# Patient Record
Sex: FEMALE | Race: WHITE | NOT HISPANIC OR LATINO | Employment: UNEMPLOYED | URBAN - NONMETROPOLITAN AREA
[De-identification: names, ages, dates, MRNs, and addresses within clinical notes are randomized per-mention and may not be internally consistent; named-entity substitution may affect disease eponyms.]

---

## 2022-09-11 ENCOUNTER — APPOINTMENT (OUTPATIENT)
Dept: ULTRASOUND IMAGING | Facility: HOSPITAL | Age: 72
End: 2022-09-11

## 2022-09-11 ENCOUNTER — APPOINTMENT (OUTPATIENT)
Dept: CT IMAGING | Facility: HOSPITAL | Age: 72
End: 2022-09-11

## 2022-09-11 ENCOUNTER — APPOINTMENT (OUTPATIENT)
Dept: GENERAL RADIOLOGY | Facility: HOSPITAL | Age: 72
End: 2022-09-11

## 2022-09-11 ENCOUNTER — HOSPITAL ENCOUNTER (INPATIENT)
Facility: HOSPITAL | Age: 72
LOS: 10 days | Discharge: HOME HEALTH | End: 2022-09-22
Attending: STUDENT IN AN ORGANIZED HEALTH CARE EDUCATION/TRAINING PROGRAM | Admitting: INTERNAL MEDICINE

## 2022-09-11 DIAGNOSIS — N30.01 ACUTE CYSTITIS WITH HEMATURIA: ICD-10-CM

## 2022-09-11 DIAGNOSIS — R53.81 PHYSICAL DEBILITY: ICD-10-CM

## 2022-09-11 DIAGNOSIS — N17.9 AKI (ACUTE KIDNEY INJURY): Primary | ICD-10-CM

## 2022-09-11 DIAGNOSIS — I50.33 ACUTE ON CHRONIC HEART FAILURE WITH PRESERVED EJECTION FRACTION: ICD-10-CM

## 2022-09-11 PROBLEM — N39.0 UTI (URINARY TRACT INFECTION): Status: ACTIVE | Noted: 2022-09-11

## 2022-09-11 LAB
ALBUMIN SERPL-MCNC: 2.99 G/DL (ref 3.5–5.2)
ALBUMIN/GLOB SERPL: 0.9 G/DL
ALP SERPL-CCNC: 132 U/L (ref 39–117)
ALT SERPL W P-5'-P-CCNC: 8 U/L (ref 1–33)
ANION GAP SERPL CALCULATED.3IONS-SCNC: 12.3 MMOL/L (ref 5–15)
APTT PPP: 33.5 SECONDS (ref 26.5–34.5)
AST SERPL-CCNC: 12 U/L (ref 1–32)
BACTERIA UR QL AUTO: ABNORMAL /HPF
BASOPHILS # BLD AUTO: 0.04 10*3/MM3 (ref 0–0.2)
BASOPHILS NFR BLD AUTO: 0.3 % (ref 0–1.5)
BILIRUB SERPL-MCNC: 0.4 MG/DL (ref 0–1.2)
BILIRUB UR QL STRIP: NEGATIVE
BUN SERPL-MCNC: 36 MG/DL (ref 8–23)
BUN/CREAT SERPL: 19.7 (ref 7–25)
CALCIUM SPEC-SCNC: 8.7 MG/DL (ref 8.6–10.5)
CHLORIDE SERPL-SCNC: 97 MMOL/L (ref 98–107)
CLARITY UR: ABNORMAL
CO2 SERPL-SCNC: 17.7 MMOL/L (ref 22–29)
COLOR UR: ABNORMAL
CREAT SERPL-MCNC: 1.83 MG/DL (ref 0.57–1)
CRP SERPL-MCNC: 9.5 MG/DL (ref 0–0.5)
D-LACTATE SERPL-SCNC: 1 MMOL/L (ref 0.5–2)
DEPRECATED RDW RBC AUTO: 45.7 FL (ref 37–54)
EGFRCR SERPLBLD CKD-EPI 2021: 29 ML/MIN/1.73
EOSINOPHIL # BLD AUTO: 0.05 10*3/MM3 (ref 0–0.4)
EOSINOPHIL NFR BLD AUTO: 0.4 % (ref 0.3–6.2)
ERYTHROCYTE [DISTWIDTH] IN BLOOD BY AUTOMATED COUNT: 14.6 % (ref 12.3–15.4)
FLUAV SUBTYP SPEC NAA+PROBE: NOT DETECTED
FLUBV RNA ISLT QL NAA+PROBE: NOT DETECTED
GLOBULIN UR ELPH-MCNC: 3.4 GM/DL
GLUCOSE SERPL-MCNC: 143 MG/DL (ref 65–99)
GLUCOSE UR STRIP-MCNC: NEGATIVE MG/DL
HCT VFR BLD AUTO: 34.4 % (ref 34–46.6)
HGB BLD-MCNC: 11.3 G/DL (ref 12–15.9)
HGB UR QL STRIP.AUTO: ABNORMAL
HYALINE CASTS UR QL AUTO: ABNORMAL /LPF
IMM GRANULOCYTES # BLD AUTO: 0.09 10*3/MM3 (ref 0–0.05)
IMM GRANULOCYTES NFR BLD AUTO: 0.6 % (ref 0–0.5)
INR PPP: 1.07 (ref 0.9–1.1)
KETONES UR QL STRIP: ABNORMAL
LEUKOCYTE ESTERASE UR QL STRIP.AUTO: ABNORMAL
LIPASE SERPL-CCNC: 34 U/L (ref 13–60)
LYMPHOCYTES # BLD AUTO: 0.99 10*3/MM3 (ref 0.7–3.1)
LYMPHOCYTES NFR BLD AUTO: 7 % (ref 19.6–45.3)
MAGNESIUM SERPL-MCNC: 2.1 MG/DL (ref 1.6–2.4)
MCH RBC QN AUTO: 28.2 PG (ref 26.6–33)
MCHC RBC AUTO-ENTMCNC: 32.8 G/DL (ref 31.5–35.7)
MCV RBC AUTO: 85.8 FL (ref 79–97)
MONOCYTES # BLD AUTO: 1.02 10*3/MM3 (ref 0.1–0.9)
MONOCYTES NFR BLD AUTO: 7.2 % (ref 5–12)
NEUTROPHILS NFR BLD AUTO: 11.94 10*3/MM3 (ref 1.7–7)
NEUTROPHILS NFR BLD AUTO: 84.5 % (ref 42.7–76)
NITRITE UR QL STRIP: POSITIVE
NRBC BLD AUTO-RTO: 0 /100 WBC (ref 0–0.2)
PH UR STRIP.AUTO: 7 [PH] (ref 5–8)
PLATELET # BLD AUTO: 298 10*3/MM3 (ref 140–450)
PMV BLD AUTO: 8.5 FL (ref 6–12)
POTASSIUM SERPL-SCNC: 4.9 MMOL/L (ref 3.5–5.2)
PROT SERPL-MCNC: 6.4 G/DL (ref 6–8.5)
PROT UR QL STRIP: ABNORMAL
PROTHROMBIN TIME: 14.1 SECONDS (ref 12.1–14.7)
RBC # BLD AUTO: 4.01 10*6/MM3 (ref 3.77–5.28)
RBC # UR STRIP: ABNORMAL /HPF
REF LAB TEST METHOD: ABNORMAL
SARS-COV-2 RNA PNL SPEC NAA+PROBE: NOT DETECTED
SODIUM SERPL-SCNC: 127 MMOL/L (ref 136–145)
SP GR UR STRIP: 1.01 (ref 1–1.03)
SQUAMOUS #/AREA URNS HPF: ABNORMAL /HPF
UROBILINOGEN UR QL STRIP: ABNORMAL
WBC # UR STRIP: ABNORMAL /HPF
WBC NRBC COR # BLD: 14.13 10*3/MM3 (ref 3.4–10.8)

## 2022-09-11 PROCEDURE — G0378 HOSPITAL OBSERVATION PER HR: HCPCS

## 2022-09-11 PROCEDURE — 87077 CULTURE AEROBIC IDENTIFY: CPT | Performed by: STUDENT IN AN ORGANIZED HEALTH CARE EDUCATION/TRAINING PROGRAM

## 2022-09-11 PROCEDURE — 74176 CT ABD & PELVIS W/O CONTRAST: CPT

## 2022-09-11 PROCEDURE — 83690 ASSAY OF LIPASE: CPT | Performed by: STUDENT IN AN ORGANIZED HEALTH CARE EDUCATION/TRAINING PROGRAM

## 2022-09-11 PROCEDURE — 87186 SC STD MICRODIL/AGAR DIL: CPT | Performed by: STUDENT IN AN ORGANIZED HEALTH CARE EDUCATION/TRAINING PROGRAM

## 2022-09-11 PROCEDURE — P9612 CATHETERIZE FOR URINE SPEC: HCPCS

## 2022-09-11 PROCEDURE — 87086 URINE CULTURE/COLONY COUNT: CPT | Performed by: STUDENT IN AN ORGANIZED HEALTH CARE EDUCATION/TRAINING PROGRAM

## 2022-09-11 PROCEDURE — 76705 ECHO EXAM OF ABDOMEN: CPT

## 2022-09-11 PROCEDURE — 71045 X-RAY EXAM CHEST 1 VIEW: CPT

## 2022-09-11 PROCEDURE — 83605 ASSAY OF LACTIC ACID: CPT | Performed by: STUDENT IN AN ORGANIZED HEALTH CARE EDUCATION/TRAINING PROGRAM

## 2022-09-11 PROCEDURE — 80053 COMPREHEN METABOLIC PANEL: CPT | Performed by: STUDENT IN AN ORGANIZED HEALTH CARE EDUCATION/TRAINING PROGRAM

## 2022-09-11 PROCEDURE — 85025 COMPLETE CBC W/AUTO DIFF WBC: CPT | Performed by: STUDENT IN AN ORGANIZED HEALTH CARE EDUCATION/TRAINING PROGRAM

## 2022-09-11 PROCEDURE — 87636 SARSCOV2 & INF A&B AMP PRB: CPT | Performed by: STUDENT IN AN ORGANIZED HEALTH CARE EDUCATION/TRAINING PROGRAM

## 2022-09-11 PROCEDURE — 99285 EMERGENCY DEPT VISIT HI MDM: CPT

## 2022-09-11 PROCEDURE — 87040 BLOOD CULTURE FOR BACTERIA: CPT | Performed by: STUDENT IN AN ORGANIZED HEALTH CARE EDUCATION/TRAINING PROGRAM

## 2022-09-11 PROCEDURE — 93005 ELECTROCARDIOGRAM TRACING: CPT | Performed by: STUDENT IN AN ORGANIZED HEALTH CARE EDUCATION/TRAINING PROGRAM

## 2022-09-11 PROCEDURE — 81001 URINALYSIS AUTO W/SCOPE: CPT | Performed by: STUDENT IN AN ORGANIZED HEALTH CARE EDUCATION/TRAINING PROGRAM

## 2022-09-11 PROCEDURE — 25010000002 CEFTRIAXONE PER 250 MG

## 2022-09-11 PROCEDURE — 99223 1ST HOSP IP/OBS HIGH 75: CPT | Performed by: PHYSICIAN ASSISTANT

## 2022-09-11 PROCEDURE — 86140 C-REACTIVE PROTEIN: CPT | Performed by: STUDENT IN AN ORGANIZED HEALTH CARE EDUCATION/TRAINING PROGRAM

## 2022-09-11 PROCEDURE — 93010 ELECTROCARDIOGRAM REPORT: CPT | Performed by: INTERNAL MEDICINE

## 2022-09-11 PROCEDURE — 83735 ASSAY OF MAGNESIUM: CPT | Performed by: STUDENT IN AN ORGANIZED HEALTH CARE EDUCATION/TRAINING PROGRAM

## 2022-09-11 PROCEDURE — 85610 PROTHROMBIN TIME: CPT | Performed by: STUDENT IN AN ORGANIZED HEALTH CARE EDUCATION/TRAINING PROGRAM

## 2022-09-11 PROCEDURE — 85730 THROMBOPLASTIN TIME PARTIAL: CPT | Performed by: STUDENT IN AN ORGANIZED HEALTH CARE EDUCATION/TRAINING PROGRAM

## 2022-09-11 RX ORDER — CHOLECALCIFEROL (VITAMIN D3) 125 MCG
10 CAPSULE ORAL NIGHTLY PRN
Status: DISCONTINUED | OUTPATIENT
Start: 2022-09-11 | End: 2022-09-22 | Stop reason: HOSPADM

## 2022-09-11 RX ORDER — INSULIN ASPART 100 [IU]/ML
0-7 INJECTION, SOLUTION INTRAVENOUS; SUBCUTANEOUS
Status: DISCONTINUED | OUTPATIENT
Start: 2022-09-12 | End: 2022-09-22 | Stop reason: HOSPADM

## 2022-09-11 RX ORDER — DEXTROSE MONOHYDRATE 25 G/50ML
25 INJECTION, SOLUTION INTRAVENOUS
Status: DISCONTINUED | OUTPATIENT
Start: 2022-09-11 | End: 2022-09-22 | Stop reason: HOSPADM

## 2022-09-11 RX ORDER — SODIUM CHLORIDE 0.9 % (FLUSH) 0.9 %
3-10 SYRINGE (ML) INJECTION AS NEEDED
Status: DISCONTINUED | OUTPATIENT
Start: 2022-09-11 | End: 2022-09-22 | Stop reason: HOSPADM

## 2022-09-11 RX ORDER — PROCHLORPERAZINE EDISYLATE 5 MG/ML
5 INJECTION INTRAMUSCULAR; INTRAVENOUS EVERY 6 HOURS PRN
Status: DISCONTINUED | OUTPATIENT
Start: 2022-09-11 | End: 2022-09-22 | Stop reason: HOSPADM

## 2022-09-11 RX ORDER — NICOTINE POLACRILEX 4 MG
15 LOZENGE BUCCAL
Status: DISCONTINUED | OUTPATIENT
Start: 2022-09-11 | End: 2022-09-22 | Stop reason: HOSPADM

## 2022-09-11 RX ORDER — METRONIDAZOLE 500 MG/100ML
500 INJECTION, SOLUTION INTRAVENOUS EVERY 8 HOURS
Status: COMPLETED | OUTPATIENT
Start: 2022-09-12 | End: 2022-09-18

## 2022-09-11 RX ORDER — HEPARIN SODIUM 5000 [USP'U]/ML
5000 INJECTION, SOLUTION INTRAVENOUS; SUBCUTANEOUS EVERY 12 HOURS SCHEDULED
Status: DISCONTINUED | OUTPATIENT
Start: 2022-09-12 | End: 2022-09-12

## 2022-09-11 RX ORDER — CALCIUM CARBONATE 200(500)MG
2 TABLET,CHEWABLE ORAL 3 TIMES DAILY PRN
Status: DISCONTINUED | OUTPATIENT
Start: 2022-09-11 | End: 2022-09-22 | Stop reason: HOSPADM

## 2022-09-11 RX ORDER — HYDROXYZINE HYDROCHLORIDE 25 MG/1
25 TABLET, FILM COATED ORAL 3 TIMES DAILY PRN
Status: DISCONTINUED | OUTPATIENT
Start: 2022-09-11 | End: 2022-09-22 | Stop reason: HOSPADM

## 2022-09-11 RX ORDER — NITROGLYCERIN 0.4 MG/1
0.4 TABLET SUBLINGUAL
Status: DISCONTINUED | OUTPATIENT
Start: 2022-09-11 | End: 2022-09-22 | Stop reason: HOSPADM

## 2022-09-11 RX ORDER — SODIUM CHLORIDE 0.9 % (FLUSH) 0.9 %
3 SYRINGE (ML) INJECTION EVERY 12 HOURS SCHEDULED
Status: DISCONTINUED | OUTPATIENT
Start: 2022-09-12 | End: 2022-09-22 | Stop reason: HOSPADM

## 2022-09-11 RX ORDER — PANTOPRAZOLE SODIUM 40 MG/1
40 TABLET, DELAYED RELEASE ORAL
Status: DISCONTINUED | OUTPATIENT
Start: 2022-09-12 | End: 2022-09-22 | Stop reason: HOSPADM

## 2022-09-11 RX ADMIN — SODIUM CHLORIDE, POTASSIUM CHLORIDE, SODIUM LACTATE AND CALCIUM CHLORIDE 1000 ML: 600; 310; 30; 20 INJECTION, SOLUTION INTRAVENOUS at 18:50

## 2022-09-11 RX ADMIN — HEPARIN SODIUM 5000 UNITS: 5000 INJECTION INTRAVENOUS; SUBCUTANEOUS at 23:45

## 2022-09-11 RX ADMIN — Medication 3 ML: at 23:56

## 2022-09-12 PROBLEM — A41.9 SEPSIS: Status: ACTIVE | Noted: 2022-09-12

## 2022-09-12 LAB
A-A DO2: 88.3 MMHG (ref 0–300)
ALBUMIN SERPL-MCNC: 2.82 G/DL (ref 3.5–5.2)
ALBUMIN/GLOB SERPL: 0.8 G/DL
ALP SERPL-CCNC: 127 U/L (ref 39–117)
ALT SERPL W P-5'-P-CCNC: 7 U/L (ref 1–33)
ANION GAP SERPL CALCULATED.3IONS-SCNC: 10.8 MMOL/L (ref 5–15)
ARTERIAL PATENCY WRIST A: POSITIVE
AST SERPL-CCNC: 10 U/L (ref 1–32)
ATMOSPHERIC PRESS: 725 MMHG
BASE EXCESS BLDA CALC-SCNC: -2.4 MMOL/L (ref 0–2)
BASOPHILS # BLD AUTO: 0.04 10*3/MM3 (ref 0–0.2)
BASOPHILS NFR BLD AUTO: 0.3 % (ref 0–1.5)
BDY SITE: ABNORMAL
BILIRUB SERPL-MCNC: 0.3 MG/DL (ref 0–1.2)
BODY TEMPERATURE: 0 C
BUN SERPL-MCNC: 33 MG/DL (ref 8–23)
BUN/CREAT SERPL: 19.1 (ref 7–25)
CALCIUM SPEC-SCNC: 8.5 MG/DL (ref 8.6–10.5)
CHLORIDE SERPL-SCNC: 98 MMOL/L (ref 98–107)
CHOLEST SERPL-MCNC: 119 MG/DL (ref 0–200)
CO2 BLDA-SCNC: 22.9 MMOL/L (ref 22–33)
CO2 SERPL-SCNC: 18.2 MMOL/L (ref 22–29)
COHGB MFR BLD: 1.2 % (ref 0–5)
CREAT SERPL-MCNC: 1.73 MG/DL (ref 0.57–1)
CRP SERPL-MCNC: 8.29 MG/DL (ref 0–0.5)
DEPRECATED RDW RBC AUTO: 48.9 FL (ref 37–54)
EGFRCR SERPLBLD CKD-EPI 2021: 31.1 ML/MIN/1.73
EOSINOPHIL # BLD AUTO: 0.02 10*3/MM3 (ref 0–0.4)
EOSINOPHIL NFR BLD AUTO: 0.2 % (ref 0.3–6.2)
ERYTHROCYTE [DISTWIDTH] IN BLOOD BY AUTOMATED COUNT: 14.6 % (ref 12.3–15.4)
FERRITIN SERPL-MCNC: 163.1 NG/ML (ref 13–150)
FOLATE SERPL-MCNC: 6.06 NG/ML (ref 4.78–24.2)
GLOBULIN UR ELPH-MCNC: 3.5 GM/DL
GLUCOSE BLDC GLUCOMTR-MCNC: 106 MG/DL (ref 70–130)
GLUCOSE BLDC GLUCOMTR-MCNC: 111 MG/DL (ref 70–130)
GLUCOSE BLDC GLUCOMTR-MCNC: 113 MG/DL (ref 70–130)
GLUCOSE BLDC GLUCOMTR-MCNC: 165 MG/DL (ref 70–130)
GLUCOSE SERPL-MCNC: 130 MG/DL (ref 65–99)
HBA1C MFR BLD: 7.6 % (ref 4.8–5.6)
HCO3 BLDA-SCNC: 21.9 MMOL/L (ref 20–26)
HCT VFR BLD AUTO: 36.8 % (ref 34–46.6)
HCT VFR BLD CALC: 34.3 % (ref 38–51)
HDLC SERPL-MCNC: 38 MG/DL (ref 40–60)
HGB BLD-MCNC: 11.3 G/DL (ref 12–15.9)
HGB BLDA-MCNC: 11.2 G/DL (ref 13.5–17.5)
IMM GRANULOCYTES # BLD AUTO: 0.05 10*3/MM3 (ref 0–0.05)
IMM GRANULOCYTES NFR BLD AUTO: 0.4 % (ref 0–0.5)
INHALED O2 CONCENTRATION: 28 %
IRON 24H UR-MRATE: 27 MCG/DL (ref 37–145)
IRON SATN MFR SERPL: 10 % (ref 20–50)
LDLC SERPL CALC-MCNC: 64 MG/DL (ref 0–100)
LDLC/HDLC SERPL: 1.68 {RATIO}
LYMPHOCYTES # BLD AUTO: 1.15 10*3/MM3 (ref 0.7–3.1)
LYMPHOCYTES NFR BLD AUTO: 9.6 % (ref 19.6–45.3)
Lab: ABNORMAL
MAGNESIUM SERPL-MCNC: 2.1 MG/DL (ref 1.6–2.4)
MCH RBC QN AUTO: 27.8 PG (ref 26.6–33)
MCHC RBC AUTO-ENTMCNC: 30.7 G/DL (ref 31.5–35.7)
MCV RBC AUTO: 90.4 FL (ref 79–97)
METHGB BLD QL: 0.1 % (ref 0–3)
MODALITY: ABNORMAL
MONOCYTES # BLD AUTO: 0.81 10*3/MM3 (ref 0.1–0.9)
MONOCYTES NFR BLD AUTO: 6.8 % (ref 5–12)
NEUTROPHILS NFR BLD AUTO: 82.7 % (ref 42.7–76)
NEUTROPHILS NFR BLD AUTO: 9.93 10*3/MM3 (ref 1.7–7)
NOTE: ABNORMAL
NRBC BLD AUTO-RTO: 0 /100 WBC (ref 0–0.2)
NT-PROBNP SERPL-MCNC: ABNORMAL PG/ML (ref 0–900)
OSMOLALITY SERPL: 279 MOSM/KG (ref 280–301)
OSMOLALITY UR: 279 MOSM/KG
OXYHGB MFR BLDV: 91.7 % (ref 94–99)
PCO2 BLDA: 35 MM HG (ref 35–45)
PCO2 TEMP ADJ BLD: ABNORMAL MM[HG]
PH BLDA: 7.4 PH UNITS (ref 7.35–7.45)
PH, TEMP CORRECTED: ABNORMAL
PLATELET # BLD AUTO: 278 10*3/MM3 (ref 140–450)
PMV BLD AUTO: 8.9 FL (ref 6–12)
PO2 BLDA: 62.3 MM HG (ref 83–108)
PO2 TEMP ADJ BLD: ABNORMAL MM[HG]
POTASSIUM SERPL-SCNC: 5.2 MMOL/L (ref 3.5–5.2)
PROCALCITONIN SERPL-MCNC: 3.24 NG/ML (ref 0–0.25)
PROT ?TM UR-MCNC: 138.1 MG/DL
PROT SERPL-MCNC: 6.3 G/DL (ref 6–8.5)
QT INTERVAL: 408 MS
QTC INTERVAL: 446 MS
RBC # BLD AUTO: 4.07 10*6/MM3 (ref 3.77–5.28)
SAO2 % BLDCOA: 92.9 % (ref 94–99)
SODIUM SERPL-SCNC: 127 MMOL/L (ref 136–145)
SODIUM UR-SCNC: 25 MMOL/L
TIBC SERPL-MCNC: 258 MCG/DL (ref 298–536)
TRANSFERRIN SERPL-MCNC: 173 MG/DL (ref 200–360)
TRIGL SERPL-MCNC: 85 MG/DL (ref 0–150)
TROPONIN T SERPL-MCNC: <0.01 NG/ML (ref 0–0.03)
TSH SERPL DL<=0.05 MIU/L-ACNC: 1.4 UIU/ML (ref 0.27–4.2)
VENTILATOR MODE: ABNORMAL
VIT B12 BLD-MCNC: 300 PG/ML (ref 211–946)
VLDLC SERPL-MCNC: 17 MG/DL (ref 5–40)
WBC NRBC COR # BLD: 12 10*3/MM3 (ref 3.4–10.8)

## 2022-09-12 PROCEDURE — 25010000002 HEPARIN (PORCINE) PER 1000 UNITS: Performed by: HOSPITALIST

## 2022-09-12 PROCEDURE — 82962 GLUCOSE BLOOD TEST: CPT

## 2022-09-12 PROCEDURE — 99222 1ST HOSP IP/OBS MODERATE 55: CPT | Performed by: SPECIALIST

## 2022-09-12 PROCEDURE — 83880 ASSAY OF NATRIURETIC PEPTIDE: CPT | Performed by: PHYSICIAN ASSISTANT

## 2022-09-12 PROCEDURE — 83935 ASSAY OF URINE OSMOLALITY: CPT | Performed by: PHYSICIAN ASSISTANT

## 2022-09-12 PROCEDURE — 80061 LIPID PANEL: CPT | Performed by: PHYSICIAN ASSISTANT

## 2022-09-12 PROCEDURE — 80053 COMPREHEN METABOLIC PANEL: CPT | Performed by: PHYSICIAN ASSISTANT

## 2022-09-12 PROCEDURE — 36600 WITHDRAWAL OF ARTERIAL BLOOD: CPT

## 2022-09-12 PROCEDURE — 82746 ASSAY OF FOLIC ACID SERUM: CPT | Performed by: PHYSICIAN ASSISTANT

## 2022-09-12 PROCEDURE — 84443 ASSAY THYROID STIM HORMONE: CPT | Performed by: HOSPITALIST

## 2022-09-12 PROCEDURE — 82607 VITAMIN B-12: CPT | Performed by: PHYSICIAN ASSISTANT

## 2022-09-12 PROCEDURE — 83050 HGB METHEMOGLOBIN QUAN: CPT

## 2022-09-12 PROCEDURE — 83735 ASSAY OF MAGNESIUM: CPT | Performed by: PHYSICIAN ASSISTANT

## 2022-09-12 PROCEDURE — 83930 ASSAY OF BLOOD OSMOLALITY: CPT | Performed by: PHYSICIAN ASSISTANT

## 2022-09-12 PROCEDURE — 85025 COMPLETE CBC W/AUTO DIFF WBC: CPT | Performed by: PHYSICIAN ASSISTANT

## 2022-09-12 PROCEDURE — 25010000002 CEFTRIAXONE PER 250 MG: Performed by: PHYSICIAN ASSISTANT

## 2022-09-12 PROCEDURE — 84145 PROCALCITONIN (PCT): CPT | Performed by: HOSPITALIST

## 2022-09-12 PROCEDURE — 84300 ASSAY OF URINE SODIUM: CPT | Performed by: PHYSICIAN ASSISTANT

## 2022-09-12 PROCEDURE — 86140 C-REACTIVE PROTEIN: CPT | Performed by: PHYSICIAN ASSISTANT

## 2022-09-12 PROCEDURE — 97166 OT EVAL MOD COMPLEX 45 MIN: CPT

## 2022-09-12 PROCEDURE — 84484 ASSAY OF TROPONIN QUANT: CPT | Performed by: HOSPITALIST

## 2022-09-12 PROCEDURE — 82375 ASSAY CARBOXYHB QUANT: CPT

## 2022-09-12 PROCEDURE — 83540 ASSAY OF IRON: CPT | Performed by: PHYSICIAN ASSISTANT

## 2022-09-12 PROCEDURE — 99222 1ST HOSP IP/OBS MODERATE 55: CPT | Performed by: SURGERY

## 2022-09-12 PROCEDURE — 83036 HEMOGLOBIN GLYCOSYLATED A1C: CPT | Performed by: PHYSICIAN ASSISTANT

## 2022-09-12 PROCEDURE — 82728 ASSAY OF FERRITIN: CPT | Performed by: PHYSICIAN ASSISTANT

## 2022-09-12 PROCEDURE — 25010000002 FUROSEMIDE PER 20 MG: Performed by: HOSPITALIST

## 2022-09-12 PROCEDURE — 99232 SBSQ HOSP IP/OBS MODERATE 35: CPT | Performed by: HOSPITALIST

## 2022-09-12 PROCEDURE — 82805 BLOOD GASES W/O2 SATURATION: CPT

## 2022-09-12 PROCEDURE — 25010000002 HEPARIN (PORCINE) PER 1000 UNITS: Performed by: INTERNAL MEDICINE

## 2022-09-12 PROCEDURE — 82570 ASSAY OF URINE CREATININE: CPT | Performed by: PHYSICIAN ASSISTANT

## 2022-09-12 PROCEDURE — 84156 ASSAY OF PROTEIN URINE: CPT | Performed by: PHYSICIAN ASSISTANT

## 2022-09-12 PROCEDURE — 97162 PT EVAL MOD COMPLEX 30 MIN: CPT

## 2022-09-12 PROCEDURE — 84466 ASSAY OF TRANSFERRIN: CPT | Performed by: PHYSICIAN ASSISTANT

## 2022-09-12 RX ORDER — FUROSEMIDE 10 MG/ML
40 INJECTION INTRAMUSCULAR; INTRAVENOUS ONCE
Status: COMPLETED | OUTPATIENT
Start: 2022-09-12 | End: 2022-09-12

## 2022-09-12 RX ORDER — AMLODIPINE BESYLATE 5 MG/1
5 TABLET ORAL
Status: DISCONTINUED | OUTPATIENT
Start: 2022-09-13 | End: 2022-09-22 | Stop reason: HOSPADM

## 2022-09-12 RX ORDER — ISOSORBIDE MONONITRATE 30 MG/1
30 TABLET, EXTENDED RELEASE ORAL DAILY
Status: DISCONTINUED | OUTPATIENT
Start: 2022-09-12 | End: 2022-09-22 | Stop reason: HOSPADM

## 2022-09-12 RX ORDER — LANOLIN ALCOHOL/MO/W.PET/CERES
1000 CREAM (GRAM) TOPICAL DAILY
Status: DISCONTINUED | OUTPATIENT
Start: 2022-09-13 | End: 2022-09-22 | Stop reason: HOSPADM

## 2022-09-12 RX ORDER — FOLIC ACID 1 MG/1
1 TABLET ORAL DAILY
Status: DISCONTINUED | OUTPATIENT
Start: 2022-09-13 | End: 2022-09-22 | Stop reason: HOSPADM

## 2022-09-12 RX ORDER — ATORVASTATIN CALCIUM 20 MG/1
20 TABLET, FILM COATED ORAL DAILY
COMMUNITY

## 2022-09-12 RX ORDER — HEPARIN SODIUM 5000 [USP'U]/ML
5000 INJECTION, SOLUTION INTRAVENOUS; SUBCUTANEOUS EVERY 8 HOURS SCHEDULED
Status: DISCONTINUED | OUTPATIENT
Start: 2022-09-12 | End: 2022-09-22 | Stop reason: HOSPADM

## 2022-09-12 RX ORDER — ATORVASTATIN CALCIUM 20 MG/1
20 TABLET, FILM COATED ORAL DAILY
Status: DISCONTINUED | OUTPATIENT
Start: 2022-09-12 | End: 2022-09-22 | Stop reason: HOSPADM

## 2022-09-12 RX ORDER — LOSARTAN POTASSIUM 50 MG/1
100 TABLET ORAL DAILY
Status: CANCELLED | OUTPATIENT
Start: 2022-09-12

## 2022-09-12 RX ORDER — AMLODIPINE BESYLATE 10 MG/1
10 TABLET ORAL DAILY
Status: CANCELLED | OUTPATIENT
Start: 2022-09-12

## 2022-09-12 RX ORDER — AMLODIPINE BESYLATE 10 MG/1
10 TABLET ORAL DAILY
COMMUNITY
End: 2023-01-04

## 2022-09-12 RX ORDER — LOSARTAN POTASSIUM 100 MG/1
100 TABLET ORAL DAILY
COMMUNITY
End: 2022-09-22 | Stop reason: HOSPADM

## 2022-09-12 RX ORDER — SODIUM BICARBONATE 650 MG/1
650 TABLET ORAL 3 TIMES DAILY
Status: DISCONTINUED | OUTPATIENT
Start: 2022-09-12 | End: 2022-09-22 | Stop reason: HOSPADM

## 2022-09-12 RX ORDER — ISOSORBIDE MONONITRATE 60 MG/1
60 TABLET, EXTENDED RELEASE ORAL EVERY MORNING
COMMUNITY
End: 2022-09-22 | Stop reason: HOSPADM

## 2022-09-12 RX ORDER — BISOPROLOL FUMARATE 10 MG/1
10 TABLET, FILM COATED ORAL DAILY
COMMUNITY
End: 2022-09-22 | Stop reason: HOSPADM

## 2022-09-12 RX ORDER — NYSTATIN 100000 [USP'U]/G
POWDER TOPICAL EVERY 12 HOURS SCHEDULED
Status: DISCONTINUED | OUTPATIENT
Start: 2022-09-12 | End: 2022-09-22 | Stop reason: HOSPADM

## 2022-09-12 RX ORDER — BISOPROLOL FUMARATE 5 MG/1
10 TABLET, FILM COATED ORAL DAILY
Status: CANCELLED | OUTPATIENT
Start: 2022-09-12

## 2022-09-12 RX ORDER — IRON POLYSACCHARIDE COMPLEX 150 MG
150 CAPSULE ORAL DAILY
Status: DISCONTINUED | OUTPATIENT
Start: 2022-09-13 | End: 2022-09-14

## 2022-09-12 RX ADMIN — CEFTRIAXONE 1 G: 1 INJECTION, POWDER, FOR SOLUTION INTRAMUSCULAR; INTRAVENOUS at 20:50

## 2022-09-12 RX ADMIN — ISOSORBIDE MONONITRATE 30 MG: 30 TABLET, EXTENDED RELEASE ORAL at 12:58

## 2022-09-12 RX ADMIN — METRONIDAZOLE 500 MG: 500 INJECTION, SOLUTION INTRAVENOUS at 08:29

## 2022-09-12 RX ADMIN — Medication 3 ML: at 20:49

## 2022-09-12 RX ADMIN — HEPARIN SODIUM 5000 UNITS: 5000 INJECTION INTRAVENOUS; SUBCUTANEOUS at 12:59

## 2022-09-12 RX ADMIN — SODIUM BICARBONATE TAB 650 MG 650 MG: 650 TAB at 12:58

## 2022-09-12 RX ADMIN — FUROSEMIDE 40 MG: 10 INJECTION, SOLUTION INTRAVENOUS at 17:37

## 2022-09-12 RX ADMIN — ATORVASTATIN CALCIUM 20 MG: 20 TABLET, FILM COATED ORAL at 12:58

## 2022-09-12 RX ADMIN — SODIUM ZIRCONIUM CYCLOSILICATE 10 G: 10 POWDER, FOR SUSPENSION ORAL at 12:59

## 2022-09-12 RX ADMIN — SODIUM BICARBONATE TAB 650 MG 650 MG: 650 TAB at 15:19

## 2022-09-12 RX ADMIN — HEPARIN SODIUM 5000 UNITS: 5000 INJECTION INTRAVENOUS; SUBCUTANEOUS at 08:29

## 2022-09-12 RX ADMIN — METRONIDAZOLE 500 MG: 500 INJECTION, SOLUTION INTRAVENOUS at 01:30

## 2022-09-12 RX ADMIN — Medication 10 MG: at 20:48

## 2022-09-12 RX ADMIN — Medication 3 ML: at 08:29

## 2022-09-12 RX ADMIN — SODIUM BICARBONATE TAB 650 MG 650 MG: 650 TAB at 20:48

## 2022-09-12 RX ADMIN — NYSTATIN: 100000 POWDER TOPICAL at 20:49

## 2022-09-12 RX ADMIN — HEPARIN SODIUM 5000 UNITS: 5000 INJECTION INTRAVENOUS; SUBCUTANEOUS at 20:48

## 2022-09-12 RX ADMIN — METRONIDAZOLE 500 MG: 500 INJECTION, SOLUTION INTRAVENOUS at 15:19

## 2022-09-12 RX ADMIN — NYSTATIN: 100000 POWDER TOPICAL at 10:16

## 2022-09-13 ENCOUNTER — APPOINTMENT (OUTPATIENT)
Dept: NUCLEAR MEDICINE | Facility: HOSPITAL | Age: 72
End: 2022-09-13

## 2022-09-13 ENCOUNTER — APPOINTMENT (OUTPATIENT)
Dept: CT IMAGING | Facility: HOSPITAL | Age: 72
End: 2022-09-13

## 2022-09-13 ENCOUNTER — APPOINTMENT (OUTPATIENT)
Dept: ULTRASOUND IMAGING | Facility: HOSPITAL | Age: 72
End: 2022-09-13

## 2022-09-13 ENCOUNTER — APPOINTMENT (OUTPATIENT)
Dept: CARDIOLOGY | Facility: HOSPITAL | Age: 72
End: 2022-09-13

## 2022-09-13 LAB
ANION GAP SERPL CALCULATED.3IONS-SCNC: 11.9 MMOL/L (ref 5–15)
BACTERIA SPEC AEROBE CULT: ABNORMAL
BACTERIA UR QL AUTO: ABNORMAL /HPF
BASOPHILS # BLD AUTO: 0.03 10*3/MM3 (ref 0–0.2)
BASOPHILS NFR BLD AUTO: 0.3 % (ref 0–1.5)
BH CV ECHO MEAS - ACS: 2 CM
BH CV ECHO MEAS - AO MAX PG: 9.1 MMHG
BH CV ECHO MEAS - AO MEAN PG: 4 MMHG
BH CV ECHO MEAS - AO ROOT DIAM: 3.3 CM
BH CV ECHO MEAS - AO V2 MAX: 151 CM/SEC
BH CV ECHO MEAS - AO V2 VTI: 34.3 CM
BH CV ECHO MEAS - EDV(CUBED): 145.9 ML
BH CV ECHO MEAS - EDV(MOD-SP4): 87.2 ML
BH CV ECHO MEAS - EF(MOD-SP4): 63.3 %
BH CV ECHO MEAS - ESV(CUBED): 39.4 ML
BH CV ECHO MEAS - ESV(MOD-SP4): 32 ML
BH CV ECHO MEAS - FS: 35.4 %
BH CV ECHO MEAS - IVS/LVPW: 1.19 CM
BH CV ECHO MEAS - IVSD: 1.59 CM
BH CV ECHO MEAS - LA DIMENSION: 4.3 CM
BH CV ECHO MEAS - LAT PEAK E' VEL: 9.9 CM/SEC
BH CV ECHO MEAS - LV DIASTOLIC VOL/BSA (35-75): 35 CM2
BH CV ECHO MEAS - LV MASS(C)D: 334.6 GRAMS
BH CV ECHO MEAS - LV SYSTOLIC VOL/BSA (12-30): 12.8 CM2
BH CV ECHO MEAS - LVIDD: 5.3 CM
BH CV ECHO MEAS - LVIDS: 3.4 CM
BH CV ECHO MEAS - LVOT AREA: 3.1 CM2
BH CV ECHO MEAS - LVOT DIAM: 2 CM
BH CV ECHO MEAS - LVPWD: 1.33 CM
BH CV ECHO MEAS - MED PEAK E' VEL: 8.3 CM/SEC
BH CV ECHO MEAS - MV A MAX VEL: 65.1 CM/SEC
BH CV ECHO MEAS - MV E MAX VEL: 97.7 CM/SEC
BH CV ECHO MEAS - MV E/A: 1.5
BH CV ECHO MEAS - PA ACC TIME: 0.05 SEC
BH CV ECHO MEAS - PA PR(ACCEL): 58.3 MMHG
BH CV ECHO MEAS - RAP SYSTOLE: 10 MMHG
BH CV ECHO MEAS - RVSP: 112.4 MMHG
BH CV ECHO MEAS - SI(MOD-SP4): 22.1 ML/M2
BH CV ECHO MEAS - SV(MOD-SP4): 55.2 ML
BH CV ECHO MEAS - TAPSE (>1.6): 1.89 CM
BH CV ECHO MEAS - TR MAX PG: 102.4 MMHG
BH CV ECHO MEAS - TR MAX VEL: 506 CM/SEC
BH CV ECHO MEASUREMENTS AVERAGE E/E' RATIO: 10.74
BH CV NUCLEAR PRIOR STUDY: 3
BH CV REST NUCLEAR ISOTOPE DOSE: 10.2 MCI
BH CV STRESS BP STAGE 1: NORMAL
BH CV STRESS COMMENTS STAGE 1: NORMAL
BH CV STRESS DOSE REGADENOSON STAGE 1: 0.4
BH CV STRESS DURATION MIN STAGE 1: 0
BH CV STRESS DURATION SEC STAGE 1: 10
BH CV STRESS HR STAGE 1: 82
BH CV STRESS NUCLEAR ISOTOPE DOSE: 30.2 MCI
BH CV STRESS PROTOCOL 1: NORMAL
BH CV STRESS RECOVERY BP: NORMAL MMHG
BH CV STRESS RECOVERY HR: 80 BPM
BH CV STRESS STAGE 1: 1
BILIRUB UR QL STRIP: NEGATIVE
BUN SERPL-MCNC: 32 MG/DL (ref 8–23)
BUN/CREAT SERPL: 20.4 (ref 7–25)
CALCIUM SPEC-SCNC: 8.8 MG/DL (ref 8.6–10.5)
CHLORIDE SERPL-SCNC: 98 MMOL/L (ref 98–107)
CLARITY UR: CLEAR
CO2 SERPL-SCNC: 20.1 MMOL/L (ref 22–29)
COLOR UR: ABNORMAL
CREAT SERPL-MCNC: 1.57 MG/DL (ref 0.57–1)
CREAT UR-MCNC: 64.3 MG/DL
DEPRECATED RDW RBC AUTO: 46.8 FL (ref 37–54)
EGFRCR SERPLBLD CKD-EPI 2021: 34.9 ML/MIN/1.73
EOSINOPHIL # BLD AUTO: 0.07 10*3/MM3 (ref 0–0.4)
EOSINOPHIL NFR BLD AUTO: 0.8 % (ref 0.3–6.2)
ERYTHROCYTE [DISTWIDTH] IN BLOOD BY AUTOMATED COUNT: 14.6 % (ref 12.3–15.4)
GLUCOSE BLDC GLUCOMTR-MCNC: 127 MG/DL (ref 70–130)
GLUCOSE BLDC GLUCOMTR-MCNC: 138 MG/DL (ref 70–130)
GLUCOSE BLDC GLUCOMTR-MCNC: 165 MG/DL (ref 70–130)
GLUCOSE BLDC GLUCOMTR-MCNC: 231 MG/DL (ref 70–130)
GLUCOSE SERPL-MCNC: 148 MG/DL (ref 65–99)
GLUCOSE UR STRIP-MCNC: NEGATIVE MG/DL
HCT VFR BLD AUTO: 34.8 % (ref 34–46.6)
HGB BLD-MCNC: 11 G/DL (ref 12–15.9)
HGB UR QL STRIP.AUTO: ABNORMAL
HYALINE CASTS UR QL AUTO: ABNORMAL /LPF
IMM GRANULOCYTES # BLD AUTO: 0.07 10*3/MM3 (ref 0–0.05)
IMM GRANULOCYTES NFR BLD AUTO: 0.8 % (ref 0–0.5)
KETONES UR QL STRIP: NEGATIVE
LEUKOCYTE ESTERASE UR QL STRIP.AUTO: ABNORMAL
LV EF NUC BP: 64 %
LYMPHOCYTES # BLD AUTO: 1.32 10*3/MM3 (ref 0.7–3.1)
LYMPHOCYTES NFR BLD AUTO: 14.2 % (ref 19.6–45.3)
MAXIMAL PREDICTED HEART RATE: 148 BPM
MAXIMAL PREDICTED HEART RATE: 148 BPM
MCH RBC QN AUTO: 27.4 PG (ref 26.6–33)
MCHC RBC AUTO-ENTMCNC: 31.6 G/DL (ref 31.5–35.7)
MCV RBC AUTO: 86.8 FL (ref 79–97)
MONOCYTES # BLD AUTO: 0.98 10*3/MM3 (ref 0.1–0.9)
MONOCYTES NFR BLD AUTO: 10.5 % (ref 5–12)
MUCOUS THREADS URNS QL MICRO: ABNORMAL /HPF
NEUTROPHILS NFR BLD AUTO: 6.82 10*3/MM3 (ref 1.7–7)
NEUTROPHILS NFR BLD AUTO: 73.4 % (ref 42.7–76)
NITRITE UR QL STRIP: POSITIVE
NRBC BLD AUTO-RTO: 0 /100 WBC (ref 0–0.2)
PERCENT MAX PREDICTED HR: 55.41 %
PH UR STRIP.AUTO: 5.5 [PH] (ref 5–8)
PLATELET # BLD AUTO: 308 10*3/MM3 (ref 140–450)
PMV BLD AUTO: 8.6 FL (ref 6–12)
POTASSIUM SERPL-SCNC: 4.9 MMOL/L (ref 3.5–5.2)
PROT ?TM UR-MCNC: 130.4 MG/DL
PROT UR QL STRIP: ABNORMAL
PROT/CREAT UR: 2028 MG/G CREA (ref 0–200)
RBC # BLD AUTO: 4.01 10*6/MM3 (ref 3.77–5.28)
RBC # UR STRIP: ABNORMAL /HPF
REF LAB TEST METHOD: ABNORMAL
SODIUM SERPL-SCNC: 130 MMOL/L (ref 136–145)
SP GR UR STRIP: 1.01 (ref 1–1.03)
SQUAMOUS #/AREA URNS HPF: ABNORMAL /HPF
STRESS BASELINE BP: NORMAL MMHG
STRESS BASELINE HR: 74 BPM
STRESS PERCENT HR: 65 %
STRESS POST PEAK BP: NORMAL MMHG
STRESS POST PEAK HR: 82 BPM
STRESS TARGET HR: 126 BPM
STRESS TARGET HR: 126 BPM
UROBILINOGEN UR QL STRIP: ABNORMAL
WBC # UR STRIP: ABNORMAL /HPF
WBC NRBC COR # BLD: 9.29 10*3/MM3 (ref 3.4–10.8)

## 2022-09-13 PROCEDURE — 81001 URINALYSIS AUTO W/SCOPE: CPT | Performed by: INTERNAL MEDICINE

## 2022-09-13 PROCEDURE — 63710000001 INSULIN ASPART PER 5 UNITS: Performed by: PHYSICIAN ASSISTANT

## 2022-09-13 PROCEDURE — 76775 US EXAM ABDO BACK WALL LIM: CPT | Performed by: RADIOLOGY

## 2022-09-13 PROCEDURE — 25010000002 CEFTRIAXONE PER 250 MG: Performed by: PHYSICIAN ASSISTANT

## 2022-09-13 PROCEDURE — 25010000002 HEPARIN (PORCINE) PER 1000 UNITS: Performed by: HOSPITALIST

## 2022-09-13 PROCEDURE — 82962 GLUCOSE BLOOD TEST: CPT

## 2022-09-13 PROCEDURE — 97530 THERAPEUTIC ACTIVITIES: CPT

## 2022-09-13 PROCEDURE — 25010000002 ONDANSETRON PER 1 MG: Performed by: HOSPITALIST

## 2022-09-13 PROCEDURE — 25010000002 REGADENOSON 0.4 MG/5ML SOLUTION: Performed by: HOSPITALIST

## 2022-09-13 PROCEDURE — 93970 EXTREMITY STUDY: CPT | Performed by: RADIOLOGY

## 2022-09-13 PROCEDURE — 99232 SBSQ HOSP IP/OBS MODERATE 35: CPT | Performed by: INTERNAL MEDICINE

## 2022-09-13 PROCEDURE — 78452 HT MUSCLE IMAGE SPECT MULT: CPT

## 2022-09-13 PROCEDURE — 71250 CT THORAX DX C-: CPT | Performed by: RADIOLOGY

## 2022-09-13 PROCEDURE — 25010000002 FUROSEMIDE PER 20 MG: Performed by: INTERNAL MEDICINE

## 2022-09-13 PROCEDURE — 78452 HT MUSCLE IMAGE SPECT MULT: CPT | Performed by: SPECIALIST

## 2022-09-13 PROCEDURE — 76775 US EXAM ABDO BACK WALL LIM: CPT

## 2022-09-13 PROCEDURE — 25010000002 PROCHLORPERAZINE 10 MG/2ML SOLUTION: Performed by: PHYSICIAN ASSISTANT

## 2022-09-13 PROCEDURE — 93306 TTE W/DOPPLER COMPLETE: CPT

## 2022-09-13 PROCEDURE — A9500 TC99M SESTAMIBI: HCPCS | Performed by: HOSPITALIST

## 2022-09-13 PROCEDURE — 85025 COMPLETE CBC W/AUTO DIFF WBC: CPT | Performed by: HOSPITALIST

## 2022-09-13 PROCEDURE — 71250 CT THORAX DX C-: CPT

## 2022-09-13 PROCEDURE — 99232 SBSQ HOSP IP/OBS MODERATE 35: CPT | Performed by: HOSPITALIST

## 2022-09-13 PROCEDURE — 93306 TTE W/DOPPLER COMPLETE: CPT | Performed by: INTERNAL MEDICINE

## 2022-09-13 PROCEDURE — 97110 THERAPEUTIC EXERCISES: CPT

## 2022-09-13 PROCEDURE — 93018 CV STRESS TEST I&R ONLY: CPT | Performed by: SPECIALIST

## 2022-09-13 PROCEDURE — 0 TECHNETIUM SESTAMIBI: Performed by: HOSPITALIST

## 2022-09-13 PROCEDURE — 93017 CV STRESS TEST TRACING ONLY: CPT

## 2022-09-13 PROCEDURE — 80048 BASIC METABOLIC PNL TOTAL CA: CPT | Performed by: HOSPITALIST

## 2022-09-13 PROCEDURE — 93970 EXTREMITY STUDY: CPT

## 2022-09-13 RX ORDER — FUROSEMIDE 10 MG/ML
60 INJECTION INTRAMUSCULAR; INTRAVENOUS EVERY 12 HOURS
Status: COMPLETED | OUTPATIENT
Start: 2022-09-13 | End: 2022-09-13

## 2022-09-13 RX ORDER — PROMETHAZINE HYDROCHLORIDE 12.5 MG/1
12.5 TABLET ORAL EVERY 6 HOURS PRN
Status: DISCONTINUED | OUTPATIENT
Start: 2022-09-13 | End: 2022-09-22 | Stop reason: HOSPADM

## 2022-09-13 RX ORDER — ONDANSETRON 2 MG/ML
4 INJECTION INTRAMUSCULAR; INTRAVENOUS EVERY 6 HOURS PRN
Status: DISCONTINUED | OUTPATIENT
Start: 2022-09-13 | End: 2022-09-22 | Stop reason: HOSPADM

## 2022-09-13 RX ADMIN — METRONIDAZOLE 500 MG: 500 INJECTION, SOLUTION INTRAVENOUS at 16:16

## 2022-09-13 RX ADMIN — HEPARIN SODIUM 5000 UNITS: 5000 INJECTION INTRAVENOUS; SUBCUTANEOUS at 05:13

## 2022-09-13 RX ADMIN — SODIUM BICARBONATE TAB 650 MG 650 MG: 650 TAB at 21:03

## 2022-09-13 RX ADMIN — HEPARIN SODIUM 5000 UNITS: 5000 INJECTION INTRAVENOUS; SUBCUTANEOUS at 16:04

## 2022-09-13 RX ADMIN — REGADENOSON 0.4 MG: 0.08 INJECTION, SOLUTION INTRAVENOUS at 14:20

## 2022-09-13 RX ADMIN — Medication 1000 MCG: at 16:04

## 2022-09-13 RX ADMIN — CEFTRIAXONE 1 G: 1 INJECTION, POWDER, FOR SOLUTION INTRAMUSCULAR; INTRAVENOUS at 21:02

## 2022-09-13 RX ADMIN — FUROSEMIDE 60 MG: 10 INJECTION, SOLUTION INTRAMUSCULAR; INTRAVENOUS at 21:03

## 2022-09-13 RX ADMIN — SODIUM BICARBONATE TAB 650 MG 650 MG: 650 TAB at 16:03

## 2022-09-13 RX ADMIN — ONDANSETRON 4 MG: 2 INJECTION INTRAMUSCULAR; INTRAVENOUS at 15:40

## 2022-09-13 RX ADMIN — TECHNETIUM TC 99M SESTAMIBI 1 DOSE: 1 INJECTION INTRAVENOUS at 13:57

## 2022-09-13 RX ADMIN — Medication 3 ML: at 21:02

## 2022-09-13 RX ADMIN — TECHNETIUM TC 99M SESTAMIBI 1 DOSE: 1 INJECTION INTRAVENOUS at 14:20

## 2022-09-13 RX ADMIN — ISOSORBIDE MONONITRATE 30 MG: 30 TABLET, EXTENDED RELEASE ORAL at 16:04

## 2022-09-13 RX ADMIN — Medication 3 ML: at 10:14

## 2022-09-13 RX ADMIN — FOLIC ACID 1 MG: 1 TABLET ORAL at 16:03

## 2022-09-13 RX ADMIN — METRONIDAZOLE 500 MG: 500 INJECTION, SOLUTION INTRAVENOUS at 10:13

## 2022-09-13 RX ADMIN — PROCHLORPERAZINE EDISYLATE 5 MG: 5 INJECTION INTRAMUSCULAR; INTRAVENOUS at 10:13

## 2022-09-13 RX ADMIN — INSULIN ASPART 2 UNITS: 100 INJECTION, SOLUTION INTRAVENOUS; SUBCUTANEOUS at 17:22

## 2022-09-13 RX ADMIN — Medication 150 MG: at 16:03

## 2022-09-13 RX ADMIN — METRONIDAZOLE 500 MG: 500 INJECTION, SOLUTION INTRAVENOUS at 00:29

## 2022-09-13 RX ADMIN — NYSTATIN: 100000 POWDER TOPICAL at 10:14

## 2022-09-13 RX ADMIN — AMLODIPINE BESYLATE 5 MG: 5 TABLET ORAL at 16:03

## 2022-09-13 RX ADMIN — HEPARIN SODIUM 5000 UNITS: 5000 INJECTION INTRAVENOUS; SUBCUTANEOUS at 21:03

## 2022-09-13 RX ADMIN — FUROSEMIDE 60 MG: 10 INJECTION, SOLUTION INTRAMUSCULAR; INTRAVENOUS at 09:00

## 2022-09-13 RX ADMIN — ATORVASTATIN CALCIUM 20 MG: 20 TABLET, FILM COATED ORAL at 16:03

## 2022-09-13 RX ADMIN — NYSTATIN: 100000 POWDER TOPICAL at 21:03

## 2022-09-14 LAB
ANION GAP SERPL CALCULATED.3IONS-SCNC: 12.8 MMOL/L (ref 5–15)
BASOPHILS # BLD AUTO: 0.03 10*3/MM3 (ref 0–0.2)
BASOPHILS NFR BLD AUTO: 0.3 % (ref 0–1.5)
BUN SERPL-MCNC: 32 MG/DL (ref 8–23)
BUN/CREAT SERPL: 20.5 (ref 7–25)
CALCIUM SPEC-SCNC: 8.5 MG/DL (ref 8.6–10.5)
CHLORIDE SERPL-SCNC: 99 MMOL/L (ref 98–107)
CO2 SERPL-SCNC: 21.2 MMOL/L (ref 22–29)
CREAT SERPL-MCNC: 1.56 MG/DL (ref 0.57–1)
DEPRECATED RDW RBC AUTO: 46.9 FL (ref 37–54)
EGFRCR SERPLBLD CKD-EPI 2021: 35.2 ML/MIN/1.73
EOSINOPHIL # BLD AUTO: 0.15 10*3/MM3 (ref 0–0.4)
EOSINOPHIL NFR BLD AUTO: 1.5 % (ref 0.3–6.2)
ERYTHROCYTE [DISTWIDTH] IN BLOOD BY AUTOMATED COUNT: 14.6 % (ref 12.3–15.4)
GLUCOSE BLDC GLUCOMTR-MCNC: 166 MG/DL (ref 70–130)
GLUCOSE BLDC GLUCOMTR-MCNC: 184 MG/DL (ref 70–130)
GLUCOSE BLDC GLUCOMTR-MCNC: 187 MG/DL (ref 70–130)
GLUCOSE BLDC GLUCOMTR-MCNC: 205 MG/DL (ref 70–130)
GLUCOSE SERPL-MCNC: 198 MG/DL (ref 65–99)
HCT VFR BLD AUTO: 34.1 % (ref 34–46.6)
HGB BLD-MCNC: 10.6 G/DL (ref 12–15.9)
IMM GRANULOCYTES # BLD AUTO: 0.1 10*3/MM3 (ref 0–0.05)
IMM GRANULOCYTES NFR BLD AUTO: 1 % (ref 0–0.5)
LYMPHOCYTES # BLD AUTO: 1.72 10*3/MM3 (ref 0.7–3.1)
LYMPHOCYTES NFR BLD AUTO: 17.7 % (ref 19.6–45.3)
MAGNESIUM SERPL-MCNC: 2 MG/DL (ref 1.6–2.4)
MCH RBC QN AUTO: 27.3 PG (ref 26.6–33)
MCHC RBC AUTO-ENTMCNC: 31.1 G/DL (ref 31.5–35.7)
MCV RBC AUTO: 87.9 FL (ref 79–97)
MONOCYTES # BLD AUTO: 1.1 10*3/MM3 (ref 0.1–0.9)
MONOCYTES NFR BLD AUTO: 11.3 % (ref 5–12)
NEUTROPHILS NFR BLD AUTO: 6.6 10*3/MM3 (ref 1.7–7)
NEUTROPHILS NFR BLD AUTO: 68.2 % (ref 42.7–76)
NRBC BLD AUTO-RTO: 0 /100 WBC (ref 0–0.2)
PLATELET # BLD AUTO: 321 10*3/MM3 (ref 140–450)
PMV BLD AUTO: 8.8 FL (ref 6–12)
POTASSIUM SERPL-SCNC: 4.8 MMOL/L (ref 3.5–5.2)
PROCALCITONIN SERPL-MCNC: 1.3 NG/ML (ref 0–0.25)
RBC # BLD AUTO: 3.88 10*6/MM3 (ref 3.77–5.28)
SODIUM SERPL-SCNC: 133 MMOL/L (ref 136–145)
WBC NRBC COR # BLD: 9.7 10*3/MM3 (ref 3.4–10.8)

## 2022-09-14 PROCEDURE — 80048 BASIC METABOLIC PNL TOTAL CA: CPT | Performed by: HOSPITALIST

## 2022-09-14 PROCEDURE — 99232 SBSQ HOSP IP/OBS MODERATE 35: CPT | Performed by: SPECIALIST

## 2022-09-14 PROCEDURE — 25010000002 PROCHLORPERAZINE 10 MG/2ML SOLUTION: Performed by: PHYSICIAN ASSISTANT

## 2022-09-14 PROCEDURE — 25010000002 HEPARIN (PORCINE) PER 1000 UNITS: Performed by: HOSPITALIST

## 2022-09-14 PROCEDURE — 82962 GLUCOSE BLOOD TEST: CPT

## 2022-09-14 PROCEDURE — 85025 COMPLETE CBC W/AUTO DIFF WBC: CPT | Performed by: HOSPITALIST

## 2022-09-14 PROCEDURE — 63710000001 PROMETHAZINE PER 12.5 MG: Performed by: HOSPITALIST

## 2022-09-14 PROCEDURE — 25010000002 FUROSEMIDE PER 20 MG: Performed by: INTERNAL MEDICINE

## 2022-09-14 PROCEDURE — 83735 ASSAY OF MAGNESIUM: CPT | Performed by: HOSPITALIST

## 2022-09-14 PROCEDURE — 99232 SBSQ HOSP IP/OBS MODERATE 35: CPT | Performed by: HOSPITALIST

## 2022-09-14 PROCEDURE — 63710000001 INSULIN ASPART PER 5 UNITS: Performed by: PHYSICIAN ASSISTANT

## 2022-09-14 PROCEDURE — 25010000002 CEFTRIAXONE PER 250 MG: Performed by: PHYSICIAN ASSISTANT

## 2022-09-14 PROCEDURE — 84145 PROCALCITONIN (PCT): CPT | Performed by: HOSPITALIST

## 2022-09-14 RX ORDER — FUROSEMIDE 10 MG/ML
60 INJECTION INTRAMUSCULAR; INTRAVENOUS EVERY 12 HOURS
Status: COMPLETED | OUTPATIENT
Start: 2022-09-14 | End: 2022-09-14

## 2022-09-14 RX ADMIN — INSULIN ASPART 2 UNITS: 100 INJECTION, SOLUTION INTRAVENOUS; SUBCUTANEOUS at 10:17

## 2022-09-14 RX ADMIN — Medication 150 MG: at 10:18

## 2022-09-14 RX ADMIN — PROMETHAZINE HYDROCHLORIDE 12.5 MG: 12.5 TABLET ORAL at 20:09

## 2022-09-14 RX ADMIN — SODIUM BICARBONATE TAB 650 MG 650 MG: 650 TAB at 20:07

## 2022-09-14 RX ADMIN — CEFTRIAXONE 1 G: 1 INJECTION, POWDER, FOR SOLUTION INTRAMUSCULAR; INTRAVENOUS at 20:07

## 2022-09-14 RX ADMIN — NYSTATIN: 100000 POWDER TOPICAL at 20:07

## 2022-09-14 RX ADMIN — FOLIC ACID 1 MG: 1 TABLET ORAL at 10:18

## 2022-09-14 RX ADMIN — SODIUM BICARBONATE TAB 650 MG 650 MG: 650 TAB at 10:18

## 2022-09-14 RX ADMIN — HEPARIN SODIUM 5000 UNITS: 5000 INJECTION INTRAVENOUS; SUBCUTANEOUS at 20:07

## 2022-09-14 RX ADMIN — PANTOPRAZOLE SODIUM 40 MG: 40 TABLET, DELAYED RELEASE ORAL at 05:59

## 2022-09-14 RX ADMIN — HEPARIN SODIUM 5000 UNITS: 5000 INJECTION INTRAVENOUS; SUBCUTANEOUS at 11:58

## 2022-09-14 RX ADMIN — ATORVASTATIN CALCIUM 20 MG: 20 TABLET, FILM COATED ORAL at 10:18

## 2022-09-14 RX ADMIN — METRONIDAZOLE 500 MG: 500 INJECTION, SOLUTION INTRAVENOUS at 00:30

## 2022-09-14 RX ADMIN — METRONIDAZOLE 500 MG: 500 INJECTION, SOLUTION INTRAVENOUS at 17:46

## 2022-09-14 RX ADMIN — HEPARIN SODIUM 5000 UNITS: 5000 INJECTION INTRAVENOUS; SUBCUTANEOUS at 05:59

## 2022-09-14 RX ADMIN — Medication 1000 MCG: at 10:18

## 2022-09-14 RX ADMIN — PROCHLORPERAZINE EDISYLATE 5 MG: 5 INJECTION INTRAMUSCULAR; INTRAVENOUS at 17:49

## 2022-09-14 RX ADMIN — FUROSEMIDE 60 MG: 10 INJECTION, SOLUTION INTRAMUSCULAR; INTRAVENOUS at 20:07

## 2022-09-14 RX ADMIN — INSULIN ASPART 3 UNITS: 100 INJECTION, SOLUTION INTRAVENOUS; SUBCUTANEOUS at 11:59

## 2022-09-14 RX ADMIN — INSULIN ASPART 2 UNITS: 100 INJECTION, SOLUTION INTRAVENOUS; SUBCUTANEOUS at 17:46

## 2022-09-14 RX ADMIN — AMLODIPINE BESYLATE 5 MG: 5 TABLET ORAL at 10:18

## 2022-09-14 RX ADMIN — PROMETHAZINE HYDROCHLORIDE 12.5 MG: 12.5 TABLET ORAL at 11:59

## 2022-09-14 RX ADMIN — ISOSORBIDE MONONITRATE 30 MG: 30 TABLET, EXTENDED RELEASE ORAL at 10:18

## 2022-09-14 RX ADMIN — METRONIDAZOLE 500 MG: 500 INJECTION, SOLUTION INTRAVENOUS at 10:17

## 2022-09-14 RX ADMIN — FUROSEMIDE 60 MG: 10 INJECTION, SOLUTION INTRAMUSCULAR; INTRAVENOUS at 10:17

## 2022-09-14 RX ADMIN — Medication 3 ML: at 10:18

## 2022-09-14 RX ADMIN — NYSTATIN: 100000 POWDER TOPICAL at 10:17

## 2022-09-14 RX ADMIN — Medication 3 ML: at 20:07

## 2022-09-15 ENCOUNTER — APPOINTMENT (OUTPATIENT)
Dept: NUCLEAR MEDICINE | Facility: HOSPITAL | Age: 72
End: 2022-09-15

## 2022-09-15 LAB
ALBUMIN SERPL-MCNC: 3.09 G/DL (ref 3.5–5.2)
ALP SERPL-CCNC: 113 U/L (ref 39–117)
ALT SERPL W P-5'-P-CCNC: 7 U/L (ref 1–33)
ANION GAP SERPL CALCULATED.3IONS-SCNC: 10.3 MMOL/L (ref 5–15)
AST SERPL-CCNC: 15 U/L (ref 1–32)
BASOPHILS # BLD AUTO: 0.05 10*3/MM3 (ref 0–0.2)
BASOPHILS NFR BLD AUTO: 0.5 % (ref 0–1.5)
BILIRUB CONJ SERPL-MCNC: <0.2 MG/DL (ref 0–0.3)
BILIRUB INDIRECT SERPL-MCNC: ABNORMAL MG/DL
BILIRUB SERPL-MCNC: 0.2 MG/DL (ref 0–1.2)
BUN SERPL-MCNC: 32 MG/DL (ref 8–23)
BUN/CREAT SERPL: 20.8 (ref 7–25)
CALCIUM SPEC-SCNC: 9 MG/DL (ref 8.6–10.5)
CHLORIDE SERPL-SCNC: 99 MMOL/L (ref 98–107)
CO2 SERPL-SCNC: 24.7 MMOL/L (ref 22–29)
CREAT SERPL-MCNC: 1.54 MG/DL (ref 0.57–1)
D DIMER PPP FEU-MCNC: 2.4 MCGFEU/ML (ref 0–0.5)
DEPRECATED RDW RBC AUTO: 46.7 FL (ref 37–54)
EGFRCR SERPLBLD CKD-EPI 2021: 35.7 ML/MIN/1.73
EOSINOPHIL # BLD AUTO: 0.15 10*3/MM3 (ref 0–0.4)
EOSINOPHIL NFR BLD AUTO: 1.4 % (ref 0.3–6.2)
ERYTHROCYTE [DISTWIDTH] IN BLOOD BY AUTOMATED COUNT: 14.5 % (ref 12.3–15.4)
GLUCOSE BLDC GLUCOMTR-MCNC: 147 MG/DL (ref 70–130)
GLUCOSE BLDC GLUCOMTR-MCNC: 165 MG/DL (ref 70–130)
GLUCOSE BLDC GLUCOMTR-MCNC: 181 MG/DL (ref 70–130)
GLUCOSE BLDC GLUCOMTR-MCNC: 188 MG/DL (ref 70–130)
GLUCOSE SERPL-MCNC: 170 MG/DL (ref 65–99)
HCT VFR BLD AUTO: 36.2 % (ref 34–46.6)
HGB BLD-MCNC: 11.4 G/DL (ref 12–15.9)
IMM GRANULOCYTES # BLD AUTO: 0.12 10*3/MM3 (ref 0–0.05)
IMM GRANULOCYTES NFR BLD AUTO: 1.1 % (ref 0–0.5)
LYMPHOCYTES # BLD AUTO: 2.18 10*3/MM3 (ref 0.7–3.1)
LYMPHOCYTES NFR BLD AUTO: 20.9 % (ref 19.6–45.3)
MCH RBC QN AUTO: 27.8 PG (ref 26.6–33)
MCHC RBC AUTO-ENTMCNC: 31.5 G/DL (ref 31.5–35.7)
MCV RBC AUTO: 88.3 FL (ref 79–97)
MONOCYTES # BLD AUTO: 1 10*3/MM3 (ref 0.1–0.9)
MONOCYTES NFR BLD AUTO: 9.6 % (ref 5–12)
NEUTROPHILS NFR BLD AUTO: 6.95 10*3/MM3 (ref 1.7–7)
NEUTROPHILS NFR BLD AUTO: 66.5 % (ref 42.7–76)
NRBC BLD AUTO-RTO: 0 /100 WBC (ref 0–0.2)
NT-PROBNP SERPL-MCNC: ABNORMAL PG/ML (ref 0–900)
PLATELET # BLD AUTO: 326 10*3/MM3 (ref 140–450)
PMV BLD AUTO: 8.4 FL (ref 6–12)
POTASSIUM SERPL-SCNC: 4.8 MMOL/L (ref 3.5–5.2)
PROT SERPL-MCNC: 6.6 G/DL (ref 6–8.5)
RBC # BLD AUTO: 4.1 10*6/MM3 (ref 3.77–5.28)
SODIUM SERPL-SCNC: 134 MMOL/L (ref 136–145)
WBC NRBC COR # BLD: 10.45 10*3/MM3 (ref 3.4–10.8)

## 2022-09-15 PROCEDURE — 25010000002 ONDANSETRON PER 1 MG: Performed by: HOSPITALIST

## 2022-09-15 PROCEDURE — 85379 FIBRIN DEGRADATION QUANT: CPT | Performed by: HOSPITALIST

## 2022-09-15 PROCEDURE — 63710000001 INSULIN ASPART PER 5 UNITS: Performed by: PHYSICIAN ASSISTANT

## 2022-09-15 PROCEDURE — 82570 ASSAY OF URINE CREATININE: CPT | Performed by: INTERNAL MEDICINE

## 2022-09-15 PROCEDURE — 25010000002 NA FERRIC GLUC CPLX PER 12.5 MG: Performed by: INTERNAL MEDICINE

## 2022-09-15 PROCEDURE — 84156 ASSAY OF PROTEIN URINE: CPT | Performed by: INTERNAL MEDICINE

## 2022-09-15 PROCEDURE — 83880 ASSAY OF NATRIURETIC PEPTIDE: CPT | Performed by: HOSPITALIST

## 2022-09-15 PROCEDURE — 82043 UR ALBUMIN QUANTITATIVE: CPT | Performed by: INTERNAL MEDICINE

## 2022-09-15 PROCEDURE — 80048 BASIC METABOLIC PNL TOTAL CA: CPT | Performed by: HOSPITALIST

## 2022-09-15 PROCEDURE — 80076 HEPATIC FUNCTION PANEL: CPT | Performed by: SURGERY

## 2022-09-15 PROCEDURE — 25010000002 CEFTRIAXONE PER 250 MG: Performed by: PHYSICIAN ASSISTANT

## 2022-09-15 PROCEDURE — 85025 COMPLETE CBC W/AUTO DIFF WBC: CPT | Performed by: HOSPITALIST

## 2022-09-15 PROCEDURE — 82962 GLUCOSE BLOOD TEST: CPT

## 2022-09-15 PROCEDURE — 99232 SBSQ HOSP IP/OBS MODERATE 35: CPT | Performed by: HOSPITALIST

## 2022-09-15 PROCEDURE — 99232 SBSQ HOSP IP/OBS MODERATE 35: CPT | Performed by: SPECIALIST

## 2022-09-15 PROCEDURE — 25010000002 HEPARIN (PORCINE) PER 1000 UNITS: Performed by: HOSPITALIST

## 2022-09-15 PROCEDURE — 97530 THERAPEUTIC ACTIVITIES: CPT

## 2022-09-15 RX ORDER — IRON POLYSACCHARIDE COMPLEX 150 MG
150 CAPSULE ORAL DAILY
Status: DISCONTINUED | OUTPATIENT
Start: 2022-09-18 | End: 2022-09-22 | Stop reason: HOSPADM

## 2022-09-15 RX ORDER — BUMETANIDE 1 MG/1
1 TABLET ORAL 2 TIMES DAILY
Status: DISCONTINUED | OUTPATIENT
Start: 2022-09-15 | End: 2022-09-17

## 2022-09-15 RX ADMIN — Medication 3 ML: at 09:02

## 2022-09-15 RX ADMIN — ATORVASTATIN CALCIUM 20 MG: 20 TABLET, FILM COATED ORAL at 09:03

## 2022-09-15 RX ADMIN — ONDANSETRON 4 MG: 2 INJECTION INTRAMUSCULAR; INTRAVENOUS at 21:10

## 2022-09-15 RX ADMIN — METRONIDAZOLE 500 MG: 500 INJECTION, SOLUTION INTRAVENOUS at 09:04

## 2022-09-15 RX ADMIN — AMLODIPINE BESYLATE 5 MG: 5 TABLET ORAL at 09:03

## 2022-09-15 RX ADMIN — BUMETANIDE 1 MG: 1 TABLET ORAL at 21:09

## 2022-09-15 RX ADMIN — NYSTATIN: 100000 POWDER TOPICAL at 21:10

## 2022-09-15 RX ADMIN — HEPARIN SODIUM 5000 UNITS: 5000 INJECTION INTRAVENOUS; SUBCUTANEOUS at 05:15

## 2022-09-15 RX ADMIN — INSULIN ASPART 2 UNITS: 100 INJECTION, SOLUTION INTRAVENOUS; SUBCUTANEOUS at 17:48

## 2022-09-15 RX ADMIN — NYSTATIN: 100000 POWDER TOPICAL at 09:02

## 2022-09-15 RX ADMIN — HEPARIN SODIUM 5000 UNITS: 5000 INJECTION INTRAVENOUS; SUBCUTANEOUS at 12:17

## 2022-09-15 RX ADMIN — HEPARIN SODIUM 5000 UNITS: 5000 INJECTION INTRAVENOUS; SUBCUTANEOUS at 21:10

## 2022-09-15 RX ADMIN — CEFTRIAXONE 1 G: 1 INJECTION, POWDER, FOR SOLUTION INTRAMUSCULAR; INTRAVENOUS at 21:10

## 2022-09-15 RX ADMIN — ONDANSETRON 4 MG: 2 INJECTION INTRAMUSCULAR; INTRAVENOUS at 05:19

## 2022-09-15 RX ADMIN — Medication 3 ML: at 21:10

## 2022-09-15 RX ADMIN — SODIUM BICARBONATE TAB 650 MG 650 MG: 650 TAB at 21:10

## 2022-09-15 RX ADMIN — SODIUM CHLORIDE 125 MG: 9 INJECTION, SOLUTION INTRAVENOUS at 09:02

## 2022-09-15 RX ADMIN — METRONIDAZOLE 500 MG: 500 INJECTION, SOLUTION INTRAVENOUS at 01:07

## 2022-09-15 RX ADMIN — Medication 10 MG: at 21:09

## 2022-09-15 RX ADMIN — INSULIN ASPART 2 UNITS: 100 INJECTION, SOLUTION INTRAVENOUS; SUBCUTANEOUS at 12:16

## 2022-09-15 RX ADMIN — ONDANSETRON 4 MG: 2 INJECTION INTRAMUSCULAR; INTRAVENOUS at 12:17

## 2022-09-15 RX ADMIN — Medication 1000 MCG: at 09:03

## 2022-09-15 RX ADMIN — METRONIDAZOLE 500 MG: 500 INJECTION, SOLUTION INTRAVENOUS at 17:49

## 2022-09-15 RX ADMIN — BUMETANIDE 1 MG: 1 TABLET ORAL at 09:07

## 2022-09-15 RX ADMIN — SODIUM BICARBONATE TAB 650 MG 650 MG: 650 TAB at 09:04

## 2022-09-15 RX ADMIN — ISOSORBIDE MONONITRATE 30 MG: 30 TABLET, EXTENDED RELEASE ORAL at 09:03

## 2022-09-15 RX ADMIN — PANTOPRAZOLE SODIUM 40 MG: 40 TABLET, DELAYED RELEASE ORAL at 05:15

## 2022-09-15 RX ADMIN — SODIUM BICARBONATE TAB 650 MG 650 MG: 650 TAB at 17:48

## 2022-09-15 RX ADMIN — FOLIC ACID 1 MG: 1 TABLET ORAL at 09:03

## 2022-09-16 LAB
ALBUMIN UR-MCNC: 56.4 MG/DL
ANION GAP SERPL CALCULATED.3IONS-SCNC: 8.5 MMOL/L (ref 5–15)
ASO AB SERPL-ACNC: NEGATIVE [IU]/ML
BACTERIA SPEC AEROBE CULT: NORMAL
BACTERIA SPEC AEROBE CULT: NORMAL
BASOPHILS # BLD AUTO: 0.06 10*3/MM3 (ref 0–0.2)
BASOPHILS NFR BLD AUTO: 0.6 % (ref 0–1.5)
BUN SERPL-MCNC: 30 MG/DL (ref 8–23)
BUN/CREAT SERPL: 20.4 (ref 7–25)
C3 SERPL-MCNC: 126 MG/DL (ref 82–167)
C4 SERPL-MCNC: 48 MG/DL (ref 14–44)
CALCIUM SPEC-SCNC: 9 MG/DL (ref 8.6–10.5)
CHLORIDE SERPL-SCNC: 96 MMOL/L (ref 98–107)
CO2 SERPL-SCNC: 25.5 MMOL/L (ref 22–29)
CREAT SERPL-MCNC: 1.47 MG/DL (ref 0.57–1)
CREAT UR-MCNC: 26.6 MG/DL
CREAT UR-MCNC: 26.6 MG/DL
DEPRECATED RDW RBC AUTO: 46.5 FL (ref 37–54)
EGFRCR SERPLBLD CKD-EPI 2021: 37.8 ML/MIN/1.73
EOSINOPHIL # BLD AUTO: 0.11 10*3/MM3 (ref 0–0.4)
EOSINOPHIL NFR BLD AUTO: 1 % (ref 0.3–6.2)
ERYTHROCYTE [DISTWIDTH] IN BLOOD BY AUTOMATED COUNT: 14.4 % (ref 12.3–15.4)
GLUCOSE BLDC GLUCOMTR-MCNC: 138 MG/DL (ref 70–130)
GLUCOSE BLDC GLUCOMTR-MCNC: 155 MG/DL (ref 70–130)
GLUCOSE BLDC GLUCOMTR-MCNC: 181 MG/DL (ref 70–130)
GLUCOSE BLDC GLUCOMTR-MCNC: 192 MG/DL (ref 70–130)
GLUCOSE SERPL-MCNC: 152 MG/DL (ref 65–99)
HAV IGM SERPL QL IA: NORMAL
HBV CORE IGM SERPL QL IA: NORMAL
HBV SURFACE AG SERPL QL IA: NORMAL
HCT VFR BLD AUTO: 36.5 % (ref 34–46.6)
HCV AB SER DONR QL: NORMAL
HGB BLD-MCNC: 11.3 G/DL (ref 12–15.9)
IMM GRANULOCYTES # BLD AUTO: 0.09 10*3/MM3 (ref 0–0.05)
IMM GRANULOCYTES NFR BLD AUTO: 0.8 % (ref 0–0.5)
LYMPHOCYTES # BLD AUTO: 1.86 10*3/MM3 (ref 0.7–3.1)
LYMPHOCYTES NFR BLD AUTO: 17.1 % (ref 19.6–45.3)
MCH RBC QN AUTO: 27.3 PG (ref 26.6–33)
MCHC RBC AUTO-ENTMCNC: 31 G/DL (ref 31.5–35.7)
MCV RBC AUTO: 88.2 FL (ref 79–97)
MICROALBUMIN/CREAT UR: 2120.3 MG/G
MONOCYTES # BLD AUTO: 0.94 10*3/MM3 (ref 0.1–0.9)
MONOCYTES NFR BLD AUTO: 8.6 % (ref 5–12)
NEUTROPHILS NFR BLD AUTO: 7.82 10*3/MM3 (ref 1.7–7)
NEUTROPHILS NFR BLD AUTO: 71.9 % (ref 42.7–76)
NRBC BLD AUTO-RTO: 0 /100 WBC (ref 0–0.2)
PLATELET # BLD AUTO: 356 10*3/MM3 (ref 140–450)
PMV BLD AUTO: 9.1 FL (ref 6–12)
POTASSIUM SERPL-SCNC: 4.6 MMOL/L (ref 3.5–5.2)
PROT ?TM UR-MCNC: 85.7 MG/DL
PROT/CREAT UR: 3221.8 MG/G CREA (ref 0–200)
RBC # BLD AUTO: 4.14 10*6/MM3 (ref 3.77–5.28)
SODIUM SERPL-SCNC: 130 MMOL/L (ref 136–145)
WBC NRBC COR # BLD: 10.88 10*3/MM3 (ref 3.4–10.8)

## 2022-09-16 PROCEDURE — 82962 GLUCOSE BLOOD TEST: CPT

## 2022-09-16 PROCEDURE — 86063 ANTISTREPTOLYSIN O SCREEN: CPT | Performed by: INTERNAL MEDICINE

## 2022-09-16 PROCEDURE — 86037 ANCA TITER EACH ANTIBODY: CPT | Performed by: INTERNAL MEDICINE

## 2022-09-16 PROCEDURE — 25010000002 CEFTRIAXONE PER 250 MG: Performed by: PHYSICIAN ASSISTANT

## 2022-09-16 PROCEDURE — 84155 ASSAY OF PROTEIN SERUM: CPT | Performed by: INTERNAL MEDICINE

## 2022-09-16 PROCEDURE — 99232 SBSQ HOSP IP/OBS MODERATE 35: CPT | Performed by: SPECIALIST

## 2022-09-16 PROCEDURE — 25010000002 ONDANSETRON PER 1 MG: Performed by: HOSPITALIST

## 2022-09-16 PROCEDURE — 85025 COMPLETE CBC W/AUTO DIFF WBC: CPT | Performed by: HOSPITALIST

## 2022-09-16 PROCEDURE — 97530 THERAPEUTIC ACTIVITIES: CPT

## 2022-09-16 PROCEDURE — 83516 IMMUNOASSAY NONANTIBODY: CPT | Performed by: INTERNAL MEDICINE

## 2022-09-16 PROCEDURE — 80074 ACUTE HEPATITIS PANEL: CPT | Performed by: INTERNAL MEDICINE

## 2022-09-16 PROCEDURE — 86160 COMPLEMENT ANTIGEN: CPT | Performed by: INTERNAL MEDICINE

## 2022-09-16 PROCEDURE — 25010000002 PROCHLORPERAZINE 10 MG/2ML SOLUTION: Performed by: PHYSICIAN ASSISTANT

## 2022-09-16 PROCEDURE — 86038 ANTINUCLEAR ANTIBODIES: CPT | Performed by: INTERNAL MEDICINE

## 2022-09-16 PROCEDURE — 83521 IG LIGHT CHAINS FREE EACH: CPT | Performed by: INTERNAL MEDICINE

## 2022-09-16 PROCEDURE — 86225 DNA ANTIBODY NATIVE: CPT | Performed by: INTERNAL MEDICINE

## 2022-09-16 PROCEDURE — 99232 SBSQ HOSP IP/OBS MODERATE 35: CPT | Performed by: FAMILY MEDICINE

## 2022-09-16 PROCEDURE — 80048 BASIC METABOLIC PNL TOTAL CA: CPT | Performed by: HOSPITALIST

## 2022-09-16 PROCEDURE — 25010000002 HEPARIN (PORCINE) PER 1000 UNITS: Performed by: HOSPITALIST

## 2022-09-16 PROCEDURE — 63710000001 INSULIN ASPART PER 5 UNITS: Performed by: PHYSICIAN ASSISTANT

## 2022-09-16 PROCEDURE — 84165 PROTEIN E-PHORESIS SERUM: CPT | Performed by: INTERNAL MEDICINE

## 2022-09-16 RX ORDER — AMOXICILLIN 250 MG
2 CAPSULE ORAL 2 TIMES DAILY
Status: DISCONTINUED | OUTPATIENT
Start: 2022-09-16 | End: 2022-09-22 | Stop reason: HOSPADM

## 2022-09-16 RX ADMIN — SODIUM BICARBONATE TAB 650 MG 650 MG: 650 TAB at 18:04

## 2022-09-16 RX ADMIN — HEPARIN SODIUM 5000 UNITS: 5000 INJECTION INTRAVENOUS; SUBCUTANEOUS at 11:30

## 2022-09-16 RX ADMIN — NYSTATIN: 100000 POWDER TOPICAL at 20:53

## 2022-09-16 RX ADMIN — ATORVASTATIN CALCIUM 20 MG: 20 TABLET, FILM COATED ORAL at 09:56

## 2022-09-16 RX ADMIN — SODIUM BICARBONATE TAB 650 MG 650 MG: 650 TAB at 09:56

## 2022-09-16 RX ADMIN — ONDANSETRON 4 MG: 2 INJECTION INTRAMUSCULAR; INTRAVENOUS at 20:52

## 2022-09-16 RX ADMIN — Medication 10 MG: at 20:52

## 2022-09-16 RX ADMIN — METRONIDAZOLE 500 MG: 500 INJECTION, SOLUTION INTRAVENOUS at 09:58

## 2022-09-16 RX ADMIN — ONDANSETRON 4 MG: 2 INJECTION INTRAMUSCULAR; INTRAVENOUS at 11:29

## 2022-09-16 RX ADMIN — PROCHLORPERAZINE EDISYLATE 5 MG: 5 INJECTION INTRAMUSCULAR; INTRAVENOUS at 01:00

## 2022-09-16 RX ADMIN — Medication 3 ML: at 09:57

## 2022-09-16 RX ADMIN — BUMETANIDE 1 MG: 1 TABLET ORAL at 20:52

## 2022-09-16 RX ADMIN — FOLIC ACID 1 MG: 1 TABLET ORAL at 09:56

## 2022-09-16 RX ADMIN — DOCUSATE SODIUM 50 MG AND SENNOSIDES 8.6 MG 2 TABLET: 8.6; 5 TABLET, FILM COATED ORAL at 11:30

## 2022-09-16 RX ADMIN — PANTOPRAZOLE SODIUM 40 MG: 40 TABLET, DELAYED RELEASE ORAL at 05:03

## 2022-09-16 RX ADMIN — Medication 1000 MCG: at 09:57

## 2022-09-16 RX ADMIN — CEFTRIAXONE 1 G: 1 INJECTION, POWDER, FOR SOLUTION INTRAMUSCULAR; INTRAVENOUS at 20:53

## 2022-09-16 RX ADMIN — INSULIN ASPART 2 UNITS: 100 INJECTION, SOLUTION INTRAVENOUS; SUBCUTANEOUS at 17:50

## 2022-09-16 RX ADMIN — HEPARIN SODIUM 5000 UNITS: 5000 INJECTION INTRAVENOUS; SUBCUTANEOUS at 05:03

## 2022-09-16 RX ADMIN — BUMETANIDE 1 MG: 1 TABLET ORAL at 09:56

## 2022-09-16 RX ADMIN — ISOSORBIDE MONONITRATE 30 MG: 30 TABLET, EXTENDED RELEASE ORAL at 09:56

## 2022-09-16 RX ADMIN — NYSTATIN: 100000 POWDER TOPICAL at 09:57

## 2022-09-16 RX ADMIN — AMLODIPINE BESYLATE 5 MG: 5 TABLET ORAL at 09:56

## 2022-09-16 RX ADMIN — SODIUM BICARBONATE TAB 650 MG 650 MG: 650 TAB at 21:03

## 2022-09-16 RX ADMIN — METRONIDAZOLE 500 MG: 500 INJECTION, SOLUTION INTRAVENOUS at 17:51

## 2022-09-16 RX ADMIN — Medication 3 ML: at 20:53

## 2022-09-16 RX ADMIN — HEPARIN SODIUM 5000 UNITS: 5000 INJECTION INTRAVENOUS; SUBCUTANEOUS at 20:53

## 2022-09-16 RX ADMIN — DOCUSATE SODIUM 50 MG AND SENNOSIDES 8.6 MG 2 TABLET: 8.6; 5 TABLET, FILM COATED ORAL at 20:52

## 2022-09-16 RX ADMIN — METRONIDAZOLE 500 MG: 500 INJECTION, SOLUTION INTRAVENOUS at 00:37

## 2022-09-16 RX ADMIN — INSULIN ASPART 2 UNITS: 100 INJECTION, SOLUTION INTRAVENOUS; SUBCUTANEOUS at 09:56

## 2022-09-17 LAB
ANION GAP SERPL CALCULATED.3IONS-SCNC: 7.3 MMOL/L (ref 5–15)
BASOPHILS # BLD AUTO: 0.06 10*3/MM3 (ref 0–0.2)
BASOPHILS NFR BLD AUTO: 0.6 % (ref 0–1.5)
BUN SERPL-MCNC: 32 MG/DL (ref 8–23)
BUN/CREAT SERPL: 17.7 (ref 7–25)
CALCIUM SPEC-SCNC: 8.4 MG/DL (ref 8.6–10.5)
CHLORIDE SERPL-SCNC: 97 MMOL/L (ref 98–107)
CO2 SERPL-SCNC: 25.7 MMOL/L (ref 22–29)
CREAT SERPL-MCNC: 1.81 MG/DL (ref 0.57–1)
DEPRECATED RDW RBC AUTO: 46.2 FL (ref 37–54)
EGFRCR SERPLBLD CKD-EPI 2021: 29.4 ML/MIN/1.73
EOSINOPHIL # BLD AUTO: 0.27 10*3/MM3 (ref 0–0.4)
EOSINOPHIL NFR BLD AUTO: 2.5 % (ref 0.3–6.2)
ERYTHROCYTE [DISTWIDTH] IN BLOOD BY AUTOMATED COUNT: 14.4 % (ref 12.3–15.4)
GLUCOSE BLDC GLUCOMTR-MCNC: 160 MG/DL (ref 70–130)
GLUCOSE BLDC GLUCOMTR-MCNC: 182 MG/DL (ref 70–130)
GLUCOSE BLDC GLUCOMTR-MCNC: 204 MG/DL (ref 70–130)
GLUCOSE BLDC GLUCOMTR-MCNC: 208 MG/DL (ref 70–130)
GLUCOSE SERPL-MCNC: 167 MG/DL (ref 65–99)
HCT VFR BLD AUTO: 33.1 % (ref 34–46.6)
HGB BLD-MCNC: 10.3 G/DL (ref 12–15.9)
IMM GRANULOCYTES # BLD AUTO: 0.08 10*3/MM3 (ref 0–0.05)
IMM GRANULOCYTES NFR BLD AUTO: 0.7 % (ref 0–0.5)
LYMPHOCYTES # BLD AUTO: 1.81 10*3/MM3 (ref 0.7–3.1)
LYMPHOCYTES NFR BLD AUTO: 16.9 % (ref 19.6–45.3)
MCH RBC QN AUTO: 27.6 PG (ref 26.6–33)
MCHC RBC AUTO-ENTMCNC: 31.1 G/DL (ref 31.5–35.7)
MCV RBC AUTO: 88.7 FL (ref 79–97)
MONOCYTES # BLD AUTO: 0.85 10*3/MM3 (ref 0.1–0.9)
MONOCYTES NFR BLD AUTO: 7.9 % (ref 5–12)
NEUTROPHILS NFR BLD AUTO: 7.66 10*3/MM3 (ref 1.7–7)
NEUTROPHILS NFR BLD AUTO: 71.4 % (ref 42.7–76)
NRBC BLD AUTO-RTO: 0 /100 WBC (ref 0–0.2)
PLATELET # BLD AUTO: 318 10*3/MM3 (ref 140–450)
PMV BLD AUTO: 8.8 FL (ref 6–12)
POTASSIUM SERPL-SCNC: 4.6 MMOL/L (ref 3.5–5.2)
QT INTERVAL: 378 MS
QTC INTERVAL: 452 MS
RBC # BLD AUTO: 3.73 10*6/MM3 (ref 3.77–5.28)
SODIUM SERPL-SCNC: 130 MMOL/L (ref 136–145)
TROPONIN T SERPL-MCNC: 0.01 NG/ML (ref 0–0.03)
WBC NRBC COR # BLD: 10.73 10*3/MM3 (ref 3.4–10.8)

## 2022-09-17 PROCEDURE — 25010000002 CEFTRIAXONE PER 250 MG: Performed by: PHYSICIAN ASSISTANT

## 2022-09-17 PROCEDURE — P9047 ALBUMIN (HUMAN), 25%, 50ML: HCPCS | Performed by: INTERNAL MEDICINE

## 2022-09-17 PROCEDURE — 25010000002 HEPARIN (PORCINE) PER 1000 UNITS: Performed by: HOSPITALIST

## 2022-09-17 PROCEDURE — 85025 COMPLETE CBC W/AUTO DIFF WBC: CPT | Performed by: FAMILY MEDICINE

## 2022-09-17 PROCEDURE — 93005 ELECTROCARDIOGRAM TRACING: CPT | Performed by: FAMILY MEDICINE

## 2022-09-17 PROCEDURE — 25010000002 ALBUMIN HUMAN 25% PER 50 ML: Performed by: INTERNAL MEDICINE

## 2022-09-17 PROCEDURE — 80048 BASIC METABOLIC PNL TOTAL CA: CPT | Performed by: FAMILY MEDICINE

## 2022-09-17 PROCEDURE — 82962 GLUCOSE BLOOD TEST: CPT

## 2022-09-17 PROCEDURE — 84166 PROTEIN E-PHORESIS/URINE/CSF: CPT | Performed by: INTERNAL MEDICINE

## 2022-09-17 PROCEDURE — 93010 ELECTROCARDIOGRAM REPORT: CPT | Performed by: SPECIALIST

## 2022-09-17 PROCEDURE — 25010000002 NA FERRIC GLUC CPLX PER 12.5 MG: Performed by: INTERNAL MEDICINE

## 2022-09-17 PROCEDURE — 99231 SBSQ HOSP IP/OBS SF/LOW 25: CPT | Performed by: FAMILY MEDICINE

## 2022-09-17 PROCEDURE — 84484 ASSAY OF TROPONIN QUANT: CPT | Performed by: FAMILY MEDICINE

## 2022-09-17 PROCEDURE — 63710000001 INSULIN ASPART PER 5 UNITS: Performed by: PHYSICIAN ASSISTANT

## 2022-09-17 PROCEDURE — 84156 ASSAY OF PROTEIN URINE: CPT | Performed by: INTERNAL MEDICINE

## 2022-09-17 PROCEDURE — 99232 SBSQ HOSP IP/OBS MODERATE 35: CPT | Performed by: SPECIALIST

## 2022-09-17 RX ORDER — ALBUMIN (HUMAN) 12.5 G/50ML
12.5 SOLUTION INTRAVENOUS 2 TIMES DAILY
Status: COMPLETED | OUTPATIENT
Start: 2022-09-17 | End: 2022-09-17

## 2022-09-17 RX ADMIN — SODIUM CHLORIDE 125 MG: 9 INJECTION, SOLUTION INTRAVENOUS at 10:11

## 2022-09-17 RX ADMIN — METRONIDAZOLE 500 MG: 500 INJECTION, SOLUTION INTRAVENOUS at 23:58

## 2022-09-17 RX ADMIN — DOCUSATE SODIUM 50 MG AND SENNOSIDES 8.6 MG 2 TABLET: 8.6; 5 TABLET, FILM COATED ORAL at 20:38

## 2022-09-17 RX ADMIN — HYDROXYZINE HYDROCHLORIDE 25 MG: 25 TABLET ORAL at 23:51

## 2022-09-17 RX ADMIN — ALBUMIN HUMAN 12.5 G: 0.25 SOLUTION INTRAVENOUS at 20:38

## 2022-09-17 RX ADMIN — Medication 1000 MCG: at 09:35

## 2022-09-17 RX ADMIN — PANTOPRAZOLE SODIUM 40 MG: 40 TABLET, DELAYED RELEASE ORAL at 06:15

## 2022-09-17 RX ADMIN — METRONIDAZOLE 500 MG: 500 INJECTION, SOLUTION INTRAVENOUS at 17:29

## 2022-09-17 RX ADMIN — HEPARIN SODIUM 5000 UNITS: 5000 INJECTION INTRAVENOUS; SUBCUTANEOUS at 20:41

## 2022-09-17 RX ADMIN — ATORVASTATIN CALCIUM 20 MG: 20 TABLET, FILM COATED ORAL at 09:35

## 2022-09-17 RX ADMIN — METRONIDAZOLE 500 MG: 500 INJECTION, SOLUTION INTRAVENOUS at 09:35

## 2022-09-17 RX ADMIN — FOLIC ACID 1 MG: 1 TABLET ORAL at 09:34

## 2022-09-17 RX ADMIN — DOCUSATE SODIUM 50 MG AND SENNOSIDES 8.6 MG 2 TABLET: 8.6; 5 TABLET, FILM COATED ORAL at 09:34

## 2022-09-17 RX ADMIN — NYSTATIN: 100000 POWDER TOPICAL at 09:36

## 2022-09-17 RX ADMIN — Medication 10 MG: at 23:51

## 2022-09-17 RX ADMIN — INSULIN ASPART 2 UNITS: 100 INJECTION, SOLUTION INTRAVENOUS; SUBCUTANEOUS at 09:34

## 2022-09-17 RX ADMIN — SODIUM BICARBONATE TAB 650 MG 650 MG: 650 TAB at 20:38

## 2022-09-17 RX ADMIN — ISOSORBIDE MONONITRATE 30 MG: 30 TABLET, EXTENDED RELEASE ORAL at 09:35

## 2022-09-17 RX ADMIN — Medication 3 ML: at 09:36

## 2022-09-17 RX ADMIN — Medication 3 ML: at 20:38

## 2022-09-17 RX ADMIN — SODIUM BICARBONATE TAB 650 MG 650 MG: 650 TAB at 09:35

## 2022-09-17 RX ADMIN — METRONIDAZOLE 500 MG: 500 INJECTION, SOLUTION INTRAVENOUS at 00:21

## 2022-09-17 RX ADMIN — CEFTRIAXONE 1 G: 1 INJECTION, POWDER, FOR SOLUTION INTRAMUSCULAR; INTRAVENOUS at 20:38

## 2022-09-17 RX ADMIN — AMLODIPINE BESYLATE 5 MG: 5 TABLET ORAL at 09:35

## 2022-09-17 RX ADMIN — INSULIN ASPART 3 UNITS: 100 INJECTION, SOLUTION INTRAVENOUS; SUBCUTANEOUS at 17:28

## 2022-09-17 RX ADMIN — HEPARIN SODIUM 5000 UNITS: 5000 INJECTION INTRAVENOUS; SUBCUTANEOUS at 12:53

## 2022-09-17 RX ADMIN — SODIUM BICARBONATE TAB 650 MG 650 MG: 650 TAB at 17:28

## 2022-09-17 RX ADMIN — ALBUMIN HUMAN 12.5 G: 0.25 SOLUTION INTRAVENOUS at 11:56

## 2022-09-17 RX ADMIN — NYSTATIN: 100000 POWDER TOPICAL at 20:38

## 2022-09-17 RX ADMIN — HEPARIN SODIUM 5000 UNITS: 5000 INJECTION INTRAVENOUS; SUBCUTANEOUS at 06:15

## 2022-09-17 RX ADMIN — INSULIN ASPART 2 UNITS: 100 INJECTION, SOLUTION INTRAVENOUS; SUBCUTANEOUS at 11:56

## 2022-09-18 LAB
ANION GAP SERPL CALCULATED.3IONS-SCNC: 11.3 MMOL/L (ref 5–15)
BASOPHILS # BLD AUTO: 0.04 10*3/MM3 (ref 0–0.2)
BASOPHILS NFR BLD AUTO: 0.3 % (ref 0–1.5)
BUN SERPL-MCNC: 32 MG/DL (ref 8–23)
BUN/CREAT SERPL: 16.4 (ref 7–25)
CALCIUM SPEC-SCNC: 8.5 MG/DL (ref 8.6–10.5)
CHLORIDE SERPL-SCNC: 99 MMOL/L (ref 98–107)
CO2 SERPL-SCNC: 24.7 MMOL/L (ref 22–29)
CREAT SERPL-MCNC: 1.95 MG/DL (ref 0.57–1)
DEPRECATED RDW RBC AUTO: 46.4 FL (ref 37–54)
EGFRCR SERPLBLD CKD-EPI 2021: 26.9 ML/MIN/1.73
EOSINOPHIL # BLD AUTO: 0.25 10*3/MM3 (ref 0–0.4)
EOSINOPHIL NFR BLD AUTO: 1.8 % (ref 0.3–6.2)
ERYTHROCYTE [DISTWIDTH] IN BLOOD BY AUTOMATED COUNT: 14.3 % (ref 12.3–15.4)
FLUAV SUBTYP SPEC NAA+PROBE: NOT DETECTED
FLUBV RNA ISLT QL NAA+PROBE: NOT DETECTED
GLUCOSE BLDC GLUCOMTR-MCNC: 161 MG/DL (ref 70–130)
GLUCOSE BLDC GLUCOMTR-MCNC: 176 MG/DL (ref 70–130)
GLUCOSE BLDC GLUCOMTR-MCNC: 182 MG/DL (ref 70–130)
GLUCOSE BLDC GLUCOMTR-MCNC: 186 MG/DL (ref 70–130)
GLUCOSE SERPL-MCNC: 174 MG/DL (ref 65–99)
HCT VFR BLD AUTO: 34.6 % (ref 34–46.6)
HGB BLD-MCNC: 10.7 G/DL (ref 12–15.9)
IMM GRANULOCYTES # BLD AUTO: 0.09 10*3/MM3 (ref 0–0.05)
IMM GRANULOCYTES NFR BLD AUTO: 0.7 % (ref 0–0.5)
LYMPHOCYTES # BLD AUTO: 1.47 10*3/MM3 (ref 0.7–3.1)
LYMPHOCYTES NFR BLD AUTO: 10.7 % (ref 19.6–45.3)
MCH RBC QN AUTO: 27.1 PG (ref 26.6–33)
MCHC RBC AUTO-ENTMCNC: 30.9 G/DL (ref 31.5–35.7)
MCV RBC AUTO: 87.6 FL (ref 79–97)
MONOCYTES # BLD AUTO: 0.94 10*3/MM3 (ref 0.1–0.9)
MONOCYTES NFR BLD AUTO: 6.9 % (ref 5–12)
NEUTROPHILS NFR BLD AUTO: 10.91 10*3/MM3 (ref 1.7–7)
NEUTROPHILS NFR BLD AUTO: 79.6 % (ref 42.7–76)
NRBC BLD AUTO-RTO: 0 /100 WBC (ref 0–0.2)
PLATELET # BLD AUTO: 345 10*3/MM3 (ref 140–450)
PMV BLD AUTO: 9 FL (ref 6–12)
POTASSIUM SERPL-SCNC: 4.8 MMOL/L (ref 3.5–5.2)
RBC # BLD AUTO: 3.95 10*6/MM3 (ref 3.77–5.28)
SARS-COV-2 RNA PNL SPEC NAA+PROBE: NOT DETECTED
SODIUM SERPL-SCNC: 135 MMOL/L (ref 136–145)
WBC NRBC COR # BLD: 13.7 10*3/MM3 (ref 3.4–10.8)

## 2022-09-18 PROCEDURE — 25010000002 CEFTRIAXONE PER 250 MG: Performed by: PHYSICIAN ASSISTANT

## 2022-09-18 PROCEDURE — 25010000002 HEPARIN (PORCINE) PER 1000 UNITS: Performed by: HOSPITALIST

## 2022-09-18 PROCEDURE — 63710000001 INSULIN ASPART PER 5 UNITS: Performed by: PHYSICIAN ASSISTANT

## 2022-09-18 PROCEDURE — 87636 SARSCOV2 & INF A&B AMP PRB: CPT | Performed by: FAMILY MEDICINE

## 2022-09-18 PROCEDURE — 85025 COMPLETE CBC W/AUTO DIFF WBC: CPT | Performed by: FAMILY MEDICINE

## 2022-09-18 PROCEDURE — 99232 SBSQ HOSP IP/OBS MODERATE 35: CPT | Performed by: SPECIALIST

## 2022-09-18 PROCEDURE — 82962 GLUCOSE BLOOD TEST: CPT

## 2022-09-18 PROCEDURE — 25010000002 PROCHLORPERAZINE 10 MG/2ML SOLUTION: Performed by: PHYSICIAN ASSISTANT

## 2022-09-18 PROCEDURE — 80048 BASIC METABOLIC PNL TOTAL CA: CPT | Performed by: FAMILY MEDICINE

## 2022-09-18 PROCEDURE — 99231 SBSQ HOSP IP/OBS SF/LOW 25: CPT | Performed by: FAMILY MEDICINE

## 2022-09-18 RX ADMIN — PROCHLORPERAZINE EDISYLATE 5 MG: 5 INJECTION INTRAMUSCULAR; INTRAVENOUS at 00:11

## 2022-09-18 RX ADMIN — DOCUSATE SODIUM 50 MG AND SENNOSIDES 8.6 MG 2 TABLET: 8.6; 5 TABLET, FILM COATED ORAL at 20:37

## 2022-09-18 RX ADMIN — PANTOPRAZOLE SODIUM 40 MG: 40 TABLET, DELAYED RELEASE ORAL at 06:20

## 2022-09-18 RX ADMIN — CEFTRIAXONE 1 G: 1 INJECTION, POWDER, FOR SOLUTION INTRAMUSCULAR; INTRAVENOUS at 20:37

## 2022-09-18 RX ADMIN — ISOSORBIDE MONONITRATE 30 MG: 30 TABLET, EXTENDED RELEASE ORAL at 08:45

## 2022-09-18 RX ADMIN — HEPARIN SODIUM 5000 UNITS: 5000 INJECTION INTRAVENOUS; SUBCUTANEOUS at 06:20

## 2022-09-18 RX ADMIN — METRONIDAZOLE 500 MG: 500 INJECTION, SOLUTION INTRAVENOUS at 17:22

## 2022-09-18 RX ADMIN — INSULIN ASPART 2 UNITS: 100 INJECTION, SOLUTION INTRAVENOUS; SUBCUTANEOUS at 11:48

## 2022-09-18 RX ADMIN — AMLODIPINE BESYLATE 5 MG: 5 TABLET ORAL at 08:45

## 2022-09-18 RX ADMIN — NYSTATIN: 100000 POWDER TOPICAL at 20:38

## 2022-09-18 RX ADMIN — SODIUM BICARBONATE TAB 650 MG 650 MG: 650 TAB at 08:45

## 2022-09-18 RX ADMIN — HEPARIN SODIUM 5000 UNITS: 5000 INJECTION INTRAVENOUS; SUBCUTANEOUS at 15:03

## 2022-09-18 RX ADMIN — NYSTATIN: 100000 POWDER TOPICAL at 10:48

## 2022-09-18 RX ADMIN — DOCUSATE SODIUM 50 MG AND SENNOSIDES 8.6 MG 2 TABLET: 8.6; 5 TABLET, FILM COATED ORAL at 08:44

## 2022-09-18 RX ADMIN — FOLIC ACID 1 MG: 1 TABLET ORAL at 08:44

## 2022-09-18 RX ADMIN — SODIUM BICARBONATE TAB 650 MG 650 MG: 650 TAB at 20:38

## 2022-09-18 RX ADMIN — HEPARIN SODIUM 5000 UNITS: 5000 INJECTION INTRAVENOUS; SUBCUTANEOUS at 20:38

## 2022-09-18 RX ADMIN — INSULIN ASPART 2 UNITS: 100 INJECTION, SOLUTION INTRAVENOUS; SUBCUTANEOUS at 17:23

## 2022-09-18 RX ADMIN — Medication 3 ML: at 08:45

## 2022-09-18 RX ADMIN — INSULIN ASPART 2 UNITS: 100 INJECTION, SOLUTION INTRAVENOUS; SUBCUTANEOUS at 08:45

## 2022-09-18 RX ADMIN — Medication 3 ML: at 20:38

## 2022-09-18 RX ADMIN — Medication 1000 MCG: at 08:44

## 2022-09-18 RX ADMIN — METRONIDAZOLE 500 MG: 500 INJECTION, SOLUTION INTRAVENOUS at 08:45

## 2022-09-18 RX ADMIN — SODIUM BICARBONATE TAB 650 MG 650 MG: 650 TAB at 17:22

## 2022-09-18 RX ADMIN — Medication 150 MG: at 08:45

## 2022-09-18 RX ADMIN — ATORVASTATIN CALCIUM 20 MG: 20 TABLET, FILM COATED ORAL at 08:45

## 2022-09-19 ENCOUNTER — APPOINTMENT (OUTPATIENT)
Dept: CARDIOLOGY | Facility: HOSPITAL | Age: 72
End: 2022-09-19

## 2022-09-19 LAB
ALBUMIN SERPL ELPH-MCNC: 3 G/DL (ref 2.9–4.4)
ALBUMIN/GLOB SERPL: 1 {RATIO} (ref 0.7–1.7)
ALPHA1 GLOB SERPL ELPH-MCNC: 0.3 G/DL (ref 0–0.4)
ALPHA2 GLOB SERPL ELPH-MCNC: 0.8 G/DL (ref 0.4–1)
ANA SER QL: NEGATIVE
ANION GAP SERPL CALCULATED.3IONS-SCNC: 9.3 MMOL/L (ref 5–15)
B-GLOBULIN SERPL ELPH-MCNC: 0.9 G/DL (ref 0.7–1.3)
BASOPHILS # BLD AUTO: 0.04 10*3/MM3 (ref 0–0.2)
BASOPHILS NFR BLD AUTO: 0.3 % (ref 0–1.5)
BH CV ECHO MEAS - RAP SYSTOLE: 10 MMHG
BH CV ECHO MEAS - RVSP: 78.6 MMHG
BH CV ECHO MEAS - TR MAX PG: 68.6 MMHG
BH CV ECHO MEAS - TR MAX VEL: 414 CM/SEC
BUN SERPL-MCNC: 34 MG/DL (ref 8–23)
BUN/CREAT SERPL: 17.3 (ref 7–25)
CALCIUM SPEC-SCNC: 8.7 MG/DL (ref 8.6–10.5)
CHLORIDE SERPL-SCNC: 99 MMOL/L (ref 98–107)
CO2 SERPL-SCNC: 26.7 MMOL/L (ref 22–29)
CREAT SERPL-MCNC: 1.97 MG/DL (ref 0.57–1)
DEPRECATED RDW RBC AUTO: 46.5 FL (ref 37–54)
DSDNA AB SER-ACNC: 3 IU/ML (ref 0–9)
EGFRCR SERPLBLD CKD-EPI 2021: 26.6 ML/MIN/1.73
EOSINOPHIL # BLD AUTO: 0.24 10*3/MM3 (ref 0–0.4)
EOSINOPHIL NFR BLD AUTO: 2 % (ref 0.3–6.2)
ERYTHROCYTE [DISTWIDTH] IN BLOOD BY AUTOMATED COUNT: 14.6 % (ref 12.3–15.4)
GAMMA GLOB SERPL ELPH-MCNC: 0.9 G/DL (ref 0.4–1.8)
GLOBULIN SER CALC-MCNC: 2.9 G/DL (ref 2.2–3.9)
GLUCOSE BLDC GLUCOMTR-MCNC: 162 MG/DL (ref 70–130)
GLUCOSE BLDC GLUCOMTR-MCNC: 188 MG/DL (ref 70–130)
GLUCOSE BLDC GLUCOMTR-MCNC: 204 MG/DL (ref 70–130)
GLUCOSE BLDC GLUCOMTR-MCNC: 225 MG/DL (ref 70–130)
GLUCOSE SERPL-MCNC: 190 MG/DL (ref 65–99)
HCT VFR BLD AUTO: 34 % (ref 34–46.6)
HGB BLD-MCNC: 10.6 G/DL (ref 12–15.9)
IMM GRANULOCYTES # BLD AUTO: 0.09 10*3/MM3 (ref 0–0.05)
IMM GRANULOCYTES NFR BLD AUTO: 0.8 % (ref 0–0.5)
KAPPA LC FREE SER-MCNC: 66.5 MG/L (ref 3.3–19.4)
KAPPA LC FREE/LAMBDA FREE SER: 1.3 {RATIO} (ref 0.26–1.65)
LABORATORY COMMENT REPORT: ABNORMAL
LAMBDA LC FREE SERPL-MCNC: 51.2 MG/L (ref 5.7–26.3)
LYMPHOCYTES # BLD AUTO: 1.78 10*3/MM3 (ref 0.7–3.1)
LYMPHOCYTES NFR BLD AUTO: 14.8 % (ref 19.6–45.3)
M PROTEIN SERPL ELPH-MCNC: ABNORMAL G/DL
MAXIMAL PREDICTED HEART RATE: 148 BPM
MCH RBC QN AUTO: 27.5 PG (ref 26.6–33)
MCHC RBC AUTO-ENTMCNC: 31.2 G/DL (ref 31.5–35.7)
MCV RBC AUTO: 88.1 FL (ref 79–97)
MONOCYTES # BLD AUTO: 0.81 10*3/MM3 (ref 0.1–0.9)
MONOCYTES NFR BLD AUTO: 6.8 % (ref 5–12)
NEUTROPHILS NFR BLD AUTO: 75.3 % (ref 42.7–76)
NEUTROPHILS NFR BLD AUTO: 9.04 10*3/MM3 (ref 1.7–7)
NRBC BLD AUTO-RTO: 0 /100 WBC (ref 0–0.2)
PLATELET # BLD AUTO: 336 10*3/MM3 (ref 140–450)
PMV BLD AUTO: 8.8 FL (ref 6–12)
POTASSIUM SERPL-SCNC: 4.5 MMOL/L (ref 3.5–5.2)
PROT PATTERN SERPL ELPH-IMP: ABNORMAL
PROT SERPL-MCNC: 5.9 G/DL (ref 6–8.5)
RBC # BLD AUTO: 3.86 10*6/MM3 (ref 3.77–5.28)
SODIUM SERPL-SCNC: 135 MMOL/L (ref 136–145)
STRESS TARGET HR: 126 BPM
WBC NRBC COR # BLD: 12 10*3/MM3 (ref 3.4–10.8)

## 2022-09-19 PROCEDURE — 93321 DOPPLER ECHO F-UP/LMTD STD: CPT

## 2022-09-19 PROCEDURE — 93325 DOPPLER ECHO COLOR FLOW MAPG: CPT

## 2022-09-19 PROCEDURE — 25010000002 NA FERRIC GLUC CPLX PER 12.5 MG: Performed by: INTERNAL MEDICINE

## 2022-09-19 PROCEDURE — 93321 DOPPLER ECHO F-UP/LMTD STD: CPT | Performed by: SPECIALIST

## 2022-09-19 PROCEDURE — 93308 TTE F-UP OR LMTD: CPT | Performed by: SPECIALIST

## 2022-09-19 PROCEDURE — 82962 GLUCOSE BLOOD TEST: CPT

## 2022-09-19 PROCEDURE — 85025 COMPLETE CBC W/AUTO DIFF WBC: CPT | Performed by: FAMILY MEDICINE

## 2022-09-19 PROCEDURE — 99232 SBSQ HOSP IP/OBS MODERATE 35: CPT | Performed by: SPECIALIST

## 2022-09-19 PROCEDURE — 93325 DOPPLER ECHO COLOR FLOW MAPG: CPT | Performed by: SPECIALIST

## 2022-09-19 PROCEDURE — 25010000002 HEPARIN (PORCINE) PER 1000 UNITS: Performed by: HOSPITALIST

## 2022-09-19 PROCEDURE — 80048 BASIC METABOLIC PNL TOTAL CA: CPT | Performed by: FAMILY MEDICINE

## 2022-09-19 PROCEDURE — 93308 TTE F-UP OR LMTD: CPT

## 2022-09-19 PROCEDURE — 63710000001 INSULIN ASPART PER 5 UNITS: Performed by: PHYSICIAN ASSISTANT

## 2022-09-19 PROCEDURE — 99232 SBSQ HOSP IP/OBS MODERATE 35: CPT | Performed by: INTERNAL MEDICINE

## 2022-09-19 RX ADMIN — AMLODIPINE BESYLATE 5 MG: 5 TABLET ORAL at 09:02

## 2022-09-19 RX ADMIN — HEPARIN SODIUM 5000 UNITS: 5000 INJECTION INTRAVENOUS; SUBCUTANEOUS at 14:09

## 2022-09-19 RX ADMIN — Medication 3 ML: at 09:03

## 2022-09-19 RX ADMIN — Medication 150 MG: at 09:02

## 2022-09-19 RX ADMIN — DOCUSATE SODIUM 50 MG AND SENNOSIDES 8.6 MG 2 TABLET: 8.6; 5 TABLET, FILM COATED ORAL at 09:02

## 2022-09-19 RX ADMIN — ATORVASTATIN CALCIUM 20 MG: 20 TABLET, FILM COATED ORAL at 09:02

## 2022-09-19 RX ADMIN — Medication 1000 MCG: at 09:02

## 2022-09-19 RX ADMIN — SODIUM BICARBONATE TAB 650 MG 650 MG: 650 TAB at 20:47

## 2022-09-19 RX ADMIN — INSULIN ASPART 2 UNITS: 100 INJECTION, SOLUTION INTRAVENOUS; SUBCUTANEOUS at 16:54

## 2022-09-19 RX ADMIN — NYSTATIN: 100000 POWDER TOPICAL at 09:03

## 2022-09-19 RX ADMIN — SODIUM BICARBONATE TAB 650 MG 650 MG: 650 TAB at 09:02

## 2022-09-19 RX ADMIN — SODIUM CHLORIDE 125 MG: 9 INJECTION, SOLUTION INTRAVENOUS at 09:02

## 2022-09-19 RX ADMIN — FOLIC ACID 1 MG: 1 TABLET ORAL at 09:02

## 2022-09-19 RX ADMIN — ISOSORBIDE MONONITRATE 30 MG: 30 TABLET, EXTENDED RELEASE ORAL at 09:02

## 2022-09-19 RX ADMIN — INSULIN ASPART 3 UNITS: 100 INJECTION, SOLUTION INTRAVENOUS; SUBCUTANEOUS at 11:52

## 2022-09-19 RX ADMIN — DOCUSATE SODIUM 50 MG AND SENNOSIDES 8.6 MG 2 TABLET: 8.6; 5 TABLET, FILM COATED ORAL at 20:47

## 2022-09-19 RX ADMIN — INSULIN ASPART 2 UNITS: 100 INJECTION, SOLUTION INTRAVENOUS; SUBCUTANEOUS at 09:02

## 2022-09-19 RX ADMIN — NYSTATIN 1 APPLICATION: 100000 POWDER TOPICAL at 20:49

## 2022-09-19 RX ADMIN — Medication 3 ML: at 20:49

## 2022-09-19 RX ADMIN — HEPARIN SODIUM 5000 UNITS: 5000 INJECTION INTRAVENOUS; SUBCUTANEOUS at 05:49

## 2022-09-19 RX ADMIN — HEPARIN SODIUM 5000 UNITS: 5000 INJECTION INTRAVENOUS; SUBCUTANEOUS at 20:48

## 2022-09-19 RX ADMIN — SODIUM BICARBONATE TAB 650 MG 650 MG: 650 TAB at 16:54

## 2022-09-19 RX ADMIN — PANTOPRAZOLE SODIUM 40 MG: 40 TABLET, DELAYED RELEASE ORAL at 05:49

## 2022-09-20 LAB
ALBUMIN 24H MFR UR ELPH: 63.3 %
ALBUMIN SERPL-MCNC: 2.74 G/DL (ref 3.5–5.2)
ALPHA1 GLOB 24H MFR UR ELPH: 1.6 %
ALPHA2 GLOB 24H MFR UR ELPH: 9 %
ANION GAP SERPL CALCULATED.3IONS-SCNC: 9.1 MMOL/L (ref 5–15)
B-GLOBULIN 24H MFR UR ELPH: 11.7 %
BASOPHILS # BLD AUTO: 0.05 10*3/MM3 (ref 0–0.2)
BASOPHILS NFR BLD AUTO: 0.4 % (ref 0–1.5)
BUN SERPL-MCNC: 30 MG/DL (ref 8–23)
BUN/CREAT SERPL: 17.9 (ref 7–25)
C-ANCA TITR SER IF: NORMAL TITER
CALCIUM SPEC-SCNC: 8.2 MG/DL (ref 8.6–10.5)
CHLORIDE SERPL-SCNC: 99 MMOL/L (ref 98–107)
CO2 SERPL-SCNC: 24.9 MMOL/L (ref 22–29)
CREAT SERPL-MCNC: 1.68 MG/DL (ref 0.57–1)
DEPRECATED RDW RBC AUTO: 53.2 FL (ref 37–54)
EGFRCR SERPLBLD CKD-EPI 2021: 32.2 ML/MIN/1.73
EOSINOPHIL # BLD AUTO: 0.25 10*3/MM3 (ref 0–0.4)
EOSINOPHIL NFR BLD AUTO: 1.8 % (ref 0.3–6.2)
ERYTHROCYTE [DISTWIDTH] IN BLOOD BY AUTOMATED COUNT: 15 % (ref 12.3–15.4)
GAMMA GLOB 24H MFR UR ELPH: 14.4 %
GLUCOSE BLDC GLUCOMTR-MCNC: 150 MG/DL (ref 70–130)
GLUCOSE BLDC GLUCOMTR-MCNC: 207 MG/DL (ref 70–130)
GLUCOSE BLDC GLUCOMTR-MCNC: 220 MG/DL (ref 70–130)
GLUCOSE BLDC GLUCOMTR-MCNC: 232 MG/DL (ref 70–130)
GLUCOSE SERPL-MCNC: 243 MG/DL (ref 65–99)
HCT VFR BLD AUTO: 37.7 % (ref 34–46.6)
HGB BLD-MCNC: 10.7 G/DL (ref 12–15.9)
HYPOCHROMIA BLD QL: NORMAL
IMM GRANULOCYTES # BLD AUTO: 0.1 10*3/MM3 (ref 0–0.05)
IMM GRANULOCYTES NFR BLD AUTO: 0.7 % (ref 0–0.5)
LABORATORY COMMENT REPORT: ABNORMAL
LYMPHOCYTES # BLD AUTO: 1.75 10*3/MM3 (ref 0.7–3.1)
LYMPHOCYTES NFR BLD AUTO: 12.6 % (ref 19.6–45.3)
M PROTEIN 24H MFR UR ELPH: ABNORMAL %
MACROCYTES BLD QL SMEAR: NORMAL
MCH RBC QN AUTO: 27.4 PG (ref 26.6–33)
MCHC RBC AUTO-ENTMCNC: 28.4 G/DL (ref 31.5–35.7)
MCV RBC AUTO: 96.4 FL (ref 79–97)
MONOCYTES # BLD AUTO: 0.89 10*3/MM3 (ref 0.1–0.9)
MONOCYTES NFR BLD AUTO: 6.4 % (ref 5–12)
MYELOPEROXIDASE AB SER IA-ACNC: <0.2 UNITS (ref 0–0.9)
NEUTROPHILS NFR BLD AUTO: 10.84 10*3/MM3 (ref 1.7–7)
NEUTROPHILS NFR BLD AUTO: 78.1 % (ref 42.7–76)
NRBC BLD AUTO-RTO: 0 /100 WBC (ref 0–0.2)
P-ANCA ATYPICAL TITR SER IF: NORMAL TITER
P-ANCA TITR SER IF: NORMAL TITER
PHOSPHATE SERPL-MCNC: 2.6 MG/DL (ref 2.5–4.5)
PLAT MORPH BLD: NORMAL
PLATELET # BLD AUTO: 294 10*3/MM3 (ref 140–450)
PMV BLD AUTO: 9.5 FL (ref 6–12)
POTASSIUM SERPL-SCNC: 4.4 MMOL/L (ref 3.5–5.2)
PROT 24H UR-MRATE: 1140 MG/24 HR (ref 30–150)
PROT UR-MCNC: 87.7 MG/DL
PROTEINASE3 AB SER IA-ACNC: <0.2 UNITS (ref 0–0.9)
RBC # BLD AUTO: 3.91 10*6/MM3 (ref 3.77–5.28)
SODIUM SERPL-SCNC: 133 MMOL/L (ref 136–145)
WBC NRBC COR # BLD: 13.88 10*3/MM3 (ref 3.4–10.8)

## 2022-09-20 PROCEDURE — 85007 BL SMEAR W/DIFF WBC COUNT: CPT | Performed by: INTERNAL MEDICINE

## 2022-09-20 PROCEDURE — 99232 SBSQ HOSP IP/OBS MODERATE 35: CPT | Performed by: INTERNAL MEDICINE

## 2022-09-20 PROCEDURE — 97110 THERAPEUTIC EXERCISES: CPT

## 2022-09-20 PROCEDURE — 25010000002 HEPARIN (PORCINE) PER 1000 UNITS: Performed by: HOSPITALIST

## 2022-09-20 PROCEDURE — 63710000001 INSULIN ASPART PER 5 UNITS: Performed by: PHYSICIAN ASSISTANT

## 2022-09-20 PROCEDURE — 97116 GAIT TRAINING THERAPY: CPT

## 2022-09-20 PROCEDURE — 82962 GLUCOSE BLOOD TEST: CPT

## 2022-09-20 PROCEDURE — 85025 COMPLETE CBC W/AUTO DIFF WBC: CPT | Performed by: INTERNAL MEDICINE

## 2022-09-20 PROCEDURE — 80069 RENAL FUNCTION PANEL: CPT | Performed by: INTERNAL MEDICINE

## 2022-09-20 PROCEDURE — 97530 THERAPEUTIC ACTIVITIES: CPT

## 2022-09-20 PROCEDURE — 25010000002 ONDANSETRON PER 1 MG: Performed by: HOSPITALIST

## 2022-09-20 RX ORDER — DULOXETIN HYDROCHLORIDE 30 MG/1
30 CAPSULE, DELAYED RELEASE ORAL DAILY
Status: DISCONTINUED | OUTPATIENT
Start: 2022-09-20 | End: 2022-09-22 | Stop reason: HOSPADM

## 2022-09-20 RX ORDER — LIDOCAINE HYDROCHLORIDE 20 MG/ML
JELLY TOPICAL AS NEEDED
Status: DISCONTINUED | OUTPATIENT
Start: 2022-09-20 | End: 2022-09-22 | Stop reason: HOSPADM

## 2022-09-20 RX ADMIN — AMLODIPINE BESYLATE 5 MG: 5 TABLET ORAL at 08:32

## 2022-09-20 RX ADMIN — INSULIN ASPART 2 UNITS: 100 INJECTION, SOLUTION INTRAVENOUS; SUBCUTANEOUS at 08:32

## 2022-09-20 RX ADMIN — NYSTATIN: 100000 POWDER TOPICAL at 20:47

## 2022-09-20 RX ADMIN — SODIUM BICARBONATE TAB 650 MG 650 MG: 650 TAB at 17:05

## 2022-09-20 RX ADMIN — Medication 1000 MCG: at 08:32

## 2022-09-20 RX ADMIN — DOCUSATE SODIUM 50 MG AND SENNOSIDES 8.6 MG 2 TABLET: 8.6; 5 TABLET, FILM COATED ORAL at 08:32

## 2022-09-20 RX ADMIN — SODIUM BICARBONATE TAB 650 MG 650 MG: 650 TAB at 08:32

## 2022-09-20 RX ADMIN — Medication 3 ML: at 08:34

## 2022-09-20 RX ADMIN — INSULIN ASPART 3 UNITS: 100 INJECTION, SOLUTION INTRAVENOUS; SUBCUTANEOUS at 17:05

## 2022-09-20 RX ADMIN — HEPARIN SODIUM 5000 UNITS: 5000 INJECTION INTRAVENOUS; SUBCUTANEOUS at 05:47

## 2022-09-20 RX ADMIN — HEPARIN SODIUM 5000 UNITS: 5000 INJECTION INTRAVENOUS; SUBCUTANEOUS at 20:43

## 2022-09-20 RX ADMIN — ATORVASTATIN CALCIUM 20 MG: 20 TABLET, FILM COATED ORAL at 08:32

## 2022-09-20 RX ADMIN — SODIUM BICARBONATE TAB 650 MG 650 MG: 650 TAB at 20:42

## 2022-09-20 RX ADMIN — ISOSORBIDE MONONITRATE 30 MG: 30 TABLET, EXTENDED RELEASE ORAL at 08:32

## 2022-09-20 RX ADMIN — DULOXETINE HYDROCHLORIDE 30 MG: 30 CAPSULE, DELAYED RELEASE ORAL at 13:32

## 2022-09-20 RX ADMIN — HEPARIN SODIUM 5000 UNITS: 5000 INJECTION INTRAVENOUS; SUBCUTANEOUS at 13:32

## 2022-09-20 RX ADMIN — INSULIN ASPART 2 UNITS: 100 INJECTION, SOLUTION INTRAVENOUS; SUBCUTANEOUS at 11:47

## 2022-09-20 RX ADMIN — FOLIC ACID 1 MG: 1 TABLET ORAL at 08:32

## 2022-09-20 RX ADMIN — Medication 3 ML: at 20:43

## 2022-09-20 RX ADMIN — Medication 150 MG: at 08:32

## 2022-09-20 RX ADMIN — DOCUSATE SODIUM 50 MG AND SENNOSIDES 8.6 MG 2 TABLET: 8.6; 5 TABLET, FILM COATED ORAL at 20:42

## 2022-09-20 RX ADMIN — PANTOPRAZOLE SODIUM 40 MG: 40 TABLET, DELAYED RELEASE ORAL at 05:47

## 2022-09-20 RX ADMIN — NYSTATIN: 100000 POWDER TOPICAL at 08:34

## 2022-09-20 RX ADMIN — ONDANSETRON 4 MG: 2 INJECTION INTRAMUSCULAR; INTRAVENOUS at 21:13

## 2022-09-21 LAB
ALBUMIN SERPL-MCNC: 2.87 G/DL (ref 3.5–5.2)
ANION GAP SERPL CALCULATED.3IONS-SCNC: 9.4 MMOL/L (ref 5–15)
BUN SERPL-MCNC: 30 MG/DL (ref 8–23)
BUN/CREAT SERPL: 17.9 (ref 7–25)
CALCIUM SPEC-SCNC: 8.2 MG/DL (ref 8.6–10.5)
CHLORIDE SERPL-SCNC: 99 MMOL/L (ref 98–107)
CO2 SERPL-SCNC: 24.6 MMOL/L (ref 22–29)
CREAT SERPL-MCNC: 1.68 MG/DL (ref 0.57–1)
DEPRECATED RDW RBC AUTO: 46.7 FL (ref 37–54)
EGFRCR SERPLBLD CKD-EPI 2021: 32.2 ML/MIN/1.73
ERYTHROCYTE [DISTWIDTH] IN BLOOD BY AUTOMATED COUNT: 14.6 % (ref 12.3–15.4)
GLUCOSE BLDC GLUCOMTR-MCNC: 157 MG/DL (ref 70–130)
GLUCOSE BLDC GLUCOMTR-MCNC: 184 MG/DL (ref 70–130)
GLUCOSE BLDC GLUCOMTR-MCNC: 188 MG/DL (ref 70–130)
GLUCOSE BLDC GLUCOMTR-MCNC: 192 MG/DL (ref 70–130)
GLUCOSE BLDC GLUCOMTR-MCNC: 254 MG/DL (ref 70–130)
GLUCOSE SERPL-MCNC: 245 MG/DL (ref 65–99)
HCT VFR BLD AUTO: 32.7 % (ref 34–46.6)
HGB BLD-MCNC: 10.1 G/DL (ref 12–15.9)
MCH RBC QN AUTO: 27.5 PG (ref 26.6–33)
MCHC RBC AUTO-ENTMCNC: 30.9 G/DL (ref 31.5–35.7)
MCV RBC AUTO: 89.1 FL (ref 79–97)
PHOSPHATE SERPL-MCNC: 2.7 MG/DL (ref 2.5–4.5)
PLATELET # BLD AUTO: 340 10*3/MM3 (ref 140–450)
PMV BLD AUTO: 9.5 FL (ref 6–12)
POTASSIUM SERPL-SCNC: 4.6 MMOL/L (ref 3.5–5.2)
RBC # BLD AUTO: 3.67 10*6/MM3 (ref 3.77–5.28)
SODIUM SERPL-SCNC: 133 MMOL/L (ref 136–145)
WBC NRBC COR # BLD: 15.04 10*3/MM3 (ref 3.4–10.8)

## 2022-09-21 PROCEDURE — 85027 COMPLETE CBC AUTOMATED: CPT | Performed by: INTERNAL MEDICINE

## 2022-09-21 PROCEDURE — 82962 GLUCOSE BLOOD TEST: CPT

## 2022-09-21 PROCEDURE — 80069 RENAL FUNCTION PANEL: CPT | Performed by: INTERNAL MEDICINE

## 2022-09-21 PROCEDURE — 99232 SBSQ HOSP IP/OBS MODERATE 35: CPT | Performed by: INTERNAL MEDICINE

## 2022-09-21 PROCEDURE — 63710000001 INSULIN ASPART PER 5 UNITS: Performed by: PHYSICIAN ASSISTANT

## 2022-09-21 PROCEDURE — 25010000002 HEPARIN (PORCINE) PER 1000 UNITS: Performed by: HOSPITALIST

## 2022-09-21 RX ADMIN — INSULIN ASPART 2 UNITS: 100 INJECTION, SOLUTION INTRAVENOUS; SUBCUTANEOUS at 11:46

## 2022-09-21 RX ADMIN — DOCUSATE SODIUM 50 MG AND SENNOSIDES 8.6 MG 2 TABLET: 8.6; 5 TABLET, FILM COATED ORAL at 21:49

## 2022-09-21 RX ADMIN — HEPARIN SODIUM 5000 UNITS: 5000 INJECTION INTRAVENOUS; SUBCUTANEOUS at 05:38

## 2022-09-21 RX ADMIN — FOLIC ACID 1 MG: 1 TABLET ORAL at 08:29

## 2022-09-21 RX ADMIN — Medication 1 APPLICATION: at 05:39

## 2022-09-21 RX ADMIN — SODIUM BICARBONATE TAB 650 MG 650 MG: 650 TAB at 17:30

## 2022-09-21 RX ADMIN — DULOXETINE HYDROCHLORIDE 30 MG: 30 CAPSULE, DELAYED RELEASE ORAL at 08:29

## 2022-09-21 RX ADMIN — SODIUM BICARBONATE TAB 650 MG 650 MG: 650 TAB at 21:49

## 2022-09-21 RX ADMIN — PANTOPRAZOLE SODIUM 40 MG: 40 TABLET, DELAYED RELEASE ORAL at 05:38

## 2022-09-21 RX ADMIN — NYSTATIN: 100000 POWDER TOPICAL at 21:49

## 2022-09-21 RX ADMIN — SODIUM BICARBONATE TAB 650 MG 650 MG: 650 TAB at 08:29

## 2022-09-21 RX ADMIN — INSULIN ASPART 2 UNITS: 100 INJECTION, SOLUTION INTRAVENOUS; SUBCUTANEOUS at 08:28

## 2022-09-21 RX ADMIN — INSULIN ASPART 2 UNITS: 100 INJECTION, SOLUTION INTRAVENOUS; SUBCUTANEOUS at 17:30

## 2022-09-21 RX ADMIN — HEPARIN SODIUM 5000 UNITS: 5000 INJECTION INTRAVENOUS; SUBCUTANEOUS at 15:06

## 2022-09-21 RX ADMIN — AMLODIPINE BESYLATE 5 MG: 5 TABLET ORAL at 08:29

## 2022-09-21 RX ADMIN — Medication 150 MG: at 08:30

## 2022-09-21 RX ADMIN — ISOSORBIDE MONONITRATE 30 MG: 30 TABLET, EXTENDED RELEASE ORAL at 08:29

## 2022-09-21 RX ADMIN — Medication 1000 MCG: at 08:30

## 2022-09-21 RX ADMIN — DOCUSATE SODIUM 50 MG AND SENNOSIDES 8.6 MG 2 TABLET: 8.6; 5 TABLET, FILM COATED ORAL at 08:29

## 2022-09-21 RX ADMIN — NYSTATIN: 100000 POWDER TOPICAL at 08:30

## 2022-09-21 RX ADMIN — Medication 3 ML: at 08:30

## 2022-09-21 RX ADMIN — Medication 3 ML: at 21:49

## 2022-09-21 RX ADMIN — HEPARIN SODIUM 5000 UNITS: 5000 INJECTION INTRAVENOUS; SUBCUTANEOUS at 21:49

## 2022-09-21 RX ADMIN — ATORVASTATIN CALCIUM 20 MG: 20 TABLET, FILM COATED ORAL at 08:29

## 2022-09-22 ENCOUNTER — APPOINTMENT (OUTPATIENT)
Dept: GENERAL RADIOLOGY | Facility: HOSPITAL | Age: 72
End: 2022-09-22

## 2022-09-22 ENCOUNTER — READMISSION MANAGEMENT (OUTPATIENT)
Dept: CALL CENTER | Facility: HOSPITAL | Age: 72
End: 2022-09-22

## 2022-09-22 ENCOUNTER — HOSPITAL ENCOUNTER (OUTPATIENT)
Facility: HOSPITAL | Age: 72
Setting detail: OBSERVATION
Discharge: NURSING, REHAB OR LONG-TERM CARE | End: 2022-09-28
Attending: STUDENT IN AN ORGANIZED HEALTH CARE EDUCATION/TRAINING PROGRAM | Admitting: HOSPITALIST

## 2022-09-22 VITALS
TEMPERATURE: 98.2 F | DIASTOLIC BLOOD PRESSURE: 72 MMHG | BODY MASS INDEX: 47.09 KG/M2 | SYSTOLIC BLOOD PRESSURE: 167 MMHG | HEART RATE: 88 BPM | RESPIRATION RATE: 20 BRPM | HEIGHT: 66 IN | OXYGEN SATURATION: 98 % | WEIGHT: 293 LBS

## 2022-09-22 DIAGNOSIS — R53.81 DEBILITY: Primary | ICD-10-CM

## 2022-09-22 PROBLEM — E11.319 DIABETIC RETINOPATHY: Status: ACTIVE | Noted: 2022-09-22

## 2022-09-22 PROBLEM — E66.01 MORBID OBESITY: Status: ACTIVE | Noted: 2022-09-22

## 2022-09-22 PROBLEM — N18.9 CHRONIC KIDNEY DISEASE: Status: ACTIVE | Noted: 2022-09-22

## 2022-09-22 PROBLEM — E11.21 DIABETIC NEPHROPATHY: Status: ACTIVE | Noted: 2022-09-22

## 2022-09-22 PROBLEM — G47.30 SLEEP APNEA: Status: ACTIVE | Noted: 2022-09-22

## 2022-09-22 LAB
ALBUMIN SERPL-MCNC: 2.92 G/DL (ref 3.5–5.2)
ALBUMIN SERPL-MCNC: 3.06 G/DL (ref 3.5–5.2)
ALBUMIN/GLOB SERPL: 1 G/DL
ALP SERPL-CCNC: 90 U/L (ref 39–117)
ALT SERPL W P-5'-P-CCNC: 11 U/L (ref 1–33)
ANION GAP SERPL CALCULATED.3IONS-SCNC: 10 MMOL/L (ref 5–15)
ANION GAP SERPL CALCULATED.3IONS-SCNC: 8.5 MMOL/L (ref 5–15)
APTT PPP: 28.9 SECONDS (ref 26.5–34.5)
AST SERPL-CCNC: 16 U/L (ref 1–32)
BASOPHILS # BLD AUTO: 0.04 10*3/MM3 (ref 0–0.2)
BASOPHILS NFR BLD AUTO: 0.3 % (ref 0–1.5)
BILIRUB SERPL-MCNC: 0.3 MG/DL (ref 0–1.2)
BUN SERPL-MCNC: 31 MG/DL (ref 8–23)
BUN SERPL-MCNC: 31 MG/DL (ref 8–23)
BUN/CREAT SERPL: 18.6 (ref 7–25)
BUN/CREAT SERPL: 19 (ref 7–25)
CALCIUM SPEC-SCNC: 8.4 MG/DL (ref 8.6–10.5)
CALCIUM SPEC-SCNC: 8.5 MG/DL (ref 8.6–10.5)
CHLORIDE SERPL-SCNC: 98 MMOL/L (ref 98–107)
CHLORIDE SERPL-SCNC: 98 MMOL/L (ref 98–107)
CO2 SERPL-SCNC: 26 MMOL/L (ref 22–29)
CO2 SERPL-SCNC: 26.5 MMOL/L (ref 22–29)
CREAT SERPL-MCNC: 1.63 MG/DL (ref 0.57–1)
CREAT SERPL-MCNC: 1.67 MG/DL (ref 0.57–1)
CRP SERPL-MCNC: 1.46 MG/DL (ref 0–0.5)
DEPRECATED RDW RBC AUTO: 46.8 FL (ref 37–54)
EGFRCR SERPLBLD CKD-EPI 2021: 32.4 ML/MIN/1.73
EGFRCR SERPLBLD CKD-EPI 2021: 33.4 ML/MIN/1.73
EOSINOPHIL # BLD AUTO: 0.23 10*3/MM3 (ref 0–0.4)
EOSINOPHIL NFR BLD AUTO: 1.9 % (ref 0.3–6.2)
ERYTHROCYTE [DISTWIDTH] IN BLOOD BY AUTOMATED COUNT: 14.6 % (ref 12.3–15.4)
FLUAV RNA RESP QL NAA+PROBE: NOT DETECTED
FLUBV RNA RESP QL NAA+PROBE: NOT DETECTED
GLOBULIN UR ELPH-MCNC: 3.1 GM/DL
GLUCOSE BLDC GLUCOMTR-MCNC: 165 MG/DL (ref 70–130)
GLUCOSE BLDC GLUCOMTR-MCNC: 200 MG/DL (ref 70–130)
GLUCOSE SERPL-MCNC: 177 MG/DL (ref 65–99)
GLUCOSE SERPL-MCNC: 261 MG/DL (ref 65–99)
HCT VFR BLD AUTO: 32.1 % (ref 34–46.6)
HGB BLD-MCNC: 10.1 G/DL (ref 12–15.9)
HOLD SPECIMEN: NORMAL
HOLD SPECIMEN: NORMAL
IMM GRANULOCYTES # BLD AUTO: 0.09 10*3/MM3 (ref 0–0.05)
IMM GRANULOCYTES NFR BLD AUTO: 0.7 % (ref 0–0.5)
INR PPP: 1.08 (ref 0.9–1.1)
LYMPHOCYTES # BLD AUTO: 1.23 10*3/MM3 (ref 0.7–3.1)
LYMPHOCYTES NFR BLD AUTO: 10.2 % (ref 19.6–45.3)
MCH RBC QN AUTO: 27.9 PG (ref 26.6–33)
MCHC RBC AUTO-ENTMCNC: 31.5 G/DL (ref 31.5–35.7)
MCV RBC AUTO: 88.7 FL (ref 79–97)
MONOCYTES # BLD AUTO: 0.89 10*3/MM3 (ref 0.1–0.9)
MONOCYTES NFR BLD AUTO: 7.4 % (ref 5–12)
NEUTROPHILS NFR BLD AUTO: 79.5 % (ref 42.7–76)
NEUTROPHILS NFR BLD AUTO: 9.54 10*3/MM3 (ref 1.7–7)
NRBC BLD AUTO-RTO: 0 /100 WBC (ref 0–0.2)
NT-PROBNP SERPL-MCNC: 9190 PG/ML (ref 0–900)
PHOSPHATE SERPL-MCNC: 3.2 MG/DL (ref 2.5–4.5)
PLATELET # BLD AUTO: 326 10*3/MM3 (ref 140–450)
PMV BLD AUTO: 8.9 FL (ref 6–12)
POTASSIUM SERPL-SCNC: 4.8 MMOL/L (ref 3.5–5.2)
POTASSIUM SERPL-SCNC: 5.1 MMOL/L (ref 3.5–5.2)
PROT SERPL-MCNC: 6.2 G/DL (ref 6–8.5)
PROTHROMBIN TIME: 14.2 SECONDS (ref 12.1–14.7)
QT INTERVAL: 378 MS
QTC INTERVAL: 446 MS
RBC # BLD AUTO: 3.62 10*6/MM3 (ref 3.77–5.28)
SARS-COV-2 RNA RESP QL NAA+PROBE: NOT DETECTED
SODIUM SERPL-SCNC: 133 MMOL/L (ref 136–145)
SODIUM SERPL-SCNC: 134 MMOL/L (ref 136–145)
WBC NRBC COR # BLD: 12.02 10*3/MM3 (ref 3.4–10.8)

## 2022-09-22 PROCEDURE — 99239 HOSP IP/OBS DSCHRG MGMT >30: CPT | Performed by: INTERNAL MEDICINE

## 2022-09-22 PROCEDURE — 93010 ELECTROCARDIOGRAM REPORT: CPT | Performed by: INTERNAL MEDICINE

## 2022-09-22 PROCEDURE — 87636 SARSCOV2 & INF A&B AMP PRB: CPT | Performed by: NURSE PRACTITIONER

## 2022-09-22 PROCEDURE — 99232 SBSQ HOSP IP/OBS MODERATE 35: CPT | Performed by: INTERNAL MEDICINE

## 2022-09-22 PROCEDURE — 25010000002 HEPARIN (PORCINE) PER 1000 UNITS: Performed by: HOSPITALIST

## 2022-09-22 PROCEDURE — G0378 HOSPITAL OBSERVATION PER HR: HCPCS

## 2022-09-22 PROCEDURE — 85025 COMPLETE CBC W/AUTO DIFF WBC: CPT | Performed by: NURSE PRACTITIONER

## 2022-09-22 PROCEDURE — 99285 EMERGENCY DEPT VISIT HI MDM: CPT

## 2022-09-22 PROCEDURE — 85730 THROMBOPLASTIN TIME PARTIAL: CPT | Performed by: NURSE PRACTITIONER

## 2022-09-22 PROCEDURE — 93005 ELECTROCARDIOGRAM TRACING: CPT | Performed by: NURSE PRACTITIONER

## 2022-09-22 PROCEDURE — 82962 GLUCOSE BLOOD TEST: CPT

## 2022-09-22 PROCEDURE — 85610 PROTHROMBIN TIME: CPT | Performed by: NURSE PRACTITIONER

## 2022-09-22 PROCEDURE — 84100 ASSAY OF PHOSPHORUS: CPT | Performed by: INTERNAL MEDICINE

## 2022-09-22 PROCEDURE — 80053 COMPREHEN METABOLIC PANEL: CPT | Performed by: INTERNAL MEDICINE

## 2022-09-22 PROCEDURE — 83880 ASSAY OF NATRIURETIC PEPTIDE: CPT | Performed by: NURSE PRACTITIONER

## 2022-09-22 PROCEDURE — 63710000001 INSULIN ASPART PER 5 UNITS: Performed by: PHYSICIAN ASSISTANT

## 2022-09-22 PROCEDURE — 71045 X-RAY EXAM CHEST 1 VIEW: CPT

## 2022-09-22 PROCEDURE — 86140 C-REACTIVE PROTEIN: CPT | Performed by: NURSE PRACTITIONER

## 2022-09-22 PROCEDURE — 36415 COLL VENOUS BLD VENIPUNCTURE: CPT

## 2022-09-22 PROCEDURE — C9803 HOPD COVID-19 SPEC COLLECT: HCPCS

## 2022-09-22 RX ORDER — DULOXETIN HYDROCHLORIDE 30 MG/1
30 CAPSULE, DELAYED RELEASE ORAL DAILY
Qty: 90 CAPSULE | Refills: 0 | Status: SHIPPED | OUTPATIENT
Start: 2022-09-23 | End: 2022-09-22 | Stop reason: SDUPTHER

## 2022-09-22 RX ORDER — CARVEDILOL 6.25 MG/1
6.25 TABLET ORAL 2 TIMES DAILY WITH MEALS
Qty: 180 TABLET | Refills: 0 | Status: SHIPPED | OUTPATIENT
Start: 2022-09-22 | End: 2022-09-22 | Stop reason: SDUPTHER

## 2022-09-22 RX ORDER — SODIUM BICARBONATE 650 MG/1
650 TABLET ORAL 3 TIMES DAILY
Qty: 42 TABLET | Refills: 0 | Status: SHIPPED | OUTPATIENT
Start: 2022-09-22 | End: 2022-09-28 | Stop reason: HOSPADM

## 2022-09-22 RX ORDER — CARVEDILOL 6.25 MG/1
6.25 TABLET ORAL 2 TIMES DAILY WITH MEALS
Status: DISCONTINUED | OUTPATIENT
Start: 2022-09-22 | End: 2022-09-22 | Stop reason: HOSPADM

## 2022-09-22 RX ORDER — IRON POLYSACCHARIDE COMPLEX 150 MG
150 CAPSULE ORAL DAILY
Qty: 30 CAPSULE | Refills: 0 | Status: SHIPPED | OUTPATIENT
Start: 2022-09-23

## 2022-09-22 RX ORDER — ISOSORBIDE MONONITRATE 30 MG/1
30 TABLET, EXTENDED RELEASE ORAL DAILY
Qty: 90 TABLET | Refills: 0 | Status: SHIPPED | OUTPATIENT
Start: 2022-09-23

## 2022-09-22 RX ORDER — ISOSORBIDE MONONITRATE 30 MG/1
30 TABLET, EXTENDED RELEASE ORAL DAILY
Qty: 90 TABLET | Refills: 0 | Status: SHIPPED | OUTPATIENT
Start: 2022-09-23 | End: 2022-09-22 | Stop reason: SDUPTHER

## 2022-09-22 RX ORDER — PANTOPRAZOLE SODIUM 40 MG/1
40 TABLET, DELAYED RELEASE ORAL
Qty: 90 TABLET | Refills: 0 | Status: SHIPPED | OUTPATIENT
Start: 2022-09-23 | End: 2022-09-22 | Stop reason: SDUPTHER

## 2022-09-22 RX ORDER — FOLIC ACID 1 MG/1
1 TABLET ORAL DAILY
Qty: 30 TABLET | Refills: 0 | Status: SHIPPED | OUTPATIENT
Start: 2022-09-23

## 2022-09-22 RX ORDER — DULOXETIN HYDROCHLORIDE 30 MG/1
30 CAPSULE, DELAYED RELEASE ORAL DAILY
Qty: 90 CAPSULE | Refills: 0 | Status: SHIPPED | OUTPATIENT
Start: 2022-09-23

## 2022-09-22 RX ORDER — PANTOPRAZOLE SODIUM 40 MG/1
40 TABLET, DELAYED RELEASE ORAL
Qty: 90 TABLET | Refills: 0 | Status: SHIPPED | OUTPATIENT
Start: 2022-09-23

## 2022-09-22 RX ORDER — LANOLIN ALCOHOL/MO/W.PET/CERES
1000 CREAM (GRAM) TOPICAL DAILY
Qty: 30 TABLET | Refills: 0 | Status: SHIPPED | OUTPATIENT
Start: 2022-09-23

## 2022-09-22 RX ORDER — CARVEDILOL 6.25 MG/1
6.25 TABLET ORAL 2 TIMES DAILY WITH MEALS
Qty: 180 TABLET | Refills: 0 | Status: SHIPPED | OUTPATIENT
Start: 2022-09-22

## 2022-09-22 RX ADMIN — Medication 3 ML: at 09:02

## 2022-09-22 RX ADMIN — ATORVASTATIN CALCIUM 20 MG: 20 TABLET, FILM COATED ORAL at 09:02

## 2022-09-22 RX ADMIN — ISOSORBIDE MONONITRATE 30 MG: 30 TABLET, EXTENDED RELEASE ORAL at 09:02

## 2022-09-22 RX ADMIN — DULOXETINE HYDROCHLORIDE 30 MG: 30 CAPSULE, DELAYED RELEASE ORAL at 11:37

## 2022-09-22 RX ADMIN — DOCUSATE SODIUM 50 MG AND SENNOSIDES 8.6 MG 2 TABLET: 8.6; 5 TABLET, FILM COATED ORAL at 09:02

## 2022-09-22 RX ADMIN — INSULIN ASPART 3 UNITS: 100 INJECTION, SOLUTION INTRAVENOUS; SUBCUTANEOUS at 11:37

## 2022-09-22 RX ADMIN — CARVEDILOL 6.25 MG: 6.25 TABLET, FILM COATED ORAL at 11:37

## 2022-09-22 RX ADMIN — Medication 1000 MCG: at 09:02

## 2022-09-22 RX ADMIN — INSULIN ASPART 2 UNITS: 100 INJECTION, SOLUTION INTRAVENOUS; SUBCUTANEOUS at 09:01

## 2022-09-22 RX ADMIN — SODIUM BICARBONATE TAB 650 MG 650 MG: 650 TAB at 09:02

## 2022-09-22 RX ADMIN — HEPARIN SODIUM 5000 UNITS: 5000 INJECTION INTRAVENOUS; SUBCUTANEOUS at 06:07

## 2022-09-22 RX ADMIN — PANTOPRAZOLE SODIUM 40 MG: 40 TABLET, DELAYED RELEASE ORAL at 06:07

## 2022-09-22 RX ADMIN — FOLIC ACID 1 MG: 1 TABLET ORAL at 09:02

## 2022-09-22 RX ADMIN — Medication 150 MG: at 09:02

## 2022-09-22 RX ADMIN — AMLODIPINE BESYLATE 5 MG: 5 TABLET ORAL at 09:02

## 2022-09-22 RX ADMIN — NYSTATIN: 100000 POWDER TOPICAL at 09:02

## 2022-09-23 PROBLEM — R53.81 PHYSICAL DEBILITY: Status: ACTIVE | Noted: 2022-09-23

## 2022-09-23 LAB
BACTERIA UR QL AUTO: ABNORMAL /HPF
BILIRUB UR QL STRIP: NEGATIVE
CLARITY UR: ABNORMAL
COLOR UR: YELLOW
GLUCOSE BLDC GLUCOMTR-MCNC: 169 MG/DL (ref 70–130)
GLUCOSE BLDC GLUCOMTR-MCNC: 198 MG/DL (ref 70–130)
GLUCOSE BLDC GLUCOMTR-MCNC: 214 MG/DL (ref 70–130)
GLUCOSE UR STRIP-MCNC: ABNORMAL MG/DL
HGB UR QL STRIP.AUTO: ABNORMAL
HYALINE CASTS UR QL AUTO: ABNORMAL /LPF
KETONES UR QL STRIP: NEGATIVE
LEUKOCYTE ESTERASE UR QL STRIP.AUTO: ABNORMAL
NITRITE UR QL STRIP: NEGATIVE
PH UR STRIP.AUTO: 6 [PH] (ref 5–8)
PROT UR QL STRIP: ABNORMAL
RBC # UR STRIP: ABNORMAL /HPF
REF LAB TEST METHOD: ABNORMAL
SP GR UR STRIP: 1.01 (ref 1–1.03)
SQUAMOUS #/AREA URNS HPF: ABNORMAL /HPF
UROBILINOGEN UR QL STRIP: ABNORMAL
WBC # UR STRIP: ABNORMAL /HPF

## 2022-09-23 PROCEDURE — 87086 URINE CULTURE/COLONY COUNT: CPT | Performed by: NURSE PRACTITIONER

## 2022-09-23 PROCEDURE — 82962 GLUCOSE BLOOD TEST: CPT

## 2022-09-23 PROCEDURE — 96372 THER/PROPH/DIAG INJ SC/IM: CPT

## 2022-09-23 PROCEDURE — 97162 PT EVAL MOD COMPLEX 30 MIN: CPT

## 2022-09-23 PROCEDURE — G0378 HOSPITAL OBSERVATION PER HR: HCPCS

## 2022-09-23 PROCEDURE — 94799 UNLISTED PULMONARY SVC/PX: CPT

## 2022-09-23 PROCEDURE — 81001 URINALYSIS AUTO W/SCOPE: CPT | Performed by: NURSE PRACTITIONER

## 2022-09-23 PROCEDURE — 99220 PR INITIAL OBSERVATION CARE/DAY 70 MINUTES: CPT | Performed by: INTERNAL MEDICINE

## 2022-09-23 PROCEDURE — 94761 N-INVAS EAR/PLS OXIMETRY MLT: CPT

## 2022-09-23 PROCEDURE — 94762 N-INVAS EAR/PLS OXIMTRY CONT: CPT

## 2022-09-23 PROCEDURE — 97167 OT EVAL HIGH COMPLEX 60 MIN: CPT

## 2022-09-23 PROCEDURE — 25010000002 ENOXAPARIN PER 10 MG: Performed by: INTERNAL MEDICINE

## 2022-09-23 PROCEDURE — 63710000001 INSULIN ASPART PER 5 UNITS: Performed by: INTERNAL MEDICINE

## 2022-09-23 RX ORDER — PROCHLORPERAZINE EDISYLATE 5 MG/ML
5 INJECTION INTRAMUSCULAR; INTRAVENOUS EVERY 6 HOURS PRN
Status: DISCONTINUED | OUTPATIENT
Start: 2022-09-23 | End: 2022-09-28 | Stop reason: HOSPADM

## 2022-09-23 RX ORDER — ONDANSETRON 2 MG/ML
4 INJECTION INTRAMUSCULAR; INTRAVENOUS EVERY 6 HOURS PRN
Status: DISCONTINUED | OUTPATIENT
Start: 2022-09-23 | End: 2022-09-28 | Stop reason: HOSPADM

## 2022-09-23 RX ORDER — ISOSORBIDE MONONITRATE 30 MG/1
30 TABLET, EXTENDED RELEASE ORAL
Status: DISCONTINUED | OUTPATIENT
Start: 2022-09-23 | End: 2022-09-23

## 2022-09-23 RX ORDER — SODIUM CHLORIDE 0.9 % (FLUSH) 0.9 %
10 SYRINGE (ML) INJECTION AS NEEDED
Status: DISCONTINUED | OUTPATIENT
Start: 2022-09-23 | End: 2022-09-28 | Stop reason: HOSPADM

## 2022-09-23 RX ORDER — PANTOPRAZOLE SODIUM 40 MG/1
40 TABLET, DELAYED RELEASE ORAL ONCE
Status: COMPLETED | OUTPATIENT
Start: 2022-09-23 | End: 2022-09-23

## 2022-09-23 RX ORDER — AMLODIPINE BESYLATE 10 MG/1
10 TABLET ORAL DAILY
Status: CANCELLED | OUTPATIENT
Start: 2022-09-23

## 2022-09-23 RX ORDER — CHOLECALCIFEROL (VITAMIN D3) 125 MCG
5 CAPSULE ORAL NIGHTLY PRN
Status: DISCONTINUED | OUTPATIENT
Start: 2022-09-23 | End: 2022-09-23

## 2022-09-23 RX ORDER — ENOXAPARIN SODIUM 100 MG/ML
60 INJECTION SUBCUTANEOUS EVERY 12 HOURS SCHEDULED
Status: DISCONTINUED | OUTPATIENT
Start: 2022-09-23 | End: 2022-09-28 | Stop reason: HOSPADM

## 2022-09-23 RX ORDER — ATORVASTATIN CALCIUM 20 MG/1
20 TABLET, FILM COATED ORAL DAILY
Status: CANCELLED | OUTPATIENT
Start: 2022-09-23

## 2022-09-23 RX ORDER — IRON POLYSACCHARIDE COMPLEX 150 MG
150 CAPSULE ORAL DAILY
Status: CANCELLED | OUTPATIENT
Start: 2022-09-23

## 2022-09-23 RX ORDER — IRON POLYSACCHARIDE COMPLEX 150 MG
150 CAPSULE ORAL DAILY
Status: DISCONTINUED | OUTPATIENT
Start: 2022-09-23 | End: 2022-09-28 | Stop reason: HOSPADM

## 2022-09-23 RX ORDER — FOLIC ACID 1 MG/1
1 TABLET ORAL DAILY
Status: DISCONTINUED | OUTPATIENT
Start: 2022-09-23 | End: 2022-09-28 | Stop reason: HOSPADM

## 2022-09-23 RX ORDER — ACETAMINOPHEN 650 MG/1
650 SUPPOSITORY RECTAL EVERY 4 HOURS PRN
Status: DISCONTINUED | OUTPATIENT
Start: 2022-09-23 | End: 2022-09-28 | Stop reason: HOSPADM

## 2022-09-23 RX ORDER — INSULIN ASPART 100 [IU]/ML
0-9 INJECTION, SOLUTION INTRAVENOUS; SUBCUTANEOUS
Status: DISCONTINUED | OUTPATIENT
Start: 2022-09-23 | End: 2022-09-26

## 2022-09-23 RX ORDER — BISACODYL 10 MG
10 SUPPOSITORY, RECTAL RECTAL DAILY PRN
Status: DISCONTINUED | OUTPATIENT
Start: 2022-09-23 | End: 2022-09-28 | Stop reason: HOSPADM

## 2022-09-23 RX ORDER — ALUMINA, MAGNESIA, AND SIMETHICONE 2400; 2400; 240 MG/30ML; MG/30ML; MG/30ML
15 SUSPENSION ORAL EVERY 6 HOURS PRN
Status: DISCONTINUED | OUTPATIENT
Start: 2022-09-23 | End: 2022-09-28 | Stop reason: HOSPADM

## 2022-09-23 RX ORDER — AMLODIPINE BESYLATE 5 MG/1
5 TABLET ORAL
Status: DISCONTINUED | OUTPATIENT
Start: 2022-09-23 | End: 2022-09-28 | Stop reason: HOSPADM

## 2022-09-23 RX ORDER — CARVEDILOL 6.25 MG/1
6.25 TABLET ORAL 2 TIMES DAILY WITH MEALS
Status: DISCONTINUED | OUTPATIENT
Start: 2022-09-23 | End: 2022-09-23

## 2022-09-23 RX ORDER — CEFDINIR 300 MG/1
300 CAPSULE ORAL EVERY 12 HOURS SCHEDULED
Status: DISCONTINUED | OUTPATIENT
Start: 2022-09-23 | End: 2022-09-27

## 2022-09-23 RX ORDER — DULOXETIN HYDROCHLORIDE 30 MG/1
30 CAPSULE, DELAYED RELEASE ORAL DAILY
Status: DISCONTINUED | OUTPATIENT
Start: 2022-09-23 | End: 2022-09-23

## 2022-09-23 RX ORDER — NICOTINE POLACRILEX 4 MG
15 LOZENGE BUCCAL
Status: DISCONTINUED | OUTPATIENT
Start: 2022-09-23 | End: 2022-09-26

## 2022-09-23 RX ORDER — SODIUM CHLORIDE 0.9 % (FLUSH) 0.9 %
10 SYRINGE (ML) INJECTION EVERY 12 HOURS SCHEDULED
Status: DISCONTINUED | OUTPATIENT
Start: 2022-09-23 | End: 2022-09-28 | Stop reason: HOSPADM

## 2022-09-23 RX ORDER — SODIUM BICARBONATE 650 MG/1
650 TABLET ORAL 3 TIMES DAILY
Status: CANCELLED | OUTPATIENT
Start: 2022-09-23 | End: 2022-10-06

## 2022-09-23 RX ORDER — LANOLIN ALCOHOL/MO/W.PET/CERES
1000 CREAM (GRAM) TOPICAL DAILY
Status: DISCONTINUED | OUTPATIENT
Start: 2022-09-23 | End: 2022-09-25

## 2022-09-23 RX ORDER — AMLODIPINE BESYLATE 5 MG/1
10 TABLET ORAL DAILY
Status: CANCELLED | OUTPATIENT
Start: 2022-09-23

## 2022-09-23 RX ORDER — FOLIC ACID 1 MG/1
1 TABLET ORAL DAILY
Status: DISCONTINUED | OUTPATIENT
Start: 2022-09-23 | End: 2022-09-23

## 2022-09-23 RX ORDER — ISOSORBIDE MONONITRATE 30 MG/1
30 TABLET, EXTENDED RELEASE ORAL DAILY
Status: DISCONTINUED | OUTPATIENT
Start: 2022-09-23 | End: 2022-09-28 | Stop reason: HOSPADM

## 2022-09-23 RX ORDER — FOLIC ACID 1 MG/1
1 TABLET ORAL DAILY
Status: CANCELLED | OUTPATIENT
Start: 2022-09-23

## 2022-09-23 RX ORDER — ISOSORBIDE MONONITRATE 30 MG/1
30 TABLET, EXTENDED RELEASE ORAL DAILY
Status: CANCELLED | OUTPATIENT
Start: 2022-09-23

## 2022-09-23 RX ORDER — PANTOPRAZOLE SODIUM 40 MG/1
40 TABLET, DELAYED RELEASE ORAL
Status: DISCONTINUED | OUTPATIENT
Start: 2022-09-24 | End: 2022-09-28 | Stop reason: HOSPADM

## 2022-09-23 RX ORDER — CALCIUM CARBONATE 200(500)MG
2 TABLET,CHEWABLE ORAL 3 TIMES DAILY PRN
Status: DISCONTINUED | OUTPATIENT
Start: 2022-09-23 | End: 2022-09-28 | Stop reason: HOSPADM

## 2022-09-23 RX ORDER — DULOXETIN HYDROCHLORIDE 30 MG/1
30 CAPSULE, DELAYED RELEASE ORAL DAILY
Status: DISCONTINUED | OUTPATIENT
Start: 2022-09-23 | End: 2022-09-28 | Stop reason: HOSPADM

## 2022-09-23 RX ORDER — BISACODYL 5 MG/1
5 TABLET, DELAYED RELEASE ORAL DAILY PRN
Status: DISCONTINUED | OUTPATIENT
Start: 2022-09-23 | End: 2022-09-28 | Stop reason: HOSPADM

## 2022-09-23 RX ORDER — SODIUM BICARBONATE 650 MG/1
650 TABLET ORAL 3 TIMES DAILY
Status: DISCONTINUED | OUTPATIENT
Start: 2022-09-23 | End: 2022-09-26

## 2022-09-23 RX ORDER — CHOLECALCIFEROL (VITAMIN D3) 125 MCG
10 CAPSULE ORAL NIGHTLY PRN
Status: DISCONTINUED | OUTPATIENT
Start: 2022-09-23 | End: 2022-09-28 | Stop reason: HOSPADM

## 2022-09-23 RX ORDER — ACETAMINOPHEN 160 MG/5ML
650 SOLUTION ORAL EVERY 4 HOURS PRN
Status: DISCONTINUED | OUTPATIENT
Start: 2022-09-23 | End: 2022-09-28 | Stop reason: HOSPADM

## 2022-09-23 RX ORDER — AMLODIPINE BESYLATE 5 MG/1
10 TABLET ORAL ONCE
Status: COMPLETED | OUTPATIENT
Start: 2022-09-23 | End: 2022-09-23

## 2022-09-23 RX ORDER — CARVEDILOL 6.25 MG/1
6.25 TABLET ORAL 2 TIMES DAILY WITH MEALS
Status: DISCONTINUED | OUTPATIENT
Start: 2022-09-23 | End: 2022-09-28 | Stop reason: HOSPADM

## 2022-09-23 RX ORDER — ATORVASTATIN CALCIUM 20 MG/1
20 TABLET, FILM COATED ORAL DAILY
Status: DISCONTINUED | OUTPATIENT
Start: 2022-09-23 | End: 2022-09-28 | Stop reason: HOSPADM

## 2022-09-23 RX ORDER — PANTOPRAZOLE SODIUM 40 MG/1
40 TABLET, DELAYED RELEASE ORAL
Status: CANCELLED | OUTPATIENT
Start: 2022-09-24

## 2022-09-23 RX ORDER — PROMETHAZINE HYDROCHLORIDE 12.5 MG/1
12.5 TABLET ORAL EVERY 6 HOURS PRN
Status: DISCONTINUED | OUTPATIENT
Start: 2022-09-23 | End: 2022-09-28 | Stop reason: HOSPADM

## 2022-09-23 RX ORDER — LANOLIN ALCOHOL/MO/W.PET/CERES
1000 CREAM (GRAM) TOPICAL DAILY
Status: CANCELLED | OUTPATIENT
Start: 2022-09-23

## 2022-09-23 RX ORDER — ACETAMINOPHEN 325 MG/1
650 TABLET ORAL EVERY 4 HOURS PRN
Status: DISCONTINUED | OUTPATIENT
Start: 2022-09-23 | End: 2022-09-28 | Stop reason: HOSPADM

## 2022-09-23 RX ORDER — AMOXICILLIN 250 MG
2 CAPSULE ORAL 2 TIMES DAILY
Status: DISCONTINUED | OUTPATIENT
Start: 2022-09-23 | End: 2022-09-28 | Stop reason: HOSPADM

## 2022-09-23 RX ORDER — SODIUM BICARBONATE 650 MG/1
650 TABLET ORAL 3 TIMES DAILY
Status: DISCONTINUED | OUTPATIENT
Start: 2022-09-23 | End: 2022-09-25

## 2022-09-23 RX ORDER — LANOLIN ALCOHOL/MO/W.PET/CERES
1000 CREAM (GRAM) TOPICAL DAILY
Status: DISCONTINUED | OUTPATIENT
Start: 2022-09-23 | End: 2022-09-28 | Stop reason: HOSPADM

## 2022-09-23 RX ORDER — CARVEDILOL 6.25 MG/1
6.25 TABLET ORAL 2 TIMES DAILY WITH MEALS
Status: CANCELLED | OUTPATIENT
Start: 2022-09-23

## 2022-09-23 RX ORDER — IRON POLYSACCHARIDE COMPLEX 150 MG
150 CAPSULE ORAL DAILY
Status: DISCONTINUED | OUTPATIENT
Start: 2022-09-23 | End: 2022-09-23

## 2022-09-23 RX ORDER — POLYETHYLENE GLYCOL 3350 17 G/17G
17 POWDER, FOR SOLUTION ORAL DAILY PRN
Status: DISCONTINUED | OUTPATIENT
Start: 2022-09-23 | End: 2022-09-28 | Stop reason: HOSPADM

## 2022-09-23 RX ORDER — HYDROXYZINE HYDROCHLORIDE 25 MG/1
25 TABLET, FILM COATED ORAL 3 TIMES DAILY PRN
Status: DISCONTINUED | OUTPATIENT
Start: 2022-09-23 | End: 2022-09-28 | Stop reason: HOSPADM

## 2022-09-23 RX ORDER — DEXTROSE MONOHYDRATE 25 G/50ML
25 INJECTION, SOLUTION INTRAVENOUS
Status: DISCONTINUED | OUTPATIENT
Start: 2022-09-23 | End: 2022-09-26

## 2022-09-23 RX ORDER — DULOXETIN HYDROCHLORIDE 30 MG/1
30 CAPSULE, DELAYED RELEASE ORAL DAILY
Status: CANCELLED | OUTPATIENT
Start: 2022-09-23

## 2022-09-23 RX ADMIN — CEFDINIR 300 MG: 300 CAPSULE ORAL at 07:38

## 2022-09-23 RX ADMIN — Medication 1000 MCG: at 09:10

## 2022-09-23 RX ADMIN — CARVEDILOL 6.25 MG: 6.25 TABLET, FILM COATED ORAL at 20:59

## 2022-09-23 RX ADMIN — PANTOPRAZOLE SODIUM 40 MG: 40 TABLET, DELAYED RELEASE ORAL at 09:10

## 2022-09-23 RX ADMIN — CARVEDILOL 6.25 MG: 6.25 TABLET, FILM COATED ORAL at 09:10

## 2022-09-23 RX ADMIN — SODIUM BICARBONATE TAB 650 MG 650 MG: 650 TAB at 16:39

## 2022-09-23 RX ADMIN — ENOXAPARIN SODIUM 60 MG: 60 INJECTION SUBCUTANEOUS at 21:00

## 2022-09-23 RX ADMIN — Medication 150 MG: at 21:00

## 2022-09-23 RX ADMIN — Medication 150 MG: at 09:10

## 2022-09-23 RX ADMIN — SODIUM BICARBONATE TAB 650 MG 650 MG: 650 TAB at 20:58

## 2022-09-23 RX ADMIN — Medication 1 MG: at 20:59

## 2022-09-23 RX ADMIN — Medication 10 MG: at 20:59

## 2022-09-23 RX ADMIN — INSULIN ASPART 4 UNITS: 100 INJECTION, SOLUTION INTRAVENOUS; SUBCUTANEOUS at 21:11

## 2022-09-23 RX ADMIN — SODIUM BICARBONATE TAB 650 MG 650 MG: 650 TAB at 09:10

## 2022-09-23 RX ADMIN — CEFDINIR 300 MG: 300 CAPSULE ORAL at 21:00

## 2022-09-23 RX ADMIN — DOCUSATE SODIUM 50 MG AND SENNOSIDES 8.6 MG 2 TABLET: 8.6; 5 TABLET, FILM COATED ORAL at 20:59

## 2022-09-23 RX ADMIN — DULOXETINE HYDROCHLORIDE 30 MG: 30 CAPSULE, DELAYED RELEASE ORAL at 20:59

## 2022-09-23 RX ADMIN — Medication 1000 MCG: at 20:58

## 2022-09-23 RX ADMIN — AMLODIPINE BESYLATE 5 MG: 5 TABLET ORAL at 20:59

## 2022-09-23 RX ADMIN — ISOSORBIDE MONONITRATE 30 MG: 30 TABLET, EXTENDED RELEASE ORAL at 09:10

## 2022-09-23 RX ADMIN — AMLODIPINE BESYLATE 10 MG: 5 TABLET ORAL at 09:10

## 2022-09-23 RX ADMIN — DULOXETINE HYDROCHLORIDE 30 MG: 30 CAPSULE, DELAYED RELEASE ORAL at 09:10

## 2022-09-23 RX ADMIN — FOLIC ACID 1 MG: 1 TABLET ORAL at 09:10

## 2022-09-23 RX ADMIN — Medication 10 ML: at 21:03

## 2022-09-23 RX ADMIN — ISOSORBIDE MONONITRATE 30 MG: 30 TABLET, EXTENDED RELEASE ORAL at 20:59

## 2022-09-23 RX ADMIN — ATORVASTATIN CALCIUM 20 MG: 20 TABLET, FILM COATED ORAL at 20:59

## 2022-09-23 NOTE — OUTREACH NOTE
Prep Survey    Flowsheet Row Responses   [facility] facility patient discharged from? [place]   Is LACE score < 7 ? No   Emergency Room discharge w/ pulse ox? No   Eligibility Not Eligible   What are the reasons patient is not eligible? Readmitted   Does the patient have one of the following disease processes/diagnoses(primary or secondary)? Other   Prep survey completed? Yes          GRETCHEN HERNANDEZ - Registered Nurse

## 2022-09-24 LAB
ALBUMIN SERPL-MCNC: 2.85 G/DL (ref 3.5–5.2)
ALBUMIN/GLOB SERPL: 1 G/DL
ALP SERPL-CCNC: 79 U/L (ref 39–117)
ALT SERPL W P-5'-P-CCNC: 6 U/L (ref 1–33)
ANION GAP SERPL CALCULATED.3IONS-SCNC: 7.9 MMOL/L (ref 5–15)
AST SERPL-CCNC: 10 U/L (ref 1–32)
BACTERIA SPEC AEROBE CULT: NORMAL
BASOPHILS # BLD AUTO: 0.04 10*3/MM3 (ref 0–0.2)
BASOPHILS NFR BLD AUTO: 0.5 % (ref 0–1.5)
BILIRUB SERPL-MCNC: 0.3 MG/DL (ref 0–1.2)
BUN SERPL-MCNC: 25 MG/DL (ref 8–23)
BUN/CREAT SERPL: 18.8 (ref 7–25)
CALCIUM SPEC-SCNC: 8.7 MG/DL (ref 8.6–10.5)
CHLORIDE SERPL-SCNC: 98 MMOL/L (ref 98–107)
CO2 SERPL-SCNC: 25.1 MMOL/L (ref 22–29)
CREAT SERPL-MCNC: 1.33 MG/DL (ref 0.57–1)
DEPRECATED RDW RBC AUTO: 47 FL (ref 37–54)
EGFRCR SERPLBLD CKD-EPI 2021: 42.6 ML/MIN/1.73
EOSINOPHIL # BLD AUTO: 0.21 10*3/MM3 (ref 0–0.4)
EOSINOPHIL NFR BLD AUTO: 2.6 % (ref 0.3–6.2)
ERYTHROCYTE [DISTWIDTH] IN BLOOD BY AUTOMATED COUNT: 14.6 % (ref 12.3–15.4)
GLOBULIN UR ELPH-MCNC: 2.9 GM/DL
GLUCOSE BLDC GLUCOMTR-MCNC: 160 MG/DL (ref 70–130)
GLUCOSE BLDC GLUCOMTR-MCNC: 164 MG/DL (ref 70–130)
GLUCOSE BLDC GLUCOMTR-MCNC: 183 MG/DL (ref 70–130)
GLUCOSE BLDC GLUCOMTR-MCNC: 186 MG/DL (ref 70–130)
GLUCOSE SERPL-MCNC: 153 MG/DL (ref 65–99)
HCT VFR BLD AUTO: 30.9 % (ref 34–46.6)
HGB BLD-MCNC: 9.6 G/DL (ref 12–15.9)
IMM GRANULOCYTES # BLD AUTO: 0.04 10*3/MM3 (ref 0–0.05)
IMM GRANULOCYTES NFR BLD AUTO: 0.5 % (ref 0–0.5)
LYMPHOCYTES # BLD AUTO: 1.34 10*3/MM3 (ref 0.7–3.1)
LYMPHOCYTES NFR BLD AUTO: 16.4 % (ref 19.6–45.3)
MAGNESIUM SERPL-MCNC: 1.7 MG/DL (ref 1.6–2.4)
MCH RBC QN AUTO: 27.5 PG (ref 26.6–33)
MCHC RBC AUTO-ENTMCNC: 31.1 G/DL (ref 31.5–35.7)
MCV RBC AUTO: 88.5 FL (ref 79–97)
MONOCYTES # BLD AUTO: 0.73 10*3/MM3 (ref 0.1–0.9)
MONOCYTES NFR BLD AUTO: 8.9 % (ref 5–12)
NEUTROPHILS NFR BLD AUTO: 5.83 10*3/MM3 (ref 1.7–7)
NEUTROPHILS NFR BLD AUTO: 71.1 % (ref 42.7–76)
NRBC BLD AUTO-RTO: 0 /100 WBC (ref 0–0.2)
PHOSPHATE SERPL-MCNC: 3.5 MG/DL (ref 2.5–4.5)
PLATELET # BLD AUTO: 360 10*3/MM3 (ref 140–450)
PMV BLD AUTO: 9.1 FL (ref 6–12)
POTASSIUM SERPL-SCNC: 4.7 MMOL/L (ref 3.5–5.2)
PROT SERPL-MCNC: 5.7 G/DL (ref 6–8.5)
RBC # BLD AUTO: 3.49 10*6/MM3 (ref 3.77–5.28)
SODIUM SERPL-SCNC: 131 MMOL/L (ref 136–145)
WBC NRBC COR # BLD: 8.19 10*3/MM3 (ref 3.4–10.8)

## 2022-09-24 PROCEDURE — 25010000002 FUROSEMIDE PER 20 MG: Performed by: INTERNAL MEDICINE

## 2022-09-24 PROCEDURE — 63710000001 INSULIN ASPART PER 5 UNITS: Performed by: INTERNAL MEDICINE

## 2022-09-24 PROCEDURE — 96374 THER/PROPH/DIAG INJ IV PUSH: CPT

## 2022-09-24 PROCEDURE — 96372 THER/PROPH/DIAG INJ SC/IM: CPT

## 2022-09-24 PROCEDURE — 99225 PR SBSQ OBSERVATION CARE/DAY 25 MINUTES: CPT | Performed by: INTERNAL MEDICINE

## 2022-09-24 PROCEDURE — G0378 HOSPITAL OBSERVATION PER HR: HCPCS

## 2022-09-24 PROCEDURE — 85025 COMPLETE CBC W/AUTO DIFF WBC: CPT | Performed by: INTERNAL MEDICINE

## 2022-09-24 PROCEDURE — 84100 ASSAY OF PHOSPHORUS: CPT | Performed by: INTERNAL MEDICINE

## 2022-09-24 PROCEDURE — 80053 COMPREHEN METABOLIC PANEL: CPT | Performed by: INTERNAL MEDICINE

## 2022-09-24 PROCEDURE — 82962 GLUCOSE BLOOD TEST: CPT

## 2022-09-24 PROCEDURE — 25010000002 ENOXAPARIN PER 10 MG: Performed by: INTERNAL MEDICINE

## 2022-09-24 PROCEDURE — 83735 ASSAY OF MAGNESIUM: CPT | Performed by: INTERNAL MEDICINE

## 2022-09-24 RX ORDER — FUROSEMIDE 10 MG/ML
60 INJECTION INTRAMUSCULAR; INTRAVENOUS ONCE
Status: COMPLETED | OUTPATIENT
Start: 2022-09-24 | End: 2022-09-24

## 2022-09-24 RX ADMIN — SODIUM BICARBONATE TAB 650 MG 650 MG: 650 TAB at 17:05

## 2022-09-24 RX ADMIN — ATORVASTATIN CALCIUM 20 MG: 20 TABLET, FILM COATED ORAL at 08:05

## 2022-09-24 RX ADMIN — CARVEDILOL 6.25 MG: 6.25 TABLET, FILM COATED ORAL at 08:06

## 2022-09-24 RX ADMIN — AMLODIPINE BESYLATE 5 MG: 5 TABLET ORAL at 08:05

## 2022-09-24 RX ADMIN — INSULIN ASPART 2 UNITS: 100 INJECTION, SOLUTION INTRAVENOUS; SUBCUTANEOUS at 08:05

## 2022-09-24 RX ADMIN — CARVEDILOL 6.25 MG: 6.25 TABLET, FILM COATED ORAL at 17:05

## 2022-09-24 RX ADMIN — Medication 150 MG: at 08:05

## 2022-09-24 RX ADMIN — INSULIN ASPART 2 UNITS: 100 INJECTION, SOLUTION INTRAVENOUS; SUBCUTANEOUS at 17:05

## 2022-09-24 RX ADMIN — ENOXAPARIN SODIUM 60 MG: 60 INJECTION SUBCUTANEOUS at 08:06

## 2022-09-24 RX ADMIN — SODIUM BICARBONATE TAB 650 MG 650 MG: 650 TAB at 08:05

## 2022-09-24 RX ADMIN — Medication 1 MG: at 08:05

## 2022-09-24 RX ADMIN — DOCUSATE SODIUM 50 MG AND SENNOSIDES 8.6 MG 2 TABLET: 8.6; 5 TABLET, FILM COATED ORAL at 20:28

## 2022-09-24 RX ADMIN — SODIUM BICARBONATE TAB 650 MG 650 MG: 650 TAB at 20:28

## 2022-09-24 RX ADMIN — INSULIN ASPART 2 UNITS: 100 INJECTION, SOLUTION INTRAVENOUS; SUBCUTANEOUS at 11:39

## 2022-09-24 RX ADMIN — Medication 10 ML: at 20:28

## 2022-09-24 RX ADMIN — Medication 10 ML: at 08:06

## 2022-09-24 RX ADMIN — ISOSORBIDE MONONITRATE 30 MG: 30 TABLET, EXTENDED RELEASE ORAL at 08:05

## 2022-09-24 RX ADMIN — CEFDINIR 300 MG: 300 CAPSULE ORAL at 20:28

## 2022-09-24 RX ADMIN — ENOXAPARIN SODIUM 60 MG: 60 INJECTION SUBCUTANEOUS at 20:27

## 2022-09-24 RX ADMIN — POLYETHYLENE GLYCOL (3350) 17 G: 17 POWDER, FOR SOLUTION ORAL at 14:29

## 2022-09-24 RX ADMIN — PANTOPRAZOLE SODIUM 40 MG: 40 TABLET, DELAYED RELEASE ORAL at 05:39

## 2022-09-24 RX ADMIN — Medication 1000 MCG: at 08:05

## 2022-09-24 RX ADMIN — Medication 10 MG: at 20:28

## 2022-09-24 RX ADMIN — DULOXETINE HYDROCHLORIDE 30 MG: 30 CAPSULE, DELAYED RELEASE ORAL at 08:05

## 2022-09-24 RX ADMIN — CEFDINIR 300 MG: 300 CAPSULE ORAL at 08:06

## 2022-09-24 RX ADMIN — FUROSEMIDE 60 MG: 10 INJECTION, SOLUTION INTRAMUSCULAR; INTRAVENOUS at 13:11

## 2022-09-24 RX ADMIN — DOCUSATE SODIUM 50 MG AND SENNOSIDES 8.6 MG 2 TABLET: 8.6; 5 TABLET, FILM COATED ORAL at 08:06

## 2022-09-25 LAB
ALBUMIN SERPL-MCNC: 3 G/DL (ref 3.5–5.2)
ANION GAP SERPL CALCULATED.3IONS-SCNC: 10 MMOL/L (ref 5–15)
BUN SERPL-MCNC: 24 MG/DL (ref 8–23)
BUN/CREAT SERPL: 17 (ref 7–25)
CALCIUM SPEC-SCNC: 9 MG/DL (ref 8.6–10.5)
CHLORIDE SERPL-SCNC: 95 MMOL/L (ref 98–107)
CO2 SERPL-SCNC: 28 MMOL/L (ref 22–29)
CREAT SERPL-MCNC: 1.41 MG/DL (ref 0.57–1)
EGFRCR SERPLBLD CKD-EPI 2021: 39.7 ML/MIN/1.73
GLUCOSE BLDC GLUCOMTR-MCNC: 133 MG/DL (ref 70–130)
GLUCOSE BLDC GLUCOMTR-MCNC: 143 MG/DL (ref 70–130)
GLUCOSE BLDC GLUCOMTR-MCNC: 144 MG/DL (ref 70–130)
GLUCOSE BLDC GLUCOMTR-MCNC: 153 MG/DL (ref 70–130)
GLUCOSE SERPL-MCNC: 169 MG/DL (ref 65–99)
PHOSPHATE SERPL-MCNC: 4 MG/DL (ref 2.5–4.5)
POTASSIUM SERPL-SCNC: 4.7 MMOL/L (ref 3.5–5.2)
SODIUM SERPL-SCNC: 133 MMOL/L (ref 136–145)

## 2022-09-25 PROCEDURE — G0378 HOSPITAL OBSERVATION PER HR: HCPCS

## 2022-09-25 PROCEDURE — 25010000002 ENOXAPARIN PER 10 MG: Performed by: INTERNAL MEDICINE

## 2022-09-25 PROCEDURE — 99214 OFFICE O/P EST MOD 30 MIN: CPT | Performed by: SURGERY

## 2022-09-25 PROCEDURE — 80069 RENAL FUNCTION PANEL: CPT | Performed by: INTERNAL MEDICINE

## 2022-09-25 PROCEDURE — 82962 GLUCOSE BLOOD TEST: CPT

## 2022-09-25 PROCEDURE — 96372 THER/PROPH/DIAG INJ SC/IM: CPT

## 2022-09-25 PROCEDURE — 99225 PR SBSQ OBSERVATION CARE/DAY 25 MINUTES: CPT | Performed by: INTERNAL MEDICINE

## 2022-09-25 RX ORDER — MAGNESIUM CARB/ALUMINUM HYDROX 105-160MG
296 TABLET,CHEWABLE ORAL ONCE
Status: DISCONTINUED | OUTPATIENT
Start: 2022-09-25 | End: 2022-09-25

## 2022-09-25 RX ADMIN — ENOXAPARIN SODIUM 60 MG: 60 INJECTION SUBCUTANEOUS at 08:48

## 2022-09-25 RX ADMIN — DOCUSATE SODIUM 50 MG AND SENNOSIDES 8.6 MG 2 TABLET: 8.6; 5 TABLET, FILM COATED ORAL at 20:21

## 2022-09-25 RX ADMIN — Medication 10 ML: at 20:21

## 2022-09-25 RX ADMIN — Medication 150 MG: at 08:47

## 2022-09-25 RX ADMIN — Medication 1000 MCG: at 08:47

## 2022-09-25 RX ADMIN — DULOXETINE HYDROCHLORIDE 30 MG: 30 CAPSULE, DELAYED RELEASE ORAL at 08:47

## 2022-09-25 RX ADMIN — ENOXAPARIN SODIUM 60 MG: 60 INJECTION SUBCUTANEOUS at 20:21

## 2022-09-25 RX ADMIN — SODIUM BICARBONATE TAB 650 MG 650 MG: 650 TAB at 17:33

## 2022-09-25 RX ADMIN — SODIUM BICARBONATE TAB 650 MG 650 MG: 650 TAB at 08:47

## 2022-09-25 RX ADMIN — CARVEDILOL 6.25 MG: 6.25 TABLET, FILM COATED ORAL at 17:33

## 2022-09-25 RX ADMIN — PANTOPRAZOLE SODIUM 40 MG: 40 TABLET, DELAYED RELEASE ORAL at 05:29

## 2022-09-25 RX ADMIN — CEFDINIR 300 MG: 300 CAPSULE ORAL at 08:47

## 2022-09-25 RX ADMIN — ISOSORBIDE MONONITRATE 30 MG: 30 TABLET, EXTENDED RELEASE ORAL at 08:47

## 2022-09-25 RX ADMIN — Medication 1 MG: at 08:47

## 2022-09-25 RX ADMIN — ATORVASTATIN CALCIUM 20 MG: 20 TABLET, FILM COATED ORAL at 08:47

## 2022-09-25 RX ADMIN — SODIUM BICARBONATE TAB 650 MG 650 MG: 650 TAB at 20:21

## 2022-09-25 RX ADMIN — AMLODIPINE BESYLATE 5 MG: 5 TABLET ORAL at 08:47

## 2022-09-25 RX ADMIN — Medication 10 ML: at 08:48

## 2022-09-25 RX ADMIN — CEFDINIR 300 MG: 300 CAPSULE ORAL at 20:21

## 2022-09-25 RX ADMIN — CARVEDILOL 6.25 MG: 6.25 TABLET, FILM COATED ORAL at 08:47

## 2022-09-25 RX ADMIN — DOCUSATE SODIUM 50 MG AND SENNOSIDES 8.6 MG 2 TABLET: 8.6; 5 TABLET, FILM COATED ORAL at 08:47

## 2022-09-26 LAB
ANION GAP SERPL CALCULATED.3IONS-SCNC: 8.7 MMOL/L (ref 5–15)
BUN SERPL-MCNC: 18 MG/DL (ref 8–23)
BUN/CREAT SERPL: 13.5 (ref 7–25)
CALCIUM SPEC-SCNC: 9.1 MG/DL (ref 8.6–10.5)
CHLORIDE SERPL-SCNC: 96 MMOL/L (ref 98–107)
CO2 SERPL-SCNC: 28.3 MMOL/L (ref 22–29)
CREAT SERPL-MCNC: 1.33 MG/DL (ref 0.57–1)
EGFRCR SERPLBLD CKD-EPI 2021: 42.6 ML/MIN/1.73
GLUCOSE BLDC GLUCOMTR-MCNC: 158 MG/DL (ref 70–130)
GLUCOSE BLDC GLUCOMTR-MCNC: 161 MG/DL (ref 70–130)
GLUCOSE BLDC GLUCOMTR-MCNC: 173 MG/DL (ref 70–130)
GLUCOSE SERPL-MCNC: 161 MG/DL (ref 65–99)
POTASSIUM SERPL-SCNC: 4.6 MMOL/L (ref 3.5–5.2)
SODIUM SERPL-SCNC: 133 MMOL/L (ref 136–145)

## 2022-09-26 PROCEDURE — 97530 THERAPEUTIC ACTIVITIES: CPT

## 2022-09-26 PROCEDURE — G0378 HOSPITAL OBSERVATION PER HR: HCPCS

## 2022-09-26 PROCEDURE — 94799 UNLISTED PULMONARY SVC/PX: CPT

## 2022-09-26 PROCEDURE — 63710000001 INSULIN ASPART PER 5 UNITS: Performed by: INTERNAL MEDICINE

## 2022-09-26 PROCEDURE — 97116 GAIT TRAINING THERAPY: CPT

## 2022-09-26 PROCEDURE — 80048 BASIC METABOLIC PNL TOTAL CA: CPT | Performed by: HOSPITALIST

## 2022-09-26 PROCEDURE — 82962 GLUCOSE BLOOD TEST: CPT

## 2022-09-26 PROCEDURE — 63710000001 INSULIN ASPART PER 5 UNITS: Performed by: HOSPITALIST

## 2022-09-26 PROCEDURE — 96372 THER/PROPH/DIAG INJ SC/IM: CPT

## 2022-09-26 PROCEDURE — 97110 THERAPEUTIC EXERCISES: CPT

## 2022-09-26 PROCEDURE — 99225 PR SBSQ OBSERVATION CARE/DAY 25 MINUTES: CPT | Performed by: HOSPITALIST

## 2022-09-26 PROCEDURE — 25010000002 ENOXAPARIN PER 10 MG: Performed by: INTERNAL MEDICINE

## 2022-09-26 PROCEDURE — 97166 OT EVAL MOD COMPLEX 45 MIN: CPT

## 2022-09-26 RX ORDER — BISACODYL 10 MG
10 SUPPOSITORY, RECTAL RECTAL ONCE
Status: COMPLETED | OUTPATIENT
Start: 2022-09-26 | End: 2022-09-26

## 2022-09-26 RX ORDER — LACTULOSE 10 G/15ML
20 SOLUTION ORAL ONCE
Status: COMPLETED | OUTPATIENT
Start: 2022-09-26 | End: 2022-09-26

## 2022-09-26 RX ORDER — DEXTROSE MONOHYDRATE 25 G/50ML
25 INJECTION, SOLUTION INTRAVENOUS
Status: DISCONTINUED | OUTPATIENT
Start: 2022-09-26 | End: 2022-09-28 | Stop reason: HOSPADM

## 2022-09-26 RX ORDER — BUMETANIDE 1 MG/1
1 TABLET ORAL DAILY
Status: DISCONTINUED | OUTPATIENT
Start: 2022-09-26 | End: 2022-09-28 | Stop reason: HOSPADM

## 2022-09-26 RX ORDER — INSULIN ASPART 100 [IU]/ML
0-7 INJECTION, SOLUTION INTRAVENOUS; SUBCUTANEOUS
Status: DISCONTINUED | OUTPATIENT
Start: 2022-09-26 | End: 2022-09-28 | Stop reason: HOSPADM

## 2022-09-26 RX ORDER — NICOTINE POLACRILEX 4 MG
15 LOZENGE BUCCAL
Status: DISCONTINUED | OUTPATIENT
Start: 2022-09-26 | End: 2022-09-28 | Stop reason: HOSPADM

## 2022-09-26 RX ADMIN — CARVEDILOL 6.25 MG: 6.25 TABLET, FILM COATED ORAL at 18:34

## 2022-09-26 RX ADMIN — AMLODIPINE BESYLATE 5 MG: 5 TABLET ORAL at 09:04

## 2022-09-26 RX ADMIN — ATORVASTATIN CALCIUM 20 MG: 20 TABLET, FILM COATED ORAL at 09:03

## 2022-09-26 RX ADMIN — Medication 1 MG: at 09:03

## 2022-09-26 RX ADMIN — CARVEDILOL 6.25 MG: 6.25 TABLET, FILM COATED ORAL at 09:02

## 2022-09-26 RX ADMIN — DOCUSATE SODIUM 50 MG AND SENNOSIDES 8.6 MG 2 TABLET: 8.6; 5 TABLET, FILM COATED ORAL at 21:13

## 2022-09-26 RX ADMIN — DULOXETINE HYDROCHLORIDE 30 MG: 30 CAPSULE, DELAYED RELEASE ORAL at 09:05

## 2022-09-26 RX ADMIN — Medication 10 ML: at 09:06

## 2022-09-26 RX ADMIN — ISOSORBIDE MONONITRATE 30 MG: 30 TABLET, EXTENDED RELEASE ORAL at 09:05

## 2022-09-26 RX ADMIN — Medication 10 ML: at 21:14

## 2022-09-26 RX ADMIN — SODIUM BICARBONATE TAB 650 MG 650 MG: 650 TAB at 09:01

## 2022-09-26 RX ADMIN — CEFDINIR 300 MG: 300 CAPSULE ORAL at 21:14

## 2022-09-26 RX ADMIN — BISACODYL 10 MG: 10 SUPPOSITORY RECTAL at 18:29

## 2022-09-26 RX ADMIN — Medication 150 MG: at 09:05

## 2022-09-26 RX ADMIN — CEFDINIR 300 MG: 300 CAPSULE ORAL at 09:04

## 2022-09-26 RX ADMIN — DOCUSATE SODIUM 50 MG AND SENNOSIDES 8.6 MG 2 TABLET: 8.6; 5 TABLET, FILM COATED ORAL at 09:05

## 2022-09-26 RX ADMIN — ENOXAPARIN SODIUM 60 MG: 60 INJECTION SUBCUTANEOUS at 09:04

## 2022-09-26 RX ADMIN — Medication 1000 MCG: at 09:01

## 2022-09-26 RX ADMIN — INSULIN ASPART 2 UNITS: 100 INJECTION, SOLUTION INTRAVENOUS; SUBCUTANEOUS at 09:00

## 2022-09-26 RX ADMIN — PANTOPRAZOLE SODIUM 40 MG: 40 TABLET, DELAYED RELEASE ORAL at 05:22

## 2022-09-26 RX ADMIN — LACTULOSE 20 G: 20 SOLUTION ORAL at 18:33

## 2022-09-26 RX ADMIN — INSULIN ASPART 3 UNITS: 100 INJECTION, SOLUTION INTRAVENOUS; SUBCUTANEOUS at 18:29

## 2022-09-26 RX ADMIN — BUMETANIDE 1 MG: 1 TABLET ORAL at 21:13

## 2022-09-26 RX ADMIN — INSULIN ASPART 2 UNITS: 100 INJECTION, SOLUTION INTRAVENOUS; SUBCUTANEOUS at 12:35

## 2022-09-26 RX ADMIN — ENOXAPARIN SODIUM 60 MG: 60 INJECTION SUBCUTANEOUS at 21:14

## 2022-09-27 ENCOUNTER — APPOINTMENT (OUTPATIENT)
Dept: CARDIOLOGY | Facility: HOSPITAL | Age: 72
End: 2022-09-27

## 2022-09-27 LAB
ANION GAP SERPL CALCULATED.3IONS-SCNC: 10.1 MMOL/L (ref 5–15)
BASOPHILS # BLD AUTO: 0.05 10*3/MM3 (ref 0–0.2)
BASOPHILS NFR BLD AUTO: 0.6 % (ref 0–1.5)
BUN SERPL-MCNC: 16 MG/DL (ref 8–23)
BUN/CREAT SERPL: 13.1 (ref 7–25)
CALCIUM SPEC-SCNC: 8.9 MG/DL (ref 8.6–10.5)
CHLORIDE SERPL-SCNC: 96 MMOL/L (ref 98–107)
CO2 SERPL-SCNC: 26.9 MMOL/L (ref 22–29)
CREAT SERPL-MCNC: 1.22 MG/DL (ref 0.57–1)
DEPRECATED RDW RBC AUTO: 47.2 FL (ref 37–54)
EGFRCR SERPLBLD CKD-EPI 2021: 47.2 ML/MIN/1.73
EOSINOPHIL # BLD AUTO: 0.16 10*3/MM3 (ref 0–0.4)
EOSINOPHIL NFR BLD AUTO: 2.1 % (ref 0.3–6.2)
ERYTHROCYTE [DISTWIDTH] IN BLOOD BY AUTOMATED COUNT: 14.6 % (ref 12.3–15.4)
GLUCOSE BLDC GLUCOMTR-MCNC: 134 MG/DL (ref 70–130)
GLUCOSE BLDC GLUCOMTR-MCNC: 160 MG/DL (ref 70–130)
GLUCOSE BLDC GLUCOMTR-MCNC: 171 MG/DL (ref 70–130)
GLUCOSE SERPL-MCNC: 168 MG/DL (ref 65–99)
HCT VFR BLD AUTO: 31.9 % (ref 34–46.6)
HGB BLD-MCNC: 9.9 G/DL (ref 12–15.9)
IMM GRANULOCYTES # BLD AUTO: 0.04 10*3/MM3 (ref 0–0.05)
IMM GRANULOCYTES NFR BLD AUTO: 0.5 % (ref 0–0.5)
LYMPHOCYTES # BLD AUTO: 1.54 10*3/MM3 (ref 0.7–3.1)
LYMPHOCYTES NFR BLD AUTO: 19.9 % (ref 19.6–45.3)
MCH RBC QN AUTO: 27.5 PG (ref 26.6–33)
MCHC RBC AUTO-ENTMCNC: 31 G/DL (ref 31.5–35.7)
MCV RBC AUTO: 88.6 FL (ref 79–97)
MONOCYTES # BLD AUTO: 0.79 10*3/MM3 (ref 0.1–0.9)
MONOCYTES NFR BLD AUTO: 10.2 % (ref 5–12)
NEUTROPHILS NFR BLD AUTO: 5.14 10*3/MM3 (ref 1.7–7)
NEUTROPHILS NFR BLD AUTO: 66.7 % (ref 42.7–76)
NRBC BLD AUTO-RTO: 0 /100 WBC (ref 0–0.2)
PLATELET # BLD AUTO: 355 10*3/MM3 (ref 140–450)
PMV BLD AUTO: 8.9 FL (ref 6–12)
POTASSIUM SERPL-SCNC: 4.3 MMOL/L (ref 3.5–5.2)
RBC # BLD AUTO: 3.6 10*6/MM3 (ref 3.77–5.28)
SODIUM SERPL-SCNC: 133 MMOL/L (ref 136–145)
WBC NRBC COR # BLD: 7.72 10*3/MM3 (ref 3.4–10.8)

## 2022-09-27 PROCEDURE — G0378 HOSPITAL OBSERVATION PER HR: HCPCS

## 2022-09-27 PROCEDURE — 99225 PR SBSQ OBSERVATION CARE/DAY 25 MINUTES: CPT | Performed by: HOSPITALIST

## 2022-09-27 PROCEDURE — 80048 BASIC METABOLIC PNL TOTAL CA: CPT | Performed by: HOSPITALIST

## 2022-09-27 PROCEDURE — 82962 GLUCOSE BLOOD TEST: CPT

## 2022-09-27 PROCEDURE — 25010000002 ENOXAPARIN PER 10 MG: Performed by: INTERNAL MEDICINE

## 2022-09-27 PROCEDURE — 85025 COMPLETE CBC W/AUTO DIFF WBC: CPT | Performed by: HOSPITALIST

## 2022-09-27 PROCEDURE — 63710000001 INSULIN ASPART PER 5 UNITS: Performed by: HOSPITALIST

## 2022-09-27 PROCEDURE — 96372 THER/PROPH/DIAG INJ SC/IM: CPT

## 2022-09-27 RX ORDER — LACTULOSE 10 G/15ML
20 SOLUTION ORAL ONCE
Status: DISCONTINUED | OUTPATIENT
Start: 2022-09-27 | End: 2022-09-28 | Stop reason: HOSPADM

## 2022-09-27 RX ADMIN — Medication 10 ML: at 21:24

## 2022-09-27 RX ADMIN — CARVEDILOL 6.25 MG: 6.25 TABLET, FILM COATED ORAL at 16:51

## 2022-09-27 RX ADMIN — CARVEDILOL 6.25 MG: 6.25 TABLET, FILM COATED ORAL at 08:49

## 2022-09-27 RX ADMIN — ENOXAPARIN SODIUM 60 MG: 60 INJECTION SUBCUTANEOUS at 08:50

## 2022-09-27 RX ADMIN — ISOSORBIDE MONONITRATE 30 MG: 30 TABLET, EXTENDED RELEASE ORAL at 08:50

## 2022-09-27 RX ADMIN — Medication 1 MG: at 08:49

## 2022-09-27 RX ADMIN — DOCUSATE SODIUM 50 MG AND SENNOSIDES 8.6 MG 2 TABLET: 8.6; 5 TABLET, FILM COATED ORAL at 08:49

## 2022-09-27 RX ADMIN — Medication 150 MG: at 08:50

## 2022-09-27 RX ADMIN — ACETAMINOPHEN 650 MG: 325 TABLET, FILM COATED ORAL at 21:23

## 2022-09-27 RX ADMIN — INSULIN ASPART 2 UNITS: 100 INJECTION, SOLUTION INTRAVENOUS; SUBCUTANEOUS at 16:51

## 2022-09-27 RX ADMIN — INSULIN ASPART 2 UNITS: 100 INJECTION, SOLUTION INTRAVENOUS; SUBCUTANEOUS at 11:54

## 2022-09-27 RX ADMIN — PANTOPRAZOLE SODIUM 40 MG: 40 TABLET, DELAYED RELEASE ORAL at 05:21

## 2022-09-27 RX ADMIN — DULOXETINE HYDROCHLORIDE 30 MG: 30 CAPSULE, DELAYED RELEASE ORAL at 08:50

## 2022-09-27 RX ADMIN — ATORVASTATIN CALCIUM 20 MG: 20 TABLET, FILM COATED ORAL at 08:49

## 2022-09-27 RX ADMIN — Medication 10 ML: at 08:50

## 2022-09-27 RX ADMIN — ENOXAPARIN SODIUM 60 MG: 60 INJECTION SUBCUTANEOUS at 21:24

## 2022-09-27 RX ADMIN — AMLODIPINE BESYLATE 5 MG: 5 TABLET ORAL at 08:49

## 2022-09-27 RX ADMIN — CEFDINIR 300 MG: 300 CAPSULE ORAL at 08:50

## 2022-09-27 RX ADMIN — DOCUSATE SODIUM 50 MG AND SENNOSIDES 8.6 MG 2 TABLET: 8.6; 5 TABLET, FILM COATED ORAL at 21:24

## 2022-09-27 RX ADMIN — BUMETANIDE 1 MG: 1 TABLET ORAL at 08:50

## 2022-09-27 RX ADMIN — Medication 1000 MCG: at 08:49

## 2022-09-28 VITALS
HEART RATE: 74 BPM | RESPIRATION RATE: 18 BRPM | SYSTOLIC BLOOD PRESSURE: 156 MMHG | BODY MASS INDEX: 47.09 KG/M2 | WEIGHT: 293 LBS | HEIGHT: 66 IN | TEMPERATURE: 98.6 F | DIASTOLIC BLOOD PRESSURE: 67 MMHG | OXYGEN SATURATION: 92 %

## 2022-09-28 LAB
ANION GAP SERPL CALCULATED.3IONS-SCNC: 8.7 MMOL/L (ref 5–15)
BUN SERPL-MCNC: 15 MG/DL (ref 8–23)
BUN/CREAT SERPL: 11.2 (ref 7–25)
CALCIUM SPEC-SCNC: 8.6 MG/DL (ref 8.6–10.5)
CHLORIDE SERPL-SCNC: 96 MMOL/L (ref 98–107)
CO2 SERPL-SCNC: 28.3 MMOL/L (ref 22–29)
CREAT SERPL-MCNC: 1.34 MG/DL (ref 0.57–1)
EGFRCR SERPLBLD CKD-EPI 2021: 42.2 ML/MIN/1.73
GLUCOSE BLDC GLUCOMTR-MCNC: 122 MG/DL (ref 70–130)
GLUCOSE BLDC GLUCOMTR-MCNC: 163 MG/DL (ref 70–130)
GLUCOSE SERPL-MCNC: 144 MG/DL (ref 65–99)
NT-PROBNP SERPL-MCNC: 8671 PG/ML (ref 0–900)
POTASSIUM SERPL-SCNC: 4.3 MMOL/L (ref 3.5–5.2)
SARS-COV-2 AG RESP QL IA.RAPID: NORMAL
SODIUM SERPL-SCNC: 133 MMOL/L (ref 136–145)

## 2022-09-28 PROCEDURE — 87426 SARSCOV CORONAVIRUS AG IA: CPT | Performed by: HOSPITALIST

## 2022-09-28 PROCEDURE — 63710000001 INSULIN ASPART PER 5 UNITS: Performed by: HOSPITALIST

## 2022-09-28 PROCEDURE — G0378 HOSPITAL OBSERVATION PER HR: HCPCS

## 2022-09-28 PROCEDURE — 83880 ASSAY OF NATRIURETIC PEPTIDE: CPT | Performed by: HOSPITALIST

## 2022-09-28 PROCEDURE — 82962 GLUCOSE BLOOD TEST: CPT

## 2022-09-28 PROCEDURE — 99217 PR OBSERVATION CARE DISCHARGE MANAGEMENT: CPT | Performed by: HOSPITALIST

## 2022-09-28 PROCEDURE — 25010000002 ENOXAPARIN PER 10 MG: Performed by: INTERNAL MEDICINE

## 2022-09-28 PROCEDURE — 80048 BASIC METABOLIC PNL TOTAL CA: CPT | Performed by: HOSPITALIST

## 2022-09-28 PROCEDURE — 96372 THER/PROPH/DIAG INJ SC/IM: CPT

## 2022-09-28 RX ORDER — AMOXICILLIN 250 MG
2 CAPSULE ORAL 2 TIMES DAILY
Qty: 28 TABLET | Refills: 0 | Status: SHIPPED | OUTPATIENT
Start: 2022-09-28 | End: 2022-10-05

## 2022-09-28 RX ORDER — BUMETANIDE 1 MG/1
0.5 TABLET ORAL DAILY
Qty: 30 TABLET | Refills: 0 | Status: SHIPPED | OUTPATIENT
Start: 2022-09-29 | End: 2022-10-17

## 2022-09-28 RX ADMIN — CARVEDILOL 6.25 MG: 6.25 TABLET, FILM COATED ORAL at 08:44

## 2022-09-28 RX ADMIN — Medication 10 ML: at 08:45

## 2022-09-28 RX ADMIN — DOCUSATE SODIUM 50 MG AND SENNOSIDES 8.6 MG 2 TABLET: 8.6; 5 TABLET, FILM COATED ORAL at 08:44

## 2022-09-28 RX ADMIN — INSULIN ASPART 2 UNITS: 100 INJECTION, SOLUTION INTRAVENOUS; SUBCUTANEOUS at 12:01

## 2022-09-28 RX ADMIN — Medication 150 MG: at 08:46

## 2022-09-28 RX ADMIN — ISOSORBIDE MONONITRATE 30 MG: 30 TABLET, EXTENDED RELEASE ORAL at 08:44

## 2022-09-28 RX ADMIN — DULOXETINE HYDROCHLORIDE 30 MG: 30 CAPSULE, DELAYED RELEASE ORAL at 08:44

## 2022-09-28 RX ADMIN — PANTOPRAZOLE SODIUM 40 MG: 40 TABLET, DELAYED RELEASE ORAL at 05:49

## 2022-09-28 RX ADMIN — ATORVASTATIN CALCIUM 20 MG: 20 TABLET, FILM COATED ORAL at 08:44

## 2022-09-28 RX ADMIN — BUMETANIDE 1 MG: 1 TABLET ORAL at 08:44

## 2022-09-28 RX ADMIN — Medication 1000 MCG: at 08:44

## 2022-09-28 RX ADMIN — AMLODIPINE BESYLATE 5 MG: 5 TABLET ORAL at 08:45

## 2022-09-28 RX ADMIN — Medication 1 MG: at 08:44

## 2022-09-28 RX ADMIN — ENOXAPARIN SODIUM 60 MG: 60 INJECTION SUBCUTANEOUS at 08:44

## 2022-10-17 ENCOUNTER — OFFICE VISIT (OUTPATIENT)
Dept: CARDIOLOGY | Facility: CLINIC | Age: 72
End: 2022-10-17

## 2022-10-17 VITALS
HEIGHT: 66 IN | DIASTOLIC BLOOD PRESSURE: 78 MMHG | SYSTOLIC BLOOD PRESSURE: 158 MMHG | TEMPERATURE: 89 F | BODY MASS INDEX: 50.58 KG/M2

## 2022-10-17 DIAGNOSIS — I10 ESSENTIAL HYPERTENSION: Primary | ICD-10-CM

## 2022-10-17 DIAGNOSIS — I50.33 ACUTE ON CHRONIC HEART FAILURE WITH PRESERVED EJECTION FRACTION: ICD-10-CM

## 2022-10-17 DIAGNOSIS — E78.2 MIXED HYPERLIPIDEMIA: ICD-10-CM

## 2022-10-17 DIAGNOSIS — G47.33 OBSTRUCTIVE SLEEP APNEA (ADULT) (PEDIATRIC): ICD-10-CM

## 2022-10-17 DIAGNOSIS — I27.20 PULMONARY HYPERTENSION: ICD-10-CM

## 2022-10-17 DIAGNOSIS — I25.10 ASCVD (ARTERIOSCLEROTIC CARDIOVASCULAR DISEASE): ICD-10-CM

## 2022-10-17 PROBLEM — I50.30 (HFPEF) HEART FAILURE WITH PRESERVED EJECTION FRACTION: Status: ACTIVE | Noted: 2022-10-17

## 2022-10-17 PROCEDURE — 99214 OFFICE O/P EST MOD 30 MIN: CPT | Performed by: NURSE PRACTITIONER

## 2022-10-17 NOTE — PROGRESS NOTES
[facility]             Charity Douglas, APRN Hensley, Edith Collett, APRN  Jolly Garcia  1950  10/17/2022    Patient Active Problem List   Diagnosis   • UTI (urinary tract infection)   • Sepsis (HCC)   • Type 2 diabetes mellitus (HCC)   • Essential hypertension   • ASCVD (arteriosclerotic cardiovascular disease)   • Chronic hypoxemic respiratory failure (HCC)   • Anxiety   • Hyperlipidemia   • Chronic kidney disease   • Diabetic retinopathy (HCC)   • Morbid obesity (HCC)   • Sleep apnea   • Diabetic nephropathy (HCC)   • Physical debility   • (HFpEF) heart failure with preserved ejection fraction (HCC)       Dear Hensley, Edith Collett, APRN:    Subjective     Chief Complaint   Patient presents with   • Hospital Follow Up Visit     FEELING BETTER AND STRONGER     HPI:     This is a 72 y.o. female with known past medical history of acute on chronic HFpEF, essential pretension, coronary disease status post CABG, hyperlipidemia, diabetes mellitus type 2, pulmonary hypertension and morbid obesity.    Jolly L Lillianyanet presents today for hospital follow-up.  Patient was recently admitted to [facility] for acute on chronic diastolic congestive heart failure, severe pulmonary hypertension, chronic hypoxic respiratory failure and debility.  She underwent IV diuresis during that admission however this was discontinued secondary to worsening renal function.  Kidney function eventually stabilized and she was resumed back on Bumex 0.5 mg on discharge.  Echocardiogram showed normal LV function with RVSP of 78 mmHg. Perfusion lung scan was recommended during admission to rule out chronic PE as cause of her severe pulmonary hypertension however patient declined.  Outpatient sleep study was recommended.  Patient states since being at the nursing home she has been doing overall well.  Continues to get stronger.  Denies any shortness of breath or chest pain.  BMP  from September showed creatinine of 1.34    • Diagnostic Testing  1. Echocardiogram 9/2022: EF 56 to 60%, moderate tricuspid valve regurgitation with RVSP of 110 mmHg consistent with severe pulmonary hypertension.  2. Nuclear stress test 9/2022: Small fixed mid anterior and anteroseptal wall defect with normal wall motion, defect is consistent with infarction and above area versus breast attenuation artifact, no ischemia seen, TID 1.03.  3. Echocardiogram 9/2022: EF 61 to 65% with RVSP of 78 mmHg     All other systems were reviewed and were negative.    Patient Active Problem List   Diagnosis   • UTI (urinary tract infection)   • Sepsis (HCC)   • Type 2 diabetes mellitus (HCC)   • Essential hypertension   • ASCVD (arteriosclerotic cardiovascular disease)   • Chronic hypoxemic respiratory failure (HCC)   • Anxiety   • Hyperlipidemia   • Chronic kidney disease   • Diabetic retinopathy (HCC)   • Morbid obesity (HCC)   • Sleep apnea   • Diabetic nephropathy (HCC)   • Physical debility   • (HFpEF) heart failure with preserved ejection fraction (HCC)       family history includes Diabetes in her mother.     reports that she has quit smoking. She does not have any smokeless tobacco history on file. She reports that she does not drink alcohol and does not use drugs.    No Known Allergies      Current Outpatient Medications:   •  amLODIPine (NORVASC) 10 MG tablet, Take 10 mg by mouth Daily., Disp: , Rfl:   •  atorvastatin (LIPITOR) 20 MG tablet, Take 20 mg by mouth Daily., Disp: , Rfl:   •  carvedilol (COREG) 6.25 MG tablet, Take 1 tablet by mouth 2 (Two) Times a Day With Meals., Disp: 180 tablet, Rfl: 0  •  DULoxetine (CYMBALTA) 30 MG capsule, Take 1 capsule by mouth Daily., Disp: 90 capsule, Rfl: 0  •  folic acid (FOLVITE) 1 MG tablet, Take 1 tablet by mouth Daily., Disp: 30 tablet, Rfl: 0  •  Insulin NPH Isophane & Regular (70-30) 100 UNIT/ML suspension pen-injector, Inject 10 Units under the skin into the appropriate  area as directed 2 (Two) Times a Day., Disp: 15 mL, Rfl: 0  •  iron polysaccharides (NIFEREX) 150 MG capsule, Take 1 capsule by mouth Daily., Disp: 30 capsule, Rfl: 0  •  isosorbide mononitrate (IMDUR) 30 MG 24 hr tablet, Take 1 tablet by mouth Daily., Disp: 90 tablet, Rfl: 0  •  linaclotide (LINZESS) 72 MCG capsule capsule, Take 1 capsule by mouth Every Morning Before Breakfast., Disp: , Rfl:   •  pantoprazole (PROTONIX) 40 MG EC tablet, Take 1 tablet by mouth Every Morning., Disp: 90 tablet, Rfl: 0  •  Tirzepatide 2.5 MG/0.5ML solution pen-injector, Inject  under the skin into the appropriate area as directed 1 (One) Time Per Week., Disp: , Rfl:   •  vitamin B-12 (CYANOCOBALAMIN) 1000 MCG tablet, Take 1 tablet by mouth Daily., Disp: 30 tablet, Rfl: 0      Physical Exam:  I have reviewed the patient's current vital signs as documented in the patient's EMR.   Vitals:    10/17/22 1333   BP: 158/78   Temp: (!) 89 °F (31.7 °C)     Body mass index is 50.58 kg/m².       10/17/22  1333   Weight: (UNABLE TO WEIGH)      Physical Exam  Constitutional:       General: She is not in acute distress.     Appearance: Normal appearance. She is well-developed and normal weight.   HENT:      Head: Normocephalic and atraumatic.   Eyes:      General: Lids are normal.      Conjunctiva/sclera: Conjunctivae normal.      Pupils: Pupils are equal, round, and reactive to light.   Neck:      Vascular: No carotid bruit or JVD.   Cardiovascular:      Rate and Rhythm: Normal rate and regular rhythm.      Pulses: Normal pulses.      Heart sounds: Normal heart sounds, S1 normal and S2 normal. No murmur heard.  Pulmonary:      Effort: Pulmonary effort is normal. No respiratory distress.      Breath sounds: Normal breath sounds. No wheezing.   Abdominal:      General: Bowel sounds are normal. There is no distension.      Palpations: Abdomen is soft. There is no hepatomegaly or splenomegaly.      Tenderness: There is no abdominal tenderness.    Musculoskeletal:         General: No swelling. Normal range of motion.      Cervical back: Normal range of motion and neck supple.      Right lower leg: Edema present.      Left lower leg: Edema present.   Skin:     General: Skin is warm and dry.      Coloration: Skin is not jaundiced.      Findings: No rash.   Neurological:      General: No focal deficit present.      Mental Status: She is alert and oriented to person, place, and time. Mental status is at baseline.   Psychiatric:         Mood and Affect: Mood normal.         Speech: Speech normal.         Behavior: Behavior normal.         Thought Content: Thought content normal.         Judgment: Judgment normal.       DATA REVIEWED:     TTE/BREANNA:  Results for orders placed during the hospital encounter of 09/11/22    Adult Transthoracic Echo Limited W/ Cont if Necessary Per Protocol    Interpretation Summary  · Left ventricular ejection fraction appears to be 61 - 65%. Left ventricular systolic function is normal.  · Left ventricular diastolic function was not assessed.  · The right ventricular cavity is mildly dilated.  · Estimated right ventricular systolic pressure from tricuspid regurgitation is markedly elevated ( 78 mmHg).  · This is a technically limited study.      Laboratory evaluations:    Lab Results   Component Value Date    GLUCOSE 144 (H) 09/28/2022    BUN 15 09/28/2022    CREATININE 1.34 (H) 09/28/2022    BCR 11.2 09/28/2022    K 4.3 09/28/2022    CO2 28.3 09/28/2022    CALCIUM 8.6 09/28/2022    PROTENTOTREF 5.9 (L) 09/16/2022    ALBUMIN 3.00 (L) 09/25/2022    LABIL2 1.0 09/16/2022    AST 10 09/24/2022    ALT 6 09/24/2022     Lab Results   Component Value Date    WBC 7.72 09/27/2022    HGB 9.9 (L) 09/27/2022    HCT 31.9 (L) 09/27/2022    MCV 88.6 09/27/2022     09/27/2022     Lab Results   Component Value Date    CHOL 119 09/12/2022    TRIG 85 09/12/2022    HDL 38 (L) 09/12/2022    LDL 64 09/12/2022     Lab Results   Component Value Date     TSH 1.400 09/12/2022     Lab Results   Component Value Date    HGBA1C 7.60 (H) 09/12/2022     Lab Results   Component Value Date    ALT 6 09/24/2022     Lab Results   Component Value Date    HGBA1C 7.60 (H) 09/12/2022     Lab Results   Component Value Date    MICROALBUR 56.4 09/15/2022    CREATININE 1.34 (H) 09/28/2022     Lab Results   Component Value Date    IRON 27 (L) 09/12/2022    TIBC 258 (L) 09/12/2022    FERRITIN 163.10 (H) 09/12/2022     Lab Results   Component Value Date    INR 1.08 09/22/2022    INR 1.07 09/11/2022    PROTIME 14.2 09/22/2022    PROTIME 14.1 09/11/2022           --------------------------------------------------------------------------------------------------------------------------    ASSESSMENT/PLAN:      Diagnosis Plan   1. Essential hypertension        2. ASCVD (arteriosclerotic cardiovascular disease)        3. Mixed hyperlipidemia        4. Acute on chronic heart failure with preserved ejection fraction (HCC)  Ambulatory Referral to Heart Failure Clinic      5. Pulmonary hypertension (HCC)  Home Sleep Study      6. Obstructive sleep apnea (adult) (pediatric)  Home Sleep Study        1. ASCVD  • Denies any chest pain.  Continue with aspirin, statin, Imdur and Coreg.    2. HFpEF  · Patient appears compensated from heart for standpoint at this time.  I have referred her to the heart clinic as she missed her initial follow-up appointment.  Per review of nursing home records it appears Bumex has been held for unclear reason possibly due to chronic kidney disease.  Request recent labs from nursing home to evaluate kidney function.  · Continue with Coreg.  If kidney function is stable can consider adding ACE/ARB or Arni and possibly Jardiance.    3.  Severe pulmonary hypertension  · Echocardiogram showed RVSP of 78 mmHg with moderate tricuspid valve regurgitation.  Patient was unable to have VQ scan to rule out chronic PE as cause of severe pulmonary hypertension due to not being able to  lie flat.  Can consider CT of the chest with contrast however patient states she will not be able to tolerate lying flat.   · Outpatient sleep study was recommended however I am unsure if insurance will approve given she is currently in the nursing home.  Will order home sleep study to rule out sleep apnea as cause of pulmonary hypertension.    4.  Essential hypertension  · Blood pressure mildly elevated in office today.  Patient reports blood pressures been controlled at the nursing home however I have no log for review.  If blood pressure remains elevated and kidney function stable can consider adding ACE/ARB.  I have requested recent BMP from [facility].      This document has been @Electronically signed by TUCKER Byrd, 10/17/22, 1:28 PM EDT.       Dictated Utilizing Dragon Dictation: Part of this note may be an electronic transcription/translation of spoken language to printed text using the Dragon Dictation System.    Follow-up appointment and medication changes provided in hand delivered After Visit Summary as well as reviewed in the room.

## 2022-10-24 ENCOUNTER — TELEPHONE (OUTPATIENT)
Dept: CARDIOLOGY | Facility: CLINIC | Age: 72
End: 2022-10-24

## 2022-10-24 NOTE — TELEPHONE ENCOUNTER
----- Message from TUCKER Dixon sent at 10/17/2022  1:50 PM EDT -----  Please request recent labs from nursing home      They will send what they have.  Patient was d/c on Saturday.

## 2022-10-27 ENCOUNTER — APPOINTMENT (OUTPATIENT)
Dept: CARDIOLOGY | Facility: HOSPITAL | Age: 72
End: 2022-10-27

## 2022-11-17 ENCOUNTER — HOSPITAL ENCOUNTER (EMERGENCY)
Facility: HOSPITAL | Age: 72
Discharge: HOME | End: 2022-11-17
Attending: STUDENT IN AN ORGANIZED HEALTH CARE EDUCATION/TRAINING PROGRAM | Admitting: STUDENT IN AN ORGANIZED HEALTH CARE EDUCATION/TRAINING PROGRAM

## 2022-11-17 VITALS
DIASTOLIC BLOOD PRESSURE: 84 MMHG | SYSTOLIC BLOOD PRESSURE: 164 MMHG | OXYGEN SATURATION: 98 % | HEART RATE: 82 BPM | HEIGHT: 66 IN | RESPIRATION RATE: 18 BRPM | TEMPERATURE: 98 F | WEIGHT: 293 LBS | BODY MASS INDEX: 47.09 KG/M2

## 2022-11-17 DIAGNOSIS — H43.13 VITREOUS HEMORRHAGE OF BOTH EYES: Primary | ICD-10-CM

## 2022-11-17 PROCEDURE — 99284 EMERGENCY DEPT VISIT MOD MDM: CPT

## 2022-11-17 RX ORDER — LATANOPROST 50 UG/ML
1 SOLUTION/ DROPS OPHTHALMIC NIGHTLY
Qty: 2.5 ML | Refills: 0 | Status: SHIPPED | OUTPATIENT
Start: 2022-11-17 | End: 2022-12-17

## 2022-11-17 NOTE — ED PROVIDER NOTES
[facility]  emergency department encounter        Pt Name: Jolly Garcia  MRN: [id number]  Birthdate 1950  Date of evaluation: 11/17/2022    Chief Complaint   Patient presents with   • Eye Problem     Possible retina detachment             HISTORY OF PRESENT ILLNESS:   Jolly Garcia is a 72 y.o. female presents for visual field deficit in the left eye.  New onset last night, constant, progressively worsening.  No reports she has lazy right eye that is essentially chronically blind.  Endorses darkness of her superior visual field in the bilateral upper quadrants of the left eye.  No pain.  Patient reports symptom onset while or shortly after she was stooling, endorsing chronic constipation.    No other exacerbating or alleviating factors other than as noted above.  Severity: Severe    PCP: Hensley, Edith Collett, APRN          REVIEW OF SYSTEMS:     Review of Systems   Constitutional: Negative for fever.   HENT: Negative for congestion and rhinorrhea.    Eyes: Positive for visual disturbance. Negative for pain, discharge and redness.   Respiratory: Negative for cough and shortness of breath.    Cardiovascular: Negative for chest pain.   Gastrointestinal: Positive for constipation. Negative for abdominal pain, nausea and vomiting.   Genitourinary: Negative for dysuria.   Musculoskeletal: Negative for myalgias.   Skin: Negative for rash.   Neurological: Negative for headaches.   Psychiatric/Behavioral: Negative for confusion.       Review of systems otherwise as per HPI.          PREVIOUS HISTORY:         Past Medical History:   Diagnosis Date   • Anxiety    • CAD (coronary artery disease)     s/p CABG   • Chronic hypoxemic respiratory failure (HCC)     Wears 2 L/min at home   • Essential hypertension    • Hyperlipidemia    • Type 2 diabetes mellitus (HCC)            Past Surgical History:   Procedure Laterality Date   • CORONARY ARTERY BYPASS GRAFT  2011   • HYSTERECTOMY             Social  "History     Socioeconomic History   • Marital status: Single   Tobacco Use   • Smoking status: Former   Substance and Sexual Activity   • Alcohol use: Never   • Drug use: Never           Family History   Problem Relation Age of Onset   • Diabetes Mother          Current Outpatient Medications   Medication Instructions   • amLODIPine (NORVASC) 10 mg, Oral, Daily   • atorvastatin (LIPITOR) 20 mg, Oral, Daily   • carvedilol (COREG) 6.25 mg, Oral, 2 Times Daily With Meals   • DULoxetine (CYMBALTA) 30 mg, Oral, Daily   • Ferrex 150 150 mg, Oral, Daily   • folic acid (FOLVITE) 1 mg, Oral, Daily   • Insulin NPH Isophane & Regular (70-30) 100 UNIT/ML suspension pen-injector 10 Units, Subcutaneous, 2 Times Daily   • isosorbide mononitrate (IMDUR) 30 mg, Oral, Daily   • latanoprost (Xalatan) 0.005 % ophthalmic solution 1 drop, Both Eyes, Nightly   • linaclotide (LINZESS) 72 mcg, Oral, Every Morning Before Breakfast   • pantoprazole (PROTONIX) 40 mg, Oral, Every Early Morning   • Tirzepatide 2.5 MG/0.5ML solution pen-injector Subcutaneous, Weekly   • vitamin B-12 (CYANOCOBALAMIN) 1,000 mcg, Oral, Daily         Allergies:  Patient has no known allergies.    Medications, allergies and past medical, surgical, family, and social history reviewed.        PHYSICAL EXAM:     Patient Vitals for the past 24 hrs:   BP Temp Temp src Pulse Resp SpO2 Height Weight   11/17/22 2031 165/86 -- -- -- -- -- -- --   11/17/22 1946 160/82 -- -- -- -- -- -- --   11/17/22 1816 117/52 -- -- -- -- -- -- --   11/17/22 1747 141/72 97.9 °F (36.6 °C) Oral 80 18 96 % 167.6 cm (66\") (!) 142 kg (313 lb)       Physical Exam  Vitals and nursing note reviewed.   Constitutional:       General: She is not in acute distress.  HENT:      Head: Normocephalic and atraumatic.   Eyes:      Extraocular Movements: Extraocular movements intact.      Conjunctiva/sclera: Conjunctivae normal.      Comments: OS acuity 20/100.  Ultrasound notable for posterior ocular linear " detachment and it moves in conjunction with medial lateral motion of the eye.  Optic disc margins distinct.   Cardiovascular:      Rate and Rhythm: Normal rate and regular rhythm.   Pulmonary:      Effort: Pulmonary effort is normal. No respiratory distress.   Abdominal:      General: Abdomen is flat. There is no distension.   Musculoskeletal:         General: No deformity. Normal range of motion.      Cervical back: Normal range of motion. No rigidity.   Skin:     Coloration: Skin is not jaundiced.      Findings: No rash.   Neurological:      General: No focal deficit present.      Mental Status: She is alert and oriented to person, place, and time.   Psychiatric:         Mood and Affect: Mood normal.         Behavior: Behavior normal.             COMPLETED DIAGNOSTIC STUDIES AND INTERVENTIONS:     Lab Results (last 24 hours)     ** No results found for the last 24 hours. **            No orders to display         New Medications Ordered This Visit   Medications   • latanoprost (Xalatan) 0.005 % ophthalmic solution     Sig: Administer 1 drop to both eyes Every Night for 30 days.     Dispense:  2 mL     Refill:  0         Procedures            MEDICAL DECISION-MAKING AND ED COURSE:     ED Course as of 11/17/22 2053   Thu Nov 17, 2022   1841 72-year-old female presents with new superior field deficit in the left eye.  History includes right-sided amblyopia with severely limited vision.  Exam notable for OS acuity 20/100.  Ultrasound notable for likely retinal detachment.  Discussed case with ophthalmologist Dr. Cobos who will see and evaluate. [KP]   1943 Dr. Cobos has completed initial evaluation of patient.  Awaiting dilation for funduscopic exam. [KP]   2052 Dr. Cobos reports patient has vitreous detachment.  Recommends latanoprost drop bilaterally nightly.  Patient stopped following up with her retinologist and will need to reestablish care with retinologist.  Referrals provided.  Patient also follow-up  with Dr. Cobos on December 20.  Recommended return for any new onset or worsening symptoms.  Patient is agreeable to plan, all questions answered. [KP]      ED Course User Index  [KP] Chance Castellon MD       ?      FINAL IMPRESSION:       1. Vitreous hemorrhage of both eyes (HCC)         The complaints listed here are new problems to this examiner.      FOLLOW-UP  Moe Carter MD  [address] [facility] [id number] [phone]    Schedule an appointment as soon as possible for a visit       [facility] Emergency Department  [address] [facility] [id number] [phone]    If symptoms worsen    Horace Kearns MD  [address]  [address]  [facility] [id number] [phone]          Bienvenido Cobos MD  [address] [facility] [address]  [phone]    Go on 12/20/2022  8am appointment      DISPOSITION  ED Disposition     ED Disposition   Discharge    Condition   Stable    Comment   --                 This note was dictated using a [id number] tool. Occasional wrong-word or 'sound-a-like' substitutions may have occurred due to the inherent limitations of voice recognition software. ?Read the chart carefully and recognize, using context, where substitutions have occurred.    Chance Castellon MD  20:53 EST  11/17/2022             Chance Castellon MD  11/17/22 2053

## 2022-11-18 NOTE — CONSULTS
@[id number](Error - No data available.)@    Referring Provider:  Chance Garcia MD    Chief complaint Floater os       History of present illness:  72 year old wf complaining of floater os in superior field of vision since yesterday evening when she was straining while constipated.  No flashes.  Pt has a long history of poor vision in the right eye due to amblyopia and she has a history of non compliance with her glaucoma medications.    Past ocular history:  Amblyopia OD, cataracts ou, POAG moderate OU with history of poor compliance with eye drops OU, strabismus surgery as a child and at age 17 for esotropia, now with an exotropia, injections both eyes for diabetic retinopathy, PRP right eye.      Past Medical History:  Past Medical History:   Diagnosis Date   • Anxiety    • CAD (coronary artery disease)     s/p CABG   • Chronic hypoxemic respiratory failure (HCC)     Wears 2 L/min at home   • Essential hypertension    • Hyperlipidemia    • Type 2 diabetes mellitus (HCC)    ,   Past Surgical History:   Procedure Laterality Date   • CORONARY ARTERY BYPASS GRAFT  2011   • HYSTERECTOMY     ,   Family History   Problem Relation Age of Onset   • Diabetes Mother    ,   Social History     Tobacco Use   • Smoking status: Former   Substance Use Topics   • Alcohol use: Never   • Drug use: Never   , (Not in a hospital admission)  , Scheduled Meds:  , Continuous Infusions:  No current facility-administered medications for this encounter.  , PRN Meds:   and Allergies:  Patient has no known allergies.      Eye medications:  None now ( has been on latanoprost and brimonidine in past)        Review of Systems  All systems were reviewed:   Gen:  +occasional sinus headaches, no jaw claudication of fatique or scalp tenderness  Eyes:  See above  ENT:  +sinus allergies  Cardiovascular:  neg  Respiratory: neg  Gastrointestinal: recent constipation  Genitourinary:neg  Musculoskeletal: +arthritis  Integument: neg  Neurological:   "Neg  Psychiatric: +stress  Endocrine: +DM  BS \"good\" A1 C 7.6  Hematologic / Lymphatic:neg  Allergies / Immunologic: neg    Mood:  Normal  Orientation:  Alert and oriented x 3      Vital Signs   Temp:  [97.9 °F (36.6 °C)] 97.9 °F (36.6 °C)  Heart Rate:  [80] 80  Resp:  [18] 18  BP: (117-141)/(52-72) 117/52        11/17/22  1747   Weight: (!) 142 kg (313 lb)         Objective:    Vision at near:     Right eye count fingers 2 feet    Left eye  20/200    Eye pressure by tonopen:      Right eye: 20    Left eye: 19    Pupils:  Pupils equal, round, reactive with no afferent pupil defect    Fields:  Full to finger counting by confrontation in each eye tested separately.    Mot:  Ductions and versions full and patient has an exotropia    Pen light exam:    Lids:  Normal  Lashes:  normal  Conjunctivae:    White no discharge  Cornea:  Clear, tr inf spk ou  Anterior chamber:  Deep and quiet OU  Lens:  Ns cataracts ou    Dilation performed both eyes with tropicamide and phenylephrine    Dilated eye exam:  Discs:  Pink and sharp both eyes  C/D ratio:  0.6 v 0.4 h od, 0.5 v 0.4 h os  Vessels:  Attenuated, old preretinal membrane over temporal arcade vessel od  Maculae: dot blot heme ou, no obvious cme but need slit lamp fundus to be sure  Periphery:  Flat 360 degrees with no holes or tears ou, 360 degree scleral depression performed no RD, extensive PRP OD,   Vitreous:  pvd ou, fresh vitreous hemorrhage os      Assessment:     Vitreous hemorrhage OS  Proliferative diabetic retinopathy ou  Moderate primary open angle glaucoma OU  Non compliance  htn  Ns and cortical cataracts ou  Amblyopia OD  exotropia      Plan:    Annual dilated eye exams and good blood sugar control.  See retinal specialist in [place] to consider injections and/or prp for the left eye  Discussed risks of non compliance including risks of total and complete bilateral blindness.  Restart latanoprost ou qhs.  RD precautions explained, seek help immediately " for flashing lights, new floaters, decreased vision etc.  We will have to call the retinal office when they are open in the morning to schedule her follow up and then call her at the [facility].  Follow up with us in our office December 20 8 am.              I discussed the patients findings and my recommendations with patient

## 2022-11-18 NOTE — DISCHARGE INSTRUCTIONS
You were evaluated by Dr. Cobos, ophthalmologist.  Reported you have a vitreous hemorrhage (NOT a vitreous detachment as it is noted in the attached paperwork however we do not have information specifically on a vitreous hemorrhage).  You are to follow-up with him at 8 AM on December 20.    You also need to follow-up with a retinologist.  May schedule appointment with retinologist such as Moe Carter or Horace Kearns at one of the numbers below.    Please return here for any new onset or worsening symptoms.

## 2022-11-18 NOTE — ED NOTES
Called [facility] spoke with Tayla requested transport back to [facility]. She will dispatch a truck.

## 2022-11-23 ENCOUNTER — LAB REQUISITION (OUTPATIENT)
Dept: LAB | Facility: HOSPITAL | Age: 72
End: 2022-11-23

## 2022-11-23 DIAGNOSIS — R05.4: ICD-10-CM

## 2022-11-23 PROCEDURE — 0202U NFCT DS 22 TRGT SARS-COV-2: CPT | Performed by: INTERNAL MEDICINE

## 2022-11-24 LAB
B PARAPERT DNA SPEC QL NAA+PROBE: NOT DETECTED
B PERT DNA SPEC QL NAA+PROBE: NOT DETECTED
C PNEUM DNA NPH QL NAA+NON-PROBE: NOT DETECTED
FLUAV H1 2009 PAND RNA NPH QL NAA+PROBE: DETECTED
FLUBV RNA ISLT QL NAA+PROBE: NOT DETECTED
HADV DNA SPEC NAA+PROBE: NOT DETECTED
HCOV 229E RNA SPEC QL NAA+PROBE: NOT DETECTED
HCOV HKU1 RNA SPEC QL NAA+PROBE: NOT DETECTED
HCOV NL63 RNA SPEC QL NAA+PROBE: NOT DETECTED
HCOV OC43 RNA SPEC QL NAA+PROBE: NOT DETECTED
HMPV RNA NPH QL NAA+NON-PROBE: NOT DETECTED
HPIV1 RNA ISLT QL NAA+PROBE: NOT DETECTED
HPIV2 RNA SPEC QL NAA+PROBE: NOT DETECTED
HPIV3 RNA NPH QL NAA+PROBE: NOT DETECTED
HPIV4 P GENE NPH QL NAA+PROBE: NOT DETECTED
M PNEUMO IGG SER IA-ACNC: NOT DETECTED
RHINOVIRUS RNA SPEC NAA+PROBE: NOT DETECTED
RSV RNA NPH QL NAA+NON-PROBE: NOT DETECTED
SARS-COV-2 RNA NPH QL NAA+NON-PROBE: NOT DETECTED

## 2022-12-03 ENCOUNTER — LAB REQUISITION (OUTPATIENT)
Dept: LAB | Facility: HOSPITAL | Age: 72
End: 2022-12-03

## 2022-12-03 DIAGNOSIS — R82.998 OTHER ABNORMAL FINDINGS IN URINE: ICD-10-CM

## 2022-12-03 LAB
COLLECT DURATION TIME UR: 24 HRS
PROT 24H UR-MRATE: 633.2 MG/24HOURS (ref 0–150)
SPECIMEN VOL 24H UR: 1350 ML

## 2022-12-03 PROCEDURE — 84156 ASSAY OF PROTEIN URINE: CPT | Performed by: INTERNAL MEDICINE

## 2022-12-03 PROCEDURE — 81050 URINALYSIS VOLUME MEASURE: CPT | Performed by: INTERNAL MEDICINE

## 2022-12-06 ENCOUNTER — TRANSCRIBE ORDERS (OUTPATIENT)
Dept: ADMINISTRATIVE | Facility: HOSPITAL | Age: 72
End: 2022-12-06

## 2022-12-06 DIAGNOSIS — R93.89 ABNORMAL CHEST X-RAY: Primary | ICD-10-CM

## 2023-01-04 ENCOUNTER — OFFICE VISIT (OUTPATIENT)
Dept: PULMONOLOGY | Facility: CLINIC | Age: 73
End: 2023-01-04
Payer: MEDICARE

## 2023-01-04 VITALS
SYSTOLIC BLOOD PRESSURE: 148 MMHG | BODY MASS INDEX: 47.09 KG/M2 | HEIGHT: 66 IN | OXYGEN SATURATION: 99 % | HEART RATE: 65 BPM | TEMPERATURE: 97.5 F | WEIGHT: 293 LBS | DIASTOLIC BLOOD PRESSURE: 72 MMHG

## 2023-01-04 DIAGNOSIS — G47.33 OSA (OBSTRUCTIVE SLEEP APNEA): Primary | ICD-10-CM

## 2023-01-04 DIAGNOSIS — E66.01 CLASS 3 SEVERE OBESITY DUE TO EXCESS CALORIES WITH BODY MASS INDEX (BMI) OF 50.0 TO 59.9 IN ADULT, UNSPECIFIED WHETHER SERIOUS COMORBIDITY PRESENT: ICD-10-CM

## 2023-01-04 DIAGNOSIS — R53.81 PHYSICAL DEBILITY: ICD-10-CM

## 2023-01-04 DIAGNOSIS — J96.11 CHRONIC RESPIRATORY FAILURE WITH HYPOXIA: ICD-10-CM

## 2023-01-04 PROCEDURE — 1160F RVW MEDS BY RX/DR IN RCRD: CPT | Performed by: INTERNAL MEDICINE

## 2023-01-04 PROCEDURE — 1159F MED LIST DOCD IN RCRD: CPT | Performed by: INTERNAL MEDICINE

## 2023-01-04 PROCEDURE — 99204 OFFICE O/P NEW MOD 45 MIN: CPT | Performed by: INTERNAL MEDICINE

## 2023-01-04 RX ORDER — ASPIRIN 81 MG/1
81 TABLET, CHEWABLE ORAL DAILY
COMMUNITY

## 2023-01-04 RX ORDER — BUMETANIDE 1 MG/1
1 TABLET ORAL DAILY
COMMUNITY
Start: 2022-11-14 | End: 2023-11-15

## 2023-01-04 RX ORDER — SODIUM ZIRCONIUM CYCLOSILICATE 10 G/10G
10 POWDER, FOR SUSPENSION ORAL
COMMUNITY

## 2023-01-04 RX ORDER — ONDANSETRON 4 MG/1
4 TABLET, ORALLY DISINTEGRATING ORAL EVERY 8 HOURS PRN
COMMUNITY

## 2023-01-04 RX ORDER — INSULIN GLARGINE 100 [IU]/ML
INJECTION, SOLUTION SUBCUTANEOUS
COMMUNITY

## 2023-01-04 RX ORDER — FLUTICASONE PROPIONATE 50 MCG
1 SPRAY, SUSPENSION (ML) NASAL DAILY
COMMUNITY

## 2023-01-04 RX ORDER — LOSARTAN POTASSIUM 25 MG/1
25 TABLET ORAL DAILY
COMMUNITY

## 2023-01-04 RX ORDER — ACETAMINOPHEN 500 MG
500 TABLET ORAL EVERY 6 HOURS PRN
COMMUNITY

## 2023-01-04 RX ORDER — ECHINACEA PURPUREA EXTRACT 125 MG
1 TABLET ORAL AS NEEDED
COMMUNITY

## 2023-01-04 RX ORDER — HYDRALAZINE HYDROCHLORIDE 25 MG/1
25 TABLET, FILM COATED ORAL 3 TIMES DAILY
COMMUNITY

## 2023-01-04 RX ORDER — UBIDECARENONE 50 MG
50 CAPSULE ORAL
COMMUNITY

## 2023-01-04 RX ORDER — SEMAGLUTIDE 1.34 MG/ML
INJECTION, SOLUTION SUBCUTANEOUS WEEKLY
COMMUNITY

## 2023-01-04 RX ORDER — IRON ASPGLY,PS/C/B12/FA/CA/SUC 150-25-1
CAPSULE ORAL
COMMUNITY

## 2023-01-04 RX ORDER — LATANOPROST 50 UG/ML
1 SOLUTION/ DROPS OPHTHALMIC NIGHTLY
COMMUNITY

## 2023-01-04 RX ORDER — MULTIPLE VITAMINS W/ MINERALS TAB 9MG-400MCG
1 TAB ORAL DAILY
COMMUNITY

## 2023-01-06 ENCOUNTER — HOSPITAL ENCOUNTER (OUTPATIENT)
Dept: CT IMAGING | Facility: HOSPITAL | Age: 73
Discharge: HOME | End: 2023-01-06
Admitting: INTERNAL MEDICINE
Payer: MEDICARE

## 2023-01-06 DIAGNOSIS — R93.89 ABNORMAL CHEST X-RAY: ICD-10-CM

## 2023-01-06 PROCEDURE — 71250 CT THORAX DX C-: CPT

## 2023-01-06 PROCEDURE — 71250 CT THORAX DX C-: CPT | Performed by: RADIOLOGY

## 2023-01-15 DIAGNOSIS — G47.33 OSA (OBSTRUCTIVE SLEEP APNEA): Primary | ICD-10-CM

## 2023-01-31 ENCOUNTER — HOSPITAL ENCOUNTER (OUTPATIENT)
Dept: RESPIRATORY THERAPY | Facility: HOSPITAL | Age: 73
Discharge: HOME | End: 2023-01-31
Admitting: INTERNAL MEDICINE
Payer: MEDICARE

## 2023-01-31 DIAGNOSIS — J96.11 CHRONIC RESPIRATORY FAILURE WITH HYPOXIA: ICD-10-CM

## 2023-01-31 PROCEDURE — 94060 EVALUATION OF WHEEZING: CPT | Performed by: INTERNAL MEDICINE

## 2023-01-31 PROCEDURE — 94010 BREATHING CAPACITY TEST: CPT

## 2023-01-31 PROCEDURE — 94727 GAS DIL/WSHOT DETER LNG VOL: CPT | Performed by: INTERNAL MEDICINE

## 2023-01-31 PROCEDURE — 94727 GAS DIL/WSHOT DETER LNG VOL: CPT

## 2023-01-31 PROCEDURE — 94729 DIFFUSING CAPACITY: CPT | Performed by: INTERNAL MEDICINE

## 2023-01-31 PROCEDURE — 94729 DIFFUSING CAPACITY: CPT

## 2023-01-31 PROCEDURE — 94640 AIRWAY INHALATION TREATMENT: CPT

## 2023-01-31 RX ORDER — ALBUTEROL SULFATE 2.5 MG/3ML
2.5 SOLUTION RESPIRATORY (INHALATION) ONCE
Status: COMPLETED | OUTPATIENT
Start: 2023-01-31 | End: 2023-01-31

## 2023-01-31 RX ADMIN — ALBUTEROL SULFATE 2.5 MG: 2.5 SOLUTION RESPIRATORY (INHALATION) at 14:01

## 2023-03-14 ENCOUNTER — OFFICE VISIT (OUTPATIENT)
Dept: CARDIOLOGY | Facility: CLINIC | Age: 73
End: 2023-03-14
Payer: MEDICARE

## 2023-03-14 VITALS
BODY MASS INDEX: 43.71 KG/M2 | DIASTOLIC BLOOD PRESSURE: 68 MMHG | HEIGHT: 66 IN | SYSTOLIC BLOOD PRESSURE: 123 MMHG | OXYGEN SATURATION: 100 % | WEIGHT: 272 LBS | HEART RATE: 68 BPM

## 2023-03-14 DIAGNOSIS — I25.10 ASCVD (ARTERIOSCLEROTIC CARDIOVASCULAR DISEASE): Primary | ICD-10-CM

## 2023-03-14 DIAGNOSIS — I10 ESSENTIAL HYPERTENSION: ICD-10-CM

## 2023-03-14 DIAGNOSIS — E78.2 MIXED HYPERLIPIDEMIA: ICD-10-CM

## 2023-03-14 DIAGNOSIS — I50.32 CHRONIC HEART FAILURE WITH PRESERVED EJECTION FRACTION: ICD-10-CM

## 2023-03-14 PROCEDURE — 1159F MED LIST DOCD IN RCRD: CPT | Performed by: NURSE PRACTITIONER

## 2023-03-14 PROCEDURE — 3074F SYST BP LT 130 MM HG: CPT | Performed by: NURSE PRACTITIONER

## 2023-03-14 PROCEDURE — 99214 OFFICE O/P EST MOD 30 MIN: CPT | Performed by: NURSE PRACTITIONER

## 2023-03-14 PROCEDURE — 1160F RVW MEDS BY RX/DR IN RCRD: CPT | Performed by: NURSE PRACTITIONER

## 2023-03-14 PROCEDURE — 3078F DIAST BP <80 MM HG: CPT | Performed by: NURSE PRACTITIONER

## 2023-03-14 RX ORDER — HYDRALAZINE HYDROCHLORIDE 25 MG/1
25 TABLET, FILM COATED ORAL 3 TIMES DAILY
COMMUNITY

## 2023-03-14 RX ORDER — PATIROMER 8.4 G/1
8.4 POWDER, FOR SUSPENSION ORAL ONCE
COMMUNITY

## 2023-03-14 RX ORDER — LUTEIN 10 MG
TABLET ORAL
COMMUNITY

## 2023-03-14 NOTE — PROGRESS NOTES
[facility]             AmandaTUCKER Soares Edith L, APRN  Jolly Snyderyanet  1950 03/14/2023    Patient Active Problem List   Diagnosis   • UTI (urinary tract infection)   • Sepsis (HCC)   • Type 2 diabetes mellitus (HCC)   • Essential hypertension   • ASCVD (arteriosclerotic cardiovascular disease)   • Chronic respiratory failure with hypoxia (HCC)   • Anxiety   • Hyperlipidemia   • Chronic kidney disease   • Diabetic retinopathy (HCC)   • Class 3 severe obesity due to excess calories with body mass index (BMI) of 50.0 to 59.9 in adult (HCC)   • JESIKA (obstructive sleep apnea)   • Diabetic nephropathy (HCC)   • Physical debility   • (HFpEF) heart failure with preserved ejection fraction (HCC)       Dear April Cantrell APRN:    Subjective     Chief Complaint   Patient presents with   • Follow-up     5 month       HPI:     This is a 72 y.o. female with known past medical history of acute on chronic HFpEF, essential pretension, coronary disease status post CABG, hyperlipidemia, diabetes mellitus type 2, pulmonary hypertension and morbid obesity.    Jolly Garcia presents today for routine cardiology follow up.  Patient states since her last visit she has been doing overall well.  Currently a resident at the local nursing home.  She brings in lab work which shows creatinine of 2.  Blood pressure overall controlled.  Denies any chest pain.  Has chronic shortness of breath and wears 2 L of oxygen via nasal cannula.    • Diagnostic Testing  1. Echocardiogram 9/2022: EF 56 to 60%, moderate tricuspid valve regurgitation with RVSP of 110 mmHg consistent with severe pulmonary hypertension.  2. Nuclear stress test 9/2022: Small fixed mid anterior and anteroseptal wall defect with normal wall motion, defect is consistent with infarction and above area versus breast attenuation artifact, no ischemia seen, TID 1.03.  3. Echocardiogram 9/2022: EF 61 to 65% with  RVSP of 78 mmHg     All other systems were reviewed and were negative.    Patient Active Problem List   Diagnosis   • UTI (urinary tract infection)   • Sepsis (HCC)   • Type 2 diabetes mellitus (HCC)   • Essential hypertension   • ASCVD (arteriosclerotic cardiovascular disease)   • Chronic respiratory failure with hypoxia (HCC)   • Anxiety   • Hyperlipidemia   • Chronic kidney disease   • Diabetic retinopathy (HCC)   • Class 3 severe obesity due to excess calories with body mass index (BMI) of 50.0 to 59.9 in adult (HCC)   • JESIKA (obstructive sleep apnea)   • Diabetic nephropathy (HCC)   • Physical debility   • (HFpEF) heart failure with preserved ejection fraction (HCC)       family history includes Diabetes in her mother.     reports that she has quit smoking. She does not have any smokeless tobacco history on file. She reports that she does not drink alcohol and does not use drugs.    No Known Allergies      Current Outpatient Medications:   •  acetaminophen (TYLENOL) 500 MG tablet, Take 1 tablet by mouth Every 6 (Six) Hours As Needed for Mild Pain., Disp: , Rfl:   •  aspirin 81 MG chewable tablet, Chew 1 tablet Daily., Disp: , Rfl:   •  atorvastatin (LIPITOR) 20 MG tablet, Take 1 tablet by mouth Daily., Disp: , Rfl:   •  bumetanide (BUMEX) 1 MG tablet, Take 1 mg by mouth Daily., Disp: , Rfl:   •  carvedilol (COREG) 6.25 MG tablet, Take 1 tablet by mouth 2 (Two) Times a Day With Meals., Disp: 180 tablet, Rfl: 0  •  Coenzyme Q10 (CoQ10) 50 MG capsule, Take 1 capsule by mouth., Disp: , Rfl:   •  DULoxetine (CYMBALTA) 30 MG capsule, Take 1 capsule by mouth Daily., Disp: 90 capsule, Rfl: 0  •  empagliflozin (JARDIANCE) 10 MG tablet tablet, Take  by mouth Daily., Disp: , Rfl:   •  fluticasone (FLONASE) 50 MCG/ACT nasal spray, 1 spray into the nostril(s) as directed by provider Daily., Disp: , Rfl:   •  folic acid (FOLVITE) 1 MG tablet, Take 1 tablet by mouth Daily., Disp: 30 tablet, Rfl: 0  •  hydrALAZINE (APRESOLINE)  25 MG tablet, Take 1 tablet by mouth 3 (Three) Times a Day., Disp: , Rfl:   •  insulin aspart (novoLOG FLEXPEN) 100 UNIT/ML solution pen-injector sc pen, Inject  under the skin into the appropriate area as directed 3 (Three) Times a Day With Meals., Disp: , Rfl:   •  Insulin Glargine (Lantus SoloStar) 100 UNIT/ML injection pen, Inject  under the skin into the appropriate area as directed., Disp: , Rfl:   •  isosorbide mononitrate (IMDUR) 30 MG 24 hr tablet, Take 1 tablet by mouth Daily., Disp: 90 tablet, Rfl: 0  •  latanoprost (XALATAN) 0.005 % ophthalmic solution, 1 drop Every Night., Disp: , Rfl:   •  linaclotide (LINZESS) 72 MCG capsule capsule, Take 1 capsule by mouth Every Morning Before Breakfast., Disp: , Rfl:   •  losartan (COZAAR) 25 MG tablet, Take 1 tablet by mouth Daily., Disp: , Rfl:   •  Lutein 10 MG tablet, Take  by mouth., Disp: , Rfl:   •  multivitamin with minerals tablet tablet, Take 1 tablet by mouth Daily., Disp: , Rfl:   •  pantoprazole (PROTONIX) 40 MG EC tablet, Take 1 tablet by mouth Every Morning., Disp: 90 tablet, Rfl: 0  •  Patiromer Sorbitex Calcium (Veltassa) 8.4 g pack, Take 1 packet by mouth 1 (One) Time., Disp: , Rfl:   •  Semaglutide,0.25 or 0.5MG/DOS, (Ozempic, 0.25 or 0.5 MG/DOSE,) 2 MG/1.5ML solution pen-injector, Inject  under the skin into the appropriate area as directed 1 (One) Time Per Week., Disp: , Rfl:   •  vitamin B-12 (CYANOCOBALAMIN) 1000 MCG tablet, Take 1 tablet by mouth Daily., Disp: 30 tablet, Rfl: 0  •  hydrALAZINE (APRESOLINE) 25 MG tablet, Take 1 tablet by mouth 3 (Three) Times a Day., Disp: , Rfl:   •  Insulin NPH Isophane & Regular (70-30) 100 UNIT/ML suspension pen-injector, Inject 10 Units under the skin into the appropriate area as directed 2 (Two) Times a Day. (Patient not taking: Reported on 3/14/2023), Disp: 15 mL, Rfl: 0  •  iron polysacch complex-B12-VitC-FA-Succ (Ferrex 150 Forte Plus)  MG capsule capsule, Take  by mouth Daily With Breakfast.  (Patient not taking: Reported on 3/14/2023), Disp: , Rfl:   •  iron polysaccharides (NIFEREX) 150 MG capsule, Take 1 capsule by mouth Daily. (Patient not taking: Reported on 3/14/2023), Disp: 30 capsule, Rfl: 0  •  ondansetron ODT (ZOFRAN-ODT) 4 MG disintegrating tablet, Place 4 mg on the tongue Every 8 (Eight) Hours As Needed for Nausea or Vomiting. (Patient not taking: Reported on 3/14/2023), Disp: , Rfl:   •  sodium chloride 0.65 % nasal spray, 1 spray into the nostril(s) as directed by provider As Needed for Congestion. (Patient not taking: Reported on 3/14/2023), Disp: , Rfl:   •  sodium zirconium cyclosilicate (Lokelma) 10 g pack, Take 10 g by mouth. (Patient not taking: Reported on 3/14/2023), Disp: , Rfl:       Physical Exam:  I have reviewed the patient's current vital signs as documented in the patient's EMR.   Vitals:    03/14/23 1419   BP: 123/68   Pulse: 68   SpO2:      Body mass index is 43.9 kg/m².       03/14/23  1343   Weight: 123 kg (272 lb)      Physical Exam  Constitutional:       General: She is not in acute distress.     Appearance: Normal appearance. She is well-developed and normal weight.   HENT:      Head: Normocephalic and atraumatic.   Eyes:      General: Lids are normal.      Conjunctiva/sclera: Conjunctivae normal.      Pupils: Pupils are equal, round, and reactive to light.   Neck:      Vascular: No carotid bruit or JVD.   Cardiovascular:      Rate and Rhythm: Normal rate and regular rhythm.      Pulses: Normal pulses.      Heart sounds: Normal heart sounds, S1 normal and S2 normal. No murmur heard.  Pulmonary:      Effort: Pulmonary effort is normal. No respiratory distress.      Breath sounds: Wheezing present.   Abdominal:      General: Bowel sounds are normal. There is no distension.      Palpations: Abdomen is soft. There is no hepatomegaly or splenomegaly.      Tenderness: There is no abdominal tenderness.   Musculoskeletal:         General: No swelling. Normal range of motion.       Cervical back: Normal range of motion and neck supple.      Right lower leg: No edema.      Left lower leg: No edema.   Skin:     General: Skin is warm and dry.      Coloration: Skin is not jaundiced.      Findings: No rash.   Neurological:      General: No focal deficit present.      Mental Status: She is alert and oriented to person, place, and time. Mental status is at baseline.   Psychiatric:         Mood and Affect: Mood normal.         Speech: Speech normal.         Behavior: Behavior normal.         Thought Content: Thought content normal.         Judgment: Judgment normal.        DATA REVIEWED:     TTE/BREANNA:  Results for orders placed during the hospital encounter of 09/11/22    Adult Transthoracic Echo Limited W/ Cont if Necessary Per Protocol    Interpretation Summary  · Left ventricular ejection fraction appears to be 61 - 65%. Left ventricular systolic function is normal.  · Left ventricular diastolic function was not assessed.  · The right ventricular cavity is mildly dilated.  · Estimated right ventricular systolic pressure from tricuspid regurgitation is markedly elevated ( 78 mmHg).  · This is a technically limited study.      Laboratory evaluations:    Lab Results   Component Value Date    GLUCOSE 144 (H) 09/28/2022    BUN 15 09/28/2022    CREATININE 1.34 (H) 09/28/2022    BCR 11.2 09/28/2022    K 4.3 09/28/2022    CO2 28.3 09/28/2022    CALCIUM 8.6 09/28/2022    PROTENTOTREF 5.9 (L) 09/16/2022    ALBUMIN 3.00 (L) 09/25/2022    LABIL2 1.0 09/16/2022    AST 10 09/24/2022    ALT 6 09/24/2022     Lab Results   Component Value Date    WBC 7.72 09/27/2022    HGB 9.9 (L) 09/27/2022    HCT 31.9 (L) 09/27/2022    MCV 88.6 09/27/2022     09/27/2022     Lab Results   Component Value Date    CHOL 119 09/12/2022    TRIG 85 09/12/2022    HDL 38 (L) 09/12/2022    LDL 64 09/12/2022     Lab Results   Component Value Date    TSH 1.400 09/12/2022     Lab Results   Component Value Date    HGBA1C 7.60 (H)  09/12/2022     Lab Results   Component Value Date    ALT 6 09/24/2022     Lab Results   Component Value Date    HGBA1C 7.60 (H) 09/12/2022     Lab Results   Component Value Date    MICROALBUR 56.4 09/15/2022    CREATININE 1.34 (H) 09/28/2022     Lab Results   Component Value Date    IRON 27 (L) 09/12/2022    TIBC 258 (L) 09/12/2022    FERRITIN 163.10 (H) 09/12/2022     Lab Results   Component Value Date    INR 1.08 09/22/2022    INR 1.07 09/11/2022    PROTIME 14.2 09/22/2022    PROTIME 14.1 09/11/2022           --------------------------------------------------------------------------------------------------------------------------    ASSESSMENT/PLAN:      Diagnosis Plan   1. ASCVD (arteriosclerotic cardiovascular disease)        2. Mixed hyperlipidemia        3. Essential hypertension        4. Chronic heart failure with preserved ejection fraction (HCC)            1. ASCVD  2. HFpEF  • Denies any chest pain. Appears compensated from heart failure standpoint.  Patient is a nursing home resident for which she gets her lab work checked regularly.  Creatinine noted to be 2 however no recent creatinine to compare this to.  Continue with aspirin, Lipitor, Bumex, Coreg, Jardiance, hydralazine, Imdur and losartan.    3. Hypertension  • Blood pressure controlled.  Continue current management.  Followed by nursing home provider    4. Hyperlipidemia   · Lipid panel in September 2022 showed LDL at goal.  Continue statin.      This document has been @Electronically signed by TUCKER Byrd, 03/14/23, 12:55 PM EDT.       Dictated Utilizing Dragon Dictation: Part of this note may be an electronic transcription/translation of spoken language to printed text using the Dragon Dictation System.    Follow-up appointment and medication changes provided in hand delivered After Visit Summary as well as reviewed in the room.

## 2023-03-23 ENCOUNTER — OFFICE VISIT (OUTPATIENT)
Dept: PULMONOLOGY | Facility: CLINIC | Age: 73
End: 2023-03-23
Payer: MEDICARE

## 2023-03-23 VITALS
SYSTOLIC BLOOD PRESSURE: 146 MMHG | WEIGHT: 293 LBS | OXYGEN SATURATION: 95 % | HEART RATE: 74 BPM | HEIGHT: 66 IN | DIASTOLIC BLOOD PRESSURE: 92 MMHG | TEMPERATURE: 97.3 F | BODY MASS INDEX: 47.09 KG/M2

## 2023-03-23 DIAGNOSIS — E66.01 MORBID OBESITY WITH BMI OF 50.0-59.9, ADULT: ICD-10-CM

## 2023-03-23 DIAGNOSIS — J98.4 RESTRICTIVE LUNG DISEASE: Primary | ICD-10-CM

## 2023-03-23 DIAGNOSIS — J96.11 CHRONIC RESPIRATORY FAILURE WITH HYPOXIA: ICD-10-CM

## 2023-03-23 DIAGNOSIS — G47.33 OSA (OBSTRUCTIVE SLEEP APNEA): ICD-10-CM

## 2023-03-23 PROCEDURE — 3080F DIAST BP >= 90 MM HG: CPT | Performed by: PHYSICIAN ASSISTANT

## 2023-03-23 PROCEDURE — 99214 OFFICE O/P EST MOD 30 MIN: CPT | Performed by: PHYSICIAN ASSISTANT

## 2023-03-23 PROCEDURE — 1160F RVW MEDS BY RX/DR IN RCRD: CPT | Performed by: PHYSICIAN ASSISTANT

## 2023-03-23 PROCEDURE — 1159F MED LIST DOCD IN RCRD: CPT | Performed by: PHYSICIAN ASSISTANT

## 2023-03-23 PROCEDURE — 3077F SYST BP >= 140 MM HG: CPT | Performed by: PHYSICIAN ASSISTANT

## 2023-03-23 RX ORDER — APIXABAN 5 MG/1
TABLET, FILM COATED ORAL
COMMUNITY
Start: 2023-03-20

## 2023-03-23 NOTE — PROGRESS NOTES
Subjective      Chief Complaint  Sleep Apnea    Subjective      History of Present Illness  Jolly Garcia is a 72 y.o. female who presents today to [facility] for JESIKA and chronic hypoxic respiratory failure. This visit is a follow up appointment.     Sleep Apnea:  Patient reports that she feels that her breathing is currently at baseline.  She states that she continues to have to use oxygen when she ambulates but has not been using it at rest. She denies any current cough or wheezing. She has been ordered a home sleep study to be evaluated for sleep apnea but it has not been performed yet. She does state that she has been told that she snores and has woke up gasping for air in the past. She does feel fatigued throughout the day.       Current Outpatient Medications:   •  acetaminophen (TYLENOL) 500 MG tablet, Take 1 tablet by mouth Every 6 (Six) Hours As Needed for Mild Pain., Disp: , Rfl:   •  aspirin 81 MG chewable tablet, Chew 1 tablet Daily., Disp: , Rfl:   •  atorvastatin (LIPITOR) 20 MG tablet, Take 1 tablet by mouth Daily., Disp: , Rfl:   •  bumetanide (BUMEX) 1 MG tablet, Take 1 tablet by mouth Daily., Disp: , Rfl:   •  carvedilol (COREG) 6.25 MG tablet, Take 1 tablet by mouth 2 (Two) Times a Day With Meals., Disp: 180 tablet, Rfl: 0  •  Coenzyme Q10 (CoQ10) 50 MG capsule, Take 1 capsule by mouth., Disp: , Rfl:   •  DULoxetine (CYMBALTA) 30 MG capsule, Take 1 capsule by mouth Daily., Disp: 90 capsule, Rfl: 0  •  Eliquis 5 MG tablet tablet, , Disp: , Rfl:   •  empagliflozin (JARDIANCE) 10 MG tablet tablet, Take  by mouth Daily., Disp: , Rfl:   •  fluticasone (FLONASE) 50 MCG/ACT nasal spray, 1 spray into the nostril(s) as directed by provider Daily., Disp: , Rfl:   •  folic acid (FOLVITE) 1 MG tablet, Take 1 tablet by mouth Daily., Disp: 30 tablet, Rfl: 0  •  hydrALAZINE (APRESOLINE) 25 MG tablet, Take 1 tablet by mouth 3 (Three) Times a Day.,  Disp: , Rfl:   •  hydrALAZINE (APRESOLINE) 25 MG tablet, Take 1 tablet by mouth 3 (Three) Times a Day., Disp: , Rfl:   •  insulin aspart (novoLOG FLEXPEN) 100 UNIT/ML solution pen-injector sc pen, Inject  under the skin into the appropriate area as directed 3 (Three) Times a Day With Meals., Disp: , Rfl:   •  Insulin Glargine (Lantus SoloStar) 100 UNIT/ML injection pen, Inject  under the skin into the appropriate area as directed., Disp: , Rfl:   •  Insulin NPH Isophane & Regular (70-30) 100 UNIT/ML suspension pen-injector, Inject 10 Units under the skin into the appropriate area as directed 2 (Two) Times a Day., Disp: 15 mL, Rfl: 0  •  iron polysacch complex-B12-VitC-FA-Succ (Ferrex 150 Forte Plus)  MG capsule capsule, Take  by mouth Daily With Breakfast., Disp: , Rfl:   •  iron polysaccharides (NIFEREX) 150 MG capsule, Take 1 capsule by mouth Daily., Disp: 30 capsule, Rfl: 0  •  isosorbide mononitrate (IMDUR) 30 MG 24 hr tablet, Take 1 tablet by mouth Daily., Disp: 90 tablet, Rfl: 0  •  latanoprost (XALATAN) 0.005 % ophthalmic solution, 1 drop Every Night., Disp: , Rfl:   •  linaclotide (LINZESS) 72 MCG capsule capsule, Take 1 capsule by mouth Every Morning Before Breakfast., Disp: , Rfl:   •  losartan (COZAAR) 25 MG tablet, Take 1 tablet by mouth Daily., Disp: , Rfl:   •  Lutein 10 MG tablet, Take  by mouth., Disp: , Rfl:   •  multivitamin with minerals tablet tablet, Take 1 tablet by mouth Daily., Disp: , Rfl:   •  ondansetron ODT (ZOFRAN-ODT) 4 MG disintegrating tablet, Place 1 tablet on the tongue Every 8 (Eight) Hours As Needed for Nausea or Vomiting., Disp: , Rfl:   •  pantoprazole (PROTONIX) 40 MG EC tablet, Take 1 tablet by mouth Every Morning., Disp: 90 tablet, Rfl: 0  •  Patiromer Sorbitex Calcium (Veltassa) 8.4 g pack, Take 1 packet by mouth 1 (One) Time., Disp: , Rfl:   •  Semaglutide,0.25 or 0.5MG/DOS, (Ozempic, 0.25 or 0.5 MG/DOSE,) 2 MG/1.5ML solution pen-injector, Inject  under the skin into  "the appropriate area as directed 1 (One) Time Per Week., Disp: , Rfl:   •  sodium chloride 0.65 % nasal spray, 1 spray into the nostril(s) as directed by provider As Needed for Congestion., Disp: , Rfl:   •  sodium zirconium cyclosilicate (Lokelma) 10 g pack, Take 10 g by mouth., Disp: , Rfl:   •  vitamin B-12 (CYANOCOBALAMIN) 1000 MCG tablet, Take 1 tablet by mouth Daily., Disp: 30 tablet, Rfl: 0      No Known Allergies    Objective     Objective   Vital Signs:  /92   Pulse 74   Temp 97.3 °F (36.3 °C) (Temporal)   Ht 167.6 cm (66\")   Wt (!) 142 kg (312 lb)   SpO2 95%   BMI 50.36 kg/m²   Estimated body mass index is 50.36 kg/m² as calculated from the following:    Height as of this encounter: 167.6 cm (66\").    Weight as of this encounter: 142 kg (312 lb).    Past Medical History:   Diagnosis Date   • Anxiety    • CAD (coronary artery disease)     s/p CABG   • Chronic hypoxemic respiratory failure (HCC)     Wears 2 L/min at home   • Essential hypertension    • Hyperlipidemia    • Type 2 diabetes mellitus (HCC)      Past Surgical History:   Procedure Laterality Date   • CORONARY ARTERY BYPASS GRAFT  2011   • HYSTERECTOMY       Social History     Socioeconomic History   • Marital status: Single   Tobacco Use   • Smoking status: Former     Passive exposure: Past   Vaping Use   • Vaping Use: Never used   Substance and Sexual Activity   • Alcohol use: Never   • Drug use: Never   • Sexual activity: Defer        Physical Exam  Constitutional:       General: She is awake.      Appearance: She is obese. She is ill-appearing (chronically).   HENT:      Head: Normocephalic and atraumatic.      Nose: Nose normal.      Mouth/Throat:      Mouth: Mucous membranes are moist.      Pharynx: Oropharynx is clear.   Eyes:      Extraocular Movements: Extraocular movements intact.      Conjunctiva/sclera: Conjunctivae normal.      Pupils: Pupils are equal, round, and reactive to light.   Cardiovascular:      Rate and Rhythm: " Normal rate and regular rhythm.      Pulses: Normal pulses.      Heart sounds: Normal heart sounds. No murmur heard.    No friction rub. No gallop.   Pulmonary:      Effort: Pulmonary effort is normal. No respiratory distress.      Breath sounds: Normal breath sounds. No wheezing, rhonchi or rales.   Musculoskeletal:         General: Normal range of motion.      Cervical back: Normal range of motion.   Skin:     General: Skin is warm and dry.   Neurological:      General: No focal deficit present.      Mental Status: She is alert and oriented to person, place, and time. Mental status is at baseline.   Psychiatric:         Mood and Affect: Mood normal.         Behavior: Behavior normal. Behavior is cooperative.         Thought Content: Thought content normal.            Result Review :  The following labs and radiology results have been reviewed.    Lab Review:   No results found for: FEV1, FVC, AOF0RQS, TLC, DLCO  No visits with results within 3 Month(s) from this visit.   Latest known visit with results is:   Lab Requisition on 12/03/2022   Component Date Value Ref Range Status   • Protein, 24H Urine 12/03/2022 633.2 (H)  0.0 - 150.0 mg/24hours Final   • 24H Urine Volume 12/03/2022 1,350  mL Final   • Time (Hours) 12/03/2022 24  hrs Final      Reviewed CT chest from 01/06/2023    Reviewed chest XR from 09/2022    Reviewed PFT from 01/31/2023    Assessment / Plan         Assessment   Diagnoses and all orders for this visit:    1. Restrictive lung disease (Primary)    2. Chronic respiratory failure with hypoxia (HCC)    3. JESIKA (obstructive sleep apnea)  -     Polysomnography 4 or More Parameters; Future    4. Morbid obesity with BMI of 50.0-59.9, adult (HCC)    PFT revealed restriction and severely reduced diffusion capacity.   Restriction possibly due to body habitus.  Previous chest XR reviewed revealing diminished lung volumes.   CT chest without any acute abnormalities.   May consider ordering HRCT scan in  future.     Compliant with oxygen use as needed and with ambulation.     Will order in-lab sleep study to evaluate for underlying sleep apnea.     Management obstructive sleep apnea also includes lifestyle modifications including weight loss, avoidance of alcohol, sedated, caffeine especially before bedtime, allowing adequate sleep time, and body position during sleep such as side versus back. 10% weight loss can result in a 50% improvement of the apnea-hypopnea index. Untreated sleep apnea can lead to cardiovascular/metabolic disorder, neurocognitive deficit, daytime sleepiness, motor vehicle accidents, depression, mood disorders and reduced quality of life.  Patient understands the risk of untreated obstructive sleep apnea and benefit benefits of the treatment. Recommended at least 4 hours of use per night for 70% of every month (approximately 21 out of 30 days) per CMS guidelines.     Follow Up   Return in about 3 months (around 6/23/2023), or if symptoms worsen or fail to improve, for Next scheduled follow up.    Patient was given instructions and counseling regarding her condition or for health maintenance advice. Please see specific information pulled into the AVS if appropriate.       This document has been electronically signed by Kim Soares PA-C   March 24, 2023 16:09 EDT    Dictated Utilizing Dragon Dictation: Part of this note may be an electronic transcription/translation of spoken language to printed text using the Dragon Dictation System.

## 2023-04-08 ENCOUNTER — LAB REQUISITION (OUTPATIENT)
Dept: LAB | Facility: HOSPITAL | Age: 73
End: 2023-04-08
Payer: MEDICARE

## 2023-04-08 DIAGNOSIS — Z79.899 OTHER LONG TERM (CURRENT) DRUG THERAPY: ICD-10-CM

## 2023-04-08 DIAGNOSIS — E11.8 TYPE 2 DIABETES MELLITUS WITH UNSPECIFIED COMPLICATIONS: ICD-10-CM

## 2023-04-08 LAB
COLLECT DURATION TIME UR: 24 HRS
PROT 24H UR-MRATE: 878.8 MG/24HOURS (ref 0–150)
SPECIMEN VOL 24H UR: 2600 ML

## 2023-04-08 PROCEDURE — 84156 ASSAY OF PROTEIN URINE: CPT | Performed by: INTERNAL MEDICINE

## 2023-04-08 PROCEDURE — 81050 URINALYSIS VOLUME MEASURE: CPT | Performed by: INTERNAL MEDICINE

## 2023-06-01 ENCOUNTER — APPOINTMENT (OUTPATIENT)
Dept: CT IMAGING | Facility: HOSPITAL | Age: 73
End: 2023-06-01
Payer: MEDICARE

## 2023-06-01 ENCOUNTER — HOSPITAL ENCOUNTER (INPATIENT)
Facility: HOSPITAL | Age: 73
LOS: 5 days | Discharge: NURSING, REHAB OR LONG-TERM CARE | End: 2023-06-06
Attending: EMERGENCY MEDICINE | Admitting: FAMILY MEDICINE
Payer: MEDICARE

## 2023-06-01 ENCOUNTER — APPOINTMENT (OUTPATIENT)
Dept: GENERAL RADIOLOGY | Facility: HOSPITAL | Age: 73
End: 2023-06-01
Payer: MEDICARE

## 2023-06-01 DIAGNOSIS — N18.4 STAGE 4 CHRONIC KIDNEY DISEASE: ICD-10-CM

## 2023-06-01 DIAGNOSIS — R91.1 LUNG NODULE SEEN ON IMAGING STUDY: ICD-10-CM

## 2023-06-01 DIAGNOSIS — R09.02 HYPOXEMIA: ICD-10-CM

## 2023-06-01 DIAGNOSIS — J44.1 COPD WITH ACUTE EXACERBATION: ICD-10-CM

## 2023-06-01 DIAGNOSIS — J18.9 PNEUMONIA OF RIGHT LOWER LOBE DUE TO INFECTIOUS ORGANISM: Primary | ICD-10-CM

## 2023-06-01 PROBLEM — E87.1 HYPONATREMIA: Status: ACTIVE | Noted: 2023-06-01

## 2023-06-01 PROBLEM — N17.9 ACUTE KIDNEY INJURY: Status: ACTIVE | Noted: 2023-06-01

## 2023-06-01 LAB
A-A DO2: 62.1 MMHG (ref 0–300)
ALBUMIN SERPL-MCNC: 3.6 G/DL (ref 3.5–5.2)
ALBUMIN/GLOB SERPL: 1 G/DL
ALP SERPL-CCNC: 127 U/L (ref 39–117)
ALT SERPL W P-5'-P-CCNC: 11 U/L (ref 1–33)
ANION GAP SERPL CALCULATED.3IONS-SCNC: 11.3 MMOL/L (ref 5–15)
APTT PPP: 32 SECONDS (ref 26.5–34.5)
ARTERIAL PATENCY WRIST A: ABNORMAL
AST SERPL-CCNC: 14 U/L (ref 1–32)
ATMOSPHERIC PRESS: 729 MMHG
BACTERIA UR QL AUTO: NORMAL /HPF
BASE EXCESS BLDA CALC-SCNC: -0.6 MMOL/L (ref 0–2)
BASOPHILS # BLD AUTO: 0.06 10*3/MM3 (ref 0–0.2)
BASOPHILS NFR BLD AUTO: 0.3 % (ref 0–1.5)
BDY SITE: ABNORMAL
BILIRUB SERPL-MCNC: 0.6 MG/DL (ref 0–1.2)
BILIRUB UR QL STRIP: NEGATIVE
BUN SERPL-MCNC: 53 MG/DL (ref 8–23)
BUN/CREAT SERPL: 20.3 (ref 7–25)
CALCIUM SPEC-SCNC: 8.9 MG/DL (ref 8.6–10.5)
CHLORIDE SERPL-SCNC: 92 MMOL/L (ref 98–107)
CLARITY UR: CLEAR
CO2 BLDA-SCNC: 23.6 MMOL/L (ref 22–33)
CO2 SERPL-SCNC: 22.7 MMOL/L (ref 22–29)
COHGB MFR BLD: 1.8 % (ref 0–5)
COLOR UR: YELLOW
CREAT SERPL-MCNC: 2.61 MG/DL (ref 0.57–1)
CRP SERPL-MCNC: 1.48 MG/DL (ref 0–0.5)
D DIMER PPP FEU-MCNC: 0.61 MCGFEU/ML (ref 0–0.72)
D-LACTATE SERPL-SCNC: 1.6 MMOL/L (ref 0.5–2)
DEPRECATED RDW RBC AUTO: 46.5 FL (ref 37–54)
EGFRCR SERPLBLD CKD-EPI 2021: 19 ML/MIN/1.73
EOSINOPHIL # BLD AUTO: 0.02 10*3/MM3 (ref 0–0.4)
EOSINOPHIL NFR BLD AUTO: 0.1 % (ref 0.3–6.2)
ERYTHROCYTE [DISTWIDTH] IN BLOOD BY AUTOMATED COUNT: 16 % (ref 12.3–15.4)
ERYTHROCYTE [SEDIMENTATION RATE] IN BLOOD: 60 MM/HR (ref 0–30)
FLUAV RNA RESP QL NAA+PROBE: NOT DETECTED
FLUBV RNA ISLT QL NAA+PROBE: NOT DETECTED
GEN 5 2HR TROPONIN T REFLEX: 36 NG/L
GLOBULIN UR ELPH-MCNC: 3.6 GM/DL
GLUCOSE BLDC GLUCOMTR-MCNC: 137 MG/DL (ref 70–130)
GLUCOSE BLDC GLUCOMTR-MCNC: 190 MG/DL (ref 70–130)
GLUCOSE BLDC GLUCOMTR-MCNC: 202 MG/DL (ref 70–130)
GLUCOSE SERPL-MCNC: 114 MG/DL (ref 65–99)
GLUCOSE UR STRIP-MCNC: NEGATIVE MG/DL
HBA1C MFR BLD: 7.4 % (ref 4.8–5.6)
HCO3 BLDA-SCNC: 22.6 MMOL/L (ref 20–26)
HCT VFR BLD AUTO: 31 % (ref 34–46.6)
HCT VFR BLD CALC: 29.7 % (ref 38–51)
HGB BLD-MCNC: 9.5 G/DL (ref 12–15.9)
HGB BLDA-MCNC: 9.7 G/DL (ref 13.5–17.5)
HGB UR QL STRIP.AUTO: NEGATIVE
HOLD SPECIMEN: NORMAL
HOLD SPECIMEN: NORMAL
HYALINE CASTS UR QL AUTO: NORMAL /LPF
IMM GRANULOCYTES # BLD AUTO: 0.11 10*3/MM3 (ref 0–0.05)
IMM GRANULOCYTES NFR BLD AUTO: 0.6 % (ref 0–0.5)
INHALED O2 CONCENTRATION: 21 %
INR PPP: 1.31 (ref 0.9–1.1)
KETONES UR QL STRIP: NEGATIVE
L PNEUMO1 AG UR QL IA: NEGATIVE
LEUKOCYTE ESTERASE UR QL STRIP.AUTO: NEGATIVE
LYMPHOCYTES # BLD AUTO: 0.83 10*3/MM3 (ref 0.7–3.1)
LYMPHOCYTES NFR BLD AUTO: 4.8 % (ref 19.6–45.3)
Lab: ABNORMAL
Lab: ABNORMAL
MCH RBC QN AUTO: 24.9 PG (ref 26.6–33)
MCHC RBC AUTO-ENTMCNC: 30.6 G/DL (ref 31.5–35.7)
MCV RBC AUTO: 81.2 FL (ref 79–97)
METHGB BLD QL: 0.1 % (ref 0–3)
MODALITY: ABNORMAL
MONOCYTES # BLD AUTO: 0.97 10*3/MM3 (ref 0.1–0.9)
MONOCYTES NFR BLD AUTO: 5.6 % (ref 5–12)
NEUTROPHILS NFR BLD AUTO: 15.26 10*3/MM3 (ref 1.7–7)
NEUTROPHILS NFR BLD AUTO: 88.6 % (ref 42.7–76)
NITRITE UR QL STRIP: NEGATIVE
NOTE: ABNORMAL
NOTIFIED BY: ABNORMAL
NOTIFIED WHO: ABNORMAL
NRBC BLD AUTO-RTO: 0 /100 WBC (ref 0–0.2)
NT-PROBNP SERPL-MCNC: 8240 PG/ML (ref 0–900)
OXYHGB MFR BLDV: 83.1 % (ref 94–99)
PCO2 BLDA: 31.1 MM HG (ref 35–45)
PCO2 TEMP ADJ BLD: ABNORMAL MM[HG]
PH BLDA: 7.47 PH UNITS (ref 7.35–7.45)
PH UR STRIP.AUTO: 6.5 [PH] (ref 5–8)
PH, TEMP CORRECTED: ABNORMAL
PLATELET # BLD AUTO: 500 10*3/MM3 (ref 140–450)
PMV BLD AUTO: 8 FL (ref 6–12)
PO2 BLDA: 46 MM HG (ref 83–108)
PO2 TEMP ADJ BLD: ABNORMAL MM[HG]
POTASSIUM SERPL-SCNC: 4.8 MMOL/L (ref 3.5–5.2)
PROCALCITONIN SERPL-MCNC: 0.05 NG/ML (ref 0–0.25)
PROT SERPL-MCNC: 7.2 G/DL (ref 6–8.5)
PROT UR QL STRIP: ABNORMAL
PROTHROMBIN TIME: 16.9 SECONDS (ref 12.1–14.7)
QT INTERVAL: 394 MS
QTC INTERVAL: 471 MS
RBC # BLD AUTO: 3.82 10*6/MM3 (ref 3.77–5.28)
RBC # UR STRIP: NORMAL /HPF
REF LAB TEST METHOD: NORMAL
SAO2 % BLDCOA: 84.7 % (ref 94–99)
SARS-COV-2 RNA RESP QL NAA+PROBE: NOT DETECTED
SODIUM SERPL-SCNC: 126 MMOL/L (ref 136–145)
SODIUM UR-SCNC: 33 MMOL/L
SP GR UR STRIP: 1.01 (ref 1–1.03)
SQUAMOUS #/AREA URNS HPF: NORMAL /HPF
TROPONIN T DELTA: -8 NG/L
TROPONIN T SERPL HS-MCNC: 44 NG/L
UROBILINOGEN UR QL STRIP: ABNORMAL
VENTILATOR MODE: ABNORMAL
WBC # UR STRIP: NORMAL /HPF
WBC NRBC COR # BLD: 17.25 10*3/MM3 (ref 3.4–10.8)
WHOLE BLOOD HOLD SPECIMEN: NORMAL

## 2023-06-01 PROCEDURE — 84300 ASSAY OF URINE SODIUM: CPT

## 2023-06-01 PROCEDURE — 80053 COMPREHEN METABOLIC PANEL: CPT | Performed by: PHYSICIAN ASSISTANT

## 2023-06-01 PROCEDURE — 85730 THROMBOPLASTIN TIME PARTIAL: CPT | Performed by: PHYSICIAN ASSISTANT

## 2023-06-01 PROCEDURE — 94761 N-INVAS EAR/PLS OXIMETRY MLT: CPT

## 2023-06-01 PROCEDURE — 83036 HEMOGLOBIN GLYCOSYLATED A1C: CPT

## 2023-06-01 PROCEDURE — 85610 PROTHROMBIN TIME: CPT | Performed by: PHYSICIAN ASSISTANT

## 2023-06-01 PROCEDURE — 99223 1ST HOSP IP/OBS HIGH 75: CPT

## 2023-06-01 PROCEDURE — 85379 FIBRIN DEGRADATION QUANT: CPT | Performed by: PHYSICIAN ASSISTANT

## 2023-06-01 PROCEDURE — 74176 CT ABD & PELVIS W/O CONTRAST: CPT | Performed by: RADIOLOGY

## 2023-06-01 PROCEDURE — 36415 COLL VENOUS BLD VENIPUNCTURE: CPT

## 2023-06-01 PROCEDURE — 83050 HGB METHEMOGLOBIN QUAN: CPT

## 2023-06-01 PROCEDURE — 74176 CT ABD & PELVIS W/O CONTRAST: CPT

## 2023-06-01 PROCEDURE — 36600 WITHDRAWAL OF ARTERIAL BLOOD: CPT

## 2023-06-01 PROCEDURE — 83605 ASSAY OF LACTIC ACID: CPT | Performed by: PHYSICIAN ASSISTANT

## 2023-06-01 PROCEDURE — 99285 EMERGENCY DEPT VISIT HI MDM: CPT

## 2023-06-01 PROCEDURE — 82805 BLOOD GASES W/O2 SATURATION: CPT

## 2023-06-01 PROCEDURE — 87636 SARSCOV2 & INF A&B AMP PRB: CPT | Performed by: PHYSICIAN ASSISTANT

## 2023-06-01 PROCEDURE — 71045 X-RAY EXAM CHEST 1 VIEW: CPT

## 2023-06-01 PROCEDURE — 85025 COMPLETE CBC W/AUTO DIFF WBC: CPT | Performed by: PHYSICIAN ASSISTANT

## 2023-06-01 PROCEDURE — 25010000002 METHYLPREDNISOLONE PER 125 MG: Performed by: PHYSICIAN ASSISTANT

## 2023-06-01 PROCEDURE — 63710000001 INSULIN GLARGINE PER 5 UNITS

## 2023-06-01 PROCEDURE — 81001 URINALYSIS AUTO W/SCOPE: CPT | Performed by: PHYSICIAN ASSISTANT

## 2023-06-01 PROCEDURE — 84484 ASSAY OF TROPONIN QUANT: CPT | Performed by: PHYSICIAN ASSISTANT

## 2023-06-01 PROCEDURE — 83935 ASSAY OF URINE OSMOLALITY: CPT

## 2023-06-01 PROCEDURE — 86140 C-REACTIVE PROTEIN: CPT | Performed by: PHYSICIAN ASSISTANT

## 2023-06-01 PROCEDURE — 25010000002 CEFTRIAXONE PER 250 MG: Performed by: PHYSICIAN ASSISTANT

## 2023-06-01 PROCEDURE — 94640 AIRWAY INHALATION TREATMENT: CPT

## 2023-06-01 PROCEDURE — 93005 ELECTROCARDIOGRAM TRACING: CPT | Performed by: PHYSICIAN ASSISTANT

## 2023-06-01 PROCEDURE — 94799 UNLISTED PULMONARY SVC/PX: CPT

## 2023-06-01 PROCEDURE — 82375 ASSAY CARBOXYHB QUANT: CPT

## 2023-06-01 PROCEDURE — 71045 X-RAY EXAM CHEST 1 VIEW: CPT | Performed by: RADIOLOGY

## 2023-06-01 PROCEDURE — 63710000001 INSULIN LISPRO (HUMAN) PER 5 UNITS

## 2023-06-01 PROCEDURE — 83880 ASSAY OF NATRIURETIC PEPTIDE: CPT | Performed by: PHYSICIAN ASSISTANT

## 2023-06-01 PROCEDURE — 71250 CT THORAX DX C-: CPT | Performed by: RADIOLOGY

## 2023-06-01 PROCEDURE — 87449 NOS EACH ORGANISM AG IA: CPT

## 2023-06-01 PROCEDURE — 87040 BLOOD CULTURE FOR BACTERIA: CPT | Performed by: PHYSICIAN ASSISTANT

## 2023-06-01 PROCEDURE — 84145 PROCALCITONIN (PCT): CPT | Performed by: PHYSICIAN ASSISTANT

## 2023-06-01 PROCEDURE — 71250 CT THORAX DX C-: CPT

## 2023-06-01 PROCEDURE — 85652 RBC SED RATE AUTOMATED: CPT | Performed by: PHYSICIAN ASSISTANT

## 2023-06-01 PROCEDURE — 87899 AGENT NOS ASSAY W/OPTIC: CPT

## 2023-06-01 PROCEDURE — 82948 REAGENT STRIP/BLOOD GLUCOSE: CPT

## 2023-06-01 RX ORDER — SODIUM CHLORIDE 9 MG/ML
40 INJECTION, SOLUTION INTRAVENOUS AS NEEDED
Status: DISCONTINUED | OUTPATIENT
Start: 2023-06-01 | End: 2023-06-06 | Stop reason: HOSPADM

## 2023-06-01 RX ORDER — IPRATROPIUM BROMIDE AND ALBUTEROL SULFATE 2.5; .5 MG/3ML; MG/3ML
3 SOLUTION RESPIRATORY (INHALATION) ONCE
Status: COMPLETED | OUTPATIENT
Start: 2023-06-01 | End: 2023-06-01

## 2023-06-01 RX ORDER — PANTOPRAZOLE SODIUM 40 MG/1
40 TABLET, DELAYED RELEASE ORAL
Status: DISCONTINUED | OUTPATIENT
Start: 2023-06-01 | End: 2023-06-06 | Stop reason: HOSPADM

## 2023-06-01 RX ORDER — ASPIRIN 81 MG/1
81 TABLET, CHEWABLE ORAL DAILY
Status: DISCONTINUED | OUTPATIENT
Start: 2023-06-01 | End: 2023-06-06 | Stop reason: HOSPADM

## 2023-06-01 RX ORDER — SPIRONOLACTONE 25 MG/1
25 TABLET ORAL DAILY
Status: CANCELLED | OUTPATIENT
Start: 2023-06-01

## 2023-06-01 RX ORDER — ONDANSETRON 2 MG/ML
4 INJECTION INTRAMUSCULAR; INTRAVENOUS EVERY 6 HOURS PRN
Status: DISCONTINUED | OUTPATIENT
Start: 2023-06-01 | End: 2023-06-02

## 2023-06-01 RX ORDER — GUAIFENESIN 600 MG/1
1200 TABLET, EXTENDED RELEASE ORAL EVERY 12 HOURS SCHEDULED
Status: DISCONTINUED | OUTPATIENT
Start: 2023-06-01 | End: 2023-06-06 | Stop reason: HOSPADM

## 2023-06-01 RX ORDER — ONDANSETRON 4 MG/1
4 TABLET, ORALLY DISINTEGRATING ORAL EVERY 6 HOURS PRN
Status: DISCONTINUED | OUTPATIENT
Start: 2023-06-01 | End: 2023-06-06 | Stop reason: HOSPADM

## 2023-06-01 RX ORDER — ATORVASTATIN CALCIUM 20 MG/1
20 TABLET, FILM COATED ORAL NIGHTLY
Status: DISCONTINUED | OUTPATIENT
Start: 2023-06-01 | End: 2023-06-06 | Stop reason: HOSPADM

## 2023-06-01 RX ORDER — HYDRALAZINE HYDROCHLORIDE 25 MG/1
25 TABLET, FILM COATED ORAL 3 TIMES DAILY
Status: DISCONTINUED | OUTPATIENT
Start: 2023-06-01 | End: 2023-06-06 | Stop reason: HOSPADM

## 2023-06-01 RX ORDER — AMOXICILLIN 250 MG
2 CAPSULE ORAL 2 TIMES DAILY
Status: DISCONTINUED | OUTPATIENT
Start: 2023-06-01 | End: 2023-06-06 | Stop reason: HOSPADM

## 2023-06-01 RX ORDER — ECHINACEA PURPUREA EXTRACT 125 MG
2 TABLET ORAL
Status: DISCONTINUED | OUTPATIENT
Start: 2023-06-01 | End: 2023-06-06 | Stop reason: HOSPADM

## 2023-06-01 RX ORDER — INSULIN LISPRO 100 [IU]/ML
1-200 INJECTION, SOLUTION INTRAVENOUS; SUBCUTANEOUS AS NEEDED
Status: DISCONTINUED | OUTPATIENT
Start: 2023-06-01 | End: 2023-06-06 | Stop reason: HOSPADM

## 2023-06-01 RX ORDER — BISACODYL 5 MG/1
5 TABLET, DELAYED RELEASE ORAL DAILY PRN
Status: DISCONTINUED | OUTPATIENT
Start: 2023-06-01 | End: 2023-06-06 | Stop reason: HOSPADM

## 2023-06-01 RX ORDER — SEMAGLUTIDE 1.34 MG/ML
1 INJECTION, SOLUTION SUBCUTANEOUS WEEKLY
COMMUNITY

## 2023-06-01 RX ORDER — SODIUM CHLORIDE 0.9 % (FLUSH) 0.9 %
10 SYRINGE (ML) INJECTION AS NEEDED
Status: DISCONTINUED | OUTPATIENT
Start: 2023-06-01 | End: 2023-06-06 | Stop reason: HOSPADM

## 2023-06-01 RX ORDER — GLUCAGON 1 MG/ML
1 KIT INJECTION
Status: DISCONTINUED | OUTPATIENT
Start: 2023-06-01 | End: 2023-06-06 | Stop reason: HOSPADM

## 2023-06-01 RX ORDER — FLUOXETINE 10 MG/1
10 CAPSULE ORAL DAILY
Status: DISCONTINUED | OUTPATIENT
Start: 2023-06-01 | End: 2023-06-06 | Stop reason: HOSPADM

## 2023-06-01 RX ORDER — GUAIFENESIN/DEXTROMETHORPHAN 100-10MG/5
5 SYRUP ORAL EVERY 4 HOURS PRN
Status: DISCONTINUED | OUTPATIENT
Start: 2023-06-01 | End: 2023-06-06 | Stop reason: HOSPADM

## 2023-06-01 RX ORDER — METOPROLOL TARTRATE 50 MG/1
50 TABLET, FILM COATED ORAL 2 TIMES DAILY
COMMUNITY

## 2023-06-01 RX ORDER — FLUOXETINE 10 MG/1
10 CAPSULE ORAL DAILY
COMMUNITY

## 2023-06-01 RX ORDER — DIPHENHYDRAMINE HCL 25 MG
25 CAPSULE ORAL EVERY 8 HOURS PRN
COMMUNITY

## 2023-06-01 RX ORDER — LANOLIN ALCOHOL/MO/W.PET/CERES
1000 CREAM (GRAM) TOPICAL DAILY
Status: DISCONTINUED | OUTPATIENT
Start: 2023-06-01 | End: 2023-06-06 | Stop reason: HOSPADM

## 2023-06-01 RX ORDER — ONDANSETRON 4 MG/1
4 TABLET, FILM COATED ORAL EVERY 6 HOURS PRN
COMMUNITY

## 2023-06-01 RX ORDER — DIPHENHYDRAMINE HCL 25 MG
25 CAPSULE ORAL EVERY 8 HOURS PRN
Status: CANCELLED | OUTPATIENT
Start: 2023-06-01

## 2023-06-01 RX ORDER — HEPARIN SODIUM 5000 [USP'U]/ML
5000 INJECTION, SOLUTION INTRAVENOUS; SUBCUTANEOUS EVERY 12 HOURS SCHEDULED
Status: CANCELLED | OUTPATIENT
Start: 2023-06-01

## 2023-06-01 RX ORDER — SPIRONOLACTONE 25 MG/1
25 TABLET ORAL DAILY
COMMUNITY
End: 2023-06-06 | Stop reason: HOSPADM

## 2023-06-01 RX ORDER — LANOLIN ALCOHOL/MO/W.PET/CERES
3 CREAM (GRAM) TOPICAL NIGHTLY PRN
COMMUNITY

## 2023-06-01 RX ORDER — BUMETANIDE 1 MG/1
0.5 TABLET ORAL DAILY
Status: CANCELLED | OUTPATIENT
Start: 2023-06-01

## 2023-06-01 RX ORDER — IPRATROPIUM BROMIDE AND ALBUTEROL SULFATE 2.5; .5 MG/3ML; MG/3ML
3 SOLUTION RESPIRATORY (INHALATION)
Status: DISCONTINUED | OUTPATIENT
Start: 2023-06-01 | End: 2023-06-04

## 2023-06-01 RX ORDER — ONDANSETRON 4 MG/1
4 TABLET, ORALLY DISINTEGRATING ORAL EVERY 6 HOURS PRN
Status: ON HOLD | COMMUNITY
End: 2023-06-01

## 2023-06-01 RX ORDER — SODIUM CHLORIDE 9 MG/ML
100 INJECTION, SOLUTION INTRAVENOUS CONTINUOUS
Status: DISCONTINUED | OUTPATIENT
Start: 2023-06-01 | End: 2023-06-03

## 2023-06-01 RX ORDER — MULTIPLE VITAMINS W/ MINERALS TAB 9MG-400MCG
1 TAB ORAL DAILY
COMMUNITY

## 2023-06-01 RX ORDER — INSULIN LISPRO 100 [IU]/ML
1-200 INJECTION, SOLUTION INTRAVENOUS; SUBCUTANEOUS
Status: DISCONTINUED | OUTPATIENT
Start: 2023-06-01 | End: 2023-06-06 | Stop reason: HOSPADM

## 2023-06-01 RX ORDER — ACETAMINOPHEN 500 MG
1000 TABLET ORAL EVERY 8 HOURS PRN
Status: DISCONTINUED | OUTPATIENT
Start: 2023-06-01 | End: 2023-06-06 | Stop reason: HOSPADM

## 2023-06-01 RX ORDER — LANOLIN ALCOHOL/MO/W.PET/CERES
3 CREAM (GRAM) TOPICAL NIGHTLY PRN
Status: CANCELLED | OUTPATIENT
Start: 2023-06-01

## 2023-06-01 RX ORDER — POLYETHYLENE GLYCOL 3350 17 G/17G
17 POWDER, FOR SOLUTION ORAL DAILY PRN
Status: DISCONTINUED | OUTPATIENT
Start: 2023-06-01 | End: 2023-06-06 | Stop reason: HOSPADM

## 2023-06-01 RX ORDER — BUMETANIDE 0.5 MG/1
0.5 TABLET ORAL DAILY
COMMUNITY

## 2023-06-01 RX ORDER — LATANOPROST 50 UG/ML
1 SOLUTION/ DROPS OPHTHALMIC NIGHTLY
Status: DISCONTINUED | OUTPATIENT
Start: 2023-06-01 | End: 2023-06-06 | Stop reason: HOSPADM

## 2023-06-01 RX ORDER — ACETAMINOPHEN 325 MG/1
650 TABLET ORAL EVERY 4 HOURS PRN
Status: DISCONTINUED | OUTPATIENT
Start: 2023-06-01 | End: 2023-06-01 | Stop reason: SDUPTHER

## 2023-06-01 RX ORDER — NICOTINE POLACRILEX 4 MG
15 LOZENGE BUCCAL
Status: DISCONTINUED | OUTPATIENT
Start: 2023-06-01 | End: 2023-06-06 | Stop reason: HOSPADM

## 2023-06-01 RX ORDER — LOSARTAN POTASSIUM 25 MG/1
25 TABLET ORAL DAILY
Status: CANCELLED | OUTPATIENT
Start: 2023-06-01

## 2023-06-01 RX ORDER — BISACODYL 10 MG
10 SUPPOSITORY, RECTAL RECTAL DAILY PRN
Status: DISCONTINUED | OUTPATIENT
Start: 2023-06-01 | End: 2023-06-06 | Stop reason: HOSPADM

## 2023-06-01 RX ORDER — ISOSORBIDE MONONITRATE 30 MG/1
30 TABLET, EXTENDED RELEASE ORAL DAILY
Status: DISCONTINUED | OUTPATIENT
Start: 2023-06-01 | End: 2023-06-06 | Stop reason: HOSPADM

## 2023-06-01 RX ORDER — DEXTROSE MONOHYDRATE 25 G/50ML
10-50 INJECTION, SOLUTION INTRAVENOUS
Status: DISCONTINUED | OUTPATIENT
Start: 2023-06-01 | End: 2023-06-06 | Stop reason: HOSPADM

## 2023-06-01 RX ORDER — METHYLPREDNISOLONE SODIUM SUCCINATE 125 MG/2ML
80 INJECTION, POWDER, LYOPHILIZED, FOR SOLUTION INTRAMUSCULAR; INTRAVENOUS ONCE
Status: COMPLETED | OUTPATIENT
Start: 2023-06-01 | End: 2023-06-01

## 2023-06-01 RX ORDER — GUAIFENESIN AND DEXTROMETHORPHAN HYDROBROMIDE 100; 10 MG/5ML; MG/5ML
5 SOLUTION ORAL EVERY 4 HOURS PRN
Status: CANCELLED | OUTPATIENT
Start: 2023-06-01

## 2023-06-01 RX ORDER — NITROGLYCERIN 0.4 MG/1
0.4 TABLET SUBLINGUAL
Status: DISCONTINUED | OUTPATIENT
Start: 2023-06-01 | End: 2023-06-06 | Stop reason: HOSPADM

## 2023-06-01 RX ORDER — SODIUM CHLORIDE 0.9 % (FLUSH) 0.9 %
10 SYRINGE (ML) INJECTION EVERY 12 HOURS SCHEDULED
Status: DISCONTINUED | OUTPATIENT
Start: 2023-06-01 | End: 2023-06-06 | Stop reason: HOSPADM

## 2023-06-01 RX ORDER — METOPROLOL TARTRATE 50 MG/1
50 TABLET, FILM COATED ORAL 2 TIMES DAILY
Status: DISCONTINUED | OUTPATIENT
Start: 2023-06-01 | End: 2023-06-06 | Stop reason: HOSPADM

## 2023-06-01 RX ORDER — GUAIFENESIN AND DEXTROMETHORPHAN HYDROBROMIDE 100; 10 MG/5ML; MG/5ML
5 SOLUTION ORAL EVERY 4 HOURS PRN
COMMUNITY

## 2023-06-01 RX ORDER — MULTIPLE VITAMINS W/ MINERALS TAB 9MG-400MCG
1 TAB ORAL DAILY
Status: DISCONTINUED | OUTPATIENT
Start: 2023-06-01 | End: 2023-06-06 | Stop reason: HOSPADM

## 2023-06-01 RX ORDER — CHOLECALCIFEROL (VITAMIN D3) 125 MCG
5 CAPSULE ORAL NIGHTLY PRN
Status: DISCONTINUED | OUTPATIENT
Start: 2023-06-01 | End: 2023-06-06 | Stop reason: HOSPADM

## 2023-06-01 RX ADMIN — ASPIRIN 81 MG: 81 TABLET, CHEWABLE ORAL at 18:53

## 2023-06-01 RX ADMIN — SALINE NASAL SPRAY 2 SPRAY: 1.5 SOLUTION NASAL at 18:53

## 2023-06-01 RX ADMIN — ISOSORBIDE MONONITRATE 30 MG: 30 TABLET, EXTENDED RELEASE ORAL at 18:53

## 2023-06-01 RX ADMIN — DOCUSATE SODIUM 50 MG AND SENNOSIDES 8.6 MG 2 TABLET: 8.6; 5 TABLET, FILM COATED ORAL at 20:46

## 2023-06-01 RX ADMIN — GUAIFENESIN 1200 MG: 600 TABLET, EXTENDED RELEASE ORAL at 20:46

## 2023-06-01 RX ADMIN — Medication 1 TABLET: at 20:50

## 2023-06-01 RX ADMIN — DOXYCYCLINE 100 MG: 100 INJECTION, POWDER, LYOPHILIZED, FOR SOLUTION INTRAVENOUS at 10:50

## 2023-06-01 RX ADMIN — SODIUM CHLORIDE 2 G: 9 INJECTION, SOLUTION INTRAVENOUS at 10:50

## 2023-06-01 RX ADMIN — PANTOPRAZOLE SODIUM 40 MG: 40 TABLET, DELAYED RELEASE ORAL at 18:53

## 2023-06-01 RX ADMIN — SALINE NASAL SPRAY 2 SPRAY: 1.5 SOLUTION NASAL at 20:46

## 2023-06-01 RX ADMIN — IPRATROPIUM BROMIDE AND ALBUTEROL SULFATE 3 ML: 2.5; .5 SOLUTION RESPIRATORY (INHALATION) at 10:26

## 2023-06-01 RX ADMIN — APIXABAN 5 MG: 5 TABLET, FILM COATED ORAL at 20:46

## 2023-06-01 RX ADMIN — FLUOXETINE HYDROCHLORIDE 10 MG: 10 CAPSULE ORAL at 18:53

## 2023-06-01 RX ADMIN — Medication 1000 MCG: at 18:53

## 2023-06-01 RX ADMIN — IPRATROPIUM BROMIDE AND ALBUTEROL SULFATE 3 ML: 2.5; .5 SOLUTION RESPIRATORY (INHALATION) at 18:58

## 2023-06-01 RX ADMIN — INSULIN GLARGINE 35 UNITS: 100 INJECTION, SOLUTION SUBCUTANEOUS at 20:44

## 2023-06-01 RX ADMIN — LATANOPROST 1 DROP: 50 SOLUTION OPHTHALMIC at 20:46

## 2023-06-01 RX ADMIN — METHYLPREDNISOLONE SODIUM SUCCINATE 80 MG: 125 INJECTION, POWDER, FOR SOLUTION INTRAMUSCULAR; INTRAVENOUS at 10:32

## 2023-06-01 RX ADMIN — INSULIN LISPRO 1 UNITS: 100 INJECTION, SOLUTION INTRAVENOUS; SUBCUTANEOUS at 20:44

## 2023-06-01 RX ADMIN — ATORVASTATIN CALCIUM 20 MG: 20 TABLET, FILM COATED ORAL at 20:46

## 2023-06-01 RX ADMIN — METOPROLOL TARTRATE 50 MG: 50 TABLET, FILM COATED ORAL at 20:46

## 2023-06-01 RX ADMIN — DOXYCYCLINE 100 MG: 100 INJECTION, POWDER, LYOPHILIZED, FOR SOLUTION INTRAVENOUS at 22:00

## 2023-06-01 RX ADMIN — SODIUM CHLORIDE 100 ML/HR: 9 INJECTION, SOLUTION INTRAVENOUS at 19:05

## 2023-06-01 RX ADMIN — SALINE NASAL SPRAY 2 SPRAY: 1.5 SOLUTION NASAL at 22:00

## 2023-06-01 RX ADMIN — HYDRALAZINE HYDROCHLORIDE 25 MG: 25 TABLET, FILM COATED ORAL at 20:50

## 2023-06-01 NOTE — H&P
[facility]  History & Physical    Patient Identification:  Name:  Jolly Garcia  Age:  72 y.o.  Sex:  female  :  1950  MRN:  [id number]   Visit Number:  [id number]  Admit Date: 2023   Primary Care Physician:  April Cantrell APRN    Subjective     Chief complaint: Shortness of Breath, LUQ Abdominal Pain    History of presenting illness:      Jolly Garcia is a 72 y.o. female with past medical history significant for CAD status post CABG, chronic HFpEF, type 2 diabetes mellitus, essential hypertension, hyperlipidemia, paroxysmal atrial fibrillation, chronic anticoagulation with Eliquis, obstructive sleep apnea, obesity by BMI, chronic hypoxic respiratory failure on 2 L/min at baseline, and anxiety.  Patient presents to [facility] emergency department today from [facility] for further evaluation of shortness of breath and left upper quadrant abdominal pain which started 2-3 days ago. She states that she has had a nonproductive cough, mild sore throat, congestion, chills, and fatigue. Denies fever. She states that she typically has to sleep in a recliner at night due to orthopnea. Denies recent increase in peripheral edema or unintentional weight gain or loss. Patient states that she previously was on 2L supplemental O2 continuously, however she was taken off and recently has not required it. She was on room air prior to arrival to our ED. Patient states that she has been in skilled nursing facility due to physical debility and wound of the coccyx. She says that she was no longer able to take care of herself. Denies difficulty swallowing or recent choking episodes. Does not typically require modified diet. Regarding LUQ abdominal pain, patient states that pain is worse with coughing, deep breathing, and movement. Abdomen is non-tender to palpation. Denies nausea, vomiting, or diarrhea. Bowel sounds are hypoactive x4  quadrants. CT of the abdomen/pelvis noted cholelithiasis and GB distention similar to prior study. Also noted stranding around pelvic wall. UA was negative for UTI. There was moderate stool burden. No evidence of appendicitis. Pancreas unremarkable. No mass or ductal dilatation. Patient states that she has not been eating or drinking much the last few days due to feeling sick. She says that she has also not been walking. Instead, patient says that she has mostly slept and stayed in the bed. Usually uses walker for ambulation. She exhibits mild generalized weakness. No focal deficits. She does have WENDY on CKD IIIb. Reports that she follows with nephrology in the outpatient setting. She denies any difficulty urinating. No dysuria symptoms. CT showed no evidence of hydronephrosis or obstructive uropathy. Sodium level also decreased more than usual. Likely due to poor oral intake. Patient is alert and oriented x4. Follows commands appropriately. Appears ill but it no acute distress. Discussed plan for admission with patient. She voiced agreement with no further questions or concerns at this time.     Upon arrival to the ED, vital signs were temperature 99.1, pulse 87, respirations 20, blood pressure 134/74 sitting, SPO2 saturation 91% on room air.  ABG with pH 7.469, PCO2 31.1, PO2 46.0, O2 saturation 94.7% on room air.  High-sensitivity troponin T 44 with -8 delta.  proBNP 8240.  CMP with sodium 126, chloride 92, BUN 53, creatinine 2.61, EGFR 19.0, glucose 114, alkaline phosphatase 127, otherwise unremarkable.  C-reactive protein 1.48.  Lactate 1.6.  Procalcitonin 0.05.  D-dimer 0.61.  PT 16.9, INR 1.31.  CBC with WBC 17.25, hemoglobin 9.5, hematocrit 31.0, platelets 500, RDW 16.0, MCH 24.9, MCHC 30.6, otherwise unremarkable.  Sed rate 60.  Urinalysis with 2+ protein, otherwise unremarkable.  Peripheral blood cultures x2 pending.  COVID-19 and flu A/B swab negative.  Chest x-ray noted probable mild CHF.  CT of the  chest without contrast noted consolidation in the right lower lobe.  Tiny nodule in the right lung.  CT of the abdomen and pelvis without contrast noted cholelithiasis and distention of the gallbladder similar to previous exam.  Stranding in anterior pelvic wall.    Known Emergency Department medications received prior to my evaluation included 2 g IV Rocephin, 100 mg IV doxycycline, DuoNeb, 80 mg IV Solu-Medrol. Emergency Department Room location at the time of my evaluation was 114. Discussed with admitting physician, Rayshawn Villanueva MD.      ---------------------------------------------------------------------------------------------------------------------   Review of Systems   Constitutional: Positive for appetite change, chills and fatigue. Negative for fever and unexpected weight change.   HENT: Positive for congestion and sore throat (mild). Negative for trouble swallowing.    Respiratory: Positive for cough, shortness of breath and wheezing. Negative for choking.    Cardiovascular: Negative for chest pain, palpitations and leg swelling.   Gastrointestinal: Positive for abdominal pain (LUQ). Negative for blood in stool, constipation, diarrhea, nausea and vomiting.   Genitourinary: Negative for difficulty urinating, flank pain, frequency and urgency.   Musculoskeletal: Positive for gait problem (due to generalized weakness/fatigue). Negative for back pain, neck pain and neck stiffness.   Neurological: Positive for weakness (generalized). Negative for dizziness, tremors, seizures, syncope, facial asymmetry, speech difficulty, light-headedness, numbness and headaches.   Psychiatric/Behavioral: Negative for confusion.     ---------------------------------------------------------------------------------------------------------------------   Past Medical History:   Diagnosis Date   • Anxiety    • CAD (coronary artery disease)     s/p CABG   • Chronic hypoxemic respiratory failure     Wears 2 L/min at home   •  Essential hypertension    • Hyperlipidemia    • Type 2 diabetes mellitus      Past Surgical History:   Procedure Laterality Date   • CORONARY ARTERY BYPASS GRAFT  2011   • HYSTERECTOMY       Family History   Problem Relation Age of Onset   • Diabetes Mother      Social History     Socioeconomic History   • Marital status: Single   Tobacco Use   • Smoking status: Former     Passive exposure: Past   Vaping Use   • Vaping Use: Never used   Substance and Sexual Activity   • Alcohol use: Never   • Drug use: Never   • Sexual activity: Defer     ---------------------------------------------------------------------------------------------------------------------   Allergies:  Patient has no known allergies.  ---------------------------------------------------------------------------------------------------------------------   Home medications:    Medications below are reported home medications pulling from within the system; at this time, these medications have not been reconciled unless otherwise specified and are in the verification process for further verifcation as current home medications.  (Not in a hospital admission)      Hospital Scheduled Meds:          Current listed hospital scheduled medications may not yet reflect those currently placed in orders that are signed and held awaiting patient's arrival to floor.   ---------------------------------------------------------------------------------------------------------------------     Objective     Vital Signs:  Temp:  [99.1 °F (37.3 °C)] 99.1 °F (37.3 °C)  Heart Rate:  [85-87] 85  Resp:  [20] 20  BP: (134)/(74) 134/74      06/01/23  0953   Weight: (!) 142 kg (312 lb)     Body mass index is 50.36 kg/m².  ---------------------------------------------------------------------------------------------------------------------       Physical Exam  Vitals reviewed.   Constitutional:       General: She is awake. She is not in acute distress.     Appearance: She is obese. She  is ill-appearing. She is not diaphoretic.      Interventions: Nasal cannula in place.      Comments: Currently on 2L nasal cannula.   HENT:      Head: Normocephalic and atraumatic.      Mouth/Throat:      Mouth: Mucous membranes are dry.      Pharynx: Oropharynx is clear.   Eyes:      Extraocular Movements: Extraocular movements intact.      Pupils: Pupils are equal, round, and reactive to light.   Cardiovascular:      Rate and Rhythm: Normal rate and regular rhythm.      Pulses:           Dorsalis pedis pulses are 1+ on the right side and 1+ on the left side.      Heart sounds: Normal heart sounds.     No friction rub.   Pulmonary:      Effort: Pulmonary effort is normal. No accessory muscle usage, respiratory distress or retractions.      Breath sounds: Decreased breath sounds present. No wheezing, rhonchi or rales.      Comments: Lung sounds diminished bilaterally. No wheezing.  Abdominal:      General: Bowel sounds are normal. There is distension (mild, chronic appearing).      Palpations: Abdomen is soft.      Tenderness: There is no abdominal tenderness. There is no guarding or rebound.   Musculoskeletal:      Cervical back: Neck supple. No rigidity.      Right lower leg: No edema.      Left lower leg: No edema.   Skin:     General: Skin is warm and dry.      Capillary Refill: Capillary refill takes more than 3 seconds.      Coloration: Skin is pale.   Neurological:      General: No focal deficit present.      Mental Status: She is alert and oriented to person, place, and time. Mental status is at baseline.      Cranial Nerves: No dysarthria or facial asymmetry.      Sensory: Sensation is intact. No sensory deficit.      Motor: Weakness (generalized) present. No tremor or seizure activity.   Psychiatric:         Attention and Perception: Attention and perception normal.         Mood and Affect: Mood and affect normal.         Speech: Speech normal.         Behavior: Behavior normal. Behavior is cooperative.          Thought Content: Thought content normal.         Cognition and Memory: Cognition normal.         Judgment: Judgment normal.       ---------------------------------------------------------------------------------------------------------------------  EKG:  Obtain EKG for baseline/admission.     Telemetry:  Appearing atrial flutter with controlled rate 80s on my exam.   ---------------------------------------------------------------------------------------------------------------------   Results from last 7 days   Lab Units 06/01/23  1032 06/01/23  1006   CRP mg/dL  --  1.48*   LACTATE mmol/L 1.6  --    WBC 10*3/mm3  --  17.25*   HEMOGLOBIN g/dL  --  9.5*   HEMATOCRIT %  --  31.0*   MCV fL  --  81.2   MCHC g/dL  --  30.6*   PLATELETS 10*3/mm3  --  500*   INR  1.31*  --      Results from last 7 days   Lab Units 06/01/23  1017   PH, ARTERIAL pH units 7.469*   PO2 ART mm Hg 46.0*   PCO2, ARTERIAL mm Hg 31.1*   HCO3 ART mmol/L 22.6     Results from last 7 days   Lab Units 06/01/23  1006   SODIUM mmol/L 126*   POTASSIUM mmol/L 4.8   CHLORIDE mmol/L 92*   CO2 mmol/L 22.7   BUN mg/dL 53*   CREATININE mg/dL 2.61*   CALCIUM mg/dL 8.9   GLUCOSE mg/dL 114*   ALBUMIN g/dL 3.6   BILIRUBIN mg/dL 0.6   ALK PHOS U/L 127*   AST (SGOT) U/L 14   ALT (SGPT) U/L 11   Estimated Creatinine Clearance: 28.4 mL/min (A) (by C-G formula based on SCr of 2.61 mg/dL (H)).  No results found for: AMMONIA  Results from last 7 days   Lab Units 06/01/23  1209 06/01/23  1006   HSTROP T ng/L 36* 44*     Results from last 7 days   Lab Units 06/01/23  1006   PROBNP pg/mL 8,240.0*     Lab Results   Component Value Date    HGBA1C 7.60 (H) 09/12/2022     Lab Results   Component Value Date    TSH 1.400 09/12/2022     No results found for: PREGTESTUR, PREGSERUM, HCG, HCGQUANT  Pain Management Panel         Latest Ref Rng & Units 9/15/2022 9/12/2022   Pain Management Panel   Creatinine, Urine mg/dL 26.6      26.6   64.3           Multiple values from one day  are sorted in reverse-chronological order               ---------------------------------------------------------------------------------------------------------------------  Imaging Results (Last 7 Days)     Procedure Component Value Units Date/Time    CT Abdomen Pelvis Without Contrast [[id number]] Collected: 06/01/23 1239     Updated: 06/01/23 1302    Narrative:      EXAM: CT ABDOMEN PELVIS WO CONTRAST-         TECHNIQUE: Multiple axial CT images were obtained from lung bases  through pubic symphysis WITH administration of IV contrast. Reformatted  images in the coronal and/or sagittal plane(s) were generated from the  axial data set to facilitate diagnostic accuracy and/or surgical  planning.  Oral Contrast:NONE.     Radiation dose reduction techniques were utilized per ALARA protocol.  Automated exposure control was initiated through either or Meliuz or  DoseRight software packages by  protocol.    DOSE:     Clinical information Upper abdominal pain.      Comparison 09/11/2022     FINDINGS:     Lower thorax: Clear. No effusions.     Abdomen:     Liver: Homogeneous. No focal hepatic mass or ductal dilatation.     Gallbladder: Distended. There is cholelithiasis. Similar to the prior  study.     Pancreas: Unremarkable. No mass or ductal dilatation.     Spleen: Homogeneous. No splenomegaly.     Adrenals: No mass.     Kidneys/ureters: No mass. No obstructive uropathy.  No evidence of  urolithiasis.     GI tract: Moderate stool burden. There is no evidence of appendicitis     MESENTERY: No free fluid, walled off fluid collections, mesenteric  stranding, or enlarged lymph nodes        Vasculature: Evidence of atherosclerotic vascular disease     Abdominal wall: Stranding in the anterior pelvic wall similar to the  prior study.     Bladder: No focal mass or significant wall thickening     Reproductive: Unremarkable as visualized     Bones: No acute bony abnormality.       Impression:         1.  Cholelithiasis and distention of the gallbladder similar to the  previous exam     2.Stranding in the anterior pelvic wall.     3. Other findings as above                    This report was finalized on 6/1/2023 12:41 PM by Dr. Leonard Narvaez MD.       CT Chest Without Contrast Diagnostic [[id number]] Collected: 06/01/23 1245     Updated: 06/01/23 1302    Narrative:      EXAM: CT CHEST WO CONTRAST DIAGNOSTIC-      CLINICAL INDICATION:Short of breath with left upper quadrant/left-sided  chest discomfort      COMPARISON: 01/06/2023     TECHNIQUE: Multiple axial CT images were obtained from lung apex through  upper abdomen without the administration of IV contrast. Reformatted  images in the coronal and/or sagittal plane(s) were generated from the  axial data set to facilitate diagnostic accuracy and/or surgical  planning.     Radiation dose reduction techniques were utilized per ALARA protocol.  Automated exposure control was initiated through either or Zolpy or  DoseRight software packages by  protocol.    DOSE (DLP mGy-cm):        FINDINGS:     LUNGS: 2 mm parenchymal nodule in the right lung on image 28 of the  axial series.  Consolidation in the right lower lobe new since the previous exam. This  may represent scarring or pneumonia     HEART: Coronary artery calcifications     MEDIASTINUM: No masses. No enlarged lymph nodes.  No fluid collections.     PLEURA: No pleural effusions     VASCULATURE: No evidence of aneurysm.     BONES: No acute bony abnormality.     VISUALIZED UPPER ABDOMEN:Please see the CT report for the abdomen and  pelvis     Other: None.       Impression:         1. Consolidation in the right lower lobe.  2. Tiny nodule in the right lung           This report was finalized on 6/1/2023 12:47 PM by Dr. Leonard Narvaez MD.       XR Chest 1 View [[id number]] Collected: 06/01/23 1117     Updated: 06/01/23 1125    Narrative:      XR CHEST 1 VW-     CLINICAL INDICATION: sob        COMPARISON:  09/22/2022      TECHNIQUE: Single frontal view of the chest.     FINDINGS:      LUNGS: Probable mild CHF but portable exam      HEART AND MEDIASTINUM: Heart and mediastinal contours are unremarkable        SKELETON: Bony and soft tissue structures are unremarkable.             Impression:      Probable mild CHF, but portable exam     This report was finalized on 6/1/2023 11:17 AM by Dr. Leonard Narvaez MD.             Last echocardiogram:  Results for orders placed during the hospital encounter of 09/11/22    Adult Transthoracic Echo Limited W/ Cont if Necessary Per Protocol    Interpretation Summary  · Left ventricular ejection fraction appears to be 61 - 65%. Left ventricular systolic function is normal.  · Left ventricular diastolic function was not assessed.  · The right ventricular cavity is mildly dilated.  · Estimated right ventricular systolic pressure from tricuspid regurgitation is markedly elevated ( 78 mmHg).  · This is a technically limited study.          I have personally reviewed the above radiology images and read the final radiology report on 06/01/23  ---------------------------------------------------------------------------------------------------------------------  Assessment / Plan     Active Hospital Problems    Diagnosis  POA   • **Pneumonia of right lower lobe due to infectious organism [J18.9]  Yes       ASSESSMENT/PLAN:    ACUTE HOSPITAL PROBLEMS:    -Acute on chronic respiratory failure with hypoxia, present on admission, likely due to right lower lobe pneumonia  -Leukocytosis and mild C-RP elevation, present on admission and due to above  -CT of the chest without contrast noted consolidation in the right lower lobe and tiny RL nodule.   -WBCs 17K, C-RP 1.48. Lactate and Procal negative.  -Received Doxycycline and Rocephin in the ED; plan to continue current abx on admission.   -COVID-19 and Flu A/B swab negative.   -Peripheral blood cultures x2 pending.   -Obtain sputum culture if able,  S. Pneumo Ag, and Legionella Ag.   -Obtain MRSA PCR.   -Bedside nursing swallow evaluation prior to PO intake. If failed, will keep NPO. Maintain aspiration precautions. Consult SLP for formal evaluation.   -Encourage incentive spirometry use.   -Turn, cough, and deep breathing exercises.   -ABG obtained in the ED revealed pO2 in the 40s on room air. She was placed on supplemental O2 and stable. Continue to titrate O2 to maintain SpO2 saturations > 90%.   -Continuous pulse oximetry.   -Scheduled DuoNebs.   -Mucinex and PRN Robitussin for supportive care.  -PRN Tylenol for mild pain or fever.   -Repeat CBC with differential in the a.m.  -Will need referral to lung nodule clinic on discharge for new finding of right lung nodule.     -WENDY on CKD stage IIIb, present on admission  -Acute on chronic hyponatremia, present on admission  -Baseline creatinine ~1.4 (2022) although values have fluctuated widely. Creatinine on admission 2.61. BUN 53.  -CT of the abdomen and pelvis without contrast noted stranding around the anterior pelvic wall. Noted no evidence of obstructive uropathy or urolithiasis.   -UA with 2+ protein, otherwise unremarkable.   -Monitor urine output and renal function closely.  -Avoid nephrotoxins as able.  -Sodium 126; given elevated BUN & PLT and physical exam findings, feel that this likely represents hypovolemic hyponatremia. Plan to administer continuous IV hydration at slow rate.   -Obtain urine sodium, urine Osm, and serum Osm to rule out SIADH.   -Repeat chemistry panel in AM.      OTHER CHRONIC MEDICAL PROBLEMS:      -CAD s/p CABG  -Hyperlipidemia  -Indeterminate troponin elevation, present on admission, likely due to hypoxia  -Chronic HFpEF  -Paroxysmal atrial fibrillation  -Chronic anticoagulation with Eliquis  -Denies chest pain at time of exam.   -Obtain EKG for baseline/admission.   -HS Troponin T 44 with -8 delta.   -Continuous cardiac monitoring ordered.   -Obtain HgbA1c and lipid profile  for risk stratification.   -Review home medications once reconciled with plans to continue aspirin, statin, and other GDMT for CHF.   -Not appearing volume overloaded on exam.   -Strict I's and O's. Daily weights.   -Echo from 9/13/2022 reviewed. Revealed LVEF 56-60% with mildly reduced right ventricular systolic function. Trace MVR. Trace AVR. Moderate TVR. Severe pulmonary HTN. No pericardial effusion.   -Continue Eliquis for stroke prophylaxis.     -Type II diabetes mellitus, insulin dependent  -Obtain HgbA1C.  -Review home medication regimen once reconciled per pharmacy.   -Start SSI and long-acting insulin per Glucomander protocol.  -Closely monitor blood glucose levels with accuchecks AC and HS.  -Hypoglycemia protocol in place.    -Essential hypertension  -BP appears well controlled.  -Plan to resume home antihypertensive regimen once reconciled per pharmacy with appropriate holding parameters to prevent hypotension and/or bradycardia.   -Closely monitor BP per hospital protocol, titrate medications as necessary.    -Chronic normocytic anemia  -History of iron deficiency  -Hgb 9.5/Hct 31.0 on admission, appearing near recent baseline.   -No s/s active bleeding on exam.   -Plan to continue daily iron supplementation.   -Repeat CBC in the a.m.   -Plan to transfuse for Hgb < 7.     -JESIKA  -Continuous pulse oximetry, as per plan above.    -Obesity by BMI, Body mass index is 50.36 kg/m².  -Affecting all aspects of care.  -Encourage lifestyle modifications.    -Anxiety  -Supportive care.   -Review home medications once reconciled.     -Physical debility  -Up with assistance.  -Maintain fall precautions.   -PT/OT consults.       ----------  -DVT prophylaxis: Eliquis will serve.  -Activity: As tolerated. Maintain fall precautions.   -Expected length of stay:   INPATIENT status due to the need for care which can only be reasonably provided in an hospital setting such as aggressive/expedited ancillary services and/or  consultation services, the necessity for IV medications, close physician monitoring and/or the possible need for procedures.  In such, I feel patient's risk for adverse outcomes and need for care warrant INPATIENT evaluation and predict the patient's care encounter to likely last beyond 2 midnights.  -Disposition plans to return to [facility] on discharge once medically stable. CM consulted for assistance.    High risk secondary to acute on chronic hypoxemic respiratory failure due to right lower lobe pneumonia.       CODE STATUS: FULL CODE, per review of ACP documents on file. Discussed with patient.      Swati Crespo, APRN   06/01/23  14:42 EDT  Pager #[phone]  ---------------------------------------------------------------------------------------------------------------------

## 2023-06-01 NOTE — ED PROVIDER NOTES
Subjective   History of Present Illness  72-year-old female presents secondary to shortness of breath along with left upper quadrant abdominal pain.  Patient states this started few days ago.  She denies any fever.  She states that she has had a minimally productive cough.  She has had some increased wheezing.  She denies any increased lower extremity swelling or edema.  She denies any nausea vomiting diarrhea.  She states that her left upper quadrant abdominal pain is worse with cough and breathing.  She has a past medical history of diabetes, hypertension, hyperlipidemia, coronary artery disease status post CABG in 2011, COPD.  Patient does not wear oxygen at home.  She presents by ambulance.  Her oxygen saturation was around 87 on room air.        Review of Systems   Constitutional: Negative.  Negative for fever.   HENT: Negative.    Respiratory: Positive for cough, shortness of breath and wheezing.    Cardiovascular: Negative.  Negative for chest pain.   Gastrointestinal: Positive for abdominal pain.   Endocrine: Negative.    Genitourinary: Negative.  Negative for dysuria.   Skin: Negative.    Neurological: Negative.    Psychiatric/Behavioral: Negative.    All other systems reviewed and are negative.      Past Medical History:   Diagnosis Date   • Anxiety    • CAD (coronary artery disease)     s/p CABG   • Chronic hypoxemic respiratory failure     Wears 2 L/min at home   • Essential hypertension    • Hyperlipidemia    • Type 2 diabetes mellitus        No Known Allergies    Past Surgical History:   Procedure Laterality Date   • CORONARY ARTERY BYPASS GRAFT  2011   • HYSTERECTOMY         Family History   Problem Relation Age of Onset   • Diabetes Mother        Social History     Socioeconomic History   • Marital status: Single   Tobacco Use   • Smoking status: Former     Passive exposure: Past   Vaping Use   • Vaping Use: Never used   Substance and Sexual Activity   • Alcohol use: Never   • Drug use: Never   •  Sexual activity: Defer           Objective   Physical Exam  Vitals and nursing note reviewed.   Constitutional:       General: She is not in acute distress.     Appearance: She is well-developed. She is not diaphoretic.   HENT:      Head: Normocephalic and atraumatic.      Right Ear: External ear normal.      Left Ear: External ear normal.      Nose: Nose normal.   Eyes:      Conjunctiva/sclera: Conjunctivae normal.      Pupils: Pupils are equal, round, and reactive to light.   Neck:      Vascular: No JVD.      Trachea: No tracheal deviation.   Cardiovascular:      Rate and Rhythm: Normal rate and regular rhythm.      Heart sounds: Normal heart sounds. No murmur heard.  Pulmonary:      Effort: Pulmonary effort is normal. No respiratory distress.      Breath sounds: Wheezing and rhonchi present.   Abdominal:      General: Bowel sounds are normal.      Palpations: Abdomen is soft.      Tenderness: There is no abdominal tenderness.   Musculoskeletal:         General: No deformity. Normal range of motion.      Cervical back: Normal range of motion and neck supple.   Skin:     General: Skin is warm and dry.      Coloration: Skin is not pale.      Findings: No erythema or rash.   Neurological:      Mental Status: She is alert and oriented to person, place, and time.      Cranial Nerves: No cranial nerve deficit.   Psychiatric:         Behavior: Behavior normal.         Thought Content: Thought content normal.         Procedures           ED Course  ED Course as of 06/01/23 1646   Thu Jun 01, 2023   1057 EKG at 0952 shows atrial flutter, variable conduction, ventricular rate 86.  QRS duration 90, QTc 471 ms.  Nonspecific ST-T changes.  No evidence for STEMI. [CM]   1237 Still awaiting CTs [JI]   1343 CTs are back.  Reviewed with Dr. Patterson.  Contacted hospitalist service [JI]   1419 Accepted by Dr. Cyr [JI]      ED Course User Index  [CM] Taras Patterson MD  [JI] Abdi Madrigal PA            Results for orders  placed or performed during the hospital encounter of 06/01/23   COVID-19 and FLU A/B PCR - Swab, Nasopharynx    Specimen: Nasopharynx; Swab   Result Value Ref Range    COVID19 Not Detected Not Detected - Ref. Range    Influenza A PCR Not Detected Not Detected    Influenza B PCR Not Detected Not Detected   Comprehensive Metabolic Panel    Specimen: Arm, Right; Blood   Result Value Ref Range    Glucose 114 (H) 65 - 99 mg/dL    BUN 53 (H) 8 - 23 mg/dL    Creatinine 2.61 (H) 0.57 - 1.00 mg/dL    Sodium 126 (L) 136 - 145 mmol/L    Potassium 4.8 3.5 - 5.2 mmol/L    Chloride 92 (L) 98 - 107 mmol/L    CO2 22.7 22.0 - 29.0 mmol/L    Calcium 8.9 8.6 - 10.5 mg/dL    Total Protein 7.2 6.0 - 8.5 g/dL    Albumin 3.6 3.5 - 5.2 g/dL    ALT (SGPT) 11 1 - 33 U/L    AST (SGOT) 14 1 - 32 U/L    Alkaline Phosphatase 127 (H) 39 - 117 U/L    Total Bilirubin 0.6 0.0 - 1.2 mg/dL    Globulin 3.6 gm/dL    A/G Ratio 1.0 g/dL    BUN/Creatinine Ratio 20.3 7.0 - 25.0    Anion Gap 11.3 5.0 - 15.0 mmol/L    eGFR 19.0 (L) >60.0 mL/min/1.73   Urinalysis With Culture If Indicated - Urine, Clean Catch    Specimen: Urine, Clean Catch   Result Value Ref Range    Color, UA Yellow Yellow, Straw    Appearance, UA Clear Clear    pH, UA 6.5 5.0 - 8.0    Specific Gravity, UA 1.011 1.005 - 1.030    Glucose, UA Negative Negative    Ketones, UA Negative Negative    Bilirubin, UA Negative Negative    Blood, UA Negative Negative    Protein,  mg/dL (2+) (A) Negative    Leuk Esterase, UA Negative Negative    Nitrite, UA Negative Negative    Urobilinogen, UA 0.2 E.U./dL 0.2 - 1.0 E.U./dL   C-reactive Protein    Specimen: Arm, Right; Blood   Result Value Ref Range    C-Reactive Protein 1.48 (H) 0.00 - 0.50 mg/dL   Sedimentation Rate    Specimen: Arm, Right; Blood   Result Value Ref Range    Sed Rate 60 (H) 0 - 30 mm/hr   BNP    Specimen: Arm, Right; Blood   Result Value Ref Range    proBNP 8,240.0 (H) 0.0 - 900.0 pg/mL   High Sensitivity Troponin T    Specimen:  Arm, Right; Blood   Result Value Ref Range    HS Troponin T 44 (H) <10 ng/L   CBC Auto Differential    Specimen: Arm, Right; Blood   Result Value Ref Range    WBC 17.25 (H) 3.40 - 10.80 10*3/mm3    RBC 3.82 3.77 - 5.28 10*6/mm3    Hemoglobin 9.5 (L) 12.0 - 15.9 g/dL    Hematocrit 31.0 (L) 34.0 - 46.6 %    MCV 81.2 79.0 - 97.0 fL    MCH 24.9 (L) 26.6 - 33.0 pg    MCHC 30.6 (L) 31.5 - 35.7 g/dL    RDW 16.0 (H) 12.3 - 15.4 %    RDW-SD 46.5 37.0 - 54.0 fl    MPV 8.0 6.0 - 12.0 fL    Platelets 500 (H) 140 - 450 10*3/mm3    Neutrophil % 88.6 (H) 42.7 - 76.0 %    Lymphocyte % 4.8 (L) 19.6 - 45.3 %    Monocyte % 5.6 5.0 - 12.0 %    Eosinophil % 0.1 (L) 0.3 - 6.2 %    Basophil % 0.3 0.0 - 1.5 %    Immature Grans % 0.6 (H) 0.0 - 0.5 %    Neutrophils, Absolute 15.26 (H) 1.70 - 7.00 10*3/mm3    Lymphocytes, Absolute 0.83 0.70 - 3.10 10*3/mm3    Monocytes, Absolute 0.97 (H) 0.10 - 0.90 10*3/mm3    Eosinophils, Absolute 0.02 0.00 - 0.40 10*3/mm3    Basophils, Absolute 0.06 0.00 - 0.20 10*3/mm3    Immature Grans, Absolute 0.11 (H) 0.00 - 0.05 10*3/mm3    nRBC 0.0 0.0 - 0.2 /100 WBC   D-dimer, Quantitative    Specimen: Arm, Right; Blood   Result Value Ref Range    D-Dimer, Quantitative 0.61 0.00 - 0.72 MCGFEU/mL   Blood Gas, Arterial With Co-Ox    Specimen: Arterial Blood   Result Value Ref Range    Site Left Brachial     Bernardo's Test N/A     pH, Arterial 7.469 (H) 7.350 - 7.450 pH units    pCO2, Arterial 31.1 (L) 35.0 - 45.0 mm Hg    pO2, Arterial 46.0 (C) 83.0 - 108.0 mm Hg    HCO3, Arterial 22.6 20.0 - 26.0 mmol/L    Base Excess, Arterial -0.6 (L) 0.0 - 2.0 mmol/L    O2 Saturation, Arterial 84.7 (L) 94.0 - 99.0 %    Hemoglobin, Blood Gas 9.7 (L) 13.5 - 17.5 g/dL    Hematocrit, Blood Gas 29.7 (L) 38.0 - 51.0 %    Oxyhemoglobin 83.1 (L) 94 - 99 %    Methemoglobin 0.10 0.00 - 3.00 %    Carboxyhemoglobin 1.8 0 - 5 %    A-a DO2 62.1 0.0 - 300.0 mmHg    CO2 Content 23.6 22 - 33 mmol/L    Barometric Pressure for Blood Gas 729 mmHg     Modality Room Air     FIO2 21 %    Ventilator Mode NA     Note      Notified Who ENRIKE SINHA PAc/RN     Notified By [id number]     Notified Time 06/01/2023 10:22     Collected by [id number]     pH, Temp Corrected      pCO2, Temperature Corrected      pO2, Temperature Corrected     Protime-INR    Specimen: Blood   Result Value Ref Range    Protime 16.9 (H) 12.1 - 14.7 Seconds    INR 1.31 (H) 0.90 - 1.10   aPTT    Specimen: Blood   Result Value Ref Range    PTT 32.0 26.5 - 34.5 seconds   Lactic Acid, Plasma    Specimen: Blood   Result Value Ref Range    Lactate 1.6 0.5 - 2.0 mmol/L   Procalcitonin    Specimen: Blood   Result Value Ref Range    Procalcitonin 0.05 0.00 - 0.25 ng/mL   High Sensitivity Troponin T 2Hr    Specimen: Hand, Right; Blood   Result Value Ref Range    HS Troponin T 36 (H) <10 ng/L    Troponin T Delta -8 (L) >=-4 - <+4 ng/L   Urinalysis, Microscopic Only - Urine, Clean Catch    Specimen: Urine, Clean Catch   Result Value Ref Range    RBC, UA 0-2 None Seen, 0-2 /HPF    WBC, UA 0-2 None Seen, 0-2 /HPF    Bacteria, UA None Seen None Seen /HPF    Squamous Epithelial Cells, UA 0-2 None Seen, 0-2 /HPF    Hyaline Casts, UA None Seen None Seen /LPF    Methodology Automated Microscopy    ECG 12 Lead Dyspnea   Result Value Ref Range    QT Interval 394 ms    QTC Interval 471 ms   Green Top (Gel)   Result Value Ref Range    Extra Tube Hold for add-ons.    Lavender Top   Result Value Ref Range    Extra Tube hold for add-on    Gold Top - SST   Result Value Ref Range    Extra Tube Hold for add-ons.      CT Abdomen Pelvis Without Contrast    Result Date: 6/1/2023   1. Cholelithiasis and distention of the gallbladder similar to the previous exam  2.Stranding in the anterior pelvic wall.  3. Other findings as above       This report was finalized on 6/1/2023 12:41 PM by Dr. Leonard Narvaez MD.      CT Chest Without Contrast Diagnostic    Result Date: 6/1/2023   1. Consolidation in the right lower lobe. 2. Tiny nodule in the right  lung    This report was finalized on 6/1/2023 12:47 PM by Dr. Leonard Narvaez MD.      XR Chest 1 View    Result Date: 6/1/2023  Probable mild CHF, but portable exam  This report was finalized on 6/1/2023 11:17 AM by Dr. Leonard Narvaez MD.                                        Medical Decision Making  72-year-old female presents secondary to shortness of breath along with left upper quadrant abdominal pain.  Patient states this started few days ago.  She denies any fever.  She states that she has had a minimally productive cough.  She has had some increased wheezing.  She denies any increased lower extremity swelling or edema.  She denies any nausea vomiting diarrhea.  She states that her left upper quadrant abdominal pain is worse with cough and breathing.  She has a past medical history of diabetes, hypertension, hyperlipidemia, coronary artery disease status post CABG in 2011, COPD.  Patient does not wear oxygen at home.  She presents by ambulance.  Her oxygen saturation was around 87 on room air.    COPD with acute exacerbation: acute illness or injury  Hypoxemia: acute illness or injury  Pneumonia of right lower lobe due to infectious organism: acute illness or injury  Amount and/or Complexity of Data Reviewed  Labs: ordered. Decision-making details documented in ED Course.  Radiology: ordered. Decision-making details documented in ED Course.  ECG/medicine tests: ordered. Decision-making details documented in ED Course.  Discussion of management or test interpretation with external provider(s): Dr Cyr    Risk  Prescription drug management.  Decision regarding hospitalization.    Risk Details: Patient was agreeable to treatment plan of admission to the hospital for further evaluation.  We reviewed her diagnostic work-up and labs        Final diagnoses:   Pneumonia of right lower lobe due to infectious organism   COPD with acute exacerbation   Hypoxemia       ED Disposition  ED Disposition     ED Disposition    Decision to Admit    Condition   --    Comment   Level of Care: Telemetry [5]   Diagnosis: Pneumonia of right lower lobe due to infectious organism [[id number]]   Admitting Physician: LENCHO PATEL [[id number]]   Certification: I Certify That Inpatient Hospital Services Are Medically Necessary For Greater Than 2 Midnights               No follow-up provider specified.       Medication List      ASK your doctor about these medications    bumetanide 0.5 MG tablet  Commonly known as: BUMEX  Ask about: Which instructions should I use?     hydrALAZINE 25 MG tablet  Commonly known as: APRESOLINE  Ask about: Which instructions should I use?     ondansetron ODT 4 MG disintegrating tablet  Commonly known as: ZOFRAN-ODT  Ask about: Which instructions should I use?     Ozempic (1 MG/DOSE) 4 MG/3ML solution pen-injector  Generic drug: Semaglutide (1 MG/DOSE)  Ask about: Which instructions should I use?             Abdi Madrigal PA  06/01/23 [id number]

## 2023-06-01 NOTE — PLAN OF CARE
Goal Outcome Evaluation:  Plan of Care Reviewed With: patient      Progress: no change   Patient admitted from ED this shift. Patient is resting in bed. Patient has tolerated all interventions thus far. No complaints/concerns. No acute distress noted, will continue to follow plan of care

## 2023-06-01 NOTE — ED NOTES
Pt is sitting in a chair in room. Pt states this is the only way her bottom doesn't hurt. Pt has 2 wounds on her buttock. Pt is stable and in no acute distress. Will continue to monitor.

## 2023-06-02 LAB
ANION GAP SERPL CALCULATED.3IONS-SCNC: 10.7 MMOL/L (ref 5–15)
ANION GAP SERPL CALCULATED.3IONS-SCNC: 12.2 MMOL/L (ref 5–15)
ANISOCYTOSIS BLD QL: NORMAL
BASOPHILS # BLD AUTO: 0.05 10*3/MM3 (ref 0–0.2)
BASOPHILS NFR BLD AUTO: 0.2 % (ref 0–1.5)
BUN SERPL-MCNC: 49 MG/DL (ref 8–23)
BUN SERPL-MCNC: 52 MG/DL (ref 8–23)
BUN/CREAT SERPL: 22.1 (ref 7–25)
BUN/CREAT SERPL: 22.6 (ref 7–25)
CALCIUM SPEC-SCNC: 8.6 MG/DL (ref 8.6–10.5)
CALCIUM SPEC-SCNC: 8.9 MG/DL (ref 8.6–10.5)
CHLORIDE SERPL-SCNC: 96 MMOL/L (ref 98–107)
CHLORIDE SERPL-SCNC: 96 MMOL/L (ref 98–107)
CHOLEST SERPL-MCNC: 64 MG/DL (ref 0–200)
CO2 SERPL-SCNC: 19.3 MMOL/L (ref 22–29)
CO2 SERPL-SCNC: 20.8 MMOL/L (ref 22–29)
CREAT SERPL-MCNC: 2.22 MG/DL (ref 0.57–1)
CREAT SERPL-MCNC: 2.3 MG/DL (ref 0.57–1)
DEPRECATED RDW RBC AUTO: 50.7 FL (ref 37–54)
EGFRCR SERPLBLD CKD-EPI 2021: 22.1 ML/MIN/1.73
EGFRCR SERPLBLD CKD-EPI 2021: 23 ML/MIN/1.73
EOSINOPHIL # BLD AUTO: 0 10*3/MM3 (ref 0–0.4)
EOSINOPHIL NFR BLD AUTO: 0 % (ref 0.3–6.2)
ERYTHROCYTE [DISTWIDTH] IN BLOOD BY AUTOMATED COUNT: 16.3 % (ref 12.3–15.4)
GLUCOSE BLDC GLUCOMTR-MCNC: 122 MG/DL (ref 70–130)
GLUCOSE BLDC GLUCOMTR-MCNC: 164 MG/DL (ref 70–130)
GLUCOSE BLDC GLUCOMTR-MCNC: 166 MG/DL (ref 70–130)
GLUCOSE BLDC GLUCOMTR-MCNC: 91 MG/DL (ref 70–130)
GLUCOSE BLDC GLUCOMTR-MCNC: 92 MG/DL (ref 70–130)
GLUCOSE SERPL-MCNC: 116 MG/DL (ref 65–99)
GLUCOSE SERPL-MCNC: 155 MG/DL (ref 65–99)
HCT VFR BLD AUTO: 30.9 % (ref 34–46.6)
HDLC SERPL-MCNC: 38 MG/DL (ref 40–60)
HGB BLD-MCNC: 8.9 G/DL (ref 12–15.9)
HYPOCHROMIA BLD QL: NORMAL
IMM GRANULOCYTES # BLD AUTO: 0.16 10*3/MM3 (ref 0–0.05)
IMM GRANULOCYTES NFR BLD AUTO: 0.8 % (ref 0–0.5)
LDLC SERPL CALC-MCNC: 15 MG/DL (ref 0–100)
LDLC/HDLC SERPL: 0.54 {RATIO}
LYMPHOCYTES # BLD AUTO: 0.7 10*3/MM3 (ref 0.7–3.1)
LYMPHOCYTES NFR BLD AUTO: 3.3 % (ref 19.6–45.3)
MAGNESIUM SERPL-MCNC: 2 MG/DL (ref 1.6–2.4)
MCH RBC QN AUTO: 24.9 PG (ref 26.6–33)
MCHC RBC AUTO-ENTMCNC: 28.8 G/DL (ref 31.5–35.7)
MCV RBC AUTO: 86.3 FL (ref 79–97)
MONOCYTES # BLD AUTO: 0.94 10*3/MM3 (ref 0.1–0.9)
MONOCYTES NFR BLD AUTO: 4.4 % (ref 5–12)
NEUTROPHILS NFR BLD AUTO: 19.34 10*3/MM3 (ref 1.7–7)
NEUTROPHILS NFR BLD AUTO: 91.3 % (ref 42.7–76)
NRBC BLD AUTO-RTO: 0 /100 WBC (ref 0–0.2)
OSMOLALITY SERPL: 281 MOSM/KG (ref 280–301)
OSMOLALITY UR: 309 MOSM/KG
OVALOCYTES BLD QL SMEAR: NORMAL
PLATELET # BLD AUTO: 450 10*3/MM3 (ref 140–450)
PMV BLD AUTO: 8.3 FL (ref 6–12)
POTASSIUM SERPL-SCNC: 4.7 MMOL/L (ref 3.5–5.2)
POTASSIUM SERPL-SCNC: 4.8 MMOL/L (ref 3.5–5.2)
RBC # BLD AUTO: 3.58 10*6/MM3 (ref 3.77–5.28)
S PNEUM AG SPEC QL LA: NEGATIVE
SMALL PLATELETS BLD QL SMEAR: ADEQUATE
SODIUM SERPL-SCNC: 126 MMOL/L (ref 136–145)
SODIUM SERPL-SCNC: 129 MMOL/L (ref 136–145)
TRIGL SERPL-MCNC: 27 MG/DL (ref 0–150)
TSH SERPL DL<=0.05 MIU/L-ACNC: 1.61 UIU/ML (ref 0.27–4.2)
VLDLC SERPL-MCNC: 11 MG/DL (ref 5–40)
WBC NRBC COR # BLD: 21.19 10*3/MM3 (ref 3.4–10.8)

## 2023-06-02 PROCEDURE — 80048 BASIC METABOLIC PNL TOTAL CA: CPT

## 2023-06-02 PROCEDURE — 97166 OT EVAL MOD COMPLEX 45 MIN: CPT | Performed by: OCCUPATIONAL THERAPIST

## 2023-06-02 PROCEDURE — 97162 PT EVAL MOD COMPLEX 30 MIN: CPT

## 2023-06-02 PROCEDURE — 82948 REAGENT STRIP/BLOOD GLUCOSE: CPT

## 2023-06-02 PROCEDURE — 85007 BL SMEAR W/DIFF WBC COUNT: CPT

## 2023-06-02 PROCEDURE — 83930 ASSAY OF BLOOD OSMOLALITY: CPT

## 2023-06-02 PROCEDURE — 63710000001 INSULIN LISPRO (HUMAN) PER 5 UNITS

## 2023-06-02 PROCEDURE — 94664 DEMO&/EVAL PT USE INHALER: CPT

## 2023-06-02 PROCEDURE — 85025 COMPLETE CBC W/AUTO DIFF WBC: CPT

## 2023-06-02 PROCEDURE — 83735 ASSAY OF MAGNESIUM: CPT

## 2023-06-02 PROCEDURE — 80061 LIPID PANEL: CPT

## 2023-06-02 PROCEDURE — 25010000002 CEFTRIAXONE PER 250 MG

## 2023-06-02 PROCEDURE — 94761 N-INVAS EAR/PLS OXIMETRY MLT: CPT

## 2023-06-02 PROCEDURE — 99233 SBSQ HOSP IP/OBS HIGH 50: CPT

## 2023-06-02 PROCEDURE — 92610 EVALUATE SWALLOWING FUNCTION: CPT

## 2023-06-02 PROCEDURE — 94799 UNLISTED PULMONARY SVC/PX: CPT

## 2023-06-02 PROCEDURE — 84443 ASSAY THYROID STIM HORMONE: CPT

## 2023-06-02 PROCEDURE — 63710000001 INSULIN GLARGINE PER 5 UNITS

## 2023-06-02 RX ADMIN — SALINE NASAL SPRAY 2 SPRAY: 1.5 SOLUTION NASAL at 05:25

## 2023-06-02 RX ADMIN — ACETAMINOPHEN 1000 MG: 500 TABLET ORAL at 19:57

## 2023-06-02 RX ADMIN — SALINE NASAL SPRAY 2 SPRAY: 1.5 SOLUTION NASAL at 15:30

## 2023-06-02 RX ADMIN — IPRATROPIUM BROMIDE AND ALBUTEROL SULFATE 3 ML: 2.5; .5 SOLUTION RESPIRATORY (INHALATION) at 07:12

## 2023-06-02 RX ADMIN — APIXABAN 5 MG: 5 TABLET, FILM COATED ORAL at 09:30

## 2023-06-02 RX ADMIN — LATANOPROST 1 DROP: 50 SOLUTION OPHTHALMIC at 20:29

## 2023-06-02 RX ADMIN — SALINE NASAL SPRAY 2 SPRAY: 1.5 SOLUTION NASAL at 19:57

## 2023-06-02 RX ADMIN — Medication 5 MG: at 20:29

## 2023-06-02 RX ADMIN — SALINE NASAL SPRAY 2 SPRAY: 1.5 SOLUTION NASAL at 12:59

## 2023-06-02 RX ADMIN — SALINE NASAL SPRAY 2 SPRAY: 1.5 SOLUTION NASAL at 18:10

## 2023-06-02 RX ADMIN — FLUOXETINE HYDROCHLORIDE 10 MG: 10 CAPSULE ORAL at 09:31

## 2023-06-02 RX ADMIN — IPRATROPIUM BROMIDE AND ALBUTEROL SULFATE 3 ML: 2.5; .5 SOLUTION RESPIRATORY (INHALATION) at 18:48

## 2023-06-02 RX ADMIN — APIXABAN 5 MG: 5 TABLET, FILM COATED ORAL at 20:29

## 2023-06-02 RX ADMIN — ISOSORBIDE MONONITRATE 30 MG: 30 TABLET, EXTENDED RELEASE ORAL at 09:31

## 2023-06-02 RX ADMIN — GUAIFENESIN 1200 MG: 600 TABLET, EXTENDED RELEASE ORAL at 09:30

## 2023-06-02 RX ADMIN — VITAMINS A AND D OINTMENT 1 APPLICATION: 15.5; 53.4 OINTMENT TOPICAL at 20:30

## 2023-06-02 RX ADMIN — ATORVASTATIN CALCIUM 20 MG: 20 TABLET, FILM COATED ORAL at 20:29

## 2023-06-02 RX ADMIN — DOCUSATE SODIUM 50 MG AND SENNOSIDES 8.6 MG 2 TABLET: 8.6; 5 TABLET, FILM COATED ORAL at 20:29

## 2023-06-02 RX ADMIN — SALINE NASAL SPRAY 2 SPRAY: 1.5 SOLUTION NASAL at 22:09

## 2023-06-02 RX ADMIN — HYDRALAZINE HYDROCHLORIDE 25 MG: 25 TABLET, FILM COATED ORAL at 17:57

## 2023-06-02 RX ADMIN — HYDRALAZINE HYDROCHLORIDE 25 MG: 25 TABLET, FILM COATED ORAL at 20:29

## 2023-06-02 RX ADMIN — DOXYCYCLINE 100 MG: 100 INJECTION, POWDER, LYOPHILIZED, FOR SOLUTION INTRAVENOUS at 09:31

## 2023-06-02 RX ADMIN — DOXYCYCLINE 100 MG: 100 INJECTION, POWDER, LYOPHILIZED, FOR SOLUTION INTRAVENOUS at 22:09

## 2023-06-02 RX ADMIN — Medication 1000 MCG: at 09:30

## 2023-06-02 RX ADMIN — SALINE NASAL SPRAY 2 SPRAY: 1.5 SOLUTION NASAL at 17:04

## 2023-06-02 RX ADMIN — METOPROLOL TARTRATE 50 MG: 50 TABLET, FILM COATED ORAL at 20:29

## 2023-06-02 RX ADMIN — METOPROLOL TARTRATE 50 MG: 50 TABLET, FILM COATED ORAL at 09:30

## 2023-06-02 RX ADMIN — Medication 1 TABLET: at 09:31

## 2023-06-02 RX ADMIN — INSULIN GLARGINE 28 UNITS: 100 INJECTION, SOLUTION SUBCUTANEOUS at 20:30

## 2023-06-02 RX ADMIN — HYDRALAZINE HYDROCHLORIDE 25 MG: 25 TABLET, FILM COATED ORAL at 09:30

## 2023-06-02 RX ADMIN — DOCUSATE SODIUM 50 MG AND SENNOSIDES 8.6 MG 2 TABLET: 8.6; 5 TABLET, FILM COATED ORAL at 09:31

## 2023-06-02 RX ADMIN — GUAIFENESIN 1200 MG: 600 TABLET, EXTENDED RELEASE ORAL at 20:29

## 2023-06-02 RX ADMIN — Medication 10 ML: at 09:32

## 2023-06-02 RX ADMIN — IPRATROPIUM BROMIDE AND ALBUTEROL SULFATE 3 ML: 2.5; .5 SOLUTION RESPIRATORY (INHALATION) at 12:11

## 2023-06-02 RX ADMIN — SALINE NASAL SPRAY 2 SPRAY: 1.5 SOLUTION NASAL at 09:32

## 2023-06-02 RX ADMIN — SALINE NASAL SPRAY 2 SPRAY: 1.5 SOLUTION NASAL at 10:48

## 2023-06-02 RX ADMIN — ASPIRIN 81 MG: 81 TABLET, CHEWABLE ORAL at 09:30

## 2023-06-02 RX ADMIN — INSULIN LISPRO 6 UNITS: 100 INJECTION, SOLUTION INTRAVENOUS; SUBCUTANEOUS at 17:57

## 2023-06-02 RX ADMIN — CEFTRIAXONE 1 G: 1 INJECTION, POWDER, FOR SOLUTION INTRAMUSCULAR; INTRAVENOUS at 09:31

## 2023-06-02 RX ADMIN — PANTOPRAZOLE SODIUM 40 MG: 40 TABLET, DELAYED RELEASE ORAL at 05:25

## 2023-06-02 RX ADMIN — Medication 10 ML: at 20:29

## 2023-06-02 RX ADMIN — VITAMINS A AND D OINTMENT 1 APPLICATION: 15.5; 53.4 OINTMENT TOPICAL at 15:30

## 2023-06-02 NOTE — PLAN OF CARE
Goal Outcome Evaluation:      Patient has been resting in bed this shift. No s/s of acute distress noted at this time. Will continue to follow plan of care.

## 2023-06-02 NOTE — PLAN OF CARE
Goal Outcome Evaluation:                     Pt appears to have rested well this shift. No s/s of acute distress noted at this time. NS infusing at 100 mL/hr. No complaints verbalized at this time. Plan of care ongoing.

## 2023-06-02 NOTE — THERAPY EVALUATION
Acute Care - Occupational Therapy Initial Evaluation   [facility]     Patient Name: Jolly Garcia  : 1950  MRN: [id number]  Today's Date: 2023  Onset of Illness/Injury or Date of Surgery: 23  Date of Referral to OT: 23  Referring Physician: Dr. Cyr    Admit Date: 2023       ICD-10-CM ICD-9-CM   1. Pneumonia of right lower lobe due to infectious organism  J18.9 486   2. COPD with acute exacerbation  J44.1 491.21   3. Hypoxemia  R09.02 799.02     Patient Active Problem List   Diagnosis   • UTI (urinary tract infection)   • Sepsis   • Type 2 diabetes mellitus   • Essential hypertension   • ASCVD (arteriosclerotic cardiovascular disease)   • Chronic respiratory failure with hypoxia   • Anxiety   • Hyperlipidemia   • Chronic kidney disease   • Diabetic retinopathy   • Class 3 severe obesity due to excess calories with body mass index (BMI) of 50.0 to 59.9 in adult   • JESIKA (obstructive sleep apnea)   • Diabetic nephropathy   • Physical debility   • (HFpEF) heart failure with preserved ejection fraction   • Pneumonia of right lower lobe due to infectious organism   • Acute kidney injury   • Hyponatremia     Past Medical History:   Diagnosis Date   • Anxiety    • CAD (coronary artery disease)     s/p CABG   • Chronic hypoxemic respiratory failure     Wears 2 L/min at home   • Essential hypertension    • Hyperlipidemia    • Type 2 diabetes mellitus      Past Surgical History:   Procedure Laterality Date   • CORONARY ARTERY BYPASS GRAFT     • HYSTERECTOMY           OT ASSESSMENT FLOWSHEET (last 12 hours)     OT Evaluation and Treatment     Row Name 23 1033                   OT Time and Intention    Subjective Information no complaints  -BF        Document Type evaluation  -BF        Mode of Treatment occupational therapy  -BF        Patient Effort good  -BF        Symptoms Noted During/After Treatment shortness of breath  -BF        Comment Pt seen this date for OT evaluation. Pt  is mod assist with toileting and L/B dressing, set-up with U/B dressing, and CGA for sit-stand-sit. Pt reports that she plans on going back to SNF.  -BF           General Information    Patient Profile Reviewed yes  -BF        Onset of Illness/Injury or Date of Surgery 06/01/23  -BF        Referring Physician Dr. Cyr  -BF        Patient/Family/Caregiver Comments/Observations Pt sitting EOB, agreeable to OT.  -BF        Prior Level of Function independent:;min assist:;ADL's  -BF        Pertinent History of Current Functional Problem Pt presented to hospital with SOA, chest congestion. Pt [place] [facility] [place] long.  -BF        Existing Precautions/Restrictions fall;oxygen therapy device and L/min  -BF           Living Environment    Current Living Arrangements residential facility  -BF           Cognition    Orientation Status (Cognition) oriented x 3  -BF        Follows Commands (Cognition) WFL  -BF           Range of Motion (ROM)    Range of Motion bilateral upper extremities;ROM is WFL  -BF           Strength Comprehensive (MMT)    Comment, General Manual Muscle Testing (MMT) Assessment BUE grossly 3+/5  -BF           Activities of Daily Living    BADL Assessment/Intervention bathing;upper body dressing;lower body dressing;grooming;feeding;toileting  -BF           Bathing Assessment/Intervention    Comment, (Bathing) Mod A  -BF           Upper Body Dressing Assessment/Training    Comment, (Upper Body Dressing) Set-up/supervision  -BF           Lower Body Dressing Assessment/Training    Comment, (Lower Body Dressing) Mod A  -BF           Grooming Assessment/Training    Comment, (Grooming) Set-up  -BF           Self-Feeding Assessment/Training    Comment, (Feeding) Set-up  -BF           Toileting Assessment/Training    Comment, (Toileting) Mod A  -BF           BADL Safety/Performance    Impairments, BADL Safety/Performance endurance/activity tolerance;shortness of  breath;strength;balance  -BF           Transfer Assessment/Treatment    Transfers sit-stand transfer;stand-sit transfer  -BF           Sit-Stand Transfer    Sit-Stand [place] (Transfers) contact guard;verbal cues;nonverbal cues (demo/gesture)  -BF           Stand-Sit Transfer    Stand-Sit [place] (Transfers) contact guard;verbal cues;nonverbal cues (demo/gesture)  -BF           Motor Skills    Motor Skills functional endurance  -BF        Functional Endurance Fair  -BF           Wound 06/01/23 1932 coccyx    Wound - Properties Group Placement Date: 06/01/23 -SB Placement Time: 1932 -SB Location: coccyx  -SB    Retired Wound - Properties Group Placement Date: 06/01/23  -SB Placement Time: 1932 -SB Location: coccyx  -SB    Retired Wound - Properties Group Date first assessed: 06/01/23 -SB Time first assessed: 1932 -SB Location: coccyx  -SB       Positioning and Restraints    Post Treatment Position bed  -BF        In Bed sitting EOB;encouraged to call for assist;call light within reach  -BF           Therapy Assessment/Plan (OT)    Date of Referral to OT 06/01/23  -BF        Patient/Family Therapy Goal Statement (OT) To return to PLOF  -BF        OT Diagnosis Debility  -BF        Rehab Potential (OT) good, to achieve stated therapy goals  -BF        Criteria for Skilled Therapeutic Interventions Met (OT) yes  -BF        Predicted Duration of Therapy Intervention (OT) 1 week  -BF        Problem List (OT) problems related to;balance;mobility;strength  activity tolerance  -BF        Activity Limitations Related to Problem List (OT) unable to transfer safely;BADLs not performed adequately or safely  -BF        Planned Therapy Interventions (OT) activity tolerance training;adaptive equipment training;BADL retraining;ROM/therapeutic exercise;strengthening exercise;transfer/mobility retraining  -BF           Therapy Plan Review/Discharge Plan (OT)    Therapy Plan Review (OT) patient  -BF        Anticipated  Discharge Disposition (OT) skilled nursing facility  -BF           OT Goals    Toileting Goal Selection (OT) toileting, OT goal 1  -BF        Activity Tolerance Goal Selection (OT) activity tolerance, OT goal 1  -BF           Toileting Goal 1 (OT)    Activity/Device (Toileting Goal 1, OT) toileting skills, all  -BF        [place] Level/Cues Needed (Toileting Goal 1, OT) contact guard required  -BF        Time Frame (Toileting Goal 1, OT) by discharge  -BF            Activity Tolerance Goal 1 (OT)    Activity Tolerance Goal 1 (OT) Pt will complete 15 minute theract sitting EOB w/ good activity tolerance to enhance independence with ADLs.  -BF        Activity Level (Endurance Goal 1, OT) 15 min activity  -BF        Time Frame (Activity Tolerance Goal 1, OT) by discharge  -BF              User Key  (r) = Recorded By, (t) = Taken By, (c) = Cosigned By    Initials Name Effective Dates    BF Yanely Agee OT 05/31/23 -     Bienvenido Toribio, FABIAN 01/19/22 -                        OT Recommendation and Plan  Planned Therapy Interventions (OT): activity tolerance training, adaptive equipment training, BADL retraining, ROM/therapeutic exercise, strengthening exercise, transfer/mobility retraining           Time Calculation:    Time Calculation- OT     Row Name 06/02/23 1043             Time Calculation- OT    OT Start Time 1020  -BF            User Key  (r) = Recorded By, (t) = Taken By, (c) = Cosigned By    Initials Name Provider Type    BF Yanely Agee OT Occupational Therapist              Therapy Charges for Today     Code Description Service Date Service Provider Modifiers Qty    [id number]  OT EVAL MOD COMPLEXITY 4 6/2/2023 Yanely Agee OT GO 1               Yanely Agee OT  6/2/2023

## 2023-06-02 NOTE — THERAPY EVALUATION
Acute Care - Physical Therapy Initial Evaluation   Sina     Patient Name: Jolly Garcia  : 1950  MRN: [id number]  Today's Date: 2023   Onset of Illness/Injury or Date of Surgery: 23  Visit Dx:     ICD-10-CM ICD-9-CM   1. Pneumonia of right lower lobe due to infectious organism  J18.9 486   2. COPD with acute exacerbation  J44.1 491.21   3. Hypoxemia  R09.02 799.02     Patient Active Problem List   Diagnosis   • UTI (urinary tract infection)   • Sepsis   • Type 2 diabetes mellitus   • Essential hypertension   • ASCVD (arteriosclerotic cardiovascular disease)   • Chronic respiratory failure with hypoxia   • Anxiety   • Hyperlipidemia   • Chronic kidney disease   • Diabetic retinopathy   • Class 3 severe obesity due to excess calories with body mass index (BMI) of 50.0 to 59.9 in adult   • JESIKA (obstructive sleep apnea)   • Diabetic nephropathy   • Physical debility   • (HFpEF) heart failure with preserved ejection fraction   • Pneumonia of right lower lobe due to infectious organism   • Acute kidney injury   • Hyponatremia     Past Medical History:   Diagnosis Date   • Anxiety    • CAD (coronary artery disease)     s/p CABG   • Chronic hypoxemic respiratory failure     Wears 2 L/min at home   • Essential hypertension    • Hyperlipidemia    • Type 2 diabetes mellitus      Past Surgical History:   Procedure Laterality Date   • CORONARY ARTERY BYPASS GRAFT     • HYSTERECTOMY       PT Assessment (last 12 hours)     PT Evaluation and Treatment     Row Name 23 1300          Physical Therapy Time and Intention    Subjective Information no complaints  -KM     Document Type evaluation  -KM     Mode of Treatment individual therapy;physical therapy  -KM     Patient Effort good  -KM     Symptoms Noted During/After Treatment shortness of breath  -KM     Row Name 23 1300          General Information    Patient Profile Reviewed yes  -KM     Patient Observations alert;cooperative;agree to  therapy  -KM     Prior Level of Function independent:;all household mobility;ADL's  per pt. report  -KM     Existing Precautions/Restrictions fall;oxygen therapy device and L/min  -KM     Risks Reviewed patient:;LOB;nausea/vomiting;dizziness;increased discomfort  -KM     Benefits Reviewed patient:;improve function;increase independence;increase strength;increase balance  -KM     [facility] Name 06/02/23 1300          Living Environment    Current Living Arrangements home  -KM     People in Home alone  -KM     Primary Care Provided by self  -KM     [facility] Name 06/02/23 1300          Home Use of Assistive/Adaptive Equipment    Equipment Currently Used at Home walker, rolling;cane, quad  -KM     [facility] Name 06/02/23 1300          Cognition    Affect/Mental Status (Cognition) [facility]  -     Orientation Status (Cognition) oriented x 3  -KM     Follows Commands (Cognition) WFL  -KM     Row Name 06/02/23 1300          Range of Motion (ROM)    Range of Motion bilateral lower extremities;ROM is WFL  -KM     Row Name 06/02/23 1300          Strength (Manual Muscle Testing)    Strength (Manual Muscle Testing) bilateral lower extremities;strength is WFL  -KM     Row Name 06/02/23 1300          Bed Mobility    Comment, (Bed Mobility) Up in chair upon arrival  -KM     Row Name 06/02/23 1300          Transfers    Transfers sit-stand transfer;stand-sit transfer;chair-bed transfer  -[facility] 06/02/23 1300          Chair-Bed Transfer    Chair-Bed [place] (Transfers) contact guard;minimum assist (75% patient effort);verbal cues  -KM     Row Name 06/02/23 1300          Sit-Stand Transfer    Sit-Stand [place] (Transfers) contact guard;minimum assist (75% patient effort);verbal cues  -KM     Row Name 06/02/23 1300          Stand-Sit Transfer    Stand-Sit [place] (Transfers) contact guard;verbal cues  -KM     Row Name 06/02/23 1300          Gait/Stairs (Locomotion)    Gait/Stairs Locomotion gait/ambulation independence;distance  ambulated  -KM     [place] Level (Gait) contact guard;minimum assist (75% patient effort);verbal cues  -KM     Distance in Feet (Gait) 2-3 steps from chair-bed  -KM     Row Name 06/02/23 1300          Safety Issues, Functional Mobility    Safety Issues Affecting Function (Mobility) awareness of need for assistance;insight into deficits/self-awareness  -KM     Impairments Affecting Function (Mobility) endurance/activity tolerance  -KM     Row Name 06/02/23 1300          Balance    Balance Assessment sitting static balance;standing dynamic balance  -KM     Static Sitting Balance modified independence  -KM     Position, Sitting Balance sitting edge of bed  -KM     Dynamic Standing Balance minimal assist;verbal cues  -KM     Row Name             Wound 06/01/23 1932 coccyx MASD (Moisture associated skin damage)    Wound - Properties Group Placement Date: 06/01/23  -SB Placement Time: 1932  -SB Present on Hospital Admission: Y  -BB Location: coccyx  -SB Primary Wound Type: MASD  -BB    Retired Wound - Properties Group Placement Date: 06/01/23  -SB Placement Time: 1932  -SB Present on Hospital Admission: Y  -BB Location: coccyx  -SB Primary Wound Type: MASD  -BB    Retired Wound - Properties Group Date first assessed: 06/01/23 -SB Time first assessed: 1932  -SB Present on Hospital Admission: Y  -BB Location: coccyx  -SB Primary Wound Type: MASD  -BB    Row Name 06/02/23 1300          Plan of Care Review    Plan of Care Reviewed With patient  -KM     Outcome Evaluation Pt. evaluation completed during PT session. She was able to perform functional mobility skills w/ Tarun. She ambulated minimal distance from chair-bed. She tolerated session well and would benefit from skilled PT services.  -KM     Row Name 06/02/23 1300          Therapy Assessment/Plan (PT)    Patient/Family Therapy Goals Statement (PT) increase mobility  -KM     Functional Level at Time of Evaluation (PT) Tarun  -KM     PT Diagnosis (PT) decreased  mobility  -KM     Rehab Potential (PT) good, to achieve stated therapy goals  -     Criteria for Skilled Interventions Met (PT) yes;skilled treatment is necessary  -     Therapy Frequency (PT) 2 times/wk  2-5x/wk  -KM     Predicted Duration of Therapy Intervention (PT) until discharge  -KM     Problem List (PT) problems related to;balance;mobility  -KM     Activity Limitations Related to Problem List (PT) unable to ambulate safely;unable to transfer safely  -KM     Row Name 06/02/23 1300          Therapy Plan Review/Discharge Plan (PT)    Therapy Plan Review (PT) evaluation/treatment results reviewed;care plan/treatment goals reviewed;risks/benefits reviewed;patient  -KM     Row Name 06/02/23 1300          Physical Therapy Goals    Bed Mobility Goal Selection (PT) bed mobility, PT goal 1  -KM     Transfer Goal Selection (PT) transfer, PT goal 1  -KM     Gait Training Goal Selection (PT) gait training, PT goal 1  -KM     Row Name 06/02/23 1300          Bed Mobility Goal 1 (PT)    Activity/Assistive Device (Bed Mobility Goal 1, PT) bed mobility activities, all  -KM     [place] Level/Cues Needed (Bed Mobility Goal 1, PT) modified independence  -KM     Time Frame (Bed Mobility Goal 1, PT) by discharge  -     Row Name 06/02/23 1300          Transfer Goal 1 (PT)    Activity/Assistive Device (Transfer Goal 1, PT) sit-to-stand/stand-to-sit;bed-to-chair/chair-to-bed  -KM     [place] Level/Cues Needed (Transfer Goal 1, PT) modified independence  -KM     Time Frame (Transfer Goal 1, PT) by discharge  -     Row Name 06/02/23 1300          Gait Training Goal 1 (PT)    Activity/Assistive Device (Gait Training Goal 1, PT) gait (walking locomotion);assistive device use;walker, rolling  -KM     [place] Level (Gait Training Goal 1, PT) standby assist  -KM     Distance (Gait Training Goal 1, PT) 50'  -KM     Time Frame (Gait Training Goal 1, PT) by [facility]           User Key  (r) = Recorded By, (t) =  Taken By, (c) = Cosigned By    Initials Name Provider Type    Kannan Harding, RN Registered Nurse    Bienvenido Toribio, RN Registered Nurse    Sina Cardozo, PT Physical Therapist                Physical Therapy Education     Title: PT OT SLP Therapies (Done)     Topic: Physical Therapy (Done)     Point: Mobility training (Done)     Learning Progress Summary           Patient Acceptance, E,TB, VU by KM at 6/2/2023 1351                   Point: Home exercise program (Done)     Learning Progress Summary           Patient Acceptance, E,TB, VU by KM at 6/2/2023 1351                   Point: Body mechanics (Done)     Learning Progress Summary           Patient Acceptance, E,TB, VU by NOE at 6/2/2023 1351                   Point: Precautions (Done)     Learning Progress Summary           Patient Acceptance, E,TB, VU by  at 6/2/2023 1351                               User Key     Initials Effective Dates Name Provider Type Discipline    KM 05/24/22 -  Sina Alberto, PT Physical Therapist PT              PT Recommendation and Plan  Planned Therapy Interventions (PT): balance training, bed mobility training, gait training, home exercise program, patient/family education, postural re-education, ROM (range of motion), stair training, strengthening, stretching, transfer training  Therapy Frequency (PT): 2 times/wk (2-5x/wk)  Plan of Care Reviewed With: patient  Outcome Evaluation: Pt. evaluation completed during PT session. She was able to perform functional mobility skills w/ Tarun. She ambulated minimal distance from chair-bed. She tolerated session well and would benefit from skilled PT services.       Time Calculation:    PT Charges     Row Name 06/02/23 1326             Time Calculation    PT Received On 06/02/23  -NOE      PT Goal Re-Cert Due Date 06/16/23  -NOE            User Key  (r) = Recorded By, (t) = Taken By, (c) = Cosigned By    Initials Name Provider Type    Sina Cardozo, ANDRÉS Physical Therapist               Therapy Charges for Today     Code Description Service Date Service Provider Modifiers Qty    86562455225 HC PT EVAL MOD COMPLEXITY 4 6/2/2023 Sina Alberto, PT GP 1          PT G-Codes  AM-PAC 6 Clicks Score (PT): 11    Sina Alberto, PT  6/2/2023

## 2023-06-02 NOTE — THERAPY EVALUATION
Acute Care - Speech Language Pathology   Swallow Initial Evaluation [facility]   CLINICAL DYSPHAGIA ASSESSMENT     Patient Name: Jolly Garcia  : 1950  MRN: [id number]  Today's Date: 2023             Admit Date: 2023    Visit Dx:     ICD-10-CM ICD-9-CM   1. Pneumonia of right lower lobe due to infectious organism  J18.9 486   2. COPD with acute exacerbation  J44.1 491.21   3. Hypoxemia  R09.02 799.02     Patient Active Problem List   Diagnosis   • UTI (urinary tract infection)   • Sepsis   • Type 2 diabetes mellitus   • Essential hypertension   • ASCVD (arteriosclerotic cardiovascular disease)   • Chronic respiratory failure with hypoxia   • Anxiety   • Hyperlipidemia   • Chronic kidney disease   • Diabetic retinopathy   • Class 3 severe obesity due to excess calories with body mass index (BMI) of 50.0 to 59.9 in adult   • JESIKA (obstructive sleep apnea)   • Diabetic nephropathy   • Physical debility   • (HFpEF) heart failure with preserved ejection fraction   • Pneumonia of right lower lobe due to infectious organism   • Acute kidney injury   • Hyponatremia     Past Medical History:   Diagnosis Date   • Anxiety    • CAD (coronary artery disease)     s/p CABG   • Chronic hypoxemic respiratory failure     Wears 2 L/min at home   • Essential hypertension    • Hyperlipidemia    • Type 2 diabetes mellitus      Past Surgical History:   Procedure Laterality Date   • CORONARY ARTERY BYPASS GRAFT     • HYSTERECTOMY       Jolly Garcia  is seen at bedside this am on 3S to assess safety/efficacy of swallowing fnx, determine safest/least restrictive diet tolerance.     Ms. Garcia presented to [facility] ED w/ sob, LUQ pain. She is admitted w/ RLL PNA. She is referred to r/o aspiration, dysphagia. She denies any overt s/s aspiration or dysphagia w/ po intake.     Social History     Socioeconomic History   • Marital status: Single   Tobacco Use   • Smoking status: Former     Passive exposure: Past   Vaping  Use   • Vaping Use: Never used   Substance and Sexual Activity   • Alcohol use: Never   • Drug use: Never   • Sexual activity: Defer      Interpretation per radiologist  Interpreted by Abdi Madrigal PA on 6/1/2023  1:02 PM EDT  Narrative & Impression  EXAM: CT CHEST WO CONTRAST DIAGNOSTIC-      CLINICAL INDICATION:Short of breath with left upper quadrant/left-sided  chest discomfort      COMPARISON: 01/06/2023     TECHNIQUE: Multiple axial CT images were obtained from lung apex through  upper abdomen without the administration of IV contrast. Reformatted  images in the coronal and/or sagittal plane(s) were generated from the  axial data set to facilitate diagnostic accuracy and/or surgical  planning.     Radiation dose reduction techniques were utilized per ALARA protocol.  Automated exposure control was initiated through either or Cartesian or  DoseRight software packages by  protocol.    DOSE (DLP mGy-cm):        FINDINGS:     LUNGS: 2 mm parenchymal nodule in the right lung on image 28 of the  axial series.  Consolidation in the right lower lobe new since the previous exam. This  may represent scarring or pneumonia     HEART: Coronary artery calcifications     MEDIASTINUM: No masses. No enlarged lymph nodes.  No fluid collections.     PLEURA: No pleural effusions     VASCULATURE: No evidence of aneurysm.     BONES: No acute bony abnormality.     VISUALIZED UPPER ABDOMEN:Please see the CT report for the abdomen and  pelvis     Other: None.     IMPRESSION:     1. Consolidation in the right lower lobe.  2. Tiny nodule in the right lung           This report was finalized on 6/1/2023 12:47 PM by Dr. Leonard Narvaez MD.    Diet Orders (active) (From admission, onward)     Start     Ordered    06/01/23 1714  Diet: Cardiac Diets, Diabetic Diets; Healthy Heart (2-3 Na+); Consistent Carbohydrate; Texture: Regular Texture (IDDSI 7); Fluid Consistency: Thin (IDDSI 0)  Diet Effective Now         06/01/23 4887               She is observed on 2L O2 via NC w/o complications.     Pt is positioned upright and centered in a chair at bedside to accept multiple po presentations of ice chips, solid cracker, puree and thin liquids via spoon, cup and straw. She is able to self feed.     Facial/oral structures are symmetrical upon observation. Lingual protrusion reveals no deviation. Oral mucosa are moist, pink, and clean. Secretions are clear, thin, and well controlled. OROM/GLENN is wfl to imitate oral postures. Gag is not assessed. Volitional cough is intact w/ adequate  intensity, slightly congestive in quality, non-productive. Voice is adequate in intensity, mildly rough in quality (which she reports as acute since onset of illness) w/ intelligible speech. She is oriented to person, place and time, follows directives and participates in simple conversational exchanges.     Upon po presentations, adequate bolus anticipation and acceptance w/ good labial seal for bolus clearance via spoon bowl, cup rim stability and suction via straw. Bolus formation, manipulation and control are wfl w/ rotary mastication pattern. A-p transit is timely w/o oral residue. No overt s/s aspiration evidenced before the swallow.     Pharyngeal swallow is timely w/ adequate hyolaryngeal elevation per palpation. No overt s/s aspiration evidenced across this assessment. No silent aspiration suspected as pt is w/o changes in vocal quality, respirations or secretions post po presentations. Pt denies odynophagia.    Impression: Per this assessment, Mr. Garcia presents w/ wfl oropharyngeal swallow w/o s/s aspiration. No s/s indicative of silent aspiration. No odynophagia reported.      SLP Recommendation and Plan  1. Regular consistency, thin liquids.    2. Meds whole in puree/thins.   3. Upright and centered for all po intake.  4. KEVIN precautions.  5. Oral care protocol.  No further formal SLP f/u warranted at this time.    D/w pt results and recommendations w/  verbal agreement.    Thank you for allowing me to participate in the care of your patient-  Josie Richter M.A., CCC-SLP    EDUCATION  The patient has been educated in the following areas:   Dysphagia (Swallowing Impairment).     Time Calculation:     Therapy Charges for Today     Code Description Service Date Service Provider Modifiers Qty    [id number]  ST EVAL ORAL PHARYNG SWALLOW 4 6/2/2023 Josie Richter MA,CCC-SLP GN 1          Josie Richter MA,CCC-SLP  6/2/2023

## 2023-06-02 NOTE — PROGRESS NOTES
Patient Identification:  Name:  Jolly Garcia  Age:  72 y.o.  Sex:  female  :  1950  MRN:  [id number]  Visit Number:  [id number]  Primary Care Provider:  April Cantrell APRN    Length of stay:  1    Subjective/Interval History/Consultants/Procedures     Chief complaint: Follow-up, acute on chronic respiratory failure with hypoxia due to right lower lobe pneumonia    Subjective/Interval History:  Jolly Garcia is our 72 y.o. female patient admitted on 2023 from local skilled nursing facility due to acute on chronic respiratory failure with hypoxia secondary to right lower lobe pneumonia.  She also presented with leukocytosis and mild CRP elevation.  CT of the chest without contrast noted consolidation in the right lower lobe and a tiny right lung nodule.  D-dimer was negative.  Lactate and Pro-Humberto also nonelevated.  Received Doxycycline and Rocephin in the emergency department. Peripheral blood cultures x2 pending. S pneumo Ag and Legionella Ag negative. MRSA PCR ordered.  PT/OT/SLP consults were placed. Passed swallow evaluation.  She is receiving scheduled DuoNebs and Mucinex.  Robitussin available as needed.  She also presented with WENDY on CKD stage IIIb and acute on chronic hyponatremia.  Baseline creatinine around 1.4 (), although values have fluctuated wildly.  Creatinine on admission was 2.61 and BUN 53.  Sodium 126.  CT of the abdomen and pelvis without contrast noted stranding around the pelvic wall.  Negative for obstructive uropathy or urolithiasis. Urinalysis with 2+ protein, otherwise unremarkable.  Continuous IV hydration initiated with normal saline at 100 mL an hour.  Creatinine has improved.  Sodium level remaining stable.    Procedures/Imaging:  · Chest x-ray  · CT of the chest without contrast  · CT of the abdomen and pelvis without contrast    On today's exam, the patient was resting in bed eating breakfast.  No signs or symptoms of acute distress.  She states that  she feels much better than she did yesterday.  Reports ongoing cough which is now productive with thick, yellow sputum.  Denies worsening shortness of breath or any other new/acute complaints.  Discussed plan with patient.  She voiced agreement with no further questions or concerns at this time.    Discussed with AM FABIAN Foley with no acute events overnight. Room location at the time of evaluation was 311b.  Discussed with admitting physician, Rayshawn Villanueva DO.  ----------------------------------------------------------------------------------------------------------------------  Current Hospital Meds:  apixaban, 5 mg, Oral, Q12H  aspirin, 81 mg, Oral, Daily  atorvastatin, 20 mg, Oral, Nightly  cefTRIAXone, 1 g, Intravenous, Q24H  doxycycline, 100 mg, Intravenous, Q12H  FLUoxetine, 10 mg, Oral, Daily  guaiFENesin, 1,200 mg, Oral, Q12H  hydrALAZINE, 25 mg, Oral, TID  insulin glargine, 1-200 Units, Subcutaneous, Nightly - Glucommander  insulin lispro, 1-200 Units, Subcutaneous, 4x Daily With Meals & Nightly  ipratropium-albuterol, 3 mL, Nebulization, 4x Daily - RT  isosorbide mononitrate, 30 mg, Oral, Daily  latanoprost, 1 drop, Both Eyes, Nightly  metoprolol tartrate, 50 mg, Oral, BID  multivitamin with minerals, 1 tablet, Oral, Daily  pantoprazole, 40 mg, Oral, Q AM  senna-docusate sodium, 2 tablet, Oral, BID  sodium chloride, 10 mL, Intravenous, Q12H  sodium chloride, 2 spray, Nasal, Q2H  vitamin B-12, 1,000 mcg, Oral, Daily      sodium chloride, 100 mL/hr, Last Rate: 100 mL/hr (06/01/23 7947)      ----------------------------------------------------------------------------------------------------------------------      Objective     Vital Signs:  Temp:  [97.1 °F (36.2 °C)-99.1 °F (37.3 °C)] 97.1 °F (36.2 °C)  Heart Rate:  [] 88  Resp:  [18-24] 18  BP: (132-159)/(74-89) 146/78      06/01/23  0953 06/01/23  1645 06/02/23  0455   Weight: (!) 142 kg (312 lb) (!) 137 kg (302 lb 3.2 oz) (!) 139 kg (306 lb 8  oz)     Body mass index is 49.47 kg/m².    Intake/Output Summary (Last 24 hours) at 2023 0910  Last data filed at 2023 1800  Gross per 24 hour   Intake 320 ml   Output --   Net 320 ml     No intake/output data recorded.  Diet: Cardiac Diets, Diabetic Diets; Healthy Heart (2-3 Na+); Consistent Carbohydrate; Texture: Regular Texture (IDDSI 7); Fluid Consistency: Thin (IDDSI 0)  ----------------------------------------------------------------------------------------------------------------------    Physical Exam  Vitals reviewed.   Constitutional:       General: She is awake. She is not in acute distress.     Appearance: She is obese. She is not diaphoretic.      Interventions: Nasal cannula in place.   HENT:      Head: Normocephalic and atraumatic.      Mouth/Throat:      Mouth: Mucous membranes are moist.      Pharynx: Oropharynx is clear.   Eyes:      Extraocular Movements: Extraocular movements intact.      Pupils: Pupils are equal, round, and reactive to light.   Cardiovascular:      Rate and Rhythm: Normal rate. Rhythm irregular.      Pulses:           Dorsalis pedis pulses are 1+ on the right side and 1+ on the left side.      Heart sounds: Normal heart sounds. No murmur heard.    No friction rub.   Pulmonary:      Effort: Pulmonary effort is normal. No accessory muscle usage, respiratory distress or retractions.      Breath sounds: Decreased breath sounds present. No wheezing, rhonchi or rales.      Comments: Lung sounds mildly diminished bilaterally.  No wheezing.  Abdominal:      General: Bowel sounds are normal. There is distension (Mild, chronic appearing).      Palpations: Abdomen is soft.      Tenderness: There is no abdominal tenderness. There is no guarding.   Musculoskeletal:      Cervical back: Neck supple. No rigidity.      Right lower le+ Pitting Edema present.      Left lower le+ Pitting Edema present.   Skin:     General: Skin is warm and dry.      Capillary Refill: Capillary  refill takes more than 3 seconds.      Coloration: Skin is pale.   Neurological:      Mental Status: She is alert and oriented to person, place, and time. Mental status is at baseline.      Cranial Nerves: No dysarthria or facial asymmetry.      Sensory: Sensation is intact. No sensory deficit.      Motor: Weakness (Generalized) present. No tremor.   Psychiatric:         Attention and Perception: Attention normal.         Mood and Affect: Mood normal.         Speech: Speech normal.         Behavior: Behavior normal. Behavior is cooperative.         Thought Content: Thought content normal.         Cognition and Memory: Cognition normal.         Judgment: Judgment normal.          ----------------------------------------------------------------------------------------------------------------------  Tele: Appearing A-flutter 80s to 90s on my exam.  ----------------------------------------------------------------------------------------------------------------------  Results from last 7 days   Lab Units 06/01/23  1209 06/01/23  1006   HSTROP T ng/L 36* 44*   PROBNP pg/mL  --  8,240.0*     Results from last 7 days   Lab Units 06/02/23  0132 06/01/23  1032 06/01/23  1006   CRP mg/dL  --   --  1.48*   LACTATE mmol/L  --  1.6  --    WBC 10*3/mm3 21.19*  --  17.25*   HEMOGLOBIN g/dL 8.9*  --  9.5*   HEMATOCRIT % 30.9*  --  31.0*   MCV fL 86.3  --  81.2   MCHC g/dL 28.8*  --  30.6*   PLATELETS 10*3/mm3 450  --  500*   INR   --  1.31*  --      Results from last 7 days   Lab Units 06/01/23  1017   PH, ARTERIAL pH units 7.469*   PO2 ART mm Hg 46.0*   PCO2, ARTERIAL mm Hg 31.1*   HCO3 ART mmol/L 22.6     Results from last 7 days   Lab Units 06/02/23  0132 06/01/23  1006   SODIUM mmol/L 126* 126*   POTASSIUM mmol/L 4.8 4.8   MAGNESIUM mg/dL 2.0  --    CHLORIDE mmol/L 96* 92*   CO2 mmol/L 19.3* 22.7   BUN mg/dL 49* 53*   CREATININE mg/dL 2.22* 2.61*   CALCIUM mg/dL 8.9 8.9   GLUCOSE mg/dL 116* 114*   ALBUMIN g/dL  --  3.6    BILIRUBIN mg/dL  --  0.6   ALK PHOS U/L  --  127*   AST (SGOT) U/L  --  14   ALT (SGPT) U/L  --  11   Estimated Creatinine Clearance: 33 mL/min (A) (by C-G formula based on SCr of 2.22 mg/dL (H)).  No results found for: AMMONIA  Results from last 7 days   Lab Units 06/02/23  0132   CHOLESTEROL mg/dL 64   TRIGLYCERIDES mg/dL 27   HDL CHOL mg/dL 38*   LDL CHOL mg/dL 15     ----------------------------------------------------------------------------------------------------------------------  Imaging Results (Last 24 Hours)     Procedure Component Value Units Date/Time    CT Abdomen Pelvis Without Contrast [[id number]] Collected: 06/01/23 1239     Updated: 06/01/23 1302    Narrative:      EXAM: CT ABDOMEN PELVIS WO CONTRAST-         TECHNIQUE: Multiple axial CT images were obtained from lung bases  through pubic symphysis WITH administration of IV contrast. Reformatted  images in the coronal and/or sagittal plane(s) were generated from the  axial data set to facilitate diagnostic accuracy and/or surgical  planning.  Oral Contrast:NONE.     Radiation dose reduction techniques were utilized per ALARA protocol.  Automated exposure control was initiated through either or itsDapper or  DoseRight software packages by  protocol.    DOSE:     Clinical information Upper abdominal pain.      Comparison 09/11/2022     FINDINGS:     Lower thorax: Clear. No effusions.     Abdomen:     Liver: Homogeneous. No focal hepatic mass or ductal dilatation.     Gallbladder: Distended. There is cholelithiasis. Similar to the prior  study.     Pancreas: Unremarkable. No mass or ductal dilatation.     Spleen: Homogeneous. No splenomegaly.     Adrenals: No mass.     Kidneys/ureters: No mass. No obstructive uropathy.  No evidence of  urolithiasis.     GI tract: Moderate stool burden. There is no evidence of appendicitis     MESENTERY: No free fluid, walled off fluid collections, mesenteric  stranding, or enlarged lymph nodes         Vasculature: Evidence of atherosclerotic vascular disease     Abdominal wall: Stranding in the anterior pelvic wall similar to the  prior study.     Bladder: No focal mass or significant wall thickening     Reproductive: Unremarkable as visualized     Bones: No acute bony abnormality.       Impression:         1. Cholelithiasis and distention of the gallbladder similar to the  previous exam     2.Stranding in the anterior pelvic wall.     3. Other findings as above                    This report was finalized on 6/1/2023 12:41 PM by Dr. Leonard Narvaez MD.       CT Chest Without Contrast Diagnostic [[id number]] Collected: 06/01/23 1245     Updated: 06/01/23 1302    Narrative:      EXAM: CT CHEST WO CONTRAST DIAGNOSTIC-      CLINICAL INDICATION:Short of breath with left upper quadrant/left-sided  chest discomfort      COMPARISON: 01/06/2023     TECHNIQUE: Multiple axial CT images were obtained from lung apex through  upper abdomen without the administration of IV contrast. Reformatted  images in the coronal and/or sagittal plane(s) were generated from the  axial data set to facilitate diagnostic accuracy and/or surgical  planning.     Radiation dose reduction techniques were utilized per ALARA protocol.  Automated exposure control was initiated through either or CareDose or  DoseRigBasys software packages by  protocol.    DOSE (DLP mGy-cm):        FINDINGS:     LUNGS: 2 mm parenchymal nodule in the right lung on image 28 of the  axial series.  Consolidation in the right lower lobe new since the previous exam. This  may represent scarring or pneumonia     HEART: Coronary artery calcifications     MEDIASTINUM: No masses. No enlarged lymph nodes.  No fluid collections.     PLEURA: No pleural effusions     VASCULATURE: No evidence of aneurysm.     BONES: No acute bony abnormality.     VISUALIZED UPPER ABDOMEN:Please see the CT report for the abdomen and  pelvis     Other: None.       Impression:         1.  Consolidation in the right lower lobe.  2. Tiny nodule in the right lung           This report was finalized on 6/1/2023 12:47 PM by Dr. Leonard Narvaez MD.       XR Chest 1 View [[id number]] Collected: 06/01/23 1117     Updated: 06/01/23 1125    Narrative:      XR CHEST 1 VW-     CLINICAL INDICATION: sob        COMPARISON: 09/22/2022      TECHNIQUE: Single frontal view of the chest.     FINDINGS:      LUNGS: Probable mild CHF but portable exam      HEART AND MEDIASTINUM: Heart and mediastinal contours are unremarkable        SKELETON: Bony and soft tissue structures are unremarkable.             Impression:      Probable mild CHF, but portable exam     This report was finalized on 6/1/2023 11:17 AM by Dr. Leonard Narvaez MD.           ----------------------------------------------------------------------------------------------------------------------   I have reviewed the above laboratory values for 06/02/23    Assessment/Plan     Active Hospital Problems    Diagnosis  POA   • **Pneumonia of right lower lobe due to infectious organism [J18.9]  Yes   • Acute kidney injury [N17.9]  Yes   • Hyponatremia [E87.1]  Yes         ASSESSMENT/PLAN:     ACUTE HOSPITAL PROBLEMS:     -Acute on chronic respiratory failure with hypoxia, present on admission, likely due to right lower lobe pneumonia  -Leukocytosis and mild C-RP elevation, present on admission and due to above  -CT of the chest without contrast noted consolidation in the right lower lobe and tiny RL nodule.   -D-Dimer negative.   -WBCs 17K, C-RP 1.48. Lactate and Procal negative.  White count slightly trended up today (21K), likely due to steroids administered in the ED.  -Continue Rocephin and Doxycycline.  -COVID-19 and Flu A/B swab negative.   -Peripheral blood cultures x2 pending.   -Obtain sputum culture if able.   -S Pneumo Ag and Legionella Ag negative.   -MRSA PCR ordered.  -SLP evaluated without s/s aspiration. Regular diet continued.  -Maintain aspiration  precautions.   -Encourage incentive spirometry use.   -Turn, cough, and deep breathing exercises.   -ABG obtained in the ED revealed pO2 in the 40s on room air. She was placed on supplemental O2 and stable. Continue to titrate O2 to maintain SpO2 saturations > 90%.   -Continuous pulse oximetry.   -Continue scheduled DuoNebs.   -Mucinex and PRN Robitussin for supportive care.  -PRN Tylenol for mild pain or fever.   -Repeat CBC with differential in the a.m.  -Will need referral to lung nodule clinic on discharge for new finding of right lung nodule.      -WENDY on CKD stage IIIb, present on admission  -Acute on chronic hyponatremia, present on admission  -Baseline creatinine ~1.4 (2022) although values have fluctuated widely. Creatinine on admission 2.61. BUN 53.  -CT of the abdomen and pelvis without contrast noted stranding around the anterior pelvic wall. Noted no evidence of obstructive uropathy or urolithiasis.   -UA with 2+ protein, otherwise unremarkable.   -Monitor urine output and renal function closely.  -Avoid nephrotoxins as able.  -Sodium 126; given elevated BUN and physical exam findings, feel that this likely represents hypovolemic hyponatremia. Continuous IV fluids initiated. Sodium level stable.   -Urine sodium mid range at 33, Urine Osm 309, Serum Osm 279.   -Repeat BMP this evening to monitor sodium level.        OTHER CHRONIC MEDICAL PROBLEMS:        -CAD s/p CABG  -Hyperlipidemia  -Indeterminate troponin elevation, present on admission, likely due to hypoxia  -Chronic HFpEF  -Paroxysmal atrial fibrillation  -Chronic anticoagulation with Eliquis  -Denies chest pain at time of exam.   -EKG revealed A flutter with controlled rate. Also noted nonspecific ST and T wave abnormality. No evidence of acute ST elevations or depressions.  -HS Troponin T was 44 with -8 delta.   -Continuous cardiac monitoring ordered.   -HgbA1c 7.40%. Lipid profile was with HDL 38, otherwise unremarkable.  -Continue aspirin,  statin.   -Not appearing volume overloaded on exam.   -Strict I's and O's. Daily weights.   -Echo from 9/13/2022 reviewed. Revealed LVEF 56-60% with mildly reduced right ventricular systolic function. Trace MVR. Trace AVR. Moderate TVR. Severe pulmonary HTN. No pericardial effusion.   -Continue Eliquis for stroke prophylaxis.      -Type II diabetes mellitus, insulin dependent  -HgbA1c 7.40%.   -Continue SSI and long-acting insulin per Glucomander protocol.  -Closely monitor blood glucose levels with accuchecks AC and HS.  -Hypoglycemia protocol in place.     -Essential hypertension  -BP appears well controlled.  -Continue current antihypertensive regimen. Adjust as necessary.  -Closely monitor BP per hospital protocol, titrate medications as necessary.     -Chronic normocytic anemia  -Hgb 9.5/Hct 31.0 on admission, appearing near recent baseline.   -Hgb remaining stable.  -No s/s active bleeding on exam.    -Continue multivitamin, B-12 supplementation.   -Repeat CBC in the a.m.   -Plan to transfuse for Hgb < 7.      -JESIKA  -Continuous pulse oximetry, as per plan above.     -Obesity by BMI, Body mass index is 50.36 kg/m².  -Affecting all aspects of care.  -Encourage lifestyle modifications.     -Anxiety  -Supportive care.   -Continue Prozac.     -Physical debility  -Up with assistance.  -Maintain fall precautions.   -PT/OT consults placed.        -----------  -DVT prophylaxis: SubQ Heparin  -GI prophylaxis: PPI.  -Activity: As tolerated. Up with assistance. Fall precautions.  -Disposition plans/anticipated needs: Plans to return to [facility] once medically stable.  -Diet: Regular, CC  -Home supplemental O2: Was on room air prior to admission although now requiring 2 L continuously.      High risk secondary to acute on chronic hypoxemic respiratory failure due to right lower lobe pneumonia, WENDY on CKD, and hyponatremia.         TUCKER Stone  06/02/23  09:10 EDT  Pager #[phone]

## 2023-06-02 NOTE — SIGNIFICANT NOTE
06/02/23 1358   Plan   Plan Comments Pt arrived to ED from [facility] with c/o SOB and LUQ ABD pain; admit to tele; blood cxs pending at time of review; IV Rocephin, IV doxy; neb tx, NS@100 ml/hr; sat 97% 2 L via NC; Na+ 126, BUN 49, Cr 2.22, WBC 21.

## 2023-06-02 NOTE — CASE MANAGEMENT/SOCIAL WORK
Discharge Planning Assessment   Sina     Patient Name: Jolly Garcia  MRN: [id number]  Today's Date: 6/2/2023    Admit Date: 6/1/2023    Plan: Pt admitted from [facility].  Pt has a skilled [facility] per Vicky.  SS will follow and assist with discharge disposition.     Discharge Plan     Row Name 06/02/23 1300       Plan    Plan Pt admitted from [facility].  Pt has a skilled bed reserved at [facility] for 14 days per Vicky.  SS will follow and assist with discharge disposition.              Continued Care and Services - Admitted Since 6/1/2023    Coordination has not been started for this encounter.          Demographic Summary     Row Name 06/02/23 1300       General Information    Admission Type inpatient    Referral Source nursing    Reason for Consult discharge planning    Preferred Language English              DEREK Wells

## 2023-06-02 NOTE — NURSING NOTE
Wound consult for denuded area to the coccyx and bilateral buttocks. Area presents as Moisture associated skin damage with a moist, pink brittany wound skin and small breaks to the epidermis. New order for Magic barrier cream BID and use Z-guard in between treatments. Pressure injury prevention protocols in place.    06/02/23 1135   Wound 06/01/23 1932 coccyx MASD (Moisture associated skin damage)   Placement Date/Time: 06/01/23 1932   Present on Hospital Admission: Yes  Location: coccyx  Primary Wound Type: MASD (Moisture associated skin damage)   Red (%), Wound Tissue Color 100   Periwound petechiae;warm;moist   Periwound Temperature warm   Periwound Skin Turgor soft   Edges irregular   Drainage Characteristics/Odor serous   Drainage Amount scant   Care, Wound   (magic barrier cream BID)   Skin Interventions   Pressure Reduction Devices pressure-redistributing mattress utilized   Pressure Reduction Techniques frequent weight shift encouraged

## 2023-06-03 LAB
ANION GAP SERPL CALCULATED.3IONS-SCNC: 11 MMOL/L (ref 5–15)
ANION GAP SERPL CALCULATED.3IONS-SCNC: 12 MMOL/L (ref 5–15)
BASOPHILS # BLD AUTO: 0.04 10*3/MM3 (ref 0–0.2)
BASOPHILS NFR BLD AUTO: 0.2 % (ref 0–1.5)
BUN SERPL-MCNC: 52 MG/DL (ref 8–23)
BUN SERPL-MCNC: 54 MG/DL (ref 8–23)
BUN/CREAT SERPL: 20.6 (ref 7–25)
BUN/CREAT SERPL: 21.1 (ref 7–25)
CALCIUM SPEC-SCNC: 8.4 MG/DL (ref 8.6–10.5)
CALCIUM SPEC-SCNC: 8.6 MG/DL (ref 8.6–10.5)
CHLORIDE SERPL-SCNC: 94 MMOL/L (ref 98–107)
CHLORIDE SERPL-SCNC: 96 MMOL/L (ref 98–107)
CO2 SERPL-SCNC: 19 MMOL/L (ref 22–29)
CO2 SERPL-SCNC: 20 MMOL/L (ref 22–29)
CREAT SERPL-MCNC: 2.46 MG/DL (ref 0.57–1)
CREAT SERPL-MCNC: 2.62 MG/DL (ref 0.57–1)
DEPRECATED RDW RBC AUTO: 49.2 FL (ref 37–54)
EGFRCR SERPLBLD CKD-EPI 2021: 18.9 ML/MIN/1.73
EGFRCR SERPLBLD CKD-EPI 2021: 20.4 ML/MIN/1.73
EOSINOPHIL # BLD AUTO: 0.03 10*3/MM3 (ref 0–0.4)
EOSINOPHIL NFR BLD AUTO: 0.2 % (ref 0.3–6.2)
ERYTHROCYTE [DISTWIDTH] IN BLOOD BY AUTOMATED COUNT: 16.3 % (ref 12.3–15.4)
GLUCOSE BLDC GLUCOMTR-MCNC: 114 MG/DL (ref 70–130)
GLUCOSE BLDC GLUCOMTR-MCNC: 131 MG/DL (ref 70–130)
GLUCOSE BLDC GLUCOMTR-MCNC: 135 MG/DL (ref 70–130)
GLUCOSE BLDC GLUCOMTR-MCNC: 143 MG/DL (ref 70–130)
GLUCOSE BLDC GLUCOMTR-MCNC: 78 MG/DL (ref 70–130)
GLUCOSE SERPL-MCNC: 125 MG/DL (ref 65–99)
GLUCOSE SERPL-MCNC: 79 MG/DL (ref 65–99)
HCT VFR BLD AUTO: 28.2 % (ref 34–46.6)
HGB BLD-MCNC: 8.3 G/DL (ref 12–15.9)
IMM GRANULOCYTES # BLD AUTO: 0.09 10*3/MM3 (ref 0–0.05)
IMM GRANULOCYTES NFR BLD AUTO: 0.5 % (ref 0–0.5)
LYMPHOCYTES # BLD AUTO: 1.35 10*3/MM3 (ref 0.7–3.1)
LYMPHOCYTES NFR BLD AUTO: 7.6 % (ref 19.6–45.3)
MCH RBC QN AUTO: 24.3 PG (ref 26.6–33)
MCHC RBC AUTO-ENTMCNC: 29.4 G/DL (ref 31.5–35.7)
MCV RBC AUTO: 82.7 FL (ref 79–97)
MONOCYTES # BLD AUTO: 1.23 10*3/MM3 (ref 0.1–0.9)
MONOCYTES NFR BLD AUTO: 6.9 % (ref 5–12)
NEUTROPHILS NFR BLD AUTO: 15.07 10*3/MM3 (ref 1.7–7)
NEUTROPHILS NFR BLD AUTO: 84.6 % (ref 42.7–76)
NRBC BLD AUTO-RTO: 0 /100 WBC (ref 0–0.2)
PLATELET # BLD AUTO: 451 10*3/MM3 (ref 140–450)
PMV BLD AUTO: 8.3 FL (ref 6–12)
POTASSIUM SERPL-SCNC: 4.6 MMOL/L (ref 3.5–5.2)
POTASSIUM SERPL-SCNC: 4.7 MMOL/L (ref 3.5–5.2)
RBC # BLD AUTO: 3.41 10*6/MM3 (ref 3.77–5.28)
SODIUM SERPL-SCNC: 125 MMOL/L (ref 136–145)
SODIUM SERPL-SCNC: 127 MMOL/L (ref 136–145)
WBC NRBC COR # BLD: 17.81 10*3/MM3 (ref 3.4–10.8)

## 2023-06-03 PROCEDURE — 25010000002 FUROSEMIDE PER 20 MG

## 2023-06-03 PROCEDURE — 63710000001 INSULIN LISPRO (HUMAN) PER 5 UNITS

## 2023-06-03 PROCEDURE — 63710000001 INSULIN GLARGINE PER 5 UNITS

## 2023-06-03 PROCEDURE — 80048 BASIC METABOLIC PNL TOTAL CA: CPT

## 2023-06-03 PROCEDURE — 94799 UNLISTED PULMONARY SVC/PX: CPT

## 2023-06-03 PROCEDURE — 99233 SBSQ HOSP IP/OBS HIGH 50: CPT

## 2023-06-03 PROCEDURE — 25010000002 CEFTRIAXONE PER 250 MG

## 2023-06-03 PROCEDURE — 94664 DEMO&/EVAL PT USE INHALER: CPT

## 2023-06-03 PROCEDURE — 82948 REAGENT STRIP/BLOOD GLUCOSE: CPT

## 2023-06-03 PROCEDURE — 85025 COMPLETE CBC W/AUTO DIFF WBC: CPT

## 2023-06-03 PROCEDURE — 94761 N-INVAS EAR/PLS OXIMETRY MLT: CPT

## 2023-06-03 RX ORDER — FUROSEMIDE 10 MG/ML
40 INJECTION INTRAMUSCULAR; INTRAVENOUS ONCE
Status: COMPLETED | OUTPATIENT
Start: 2023-06-03 | End: 2023-06-03

## 2023-06-03 RX ADMIN — IPRATROPIUM BROMIDE AND ALBUTEROL SULFATE 3 ML: 2.5; .5 SOLUTION RESPIRATORY (INHALATION) at 07:13

## 2023-06-03 RX ADMIN — DOCUSATE SODIUM 50 MG AND SENNOSIDES 8.6 MG 2 TABLET: 8.6; 5 TABLET, FILM COATED ORAL at 08:51

## 2023-06-03 RX ADMIN — GUAIFENESIN 1200 MG: 600 TABLET, EXTENDED RELEASE ORAL at 08:51

## 2023-06-03 RX ADMIN — FUROSEMIDE 40 MG: 10 INJECTION, SOLUTION INTRAVENOUS at 11:00

## 2023-06-03 RX ADMIN — LATANOPROST 1 DROP: 50 SOLUTION OPHTHALMIC at 21:04

## 2023-06-03 RX ADMIN — SALINE NASAL SPRAY 2 SPRAY: 1.5 SOLUTION NASAL at 01:52

## 2023-06-03 RX ADMIN — INSULIN LISPRO 3 UNITS: 100 INJECTION, SOLUTION INTRAVENOUS; SUBCUTANEOUS at 08:51

## 2023-06-03 RX ADMIN — SALINE NASAL SPRAY 2 SPRAY: 1.5 SOLUTION NASAL at 21:04

## 2023-06-03 RX ADMIN — APIXABAN 5 MG: 5 TABLET, FILM COATED ORAL at 08:51

## 2023-06-03 RX ADMIN — METOPROLOL TARTRATE 50 MG: 50 TABLET, FILM COATED ORAL at 21:04

## 2023-06-03 RX ADMIN — HYDRALAZINE HYDROCHLORIDE 25 MG: 25 TABLET, FILM COATED ORAL at 21:04

## 2023-06-03 RX ADMIN — GUAIFENESIN 1200 MG: 600 TABLET, EXTENDED RELEASE ORAL at 21:04

## 2023-06-03 RX ADMIN — SALINE NASAL SPRAY 2 SPRAY: 1.5 SOLUTION NASAL at 20:15

## 2023-06-03 RX ADMIN — CEFTRIAXONE 1 G: 1 INJECTION, POWDER, FOR SOLUTION INTRAMUSCULAR; INTRAVENOUS at 08:52

## 2023-06-03 RX ADMIN — IPRATROPIUM BROMIDE AND ALBUTEROL SULFATE 3 ML: 2.5; .5 SOLUTION RESPIRATORY (INHALATION) at 00:43

## 2023-06-03 RX ADMIN — INSULIN GLARGINE 22 UNITS: 100 INJECTION, SOLUTION SUBCUTANEOUS at 22:09

## 2023-06-03 RX ADMIN — ISOSORBIDE MONONITRATE 30 MG: 30 TABLET, EXTENDED RELEASE ORAL at 08:51

## 2023-06-03 RX ADMIN — METOPROLOL TARTRATE 50 MG: 50 TABLET, FILM COATED ORAL at 08:51

## 2023-06-03 RX ADMIN — APIXABAN 5 MG: 5 TABLET, FILM COATED ORAL at 21:04

## 2023-06-03 RX ADMIN — Medication 10 ML: at 08:52

## 2023-06-03 RX ADMIN — DOXYCYCLINE 100 MG: 100 INJECTION, POWDER, LYOPHILIZED, FOR SOLUTION INTRAVENOUS at 21:04

## 2023-06-03 RX ADMIN — VITAMINS A AND D OINTMENT 1 APPLICATION: 15.5; 53.4 OINTMENT TOPICAL at 21:14

## 2023-06-03 RX ADMIN — DOCUSATE SODIUM 50 MG AND SENNOSIDES 8.6 MG 2 TABLET: 8.6; 5 TABLET, FILM COATED ORAL at 21:04

## 2023-06-03 RX ADMIN — ACETAMINOPHEN 1000 MG: 500 TABLET ORAL at 05:12

## 2023-06-03 RX ADMIN — Medication 1000 MCG: at 08:51

## 2023-06-03 RX ADMIN — Medication 10 ML: at 22:10

## 2023-06-03 RX ADMIN — SALINE NASAL SPRAY 2 SPRAY: 1.5 SOLUTION NASAL at 10:40

## 2023-06-03 RX ADMIN — FLUOXETINE HYDROCHLORIDE 10 MG: 10 CAPSULE ORAL at 08:51

## 2023-06-03 RX ADMIN — SALINE NASAL SPRAY 2 SPRAY: 1.5 SOLUTION NASAL at 08:53

## 2023-06-03 RX ADMIN — ATORVASTATIN CALCIUM 20 MG: 20 TABLET, FILM COATED ORAL at 21:04

## 2023-06-03 RX ADMIN — Medication 5 MG: at 21:04

## 2023-06-03 RX ADMIN — PANTOPRAZOLE SODIUM 40 MG: 40 TABLET, DELAYED RELEASE ORAL at 05:12

## 2023-06-03 RX ADMIN — VITAMINS A AND D OINTMENT 1 APPLICATION: 15.5; 53.4 OINTMENT TOPICAL at 08:53

## 2023-06-03 RX ADMIN — HYDRALAZINE HYDROCHLORIDE 25 MG: 25 TABLET, FILM COATED ORAL at 17:53

## 2023-06-03 RX ADMIN — Medication 1 TABLET: at 08:51

## 2023-06-03 RX ADMIN — DOXYCYCLINE 100 MG: 100 INJECTION, POWDER, LYOPHILIZED, FOR SOLUTION INTRAVENOUS at 11:00

## 2023-06-03 RX ADMIN — GUAIFENESIN AND DEXTROMETHORPHAN 5 ML: 100; 10 SYRUP ORAL at 10:55

## 2023-06-03 RX ADMIN — SALINE NASAL SPRAY 2 SPRAY: 1.5 SOLUTION NASAL at 05:12

## 2023-06-03 RX ADMIN — ASPIRIN 81 MG: 81 TABLET, CHEWABLE ORAL at 08:53

## 2023-06-03 RX ADMIN — HYDRALAZINE HYDROCHLORIDE 25 MG: 25 TABLET, FILM COATED ORAL at 08:51

## 2023-06-03 NOTE — PLAN OF CARE
Goal Outcome Evaluation:     Patient has not rested well this shift. Patient has complained of pain, PRN medication provided as available, position adjusted etc. Patient has been argumentative, frequently seeking staff, repeated requests throughout the shift. Patient has remained of 2L NC. No indicators of acute distress noted. Will continue to follow plan of care this shift.

## 2023-06-03 NOTE — PROGRESS NOTES
Patient Identification:  Name:  Jolly Garcia  Age:  72 y.o.  Sex:  female  :  1950  MRN:  [id number]  Visit Number:  [id number]  Primary Care Provider:  April Cantrell APRN    Length of stay:  2    Subjective/Interval History/Consultants/Procedures     Chief complaint: Follow-up, acute on chronic respiratory failure with hypoxia due to right lower lobe pneumonia    Subjective/Interval History:  Jolly Garcia is our 72 y.o. female patient admitted on 2023 from local skilled nursing facility due to acute on chronic respiratory failure with hypoxia secondary to right lower lobe pneumonia.  She also presented with leukocytosis and mild CRP elevation.  CT of the chest without contrast noted consolidation in the right lower lobe and a tiny right lung nodule.  D-dimer was negative.  Lactate and Pro-Humberto also nonelevated.  Received Doxycycline and Rocephin in the emergency department. Continued on admission. Peripheral blood cultures x2 pending. S pneumo Ag and Legionella Ag negative. MRSA PCR ordered.  PT/OT/SLP consults were placed. Passed swallow evaluation without s/s aspiration. She is receiving scheduled DuoNebs and Mucinex. Robitussin available as needed for cough. She also presented with WENDY on CKD stage IIIb and acute on chronic hyponatremia. Baseline creatinine around 1.4 (), although values have fluctuated wildly. Creatinine on admission was 2.61 and BUN 53. Sodium 126. CT of the abdomen and pelvis without contrast noted stranding around the pelvic wall. Negative for obstructive uropathy or urolithiasis. Urinalysis with 2+ protein, otherwise unremarkable.  Continuous IV hydration initiated with normal saline at 100 mL an hour on admission. Sodium has slightly improved. Creatinine initially improved to 2.2, however has yet again risen to 2.6. Plan to hold fluids and administer 1x dose of IV Lasix as patient appears to have worsening edema in BLE. Obtain bilateral renal ultrasound.  Respiratory status remains the same. Stable on 2L.     Procedures/Imaging:  Chest x-ray  CT of the chest without contrast  CT of the abdomen and pelvis without contrast    On today's exam, the patient was resting in bed; just finished eating breakfast. No signs or symptoms of acute distress. She states that she did not sleep well last night. Still feels generally tired. Breathing and cough reportedly the same. Stable on 2L. Patient requested a warm blanket and a cup of coffee. Provided. Notified patient that white count has trended down and sodium level has slightly improved however renal function has worsened again. Discussed plan. She voiced agreement and understanding with no further questions or concerns at this time.     Discussed with AM FABIAN Foley with no acute events overnight. Room location at the time of evaluation was 311b.  Discussed with admitting physician, Rayshawn Villanueva DO.  ----------------------------------------------------------------------------------------------------------------------  Current Hospital Meds:  apixaban, 5 mg, Oral, Q12H  aspirin, 81 mg, Oral, Daily  atorvastatin, 20 mg, Oral, Nightly  cefTRIAXone, 1 g, Intravenous, Q24H  doxycycline, 100 mg, Intravenous, Q12H  FLUoxetine, 10 mg, Oral, Daily  furosemide, 40 mg, Intravenous, Once  guaiFENesin, 1,200 mg, Oral, Q12H  hydrALAZINE, 25 mg, Oral, TID  insulin glargine, 1-200 Units, Subcutaneous, Nightly - Glucommander  insulin lispro, 1-200 Units, Subcutaneous, 4x Daily With Meals & Nightly  ipratropium-albuterol, 3 mL, Nebulization, 4x Daily - RT  isosorbide mononitrate, 30 mg, Oral, Daily  latanoprost, 1 drop, Both Eyes, Nightly  magic barrier cream, 1 application, Topical, BID  metoprolol tartrate, 50 mg, Oral, BID  multivitamin with minerals, 1 tablet, Oral, Daily  pantoprazole, 40 mg, Oral, Q AM  senna-docusate sodium, 2 tablet, Oral, BID  sodium chloride, 10 mL, Intravenous, Q12H  sodium chloride, 2 spray, Nasal,  Q2H  vitamin B-12, 1,000 mcg, Oral, Daily      sodium chloride, 100 mL/hr, Last Rate: 100 mL/hr (06/01/23 1905)      ----------------------------------------------------------------------------------------------------------------------      Objective     Vital Signs:  Temp:  [97.1 °F (36.2 °C)-98.1 °F (36.7 °C)] 97.1 °F (36.2 °C)  Heart Rate:  [] 91  Resp:  [18-28] 22  BP: (120-145)/(63-84) 145/77      06/01/23  1645 06/02/23  0455 06/03/23  0500   Weight: (!) 137 kg (302 lb 3.2 oz) (!) 139 kg (306 lb 8 oz) (!) 146 kg (322 lb 6.4 oz)     Body mass index is 52.04 kg/m².    Intake/Output Summary (Last 24 hours) at 6/3/2023 1037  Last data filed at 6/3/2023 0800  Gross per 24 hour   Intake 480 ml   Output --   Net 480 ml       I/O this shift:  In: 360 [P.O.:360]  Out: -   Diet: Cardiac Diets, Diabetic Diets; Healthy Heart (2-3 Na+); Consistent Carbohydrate; Texture: Regular Texture (IDDSI 7); Fluid Consistency: Thin (IDDSI 0)  ----------------------------------------------------------------------------------------------------------------------    Physical Exam  Vitals reviewed.   Constitutional:       General: She is awake. She is not in acute distress.     Appearance: She is obese. She is not diaphoretic.      Interventions: Nasal cannula in place.   HENT:      Head: Normocephalic and atraumatic.      Mouth/Throat:      Mouth: Mucous membranes are moist.      Pharynx: Oropharynx is clear.   Eyes:      Extraocular Movements: Extraocular movements intact.      Pupils: Pupils are equal, round, and reactive to light.   Cardiovascular:      Rate and Rhythm: Normal rate. Rhythm irregular.      Pulses:           Dorsalis pedis pulses are 1+ on the right side and 1+ on the left side.      Heart sounds: Normal heart sounds. No murmur heard.    No friction rub.   Pulmonary:      Effort: Pulmonary effort is normal. No accessory muscle usage, respiratory distress or retractions.      Breath sounds: Decreased breath sounds  present. No wheezing, rhonchi or rales.      Comments: Lung sounds diminished bilaterally, more so in the bases.  No wheezing or rhonchi.  Abdominal:      General: Bowel sounds are normal. There is distension (Mild, chronic appearing).      Palpations: Abdomen is soft.      Tenderness: There is no abdominal tenderness. There is no guarding.   Musculoskeletal:      Cervical back: Neck supple. No rigidity.      Right lower le+ Pitting Edema present.      Left lower le+ Pitting Edema present.   Skin:     General: Skin is warm and dry.      Capillary Refill: Capillary refill takes more than 3 seconds.      Coloration: Skin is pale.   Neurological:      Mental Status: She is alert and oriented to person, place, and time. Mental status is at baseline.      Cranial Nerves: No dysarthria or facial asymmetry.      Sensory: Sensation is intact. No sensory deficit.      Motor: Weakness (Generalized) present. No tremor.   Psychiatric:         Attention and Perception: Attention normal.         Mood and Affect: Mood normal.         Speech: Speech normal.         Behavior: Behavior normal. Behavior is cooperative.         Thought Content: Thought content normal.         Cognition and Memory: Cognition normal.         Judgment: Judgment normal.        ----------------------------------------------------------------------------------------------------------------------  Tele: Appearing A-flutter 80s to 90s on my exam.  ----------------------------------------------------------------------------------------------------------------------  Results from last 7 days   Lab Units 23  1209 23  1006   HSTROP T ng/L 36* 44*   PROBNP pg/mL  --  8,240.0*       Results from last 7 days   Lab Units 23  0429 23  0132 23  1032 23  1006   CRP mg/dL  --   --   --  1.48*   LACTATE mmol/L  --   --  1.6  --    WBC 10*3/mm3 17.81* 21.19*  --  17.25*   HEMOGLOBIN g/dL 8.3* 8.9*  --  9.5*   HEMATOCRIT % 28.2*  30.9*  --  31.0*   MCV fL 82.7 86.3  --  81.2   MCHC g/dL 29.4* 28.8*  --  30.6*   PLATELETS 10*3/mm3 451* 450  --  500*   INR   --   --  1.31*  --        Results from last 7 days   Lab Units 06/01/23  1017   PH, ARTERIAL pH units 7.469*   PO2 ART mm Hg 46.0*   PCO2, ARTERIAL mm Hg 31.1*   HCO3 ART mmol/L 22.6       Results from last 7 days   Lab Units 06/03/23  0429 06/02/23  1641 06/02/23  0132 06/01/23  1006   SODIUM mmol/L 127* 129* 126* 126*   POTASSIUM mmol/L 4.6 4.7 4.8 4.8   MAGNESIUM mg/dL  --   --  2.0  --    CHLORIDE mmol/L 96* 96* 96* 92*   CO2 mmol/L 20.0* 20.8* 19.3* 22.7   BUN mg/dL 54* 52* 49* 53*   CREATININE mg/dL 2.62* 2.30* 2.22* 2.61*   CALCIUM mg/dL 8.6 8.6 8.9 8.9   GLUCOSE mg/dL 79 155* 116* 114*   ALBUMIN g/dL  --   --   --  3.6   BILIRUBIN mg/dL  --   --   --  0.6   ALK PHOS U/L  --   --   --  127*   AST (SGOT) U/L  --   --   --  14   ALT (SGPT) U/L  --   --   --  11     Estimated Creatinine Clearance: 28.8 mL/min (A) (by C-G formula based on SCr of 2.62 mg/dL (H)).  No results found for: AMMONIA  Results from last 7 days   Lab Units 06/02/23  0132   CHOLESTEROL mg/dL 64   TRIGLYCERIDES mg/dL 27   HDL CHOL mg/dL 38*   LDL CHOL mg/dL 15       ----------------------------------------------------------------------------------------------------------------------  Imaging Results (Last 24 Hours)       ** No results found for the last 24 hours. **          ----------------------------------------------------------------------------------------------------------------------   I have reviewed the above laboratory values for 06/03/23    Assessment/Plan     Active Hospital Problems    Diagnosis  POA    **Pneumonia of right lower lobe due to infectious organism [J18.9]  Yes    Acute kidney injury [N17.9]  Yes    Hyponatremia [E87.1]  Yes         ASSESSMENT/PLAN:     ACUTE HOSPITAL PROBLEMS:     -Acute on chronic respiratory failure with hypoxia, present on admission, likely due to right lower lobe  pneumonia  -Leukocytosis and mild C-RP elevation, present on admission and due to above  -CT of the chest without contrast noted consolidation in the right lower lobe and tiny RL nodule.   -D-Dimer negative.   -WBCs 17K, C-RP 1.48. Lactate and Procal negative. White count initially increased after admission (likely due to IV steroids administered in the ED), now trending back down.   -Continue Rocephin and Doxycycline.  -COVID-19 and Flu A/B swab negative.   -Peripheral blood cultures x2 pending. No growth to date.  -Obtain sputum culture if able.   -S Pneumo Ag and Legionella Ag negative.   -MRSA PCR ordered, not yet collected.  -SLP evaluated without s/s aspiration. Regular diet continued.  -Maintain aspiration precautions.   -Encourage incentive spirometry use.   -Turn, cough, and deep breathing exercises.   -ABG obtained in the ED revealed pO2 in the 40s on room air. She was placed on supplemental O2 and stable. Continue to titrate O2 to maintain SpO2 saturations > 90%.   -Continuous pulse oximetry.   -Continue scheduled DuoNebs.   -Mucinex and PRN Robitussin for supportive care.  -PRN Tylenol for mild pain or fever.   -Repeat CBC with differential and C-RP in the a.m.  -Will need referral to lung nodule clinic on discharge for new finding of right lung nodule.      -WENDY on CKD stage IIIb, present on admission  -Acute on chronic hyponatremia, present on admission  -Baseline creatinine ~1.4 (2022) although values have fluctuated widely. Creatinine on admission 2.61. BUN 53.   -CT of the abdomen and pelvis without contrast noted stranding around the anterior pelvic wall. Noted no evidence of obstructive uropathy or urolithiasis.   -UA with 2+ protein, otherwise unremarkable.   -Continue to monitor urine output and renal function closely.  -Avoid nephrotoxins as able.  -Sodium was 126; given elevated BUN and physical exam findings, feel that this likely represented hypovolemic hyponatremia. Continuous IV fluids  initiated. Sodium level slowly improving.   -Urine sodium mid range at 33, Urine Osm 309, Serum Osm 279.   -Creatinine initially improved s/p administration of fluids, but now trending back up. Evidence of volume overload on physical exam w/ worsening BLE edema. Hold fluids. Administer 40 mg IV Lasix. Repeat BMP at 14:00. Consider Nephrology consult.         OTHER CHRONIC MEDICAL PROBLEMS:        -CAD s/p CABG  -Hyperlipidemia  -Indeterminate troponin elevation, present on admission, likely due to hypoxia  -Chronic HFpEF  -Paroxysmal atrial fibrillation  -Chronic anticoagulation with Eliquis  -Denies chest pain at time of exam.   -EKG revealed A flutter with controlled rate. Also noted nonspecific ST and T wave abnormality. No evidence of acute ST elevations or depressions.  -HS Troponin T was 44 with -8 delta.   -Continuous cardiac monitoring ordered.   -HgbA1c 7.40%. Lipid profile was with HDL 38, otherwise unremarkable.  -Continue aspirin, statin.   -Strict I's and O's. Daily weights.   -Echo from 9/13/2022 reviewed. Revealed LVEF 56-60% with mildly reduced right ventricular systolic function. Trace MVR. Trace AVR. Moderate TVR. Severe pulmonary HTN. No pericardial effusion.   -Continue Eliquis for stroke prophylaxis.      -Type II diabetes mellitus, insulin dependent  -HgbA1c 7.40%.   -Continue SSI and long-acting insulin per Glucomander protocol.  -Closely monitor blood glucose levels with accuchecks AC and HS.  -Hypoglycemia protocol in place.     -Essential hypertension  -BP appears well controlled.  -Continue current antihypertensive regimen. Adjust as necessary.  -Closely monitor BP per hospital protocol, titrate medications as necessary.     -Chronic normocytic anemia  -Hgb 9.5/Hct 31.0 on admission, appearing near recent baseline.   -Hgb remaining stable.  -No s/s active bleeding on exam.    -Continue multivitamin, B-12 supplementation.   -Repeat CBC in the a.m.   -Plan to transfuse for Hgb < 7.       -JESIKA  -Continuous pulse oximetry, as per plan above.     -Obesity by BMI, Body mass index is 50.36 kg/m².  -Affecting all aspects of care.  -Encourage lifestyle modifications.     -Anxiety  -Supportive care.   -Continue Prozac.     -Physical debility  -Up with assistance.  -Maintain fall precautions.   -PT/OT consults placed.        -----------  -DVT prophylaxis: SubQ Heparin  -GI prophylaxis: PPI.  -Activity: As tolerated. Up with assistance. Fall precautions.  -Disposition plans/anticipated needs: Plans to return to [facility] once medically stable.  -Diet: Regular, CC  -Home supplemental O2: Was on room air prior to admission although now requiring 2 L continuously.      High risk secondary to acute on chronic hypoxemic respiratory failure due to right lower lobe pneumonia, WENDY on CKD, and hyponatremia.         TUCKER Stone  06/03/23  10:37 EDT  Pager #[phone]

## 2023-06-04 ENCOUNTER — APPOINTMENT (OUTPATIENT)
Dept: ULTRASOUND IMAGING | Facility: HOSPITAL | Age: 73
End: 2023-06-04
Payer: MEDICARE

## 2023-06-04 LAB
ANION GAP SERPL CALCULATED.3IONS-SCNC: 11 MMOL/L (ref 5–15)
BACTERIA UR QL AUTO: ABNORMAL /HPF
BASOPHILS # BLD AUTO: 0.03 10*3/MM3 (ref 0–0.2)
BASOPHILS NFR BLD AUTO: 0.2 % (ref 0–1.5)
BILIRUB UR QL STRIP: NEGATIVE
BUN SERPL-MCNC: 54 MG/DL (ref 8–23)
BUN/CREAT SERPL: 23.9 (ref 7–25)
CALCIUM SPEC-SCNC: 8.5 MG/DL (ref 8.6–10.5)
CHLORIDE SERPL-SCNC: 98 MMOL/L (ref 98–107)
CLARITY UR: CLEAR
CO2 SERPL-SCNC: 21 MMOL/L (ref 22–29)
COLOR UR: YELLOW
CREAT SERPL-MCNC: 2.26 MG/DL (ref 0.57–1)
CRP SERPL-MCNC: 6.88 MG/DL (ref 0–0.5)
DEPRECATED RDW RBC AUTO: 48.1 FL (ref 37–54)
EGFRCR SERPLBLD CKD-EPI 2021: 22.5 ML/MIN/1.73
EOSINOPHIL # BLD AUTO: 0.06 10*3/MM3 (ref 0–0.4)
EOSINOPHIL NFR BLD AUTO: 0.4 % (ref 0.3–6.2)
ERYTHROCYTE [DISTWIDTH] IN BLOOD BY AUTOMATED COUNT: 16.2 % (ref 12.3–15.4)
FERRITIN SERPL-MCNC: 41.51 NG/ML (ref 13–150)
GLUCOSE BLDC GLUCOMTR-MCNC: 118 MG/DL (ref 70–130)
GLUCOSE BLDC GLUCOMTR-MCNC: 119 MG/DL (ref 70–130)
GLUCOSE BLDC GLUCOMTR-MCNC: 126 MG/DL (ref 70–130)
GLUCOSE BLDC GLUCOMTR-MCNC: 95 MG/DL (ref 70–130)
GLUCOSE SERPL-MCNC: 110 MG/DL (ref 65–99)
GLUCOSE UR STRIP-MCNC: NEGATIVE MG/DL
HCT VFR BLD AUTO: 29.4 % (ref 34–46.6)
HGB BLD-MCNC: 8.9 G/DL (ref 12–15.9)
HGB UR QL STRIP.AUTO: NEGATIVE
HYALINE CASTS UR QL AUTO: ABNORMAL /LPF
IMM GRANULOCYTES # BLD AUTO: 0.1 10*3/MM3 (ref 0–0.05)
IMM GRANULOCYTES NFR BLD AUTO: 0.6 % (ref 0–0.5)
IRON 24H UR-MRATE: 18 MCG/DL (ref 37–145)
IRON SATN MFR SERPL: 5 % (ref 20–50)
KETONES UR QL STRIP: ABNORMAL
LEUKOCYTE ESTERASE UR QL STRIP.AUTO: NEGATIVE
LYMPHOCYTES # BLD AUTO: 1.1 10*3/MM3 (ref 0.7–3.1)
LYMPHOCYTES NFR BLD AUTO: 7 % (ref 19.6–45.3)
MCH RBC QN AUTO: 24.8 PG (ref 26.6–33)
MCHC RBC AUTO-ENTMCNC: 30.3 G/DL (ref 31.5–35.7)
MCV RBC AUTO: 81.9 FL (ref 79–97)
MONOCYTES # BLD AUTO: 1.17 10*3/MM3 (ref 0.1–0.9)
MONOCYTES NFR BLD AUTO: 7.4 % (ref 5–12)
NEUTROPHILS NFR BLD AUTO: 13.33 10*3/MM3 (ref 1.7–7)
NEUTROPHILS NFR BLD AUTO: 84.4 % (ref 42.7–76)
NITRITE UR QL STRIP: NEGATIVE
NRBC BLD AUTO-RTO: 0 /100 WBC (ref 0–0.2)
PH UR STRIP.AUTO: 5.5 [PH] (ref 5–8)
PLATELET # BLD AUTO: 446 10*3/MM3 (ref 140–450)
PMV BLD AUTO: 8 FL (ref 6–12)
POTASSIUM SERPL-SCNC: 4.7 MMOL/L (ref 3.5–5.2)
PROT UR QL STRIP: ABNORMAL
RBC # BLD AUTO: 3.59 10*6/MM3 (ref 3.77–5.28)
RBC # UR STRIP: ABNORMAL /HPF
REF LAB TEST METHOD: ABNORMAL
SODIUM SERPL-SCNC: 130 MMOL/L (ref 136–145)
SP GR UR STRIP: 1.02 (ref 1–1.03)
SQUAMOUS #/AREA URNS HPF: ABNORMAL /HPF
TIBC SERPL-MCNC: 340 MCG/DL (ref 298–536)
TRANSFERRIN SERPL-MCNC: 228 MG/DL (ref 200–360)
UROBILINOGEN UR QL STRIP: ABNORMAL
WBC # UR STRIP: ABNORMAL /HPF
WBC NRBC COR # BLD: 15.79 10*3/MM3 (ref 3.4–10.8)
YEAST URNS QL MICRO: ABNORMAL /HPF

## 2023-06-04 PROCEDURE — 25010000002 CEFTRIAXONE PER 250 MG

## 2023-06-04 PROCEDURE — 76775 US EXAM ABDO BACK WALL LIM: CPT

## 2023-06-04 PROCEDURE — 86140 C-REACTIVE PROTEIN: CPT

## 2023-06-04 PROCEDURE — 85025 COMPLETE CBC W/AUTO DIFF WBC: CPT

## 2023-06-04 PROCEDURE — 94761 N-INVAS EAR/PLS OXIMETRY MLT: CPT

## 2023-06-04 PROCEDURE — 82948 REAGENT STRIP/BLOOD GLUCOSE: CPT

## 2023-06-04 PROCEDURE — 76775 US EXAM ABDO BACK WALL LIM: CPT | Performed by: RADIOLOGY

## 2023-06-04 PROCEDURE — 80048 BASIC METABOLIC PNL TOTAL CA: CPT

## 2023-06-04 PROCEDURE — 83540 ASSAY OF IRON: CPT | Performed by: INTERNAL MEDICINE

## 2023-06-04 PROCEDURE — 82570 ASSAY OF URINE CREATININE: CPT | Performed by: INTERNAL MEDICINE

## 2023-06-04 PROCEDURE — 81001 URINALYSIS AUTO W/SCOPE: CPT | Performed by: INTERNAL MEDICINE

## 2023-06-04 PROCEDURE — 99233 SBSQ HOSP IP/OBS HIGH 50: CPT

## 2023-06-04 PROCEDURE — 63710000001 INSULIN LISPRO (HUMAN) PER 5 UNITS

## 2023-06-04 PROCEDURE — 94799 UNLISTED PULMONARY SVC/PX: CPT

## 2023-06-04 PROCEDURE — 84466 ASSAY OF TRANSFERRIN: CPT | Performed by: INTERNAL MEDICINE

## 2023-06-04 PROCEDURE — 82728 ASSAY OF FERRITIN: CPT | Performed by: INTERNAL MEDICINE

## 2023-06-04 PROCEDURE — 82043 UR ALBUMIN QUANTITATIVE: CPT | Performed by: INTERNAL MEDICINE

## 2023-06-04 PROCEDURE — 63710000001 INSULIN GLARGINE PER 5 UNITS

## 2023-06-04 PROCEDURE — 84156 ASSAY OF PROTEIN URINE: CPT | Performed by: INTERNAL MEDICINE

## 2023-06-04 RX ORDER — FLUTICASONE PROPIONATE 50 MCG
2 SPRAY, SUSPENSION (ML) NASAL DAILY
Status: DISCONTINUED | OUTPATIENT
Start: 2023-06-04 | End: 2023-06-06 | Stop reason: HOSPADM

## 2023-06-04 RX ORDER — BUMETANIDE 1 MG/1
0.5 TABLET ORAL DAILY
Status: DISCONTINUED | OUTPATIENT
Start: 2023-06-04 | End: 2023-06-06 | Stop reason: HOSPADM

## 2023-06-04 RX ORDER — LIDOCAINE 50 MG/G
2 PATCH TOPICAL
Status: DISCONTINUED | OUTPATIENT
Start: 2023-06-05 | End: 2023-06-06 | Stop reason: HOSPADM

## 2023-06-04 RX ORDER — IPRATROPIUM BROMIDE AND ALBUTEROL SULFATE 2.5; .5 MG/3ML; MG/3ML
3 SOLUTION RESPIRATORY (INHALATION) EVERY 6 HOURS PRN
Status: DISCONTINUED | OUTPATIENT
Start: 2023-06-04 | End: 2023-06-06 | Stop reason: HOSPADM

## 2023-06-04 RX ADMIN — SALINE NASAL SPRAY 2 SPRAY: 1.5 SOLUTION NASAL at 23:10

## 2023-06-04 RX ADMIN — FLUOXETINE HYDROCHLORIDE 10 MG: 10 CAPSULE ORAL at 09:06

## 2023-06-04 RX ADMIN — METOPROLOL TARTRATE 50 MG: 50 TABLET, FILM COATED ORAL at 20:36

## 2023-06-04 RX ADMIN — SALINE NASAL SPRAY 2 SPRAY: 1.5 SOLUTION NASAL at 11:56

## 2023-06-04 RX ADMIN — Medication 5 MG: at 20:36

## 2023-06-04 RX ADMIN — HYDRALAZINE HYDROCHLORIDE 25 MG: 25 TABLET, FILM COATED ORAL at 09:07

## 2023-06-04 RX ADMIN — METOPROLOL TARTRATE 50 MG: 50 TABLET, FILM COATED ORAL at 09:07

## 2023-06-04 RX ADMIN — DOXYCYCLINE 100 MG: 100 INJECTION, POWDER, LYOPHILIZED, FOR SOLUTION INTRAVENOUS at 21:44

## 2023-06-04 RX ADMIN — GUAIFENESIN 1200 MG: 600 TABLET, EXTENDED RELEASE ORAL at 09:07

## 2023-06-04 RX ADMIN — APIXABAN 5 MG: 5 TABLET, FILM COATED ORAL at 09:07

## 2023-06-04 RX ADMIN — SALINE NASAL SPRAY 2 SPRAY: 1.5 SOLUTION NASAL at 15:43

## 2023-06-04 RX ADMIN — SALINE NASAL SPRAY 2 SPRAY: 1.5 SOLUTION NASAL at 21:44

## 2023-06-04 RX ADMIN — Medication 1000 MCG: at 09:07

## 2023-06-04 RX ADMIN — APIXABAN 5 MG: 5 TABLET, FILM COATED ORAL at 20:36

## 2023-06-04 RX ADMIN — SALINE NASAL SPRAY 2 SPRAY: 1.5 SOLUTION NASAL at 14:47

## 2023-06-04 RX ADMIN — Medication 10 ML: at 20:37

## 2023-06-04 RX ADMIN — INSULIN LISPRO 9 UNITS: 100 INJECTION, SOLUTION INTRAVENOUS; SUBCUTANEOUS at 12:15

## 2023-06-04 RX ADMIN — VITAMINS A AND D OINTMENT 1 APPLICATION: 15.5; 53.4 OINTMENT TOPICAL at 09:13

## 2023-06-04 RX ADMIN — DOXYCYCLINE 100 MG: 100 INJECTION, POWDER, LYOPHILIZED, FOR SOLUTION INTRAVENOUS at 09:23

## 2023-06-04 RX ADMIN — Medication 10 ML: at 09:07

## 2023-06-04 RX ADMIN — FLUTICASONE PROPIONATE 2 SPRAY: 50 SPRAY, METERED NASAL at 11:55

## 2023-06-04 RX ADMIN — ASPIRIN 81 MG: 81 TABLET, CHEWABLE ORAL at 09:07

## 2023-06-04 RX ADMIN — SALINE NASAL SPRAY 2 SPRAY: 1.5 SOLUTION NASAL at 09:10

## 2023-06-04 RX ADMIN — DOCUSATE SODIUM 50 MG AND SENNOSIDES 8.6 MG 2 TABLET: 8.6; 5 TABLET, FILM COATED ORAL at 20:36

## 2023-06-04 RX ADMIN — CEFTRIAXONE 1 G: 1 INJECTION, POWDER, FOR SOLUTION INTRAMUSCULAR; INTRAVENOUS at 09:06

## 2023-06-04 RX ADMIN — BUMETANIDE 0.5 MG: 1 TABLET ORAL at 20:36

## 2023-06-04 RX ADMIN — INSULIN GLARGINE 10 UNITS: 100 INJECTION, SOLUTION SUBCUTANEOUS at 20:52

## 2023-06-04 RX ADMIN — ATORVASTATIN CALCIUM 20 MG: 20 TABLET, FILM COATED ORAL at 20:36

## 2023-06-04 RX ADMIN — HYDRALAZINE HYDROCHLORIDE 25 MG: 25 TABLET, FILM COATED ORAL at 15:43

## 2023-06-04 RX ADMIN — SALINE NASAL SPRAY 2 SPRAY: 1.5 SOLUTION NASAL at 20:37

## 2023-06-04 RX ADMIN — INSULIN LISPRO 9 UNITS: 100 INJECTION, SOLUTION INTRAVENOUS; SUBCUTANEOUS at 17:10

## 2023-06-04 RX ADMIN — ISOSORBIDE MONONITRATE 30 MG: 30 TABLET, EXTENDED RELEASE ORAL at 09:06

## 2023-06-04 RX ADMIN — LATANOPROST 1 DROP: 50 SOLUTION OPHTHALMIC at 20:38

## 2023-06-04 RX ADMIN — SALINE NASAL SPRAY 2 SPRAY: 1.5 SOLUTION NASAL at 17:10

## 2023-06-04 RX ADMIN — SALINE NASAL SPRAY 2 SPRAY: 1.5 SOLUTION NASAL at 09:11

## 2023-06-04 RX ADMIN — VITAMINS A AND D OINTMENT 1 APPLICATION: 15.5; 53.4 OINTMENT TOPICAL at 20:37

## 2023-06-04 RX ADMIN — GUAIFENESIN AND DEXTROMETHORPHAN 5 ML: 100; 10 SYRUP ORAL at 18:13

## 2023-06-04 RX ADMIN — DOCUSATE SODIUM 50 MG AND SENNOSIDES 8.6 MG 2 TABLET: 8.6; 5 TABLET, FILM COATED ORAL at 09:07

## 2023-06-04 RX ADMIN — Medication 1 TABLET: at 09:06

## 2023-06-04 RX ADMIN — GUAIFENESIN 1200 MG: 600 TABLET, EXTENDED RELEASE ORAL at 20:36

## 2023-06-04 RX ADMIN — HYDRALAZINE HYDROCHLORIDE 25 MG: 25 TABLET, FILM COATED ORAL at 20:36

## 2023-06-04 RX ADMIN — ACETAMINOPHEN 1000 MG: 500 TABLET ORAL at 19:21

## 2023-06-04 NOTE — PROGRESS NOTES
Patient Identification:  Name:  Jolly Garcia  Age:  72 y.o.  Sex:  female  :  1950  MRN:  [id number]  Visit Number:  [id number]  Primary Care Provider:  April Cantrell APRN    Length of stay:  3    Subjective/Interval History/Consultants/Procedures     Chief complaint: Follow-up, acute on chronic respiratory failure with hypoxia due to right lower lobe pneumonia    Subjective/Interval History:  Jolly Garcia is our 72 y.o. female patient admitted on 2023 from local skilled nursing facility due to acute on chronic respiratory failure with hypoxia secondary to right lower lobe pneumonia.  She also presented with leukocytosis and mild CRP elevation.  CT of the chest without contrast noted consolidation in the right lower lobe and a tiny right lung nodule.  D-dimer was negative.  Lactate and Pro-Humberto also nonelevated.  Received Doxycycline and Rocephin in the emergency department. Continued on admission. Peripheral blood cultures x2 pending. No growth to date. S pneumo Ag and Legionella Ag negative. MRSA PCR ordered.  PT/OT/SLP consults were placed. Passed swallow evaluation without s/s aspiration. She is receiving scheduled DuoNebs and Mucinex. Robitussin available as needed for cough. She also presented with WENDY on CKD stage IIIb and acute on chronic hyponatremia. Baseline creatinine around 1.4 (), although values have fluctuated wildly. Creatinine on admission was 2.61 and BUN 53. Sodium 126. CT of the abdomen and pelvis without contrast noted stranding around the pelvic wall. Negative for obstructive uropathy or urolithiasis. Urinalysis with 2+ protein, otherwise unremarkable.  Continuous IV hydration initiated with normal saline at 100 mL an hour on admission. Received IV fluids for roughly 1 day; developed evidence of worsening BLE edema. Stopped fluids and gave 1x dose of IV Lasix on 6/3. Creatinine 2.2 this a.m. and Sodium 130. Bilateral renal ultrasound ordered along with  inpatient nephrology consult placed for further recommendations. Greatly appreciate their assistance. Awaiting recs.     Procedures/Imaging:  Chest x-ray  CT of the chest without contrast  CT of the abdomen and pelvis without contrast  Bilateral renal ultrasound, pending    Consults:  Nephrology    On today's exam, the patient was resting in bed with no s/s acute distress noted. She was asleep upon first entering room. Aroused easily to verbal stimuli. She denies any chest pain or worsening shortness of breath. Has had excellent urine output overnight (-2,250 mL). She reports ongoing congestion, although says that cough has improved. White count continues to trend down. Lung sounds mildly diminished in bilateral bases, otherwise clear. Discussed plan with patient. She voiced agreement and understanding with no further questions or concerns at this time.     Discussed with AM FABIAN Martin with no acute events overnight. Room location at the time of evaluation was 311b.  Discussed with admitting physician, Rayshawn Villanueva DO.  ----------------------------------------------------------------------------------------------------------------------  Current Hospital Meds:  apixaban, 5 mg, Oral, Q12H  aspirin, 81 mg, Oral, Daily  atorvastatin, 20 mg, Oral, Nightly  cefTRIAXone, 1 g, Intravenous, Q24H  doxycycline, 100 mg, Intravenous, Q12H  FLUoxetine, 10 mg, Oral, Daily  guaiFENesin, 1,200 mg, Oral, Q12H  hydrALAZINE, 25 mg, Oral, TID  insulin glargine, 1-200 Units, Subcutaneous, Nightly - Glucommander  insulin lispro, 1-200 Units, Subcutaneous, 4x Daily With Meals & Nightly  isosorbide mononitrate, 30 mg, Oral, Daily  latanoprost, 1 drop, Both Eyes, Nightly  magic barrier cream, 1 application, Topical, BID  metoprolol tartrate, 50 mg, Oral, BID  multivitamin with minerals, 1 tablet, Oral, Daily  pantoprazole, 40 mg, Oral, Q AM  senna-docusate sodium, 2 tablet, Oral, BID  sodium chloride, 10 mL, Intravenous, Q12H  sodium  chloride, 2 spray, Nasal, Q2H  vitamin B-12, 1,000 mcg, Oral, Daily           ----------------------------------------------------------------------------------------------------------------------      Objective     Vital Signs:  Temp:  [97.3 °F (36.3 °C)-98.2 °F (36.8 °C)] 98.2 °F (36.8 °C)  Heart Rate:  [] 88  Resp:  [16-24] 16  BP: (128-149)/(69-88) 146/69      06/02/23  0455 06/03/23  0500 06/04/23  0500   Weight: (!) 139 kg (306 lb 8 oz) (!) 146 kg (322 lb 6.4 oz) (!) 146 kg (321 lb 8 oz)     Body mass index is 51.89 kg/m².    Intake/Output Summary (Last 24 hours) at 6/4/2023 0937  Last data filed at 6/4/2023 0500  Gross per 24 hour   Intake 120 ml   Output 2250 ml   Net -2130 ml       No intake/output data recorded.  Diet: Cardiac Diets, Diabetic Diets; Healthy Heart (2-3 Na+); Consistent Carbohydrate; Texture: Regular Texture (IDDSI 7); Fluid Consistency: Thin (IDDSI 0)  ----------------------------------------------------------------------------------------------------------------------    Physical Exam  Vitals reviewed.   Constitutional:       General: She is awake. She is not in acute distress.     Appearance: She is obese. She is not diaphoretic.      Interventions: Nasal cannula in place.   HENT:      Head: Normocephalic and atraumatic.      Mouth/Throat:      Mouth: Mucous membranes are moist.      Pharynx: Oropharynx is clear.   Eyes:      Extraocular Movements: Extraocular movements intact.      Pupils: Pupils are equal, round, and reactive to light.   Cardiovascular:      Rate and Rhythm: Normal rate. Rhythm irregular.      Pulses:           Dorsalis pedis pulses are 1+ on the right side and 1+ on the left side.      Heart sounds: Normal heart sounds. No murmur heard.    No friction rub.   Pulmonary:      Effort: Pulmonary effort is normal. No accessory muscle usage, respiratory distress or retractions.      Breath sounds: Decreased breath sounds present. No wheezing, rhonchi or rales.       Comments: Lung sounds diminished in bilateral bases.  No wheezing, rhonchi, or rales.  Abdominal:      General: Bowel sounds are normal. There is distension (Mild, chronic appearing).      Palpations: Abdomen is soft.      Tenderness: There is no abdominal tenderness. There is no guarding.   Musculoskeletal:      Cervical back: Neck supple. No rigidity.      Right lower le+ Pitting Edema present.      Left lower le+ Pitting Edema present.   Skin:     General: Skin is warm and dry.      Capillary Refill: Capillary refill takes more than 3 seconds.      Coloration: Skin is pale.   Neurological:      Mental Status: She is alert and oriented to person, place, and time. Mental status is at baseline.      Cranial Nerves: No dysarthria or facial asymmetry.      Sensory: Sensation is intact. No sensory deficit.      Motor: Weakness (Generalized) present. No tremor.   Psychiatric:         Attention and Perception: Attention normal.         Mood and Affect: Mood normal.         Speech: Speech normal.         Behavior: Behavior normal. Behavior is cooperative.         Thought Content: Thought content normal.         Cognition and Memory: Cognition normal.         Judgment: Judgment normal.        ----------------------------------------------------------------------------------------------------------------------  Tele: Appearing A-flutter 80s to 90s on my exam.  ----------------------------------------------------------------------------------------------------------------------  Results from last 7 days   Lab Units 23  1209 06/01/23  1006   HSTROP T ng/L 36* 44*   PROBNP pg/mL  --  8,240.0*       Results from last 7 days   Lab Units 23  0057 23  0429 23  0132 23  1032 23  1006   CRP mg/dL 6.88*  --   --   --  1.48*   LACTATE mmol/L  --   --   --  1.6  --    WBC 10*3/mm3 15.79* 17.81* 21.19*  --  17.25*   HEMOGLOBIN g/dL 8.9* 8.3* 8.9*  --  9.5*   HEMATOCRIT % 29.4* 28.2* 30.9*  --   31.0*   MCV fL 81.9 82.7 86.3  --  81.2   MCHC g/dL 30.3* 29.4* 28.8*  --  30.6*   PLATELETS 10*3/mm3 446 451* 450  --  500*   INR   --   --   --  1.31*  --        Results from last 7 days   Lab Units 06/01/23  1017   PH, ARTERIAL pH units 7.469*   PO2 ART mm Hg 46.0*   PCO2, ARTERIAL mm Hg 31.1*   HCO3 ART mmol/L 22.6       Results from last 7 days   Lab Units 06/04/23  0057 06/03/23  1412 06/03/23  0429 06/02/23  1641 06/02/23  0132 06/01/23  1006   SODIUM mmol/L 130* 125* 127*   < > 126* 126*   POTASSIUM mmol/L 4.7 4.7 4.6   < > 4.8 4.8   MAGNESIUM mg/dL  --   --   --   --  2.0  --    CHLORIDE mmol/L 98 94* 96*   < > 96* 92*   CO2 mmol/L 21.0* 19.0* 20.0*   < > 19.3* 22.7   BUN mg/dL 54* 52* 54*   < > 49* 53*   CREATININE mg/dL 2.26* 2.46* 2.62*   < > 2.22* 2.61*   CALCIUM mg/dL 8.5* 8.4* 8.6   < > 8.9 8.9   GLUCOSE mg/dL 110* 125* 79   < > 116* 114*   ALBUMIN g/dL  --   --   --   --   --  3.6   BILIRUBIN mg/dL  --   --   --   --   --  0.6   ALK PHOS U/L  --   --   --   --   --  127*   AST (SGOT) U/L  --   --   --   --   --  14   ALT (SGPT) U/L  --   --   --   --   --  11    < > = values in this interval not displayed.     Estimated Creatinine Clearance: 33.4 mL/min (A) (by C-G formula based on SCr of 2.26 mg/dL (H)).  No results found for: AMMONIA  Results from last 7 days   Lab Units 06/02/23  0132   CHOLESTEROL mg/dL 64   TRIGLYCERIDES mg/dL 27   HDL CHOL mg/dL 38*   LDL CHOL mg/dL 15       ----------------------------------------------------------------------------------------------------------------------  Imaging Results (Last 24 Hours)       ** No results found for the last 24 hours. **          ----------------------------------------------------------------------------------------------------------------------   I have reviewed the above laboratory values for 06/04/23    Assessment/Plan     Active Hospital Problems    Diagnosis  POA    **Pneumonia of right lower lobe due to infectious organism [J18.9]   Yes    Acute kidney injury [N17.9]  Yes    Hyponatremia [E87.1]  Yes         ASSESSMENT/PLAN:     ACUTE HOSPITAL PROBLEMS:     -Acute on chronic respiratory failure with hypoxia, present on admission, likely due to right lower lobe pneumonia  -Leukocytosis and mild C-RP elevation, present on admission and due to above  -CT of the chest without contrast noted consolidation in the right lower lobe and tiny RL nodule.   -D-Dimer negative.   -WBCs 17K, C-RP 1.48. Lactate and Procal negative. White count initially increased after admission (likely due to IV steroids administered in the ED), now trending back down and continuing to decrease.  -Continue Rocephin and Doxycycline.  -COVID-19 and Flu A/B swab negative.   -Peripheral blood cultures x2 pending. No growth to date.  -Obtain sputum culture if able.   -S Pneumo Ag and Legionella Ag negative.   -MRSA PCR ordered, not yet collected.  -SLP evaluated without s/s aspiration. Regular diet continued.  -Maintain aspiration precautions.   -Encourage incentive spirometry use.   -Turn, cough, and deep breathing exercises.   -ABG obtained in the ED revealed pO2 in the 40s on room air. She was placed on supplemental O2 and stable. Continue to titrate O2 to maintain SpO2 saturations > 90%.   -Continuous pulse oximetry.   -Continue scheduled DuoNebs.   -Mucinex and PRN Robitussin for supportive care.  -PRN Tylenol for mild pain or fever.   -Repeat CBC with differential in the a.m.  -Will need referral to lung nodule clinic on discharge for new finding of right lung nodule.      -WENDY on CKD stage IIIb, present on admission  -Acute on chronic hyponatremia, present on admission  -Baseline creatinine ~1.4 (2022) although values have fluctuated widely. Creatinine on admission 2.61. BUN 53.   -CT of the abdomen and pelvis without contrast noted stranding around the anterior pelvic wall. Noted no evidence of obstructive uropathy or urolithiasis.   -UA with 2+ protein, otherwise  unremarkable.   -Continue to monitor urine output and renal function closely.  -Avoid nephrotoxins as able.  -Sodium was 126; given elevated BUN and physical exam findings, felt likely represented hypovolemic hyponatremia. Continuous IV fluids were initiated and patient received for roughly 1 day. Discontinued IVF on 6/3. Sodium level currently improved to 130.  -Creatinine initially improved s/p administration of fluids, but trended back up. There was evidence of volume overload on physical exam w/ worsening BLE edema on 6/3. IV fluids held as noted above. 1x dose of 40 mg IV Lasix administered. Cr currently improved to 2.2.   -Inpatient Nephrology consult placed for further assistance. Greatly appreciate their input.         OTHER CHRONIC MEDICAL PROBLEMS:        -CAD s/p CABG  -Hyperlipidemia  -Indeterminate troponin elevation, present on admission, likely due to hypoxia  -Chronic HFpEF  -Paroxysmal atrial fibrillation  -Chronic anticoagulation with Eliquis  -Denies chest pain at time of exam.   -EKG revealed A flutter with controlled rate. Also noted nonspecific ST and T wave abnormality. No evidence of acute ST elevations or depressions.  -HS Troponin T was 44 with -8 delta.   -Continuous cardiac monitoring ordered.   -HgbA1c 7.40%. Lipid profile was with HDL 38, otherwise unremarkable.  -Continue aspirin, statin.   -Strict I's and O's. Daily weights.   -Echo from 9/13/2022 reviewed. Revealed LVEF 56-60% with mildly reduced right ventricular systolic function. Trace MVR. Trace AVR. Moderate TVR. Severe pulmonary HTN. No pericardial effusion.   -Continue Eliquis for stroke prophylaxis.      -Type II diabetes mellitus, insulin dependent  -HgbA1c 7.40%.   -Continue SSI and long-acting insulin per Glucomander protocol.  -Closely monitor blood glucose levels with accuchecks AC and HS.  -Hypoglycemia protocol in place.     -Essential hypertension  -BP appears well controlled.  -Continue current antihypertensive  regimen. Adjust as necessary.  -Closely monitor BP per hospital protocol, titrate medications as necessary.     -Chronic normocytic anemia  -Hgb 9.5/Hct 31.0 on admission, appearing near recent baseline.   -Hgb remaining stable.  -No s/s active bleeding on exam.    -Continue multivitamin, B-12 supplementation.   -Repeat CBC in the a.m.   -Plan to transfuse for Hgb < 7.      -JESIKA  -Continuous pulse oximetry, as per plan above.     -Obesity by BMI, Body mass index is 50.36 kg/m².  -Affecting all aspects of care.  -Encourage lifestyle modifications.     -Anxiety  -Supportive care.   -Continue Prozac.     -Physical debility  -Up with assistance.  -Maintain fall precautions.   -PT/OT consults placed.        -----------  -DVT prophylaxis: SubQ Heparin  -GI prophylaxis: PPI.  -Activity: As tolerated. Up with assistance. Fall precautions.  -Disposition plans/anticipated needs: Plans to return to [facility] once medically stable.  -Diet: Regular, CC  -Home supplemental O2: Was on room air prior to admission although now requiring 2 L continuously.      High risk secondary to acute on chronic hypoxemic respiratory failure due to right lower lobe pneumonia, WENDY on CKD, and hyponatremia.         TUCKER Stone  06/04/23  09:37 EDT  Pager #[phone]

## 2023-06-04 NOTE — CONSULTS
Nephrology Consultation Note    Referring Provider: Dr. Cyr  Reason for Consultation: Elevated creatinine    Chief complaint shortness of breath    Subjective .     History of present illness: Patient was sent from the nursing home at [facility] for evaluation of shortness of breath and left upper quadrant abdominal discomfort.  Patient did have a nonproductive cough and chills and fatigue but no fever.  Patient has been edematous for a while.  She normally uses oxygen on a as needed basis at the nursing home.  Patient did not have any hemoptysis or hematemesis or melena or hematuria.  She denied any headache or diplopia or dysarthria or dysphagia.  She denied any vomiting or diarrhea.  Patient ambulates with a walker.    CT of the abdomen and pelvis revealed cholelithiasis but no hydronephrosis and CT of the chest revealed right lower lobe consolidation.  The patient was admitted with a diagnosis of acute on chronic respiratory failure secondary to right lower lobe pneumonia and a working diagnosis of acute renal failure    The patient's creatinine was 1.3 mg/dL's -0.1 mg/dL in September 2022.  At that time the patient was noted to be nephrotic and had serology studies undertaken to rule out other systemic causes of kidney problems other than diabetes.  Serologies for lupus and vasculitis and myeloma were negative and the patient was not hypocomplementemic.  She has not seen a nephrologist since.    Her creatinine this admission has been ranging from 2.2 to 2.6 mg/dL.  It is actually better today as compared to admission    Hemodynamic data reviewed and no significant hemodynamic drops in blood pressure noted.  In fact the lowest blood pressure that I see over the last 2 days is 120 systolic    Medications with potential for nephrotoxicity received include only potentially bumetanide.  I note the patient is on spironolactone and patiromer at the nursing  home.  She is not on any thing nephrotoxic at this time    Patient is feeling better today she thinks    History  Past Medical History:   Diagnosis Date    Anxiety     CAD (coronary artery disease)     s/p CABG    Chronic hypoxemic respiratory failure     Wears 2 L/min at home    Essential hypertension     Hyperlipidemia     Type 2 diabetes mellitus    ,   Past Surgical History:   Procedure Laterality Date    CORONARY ARTERY BYPASS GRAFT  2011    HYSTERECTOMY     ,   Family History   Problem Relation Age of Onset    Diabetes Mother    ,   Social History     Tobacco Use    Smoking status: Former     Passive exposure: Past   Vaping Use    Vaping Use: Never used   Substance Use Topics    Alcohol use: Never    Drug use: Never    and Allergies:  Patient has no known allergies.      Vital Signs   Temp:  [97.7 °F (36.5 °C)-98.5 °F (36.9 °C)] 98.5 °F (36.9 °C)  Heart Rate:  [84-94] 84  Resp:  [16-22] 18  BP: (127-146)/(59-79) 127/59    Physical Exam:     General Appearance:    Alert, cooperative, morbidly obese   Head:    Normocephalic, without obvious abnormality   Eyes:          Pallor.   Ears:    Ears appear intact    Throat:   oral mucosa moist   Neck:   No JVD   Back:    no tenderness to percussion   Lungs:   Occasional scattered posterior basal crackles    Heart:    Regular rhythm and normal rate, normal S1 and S2, n       Abdomen:     Normal bowel sounds, no tenderness   Rectal:     Deferred   Extremities: 2-3+ edema       Skin:   No petechiae, no purpura   Lymph nodes:   No palpable cervical adenopathy   Neurologic:   Cr Ns grossly intact, oriented x3     Results Review:   I reviewed the patient's new clinical results.      Lab Results (last 24 hours)       Procedure Component Value Units Date/Time    POC Glucose Once [[id number]]  (Normal) Collected: 06/04/23 1632    Specimen: Blood Updated: 06/04/23 1643     Glucose 126 mg/dL      Comment: Meter: [id number] : [id number] Nguyen Willard       POC Glucose Once  [[id number]]  (Normal) Collected: 06/04/23 1151    Specimen: Blood Updated: 06/04/23 1205     Glucose 118 mg/dL      Comment: Meter: [id number] : [id number] [employer]       Blood Culture - Blood, Arm, Left [[id number]]  (Normal) Collected: 06/01/23 1048    Specimen: Blood from Arm, Left Updated: 06/04/23 1100     Blood Culture No growth at 3 days    Blood Culture - Blood, Arm, Right [[id number]]  (Normal) Collected: 06/01/23 1006    Specimen: Blood from Arm, Right Updated: 06/04/23 1100     Blood Culture No growth at 3 days    POC Glucose Once [[id number]]  (Normal) Collected: 06/04/23 0710    Specimen: Blood Updated: 06/04/23 0727     Glucose 119 mg/dL      Comment: Meter: [id number] : [id number] [employer]       Basic Metabolic Panel [[id number]]  (Abnormal) Collected: 06/04/23 0057    Specimen: Blood Updated: 06/04/23 0134     Glucose 110 mg/dL      BUN 54 mg/dL      Creatinine 2.26 mg/dL      Sodium 130 mmol/L      Potassium 4.7 mmol/L      Chloride 98 mmol/L      CO2 21.0 mmol/L      Calcium 8.5 mg/dL      BUN/Creatinine Ratio 23.9     Anion Gap 11.0 mmol/L      eGFR 22.5 mL/min/1.73     Narrative:      GFR Normal >60  Chronic Kidney Disease <60  Kidney Failure <15    The GFR formula is only valid for adults with stable renal function between ages 18 and 70.    C-reactive Protein [[id number]]  (Abnormal) Collected: 06/04/23 0057    Specimen: Blood Updated: 06/04/23 0134     C-Reactive Protein 6.88 mg/dL     CBC & Differential [[id number]]  (Abnormal) Collected: 06/04/23 0057    Specimen: Blood Updated: 06/04/23 0117    Narrative:      The following orders were created for panel order CBC & Differential.  Procedure                               Abnormality         Status                     ---------                               -----------         ------                     CBC Auto Differential[[id number]]        Abnormal            Final result                 Please view results for these tests on  the individual orders.    CBC Auto Differential [[id number]]  (Abnormal) Collected: 06/04/23 0057    Specimen: Blood Updated: 06/04/23 0117     WBC 15.79 10*3/mm3      RBC 3.59 10*6/mm3      Hemoglobin 8.9 g/dL      Hematocrit 29.4 %      MCV 81.9 fL      MCH 24.8 pg      MCHC 30.3 g/dL      RDW 16.2 %      RDW-SD 48.1 fl      MPV 8.0 fL      Platelets 446 10*3/mm3      Neutrophil % 84.4 %      Lymphocyte % 7.0 %      Monocyte % 7.4 %      Eosinophil % 0.4 %      Basophil % 0.2 %      Immature Grans % 0.6 %      Neutrophils, Absolute 13.33 10*3/mm3      Lymphocytes, Absolute 1.10 10*3/mm3      Monocytes, Absolute 1.17 10*3/mm3      Eosinophils, Absolute 0.06 10*3/mm3      Basophils, Absolute 0.03 10*3/mm3      Immature Grans, Absolute 0.10 10*3/mm3      nRBC 0.0 /100 WBC             Imaging Results (Last 24 Hours)       Procedure Component Value Units Date/Time    US Renal Bilateral [[id number]] Collected: 06/04/23 1123     Updated: 06/04/23 1126    Narrative:      EXAMINATION: US RENAL BILATERAL-      CLINICAL INDICATION: WENDY; J18.9-Pneumonia, unspecified organism;  J44.1-Chronic obstructive pulmonary disease with (acute) exacerbation;  R09.02-Hypoxemia        COMPARISON: None available     PROCEDURE: Sonographic imaging of the kidneys     FINDINGS:  Imaging of the right kidney demonstrates the right kidney measuring  12.68 x 7.30 x 5.14 cm. No solid mass or hydronephrosis.     Left kidney measures 11.51 x 6.83 x 5.78 cm. No solid mass or  hydronephrosis       Impression:      1. Unremarkable sonographic appearance of the kidneys.     This report was finalized on 6/4/2023 11:23 AM by Dr. Leonard Narvaez MD.                         Assessment and Plan:    1.  CKD stage IV  2.  Nephrotic syndrome  3.  Type 2 diabetes with renal involvement poor control  4.  Acute on chronic hypoxic respiratory failure  5.  Respiratory alkalosis  6.  Anemia with prior documented iron deficiency  7.  Heart failure with preserved ejection  fraction  8.  Dilutional hyponatremia  9.  Morbid obesity    Discussed with Dr. Cyr  With significant nephrotic syndrome about 7 8 months ago and no nephrology follow-up, I suspect this creatinine represents a permanent loss of kidney function.  I do not think there is an acute element to this.    I will recheck urinary indices.  I will check iron profile.  The patient is on bumetanide and I do not think she is in CHF based on my personal review of the film.  Will check venous gas in the morning and reorder a chest x-ray        David Farley MD  06/04/23  19:38 EDT

## 2023-06-04 NOTE — PLAN OF CARE
Goal Outcome Evaluation:  Pt is resting in bed with no s/s of distress noted. VSS. IV access maintained. Will continue with plan of care.

## 2023-06-04 NOTE — PLAN OF CARE
Goal Outcome Evaluation:   Pt resting in bed this shift. No ss of acute distress noted. Will continue to monitor and follow plan of care

## 2023-06-05 ENCOUNTER — APPOINTMENT (OUTPATIENT)
Dept: GENERAL RADIOLOGY | Facility: HOSPITAL | Age: 73
End: 2023-06-05
Payer: MEDICARE

## 2023-06-05 LAB
ALBUMIN UR-MCNC: 23.1 MG/DL
ANION GAP SERPL CALCULATED.3IONS-SCNC: 11.7 MMOL/L (ref 5–15)
ATMOSPHERIC PRESS: 725 MMHG
BASE EXCESS BLDV CALC-SCNC: 0.5 MMOL/L (ref 0–2)
BASOPHILS # BLD AUTO: 0.03 10*3/MM3 (ref 0–0.2)
BASOPHILS NFR BLD AUTO: 0.3 % (ref 0–1.5)
BDY SITE: ABNORMAL
BUN SERPL-MCNC: 51 MG/DL (ref 8–23)
BUN/CREAT SERPL: 23.6 (ref 7–25)
CALCIUM SPEC-SCNC: 8.5 MG/DL (ref 8.6–10.5)
CHLORIDE SERPL-SCNC: 97 MMOL/L (ref 98–107)
CO2 BLDA-SCNC: 28 MMOL/L (ref 22–33)
CO2 SERPL-SCNC: 21.3 MMOL/L (ref 22–29)
COHGB MFR BLD: 1.2 % (ref 0–5)
CREAT SERPL-MCNC: 2.16 MG/DL (ref 0.57–1)
CREAT UR-MCNC: 111.5 MG/DL
CREAT UR-MCNC: 111.5 MG/DL
DEPRECATED RDW RBC AUTO: 49.2 FL (ref 37–54)
EGFRCR SERPLBLD CKD-EPI 2021: 23.8 ML/MIN/1.73
EOSINOPHIL # BLD AUTO: 0.08 10*3/MM3 (ref 0–0.4)
EOSINOPHIL NFR BLD AUTO: 0.7 % (ref 0.3–6.2)
ERYTHROCYTE [DISTWIDTH] IN BLOOD BY AUTOMATED COUNT: 16.3 % (ref 12.3–15.4)
GAS FLOW AIRWAY: 2 LPM
GLUCOSE BLDC GLUCOMTR-MCNC: 114 MG/DL (ref 70–130)
GLUCOSE BLDC GLUCOMTR-MCNC: 147 MG/DL (ref 70–130)
GLUCOSE BLDC GLUCOMTR-MCNC: 166 MG/DL (ref 70–130)
GLUCOSE BLDC GLUCOMTR-MCNC: 176 MG/DL (ref 70–130)
GLUCOSE SERPL-MCNC: 98 MG/DL (ref 65–99)
HCO3 BLDV-SCNC: 26.5 MMOL/L (ref 22–28)
HCT VFR BLD AUTO: 29.4 % (ref 34–46.6)
HGB BLD-MCNC: 8.6 G/DL (ref 12–15.9)
HGB BLDA-MCNC: 9 G/DL (ref 13.5–17.5)
IMM GRANULOCYTES # BLD AUTO: 0.06 10*3/MM3 (ref 0–0.05)
IMM GRANULOCYTES NFR BLD AUTO: 0.5 % (ref 0–0.5)
INHALED O2 CONCENTRATION: 28 %
LYMPHOCYTES # BLD AUTO: 1.31 10*3/MM3 (ref 0.7–3.1)
LYMPHOCYTES NFR BLD AUTO: 11.1 % (ref 19.6–45.3)
Lab: ABNORMAL
MCH RBC QN AUTO: 24.2 PG (ref 26.6–33)
MCHC RBC AUTO-ENTMCNC: 29.3 G/DL (ref 31.5–35.7)
MCV RBC AUTO: 82.6 FL (ref 79–97)
METHGB BLD QL: 0.6 % (ref 0–3)
MICROALBUMIN/CREAT UR: 207.2 MG/G
MODALITY: ABNORMAL
MONOCYTES # BLD AUTO: 1 10*3/MM3 (ref 0.1–0.9)
MONOCYTES NFR BLD AUTO: 8.5 % (ref 5–12)
NEUTROPHILS NFR BLD AUTO: 78.9 % (ref 42.7–76)
NEUTROPHILS NFR BLD AUTO: 9.34 10*3/MM3 (ref 1.7–7)
NRBC BLD AUTO-RTO: 0 /100 WBC (ref 0–0.2)
OXYHGB MFR BLDV: 16.4 % (ref 45–75)
PCO2 BLDV: 48.4 MM HG (ref 41–51)
PH BLDV: 7.35 PH UNITS (ref 7.32–7.42)
PLATELET # BLD AUTO: 426 10*3/MM3 (ref 140–450)
PMV BLD AUTO: 8 FL (ref 6–12)
PO2 BLDV: 14.9 MM HG (ref 27–53)
POTASSIUM SERPL-SCNC: 4.6 MMOL/L (ref 3.5–5.2)
PROT ?TM UR-MCNC: 47.5 MG/DL
PROT/CREAT UR: 426 MG/G CREA (ref 0–200)
RBC # BLD AUTO: 3.56 10*6/MM3 (ref 3.77–5.28)
SAO2 % BLDCOV: 16.7 % (ref 45–75)
SODIUM SERPL-SCNC: 130 MMOL/L (ref 136–145)
VENTILATOR MODE: ABNORMAL
WBC NRBC COR # BLD: 11.82 10*3/MM3 (ref 3.4–10.8)

## 2023-06-05 PROCEDURE — 97110 THERAPEUTIC EXERCISES: CPT

## 2023-06-05 PROCEDURE — 99233 SBSQ HOSP IP/OBS HIGH 50: CPT

## 2023-06-05 PROCEDURE — 82805 BLOOD GASES W/O2 SATURATION: CPT

## 2023-06-05 PROCEDURE — 82820 HEMOGLOBIN-OXYGEN AFFINITY: CPT

## 2023-06-05 PROCEDURE — 80048 BASIC METABOLIC PNL TOTAL CA: CPT

## 2023-06-05 PROCEDURE — 71045 X-RAY EXAM CHEST 1 VIEW: CPT | Performed by: RADIOLOGY

## 2023-06-05 PROCEDURE — 94664 DEMO&/EVAL PT USE INHALER: CPT

## 2023-06-05 PROCEDURE — 63710000001 INSULIN GLARGINE PER 5 UNITS

## 2023-06-05 PROCEDURE — 25010000002 CEFTRIAXONE PER 250 MG

## 2023-06-05 PROCEDURE — 85025 COMPLETE CBC W/AUTO DIFF WBC: CPT

## 2023-06-05 PROCEDURE — 63710000001 INSULIN LISPRO (HUMAN) PER 5 UNITS

## 2023-06-05 PROCEDURE — 94799 UNLISTED PULMONARY SVC/PX: CPT

## 2023-06-05 PROCEDURE — 94761 N-INVAS EAR/PLS OXIMETRY MLT: CPT

## 2023-06-05 PROCEDURE — 25010000002 ALBUMIN HUMAN 25% PER 50 ML: Performed by: INTERNAL MEDICINE

## 2023-06-05 PROCEDURE — 71045 X-RAY EXAM CHEST 1 VIEW: CPT

## 2023-06-05 PROCEDURE — P9047 ALBUMIN (HUMAN), 25%, 50ML: HCPCS | Performed by: INTERNAL MEDICINE

## 2023-06-05 PROCEDURE — 82948 REAGENT STRIP/BLOOD GLUCOSE: CPT

## 2023-06-05 PROCEDURE — 25010000002 NA FERRIC GLUC CPLX PER 12.5 MG: Performed by: INTERNAL MEDICINE

## 2023-06-05 RX ORDER — ALBUMIN (HUMAN) 12.5 G/50ML
12.5 SOLUTION INTRAVENOUS ONCE
Status: COMPLETED | OUTPATIENT
Start: 2023-06-05 | End: 2023-06-05

## 2023-06-05 RX ADMIN — Medication 10 ML: at 08:19

## 2023-06-05 RX ADMIN — PANTOPRAZOLE SODIUM 40 MG: 40 TABLET, DELAYED RELEASE ORAL at 05:11

## 2023-06-05 RX ADMIN — DOCUSATE SODIUM 50 MG AND SENNOSIDES 8.6 MG 2 TABLET: 8.6; 5 TABLET, FILM COATED ORAL at 20:21

## 2023-06-05 RX ADMIN — GUAIFENESIN 1200 MG: 600 TABLET, EXTENDED RELEASE ORAL at 08:17

## 2023-06-05 RX ADMIN — SALINE NASAL SPRAY 2 SPRAY: 1.5 SOLUTION NASAL at 17:23

## 2023-06-05 RX ADMIN — VITAMINS A AND D OINTMENT 1 APPLICATION: 15.5; 53.4 OINTMENT TOPICAL at 20:21

## 2023-06-05 RX ADMIN — HYDRALAZINE HYDROCHLORIDE 25 MG: 25 TABLET, FILM COATED ORAL at 08:17

## 2023-06-05 RX ADMIN — DOXYCYCLINE 100 MG: 100 INJECTION, POWDER, LYOPHILIZED, FOR SOLUTION INTRAVENOUS at 10:27

## 2023-06-05 RX ADMIN — SALINE NASAL SPRAY 2 SPRAY: 1.5 SOLUTION NASAL at 14:46

## 2023-06-05 RX ADMIN — LATANOPROST 1 DROP: 50 SOLUTION OPHTHALMIC at 20:22

## 2023-06-05 RX ADMIN — Medication 5 MG: at 20:21

## 2023-06-05 RX ADMIN — INSULIN GLARGINE 8 UNITS: 100 INJECTION, SOLUTION SUBCUTANEOUS at 20:20

## 2023-06-05 RX ADMIN — BUMETANIDE 0.5 MG: 1 TABLET ORAL at 08:17

## 2023-06-05 RX ADMIN — ASPIRIN 81 MG: 81 TABLET, CHEWABLE ORAL at 08:17

## 2023-06-05 RX ADMIN — CEFTRIAXONE 1 G: 1 INJECTION, POWDER, FOR SOLUTION INTRAMUSCULAR; INTRAVENOUS at 08:22

## 2023-06-05 RX ADMIN — HYDRALAZINE HYDROCHLORIDE 25 MG: 25 TABLET, FILM COATED ORAL at 15:50

## 2023-06-05 RX ADMIN — SODIUM CHLORIDE 250 MG: 9 INJECTION, SOLUTION INTRAVENOUS at 11:50

## 2023-06-05 RX ADMIN — SALINE NASAL SPRAY 2 SPRAY: 1.5 SOLUTION NASAL at 15:50

## 2023-06-05 RX ADMIN — ATORVASTATIN CALCIUM 20 MG: 20 TABLET, FILM COATED ORAL at 20:21

## 2023-06-05 RX ADMIN — FLUOXETINE HYDROCHLORIDE 10 MG: 10 CAPSULE ORAL at 08:18

## 2023-06-05 RX ADMIN — FLUTICASONE PROPIONATE 2 SPRAY: 50 SPRAY, METERED NASAL at 08:18

## 2023-06-05 RX ADMIN — SALINE NASAL SPRAY 2 SPRAY: 1.5 SOLUTION NASAL at 08:18

## 2023-06-05 RX ADMIN — APIXABAN 5 MG: 5 TABLET, FILM COATED ORAL at 20:21

## 2023-06-05 RX ADMIN — ACETAMINOPHEN 1000 MG: 500 TABLET ORAL at 21:45

## 2023-06-05 RX ADMIN — METOPROLOL TARTRATE 50 MG: 50 TABLET, FILM COATED ORAL at 08:17

## 2023-06-05 RX ADMIN — GUAIFENESIN AND DEXTROMETHORPHAN 5 ML: 100; 10 SYRUP ORAL at 01:10

## 2023-06-05 RX ADMIN — SALINE NASAL SPRAY 2 SPRAY: 1.5 SOLUTION NASAL at 20:21

## 2023-06-05 RX ADMIN — SALINE NASAL SPRAY 2 SPRAY: 1.5 SOLUTION NASAL at 10:27

## 2023-06-05 RX ADMIN — Medication 1000 MCG: at 08:18

## 2023-06-05 RX ADMIN — APIXABAN 5 MG: 5 TABLET, FILM COATED ORAL at 08:17

## 2023-06-05 RX ADMIN — Medication 1 TABLET: at 08:18

## 2023-06-05 RX ADMIN — METOPROLOL TARTRATE 50 MG: 50 TABLET, FILM COATED ORAL at 20:21

## 2023-06-05 RX ADMIN — LIDOCAINE 2 PATCH: 50 PATCH CUTANEOUS at 08:18

## 2023-06-05 RX ADMIN — HYDRALAZINE HYDROCHLORIDE 25 MG: 25 TABLET, FILM COATED ORAL at 20:21

## 2023-06-05 RX ADMIN — DOXYCYCLINE 100 MG: 100 INJECTION, POWDER, LYOPHILIZED, FOR SOLUTION INTRAVENOUS at 21:27

## 2023-06-05 RX ADMIN — VITAMINS A AND D OINTMENT 1 APPLICATION: 15.5; 53.4 OINTMENT TOPICAL at 08:18

## 2023-06-05 RX ADMIN — DOCUSATE SODIUM 50 MG AND SENNOSIDES 8.6 MG 2 TABLET: 8.6; 5 TABLET, FILM COATED ORAL at 08:18

## 2023-06-05 RX ADMIN — ALBUMIN HUMAN 12.5 G: 0.25 SOLUTION INTRAVENOUS at 11:40

## 2023-06-05 RX ADMIN — Medication 10 ML: at 20:22

## 2023-06-05 RX ADMIN — SALINE NASAL SPRAY 2 SPRAY: 1.5 SOLUTION NASAL at 05:11

## 2023-06-05 RX ADMIN — INSULIN LISPRO 10 UNITS: 100 INJECTION, SOLUTION INTRAVENOUS; SUBCUTANEOUS at 08:17

## 2023-06-05 RX ADMIN — GUAIFENESIN 1200 MG: 600 TABLET, EXTENDED RELEASE ORAL at 20:21

## 2023-06-05 RX ADMIN — ACETAMINOPHEN 1000 MG: 500 TABLET ORAL at 05:52

## 2023-06-05 RX ADMIN — INSULIN LISPRO 10 UNITS: 100 INJECTION, SOLUTION INTRAVENOUS; SUBCUTANEOUS at 17:35

## 2023-06-05 RX ADMIN — ISOSORBIDE MONONITRATE 30 MG: 30 TABLET, EXTENDED RELEASE ORAL at 08:17

## 2023-06-05 NOTE — PLAN OF CARE
Goal Outcome Evaluation:      Pt has been resting this shift. Pt has complained of pain this shift, PRN medication was administered see MAR. No signs and symptoms of acute distress noted. No complaints of chest pain or SOB reported.

## 2023-06-05 NOTE — SIGNIFICANT NOTE
06/05/23 1327   Plan   Plan Comments IV Rocephin, IV doxy, IV Ferrlecit; 97% sat on 2 L via NC; Na+ 130, BUN 51, Cr 2.16

## 2023-06-05 NOTE — THERAPY TREATMENT NOTE
Acute Care - Physical Therapy Treatment Note   [facility]     Patient Name: Jolly Garcia  : 1950  MRN: [id number]  Today's Date: 2023   Onset of Illness/Injury or Date of Surgery: 23  Visit Dx:     ICD-10-CM ICD-9-CM   1. Pneumonia of right lower lobe due to infectious organism  J18.9 486   2. COPD with acute exacerbation  J44.1 491.21   3. Hypoxemia  R09.02 799.02     Patient Active Problem List   Diagnosis    UTI (urinary tract infection)    Sepsis    Type 2 diabetes mellitus    Essential hypertension    ASCVD (arteriosclerotic cardiovascular disease)    Chronic respiratory failure with hypoxia    Anxiety    Hyperlipidemia    Chronic kidney disease    Diabetic retinopathy    Class 3 severe obesity due to excess calories with body mass index (BMI) of 50.0 to 59.9 in adult    JESIKA (obstructive sleep apnea)    Diabetic nephropathy    Physical debility    (HFpEF) heart failure with preserved ejection fraction    Pneumonia of right lower lobe due to infectious organism    Acute kidney injury    Hyponatremia     Past Medical History:   Diagnosis Date    Anxiety     CAD (coronary artery disease)     s/p CABG    Chronic hypoxemic respiratory failure     Wears 2 L/min at home    Essential hypertension     Hyperlipidemia     Type 2 diabetes mellitus      Past Surgical History:   Procedure Laterality Date    CORONARY ARTERY BYPASS GRAFT      HYSTERECTOMY       PT Assessment (last 12 hours)       PT Evaluation and Treatment       Row Name 23 1508          Physical Therapy Time and Intention    Subjective Information complains of;weakness;fatigue;nausea/vomiting  -HC     Document Type therapy note (daily note)  -HC     Mode of Treatment individual therapy;physical therapy  -HC     Patient Effort good  -HC     Comment Pt and FABIAN Martin in agreement for PT. Pt complained of feeling poorly and would only complete sitting EOB exercises.  -HC       Row Name 23 1500          General Information     Patient Profile Reviewed yes  -HC     Existing Precautions/Restrictions fall;oxygen therapy device and L/min  -HC       Row Name 06/05/23 1503          Living Environment    Primary Care Provided by self  -       Row Name 06/05/23 1503          Home Use of Assistive/Adaptive Equipment    Equipment Currently Used at Home walker, rolling;cane, quad  -HC       Row Name 06/05/23 1503          Cognition    Affect/Mental Status (Cognition) WFL  -     Orientation Status (Cognition) oriented x 3  -HC     Follows Commands (Cognition) WFL  -     Personal Safety Interventions fall prevention program maintained;supervised activity  -HC       Row Name 06/05/23 1503          Bed Mobility    Comment, (Bed Mobility) sitting EOB  -HC       Row Name 06/05/23 1503          Safety Issues, Functional Mobility    Impairments Affecting Function (Mobility) endurance/activity tolerance  -       Row Name 06/05/23 1503          Motor Skills    Therapeutic Exercise other (see comments)  EOB: AP, LAQ, march, knees in/out  -HC       Row Name             Wound 06/01/23 1932 coccyx MASD (Moisture associated skin damage)    Wound - Properties Group Placement Date: 06/01/23  -SB Placement Time: 1932  -SB Present on Hospital Admission: Y  -BB Location: coccyx  -SB Primary Wound Type: MASD  -BB    Retired Wound - Properties Group Placement Date: 06/01/23  -SB Placement Time: 1932  -SB Present on Hospital Admission: Y  -BB Location: coccyx  -SB Primary Wound Type: MASD  -BB    Retired Wound - Properties Group Date first assessed: 06/01/23  -SB Time first assessed: 1932  -SB Present on Hospital Admission: Y  -BB Location: coccyx  -SB Primary Wound Type: MASD  -BB      Row Name 06/05/23 1503          Positioning and Restraints    Pre-Treatment Position in bed  -HC     Post Treatment Position bed  -HC     In Bed sitting EOB;call light within reach;encouraged to call for assist  -       Row Name 06/05/23 1503          Therapy Assessment/Plan (PT)     Rehab Potential (PT) good, to achieve stated therapy goals  -     Criteria for Skilled Interventions Met (PT) yes;skilled treatment is necessary  -     Therapy Frequency (PT) 2 times/wk  2-5x/wk  -     Problem List (PT) problems related to;balance;mobility  -     Activity Limitations Related to Problem List (PT) unable to ambulate safely;unable to transfer safely  -       Row Name 06/05/23 1504          Physical Therapy Goals    Bed Mobility Goal Selection (PT) bed mobility, PT goal 1  -HC     Transfer Goal Selection (PT) transfer, PT goal 1  -HC     Gait Training Goal Selection (PT) gait training, PT goal 1  -       Row Name 06/05/23 1508          Bed Mobility Goal 1 (PT)    Activity/Assistive Device (Bed Mobility Goal 1, PT) bed mobility activities, all  -HC     [place] Level/Cues Needed (Bed Mobility Goal 1, PT) modified independence  -HC     Time Frame (Bed Mobility Goal 1, PT) by discharge  -       Row Name 06/05/23 1505          Transfer Goal 1 (PT)    Activity/Assistive Device (Transfer Goal 1, PT) sit-to-stand/stand-to-sit;bed-to-chair/chair-to-bed  -HC     [place] Level/Cues Needed (Transfer Goal 1, PT) modified independence  -HC     Time Frame (Transfer Goal 1, PT) by discharge  -       Row Name 06/05/23 9034          Gait Training Goal 1 (PT)    Activity/Assistive Device (Gait Training Goal 1, PT) gait (walking locomotion);assistive device use;walker, rolling  -     [place] Level (Gait Training Goal 1, PT) standby assist  -HC     Distance (Gait Training Goal 1, PT) 50'  -HC     Time Frame (Gait Training Goal 1, PT) by discharge  -               User Key  (r) = Recorded By, (t) = Taken By, (c) = Cosigned By      Initials Name Provider Type    Kannan Harding RN Registered Nurse    Bienvenido Toribio, RN Registered Nurse    Mackenzie Neumann PTA Physical Therapist Assistant                    Physical Therapy Education       Title: PT OT SLP Therapies  (Done)       Topic: Physical Therapy (Done)       Point: Mobility training (Done)       Learning Progress Summary             Patient Acceptance, E,TB, VU by  at 6/3/2023 1628    Acceptance, E,TB, VU by  at 6/2/2023 1606    Acceptance, E,TB, VU by  at 6/2/2023 1351                         Point: Home exercise program (Done)       Learning Progress Summary             Patient Acceptance, E,TB, VU by  at 6/3/2023 1628    Acceptance, E,TB, VU by  at 6/2/2023 1606    Acceptance, E,TB, VU by KM at 6/2/2023 1351                         Point: Body mechanics (Done)       Learning Progress Summary             Patient Acceptance, E,TB, VU by  at 6/3/2023 1628    Acceptance, E,TB, VU by  at 6/2/2023 1606    Acceptance, E,TB, VU by KM at 6/2/2023 1351                         Point: Precautions (Done)       Learning Progress Summary             Patient Acceptance, E,TB, VU by  at 6/3/2023 1628    Acceptance, E,TB, VU by  at 6/2/2023 1606    Acceptance, E,TB, VU by KM at 6/2/2023 1351                                         User Key       Initials Effective Dates Name Provider Type Discipline     05/24/22 -  Sina Alberto, PT Physical Therapist PT     07/06/22 -  Alaina Barfield, RN Registered Nurse Nurse                  PT Recommendation and Plan  Therapy Frequency (PT): 2 times/wk (2-5x/wk)          Time Calculation:    PT Charges       Row Name 06/05/23 1506             Time Calculation    PT Received On 06/05/23  -HC         Time Calculation- PT    Total Timed Code Minutes- PT 15 minute(s)  -                User Key  (r) = Recorded By, (t) = Taken By, (c) = Cosigned By      Initials Name Provider Type     Mackenzie Sheridan PTA Physical Therapist Assistant                  Therapy Charges for Today       Code Description Service Date Service Provider Modifiers Qty    [id number]  PT THER PROC EA 15 MIN 6/5/2023 Mackenzie Sheridan PTA GP, CQ 1            PT G-Codes  AM-PAC 6 Clicks  Score (PT): 9    Mackenzie Sheridan, PTA  6/5/2023

## 2023-06-05 NOTE — PROGRESS NOTES
Nephrology Progress Note      Subjective     Patient feels slightly better and shortness of breath.  Lying on the bed comfortably.  No chest pain    Objective       Vital signs :     Temp:  [98.2 °F (36.8 °C)-98.9 °F (37.2 °C)] 98.6 °F (37 °C)  Heart Rate:  [81-94] 89  Resp:  [18] 18  BP: (112-145)/(47-84) 145/84    Intake/Output                         06/03/23 0701 - 06/04/23 0700 06/04/23 0701 - 06/05/23 0700     2876-1322 2001-4690 Total 0650-4076 5239-2851 Total                 Intake    P.O.  480  -- 480  340  -- 340    I.V.  --  -- --  195.1  -- 195.1    Total Intake 480 -- 480 535.1 -- 535.1       Output    Urine  1000  1250 2250  850  200 1050    Total Output 1000 1250 2250              Physical Exam:    General Appearance : alert, pleasant, appears stated age, cooperative and alert  Lungs : clear to auscultation, respirations regular  Heart :  regular rhythm & normal rate, normal S1, S2 and no murmur, no rub  Abdomen : soft, non distended  Extremities : 1+ edema,   Neurologic :   orientated to person, place, time and situation, Grossly no focal deficits        Laboratory Data :     Albumin No results found for: ALBUMIN   Magnesium No results found for: MG       PTH               No results found for: PTH    CBC and coagulation:  Results from last 7 days   Lab Units 06/05/23  0329 06/04/23  0057 06/03/23  0429 06/02/23  0132 06/01/23  1032 06/01/23  1007 06/01/23  1006   PROCALCITONIN ng/mL  --   --   --   --  0.05  --   --    LACTATE mmol/L  --   --   --   --  1.6  --   --    SED RATE mm/hr  --   --   --   --   --   --  60*   CRP mg/dL  --  6.88*  --   --   --   --  1.48*   WBC 10*3/mm3 11.82* 15.79* 17.81*   < >  --   --  17.25*   HEMOGLOBIN g/dL 8.6* 8.9* 8.3*   < >  --   --  9.5*   HEMATOCRIT % 29.4* 29.4* 28.2*   < >  --   --  31.0*   MCV fL 82.6 81.9 82.7   < >  --   --  81.2   MCHC g/dL 29.3* 30.3* 29.4*   < >  --   --  30.6*   PLATELETS 10*3/mm3 426 446 451*   < >  --   --  500*   INR    --   --   --   --  1.31*  --   --    D DIMER QUANT MCGFEU/mL  --   --   --   --   --  0.61  --     < > = values in this interval not displayed.     Acid/base balance:  Results from last 7 days   Lab Units 06/01/23  1017   PH, ARTERIAL pH units 7.469*   PO2 ART mm Hg 46.0*   PCO2, ARTERIAL mm Hg 31.1*   HCO3 ART mmol/L 22.6     Renal and electrolytes:    Results from last 7 days   Lab Units 06/05/23  0329 06/04/23  0057 06/03/23  1412 06/03/23  0429 06/02/23  1641 06/02/23  0132   SODIUM mmol/L 130* 130* 125* 127* 129* 126*   POTASSIUM mmol/L 4.6 4.7 4.7 4.6 4.7 4.8   MAGNESIUM mg/dL  --   --   --   --   --  2.0   CHLORIDE mmol/L 97* 98 94* 96* 96* 96*   CO2 mmol/L 21.3* 21.0* 19.0* 20.0* 20.8* 19.3*   BUN mg/dL 51* 54* 52* 54* 52* 49*   CREATININE mg/dL 2.16* 2.26* 2.46* 2.62* 2.30* 2.22*   CALCIUM mg/dL 8.5* 8.5* 8.4* 8.6 8.6 8.9     Estimated Creatinine Clearance: 34.2 mL/min (A) (by C-G formula based on SCr of 2.16 mg/dL (H)).  @GFRCG:3@   Liver and pancreatic function:  Results from last 7 days   Lab Units 06/01/23  1006   ALBUMIN g/dL 3.6   BILIRUBIN mg/dL 0.6   ALK PHOS U/L 127*   AST (SGOT) U/L 14   ALT (SGPT) U/L 11         Cardiac:  Results from last 7 days   Lab Units 06/01/23  1006   PROBNP pg/mL 8,240.0*     Liver and pancreatic function:  Results from last 7 days   Lab Units 06/01/23  1006   ALBUMIN g/dL 3.6   BILIRUBIN mg/dL 0.6   ALK PHOS U/L 127*   AST (SGOT) U/L 14   ALT (SGPT) U/L 11       Medications :     apixaban, 5 mg, Oral, Q12H  aspirin, 81 mg, Oral, Daily  atorvastatin, 20 mg, Oral, Nightly  bumetanide, 0.5 mg, Oral, Daily  cefTRIAXone, 1 g, Intravenous, Q24H  doxycycline, 100 mg, Intravenous, Q12H  FLUoxetine, 10 mg, Oral, Daily  fluticasone, 2 spray, Each Nare, Daily  guaiFENesin, 1,200 mg, Oral, Q12H  hydrALAZINE, 25 mg, Oral, TID  insulin glargine, 1-200 Units, Subcutaneous, Nightly - Glucommander  insulin lispro, 1-200 Units, Subcutaneous, 4x Daily With Meals & Nightly  isosorbide  mononitrate, 30 mg, Oral, Daily  latanoprost, 1 drop, Both Eyes, Nightly  lidocaine, 2 patch, Transdermal, Q24H  magic barrier cream, 1 application, Topical, BID  metoprolol tartrate, 50 mg, Oral, BID  multivitamin with minerals, 1 tablet, Oral, Daily  pantoprazole, 40 mg, Oral, Q AM  senna-docusate sodium, 2 tablet, Oral, BID  sodium chloride, 10 mL, Intravenous, Q12H  sodium chloride, 2 spray, Nasal, Q2H  vitamin B-12, 1,000 mcg, Oral, Daily             Assessment & Plan     1.  CKD stage IV  2.  Nephrotic syndrome  3.  Type 2 diabetes with renal involvement poor control  4.  Acute on chronic hypoxic respiratory failure  5.  Respiratory alkalosis  6.  Anemia with prior documented iron deficiency  7.  Heart failure with preserved ejection fraction  8.  Hypovolemic hyponatremia  9.  Morbid obesity    Patient had overall net negative fluid balance with only minimal dose of Bumex.  There is improvement in creatinine past so most likely post ATN polyuria.  Patient has a very low iron stores on labs  -Start on IV iron to 50 mg 3 times per week  - Albumin 2012.5 g IV once  - Continue Bumex 0.5 mg  - Follow-up urine protein creatinine ratio discussed  -Please avoid any nephrotoxic agents, hypotension and adjust medications according to estimated GFR      Elaina Bell MD  06/05/23  08:45 EDT

## 2023-06-05 NOTE — PROGRESS NOTES
Patient Identification:  Name:  Jolly Garcia  Age:  72 y.o.  Sex:  female  :  1950  MRN:  [id number]  Visit Number:  [id number]  Primary Care Provider:  April Cantrell APRN    Length of stay:  4    Subjective/Interval History/Consultants/Procedures     Chief complaint: Follow-up, acute on chronic respiratory failure with hypoxia due to right lower lobe pneumonia    Subjective/Interval History:  Jolly Gacria is our 72 y.o. female patient admitted on 2023 from local skilled nursing facility due to acute on chronic respiratory failure with hypoxia secondary to right lower lobe pneumonia.  She also presented with leukocytosis and mild CRP elevation.  CT of the chest without contrast noted consolidation in the right lower lobe and a tiny right lung nodule. D-dimer was negative. Lactate and Pro-Humberto also nonelevated.  Received Doxycycline and Rocephin in the emergency department. Continued on admission. Peripheral blood cultures x2 pending. No growth to date. S pneumo Ag and Legionella Ag negative. MRSA PCR ordered.  PT/OT/SLP consults were placed. Passed swallow evaluation without s/s aspiration. She is receiving scheduled DuoNebs and Mucinex. Robitussin available as needed for cough. She also presented with suspected WENDY on CKD stage IIIb and acute on chronic hyponatremia. Baseline creatinine around 1.4 (), although values have fluctuated wildly. Creatinine on admission was 2.61 and BUN 53. Sodium was 126. CT of the abdomen and pelvis without contrast noted stranding around the pelvic wall. Negative for obstructive uropathy or urolithiasis. Urinalysis with 2+ protein, otherwise unremarkable.  Continuous IV hydration initiated with normal saline at 100 mL an hour on admission. Received IV fluids for roughly 1 day; developed evidence of worsening BLE edema. Stopped fluids and gave 1x dose of IV Lasix on 6/3. Creatinine 2.1 this a.m. and Sodium 130. Bilateral renal ultrasound ordered along  with inpatient nephrology consult placed for further recommendations. Greatly appreciate their assistance. Renal ultrasound was unremarkable. Per Dr. Farley, patient had significant nephrotic syndrome around 7-8 months ago and did not follow up with Nephrology. He suspects that creatinine on admission represents permanent loss of kidney function and he does not believe there is an acute element. Urinary studies ordered along with iron profile, VBG, and repeat chest xray. Okay to continue oral Bumex. Do not suspect acute volume overload. Greatly appreciate their input and continued assistance.    Procedures/Imaging:  Chest x-ray x2  CT of the chest without contrast  CT of the abdomen and pelvis without contrast  Bilateral renal ultrasound    Consults:  Nephrology    On today's exam, the patient was resting in bed with no s/s acute distress noted. She was eating breakfast. Remains on 2L nasal cannula. Leukocytosis and renal function has improved. Sodium level remains stable. Patient is eating and drinking. She has been working with PT. She has completed 5 days of Rocephin and 4 days of Doxycycline. Antibiotic course will be full completed by the end of today. From all standpoints, patient has clinically improved. She denies any shortness of breath, increased shortness of breath, or any other acute complaints today. She states that she feels overall fatigued, however she states that this is her baseline. Pending further recommendations from Nephrology today. Patient will likely be able to return to skilled nursing facility tomorrow. Discussed with patient. She voiced agreement with no further questions or concerns at this time.    Discussed with AM FABIAN Martin with no acute events overnight. Room location at the time of evaluation was 311b.  Discussed with admitting physician, Dr. Behbahani, Katayoun,  MD.  ----------------------------------------------------------------------------------------------------------------------  Current Hospital Meds:  albumin human, 12.5 g, Intravenous, Once  apixaban, 5 mg, Oral, Q12H  aspirin, 81 mg, Oral, Daily  atorvastatin, 20 mg, Oral, Nightly  bumetanide, 0.5 mg, Oral, Daily  cefTRIAXone, 1 g, Intravenous, Q24H  doxycycline, 100 mg, Intravenous, Q12H  ferric gluconate, 250 mg, Intravenous, Daily  FLUoxetine, 10 mg, Oral, Daily  fluticasone, 2 spray, Each Nare, Daily  guaiFENesin, 1,200 mg, Oral, Q12H  hydrALAZINE, 25 mg, Oral, TID  insulin glargine, 1-200 Units, Subcutaneous, Nightly - Glucommander  insulin lispro, 1-200 Units, Subcutaneous, 4x Daily With Meals & Nightly  isosorbide mononitrate, 30 mg, Oral, Daily  latanoprost, 1 drop, Both Eyes, Nightly  lidocaine, 2 patch, Transdermal, Q24H  magic barrier cream, 1 application, Topical, BID  metoprolol tartrate, 50 mg, Oral, BID  multivitamin with minerals, 1 tablet, Oral, Daily  pantoprazole, 40 mg, Oral, Q AM  senna-docusate sodium, 2 tablet, Oral, BID  sodium chloride, 10 mL, Intravenous, Q12H  sodium chloride, 2 spray, Nasal, Q2H  vitamin B-12, 1,000 mcg, Oral, Daily           ----------------------------------------------------------------------------------------------------------------------      Objective     Vital Signs:  Temp:  [98.2 °F (36.8 °C)-98.9 °F (37.2 °C)] 98.6 °F (37 °C)  Heart Rate:  [81-94] 89  Resp:  [18] 18  BP: (112-145)/(47-84) 145/84      06/03/23  0500 06/04/23  0500 06/05/23  0500   Weight: (!) 146 kg (322 lb 6.4 oz) (!) 146 kg (321 lb 8 oz) (!) 141 kg (309 lb 14.4 oz)     Body mass index is 50.02 kg/m².    Intake/Output Summary (Last 24 hours) at 6/5/2023 0907  Last data filed at 6/4/2023 2049  Gross per 24 hour   Intake 415.11 ml   Output 1050 ml   Net -634.89 ml       No intake/output data recorded.  Diet: Cardiac Diets, Diabetic Diets; Healthy Heart (2-3 Na+); Consistent Carbohydrate; Texture:  Regular Texture (IDDSI 7); Fluid Consistency: Thin (IDDSI 0)  ----------------------------------------------------------------------------------------------------------------------    Physical Exam  Vitals reviewed.   Constitutional:       General: She is awake. She is not in acute distress.     Appearance: She is obese. She is not diaphoretic.      Interventions: Nasal cannula in place.   HENT:      Head: Normocephalic and atraumatic.      Mouth/Throat:      Mouth: Mucous membranes are moist.      Pharynx: Oropharynx is clear.   Eyes:      Extraocular Movements: Extraocular movements intact.      Pupils: Pupils are equal, round, and reactive to light.   Cardiovascular:      Rate and Rhythm: Normal rate. Rhythm irregular.      Pulses:           Dorsalis pedis pulses are 1+ on the right side and 1+ on the left side.      Heart sounds: Normal heart sounds. No murmur heard.    No friction rub.   Pulmonary:      Effort: Pulmonary effort is normal. No accessory muscle usage, respiratory distress or retractions.      Breath sounds: Decreased breath sounds present. No wheezing, rhonchi or rales.      Comments: Lung sounds diminished in bilateral bases.  No wheezing, rhonchi, or rales.  Abdominal:      General: Bowel sounds are normal. There is distension (Mild, chronic appearing).      Palpations: Abdomen is soft.      Tenderness: There is no abdominal tenderness. There is no guarding.   Musculoskeletal:      Cervical back: Neck supple. No rigidity.      Right lower le+ Pitting Edema present.      Left lower le+ Pitting Edema present.   Skin:     General: Skin is warm and dry.      Capillary Refill: Capillary refill takes 2 to 3 seconds.      Coloration: Skin is pale.   Neurological:      Mental Status: She is alert and oriented to person, place, and time. Mental status is at baseline.      Cranial Nerves: No dysarthria or facial asymmetry.      Sensory: Sensation is intact. No sensory deficit.      Motor: Weakness  (Generalized) present. No tremor.   Psychiatric:         Attention and Perception: Attention normal.         Mood and Affect: Mood normal.         Speech: Speech normal.         Behavior: Behavior normal. Behavior is cooperative.         Thought Content: Thought content normal.         Cognition and Memory: Cognition normal.         Judgment: Judgment normal.        ----------------------------------------------------------------------------------------------------------------------  Tele: Appearing A-flutter 80s to 90s on my exam.  ----------------------------------------------------------------------------------------------------------------------  Results from last 7 days   Lab Units 06/01/23  1209 06/01/23  1006   HSTROP T ng/L 36* 44*   PROBNP pg/mL  --  8,240.0*       Results from last 7 days   Lab Units 06/05/23  0329 06/04/23  0057 06/03/23  0429 06/02/23  0132 06/01/23  1032 06/01/23  1006   CRP mg/dL  --  6.88*  --   --   --  1.48*   LACTATE mmol/L  --   --   --   --  1.6  --    WBC 10*3/mm3 11.82* 15.79* 17.81*   < >  --  17.25*   HEMOGLOBIN g/dL 8.6* 8.9* 8.3*   < >  --  9.5*   HEMATOCRIT % 29.4* 29.4* 28.2*   < >  --  31.0*   MCV fL 82.6 81.9 82.7   < >  --  81.2   MCHC g/dL 29.3* 30.3* 29.4*   < >  --  30.6*   PLATELETS 10*3/mm3 426 446 451*   < >  --  500*   INR   --   --   --   --  1.31*  --     < > = values in this interval not displayed.       Results from last 7 days   Lab Units 06/01/23  1017   PH, ARTERIAL pH units 7.469*   PO2 ART mm Hg 46.0*   PCO2, ARTERIAL mm Hg 31.1*   HCO3 ART mmol/L 22.6       Results from last 7 days   Lab Units 06/05/23  0329 06/04/23  0057 06/03/23  1412 06/02/23  1641 06/02/23  0132 06/01/23  1006   SODIUM mmol/L 130* 130* 125*   < > 126* 126*   POTASSIUM mmol/L 4.6 4.7 4.7   < > 4.8 4.8   MAGNESIUM mg/dL  --   --   --   --  2.0  --    CHLORIDE mmol/L 97* 98 94*   < > 96* 92*   CO2 mmol/L 21.3* 21.0* 19.0*   < > 19.3* 22.7   BUN mg/dL 51* 54* 52*   < > 49* 53*    CREATININE mg/dL 2.16* 2.26* 2.46*   < > 2.22* 2.61*   CALCIUM mg/dL 8.5* 8.5* 8.4*   < > 8.9 8.9   GLUCOSE mg/dL 98 110* 125*   < > 116* 114*   ALBUMIN g/dL  --   --   --   --   --  3.6   BILIRUBIN mg/dL  --   --   --   --   --  0.6   ALK PHOS U/L  --   --   --   --   --  127*   AST (SGOT) U/L  --   --   --   --   --  14   ALT (SGPT) U/L  --   --   --   --   --  11    < > = values in this interval not displayed.     Estimated Creatinine Clearance: 34.2 mL/min (A) (by C-G formula based on SCr of 2.16 mg/dL (H)).  No results found for: AMMONIA  Results from last 7 days   Lab Units 06/02/23  0132   CHOLESTEROL mg/dL 64   TRIGLYCERIDES mg/dL 27   HDL CHOL mg/dL 38*   LDL CHOL mg/dL 15       ----------------------------------------------------------------------------------------------------------------------  Imaging Results (Last 24 Hours)       Procedure Component Value Units Date/Time    XR Chest 1 View [[id number]] Collected: 06/05/23 0724     Updated: 06/05/23 0728    Narrative:      CLINICAL HISTORY: CHF.     COMPARISON: 6/1/2023.     TECHNIQUE: Single portable upright AP view of the chest was obtained.     FINDINGS: Heart size is enlarged.  Mild airspace opacities are seen at  the left lung base.  No pleural effusion or pneumothorax is seen.  There  is elevation of the right hemidiaphragm.  Sternotomy wires and hardware  plates are again seen.  There is no acute osseous or upper abdominal  abnormality.       Impression:         AIRSPACE OPACITIES AT THE LEFT LUNG BASE MAY REPRESENT PNEUMONIA,  ATELECTASIS, OR EDEMA.     This report was finalized on 6/5/2023 7:26 AM by Ellen Baer MD.       US Renal Bilateral [[id number]] Collected: 06/04/23 1123     Updated: 06/04/23 1126    Narrative:      EXAMINATION: US RENAL BILATERAL-      CLINICAL INDICATION: WENDY; J18.9-Pneumonia, unspecified organism;  J44.1-Chronic obstructive pulmonary disease with (acute) exacerbation;  R09.02-Hypoxemia        COMPARISON: None  available     PROCEDURE: Sonographic imaging of the kidneys     FINDINGS:  Imaging of the right kidney demonstrates the right kidney measuring  12.68 x 7.30 x 5.14 cm. No solid mass or hydronephrosis.     Left kidney measures 11.51 x 6.83 x 5.78 cm. No solid mass or  hydronephrosis       Impression:      1. Unremarkable sonographic appearance of the kidneys.     This report was finalized on 6/4/2023 11:23 AM by Dr. Leonard Narvaez MD.             ----------------------------------------------------------------------------------------------------------------------   I have reviewed the above laboratory values for 06/05/23    Assessment/Plan     Active Hospital Problems    Diagnosis  POA    **Pneumonia of right lower lobe due to infectious organism [J18.9]  Yes    Acute kidney injury [N17.9]  Yes    Hyponatremia [E87.1]  Yes         ASSESSMENT/PLAN:     ACUTE HOSPITAL PROBLEMS:     -Acute on chronic respiratory failure with hypoxia, present on admission, likely due to right lower lobe pneumonia  -Leukocytosis and mild C-RP elevation, present on admission and due to above  -CT of the chest without contrast noted consolidation in the right lower lobe and tiny RL nodule.   -D-Dimer negative.   -WBCs 17K, C-RP 1.48. Lactate and Procal negative. White count initially increased after admission (likely due to IV steroids administered in the ED), now trending back down and continuing to decrease.  -Completed 5 day course of IV Rocephin; will complete 5 day course of IV Doxycycline today.   -COVID-19 and Flu A/B swab was negative.   -Peripheral blood cultures x2 pending. No growth to date.  -Sputum culture ordered, however not collected.  -S Pneumo Ag and Legionella Ag negative.   -MRSA PCR ordered, also not yet collected.  -SLP evaluated without s/s aspiration. Regular diet continued.  -Maintain aspiration precautions.   -Encourage incentive spirometry use.   -Turn, cough, and deep breathing exercises.   -ABG obtained in the ED  revealed pO2 in the 40s on room air. She was placed on supplemental O2 and stable. Continue to titrate O2 to maintain SpO2 saturations > 90%. Will likely require 2L continuously on return to nursing facility.  -Continuous pulse oximetry in place.  -Continue scheduled DuoNebs.   -Mucinex and PRN Robitussin for supportive care.  -PRN Tylenol for mild pain or fever.   -Repeat CBC in the a.m.  -Recommend referral to lung nodule clinic on discharge for new finding of right lung nodule.      -WENDY verus progression of CKD IIIb - IV, present on admission  -Acute on chronic hyponatremia, present on admission  -Baseline creatinine ~1.4 (2022) although values have fluctuated widely. Creatinine on admission 2.61. BUN 53.   -CT of the abdomen and pelvis without contrast noted stranding around the anterior pelvic wall. Noted no evidence of obstructive uropathy or urolithiasis.   -Initial UA with 2+ protein, otherwise unremarkable.   -Continue to monitor urine output and renal function closely.  -Avoid nephrotoxins as able.  -Sodium was 126 on arrival; given elevated BUN and physical exam findings, felt likely represented hypovolemic hyponatremia. Continuous IV fluids were initiated and patient received for roughly 1 day. Discontinued IVF on 6/3. Sodium level currently improved to 130 and remaining stable.  -Creatinine initially improved s/p administration of fluids, but trended back up. There was evidence of volume overload on physical exam w/ worsening BLE edema on 6/3. IV fluids held as noted above. 1x dose of 40 mg IV Lasix administered. Cr currently improved to 2.1. Home oral Bumex 0.5 mg daily continued.   -Inpatient Nephrology consult was placed for further assistance. Greatly appreciate their input. Per note, patient had significant nephrotic syndrome ~7-8 months ago and has not followed up with nephrology since. Suspect current creatinine level represents permanent loss of kidney function. Do not current suspect acute  element. Iron profile, urine studies, VBG, and repeat chest xray ordered per Nephrology. Okay to continue oral Bumex.         OTHER CHRONIC MEDICAL PROBLEMS:        -CAD s/p CABG  -Hyperlipidemia  -Indeterminate troponin elevation, present on admission, likely due to hypoxia  -Chronic HFpEF  -Chronic atrial fibrillation/flutter  -Chronic anticoagulation with Eliquis  -Denies chest pain at time of exam.   -Initial EKG revealed A flutter with controlled rate. Also noted nonspecific ST and T wave abnormality. No evidence of acute ST elevations or depressions.  -HS Troponin T was 44 with -8 delta.   -Continuous cardiac monitoring in place.  -HgbA1c 7.40%. Lipid profile was with HDL 38, otherwise unremarkable.  -Continue aspirin, statin.   -Strict I's and O's. Daily weights.   -Echo from 9/13/2022 reviewed. Revealed LVEF 56-60% with mildly reduced right ventricular systolic function. Trace MVR. Trace AVR. Moderate TVR. Severe pulmonary HTN. No pericardial effusion.   -Continue Eliquis for stroke prophylaxis.      -Type II diabetes mellitus, insulin dependent  -HgbA1c 7.40%.   -Continue SSI and long-acting insulin per Glucomander protocol.  -Closely monitor blood glucose levels with accuchecks AC and HS.  -Hypoglycemia protocol in place.     -Essential hypertension  -BP appears well controlled.  -Continue current antihypertensive regimen. Adjust as necessary.  -Closely monitor BP per hospital protocol, titrate medications as necessary.     -Chronic normocytic anemia  -Hgb 9.5/Hct 31.0 on admission, appearing near recent baseline.   -Hgb remaining stable.  -No s/s active bleeding on exam.    -Continue multivitamin, B-12 supplementation.   -Repeat CBC in the a.m.   -Plan to transfuse for Hgb < 7.      -JESIKA  -Continuous pulse oximetry, as per plan above.     -Obesity by BMI, Body mass index is 50.36 kg/m².  -Affecting all aspects of care.  -Encourage lifestyle modifications.     -Anxiety  -Supportive care.   -Continue  Prozac.     -Physical debility  -Up with assistance.  -Maintain fall precautions.   -PT/OT following.       -----------  -DVT prophylaxis: SubQ Heparin  -GI prophylaxis: PPI.  -Activity: As tolerated. Up with assistance. Fall precautions.  -Disposition plans/anticipated needs: Plans to return to [facility] once medically stable.  -Diet: Regular, CC  -Home supplemental O2: Was on room air prior to admission although now requiring 2 L continuously.      High risk secondary to acute on chronic hypoxemic respiratory failure due to right lower lobe pneumonia, WENDY versus progression of CKD, and acute on chronic hyponatremia.         TUCKER Stone  06/05/23  09:07 EDT  Pager #[phone]

## 2023-06-05 NOTE — CASE MANAGEMENT/SOCIAL WORK
Discharge Planning Assessment   Sina     Patient Name: Jolly Garcia  MRN: [id number]  Today's Date: 6/5/2023    Admit Date: 6/1/2023    Plan: Pt admitted from [facility].  Pt has a skilled bed reserved at [facility] for 14 days per Vicky.  SS will follow and assist with discharge disposition.     Discharge Plan       Row Name 06/05/23 1307       Plan    Plan Pt admitted from [facility].  Pt has a skilled bed reserved at [facility] for 14 days per Vicky.  SS will follow and assist with discharge disposition.                  DEREK Wells

## 2023-06-05 NOTE — PLAN OF CARE
Goal Outcome Evaluation:    Pt is resting in bed with no s/s of distress noted. VSS. IV access maintained. Pt ambulated within room earlier in shift. Pt up out of bed in chair for approx one hr this shift. Will continue with plan of care.

## 2023-06-06 VITALS
DIASTOLIC BLOOD PRESSURE: 81 MMHG | RESPIRATION RATE: 18 BRPM | HEART RATE: 78 BPM | BODY MASS INDEX: 47.09 KG/M2 | SYSTOLIC BLOOD PRESSURE: 170 MMHG | OXYGEN SATURATION: 97 % | TEMPERATURE: 97.1 F | HEIGHT: 66 IN | WEIGHT: 293 LBS

## 2023-06-06 LAB
ANION GAP SERPL CALCULATED.3IONS-SCNC: 10.5 MMOL/L (ref 5–15)
BACTERIA SPEC AEROBE CULT: NORMAL
BACTERIA SPEC AEROBE CULT: NORMAL
BUN SERPL-MCNC: 48 MG/DL (ref 8–23)
BUN/CREAT SERPL: 23.9 (ref 7–25)
CALCIUM SPEC-SCNC: 8.7 MG/DL (ref 8.6–10.5)
CHLORIDE SERPL-SCNC: 97 MMOL/L (ref 98–107)
CO2 SERPL-SCNC: 22.5 MMOL/L (ref 22–29)
CREAT SERPL-MCNC: 2.01 MG/DL (ref 0.57–1)
CRP SERPL-MCNC: 4.6 MG/DL (ref 0–0.5)
DEPRECATED RDW RBC AUTO: 49.8 FL (ref 37–54)
EGFRCR SERPLBLD CKD-EPI 2021: 26 ML/MIN/1.73
ERYTHROCYTE [DISTWIDTH] IN BLOOD BY AUTOMATED COUNT: 16.4 % (ref 12.3–15.4)
GLUCOSE BLDC GLUCOMTR-MCNC: 92 MG/DL (ref 70–130)
GLUCOSE BLDC GLUCOMTR-MCNC: 95 MG/DL (ref 70–130)
GLUCOSE SERPL-MCNC: 86 MG/DL (ref 65–99)
HCT VFR BLD AUTO: 31.5 % (ref 34–46.6)
HGB BLD-MCNC: 9.1 G/DL (ref 12–15.9)
MCH RBC QN AUTO: 23.9 PG (ref 26.6–33)
MCHC RBC AUTO-ENTMCNC: 28.9 G/DL (ref 31.5–35.7)
MCV RBC AUTO: 82.9 FL (ref 79–97)
PLATELET # BLD AUTO: 426 10*3/MM3 (ref 140–450)
PMV BLD AUTO: 8 FL (ref 6–12)
POTASSIUM SERPL-SCNC: 4.6 MMOL/L (ref 3.5–5.2)
RBC # BLD AUTO: 3.8 10*6/MM3 (ref 3.77–5.28)
SODIUM SERPL-SCNC: 130 MMOL/L (ref 136–145)
WBC NRBC COR # BLD: 12.19 10*3/MM3 (ref 3.4–10.8)

## 2023-06-06 PROCEDURE — 25010000002 NA FERRIC GLUC CPLX PER 12.5 MG: Performed by: INTERNAL MEDICINE

## 2023-06-06 PROCEDURE — 82948 REAGENT STRIP/BLOOD GLUCOSE: CPT

## 2023-06-06 PROCEDURE — 80048 BASIC METABOLIC PNL TOTAL CA: CPT

## 2023-06-06 PROCEDURE — 99239 HOSP IP/OBS DSCHRG MGMT >30: CPT

## 2023-06-06 PROCEDURE — 25010000002 CEFTRIAXONE PER 250 MG

## 2023-06-06 PROCEDURE — 86140 C-REACTIVE PROTEIN: CPT

## 2023-06-06 PROCEDURE — 85027 COMPLETE CBC AUTOMATED: CPT

## 2023-06-06 RX ORDER — GUAIFENESIN 600 MG/1
1200 TABLET, EXTENDED RELEASE ORAL EVERY 12 HOURS SCHEDULED
Qty: 8 TABLET | Refills: 0
Start: 2023-06-06 | End: 2023-06-08

## 2023-06-06 RX ORDER — FLUTICASONE PROPIONATE 50 MCG
2 SPRAY, SUSPENSION (ML) NASAL DAILY
Qty: 9.9 ML | Refills: 0
Start: 2023-06-06

## 2023-06-06 RX ADMIN — FLUOXETINE HYDROCHLORIDE 10 MG: 10 CAPSULE ORAL at 08:59

## 2023-06-06 RX ADMIN — PANTOPRAZOLE SODIUM 40 MG: 40 TABLET, DELAYED RELEASE ORAL at 06:17

## 2023-06-06 RX ADMIN — Medication 10 ML: at 09:00

## 2023-06-06 RX ADMIN — METOPROLOL TARTRATE 50 MG: 50 TABLET, FILM COATED ORAL at 08:59

## 2023-06-06 RX ADMIN — SALINE NASAL SPRAY 2 SPRAY: 1.5 SOLUTION NASAL at 06:18

## 2023-06-06 RX ADMIN — ASPIRIN 81 MG: 81 TABLET, CHEWABLE ORAL at 08:59

## 2023-06-06 RX ADMIN — GUAIFENESIN 1200 MG: 600 TABLET, EXTENDED RELEASE ORAL at 09:00

## 2023-06-06 RX ADMIN — SODIUM CHLORIDE 250 MG: 9 INJECTION, SOLUTION INTRAVENOUS at 10:11

## 2023-06-06 RX ADMIN — SALINE NASAL SPRAY 2 SPRAY: 1.5 SOLUTION NASAL at 08:59

## 2023-06-06 RX ADMIN — Medication 1000 MCG: at 08:59

## 2023-06-06 RX ADMIN — ISOSORBIDE MONONITRATE 30 MG: 30 TABLET, EXTENDED RELEASE ORAL at 08:59

## 2023-06-06 RX ADMIN — Medication 1 TABLET: at 08:59

## 2023-06-06 RX ADMIN — FLUTICASONE PROPIONATE 2 SPRAY: 50 SPRAY, METERED NASAL at 08:59

## 2023-06-06 RX ADMIN — BUMETANIDE 0.5 MG: 1 TABLET ORAL at 08:59

## 2023-06-06 RX ADMIN — APIXABAN 5 MG: 5 TABLET, FILM COATED ORAL at 09:00

## 2023-06-06 RX ADMIN — VITAMINS A AND D OINTMENT 1 APPLICATION: 15.5; 53.4 OINTMENT TOPICAL at 09:09

## 2023-06-06 RX ADMIN — CEFTRIAXONE 1 G: 1 INJECTION, POWDER, FOR SOLUTION INTRAMUSCULAR; INTRAVENOUS at 09:00

## 2023-06-06 RX ADMIN — LIDOCAINE 2 PATCH: 50 PATCH CUTANEOUS at 09:00

## 2023-06-06 RX ADMIN — HYDRALAZINE HYDROCHLORIDE 25 MG: 25 TABLET, FILM COATED ORAL at 08:59

## 2023-06-06 RX ADMIN — DOCUSATE SODIUM 50 MG AND SENNOSIDES 8.6 MG 2 TABLET: 8.6; 5 TABLET, FILM COATED ORAL at 09:00

## 2023-06-06 NOTE — PROGRESS NOTES
Nephrology Progress Note      Subjective     No chest pain, shortness of breath    Objective       Vital signs :     Temp:  [97.8 °F (36.6 °C)-98.2 °F (36.8 °C)] 97.8 °F (36.6 °C)  Heart Rate:  [] 87  Resp:  [18-20] 20  BP: (114-172)/(68-91) 172/82    Intake/Output                         06/04/23 0701 - 06/05/23 0700 06/05/23 0701 - 06/06/23 0700     [id number] 6565-1483 Total 6304-3617 7880-1882 Total                 Intake    P.O.  340  -- 340  120  -- 120    I.V.  195.1  -- 195.1  156.8  -- 156.8    Total Intake 535.1 -- 535.1 276.8 -- 276.8       Output    Urine  850  200 1050  --  -- --    Total Output  -- -- --             Physical Exam:    General Appearance : alert, pleasant, appears stated age, cooperative and alert  Lungs : clear to auscultation, respirations regular  Heart :  regular rhythm & normal rate, normal S1, S2 and no murmur, no rub  Abdomen : soft, non distended  Extremities : 1+ edema,   Neurologic :   orientated to person, place, time and situation, Grossly no focal deficits      Laboratory Data :     Albumin No results found for: ALBUMIN   Magnesium No results found for: MG       PTH               No results found for: PTH    CBC and coagulation:  Results from last 7 days   Lab Units 06/06/23  0109 06/05/23  0329 06/04/23  0057 06/02/23  0132 06/01/23  1032 06/01/23  1007 06/01/23  1006   PROCALCITONIN ng/mL  --   --   --   --  0.05  --   --    LACTATE mmol/L  --   --   --   --  1.6  --   --    SED RATE mm/hr  --   --   --   --   --   --  60*   CRP mg/dL 4.60*  --  6.88*  --   --   --  1.48*   WBC 10*3/mm3 12.19* 11.82* 15.79*   < >  --   --  17.25*   HEMOGLOBIN g/dL 9.1* 8.6* 8.9*   < >  --   --  9.5*   HEMATOCRIT % 31.5* 29.4* 29.4*   < >  --   --  31.0*   MCV fL 82.9 82.6 81.9   < >  --   --  81.2   MCHC g/dL 28.9* 29.3* 30.3*   < >  --   --  30.6*   PLATELETS 10*3/mm3 426 426 446   < >  --   --  500*   INR   --   --   --   --  1.31*  --   --    D DIMER QUANT MCGFEU/mL  --    --   --   --   --  0.61  --     < > = values in this interval not displayed.     Acid/base balance:  Results from last 7 days   Lab Units 06/01/23  1017   PH, ARTERIAL pH units 7.469*   PO2 ART mm Hg 46.0*   PCO2, ARTERIAL mm Hg 31.1*   HCO3 ART mmol/L 22.6     Renal and electrolytes:    Results from last 7 days   Lab Units 06/06/23  0109 06/05/23  0329 06/04/23  0057 06/03/23  1412 06/03/23  0429 06/02/23  1641 06/02/23  0132   SODIUM mmol/L 130* 130* 130* 125* 127*   < > 126*   POTASSIUM mmol/L 4.6 4.6 4.7 4.7 4.6   < > 4.8   MAGNESIUM mg/dL  --   --   --   --   --   --  2.0   CHLORIDE mmol/L 97* 97* 98 94* 96*   < > 96*   CO2 mmol/L 22.5 21.3* 21.0* 19.0* 20.0*   < > 19.3*   BUN mg/dL 48* 51* 54* 52* 54*   < > 49*   CREATININE mg/dL 2.01* 2.16* 2.26* 2.46* 2.62*   < > 2.22*   CALCIUM mg/dL 8.7 8.5* 8.5* 8.4* 8.6   < > 8.9    < > = values in this interval not displayed.     Estimated Creatinine Clearance: 36.6 mL/min (A) (by C-G formula based on SCr of 2.01 mg/dL (H)).  @GFRCG:3@   Liver and pancreatic function:  Results from last 7 days   Lab Units 06/01/23  1006   ALBUMIN g/dL 3.6   BILIRUBIN mg/dL 0.6   ALK PHOS U/L 127*   AST (SGOT) U/L 14   ALT (SGPT) U/L 11         Cardiac:  Results from last 7 days   Lab Units 06/01/23  1006   PROBNP pg/mL 8,240.0*     Liver and pancreatic function:  Results from last 7 days   Lab Units 06/01/23  1006   ALBUMIN g/dL 3.6   BILIRUBIN mg/dL 0.6   ALK PHOS U/L 127*   AST (SGOT) U/L 14   ALT (SGPT) U/L 11       Medications :     apixaban, 5 mg, Oral, Q12H  aspirin, 81 mg, Oral, Daily  atorvastatin, 20 mg, Oral, Nightly  bumetanide, 0.5 mg, Oral, Daily  cefTRIAXone, 1 g, Intravenous, Q24H  doxycycline, 100 mg, Intravenous, Q12H  ferric gluconate, 250 mg, Intravenous, Daily  FLUoxetine, 10 mg, Oral, Daily  fluticasone, 2 spray, Each Nare, Daily  guaiFENesin, 1,200 mg, Oral, Q12H  hydrALAZINE, 25 mg, Oral, TID  insulin glargine, 1-200 Units, Subcutaneous, Nightly -  Glucommander  insulin lispro, 1-200 Units, Subcutaneous, 4x Daily With Meals & Nightly  isosorbide mononitrate, 30 mg, Oral, Daily  latanoprost, 1 drop, Both Eyes, Nightly  lidocaine, 2 patch, Transdermal, Q24H  magic barrier cream, 1 application, Topical, BID  metoprolol tartrate, 50 mg, Oral, BID  multivitamin with minerals, 1 tablet, Oral, Daily  pantoprazole, 40 mg, Oral, Q AM  senna-docusate sodium, 2 tablet, Oral, BID  sodium chloride, 10 mL, Intravenous, Q12H  sodium chloride, 2 spray, Nasal, Q2H  vitamin B-12, 1,000 mcg, Oral, Daily             Assessment & Plan     1.  CKD stage IV  2.  Nephrotic syndrome  3.  Type 2 diabetes with renal involvement poor control  4.  Acute on chronic hypoxic respiratory failure  5.  Respiratory alkalosis  6.  Anemia with prior documented iron deficiency  7.  Heart failure with preserved ejection fraction  8.  Hypovolemic hyponatremia  9.  Morbid obesity    Renal functions grossly stable with creatinine remains around 2.0.  Fluid status has improved significantly.  Clinically euvolemic.  Educated and counseled the patient will continue on current dose of diuretics.    -Continue IV iron to 50 mg 3 times per week  - Continue Bumex 0.5 mg  - Follow-up urine protein creatinine ratio discussed  -Please avoid any nephrotoxic agents, hypotension and adjust medications according to estimated GFR      Elaina Bell MD  06/06/23  08:17 EDT

## 2023-06-06 NOTE — DISCHARGE SUMMARY
[facility] Medicine Services  DISCHARGE SUMMARY    Date of Admission: 6/1/2023    Date of Discharge:  6/6/2023    PCP: April Cantrell APRN    Discharging Provider: TUCKER Stone  Attending Physician on day of DC: Dr. Behbahani, Katayoun, MD    Admission Diagnosis:   Please see admission H&P    Discharge Diagnosis:   Acute on chronic hypoxic respiratory failure due to right lower lobe pneumonia  Leukocytosis and mild C-RP elevation due to above, improved  Progression of CKD stage IIIb to IV   Acute on chronic hyponatremia, improved  Iron deficiency anemia  CAD s/p CABG  Hyperlipidemia  Indeterminate troponin elevation likely due to hypoxia   Chronic HFpEF  Chronic atrial fib/flutter  Chronic anticoagulation with Eliquis  Insulin dependent type II DM  Essential hypertension, controlled  JESIKA  Obesity by BMI, 49.86 kg/m2  Anxiety  Chronic physical debility  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 female patient admitted on 6/1/2023 from local skilled nursing facility due to acute on chronic respiratory failure with hypoxia secondary to right lower lobe pneumonia. She has a pMH significant for CAD status post CABG, chronic HFpEF, type 2 diabetes mellitus, CKD stage IIIb/IV, chronic anemia, essential hypertension, hyperlipidemia, paroxysmal atrial fibrillation, chronic anticoagulation with Eliquis, obstructive sleep apnea, obesity by BMI, chronic hypoxic respiratory failure on 2 L/min PRN at baseline, and anxiety.        Hospital Course     Hospital Course  Jolly Garcia was admitted as outlined in above HPI.     CT of  the chest without contrast noted consolidation in the right lower lobe and a tiny right lung nodule. D-dimer was negative. Lactate and Pro-Humberto non-elevated. She did have leukocytosis and mild C-RP elevation. Received Doxycycline and Rocephin in the emergency department. Continued on admission. Completed 5 day course of abx. Leukocytosis and C-RP improved. Peripheral blood cultures x2 pending. No growth at 4 days. S pneumo Ag and Legionella Ag negative. MRSA PCR ordered, but unfortunately was never collected. However, patient improved on Doxy/Rocephin. PT/OT/SLP consults were placed. She passed her swallow evaluation without s/s aspiration. Placed on regular diet with thin liquids. Worked well with PT/OT. She has received scheduled DuoNebs and Mucinex. Robitussin was made available as needed for cough. She also presented with suspected WENDY on CKD stage IIIb and acute on chronic hyponatremia. Baseline creatinine around 1.4 (2022), although values have fluctuated wildly. Creatinine on admission was 2.61 and BUN 53. Sodium was 126. CT of the abdomen and pelvis without contrast noted stranding around the pelvic wall. Negative for obstructive uropathy or urolithiasis. Urinalysis with 2+ protein, otherwise unremarkable.  Continuous IV hydration initiated with normal saline at 100 mL an hour on admission. Received IV fluids for roughly 1 day; developed evidence of worsening BLE edema. Stopped fluids and gave 1x dose of IV Lasix on 6/3. Creatinine 2.0 this a.m. and Sodium 130. Bilateral renal ultrasound ordered along with inpatient nephrology consult placed for further recommendations. Greatly appreciate their input and assistance. Renal ultrasound was unremarkable. Per Dr. Farley, patient had significant nephrotic syndrome around 7-8 months ago and did not follow up with Nephrology. He suspects that creatinine on admission represents permanent loss of kidney function (progression to CKD stage IV) and he does not believe there  is an acute element. Urine studies ordered along with iron profile, VBG, and repeat chest xray. Home low dose oral daily Bumex was continued. Do not suspect acute volume overload. Iron studies revealed significantly decreased iron stores and Nephrology is currently recommending iron infusions three times weekly (M, W, F). Patient received IV iron on Monday, 6/5. She also received 1x dose of IV albumin on that day. Patient will need continued follow up with Nephrology in the outpatient setting for management of CKD IV. From pneumonia standpoint, patient has shown significant clinical improvement. Feel that she has currently reached maximum benefit of inpatient hospitalization and may be discharged back to [facility] on this date. Discussed with patient and she voiced agreement. She denies any chest pain, worsening shortness of breath, increased sputum production, or other new/acute complaints. She has continued to have net negative output on current diuretic regimen. Previously only used supplemental O2 at 2L nightly; will now require continuously. Ambulatory referral to lung nodule clinic was placed due to new, incidental finding of right lung nodule on CT chest.     Discussed with AM FABIAN Albarran on day of discharge.     Telemetry on day of discharge was atrial flutter 70-80s.     Oxygen saturation on the day of discharge was 96% on 2L nasal cannula.      Pertinent Laboratory and Radiology Results     Pertinent Test Results:      Results from last 7 days   Lab Units 06/01/23  1017   PH, ARTERIAL pH units 7.469*   PO2 ART mm Hg 46.0*   PCO2, ARTERIAL mm Hg 31.1*   HCO3 ART mmol/L 22.6     Results from last 7 days   Lab Units 06/06/23  0109 06/05/23  0329 06/04/23  0057 06/03/23  1412 06/03/23  0429 06/02/23  1641 06/02/23  0132 06/01/23  1006   WBC 10*3/mm3 12.19* 11.82* 15.79*  --  17.81*  --  21.19* 17.25*   HEMOGLOBIN g/dL 9.1* 8.6* 8.9*  --  8.3*  --  8.9* 9.5*   HEMATOCRIT % 31.5* 29.4* 29.4*  --   28.2*  --  30.9* 31.0*   PLATELETS 10*3/mm3 426 426 446  --  451*  --  450 500*   MCV fL 82.9 82.6 81.9  --  82.7  --  86.3 81.2   SODIUM mmol/L 130* 130* 130* 125* 127* 129* 126* 126*   POTASSIUM mmol/L 4.6 4.6 4.7 4.7 4.6 4.7 4.8 4.8   CHLORIDE mmol/L 97* 97* 98 94* 96* 96* 96* 92*   CO2 mmol/L 22.5 21.3* 21.0* 19.0* 20.0* 20.8* 19.3* 22.7   BUN mg/dL 48* 51* 54* 52* 54* 52* 49* 53*   CREATININE mg/dL 2.01* 2.16* 2.26* 2.46* 2.62* 2.30* 2.22* 2.61*   GLUCOSE mg/dL 86 98 110* 125* 79 155* 116* 114*   CALCIUM mg/dL 8.7 8.5* 8.5* 8.4* 8.6 8.6 8.9 8.9        Results from last 7 days   Lab Units 06/01/23  1209 06/01/23  1006   HSTROP T ng/L 36* 44*   PROBNP pg/mL  --  8,240.0*     Results from last 7 days   Lab Units 06/06/23  0109 06/05/23  0329 06/04/23  0057 06/03/23  0429 06/02/23  0132 06/01/23  1032 06/01/23  1006   CRP mg/dL 4.60*  --  6.88*  --   --   --  1.48*   LACTATE mmol/L  --   --   --   --   --  1.6  --    WBC 10*3/mm3 12.19* 11.82* 15.79* 17.81* 21.19*  --  17.25*     Results from last 7 days   Lab Units 06/01/23  1032 06/01/23  1006   BILIRUBIN mg/dL  --  0.6   ALK PHOS U/L  --  127*   ALT (SGPT) U/L  --  11   AST (SGOT) U/L  --  14   PROTIME Seconds 16.9*  --    INR  1.31*  --    APTT seconds 32.0  --      Results from last 7 days   Lab Units 06/02/23  0132   CHOLESTEROL mg/dL 64   TRIGLYCERIDES mg/dL 27   HDL CHOL mg/dL 38*     Results from last 7 days   Lab Units 06/02/23  0132 06/01/23  1006   TSH uIU/mL 1.610  --    HEMOGLOBIN A1C %  --  7.40*     Brief Urine Lab Results  (Last result in the past 365 days)        Color   Clarity   Blood   Leuk Est   Nitrite   Protein   CREAT   Urine HCG        06/04/23 2231             111.5         06/04/23 2231             111.5         06/04/23 2231 Yellow   Clear   Negative   Negative   Negative   30 mg/dL (1+)                           Results for orders placed during the hospital encounter of 09/11/22    Adult Transthoracic Echo Limited W/ Cont if Necessary  Per Protocol    Interpretation Summary  · Left ventricular ejection fraction appears to be 61 - 65%. Left ventricular systolic function is normal.  · Left ventricular diastolic function was not assessed.  · The right ventricular cavity is mildly dilated.  · Estimated right ventricular systolic pressure from tricuspid regurgitation is markedly elevated ( 78 mmHg).  · This is a technically limited study.      Pending Labs       Order Current Status    Blood Culture - Blood, Arm, Left Preliminary result    Blood Culture - Blood, Arm, Right Preliminary result              ----------------------------------------------------------------------------------------------------------------------  CT Abdomen Pelvis Without Contrast    Result Date: 6/1/2023   1. Cholelithiasis and distention of the gallbladder similar to the previous exam  2.Stranding in the anterior pelvic wall.  3. Other findings as above       This report was finalized on 6/1/2023 12:41 PM by Dr. Leonard Narvaez MD.      CT Chest Without Contrast Diagnostic    Result Date: 6/1/2023   1. Consolidation in the right lower lobe. 2. Tiny nodule in the right lung    This report was finalized on 6/1/2023 12:47 PM by Dr. Leonard Narvaez MD.      XR Chest 1 View    Result Date: 6/5/2023   AIRSPACE OPACITIES AT THE LEFT LUNG BASE MAY REPRESENT PNEUMONIA, ATELECTASIS, OR EDEMA.  This report was finalized on 6/5/2023 7:26 AM by Ellen Baer MD.      XR Chest 1 View    Result Date: 6/1/2023  Probable mild CHF, but portable exam  This report was finalized on 6/1/2023 11:17 AM by Dr. Leonard Narvaez MD.      US Renal Bilateral    Result Date: 6/4/2023  1. Unremarkable sonographic appearance of the kidneys.  This report was finalized on 6/4/2023 11:23 AM by Dr. Leonard Narvaez MD.         Microbiology Results (last 10 days)       Procedure Component Value - Date/Time    S. Pneumo Ag Urine or CSF - Urine, Urine, Clean Catch [[id number]]  (Normal) Collected: 06/01/23 2041    Lab  Status: Final result Specimen: Urine, Clean Catch Updated: 06/02/23 0654     Strep Pneumo Ag Negative    Legionella Antigen, Urine - Urine, Urine, Clean Catch [[id number]]  (Normal) Collected: 06/01/23 1116    Lab Status: Final result Specimen: Urine, Clean Catch Updated: 06/01/23 1743     LEGIONELLA ANTIGEN, URINE Negative    Narrative:      Presumptive negative for L. pneumophilia serogroup 1 antigen, suggesting no recent or current infection.    Blood Culture - Blood, Arm, Left [[id number]]  (Normal) Collected: 06/01/23 1048    Lab Status: Preliminary result Specimen: Blood from Arm, Left Updated: 06/05/23 1101     Blood Culture No growth at 4 days    COVID-19 and FLU A/B PCR - Swab, Nasopharynx [[id number]]  (Normal) Collected: 06/01/23 1007    Lab Status: Final result Specimen: Swab from Nasopharynx Updated: 06/01/23 1035     COVID19 Not Detected     Influenza A PCR Not Detected     Influenza B PCR Not Detected    Narrative:      Fact sheet for providers: https://www.fda.gov/media/927393/download    Fact sheet for patients: https://www.fda.gov/media/094541/download    Test performed by PCR.    Blood Culture - Blood, Arm, Right [[id number]]  (Normal) Collected: 06/01/23 1006    Lab Status: Preliminary result Specimen: Blood from Arm, Right Updated: 06/05/23 1101     Blood Culture No growth at 4 days            Labs above have been reviewed on the day of discharge.  Radiology images from prior 30 days were reviewed prior to discharge as incorporated into this document.     Discharge Vitals and Physical Examination       Vital Signs  Temp:  [97.8 °F (36.6 °C)-98.2 °F (36.8 °C)] 97.8 °F (36.6 °C)  Heart Rate:  [] 87  Resp:  [18-20] 20  BP: (114-172)/(68-91) 172/82     PHYSICAL EXAMINATION:   Physical Exam  Vitals and nursing note reviewed.   Constitutional:       General: She is awake. She is not in acute distress.     Appearance: She is obese. She is ill-appearing (chronically). She is not diaphoretic.       Interventions: Nasal cannula in place.      Comments: Currently on 2 L nasal cannula.   HENT:      Head: Normocephalic and atraumatic.      Mouth/Throat:      Mouth: Mucous membranes are moist.      Pharynx: Oropharynx is clear.   Eyes:      Extraocular Movements: Extraocular movements intact.      Pupils: Pupils are equal, round, and reactive to light.   Cardiovascular:      Rate and Rhythm: Normal rate and regular rhythm.      Pulses: Normal pulses.           Dorsalis pedis pulses are 1+ on the right side and 1+ on the left side.      Heart sounds: Normal heart sounds.     No friction rub.   Pulmonary:      Effort: Pulmonary effort is normal. No accessory muscle usage, respiratory distress or retractions.      Breath sounds: Decreased breath sounds present. No wheezing, rhonchi or rales.      Comments: Lung sounds mildly diminished in bilateral bases. Otherwise, no wheezing, rhonchi, or rales.  Abdominal:      General: Bowel sounds are normal. There is distension (mild, chronic appearing).      Palpations: Abdomen is soft.      Tenderness: There is no abdominal tenderness. There is no guarding.   Musculoskeletal:      Cervical back: Neck supple. No rigidity.      Right lower le+ Pitting Edema present.      Left lower le+ Pitting Edema present.   Skin:     General: Skin is warm and dry.      Capillary Refill: Capillary refill takes 2 to 3 seconds.      Coloration: Skin is pale.   Neurological:      Mental Status: She is alert and oriented to person, place, and time. Mental status is at baseline.      Cranial Nerves: No dysarthria or facial asymmetry.      Sensory: No sensory deficit.      Motor: Weakness (generalized) present. No tremor or seizure activity.   Psychiatric:         Attention and Perception: Attention normal.         Mood and Affect: Mood normal.         Speech: Speech normal.         Behavior: Behavior normal. Behavior is cooperative.         Thought Content: Thought content normal.          Cognition and Memory: Cognition and memory normal.         Judgment: Judgment normal.         Discharge Disposition, Discharge Medications, and Discharge Appointments     Discharge Disposition:   SNF    Condition on Discharge:  Stable    Discharge Medications:     Discharge Medications        New Medications        Instructions Start Date   ferric gluconate  IVPB   50 mg, Intravenous, 3 Times Weekly   Start Date: June 7, 2023     fluticasone 50 MCG/ACT nasal spray  Commonly known as: FLONASE   2 sprays, Each Nare, Daily      guaiFENesin 600 MG 12 hr tablet  Commonly known as: MUCINEX   1,200 mg, Oral, Every 12 Hours Scheduled      magic barrier cream   1 application, Topical, 2 Times Daily, (MASD) Apply to coccyx and bilateral buttocks after cleaning incontinent episodes.             Continue These Medications        Instructions Start Date   acetaminophen 500 MG tablet  Commonly known as: TYLENOL   1,000 mg, Oral, Every 8 Hours PRN      aspirin 81 MG chewable tablet   81 mg, Oral, Daily      atorvastatin 20 MG tablet  Commonly known as: LIPITOR   20 mg, Oral, Nightly      bumetanide 0.5 MG tablet  Commonly known as: BUMEX   0.5 mg, Oral, Daily      CoQ10 50 MG capsule   100 mg, Oral, Daily      dextromethorphan-guaifenesin  MG/5ML syrup  Commonly known as: ROBITUSSIN-DM   5 mL, Oral, Every 4 Hours PRN      diphenhydrAMINE 25 mg capsule  Commonly known as: BENADRYL   25 mg, Oral, Every 8 Hours PRN      Eliquis 5 MG tablet tablet  Generic drug: apixaban   5 mg, Oral, Every 12 Hours Scheduled      FLUoxetine 10 MG capsule  Commonly known as: PROzac   10 mg, Oral, Daily      hydrALAZINE 25 MG tablet  Commonly known as: APRESOLINE   25 mg, Oral, 3 Times Daily      insulin aspart 100 UNIT/ML solution pen-injector sc pen  Commonly known as: novoLOG FLEXPEN   2-10 Units, Subcutaneous, 4 Times Daily Before Meals & Nightly      isosorbide mononitrate 30 MG 24 hr tablet  Commonly known as: IMDUR   30 mg, Oral, Daily       Lantus SoloStar 100 UNIT/ML injection pen  Generic drug: Insulin Glargine   40 Units, Subcutaneous, Nightly      latanoprost 0.005 % ophthalmic solution  Commonly known as: XALATAN   1 drop, Both Eyes, Nightly      linaclotide 145 MCG capsule capsule  Commonly known as: LINZESS   145 mcg, Oral, Every Morning Before Breakfast      melatonin 3 MG tablet   3 mg, Oral, Nightly PRN      metoprolol tartrate 50 MG tablet  Commonly known as: LOPRESSOR   50 mg, Oral, 2 Times Daily      multivitamin with minerals tablet tablet   1 tablet, Oral, Daily      ondansetron 4 MG tablet  Commonly known as: ZOFRAN   4 mg, Oral, Every 6 Hours PRN      Ozempic (1 MG/DOSE) 4 MG/3ML solution pen-injector  Generic drug: Semaglutide (1 MG/DOSE)   1 mg, Subcutaneous, Weekly      pantoprazole 40 MG EC tablet  Commonly known as: PROTONIX   40 mg, Oral, Every Early Morning      sodium chloride 0.65 % nasal spray   2 sprays, Nasal, Every 2 Hours      vitamin B-12 1000 MCG tablet  Commonly known as: CYANOCOBALAMIN   1,000 mcg, Oral, Daily             Stop These Medications      spironolactone 25 MG tablet  Commonly known as: ALDACTONE     Veltassa 8.4 g pack  Generic drug: Patiromer Sorbitex Calcium              Discharged medication regimen discussed with attending physician prior to discharge.     Discharge Diet:   regular diet, cardiac diet, and diabetic diet  Dietary Orders (From admission, onward)       Start     Ordered    06/01/23 1714  Diet: Cardiac Diets, Diabetic Diets; Healthy Heart (2-3 Na+); Consistent Carbohydrate; Texture: Regular Texture (IDDSI 7); Fluid Consistency: Thin (IDDSI 0)  Diet Effective Now        References:    Diet Order [place]   Question Answer Comment   Diets: Cardiac Diets    Diets: Diabetic Diets    Cardiac Diet: Healthy Heart (2-3 Na+)    Diabetic Diet: Consistent Carbohydrate    Texture: Regular Texture (IDDSI 7)    Fluid Consistency: Thin (IDDSI 0)        06/01/23 1713                    Activity at  Discharge:  activity as tolerated         Discharge Disposition:    Skilled Nursing Facility (DC - External)        Follow-up Appointments:  Your Scheduled Appointments      Jun 23, 2023 11:30 AM  Follow Up with Kim Soares PA-C  [facility] PULMONARY & CRITICAL CARE MEDICINE ([place]) [address]   [place] [address] [phone]   Established: Please bring outside images or reports.         Sep 14, 2023  2:30 PM  Follow Up with TUCKER Dixon  [facility] CARDIOLOGY ([place]) [address] [place] [address] [phone]   -Bring photo ID, insurance card, and list of medications to appointment  -If testing was completed outside of [facility] then patient must bring images on a disc  -Copay will be collected at time of appointment  -Established patients should arrive 10 minutes prior to appointment                Additional Instructions for the Follow-ups that You Need to Schedule       Ambulatory Referral to Lung Nodule Clinic - [place]   As directed      Discharge Follow-up with PCP   As directed       Currently Documented PCP:    April Cantrell APRN    PCP Phone Number:    [phone]     Follow Up Details: 1 week post hospital dc fu         Discharge Follow-up with Specialty: Nephrology (Dr. Bell); 2 Weeks   As directed      Specialty: Nephrology (Dr. Bell)    Follow Up: 2 Weeks    Follow Up Details: CKD IIIb/IV, on IV iron three X/s weekly through infusion clinic                Follow-up Information       April Cantrell APRN .    Specialty: Family Medicine  Why: 1 week post hospital dc fu  Contact information:  [address]  [place] [address]  [phone]               [facility] PULMONARY & CRITICAL CARE MEDICINE .    Specialty: Pulmonology  Contact information:  [address]  [place] [address] [phone]                           Additional Instructions for the Follow-ups  that You Need to Schedule       Ambulatory Referral to Lung Nodule Clinic - [place]   As directed      Discharge Follow-up with PCP   As directed       Currently Documented PCP:    April Cantrell APRN    PCP Phone Number:    [phone]     Follow Up Details: 1 week post hospital dc fu         Discharge Follow-up with Specialty: Nephrology (Dr. Bell); 2 Weeks   As directed      Specialty: Nephrology (Dr. Bell)    Follow Up: 2 Weeks    Follow Up Details: CKD IIIb/IV, on IV iron three X/s weekly through infusion clinic                 Test Results Pending at Discharge:    Pending Labs       Order Current Status    Blood Culture - Blood, Arm, Left Preliminary result    Blood Culture - Blood, Arm, Right Preliminary result               TUCKER Sotne   [facility] Medicine Team  06/06/23  09:02 EDT      Time: Greater than 30 minutes spent on this discharge.  I spent 60 minutes on this discharge activity which included:  Face-to-face encounter with the patient, discussing plan with attending physician, reviewing the data in the system, coordination of the care with the nursing staff as well as consultations, documentation, and entering orders.

## 2023-06-06 NOTE — PLAN OF CARE
Goal Outcome Evaluation:   Patient is returning to [facility] today.

## 2023-06-06 NOTE — PAYOR COMM NOTE
"CONTACT: JARRETT DAVIES RN  UTILIZATION MANAGEMENT DEPT.  [facility]  [address] [place] [address]  PHONE: [phone]  FAX: [phone]    DISCHARGE NOTIFICATION  DC'D TO SNF ON 6/6/23    AUTH# [id number]      Jolly Nolan (72 y.o. Female)       Date of Birth   1950    Social Security Number       Address   [address]  [facility] [address]    Home Phone   [phone]    MRN   [id number]       [employer]   None    Marital Status   Single                            Admission Date   6/1/23    Admission Type   Emergency    Admitting Provider   Rayshawn Cyr DO    Attending Provider       Department, Room/Bed   [facility], 3311/2S       Discharge Date   6/6/2023    Discharge Disposition   Skilled Nursing Facility (DC - External)    Discharge Destination   Other                              Attending Provider: (none)   Allergies: No Known Allergies    Isolation: None   Infection: None   Code Status: Prior    Ht: 167.6 cm (66\")   Wt: 140 kg (308 lb 14.4 oz)    Admission Cmt: None   Principal Problem: Pneumonia of right lower lobe due to infectious organism [J18.9]                   Active Insurance as of 6/1/2023       Primary Coverage       Payor Plan Insurance Group Employer/Plan Group    HUMANA MEDICARE REPLACEMENT HUMANA MEDICARE REPLACEMENT [id number]       Payor Plan Address Payor Plan Phone Number Payor Plan Fax Number Effective Dates    [address] [address] [phone]  1/1/2018 - None Entered    [facility] [address]         Subscriber Name Subscriber Birth Date Member ID       JOLLY NOLAN 1950 [id number]               Secondary Coverage       Payor Plan Insurance Group Employer/Plan Group    [employer]ANCE        Payor Plan Address Payor Plan Phone Number Payor Plan Fax Number Effective Dates    [address] [phone]  1/1/2022 - None Entered    [facility] [id number]         Subscriber Name Subscriber Birth Date " Member ID       YVONNE NOLAN 1950 [id number]                     Emergency Contacts        (Rel.) Home Phone Work Phone Mobile Phone    DARCI MARTIN (Relative) [phone] -- --                 Discharge Summary        Swati Crespo APRN at 06/06/23 0846       Attestation signed by Behbahani, Katayoun, MD at 06/06/23 0933    I have reviewed this documentation and agree.                      [facility] Medicine Services  DISCHARGE SUMMARY    Date of Admission: 6/1/2023    Date of Discharge:  6/6/2023    PCP: April Cantrell APRN    Discharging Provider: TUCKER Stone  Attending Physician on day of DC: Dr. Behbahani, Katayoun, MD    Admission Diagnosis:   Please see admission H&P    Discharge Diagnosis:   Acute on chronic hypoxic respiratory failure due to right lower lobe pneumonia  Leukocytosis and mild C-RP elevation due to above, improved  Progression of CKD stage IIIb to IV   Acute on chronic hyponatremia, improved  Iron deficiency anemia  CAD s/p CABG  Hyperlipidemia  Indeterminate troponin elevation likely due to hypoxia   Chronic HFpEF  Chronic atrial fib/flutter  Chronic anticoagulation with Eliquis  Insulin dependent type II DM  Essential hypertension, controlled  JESIKA  Obesity by BMI, 49.86 kg/m2  Anxiety  Chronic physical debility  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 female patient admitted on 6/1/2023 from local skilled nursing facility due to acute on chronic respiratory failure with hypoxia secondary to right lower lobe pneumonia. She has a pMH significant for CAD status post  CABG, chronic HFpEF, type 2 diabetes mellitus, CKD stage IIIb/IV, chronic anemia, essential hypertension, hyperlipidemia, paroxysmal atrial fibrillation, chronic anticoagulation with Eliquis, obstructive sleep apnea, obesity by BMI, chronic hypoxic respiratory failure on 2 L/min PRN at baseline, and anxiety.        Hospital Course     Hospital Course  Jolly Garcia was admitted as outlined in above HPI.     CT of the chest without contrast noted consolidation in the right lower lobe and a tiny right lung nodule. D-dimer was negative. Lactate and Pro-Humberto non-elevated. She did have leukocytosis and mild C-RP elevation. Received Doxycycline and Rocephin in the emergency department. Continued on admission. Completed 5 day course of abx. Leukocytosis and C-RP improved. Peripheral blood cultures x2 pending. No growth at 4 days. S pneumo Ag and Legionella Ag negative. MRSA PCR ordered, but unfortunately was never collected. However, patient improved on Doxy/Rocephin. PT/OT/SLP consults were placed. She passed her swallow evaluation without s/s aspiration. Placed on regular diet with thin liquids. Worked well with PT/OT. She has received scheduled DuoNebs and Mucinex. Robitussin was made available as needed for cough. She also presented with suspected WENDY on CKD stage IIIb and acute on chronic hyponatremia. Baseline creatinine around 1.4 (2022), although values have fluctuated wildly. Creatinine on admission was 2.61 and BUN 53. Sodium was 126. CT of the abdomen and pelvis without contrast noted stranding around the pelvic wall. Negative for obstructive uropathy or urolithiasis. Urinalysis with 2+ protein, otherwise unremarkable.  Continuous IV hydration initiated with normal saline at 100 mL an hour on admission. Received IV fluids for roughly 1 day; developed evidence of worsening BLE edema. Stopped fluids and gave 1x dose of IV Lasix on 6/3. Creatinine 2.0 this a.m. and Sodium 130. Bilateral renal ultrasound ordered  along with inpatient nephrology consult placed for further recommendations. Greatly appreciate their input and assistance. Renal ultrasound was unremarkable. Per Dr. Farley, patient had significant nephrotic syndrome around 7-8 months ago and did not follow up with Nephrology. He suspects that creatinine on admission represents permanent loss of kidney function (progression to CKD stage IV) and he does not believe there is an acute element. Urine studies ordered along with iron profile, VBG, and repeat chest xray. Home low dose oral daily Bumex was continued. Do not suspect acute volume overload. Iron studies revealed significantly decreased iron stores and Nephrology is currently recommending iron infusions three times weekly (M, W, F). Patient received IV iron on Monday, 6/5. She also received 1x dose of IV albumin on that day. Patient will need continued follow up with Nephrology in the outpatient setting for management of CKD IV. From pneumonia standpoint, patient has shown significant clinical improvement. Feel that she has currently reached maximum benefit of inpatient hospitalization and may be discharged back to [facility] on this date. Discussed with patient and she voiced agreement. She denies any chest pain, worsening shortness of breath, increased sputum production, or other new/acute complaints. She has continued to have net negative output on current diuretic regimen. Previously only used supplemental O2 at 2L nightly; will now require continuously. Ambulatory referral to lung nodule clinic was placed due to new, incidental finding of right lung nodule on CT chest.     Discussed with AM FABIAN Albarran on day of discharge.     Telemetry on day of discharge was atrial flutter 70-80s.     Oxygen saturation on the day of discharge was 96% on 2L nasal cannula.      Pertinent Laboratory and Radiology Results     Pertinent Test Results:      Results from last 7 days   Lab Units 06/01/23  1017   PH,  ARTERIAL pH units 7.469*   PO2 ART mm Hg 46.0*   PCO2, ARTERIAL mm Hg 31.1*   HCO3 ART mmol/L 22.6     Results from last 7 days   Lab Units 06/06/23  0109 06/05/23  0329 06/04/23  0057 06/03/23  1412 06/03/23  0429 06/02/23  1641 06/02/23  0132 06/01/23  1006   WBC 10*3/mm3 12.19* 11.82* 15.79*  --  17.81*  --  21.19* 17.25*   HEMOGLOBIN g/dL 9.1* 8.6* 8.9*  --  8.3*  --  8.9* 9.5*   HEMATOCRIT % 31.5* 29.4* 29.4*  --  28.2*  --  30.9* 31.0*   PLATELETS 10*3/mm3 426 426 446  --  451*  --  450 500*   MCV fL 82.9 82.6 81.9  --  82.7  --  86.3 81.2   SODIUM mmol/L 130* 130* 130* 125* 127* 129* 126* 126*   POTASSIUM mmol/L 4.6 4.6 4.7 4.7 4.6 4.7 4.8 4.8   CHLORIDE mmol/L 97* 97* 98 94* 96* 96* 96* 92*   CO2 mmol/L 22.5 21.3* 21.0* 19.0* 20.0* 20.8* 19.3* 22.7   BUN mg/dL 48* 51* 54* 52* 54* 52* 49* 53*   CREATININE mg/dL 2.01* 2.16* 2.26* 2.46* 2.62* 2.30* 2.22* 2.61*   GLUCOSE mg/dL 86 98 110* 125* 79 155* 116* 114*   CALCIUM mg/dL 8.7 8.5* 8.5* 8.4* 8.6 8.6 8.9 8.9        Results from last 7 days   Lab Units 06/01/23  1209 06/01/23  1006   HSTROP T ng/L 36* 44*   PROBNP pg/mL  --  8,240.0*     Results from last 7 days   Lab Units 06/06/23  0109 06/05/23  0329 06/04/23  0057 06/03/23  0429 06/02/23  0132 06/01/23  1032 06/01/23  1006   CRP mg/dL 4.60*  --  6.88*  --   --   --  1.48*   LACTATE mmol/L  --   --   --   --   --  1.6  --    WBC 10*3/mm3 12.19* 11.82* 15.79* 17.81* 21.19*  --  17.25*     Results from last 7 days   Lab Units 06/01/23  1032 06/01/23  1006   BILIRUBIN mg/dL  --  0.6   ALK PHOS U/L  --  127*   ALT (SGPT) U/L  --  11   AST (SGOT) U/L  --  14   PROTIME Seconds 16.9*  --    INR  1.31*  --    APTT seconds 32.0  --      Results from last 7 days   Lab Units 06/02/23  0132   CHOLESTEROL mg/dL 64   TRIGLYCERIDES mg/dL 27   HDL CHOL mg/dL 38*     Results from last 7 days   Lab Units 06/02/23  0132 06/01/23  1006   TSH uIU/mL 1.610  --    HEMOGLOBIN A1C %  --  7.40*     Brief Urine Lab Results  (Last  result in the past 365 days)        Color   Clarity   Blood   Leuk Est   Nitrite   Protein   CREAT   Urine HCG        06/04/23 2231             111.5         06/04/23 2231             111.5         06/04/23 2231 Yellow   Clear   Negative   Negative   Negative   30 mg/dL (1+)                           Results for orders placed during the hospital encounter of 09/11/22    Adult Transthoracic Echo Limited W/ Cont if Necessary Per Protocol    Interpretation Summary  · Left ventricular ejection fraction appears to be 61 - 65%. Left ventricular systolic function is normal.  · Left ventricular diastolic function was not assessed.  · The right ventricular cavity is mildly dilated.  · Estimated right ventricular systolic pressure from tricuspid regurgitation is markedly elevated ( 78 mmHg).  · This is a technically limited study.      Pending Labs       Order Current Status    Blood Culture - Blood, Arm, Left Preliminary result    Blood Culture - Blood, Arm, Right Preliminary result              ----------------------------------------------------------------------------------------------------------------------  CT Abdomen Pelvis Without Contrast    Result Date: 6/1/2023   1. Cholelithiasis and distention of the gallbladder similar to the previous exam  2.Stranding in the anterior pelvic wall.  3. Other findings as above       This report was finalized on 6/1/2023 12:41 PM by Dr. Leonard Narvaez MD.      CT Chest Without Contrast Diagnostic    Result Date: 6/1/2023   1. Consolidation in the right lower lobe. 2. Tiny nodule in the right lung    This report was finalized on 6/1/2023 12:47 PM by Dr. Leonard Narvaez MD.      XR Chest 1 View    Result Date: 6/5/2023   AIRSPACE OPACITIES AT THE LEFT LUNG BASE MAY REPRESENT PNEUMONIA, ATELECTASIS, OR EDEMA.  This report was finalized on 6/5/2023 7:26 AM by Ellen Baer MD.      XR Chest 1 View    Result Date: 6/1/2023  Probable mild CHF, but portable exam  This report was finalized  on 6/1/2023 11:17 AM by Dr. Leonard Narvaez MD.      US Renal Bilateral    Result Date: 6/4/2023  1. Unremarkable sonographic appearance of the kidneys.  This report was finalized on 6/4/2023 11:23 AM by Dr. Leonard Narvaez MD.         Microbiology Results (last 10 days)       Procedure Component Value - Date/Time    S. Pneumo Ag Urine or CSF - Urine, Urine, Clean Catch [[id number]]  (Normal) Collected: 06/01/23 2041    Lab Status: Final result Specimen: Urine, Clean Catch Updated: 06/02/23 0654     Strep Pneumo Ag Negative    Legionella Antigen, Urine - Urine, Urine, Clean Catch [[id number]]  (Normal) Collected: 06/01/23 1116    Lab Status: Final result Specimen: Urine, Clean Catch Updated: 06/01/23 1743     LEGIONELLA ANTIGEN, URINE Negative    Narrative:      Presumptive negative for L. pneumophilia serogroup 1 antigen, suggesting no recent or current infection.    Blood Culture - Blood, Arm, Left [[id number]]  (Normal) Collected: 06/01/23 1048    Lab Status: Preliminary result Specimen: Blood from Arm, Left Updated: 06/05/23 1101     Blood Culture No growth at 4 days    COVID-19 and FLU A/B PCR - Swab, Nasopharynx [[id number]]  (Normal) Collected: 06/01/23 1007    Lab Status: Final result Specimen: Swab from Nasopharynx Updated: 06/01/23 1035     COVID19 Not Detected     Influenza A PCR Not Detected     Influenza B PCR Not Detected    Narrative:      Fact sheet for providers: https://www.fda.gov/media/697460/download    Fact sheet for patients: https://www.fda.gov/media/443024/download    Test performed by PCR.    Blood Culture - Blood, Arm, Right [[id number]]  (Normal) Collected: 06/01/23 1006    Lab Status: Preliminary result Specimen: Blood from Arm, Right Updated: 06/05/23 1101     Blood Culture No growth at 4 days            Labs above have been reviewed on the day of discharge.  Radiology images from prior 30 days were reviewed prior to discharge as incorporated into this document.     Discharge Vitals and Physical  Examination       Vital Signs  Temp:  [97.8 °F (36.6 °C)-98.2 °F (36.8 °C)] 97.8 °F (36.6 °C)  Heart Rate:  [] 87  Resp:  [18-20] 20  BP: (114-172)/(68-91) 172/82     PHYSICAL EXAMINATION:   Physical Exam  Vitals and nursing note reviewed.   Constitutional:       General: She is awake. She is not in acute distress.     Appearance: She is obese. She is ill-appearing (chronically). She is not diaphoretic.      Interventions: Nasal cannula in place.      Comments: Currently on 2 L nasal cannula.   HENT:      Head: Normocephalic and atraumatic.      Mouth/Throat:      Mouth: Mucous membranes are moist.      Pharynx: Oropharynx is clear.   Eyes:      Extraocular Movements: Extraocular movements intact.      Pupils: Pupils are equal, round, and reactive to light.   Cardiovascular:      Rate and Rhythm: Normal rate and regular rhythm.      Pulses: Normal pulses.           Dorsalis pedis pulses are 1+ on the right side and 1+ on the left side.      Heart sounds: Normal heart sounds.     No friction rub.   Pulmonary:      Effort: Pulmonary effort is normal. No accessory muscle usage, respiratory distress or retractions.      Breath sounds: Decreased breath sounds present. No wheezing, rhonchi or rales.      Comments: Lung sounds mildly diminished in bilateral bases. Otherwise, no wheezing, rhonchi, or rales.  Abdominal:      General: Bowel sounds are normal. There is distension (mild, chronic appearing).      Palpations: Abdomen is soft.      Tenderness: There is no abdominal tenderness. There is no guarding.   Musculoskeletal:      Cervical back: Neck supple. No rigidity.      Right lower le+ Pitting Edema present.      Left lower le+ Pitting Edema present.   Skin:     General: Skin is warm and dry.      Capillary Refill: Capillary refill takes 2 to 3 seconds.      Coloration: Skin is pale.   Neurological:      Mental Status: She is alert and oriented to person, place, and time. Mental status is at baseline.       Cranial Nerves: No dysarthria or facial asymmetry.      Sensory: No sensory deficit.      Motor: Weakness (generalized) present. No tremor or seizure activity.   Psychiatric:         Attention and Perception: Attention normal.         Mood and Affect: Mood normal.         Speech: Speech normal.         Behavior: Behavior normal. Behavior is cooperative.         Thought Content: Thought content normal.         Cognition and Memory: Cognition and memory normal.         Judgment: Judgment normal.         Discharge Disposition, Discharge Medications, and Discharge Appointments     Discharge Disposition:   SNF    Condition on Discharge:  Stable    Discharge Medications:     Discharge Medications        New Medications        Instructions Start Date   ferric gluconate  IVPB   50 mg, Intravenous, 3 Times Weekly   Start Date: June 7, 2023     fluticasone 50 MCG/ACT nasal spray  Commonly known as: FLONASE   2 sprays, Each Nare, Daily      guaiFENesin 600 MG 12 hr tablet  Commonly known as: MUCINEX   1,200 mg, Oral, Every 12 Hours Scheduled      magic barrier cream   1 application, Topical, 2 Times Daily, (MASD) Apply to coccyx and bilateral buttocks after cleaning incontinent episodes.             Continue These Medications        Instructions Start Date   acetaminophen 500 MG tablet  Commonly known as: TYLENOL   1,000 mg, Oral, Every 8 Hours PRN      aspirin 81 MG chewable tablet   81 mg, Oral, Daily      atorvastatin 20 MG tablet  Commonly known as: LIPITOR   20 mg, Oral, Nightly      bumetanide 0.5 MG tablet  Commonly known as: BUMEX   0.5 mg, Oral, Daily      CoQ10 50 MG capsule   100 mg, Oral, Daily      dextromethorphan-guaifenesin  MG/5ML syrup  Commonly known as: ROBITUSSIN-DM   5 mL, Oral, Every 4 Hours PRN      diphenhydrAMINE 25 mg capsule  Commonly known as: BENADRYL   25 mg, Oral, Every 8 Hours PRN      Eliquis 5 MG tablet tablet  Generic drug: apixaban   5 mg, Oral, Every 12 Hours Scheduled       FLUoxetine 10 MG capsule  Commonly known as: PROzac   10 mg, Oral, Daily      hydrALAZINE 25 MG tablet  Commonly known as: APRESOLINE   25 mg, Oral, 3 Times Daily      insulin aspart 100 UNIT/ML solution pen-injector sc pen  Commonly known as: novoLOG FLEXPEN   2-10 Units, Subcutaneous, 4 Times Daily Before Meals & Nightly      isosorbide mononitrate 30 MG 24 hr tablet  Commonly known as: IMDUR   30 mg, Oral, Daily      Lantus SoloStar 100 UNIT/ML injection pen  Generic drug: Insulin Glargine   40 Units, Subcutaneous, Nightly      latanoprost 0.005 % ophthalmic solution  Commonly known as: XALATAN   1 drop, Both Eyes, Nightly      linaclotide 145 MCG capsule capsule  Commonly known as: LINZESS   145 mcg, Oral, Every Morning Before Breakfast      melatonin 3 MG tablet   3 mg, Oral, Nightly PRN      metoprolol tartrate 50 MG tablet  Commonly known as: LOPRESSOR   50 mg, Oral, 2 Times Daily      multivitamin with minerals tablet tablet   1 tablet, Oral, Daily      ondansetron 4 MG tablet  Commonly known as: ZOFRAN   4 mg, Oral, Every 6 Hours PRN      Ozempic (1 MG/DOSE) 4 MG/3ML solution pen-injector  Generic drug: Semaglutide (1 MG/DOSE)   1 mg, Subcutaneous, Weekly      pantoprazole 40 MG EC tablet  Commonly known as: PROTONIX   40 mg, Oral, Every Early Morning      sodium chloride 0.65 % nasal spray   2 sprays, Nasal, Every 2 Hours      vitamin B-12 1000 MCG tablet  Commonly known as: CYANOCOBALAMIN   1,000 mcg, Oral, Daily             Stop These Medications      spironolactone 25 MG tablet  Commonly known as: ALDACTONE     Veltassa 8.4 g pack  Generic drug: Patiromer Sorbitex Calcium              Discharged medication regimen discussed with attending physician prior to discharge.     Discharge Diet:   regular diet, cardiac diet, and diabetic diet  Dietary Orders (From admission, onward)       Start     Ordered    06/01/23 1637  Diet: Cardiac Diets, Diabetic Diets; Healthy Heart (2-3 Na+); Consistent Carbohydrate;  Texture: Regular Texture (IDDSI 7); Fluid Consistency: Thin (IDDSI 0)  Diet Effective Now        References:    Diet Order [place]   Question Answer Comment   Diets: Cardiac Diets    Diets: Diabetic Diets    Cardiac Diet: Healthy Heart (2-3 Na+)    Diabetic Diet: Consistent Carbohydrate    Texture: Regular Texture (IDDSI 7)    Fluid Consistency: Thin (IDDSI 0)        06/01/23 1713                    Activity at Discharge:  activity as tolerated         Discharge Disposition:    Skilled Nursing Facility (DC - External)        Follow-up Appointments:  Your Scheduled Appointments      Jun 23, 2023 11:30 AM  Follow Up with Kim Soares PA-C  [facility] PULMONARY & CRITICAL CARE MEDICINE ([place]) [address] [facility] [address]  [place] [address] [phone]   Established: Please bring outside images or reports.         Sep 14, 2023  2:30 PM  Follow Up with TUCKER Dixon  [facility] CARDIOLOGY ([place]) [address]  [place] [address] [phone]   -Bring photo ID, insurance card, and list of medications to appointment  -If testing was completed outside of [facility] then patient must bring images on a disc  -Copay will be collected at time of appointment  -Established patients should arrive 10 minutes prior to appointment                Additional Instructions for the Follow-ups that You Need to Schedule       Ambulatory Referral to Lung Nodule Clinic - [place]   As directed      Discharge Follow-up with PCP   As directed       Currently Documented PCP:    April Cantrell APRN    PCP Phone Number:    [phone]     Follow Up Details: 1 week post hospital dc fu         Discharge Follow-up with Specialty: Nephrology (Dr. Bell); 2 Weeks   As directed      Specialty: Nephrology (Dr. Bell)    Follow Up: 2 Weeks    Follow Up Details: CKD IIIb/IV, on IV iron three X/s weekly through infusion clinic                Follow-up Information       April Cantrell  TUCKER HULL .    Specialty: Family Medicine  Why: 1 week post hospital dc fu  Contact information:  [address] [facility] [place] [address]  [phone]               [facility] PULMONARY & CRITICAL CARE MEDICINE .    Specialty: Pulmonology  Contact information:  [address]  [place] [address] [phone]                           Additional Instructions for the Follow-ups that You Need to Schedule       Ambulatory Referral to Lung Nodule Clinic - [place]   As directed      Discharge Follow-up with PCP   As directed       Currently Documented PCP:    April Cantrell APRN    PCP Phone Number:    [phone]     Follow Up Details: 1 week post hospital dc fu         Discharge Follow-up with Specialty: Nephrology (Dr. Bell); 2 Weeks   As directed      Specialty: Nephrology (Dr. Bell)    Follow Up: 2 Weeks    Follow Up Details: CKD IIIb/IV, on IV iron three X/s weekly through infusion clinic                 Test Results Pending at Discharge:    Pending Labs       Order Current Status    Blood Culture - Blood, Arm, Left Preliminary result    Blood Culture - Blood, Arm, Right Preliminary result               TUCKER Stone   [facility] Medicine Team  06/06/23  09:02 EDT      Time: Greater than 30 minutes spent on this discharge.  I spent 60 minutes on this discharge activity which included:  Face-to-face encounter with the patient, discussing plan with attending physician, reviewing the data in the system, coordination of the care with the nursing staff as well as consultations, documentation, and entering orders.      Electronically signed by Behbahani, Katayoun, MD at 06/06/23 0944

## 2023-06-06 NOTE — PLAN OF CARE
Goal Outcome Evaluation:   Pt resting in bed this shift. No complaints voiced at this time. No visible acute signs of distress noted. Will continue with plan of care.

## 2023-06-06 NOTE — DISCHARGE PLACEMENT REQUEST
"Yvonne Nolan (72 y.o. Female)       Date of Birth   1950    Social Security Number   [id number]    Address   [address]  [facility] [address]    Home Phone   [phone]    MRN   [id number]       Tenriism   None    Marital Status   Single                            Admission Date   6/1/23    Admission Type   Emergency    Admitting Provider   Rayshawn Cyr DO    Attending Provider   Behbahani, Katayoun, MD    Department, Room/Bed   [address], [address]       Discharge Date       Discharge Disposition   Skilled Nursing Facility (DC - External)    Discharge Destination                                 Attending Provider: Behbahani, Katayoun, MD    Allergies: No Known Allergies    Isolation: None   Infection: None   Code Status: CPR    Ht: 167.6 cm (66\")   Wt: 140 kg (308 lb 14.4 oz)    Admission Cmt: None   Principal Problem: Pneumonia of right lower lobe due to infectious organism [J18.9]                   Active Insurance as of 6/1/2023       Primary Coverage       Payor Plan Insurance Group Employer/Plan Group    HUMANA MEDICARE REPLACEMENT HUMANA MEDICARE REPLACEMENT [id number]       Payor Plan Address Payor Plan Phone Number Payor Plan Fax Number Effective Dates    [address] [address] [phone]  1/1/2018 - None Entered    [facility] [address]         Subscriber Name Subscriber Birth Date Member ID       YVONNE NOLAN 1950 [id number]               Secondary Coverage       Payor Plan Insurance Group Employer/Plan Group    [employer]        Payor Plan Address Payor Plan Phone Number Payor Plan Fax Number Effective Dates    [address] [phone]  1/1/2022 - None Entered    [facility] [id number]         Subscriber Name Subscriber Birth Date Member ID       YVONNE NOLAN 1950 [id number]                     Emergency Contacts        (Rel.) Home Phone Work Phone Mobile Phone    DARCI MARTIN (Relative) [phone] -- " --          No Dressing Needed [[id number]] (Order [id number])  Order  Date: 6/6/2023 Department: [address] Ordering/Authorizing: Swati Crespo APRN     Order History  Outpatient  Date/Time Action Taken User Additional Information   06/06/23 0849 Sign Swati Crespo APRN      Order Details    Frequency Duration Priority Order Class   None None Routine Clinic Performed     Start Date/Time    Start Date   06/06/23     Order Information    Order Date Service Start Date Start Time   06/06/23 Medicine 06/06/23      Comments    MASD to coccyx & bilateral buttocks: Use magic barrier cream BID and use Z-guard in between applications after cleaning/episodes of incontinence.            Reference Links    Associated Reports   View Encounter               Source Order Set / Preference List    Order Set   [facility] GEN EXPRESS DISCHARGE [[id number]]                                     Reprint Order Requisition    No Dressing Needed (Order #[id number]) on 6/6/23         Encounter    View Encounter                Order Provider Info        Office phone Pager E-mail   Ordering User Swati Crespo APRN [phone] -- --   Authorizing Provider Swati Crespo APRN [phone] -- --   Attending Provider Behbahani, Katayoun, [phone] -- --     Tracking Reports    Cosign Tracking Order Transmittal Tracking     Authorized by:  TUCKER Stone  (NPI: [id number])                Lab Component SmartPhrase Guide    No Dressing Needed (Order #[id number]) on 6/6/23       Medication  ferric gluconate (FERRLECIT)125 MG in sodium chloride 0.9 % 100 mL IVPB [[id number]]  Order History  Outpatient  Date/Time Action Taken User Additional Information   06/06/23 0841 Pend Swati Crespo APRN    06/06/23 0849 Sign Swati Crespo APRN      Outpatient Morphine Milligram Equivalents Per Day  Expand All  Collapse All  No orders with morphine equivalence     ferric gluconate (FERRLECIT)125 MG in sodium chloride 0.9 % 100 mL IVPB  [[id number]]     Order Details  Dose: 50 mg Route: Intravenous Frequency: 3 Times Weekly   Dispense Quantity: 360 mL Refills: 0    Note to Pharmacy: To be administered at outpatient infusion clinic or nursing facility (if possible) three times weekly per Nephrology recommendations.         Sig: Infuse 40 mL into a venous catheter 3 (Three) Times a Week for 9 doses.         Start Date: 06/07/23 End Date: 06/27/23 after 9 doses   Written Date: 06/06/23 Expiration Date: 06/05/24   Providers    Ordering Provider and Authorizing Provider:    Swati Crespo APRN   [address] [facility] [address] [place] [address]   Phone:  [phone]   Fax:  [phone]   NPI:  [id number]        Ordering User:  Swati Crespo APRN          Lab Test Results    Component Date/Time Value Range & Unit Status   Hemoglobin 06/06/23 0109 9.1 (L) 12.0 - 15.9 g/dL Final    06/05/23 0329 8.6 (L) 12.0 - 15.9 g/dL Final    06/04/23 0057 8.9 (L) 12.0 - 15.9 g/dL Final   Hematocrit 06/06/23 0109 31.5 (L) 34.0 - 46.6 % Final    06/05/23 0329 29.4 (L) 34.0 - 46.6 % Final    06/04/23 0057 29.4 (L) 34.0 - 46.6 % Final   Iron 06/04/23 0057 18 (L) 37 - 145 mcg/dL Final   TIBC 06/04/23 0057 340 298 - 536 mcg/dL Final     Orders with any of the following pharmaceutical classes: Hematopoietic Agents    Name Dose Frequency Start Date End Date Medication Warnings Interventions? Order Mode    vitamin B-12 (CYANOCOBALAMIN) 1000 MCG tablet 1,000 mcg Daily 09/23/22    Outpatient    vitamin B-12 (CYANOCOBALAMIN) tablet 1,000 mcg 1,000 mcg Daily 06/01/23 1830    Inpatient    ferric gluconate (FERRLECIT) 250 mg in sodium chloride 0.9 % 120 mL IVPB 250 mg Daily (Monday-Friday) 06/05/23 1000 06/12/23 0859   Inpatient    folic acid (FOLVITE) 1 MG tablet 1 mg Daily 09/23/22 06/01/23   Outpatient    iron polysaccharides (NIFEREX) 150 MG capsule 150 mg Daily 09/23/22 06/01/23   Outpatient      Warnings Override History    No Interaction Warnings Shown      Pharmacist Clinical  Review History    This prescription has not been clinically reviewed.     Order Reconciliation Actions       Order Reconciliation Actions     E-Prescribing Status    Outpatient Medication Detail    ferric gluconate (FERRLECIT)125 MG in sodium chloride 0.9 % 100 mL IVPB        Sig: Infuse 40 mL into a venous catheter 3 (Three) Times a Week for 9 doses.        Class: No Print        Notes to Pharmacy: To be administered at outpatient infusion clinic or nursing facility (if possible) three times weekly per Nephrology recommendations.        Route: Intravenous          Event History       Event History     Tracking Reports    Cosign Tracking Order Transmittal Tracking          History & Physical        Swati Crespo APRN at 23 1422       Attestation signed by Rayshawn Cyr DO at 23 9185    I have reviewed this documentation and agree.                    [facility] Medicine Services  History & Physical    Patient Identification:  Name:  Jolly Garcia  Age:  72 y.o.  Sex:  female  :  1950  MRN:  [id number]   Visit Number:  [id number]  Admit Date: 2023   Primary Care Physician:  April Cantrell APRN    Subjective     Chief complaint: Shortness of Breath, LUQ Abdominal Pain    History of presenting illness:      Jolly Garcia is a 72 y.o. female with past medical history significant for CAD status post CABG, chronic HFpEF, type 2 diabetes mellitus, essential hypertension, hyperlipidemia, paroxysmal atrial fibrillation, chronic anticoagulation with Eliquis, obstructive sleep apnea, obesity by BMI, chronic hypoxic respiratory failure on 2 L/min at baseline, and anxiety.  Patient presents to [facility] emergency department today from [facility] for further evaluation of shortness of breath and left upper quadrant abdominal pain which started 2-3 days ago. She states that she has had a nonproductive cough, mild sore throat, congestion,  chills, and fatigue. Denies fever. She states that she typically has to sleep in a recliner at night due to orthopnea. Denies recent increase in peripheral edema or unintentional weight gain or loss. Patient states that she previously was on 2L supplemental O2 continuously, however she was taken off and recently has not required it. She was on room air prior to arrival to our ED. Patient states that she has been in skilled nursing facility due to physical debility and wound of the coccyx. She says that she was no longer able to take care of herself. Denies difficulty swallowing or recent choking episodes. Does not typically require modified diet. Regarding LUQ abdominal pain, patient states that pain is worse with coughing, deep breathing, and movement. Abdomen is non-tender to palpation. Denies nausea, vomiting, or diarrhea. Bowel sounds are hypoactive x4 quadrants. CT of the abdomen/pelvis noted cholelithiasis and GB distention similar to prior study. Also noted stranding around pelvic wall. UA was negative for UTI. There was moderate stool burden. No evidence of appendicitis. Pancreas unremarkable. No mass or ductal dilatation. Patient states that she has not been eating or drinking much the last few days due to feeling sick. She says that she has also not been walking. Instead, patient says that she has mostly slept and stayed in the bed. Usually uses walker for ambulation. She exhibits mild generalized weakness. No focal deficits. She does have WENDY on CKD IIIb. Reports that she follows with nephrology in the outpatient setting. She denies any difficulty urinating. No dysuria symptoms. CT showed no evidence of hydronephrosis or obstructive uropathy. Sodium level also decreased more than usual. Likely due to poor oral intake. Patient is alert and oriented x4. Follows commands appropriately. Appears ill but it no acute distress. Discussed plan for admission with patient. She voiced agreement with no further  questions or concerns at this time.     Upon arrival to the ED, vital signs were temperature 99.1, pulse 87, respirations 20, blood pressure 134/74 sitting, SPO2 saturation 91% on room air.  ABG with pH 7.469, PCO2 31.1, PO2 46.0, O2 saturation 94.7% on room air.  High-sensitivity troponin T 44 with -8 delta.  proBNP 8240.  CMP with sodium 126, chloride 92, BUN 53, creatinine 2.61, EGFR 19.0, glucose 114, alkaline phosphatase 127, otherwise unremarkable.  C-reactive protein 1.48.  Lactate 1.6.  Procalcitonin 0.05.  D-dimer 0.61.  PT 16.9, INR 1.31.  CBC with WBC 17.25, hemoglobin 9.5, hematocrit 31.0, platelets 500, RDW 16.0, MCH 24.9, MCHC 30.6, otherwise unremarkable.  Sed rate 60.  Urinalysis with 2+ protein, otherwise unremarkable.  Peripheral blood cultures x2 pending.  COVID-19 and flu A/B swab negative.  Chest x-ray noted probable mild CHF.  CT of the chest without contrast noted consolidation in the right lower lobe.  Tiny nodule in the right lung.  CT of the abdomen and pelvis without contrast noted cholelithiasis and distention of the gallbladder similar to previous exam.  Stranding in anterior pelvic wall.    Known Emergency Department medications received prior to my evaluation included 2 g IV Rocephin, 100 mg IV doxycycline, DuoNeb, 80 mg IV Solu-Medrol. Emergency Department Room location at the time of my evaluation was [facility]. Discussed with admitting physician, Rayshawn Villanueva MD.      ---------------------------------------------------------------------------------------------------------------------   Review of Systems   Constitutional: Positive for appetite change, chills and fatigue. Negative for fever and unexpected weight change.   HENT: Positive for congestion and sore throat (mild). Negative for trouble swallowing.    Respiratory: Positive for cough, shortness of breath and wheezing. Negative for choking.    Cardiovascular: Negative for chest pain, palpitations and leg swelling.    Gastrointestinal: Positive for abdominal pain (LUQ). Negative for blood in stool, constipation, diarrhea, nausea and vomiting.   Genitourinary: Negative for difficulty urinating, flank pain, frequency and urgency.   Musculoskeletal: Positive for gait problem (due to generalized weakness/fatigue). Negative for back pain, neck pain and neck stiffness.   Neurological: Positive for weakness (generalized). Negative for dizziness, tremors, seizures, syncope, facial asymmetry, speech difficulty, light-headedness, numbness and headaches.   Psychiatric/Behavioral: Negative for confusion.     ---------------------------------------------------------------------------------------------------------------------   Past Medical History:   Diagnosis Date    Anxiety     CAD (coronary artery disease)     s/p CABG    Chronic hypoxemic respiratory failure     Wears 2 L/min at home    Essential hypertension     Hyperlipidemia     Type 2 diabetes mellitus      Past Surgical History:   Procedure Laterality Date    CORONARY ARTERY BYPASS GRAFT  2011    HYSTERECTOMY       Family History   Problem Relation Age of Onset    Diabetes Mother      Social History     Socioeconomic History    Marital status: Single   Tobacco Use    Smoking status: Former     Passive exposure: Past   Vaping Use    Vaping Use: Never used   Substance and Sexual Activity    Alcohol use: Never    Drug use: Never    Sexual activity: Defer     ---------------------------------------------------------------------------------------------------------------------   Allergies:  Patient has no known allergies.  ---------------------------------------------------------------------------------------------------------------------   Home medications:    Medications below are reported home medications pulling from within the system; at this time, these medications have not been reconciled unless otherwise specified and are in the verification process for further verifcation as  current home medications.  (Not in a hospital admission)      Hospital Scheduled Meds:          Current listed hospital scheduled medications may not yet reflect those currently placed in orders that are signed and held awaiting patient's arrival to floor.   ---------------------------------------------------------------------------------------------------------------------     Objective     Vital Signs:  Temp:  [99.1 °F (37.3 °C)] 99.1 °F (37.3 °C)  Heart Rate:  [85-87] 85  Resp:  [20] 20  BP: (134)/(74) 134/74      06/01/23  0953   Weight: (!) 142 kg (312 lb)     Body mass index is 50.36 kg/m².  ---------------------------------------------------------------------------------------------------------------------       Physical Exam  Vitals reviewed.   Constitutional:       General: She is awake. She is not in acute distress.     Appearance: She is obese. She is ill-appearing. She is not diaphoretic.      Interventions: Nasal cannula in place.      Comments: Currently on 2L nasal cannula.   HENT:      Head: Normocephalic and atraumatic.      Mouth/Throat:      Mouth: Mucous membranes are dry.      Pharynx: Oropharynx is clear.   Eyes:      Extraocular Movements: Extraocular movements intact.      Pupils: Pupils are equal, round, and reactive to light.   Cardiovascular:      Rate and Rhythm: Normal rate and regular rhythm.      Pulses:           Dorsalis pedis pulses are 1+ on the right side and 1+ on the left side.      Heart sounds: Normal heart sounds.     No friction rub.   Pulmonary:      Effort: Pulmonary effort is normal. No accessory muscle usage, respiratory distress or retractions.      Breath sounds: Decreased breath sounds present. No wheezing, rhonchi or rales.      Comments: Lung sounds diminished bilaterally. No wheezing.  Abdominal:      General: Bowel sounds are normal. There is distension (mild, chronic appearing).      Palpations: Abdomen is soft.      Tenderness: There is no abdominal tenderness.  There is no guarding or rebound.   Musculoskeletal:      Cervical back: Neck supple. No rigidity.      Right lower leg: No edema.      Left lower leg: No edema.   Skin:     General: Skin is warm and dry.      Capillary Refill: Capillary refill takes more than 3 seconds.      Coloration: Skin is pale.   Neurological:      General: No focal deficit present.      Mental Status: She is alert and oriented to person, place, and time. Mental status is at baseline.      Cranial Nerves: No dysarthria or facial asymmetry.      Sensory: Sensation is intact. No sensory deficit.      Motor: Weakness (generalized) present. No tremor or seizure activity.   Psychiatric:         Attention and Perception: Attention and perception normal.         Mood and Affect: Mood and affect normal.         Speech: Speech normal.         Behavior: Behavior normal. Behavior is cooperative.         Thought Content: Thought content normal.         Cognition and Memory: Cognition normal.         Judgment: Judgment normal.       ---------------------------------------------------------------------------------------------------------------------  EKG:  Obtain EKG for baseline/admission.     Telemetry:  Appearing atrial flutter with controlled rate 80s on my exam.   ---------------------------------------------------------------------------------------------------------------------   Results from last 7 days   Lab Units 06/01/23  1032 06/01/23  1006   CRP mg/dL  --  1.48*   LACTATE mmol/L 1.6  --    WBC 10*3/mm3  --  17.25*   HEMOGLOBIN g/dL  --  9.5*   HEMATOCRIT %  --  31.0*   MCV fL  --  81.2   MCHC g/dL  --  30.6*   PLATELETS 10*3/mm3  --  500*   INR  1.31*  --      Results from last 7 days   Lab Units 06/01/23  1017   PH, ARTERIAL pH units 7.469*   PO2 ART mm Hg 46.0*   PCO2, ARTERIAL mm Hg 31.1*   HCO3 ART mmol/L 22.6     Results from last 7 days   Lab Units 06/01/23  1006   SODIUM mmol/L 126*   POTASSIUM mmol/L 4.8   CHLORIDE mmol/L 92*   CO2  mmol/L 22.7   BUN mg/dL 53*   CREATININE mg/dL 2.61*   CALCIUM mg/dL 8.9   GLUCOSE mg/dL 114*   ALBUMIN g/dL 3.6   BILIRUBIN mg/dL 0.6   ALK PHOS U/L 127*   AST (SGOT) U/L 14   ALT (SGPT) U/L 11   Estimated Creatinine Clearance: 28.4 mL/min (A) (by C-G formula based on SCr of 2.61 mg/dL (H)).  No results found for: AMMONIA  Results from last 7 days   Lab Units 06/01/23  1209 06/01/23  1006   HSTROP T ng/L 36* 44*     Results from last 7 days   Lab Units 06/01/23  1006   PROBNP pg/mL 8,240.0*     Lab Results   Component Value Date    HGBA1C 7.60 (H) 09/12/2022     Lab Results   Component Value Date    TSH 1.400 09/12/2022     No results found for: PREGTESTUR, PREGSERUM, HCG, HCGQUANT  Pain Management Panel           Latest Ref Rng & Units 9/15/2022 9/12/2022   Pain Management Panel   Creatinine, Urine mg/dL 26.6      26.6   64.3           Multiple values from one day are sorted in reverse-chronological order               ---------------------------------------------------------------------------------------------------------------------  Imaging Results (Last 7 Days)       Procedure Component Value Units Date/Time    CT Abdomen Pelvis Without Contrast [[id number]] Collected: 06/01/23 1239     Updated: 06/01/23 1302    Narrative:      EXAM: CT ABDOMEN PELVIS WO CONTRAST-         TECHNIQUE: Multiple axial CT images were obtained from lung bases  through pubic symphysis WITH administration of IV contrast. Reformatted  images in the coronal and/or sagittal plane(s) were generated from the  axial data set to facilitate diagnostic accuracy and/or surgical  planning.  Oral Contrast:NONE.     Radiation dose reduction techniques were utilized per ALARA protocol.  Automated exposure control was initiated through either or [employer] or  DoseRight software packages by  protocol.    DOSE:     Clinical information Upper abdominal pain.      Comparison 09/11/2022     FINDINGS:     Lower thorax: Clear. No effusions.      Abdomen:     Liver: Homogeneous. No focal hepatic mass or ductal dilatation.     Gallbladder: Distended. There is cholelithiasis. Similar to the prior  study.     Pancreas: Unremarkable. No mass or ductal dilatation.     Spleen: Homogeneous. No splenomegaly.     Adrenals: No mass.     Kidneys/ureters: No mass. No obstructive uropathy.  No evidence of  urolithiasis.     GI tract: Moderate stool burden. There is no evidence of appendicitis     MESENTERY: No free fluid, walled off fluid collections, mesenteric  stranding, or enlarged lymph nodes        Vasculature: Evidence of atherosclerotic vascular disease     Abdominal wall: Stranding in the anterior pelvic wall similar to the  prior study.     Bladder: No focal mass or significant wall thickening     Reproductive: Unremarkable as visualized     Bones: No acute bony abnormality.       Impression:         1. Cholelithiasis and distention of the gallbladder similar to the  previous exam     2.Stranding in the anterior pelvic wall.     3. Other findings as above                    This report was finalized on 6/1/2023 12:41 PM by Dr. Leonard Narvaez MD.       CT Chest Without Contrast Diagnostic [[id number]] Collected: 06/01/23 1245     Updated: 06/01/23 1302    Narrative:      EXAM: CT CHEST WO CONTRAST DIAGNOSTIC-      CLINICAL INDICATION:Short of breath with left upper quadrant/left-sided  chest discomfort      COMPARISON: 01/06/2023     TECHNIQUE: Multiple axial CT images were obtained from lung apex through  upper abdomen without the administration of IV contrast. Reformatted  images in the coronal and/or sagittal plane(s) were generated from the  axial data set to facilitate diagnostic accuracy and/or surgical  planning.     Radiation dose reduction techniques were utilized per ALARA protocol.  Automated exposure control was initiated through either or [employer] or  DoseRight software packages by  protocol.    DOSE (DLP mGy-cm):        FINDINGS:      LUNGS: 2 mm parenchymal nodule in the right lung on image 28 of the  axial series.  Consolidation in the right lower lobe new since the previous exam. This  may represent scarring or pneumonia     HEART: Coronary artery calcifications     MEDIASTINUM: No masses. No enlarged lymph nodes.  No fluid collections.     PLEURA: No pleural effusions     VASCULATURE: No evidence of aneurysm.     BONES: No acute bony abnormality.     VISUALIZED UPPER ABDOMEN:Please see the CT report for the abdomen and  pelvis     Other: None.       Impression:         1. Consolidation in the right lower lobe.  2. Tiny nodule in the right lung           This report was finalized on 6/1/2023 12:47 PM by Dr. Leonard Narvaez MD.       XR Chest 1 View [[id number]] Collected: 06/01/23 1117     Updated: 06/01/23 1125    Narrative:      XR CHEST 1 VW-     CLINICAL INDICATION: sob        COMPARISON: 09/22/2022      TECHNIQUE: Single frontal view of the chest.     FINDINGS:      LUNGS: Probable mild CHF but portable exam      HEART AND MEDIASTINUM: Heart and mediastinal contours are unremarkable        SKELETON: Bony and soft tissue structures are unremarkable.             Impression:      Probable mild CHF, but portable exam     This report was finalized on 6/1/2023 11:17 AM by Dr. Leonard Narvaez MD.               Last echocardiogram:  Results for orders placed during the hospital encounter of 09/11/22    Adult Transthoracic Echo Limited W/ Cont if Necessary Per Protocol    Interpretation Summary  · Left ventricular ejection fraction appears to be 61 - 65%. Left ventricular systolic function is normal.  · Left ventricular diastolic function was not assessed.  · The right ventricular cavity is mildly dilated.  · Estimated right ventricular systolic pressure from tricuspid regurgitation is markedly elevated ( 78 mmHg).  · This is a technically limited study.          I have personally reviewed the above radiology images and read the final radiology  report on 06/01/23  ---------------------------------------------------------------------------------------------------------------------  Assessment / Plan     Active Hospital Problems    Diagnosis  POA    **Pneumonia of right lower lobe due to infectious organism [J18.9]  Yes       ASSESSMENT/PLAN:    ACUTE HOSPITAL PROBLEMS:    -Acute on chronic respiratory failure with hypoxia, present on admission, likely due to right lower lobe pneumonia  -Leukocytosis and mild C-RP elevation, present on admission and due to above  -CT of the chest without contrast noted consolidation in the right lower lobe and tiny RL nodule.   -WBCs 17K, C-RP 1.48. Lactate and Procal negative.  -Received Doxycycline and Rocephin in the ED; plan to continue current abx on admission.   -COVID-19 and Flu A/B swab negative.   -Peripheral blood cultures x2 pending.   -Obtain sputum culture if able, S. Pneumo Ag, and Legionella Ag.   -Obtain MRSA PCR.   -Bedside nursing swallow evaluation prior to PO intake. If failed, will keep NPO. Maintain aspiration precautions. Consult SLP for formal evaluation.   -Encourage incentive spirometry use.   -Turn, cough, and deep breathing exercises.   -ABG obtained in the ED revealed pO2 in the 40s on room air. She was placed on supplemental O2 and stable. Continue to titrate O2 to maintain SpO2 saturations > 90%.   -Continuous pulse oximetry.   -Scheduled DuoNebs.   -Mucinex and PRN Robitussin for supportive care.  -PRN Tylenol for mild pain or fever.   -Repeat CBC with differential in the a.m.  -Will need referral to lung nodule clinic on discharge for new finding of right lung nodule.     -WENDY on CKD stage IIIb, present on admission  -Acute on chronic hyponatremia, present on admission  -Baseline creatinine ~1.4 (2022) although values have fluctuated widely. Creatinine on admission 2.61. BUN 53.  -CT of the abdomen and pelvis without contrast noted stranding around the anterior pelvic wall. Noted no evidence  of obstructive uropathy or urolithiasis.   -UA with 2+ protein, otherwise unremarkable.   -Monitor urine output and renal function closely.  -Avoid nephrotoxins as able.  -Sodium 126; given elevated BUN & PLT and physical exam findings, feel that this likely represents hypovolemic hyponatremia. Plan to administer continuous IV hydration at slow rate.   -Obtain urine sodium, urine Osm, and serum Osm to rule out SIADH.   -Repeat chemistry panel in AM.      OTHER CHRONIC MEDICAL PROBLEMS:      -CAD s/p CABG  -Hyperlipidemia  -Indeterminate troponin elevation, present on admission, likely due to hypoxia  -Chronic HFpEF  -Paroxysmal atrial fibrillation  -Chronic anticoagulation with Eliquis  -Denies chest pain at time of exam.   -Obtain EKG for baseline/admission.   -HS Troponin T 44 with -8 delta.   -Continuous cardiac monitoring ordered.   -Obtain HgbA1c and lipid profile for risk stratification.   -Review home medications once reconciled with plans to continue aspirin, statin, and other GDMT for CHF.   -Not appearing volume overloaded on exam.   -Strict I's and O's. Daily weights.   -Echo from 9/13/2022 reviewed. Revealed LVEF 56-60% with mildly reduced right ventricular systolic function. Trace MVR. Trace AVR. Moderate TVR. Severe pulmonary HTN. No pericardial effusion.   -Continue Eliquis for stroke prophylaxis.     -Type II diabetes mellitus, insulin dependent  -Obtain HgbA1C.  -Review home medication regimen once reconciled per pharmacy.   -Start SSI and long-acting insulin per Glucomander protocol.  -Closely monitor blood glucose levels with accuchecks AC and HS.  -Hypoglycemia protocol in place.    -Essential hypertension  -BP appears well controlled.  -Plan to resume home antihypertensive regimen once reconciled per pharmacy with appropriate holding parameters to prevent hypotension and/or bradycardia.   -Closely monitor BP per hospital protocol, titrate medications as necessary.    -Chronic normocytic  anemia  -History of iron deficiency  -Hgb 9.5/Hct 31.0 on admission, appearing near recent baseline.   -No s/s active bleeding on exam.   -Plan to continue daily iron supplementation.   -Repeat CBC in the a.m.   -Plan to transfuse for Hgb < 7.     -JESIKA  -Continuous pulse oximetry, as per plan above.    -Obesity by BMI, Body mass index is 50.36 kg/m².  -Affecting all aspects of care.  -Encourage lifestyle modifications.    -Anxiety  -Supportive care.   -Review home medications once reconciled.     -Physical debility  -Up with assistance.  -Maintain fall precautions.   -PT/OT consults.       ----------  -DVT prophylaxis: Eliquis will serve.  -Activity: As tolerated. Maintain fall precautions.   -Expected length of stay:   INPATIENT status due to the need for care which can only be reasonably provided in an hospital setting such as aggressive/expedited ancillary services and/or consultation services, the necessity for IV medications, close physician monitoring and/or the possible need for procedures.  In such, I feel patient's risk for adverse outcomes and need for care warrant INPATIENT evaluation and predict the patient's care encounter to likely last beyond 2 midnights.  -Disposition plans to return to [facility] on discharge once medically stable. CM consulted for assistance.    High risk secondary to acute on chronic hypoxemic respiratory failure due to right lower lobe pneumonia.       CODE STATUS: FULL CODE, per review of ACP documents on file. Discussed with patient.      TUCKER Stone   06/01/23  14:42 EDT  Pager #[phone]  ---------------------------------------------------------------------------------------------------------------------       Electronically signed by Rayshawn Cyr DO at 06/04/23 4863          Discharge Summary        Swati Crespo APRN at 06/06/23 0851              [facility] Medicine Services  DISCHARGE SUMMARY    Date of Admission:  6/1/2023    Date of Discharge:  6/6/2023    PCP: April Cantrell APRN    Discharging Provider: TUCKER Stone  Attending Physician on day of DC: Dr. Behbahani, Katayoun, MD    Admission Diagnosis:   Please see admission H&P    Discharge Diagnosis:   Acute on chronic hypoxic respiratory failure due to right lower lobe pneumonia  Leukocytosis and mild C-RP elevation due to above, improved  Progression of CKD stage IIIb to IV   Acute on chronic hyponatremia, improved  Iron deficiency anemia  CAD s/p CABG  Hyperlipidemia  Indeterminate troponin elevation likely due to hypoxia   Chronic HFpEF  Chronic atrial fib/flutter  Chronic anticoagulation with Eliquis  Insulin dependent type II DM  Essential hypertension, controlled  JESIKA  Obesity by BMI, 49.86 kg/m2  Anxiety  Chronic physical debility  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 female patient admitted on 6/1/2023 from local [facility] nursing [facility] due to acute on chronic respiratory failure with hypoxia secondary to right lower lobe pneumonia. She has a pMH significant for CAD status post CABG, chronic HFpEF, type 2 diabetes mellitus, CKD stage IIIb/IV, chronic anemia, essential hypertension, hyperlipidemia, paroxysmal atrial fibrillation, chronic anticoagulation with Eliquis, obstructive sleep apnea, obesity by BMI, chronic hypoxic respiratory failure on 2 L/min PRN at baseline, and anxiety.        Hospital Course     Hospital Course  Jolly Garcia was admitted as outlined in above HPI.     CT of the chest without contrast noted consolidation in the right lower lobe and a tiny right lung  nodule. D-dimer was negative. Lactate and Pro-Humberto non-elevated. She did have leukocytosis and mild C-RP elevation. Received Doxycycline and Rocephin in the emergency department. Continued on admission. Completed 5 day course of abx. Leukocytosis and C-RP improved. Peripheral blood cultures x2 pending. No growth at 4 days. S pneumo Ag and Legionella Ag negative. MRSA PCR ordered, but unfortunately was never collected. However, patient improved on Doxy/Rocephin. PT/OT/SLP consults were placed. She passed her swallow evaluation without s/s aspiration. Placed on regular diet with thin liquids. Worked well with PT/OT. She has received scheduled DuoNebs and Mucinex. Robitussin was made available as needed for cough. She also presented with suspected WENDY on CKD stage IIIb and acute on chronic hyponatremia. Baseline creatinine around 1.4 (2022), although values have fluctuated wildly. Creatinine on admission was 2.61 and BUN 53. Sodium was 126. CT of the abdomen and pelvis without contrast noted stranding around the pelvic wall. Negative for obstructive uropathy or urolithiasis. Urinalysis with 2+ protein, otherwise unremarkable.  Continuous IV hydration initiated with normal saline at 100 mL an hour on admission. Received IV fluids for roughly 1 day; developed evidence of worsening BLE edema. Stopped fluids and gave 1x dose of IV Lasix on 6/3. Creatinine 2.0 this a.m. and Sodium 130. Bilateral renal ultrasound ordered along with inpatient nephrology consult placed for further recommendations. Greatly appreciate their input and assistance. Renal ultrasound was unremarkable. Per Dr. Farley, patient had significant nephrotic syndrome around 7-8 months ago and did not follow up with Nephrology. He suspects that creatinine on admission represents permanent loss of kidney function (progression to CKD stage IV) and he does not believe there is an acute element. Urine studies ordered along with iron profile, VBG, and repeat chest  xray. Home low dose oral daily Bumex was continued. Do not suspect acute volume overload. Iron studies revealed significantly decreased iron stores and Nephrology is currently recommending iron infusions three times weekly (M, W, F). Patient received IV iron on Monday, 6/5. She also received 1x dose of IV albumin on that day. Patient will need continued follow up with Nephrology in the outpatient setting for management of CKD IV. From pneumonia standpoint, patient has shown significant clinical improvement. Feel that she has currently reached maximum benefit of inpatient hospitalization and may be discharged back to [facility] on this date. Discussed with patient and she voiced agreement. She denies any chest pain, worsening shortness of breath, increased sputum production, or other new/acute complaints. She has continued to have net negative output on current diuretic regimen. Previously only used supplemental O2 at 2L nightly; will now require continuously. Ambulatory referral to lung nodule clinic was placed due to new, incidental finding of right lung nodule on CT chest.     Discussed with AM FABIAN Albarran on day of discharge.     Telemetry on day of discharge was atrial flutter 70-80s.     Oxygen saturation on the day of discharge was 96% on 2L nasal cannula.      Pertinent Laboratory and Radiology Results     Pertinent Test Results:      Results from last 7 days   Lab Units 06/01/23  1017   PH, ARTERIAL pH units 7.469*   PO2 ART mm Hg 46.0*   PCO2, ARTERIAL mm Hg 31.1*   HCO3 ART mmol/L 22.6     Results from last 7 days   Lab Units 06/06/23  0109 06/05/23  0329 06/04/23  0057 06/03/23  1412 06/03/23  0429 06/02/23  1641 06/02/23  0132 06/01/23  1006   WBC 10*3/mm3 12.19* 11.82* 15.79*  --  17.81*  --  21.19* 17.25*   HEMOGLOBIN g/dL 9.1* 8.6* 8.9*  --  8.3*  --  8.9* 9.5*   HEMATOCRIT % 31.5* 29.4* 29.4*  --  28.2*  --  30.9* 31.0*   PLATELETS 10*3/mm3 426 426 446  --  451*  --  450 500*   MCV fL  82.9 82.6 81.9  --  82.7  --  86.3 81.2   SODIUM mmol/L 130* 130* 130* 125* 127* 129* 126* 126*   POTASSIUM mmol/L 4.6 4.6 4.7 4.7 4.6 4.7 4.8 4.8   CHLORIDE mmol/L 97* 97* 98 94* 96* 96* 96* 92*   CO2 mmol/L 22.5 21.3* 21.0* 19.0* 20.0* 20.8* 19.3* 22.7   BUN mg/dL 48* 51* 54* 52* 54* 52* 49* 53*   CREATININE mg/dL 2.01* 2.16* 2.26* 2.46* 2.62* 2.30* 2.22* 2.61*   GLUCOSE mg/dL 86 98 110* 125* 79 155* 116* 114*   CALCIUM mg/dL 8.7 8.5* 8.5* 8.4* 8.6 8.6 8.9 8.9        Results from last 7 days   Lab Units 06/01/23  1209 06/01/23  1006   HSTROP T ng/L 36* 44*   PROBNP pg/mL  --  8,240.0*     Results from last 7 days   Lab Units 06/06/23  0109 06/05/23  0329 06/04/23  0057 06/03/23  0429 06/02/23  0132 06/01/23  1032 06/01/23  1006   CRP mg/dL 4.60*  --  6.88*  --   --   --  1.48*   LACTATE mmol/L  --   --   --   --   --  1.6  --    WBC 10*3/mm3 12.19* 11.82* 15.79* 17.81* 21.19*  --  17.25*     Results from last 7 days   Lab Units 06/01/23  1032 06/01/23  1006   BILIRUBIN mg/dL  --  0.6   ALK PHOS U/L  --  127*   ALT (SGPT) U/L  --  11   AST (SGOT) U/L  --  14   PROTIME Seconds 16.9*  --    INR  1.31*  --    APTT seconds 32.0  --      Results from last 7 days   Lab Units 06/02/23  0132   CHOLESTEROL mg/dL 64   TRIGLYCERIDES mg/dL 27   HDL CHOL mg/dL 38*     Results from last 7 days   Lab Units 06/02/23  0132 06/01/23  1006   TSH uIU/mL 1.610  --    HEMOGLOBIN A1C %  --  7.40*     Brief Urine Lab Results  (Last result in the past 365 days)        Color   Clarity   Blood   Leuk Est   Nitrite   Protein   CREAT   Urine HCG        06/04/23 2231             111.5         06/04/23 2231             111.5         06/04/23 2231 Yellow   Clear   Negative   Negative   Negative   30 mg/dL (1+)                           Results for orders placed during the hospital encounter of 09/11/22    Adult Transthoracic Echo Limited W/ Cont if Necessary Per Protocol    Interpretation Summary  · Left ventricular ejection fraction appears to  be 61 - 65%. Left ventricular systolic function is normal.  · Left ventricular diastolic function was not assessed.  · The right ventricular cavity is mildly dilated.  · Estimated right ventricular systolic pressure from tricuspid regurgitation is markedly elevated ( 78 mmHg).  · This is a technically limited study.      Pending Labs       Order Current Status    Blood Culture - Blood, Arm, Left Preliminary result    Blood Culture - Blood, Arm, Right Preliminary result              ----------------------------------------------------------------------------------------------------------------------  CT Abdomen Pelvis Without Contrast    Result Date: 6/1/2023   1. Cholelithiasis and distention of the gallbladder similar to the previous exam  2.Stranding in the anterior pelvic wall.  3. Other findings as above       This report was finalized on 6/1/2023 12:41 PM by Dr. Leonard Narvaez MD.      CT Chest Without Contrast Diagnostic    Result Date: 6/1/2023   1. Consolidation in the right lower lobe. 2. Tiny nodule in the right lung    This report was finalized on 6/1/2023 12:47 PM by Dr. Leonard Narvaez MD.      XR Chest 1 View    Result Date: 6/5/2023   AIRSPACE OPACITIES AT THE LEFT LUNG BASE MAY REPRESENT PNEUMONIA, ATELECTASIS, OR EDEMA.  This report was finalized on 6/5/2023 7:26 AM by Ellen Baer MD.      XR Chest 1 View    Result Date: 6/1/2023  Probable mild CHF, but portable exam  This report was finalized on 6/1/2023 11:17 AM by Dr. Leonard Narvaez MD.      US Renal Bilateral    Result Date: 6/4/2023  1. Unremarkable sonographic appearance of the kidneys.  This report was finalized on 6/4/2023 11:23 AM by Dr. Leonard Narvaez MD.         Microbiology Results (last 10 days)       Procedure Component Value - Date/Time    S. Pneumo Ag Urine or CSF - Urine, Urine, Clean Catch [[id number]]  (Normal) Collected: 06/01/23 2041    Lab Status: Final result Specimen: Urine, Clean Catch Updated: 06/02/23 0654     Strep Pneumo Ag  Negative    Legionella Antigen, Urine - Urine, Urine, Clean Catch [[id number]]  (Normal) Collected: 06/01/23 1116    Lab Status: Final result Specimen: Urine, Clean Catch Updated: 06/01/23 1743     LEGIONELLA ANTIGEN, URINE Negative    Narrative:      Presumptive negative for L. pneumophilia serogroup 1 antigen, suggesting no recent or current infection.    Blood Culture - Blood, Arm, Left [[id number]]  (Normal) Collected: 06/01/23 1048    Lab Status: Preliminary result Specimen: Blood from Arm, Left Updated: 06/05/23 1101     Blood Culture No growth at 4 days    COVID-19 and FLU A/B PCR - Swab, Nasopharynx [[id number]]  (Normal) Collected: 06/01/23 1007    Lab Status: Final result Specimen: Swab from Nasopharynx Updated: 06/01/23 1035     COVID19 Not Detected     Influenza A PCR Not Detected     Influenza B PCR Not Detected    Narrative:      Fact sheet for providers: https://www.fda.gov/media/631908/download    Fact sheet for patients: https://www.fda.gov/media/419865/download    Test performed by PCR.    Blood Culture - Blood, Arm, Right [[id number]]  (Normal) Collected: 06/01/23 1006    Lab Status: Preliminary result Specimen: Blood from Arm, Right Updated: 06/05/23 1101     Blood Culture No growth at 4 days            Labs above have been reviewed on the day of discharge.  Radiology images from prior 30 days were reviewed prior to discharge as incorporated into this document.     Discharge Vitals and Physical Examination       Vital Signs  Temp:  [97.8 °F (36.6 °C)-98.2 °F (36.8 °C)] 97.8 °F (36.6 °C)  Heart Rate:  [] 87  Resp:  [18-20] 20  BP: (114-172)/(68-91) 172/82     PHYSICAL EXAMINATION:   Physical Exam  Vitals and nursing note reviewed.   Constitutional:       General: She is awake. She is not in acute distress.     Appearance: She is obese. She is ill-appearing (chronically). She is not diaphoretic.      Interventions: Nasal cannula in place.      Comments: Currently on 2 L nasal cannula.   HENT:       Head: Normocephalic and atraumatic.      Mouth/Throat:      Mouth: Mucous membranes are moist.      Pharynx: Oropharynx is clear.   Eyes:      Extraocular Movements: Extraocular movements intact.      Pupils: Pupils are equal, round, and reactive to light.   Cardiovascular:      Rate and Rhythm: Normal rate and regular rhythm.      Pulses: Normal pulses.           Dorsalis pedis pulses are 1+ on the right side and 1+ on the left side.      Heart sounds: Normal heart sounds.     No friction rub.   Pulmonary:      Effort: Pulmonary effort is normal. No accessory muscle usage, respiratory distress or retractions.      Breath sounds: Decreased breath sounds present. No wheezing, rhonchi or rales.      Comments: Lung sounds mildly diminished in bilateral bases. Otherwise, no wheezing, rhonchi, or rales.  Abdominal:      General: Bowel sounds are normal. There is distension (mild, chronic appearing).      Palpations: Abdomen is soft.      Tenderness: There is no abdominal tenderness. There is no guarding.   Musculoskeletal:      Cervical back: Neck supple. No rigidity.      Right lower le+ Pitting Edema present.      Left lower le+ Pitting Edema present.   Skin:     General: Skin is warm and dry.      Capillary Refill: Capillary refill takes 2 to 3 seconds.      Coloration: Skin is pale.   Neurological:      Mental Status: She is alert and oriented to person, place, and time. Mental status is at baseline.      Cranial Nerves: No dysarthria or facial asymmetry.      Sensory: No sensory deficit.      Motor: Weakness (generalized) present. No tremor or seizure activity.   Psychiatric:         Attention and Perception: Attention normal.         Mood and Affect: Mood normal.         Speech: Speech normal.         Behavior: Behavior normal. Behavior is cooperative.         Thought Content: Thought content normal.         Cognition and Memory: Cognition and memory normal.         Judgment: Judgment normal.          Discharge Disposition, Discharge Medications, and Discharge Appointments     Discharge Disposition:   SNF    Condition on Discharge:  Stable    Discharge Medications:     Discharge Medications        New Medications        Instructions Start Date   ferric gluconate  IVPB   50 mg, Intravenous, 3 Times Weekly   Start Date: June 7, 2023     fluticasone 50 MCG/ACT nasal spray  Commonly known as: FLONASE   2 sprays, Each Nare, Daily      guaiFENesin 600 MG 12 hr tablet  Commonly known as: MUCINEX   1,200 mg, Oral, Every 12 Hours Scheduled      magic barrier cream   1 application, Topical, 2 Times Daily, (MASD) Apply to coccyx and bilateral buttocks after cleaning incontinent episodes.             Continue These Medications        Instructions Start Date   acetaminophen 500 MG tablet  Commonly known as: TYLENOL   1,000 mg, Oral, Every 8 Hours PRN      aspirin 81 MG chewable tablet   81 mg, Oral, Daily      atorvastatin 20 MG tablet  Commonly known as: LIPITOR   20 mg, Oral, Nightly      bumetanide 0.5 MG tablet  Commonly known as: BUMEX   0.5 mg, Oral, Daily      CoQ10 50 MG capsule   100 mg, Oral, Daily      dextromethorphan-guaifenesin  MG/5ML syrup  Commonly known as: ROBITUSSIN-DM   5 mL, Oral, Every 4 Hours PRN      diphenhydrAMINE 25 mg capsule  Commonly known as: BENADRYL   25 mg, Oral, Every 8 Hours PRN      Eliquis 5 MG tablet tablet  Generic drug: apixaban   5 mg, Oral, Every 12 Hours Scheduled      FLUoxetine 10 MG capsule  Commonly known as: PROzac   10 mg, Oral, Daily      hydrALAZINE 25 MG tablet  Commonly known as: APRESOLINE   25 mg, Oral, 3 Times Daily      insulin aspart 100 UNIT/ML solution pen-injector sc pen  Commonly known as: novoLOG FLEXPEN   2-10 Units, Subcutaneous, 4 Times Daily Before Meals & Nightly      isosorbide mononitrate 30 MG 24 hr tablet  Commonly known as: IMDUR   30 mg, Oral, Daily      Lantus SoloStar 100 UNIT/ML injection pen  Generic drug: Insulin Glargine   40  Units, Subcutaneous, Nightly      latanoprost 0.005 % ophthalmic solution  Commonly known as: XALATAN   1 drop, Both Eyes, Nightly      linaclotide 145 MCG capsule capsule  Commonly known as: LINZESS   145 mcg, Oral, Every Morning Before Breakfast      melatonin 3 MG tablet   3 mg, Oral, Nightly PRN      metoprolol tartrate 50 MG tablet  Commonly known as: LOPRESSOR   50 mg, Oral, 2 Times Daily      multivitamin with minerals tablet tablet   1 tablet, Oral, Daily      ondansetron 4 MG tablet  Commonly known as: ZOFRAN   4 mg, Oral, Every 6 Hours PRN      Ozempic (1 MG/DOSE) 4 MG/3ML solution pen-injector  Generic drug: Semaglutide (1 MG/DOSE)   1 mg, Subcutaneous, Weekly      pantoprazole 40 MG EC tablet  Commonly known as: PROTONIX   40 mg, Oral, Every Early Morning      sodium chloride 0.65 % nasal spray   2 sprays, Nasal, Every 2 Hours      vitamin B-12 1000 MCG tablet  Commonly known as: CYANOCOBALAMIN   1,000 mcg, Oral, Daily             Stop These Medications      spironolactone 25 MG tablet  Commonly known as: ALDACTONE     Veltassa 8.4 g pack  Generic drug: Patiromer Sorbitex Calcium              Discharged medication regimen discussed with attending physician prior to discharge.     Discharge Diet:   regular diet, cardiac diet, and diabetic diet  Dietary Orders (From admission, onward)       Start     Ordered    06/01/23 1714  Diet: Cardiac Diets, Diabetic Diets; Healthy Heart (2-3 Na+); Consistent Carbohydrate; Texture: Regular Texture (IDDSI 7); Fluid Consistency: Thin (IDDSI 0)  Diet Effective Now        References:    Diet Order [facility]   Question Answer Comment   Diets: Cardiac Diets    Diets: Diabetic Diets    Cardiac Diet: Healthy Heart (2-3 Na+)    Diabetic Diet: Consistent Carbohydrate    Texture: Regular Texture (IDDSI 7)    Fluid Consistency: Thin (IDDSI 0)        06/01/23 1713                    Activity at Discharge:  activity as tolerated         Discharge Disposition:    Skilled Nursing  Facility (DC - External)        Follow-up Appointments:  Your Scheduled Appointments      Jun 23, 2023 11:30 AM  Follow Up with Kim Soares PA-C  [facility] PULMONARY & CRITICAL CARE MEDICINE ([place]) [address] [facility]   [place] [address] [phone]   Established: Please bring outside images or reports.         Sep 14, 2023  2:30 PM  Follow Up with TUCKER Dixon  [facility] CARDIOLOGY ([place]) [address] [place] [address] [phone]   -Bring photo ID, insurance card, and list of medications to appointment  -If [facility] of [facility] then patient must bring images on a disc  -Copay will be collected at time of appointment  -Established patients should arrive 10 minutes prior to appointment                Additional Instructions for the Follow-ups that You Need to Schedule       Ambulatory Referral to Lung Nodule Clinic - [place]   As directed      Discharge Follow-up with PCP   As directed       Currently Documented PCP:    April Cantrell APRN    PCP Phone Number:    [phone]     Follow Up Details: 1 week post hospital dc fu         Discharge Follow-up with Specialty: Nephrology (Dr. Bell); 2 Weeks   As directed      Specialty: Nephrology (Dr. Bell)    Follow Up: 2 Weeks    Follow Up Details: CKD IIIb/IV, on IV iron three X/s weekly through infusion clinic                Follow-up Information       April Cantrell APRN .    Specialty: Family Medicine  Why: 1 week post hospital dc fu  Contact information:  [address] [facility] [facility]  [place] [address]  [phone]               [facility] PULMONARY [facility]ITICAL [facility] MEDICINE .    Specialty: Pulmonology  Contact information:  [address] [facility] [address]  [place] [address] [phone]                           Additional Instructions for the Follow-ups that You Need to Schedule       Ambulatory Referral to [facility] [place]   As  directed      Discharge Follow-up with PCP   As directed       Currently Documented PCP:    April Cantrell APRN    PCP Phone Number:    [phone]     Follow Up Details: 1 week post hospital dc fu         Discharge Follow-up with Specialty: Nephrology (Dr. Bell); 2 Weeks   As directed      Specialty: Nephrology (Dr. Bell)    Follow Up: 2 Weeks    Follow Up Details: CKD IIIb/IV, on IV iron three X/s weekly through infusion clinic                 Test Results Pending at Discharge:    Pending Labs       Order Current Status    Blood Culture - Blood, Arm, Left Preliminary result    Blood Culture - Blood, Arm, Right Preliminary result               TUCKER Stone   [facility] Medicine Team  06/06/23  09:02 EDT      Time: Greater than 30 minutes spent on this discharge.  I spent 60 minutes on this discharge activity which included:  Face-to-face encounter with the patient, discussing plan with attending physician, reviewing the data in the system, coordination of the care with the nursing staff as well as consultations, documentation, and entering orders.      Electronically signed by Swati Crespo APRN at 06/06/23 0915       Discharge Order (From admission, onward)       Start     Ordered    06/06/23 0842  Discharge patient  Once        Expected Discharge Date: 06/06/23    Expected Discharge Time: Morning    Discharge Disposition: Skilled Nursing Facility (DC - External)    Physician of Record for Attribution - Please select from Treatment Team: LENCHO PATEL [[id number]]    Review needed by CMO to determine Physician of Record: No       Question Answer Comment   Physician of Record for Attribution - Please select from Treatment Team LENCHO PATEL    Review needed by CMO to determine Physician of Record No        06/06/23 0849

## 2023-06-06 NOTE — CASE MANAGEMENT/SOCIAL WORK
Discharge Planning Assessment  [facility]     Patient Name: Jolly Garcia  MRN: [id number]  Today's Date: 6/6/2023    Admit Date: 6/1/2023       Discharge Plan       Row Name 06/06/23 0943       Plan    Final Discharge Disposition Code 03 - skilled nursing facility (SNF)    Final Note Pt is returning to [facility] today. SS confirmed with [facility] that IV Iron cannot be completed at NH. PA has ordered IV iron three times a week with  outpatient infusion. SS faxed clinical update and AVS summary to [facility] via [employer].    11:10am: SS left voicemail notifying relative Zandra Colón [phone] that Pt is being discharged back to NH today. SS provided RN with [facility] report number [phone]. SS contacted [facility] who states  EMS not available. SS contacted [facility] per Doroteo and scheduled transport.                     DEREK Combs

## 2023-06-14 PROBLEM — D50.9 IRON DEFICIENCY ANEMIA, UNSPECIFIED: Status: ACTIVE | Noted: 2023-06-14

## 2023-06-15 ENCOUNTER — INFUSION (OUTPATIENT)
Dept: ONCOLOGY | Facility: HOSPITAL | Age: 73
End: 2023-06-15
Payer: MEDICARE

## 2023-06-15 VITALS
HEART RATE: 88 BPM | SYSTOLIC BLOOD PRESSURE: 129 MMHG | OXYGEN SATURATION: 91 % | DIASTOLIC BLOOD PRESSURE: 76 MMHG | RESPIRATION RATE: 18 BRPM | TEMPERATURE: 97.5 F

## 2023-06-15 DIAGNOSIS — D50.9 IRON DEFICIENCY ANEMIA, UNSPECIFIED IRON DEFICIENCY ANEMIA TYPE: Primary | ICD-10-CM

## 2023-06-15 DIAGNOSIS — N18.9 CHRONIC KIDNEY DISEASE, UNSPECIFIED CKD STAGE: ICD-10-CM

## 2023-06-15 PROCEDURE — 25010000002 NA FERRIC GLUC CPLX PER 12.5 MG

## 2023-06-15 PROCEDURE — 96365 THER/PROPH/DIAG IV INF INIT: CPT

## 2023-06-15 RX ADMIN — SODIUM CHLORIDE 125 MG: 9 INJECTION, SOLUTION INTRAVENOUS at 10:46

## 2023-06-19 ENCOUNTER — INFUSION (OUTPATIENT)
Dept: ONCOLOGY | Facility: HOSPITAL | Age: 73
End: 2023-06-19
Payer: MEDICARE

## 2023-06-19 VITALS
HEART RATE: 122 BPM | SYSTOLIC BLOOD PRESSURE: 125 MMHG | OXYGEN SATURATION: 93 % | DIASTOLIC BLOOD PRESSURE: 78 MMHG | TEMPERATURE: 97.3 F | RESPIRATION RATE: 18 BRPM

## 2023-06-19 DIAGNOSIS — N18.9 CHRONIC KIDNEY DISEASE, UNSPECIFIED CKD STAGE: ICD-10-CM

## 2023-06-19 DIAGNOSIS — D50.9 IRON DEFICIENCY ANEMIA, UNSPECIFIED IRON DEFICIENCY ANEMIA TYPE: Primary | ICD-10-CM

## 2023-06-19 PROCEDURE — 96365 THER/PROPH/DIAG IV INF INIT: CPT

## 2023-06-19 PROCEDURE — 25010000002 NA FERRIC GLUC CPLX PER 12.5 MG

## 2023-06-19 RX ADMIN — SODIUM CHLORIDE 125 MG: 9 INJECTION, SOLUTION INTRAVENOUS at 10:43

## 2023-07-22 ENCOUNTER — APPOINTMENT (OUTPATIENT)
Dept: GENERAL RADIOLOGY | Facility: HOSPITAL | Age: 73
DRG: 280 | End: 2023-07-22
Payer: MEDICARE

## 2023-07-22 ENCOUNTER — APPOINTMENT (OUTPATIENT)
Dept: CT IMAGING | Facility: HOSPITAL | Age: 73
DRG: 280 | End: 2023-07-22
Payer: MEDICARE

## 2023-07-22 ENCOUNTER — HOSPITAL ENCOUNTER (INPATIENT)
Facility: HOSPITAL | Age: 73
LOS: 4 days | Discharge: NURSING, REHAB OR LONG-TERM CARE | DRG: 280 | End: 2023-07-28
Attending: EMERGENCY MEDICINE | Admitting: INTERNAL MEDICINE
Payer: MEDICARE

## 2023-07-22 DIAGNOSIS — I50.9 ACUTE CONGESTIVE HEART FAILURE, UNSPECIFIED HEART FAILURE TYPE: Primary | ICD-10-CM

## 2023-07-22 DIAGNOSIS — G89.4 CHRONIC PAIN SYNDROME: ICD-10-CM

## 2023-07-22 PROBLEM — I50.33 ACUTE ON CHRONIC DIASTOLIC CHF (CONGESTIVE HEART FAILURE): Status: ACTIVE | Noted: 2023-07-22

## 2023-07-22 LAB
A-A DO2: 55.1 MMHG (ref 0–300)
ACANTHOCYTES BLD QL SMEAR: NORMAL
ALBUMIN SERPL-MCNC: 3.4 G/DL (ref 3.5–5.2)
ALBUMIN/GLOB SERPL: 1.1 G/DL
ALP SERPL-CCNC: 117 U/L (ref 39–117)
ALT SERPL W P-5'-P-CCNC: 9 U/L (ref 1–33)
ANION GAP SERPL CALCULATED.3IONS-SCNC: 9.9 MMOL/L (ref 5–15)
ANISOCYTOSIS BLD QL: NORMAL
ARTERIAL PATENCY WRIST A: ABNORMAL
AST SERPL-CCNC: 19 U/L (ref 1–32)
ATMOSPHERIC PRESS: 725 MMHG
BASE EXCESS BLDA CALC-SCNC: 0.1 MMOL/L (ref 0–2)
BASOPHILS # BLD AUTO: 0.04 10*3/MM3 (ref 0–0.2)
BASOPHILS NFR BLD AUTO: 0.5 % (ref 0–1.5)
BDY SITE: ABNORMAL
BILIRUB SERPL-MCNC: 0.4 MG/DL (ref 0–1.2)
BILIRUB UR QL STRIP: NEGATIVE
BUN SERPL-MCNC: 64 MG/DL (ref 8–23)
BUN/CREAT SERPL: 25.7 (ref 7–25)
CALCIUM SPEC-SCNC: 8.9 MG/DL (ref 8.6–10.5)
CHLORIDE SERPL-SCNC: 102 MMOL/L (ref 98–107)
CLARITY UR: CLEAR
CO2 BLDA-SCNC: 28.1 MMOL/L (ref 22–33)
CO2 SERPL-SCNC: 25.1 MMOL/L (ref 22–29)
COHGB MFR BLD: 1.4 % (ref 0–5)
COLOR UR: YELLOW
CREAT SERPL-MCNC: 2.49 MG/DL (ref 0.57–1)
CRP SERPL-MCNC: <0.3 MG/DL (ref 0–0.5)
D-LACTATE SERPL-SCNC: 0.8 MMOL/L (ref 0.5–2)
DEPRECATED RDW RBC AUTO: 70.4 FL (ref 37–54)
EGFRCR SERPLBLD CKD-EPI 2021: 20.1 ML/MIN/1.73
EOSINOPHIL # BLD AUTO: 0.16 10*3/MM3 (ref 0–0.4)
EOSINOPHIL NFR BLD AUTO: 2.1 % (ref 0.3–6.2)
ERYTHROCYTE [DISTWIDTH] IN BLOOD BY AUTOMATED COUNT: 21.8 % (ref 12.3–15.4)
FLUAV RNA RESP QL NAA+PROBE: NOT DETECTED
FLUBV RNA ISLT QL NAA+PROBE: NOT DETECTED
GAS FLOW AIRWAY: 2 LPM
GEN 5 2HR TROPONIN T REFLEX: 53 NG/L
GLOBULIN UR ELPH-MCNC: 3.2 GM/DL
GLUCOSE BLDC GLUCOMTR-MCNC: 152 MG/DL (ref 70–130)
GLUCOSE SERPL-MCNC: 130 MG/DL (ref 65–99)
GLUCOSE UR STRIP-MCNC: NEGATIVE MG/DL
HCO3 BLDA-SCNC: 26.5 MMOL/L (ref 20–26)
HCT VFR BLD AUTO: 33.7 % (ref 34–46.6)
HCT VFR BLD CALC: 30.9 % (ref 38–51)
HGB BLD-MCNC: 10 G/DL (ref 12–15.9)
HGB BLDA-MCNC: 10.1 G/DL (ref 13.5–17.5)
HGB UR QL STRIP.AUTO: NEGATIVE
HOLD SPECIMEN: NORMAL
HOLD SPECIMEN: NORMAL
HYPOCHROMIA BLD QL: NORMAL
IMM GRANULOCYTES # BLD AUTO: 0.02 10*3/MM3 (ref 0–0.05)
IMM GRANULOCYTES NFR BLD AUTO: 0.3 % (ref 0–0.5)
INHALED O2 CONCENTRATION: 28 %
KETONES UR QL STRIP: NEGATIVE
LEUKOCYTE ESTERASE UR QL STRIP.AUTO: NEGATIVE
LYMPHOCYTES # BLD AUTO: 1.34 10*3/MM3 (ref 0.7–3.1)
LYMPHOCYTES NFR BLD AUTO: 17.7 % (ref 19.6–45.3)
Lab: ABNORMAL
MAGNESIUM SERPL-MCNC: 2.2 MG/DL (ref 1.6–2.4)
MCH RBC QN AUTO: 26.2 PG (ref 26.6–33)
MCHC RBC AUTO-ENTMCNC: 29.7 G/DL (ref 31.5–35.7)
MCV RBC AUTO: 88.5 FL (ref 79–97)
METHGB BLD QL: 0 % (ref 0–3)
MODALITY: ABNORMAL
MONOCYTES # BLD AUTO: 0.49 10*3/MM3 (ref 0.1–0.9)
MONOCYTES NFR BLD AUTO: 6.5 % (ref 5–12)
NEUTROPHILS NFR BLD AUTO: 5.53 10*3/MM3 (ref 1.7–7)
NEUTROPHILS NFR BLD AUTO: 72.9 % (ref 42.7–76)
NITRITE UR QL STRIP: NEGATIVE
NOTE: ABNORMAL
NRBC BLD AUTO-RTO: 0 /100 WBC (ref 0–0.2)
NT-PROBNP SERPL-MCNC: ABNORMAL PG/ML (ref 0–900)
OVALOCYTES BLD QL SMEAR: NORMAL
OXYHGB MFR BLDV: 94.2 % (ref 94–99)
PCO2 BLDA: 50.5 MM HG (ref 35–45)
PCO2 TEMP ADJ BLD: ABNORMAL MM[HG]
PH BLDA: 7.33 PH UNITS (ref 7.35–7.45)
PH UR STRIP.AUTO: <=5 [PH] (ref 5–8)
PH, TEMP CORRECTED: ABNORMAL
PLAT MORPH BLD: NORMAL
PLATELET # BLD AUTO: 313 10*3/MM3 (ref 140–450)
PMV BLD AUTO: 8.4 FL (ref 6–12)
PO2 BLDA: 78.3 MM HG (ref 83–108)
PO2 TEMP ADJ BLD: ABNORMAL MM[HG]
POIKILOCYTOSIS BLD QL SMEAR: NORMAL
POTASSIUM SERPL-SCNC: 4.1 MMOL/L (ref 3.5–5.2)
PROT SERPL-MCNC: 6.6 G/DL (ref 6–8.5)
PROT UR QL STRIP: NEGATIVE
QT INTERVAL: 348 MS
QTC INTERVAL: 404 MS
RBC # BLD AUTO: 3.81 10*6/MM3 (ref 3.77–5.28)
SAO2 % BLDCOA: 95.4 % (ref 94–99)
SARS-COV-2 RNA RESP QL NAA+PROBE: NOT DETECTED
SODIUM SERPL-SCNC: 137 MMOL/L (ref 136–145)
SP GR UR STRIP: 1.01 (ref 1–1.03)
T4 FREE SERPL-MCNC: 1.17 NG/DL (ref 0.93–1.7)
TROPONIN T DELTA: -4 NG/L
TROPONIN T SERPL HS-MCNC: 57 NG/L
TSH SERPL DL<=0.05 MIU/L-ACNC: 6.87 UIU/ML (ref 0.27–4.2)
UROBILINOGEN UR QL STRIP: NORMAL
VENTILATOR MODE: ABNORMAL
WBC NRBC COR # BLD: 7.58 10*3/MM3 (ref 3.4–10.8)
WHOLE BLOOD HOLD COAG: NORMAL
WHOLE BLOOD HOLD SPECIMEN: NORMAL

## 2023-07-22 PROCEDURE — 87040 BLOOD CULTURE FOR BACTERIA: CPT | Performed by: PHYSICIAN ASSISTANT

## 2023-07-22 PROCEDURE — G0378 HOSPITAL OBSERVATION PER HR: HCPCS

## 2023-07-22 PROCEDURE — 87636 SARSCOV2 & INF A&B AMP PRB: CPT | Performed by: PHYSICIAN ASSISTANT

## 2023-07-22 PROCEDURE — 80053 COMPREHEN METABOLIC PANEL: CPT | Performed by: PHYSICIAN ASSISTANT

## 2023-07-22 PROCEDURE — 86140 C-REACTIVE PROTEIN: CPT | Performed by: PHYSICIAN ASSISTANT

## 2023-07-22 PROCEDURE — 85025 COMPLETE CBC W/AUTO DIFF WBC: CPT | Performed by: PHYSICIAN ASSISTANT

## 2023-07-22 PROCEDURE — 83050 HGB METHEMOGLOBIN QUAN: CPT

## 2023-07-22 PROCEDURE — 84439 ASSAY OF FREE THYROXINE: CPT | Performed by: INTERNAL MEDICINE

## 2023-07-22 PROCEDURE — 36600 WITHDRAWAL OF ARTERIAL BLOOD: CPT

## 2023-07-22 PROCEDURE — 82570 ASSAY OF URINE CREATININE: CPT | Performed by: INTERNAL MEDICINE

## 2023-07-22 PROCEDURE — 83880 ASSAY OF NATRIURETIC PEPTIDE: CPT | Performed by: PHYSICIAN ASSISTANT

## 2023-07-22 PROCEDURE — 99285 EMERGENCY DEPT VISIT HI MDM: CPT

## 2023-07-22 PROCEDURE — 81003 URINALYSIS AUTO W/O SCOPE: CPT | Performed by: PHYSICIAN ASSISTANT

## 2023-07-22 PROCEDURE — 93005 ELECTROCARDIOGRAM TRACING: CPT | Performed by: PHYSICIAN ASSISTANT

## 2023-07-22 PROCEDURE — 82805 BLOOD GASES W/O2 SATURATION: CPT

## 2023-07-22 PROCEDURE — 71045 X-RAY EXAM CHEST 1 VIEW: CPT

## 2023-07-22 PROCEDURE — 93010 ELECTROCARDIOGRAM REPORT: CPT | Performed by: SPECIALIST

## 2023-07-22 PROCEDURE — 83735 ASSAY OF MAGNESIUM: CPT | Performed by: PHYSICIAN ASSISTANT

## 2023-07-22 PROCEDURE — 93005 ELECTROCARDIOGRAM TRACING: CPT | Performed by: INTERNAL MEDICINE

## 2023-07-22 PROCEDURE — 82948 REAGENT STRIP/BLOOD GLUCOSE: CPT

## 2023-07-22 PROCEDURE — 82375 ASSAY CARBOXYHB QUANT: CPT

## 2023-07-22 PROCEDURE — 84156 ASSAY OF PROTEIN URINE: CPT | Performed by: INTERNAL MEDICINE

## 2023-07-22 PROCEDURE — 84443 ASSAY THYROID STIM HORMONE: CPT | Performed by: PHYSICIAN ASSISTANT

## 2023-07-22 PROCEDURE — 83605 ASSAY OF LACTIC ACID: CPT | Performed by: PHYSICIAN ASSISTANT

## 2023-07-22 PROCEDURE — 99223 1ST HOSP IP/OBS HIGH 75: CPT

## 2023-07-22 PROCEDURE — 84484 ASSAY OF TROPONIN QUANT: CPT | Performed by: PHYSICIAN ASSISTANT

## 2023-07-22 PROCEDURE — 70450 CT HEAD/BRAIN W/O DYE: CPT

## 2023-07-22 PROCEDURE — 36415 COLL VENOUS BLD VENIPUNCTURE: CPT

## 2023-07-22 PROCEDURE — 85007 BL SMEAR W/DIFF WBC COUNT: CPT | Performed by: PHYSICIAN ASSISTANT

## 2023-07-22 RX ORDER — FERROUS SULFATE 325(65) MG
325 TABLET ORAL
COMMUNITY

## 2023-07-22 RX ORDER — PATIROMER 16.8 G/1
1 POWDER, FOR SUSPENSION ORAL DAILY
COMMUNITY

## 2023-07-22 RX ORDER — FERROUS SULFATE 325(65) MG
325 TABLET ORAL
Status: DISCONTINUED | OUTPATIENT
Start: 2023-07-23 | End: 2023-07-28 | Stop reason: HOSPADM

## 2023-07-22 RX ORDER — HYDROCODONE BITARTRATE AND ACETAMINOPHEN 5; 325 MG/1; MG/1
1 TABLET ORAL EVERY 8 HOURS PRN
Status: ON HOLD | COMMUNITY
End: 2023-07-28 | Stop reason: SDUPTHER

## 2023-07-22 RX ORDER — ECHINACEA PURPUREA EXTRACT 125 MG
2 TABLET ORAL
Status: DISCONTINUED | OUTPATIENT
Start: 2023-07-22 | End: 2023-07-28 | Stop reason: HOSPADM

## 2023-07-22 RX ORDER — LANOLIN ALCOHOL/MO/W.PET/CERES
1000 CREAM (GRAM) TOPICAL DAILY
Status: DISCONTINUED | OUTPATIENT
Start: 2023-07-23 | End: 2023-07-28 | Stop reason: HOSPADM

## 2023-07-22 RX ORDER — DIPHENHYDRAMINE HCL 25 MG
25 CAPSULE ORAL EVERY 8 HOURS PRN
Status: DISCONTINUED | OUTPATIENT
Start: 2023-07-22 | End: 2023-07-28 | Stop reason: HOSPADM

## 2023-07-22 RX ORDER — HYDROCODONE BITARTRATE AND ACETAMINOPHEN 5; 325 MG/1; MG/1
1 TABLET ORAL EVERY 8 HOURS PRN
Status: DISCONTINUED | OUTPATIENT
Start: 2023-07-22 | End: 2023-07-28 | Stop reason: HOSPADM

## 2023-07-22 RX ORDER — FLUOXETINE HYDROCHLORIDE 20 MG/1
20 CAPSULE ORAL DAILY
COMMUNITY

## 2023-07-22 RX ORDER — ACETAMINOPHEN 500 MG
1000 TABLET ORAL EVERY 8 HOURS PRN
Status: DISCONTINUED | OUTPATIENT
Start: 2023-07-22 | End: 2023-07-28 | Stop reason: HOSPADM

## 2023-07-22 RX ORDER — LORAZEPAM 0.5 MG/1
0.5 TABLET ORAL EVERY 12 HOURS PRN
Status: DISCONTINUED | OUTPATIENT
Start: 2023-07-22 | End: 2023-07-28 | Stop reason: HOSPADM

## 2023-07-22 RX ORDER — NITROGLYCERIN 0.4 MG/1
0.4 TABLET SUBLINGUAL
Status: DISCONTINUED | OUTPATIENT
Start: 2023-07-22 | End: 2023-07-28 | Stop reason: HOSPADM

## 2023-07-22 RX ORDER — MULTIPLE VITAMINS W/ MINERALS TAB 9MG-400MCG
1 TAB ORAL DAILY
Status: DISCONTINUED | OUTPATIENT
Start: 2023-07-23 | End: 2023-07-28 | Stop reason: HOSPADM

## 2023-07-22 RX ORDER — GUAIFENESIN AND DEXTROMETHORPHAN HYDROBROMIDE 100; 10 MG/5ML; MG/5ML
5 SOLUTION ORAL EVERY 4 HOURS PRN
COMMUNITY

## 2023-07-22 RX ORDER — GUAIFENESIN 200 MG/1
400 TABLET ORAL 3 TIMES DAILY
Status: DISCONTINUED | OUTPATIENT
Start: 2023-07-23 | End: 2023-07-28 | Stop reason: HOSPADM

## 2023-07-22 RX ORDER — ONDANSETRON 4 MG/1
4 TABLET, FILM COATED ORAL EVERY 6 HOURS PRN
Status: DISCONTINUED | OUTPATIENT
Start: 2023-07-22 | End: 2023-07-28 | Stop reason: HOSPADM

## 2023-07-22 RX ORDER — INSULIN LISPRO 100 [IU]/ML
2-10 INJECTION, SOLUTION INTRAVENOUS; SUBCUTANEOUS
Status: CANCELLED | OUTPATIENT
Start: 2023-07-22

## 2023-07-22 RX ORDER — BUMETANIDE 1 MG/1
1 TABLET ORAL DAILY
Status: ON HOLD | COMMUNITY
End: 2023-07-28 | Stop reason: SDUPTHER

## 2023-07-22 RX ORDER — GUAIFENESIN 200 MG/1
400 TABLET ORAL 3 TIMES DAILY
Status: CANCELLED | OUTPATIENT
Start: 2023-07-22

## 2023-07-22 RX ORDER — MULTIPLE VITAMINS W/ MINERALS TAB 9MG-400MCG
1 TAB ORAL DAILY
Status: CANCELLED | OUTPATIENT
Start: 2023-07-22

## 2023-07-22 RX ORDER — ATORVASTATIN CALCIUM 20 MG/1
20 TABLET, FILM COATED ORAL NIGHTLY
Status: DISCONTINUED | OUTPATIENT
Start: 2023-07-23 | End: 2023-07-28 | Stop reason: HOSPADM

## 2023-07-22 RX ORDER — SODIUM CHLORIDE 0.9 % (FLUSH) 0.9 %
10 SYRINGE (ML) INJECTION AS NEEDED
Status: DISCONTINUED | OUTPATIENT
Start: 2023-07-22 | End: 2023-07-28 | Stop reason: HOSPADM

## 2023-07-22 RX ORDER — PANTOPRAZOLE SODIUM 40 MG/1
40 TABLET, DELAYED RELEASE ORAL
Status: DISCONTINUED | OUTPATIENT
Start: 2023-07-23 | End: 2023-07-28 | Stop reason: HOSPADM

## 2023-07-22 RX ORDER — FLUTICASONE PROPIONATE 50 MCG
2 SPRAY, SUSPENSION (ML) NASAL DAILY
Status: DISCONTINUED | OUTPATIENT
Start: 2023-07-23 | End: 2023-07-28 | Stop reason: HOSPADM

## 2023-07-22 RX ORDER — NICOTINE POLACRILEX 4 MG
15 LOZENGE BUCCAL
Status: DISCONTINUED | OUTPATIENT
Start: 2023-07-22 | End: 2023-07-28 | Stop reason: HOSPADM

## 2023-07-22 RX ORDER — ISOSORBIDE MONONITRATE 30 MG/1
30 TABLET, EXTENDED RELEASE ORAL DAILY
Status: DISCONTINUED | OUTPATIENT
Start: 2023-07-23 | End: 2023-07-28 | Stop reason: HOSPADM

## 2023-07-22 RX ORDER — BUMETANIDE 0.25 MG/ML
2 INJECTION INTRAMUSCULAR; INTRAVENOUS ONCE
Status: COMPLETED | OUTPATIENT
Start: 2023-07-22 | End: 2023-07-22

## 2023-07-22 RX ORDER — FLUOXETINE HYDROCHLORIDE 20 MG/1
20 CAPSULE ORAL DAILY
Status: DISCONTINUED | OUTPATIENT
Start: 2023-07-23 | End: 2023-07-28 | Stop reason: HOSPADM

## 2023-07-22 RX ORDER — BUMETANIDE 1 MG/1
1 TABLET ORAL DAILY
Status: CANCELLED | OUTPATIENT
Start: 2023-07-23

## 2023-07-22 RX ORDER — NYSTATIN 100000 [USP'U]/G
POWDER TOPICAL EVERY 12 HOURS SCHEDULED
Status: DISCONTINUED | OUTPATIENT
Start: 2023-07-22 | End: 2023-07-28 | Stop reason: HOSPADM

## 2023-07-22 RX ORDER — METOPROLOL TARTRATE 50 MG/1
50 TABLET, FILM COATED ORAL 2 TIMES DAILY
Status: DISCONTINUED | OUTPATIENT
Start: 2023-07-23 | End: 2023-07-26

## 2023-07-22 RX ORDER — MULTIPLE VITAMINS W/ MINERALS TAB 9MG-400MCG
1 TAB ORAL DAILY
COMMUNITY
End: 2023-07-28 | Stop reason: HOSPADM

## 2023-07-22 RX ORDER — INSULIN LISPRO 100 [IU]/ML
2-9 INJECTION, SOLUTION INTRAVENOUS; SUBCUTANEOUS
Status: DISCONTINUED | OUTPATIENT
Start: 2023-07-22 | End: 2023-07-28 | Stop reason: HOSPADM

## 2023-07-22 RX ORDER — LORAZEPAM 0.5 MG/1
0.5 TABLET ORAL EVERY 12 HOURS PRN
COMMUNITY

## 2023-07-22 RX ORDER — ASPIRIN 81 MG/1
81 TABLET, CHEWABLE ORAL DAILY
Status: DISCONTINUED | OUTPATIENT
Start: 2023-07-23 | End: 2023-07-28 | Stop reason: HOSPADM

## 2023-07-22 RX ORDER — HYDRALAZINE HYDROCHLORIDE 25 MG/1
25 TABLET, FILM COATED ORAL 3 TIMES DAILY
Status: DISCONTINUED | OUTPATIENT
Start: 2023-07-23 | End: 2023-07-24

## 2023-07-22 RX ORDER — GUAIFENESIN 400 MG/1
400 TABLET ORAL 3 TIMES DAILY
COMMUNITY

## 2023-07-22 RX ORDER — LATANOPROST 50 UG/ML
1 SOLUTION/ DROPS OPHTHALMIC NIGHTLY
Status: DISCONTINUED | OUTPATIENT
Start: 2023-07-23 | End: 2023-07-28 | Stop reason: HOSPADM

## 2023-07-22 RX ORDER — DEXTROSE MONOHYDRATE 25 G/50ML
25 INJECTION, SOLUTION INTRAVENOUS
Status: DISCONTINUED | OUTPATIENT
Start: 2023-07-22 | End: 2023-07-28 | Stop reason: HOSPADM

## 2023-07-22 RX ORDER — GUAIFENESIN AND DEXTROMETHORPHAN HYDROBROMIDE 100; 10 MG/5ML; MG/5ML
5 SOLUTION ORAL EVERY 4 HOURS PRN
Status: CANCELLED | OUTPATIENT
Start: 2023-07-22

## 2023-07-22 RX ORDER — GLUCAGON 1 MG/ML
1 KIT INJECTION
Status: DISCONTINUED | OUTPATIENT
Start: 2023-07-22 | End: 2023-07-28 | Stop reason: HOSPADM

## 2023-07-22 RX ADMIN — NYSTATIN: 100000 POWDER TOPICAL at 21:38

## 2023-07-22 RX ADMIN — BUMETANIDE 2 MG: 0.25 INJECTION, SOLUTION INTRAMUSCULAR; INTRAVENOUS at 15:42

## 2023-07-22 NOTE — H&P
[facility] Medicine Services  History & Physical    Patient Identification:  Name:  Jolly Garcia  Age:  72 y.o.  Sex:  female  :  1950  MRN:  [id number]   Visit Number:  [id number]  Admit Date: 2023   Primary Care Physician:  Whit Cadet APRN    Subjective     Chief complaint: Shortness of breath, nausea, abnormal lab    History of presenting illness:      Jolly Garcia is a 72 y.o. female with past medical history significant for CAD, HTN, HLD, T2DM, HFpEF, chronic hypoxic respiratory failure, CKD stage IV, history of nephrotic syndrome, Hx EDER    Per handoff patient presents from local SNF with reported AMS lethargy and increased swelling.  Bumex recently increased from 0.5 to 1 mg daily.  She was alert and oriented on arrival to the ED though reports visual hallucinations recently.    Upon arrival to the ED, vital signs were temp 98.3, heart rate 81, respirations 24, /67, SPO2 98% on 2 L per nasal cannula.  ABG in the ED with pH 7.329, PCO2 increased at 50.5, PO2 78.3.  HS troponin on arrival was 57 with repeat at 53.  proBNP is 30,000.  Baseline creatinine appears to be around 2 and is elevated at 2.49 on arrival.  BUN is 64.  TSH is 6.87 though free T4 is WNL at 1.17.  CXR notable for mild bilateral interstitial prominence which may suggest volume overload.  CT head is negative for acute change.  EKG with normal sinus rhythm, low voltage QRS and left posterior fascicular block.    On exam patient is pleasant and cooperative resting quietly in no acute distress.  She reports over the past 2 to 3 weeks she has become increasingly fatigued and intolerant to any sort of exertion.  She reports she is generally weak.  She is increasingly short of breath as well.  She has noted increased lower extremity swelling which has remained somewhat controlled with wraps ordered by the doctor at her nursing home.  She denies decreased urinary output stating it  has actually increased over the past week with increased dosing of her Bumex.  Denies any dysuria.  She denies cough no recent fever or chills.  She states at baseline she is ambulatory with a walker or a cane though his symptoms have progressed she has been essentially bound to her bed.  She denies any chest pain, does note occasional palpitations which are chronic she states secondary to her A-fib.  She wears 2 L per nasal cannula continuously at baseline.  Further review of systems only notable for chronic constipation.  See below.    Known Emergency Department medications received prior to my evaluation included Bumex 2 mg.   Emergency Department Room location at the time of my evaluation was 401.     ---------------------------------------------------------------------------------------------------------------------   Review of Systems   Constitutional:  Positive for fatigue. Negative for chills and fever.   HENT:  Negative for congestion and rhinorrhea.    Respiratory:  Positive for shortness of breath. Negative for cough.    Cardiovascular:  Positive for palpitations (Chronic, occasional) and leg swelling. Negative for chest pain.   Gastrointestinal:  Positive for constipation. Negative for abdominal pain, diarrhea, nausea and vomiting.   Genitourinary:  Negative for difficulty urinating and dysuria.   Musculoskeletal:  Negative for arthralgias and myalgias.   Skin:  Negative for rash and wound.   Neurological:  Positive for weakness. Negative for dizziness and light-headedness.   Psychiatric/Behavioral:  Positive for hallucinations.       ---------------------------------------------------------------------------------------------------------------------   Past Medical History:   Diagnosis Date    Anxiety     CAD (coronary artery disease)     s/p CABG    Chronic hypoxemic respiratory failure     Wears 2 L/min at home    Essential hypertension     Hyperlipidemia     Type 2 diabetes mellitus      Past Surgical  History:   Procedure Laterality Date    CORONARY ARTERY BYPASS GRAFT  2011    HYSTERECTOMY       Family History   Problem Relation Age of Onset    Diabetes Mother      Social History     Socioeconomic History    Marital status: Single   Tobacco Use    Smoking status: Former     Passive exposure: Past   Vaping Use    Vaping Use: Never used   Substance and Sexual Activity    Alcohol use: Never    Drug use: Never    Sexual activity: Defer     ---------------------------------------------------------------------------------------------------------------------   Allergies:  Patient has no known allergies.  ---------------------------------------------------------------------------------------------------------------------   Home medications:    Medications below are reported home medications pulling from within the system; at this time, these medications have not been reconciled unless otherwise specified and are in the verification process for further verifcation as current home medications.  (Not in a hospital admission)      Hospital Scheduled Meds:          Current listed hospital scheduled medications may not yet reflect those currently placed in orders that are signed and held awaiting patient's arrival to floor.   ---------------------------------------------------------------------------------------------------------------------     Objective     Vital Signs:  Temp:  [98.3 °F (36.8 °C)] 98.3 °F (36.8 °C)  Heart Rate:  [80-81] 80  Resp:  [24] 24  BP: (117-127)/(65-94) 127/65      07/22/23  1405   Weight: 125 kg (275 lb)     Body mass index is 43.07 kg/m².  ---------------------------------------------------------------------------------------------------------------------       Physical Exam  Vitals and nursing note reviewed.   Constitutional:       General: She is not in acute distress.  HENT:      Head: Normocephalic and atraumatic.   Eyes:      Extraocular Movements: Extraocular movements intact.       Conjunctiva/sclera: Conjunctivae normal.   Cardiovascular:      Rate and Rhythm: Normal rate and regular rhythm.   Pulmonary:      Effort: Pulmonary effort is normal.      Comments: Diminished breath sounds bilaterally  Abdominal:      Palpations: Abdomen is soft.      Tenderness: There is no abdominal tenderness.   Musculoskeletal:      Right lower leg: No edema.      Left lower leg: No edema.   Skin:     General: Skin is warm and dry.   Neurological:      Mental Status: She is alert and oriented to person, place, and time. Mental status is at baseline.   Psychiatric:         Mood and Affect: Mood normal.         Behavior: Behavior normal.             ---------------------------------------------------------------------------------------------------------------------  EKG:        I have personally looked at the EKG.  ---------------------------------------------------------------------------------------------------------------------   Results from last 7 days   Lab Units 07/22/23  1413   CRP mg/dL <0.30   LACTATE mmol/L 0.8   WBC 10*3/mm3 7.58   HEMOGLOBIN g/dL 10.0*   HEMATOCRIT % 33.7*   MCV fL 88.5   MCHC g/dL 29.7*   PLATELETS 10*3/mm3 313     Results from last 7 days   Lab Units 07/22/23  1515   PH, ARTERIAL pH units 7.329*   PO2 ART mm Hg 78.3*   PCO2, ARTERIAL mm Hg 50.5*   HCO3 ART mmol/L 26.5*     Results from last 7 days   Lab Units 07/22/23  1413   SODIUM mmol/L 137   POTASSIUM mmol/L 4.1   MAGNESIUM mg/dL 2.2   CHLORIDE mmol/L 102   CO2 mmol/L 25.1   BUN mg/dL 64*   CREATININE mg/dL 2.49*   CALCIUM mg/dL 8.9   GLUCOSE mg/dL 130*   ALBUMIN g/dL 3.4*   BILIRUBIN mg/dL 0.4   ALK PHOS U/L 117   AST (SGOT) U/L 19   ALT (SGPT) U/L 9   Estimated Creatinine Clearance: 28 mL/min (A) (by C-G formula based on SCr of 2.49 mg/dL (H)).  No results found for: AMMONIA  Results from last 7 days   Lab Units 07/22/23  1636 07/22/23  1413   HSTROP T ng/L 53* 57*     Results from last 7 days   Lab Units 07/22/23  1413    PROBNP pg/mL 30,507.0*     Lab Results   Component Value Date    HGBA1C 7.40 (H) 06/01/2023     Lab Results   Component Value Date    TSH 6.870 (H) 07/22/2023     No results found for: PREGTESTUR, PREGSERUM, HCG, HCGQUANT  Pain Management Panel  More data exists         Latest Ref Rng & Units 6/4/2023 9/15/2022   Pain Management Panel   Creatinine, Urine mg/dL 111.5  111.5  26.6  26.6      No results found for: BLOODCX  No results found for: URINECX  No results found for: WOUNDCX  No results found for: STOOLCX      ---------------------------------------------------------------------------------------------------------------------  Imaging Results (Last 7 Days)       Procedure Component Value Units Date/Time    XR Chest 1 View [[id number]] Collected: 07/22/23 1638     Updated: 07/22/23 1702    Narrative:         Portable upright AP view chest.     Comparison: 6/5/2023     Findings: Median sternotomy wires and hardware again noted.  Cardiac  silhouette is enlarged, allowing for technique, diffuse interstitial  prominence noted.  No pleural effusion.  Elevation of the right  hemidiaphragm noted.     No confluent opacification.       Impression:         Mild bilateral interstitial prominence which may suggest volume overload  in the proper clinical setting.     This report was finalized on 7/22/2023 4:41 PM by Kristen Jara MD.       CT Head Without Contrast [[id number]] Collected: 07/22/23 1533     Updated: 07/22/23 1537    Narrative:      EXAMINATION:Computed tomography(CT)of the head without IV contrast     TECHNIQUE:CT of the head was performed in the supine position without  intravenous contrast according to as low as reasonably achievable dose  protocol. Axial CT utilized with slice thickness of 5 mm presented in  soft tissue and bone algorithms.     COMPARISON:No prior studies are available for review at the time of this  dictation.     FINDINGS:     No acute intra-or extra-axial hemorrhage or fluid  collections are  identified. The ventricles are of normal size,shape,and morphology. The  basilar cisterns are patent. No mass effect or midline shift is seen.  The gray-white matter differentiation is normal. The visualized portions  of the orbits,paranasal sinuses,and mastoids appear normal. No acute  fracture is identified.       Impression:         No acute intracranial hemorrhage,midline shift,or significant mass  effect.     This report was finalized on 7/22/2023 3:35 PM by Kristen Jara MD.               Cultures:  No results found for: BLOODCX, URINECX, WOUNDCX, MRSACX, RESPCX, STOOLCX    Last echocardiogram:  Results for orders placed during the hospital encounter of 09/11/22    Adult Transthoracic Echo Limited W/ Cont if Necessary Per Protocol    Interpretation Summary  · Left ventricular ejection fraction appears to be 61 - 65%. Left ventricular systolic function is normal.  · Left ventricular diastolic function was not assessed.  · The right ventricular cavity is mildly dilated.  · Estimated right ventricular systolic pressure from tricuspid regurgitation is markedly elevated ( 78 mmHg).  · This is a technically limited study.          I have personally reviewed the above radiology images and read the final radiology report on 07/22/23  ---------------------------------------------------------------------------------------------------------------------  Assessment / Plan     Active Hospital Problems    Diagnosis  POA    **Acute on chronic diastolic CHF (congestive heart failure) [I50.33]  Yes       ASSESSMENT/PLAN:    Acute on chronic heart failure with preserved ejection fraction, POA  NSTEMI, T1 versus T2, POA, likely 2/2 above  Acute on chronic CKD stage IV, POA  Acute encephalopathy, POA, currently unclear etiology  Patient presents from local SNF secondary to AMS, lethargy and increased swelling.  Patient AOx3 on arrival to the ED per ED provider note though does admit to visual  hallucinations  Work-up revealed HS troponin 57 with repeat at 53.  proBNP is 30,507.  CXR suggestive of volume overload.  EKG with normal sinus rhythm, low voltage QRS.  Creatinine also increased at 2.49.  Patient received Bumex 2 mg in the ED  She will be admitted to telemetry for further observation with continuous cardiac monitoring/pulse oximetry  We will obtain a TTE  Daily weights, strictly monitor I's and O's and clinical volume status closely  Continue to diurese as indicated  Inpatient heart failure navigator consult placed  We will obtain a ReDs vest value  Avoid nephrotoxins as able  Per review of recent discharge summary patient with history of nephrotic syndrome 6 to 8 months ago and failed to follow-up with nephrology.  We will consult nephrology for further assistance, appreciated.  Admission UA unremarkable.  Continue supportive care    Chronic:  CAD s/p CABG  A-fib/flutter  HTN  HLD  T2DM-insulin-dependent  JESIKA  Chronic hypoxic respiratory failure  Hx EDER  Plan to restart home meds as indicated pending med rec  Initiate insulin regimen at appropriate doses.  Accu-Cheks to monitor with hypoglycemia protocol in place  H&H appears around baseline continue to monitor with serial labs  Monitor vital signs per protocol  ----------  -DVT prophylaxis: Chronically anticoagulated with Eliquis  -Activity: Ad oliverio.  -Expected length of stay: OBSERVATION status; however, if further evaluation or treatment plans warrant, status may change.  Based upon current information, I predict patient's care encounter to be less than or equal to 2 midnights   -Disposition pending course, likely return to SNF following clinical improvement    High risk secondary to acute on chronic congestive heart failure    There are no questions and answers to display.       Jose Cook PA-C   07/22/23  18:26 EDT

## 2023-07-22 NOTE — ED PROVIDER NOTES
Subjective   History of Present Illness  71 yo female patient with hx of CAD, HLD, CHF, HTN, DM, and CKD presents to the ED by EMS from [facility].  [facility] called EMS due to altered mental status, lethargy, dizziness, abnormal labs, and increased swelling.  Pt was recently increased on Bumex from 0.5mg to 1mg daily. Her potassium bhupinder on 7/18 was 5.1.  Pt has valentino boots bilaterally. She has venous stasis ulcers to posterior lower legs bilaterally. Pt has had increased swelling to lower extremities.  Pt is alert and oriented. She reports she just feels bad and fatigued.  Pt reports SOB and nausea. Denies any CP.  Pt wears 2LNC all the time.     History provided by:  Patient   used: No      Review of Systems   Constitutional: Negative.  Positive for fatigue.   Eyes: Negative.    Respiratory:  Positive for shortness of breath.    Cardiovascular:  Positive for leg swelling.   Gastrointestinal:  Positive for nausea.   Endocrine: Negative.    Genitourinary: Negative.    Musculoskeletal: Negative.    Skin: Negative.    Allergic/Immunologic: Negative.    Neurological: Negative.    Hematological: Negative.    Psychiatric/Behavioral: Negative.     All other systems reviewed and are negative.    Past Medical History:   Diagnosis Date    Anxiety     CAD (coronary artery disease)     s/p CABG    Chronic hypoxemic respiratory failure     Wears 2 L/min at home    Essential hypertension     Hyperlipidemia     Type 2 diabetes mellitus        No Known Allergies    Past Surgical History:   Procedure Laterality Date    CORONARY ARTERY BYPASS GRAFT  2011    HYSTERECTOMY         Family History   Problem Relation Age of Onset    Diabetes Mother        Social History     Socioeconomic History    Marital status: Single   Tobacco Use    Smoking status: Former     Passive exposure: Past   Vaping Use    Vaping Use: Never used   Substance and Sexual Activity    Alcohol use: Never    Drug use: Never    Sexual activity: Defer            Objective   Physical Exam  Vitals reviewed.   Constitutional:       Appearance: She is well-developed. She is obese. She is ill-appearing.   HENT:      Head: Normocephalic and atraumatic.      Mouth/Throat:      Mouth: Mucous membranes are moist.      Pharynx: Oropharynx is clear.   Eyes:      Extraocular Movements: Extraocular movements intact.      Pupils: Pupils are equal, round, and reactive to light.   Cardiovascular:      Rate and Rhythm: Normal rate and regular rhythm.      Pulses: Normal pulses.      Heart sounds: Normal heart sounds.   Pulmonary:      Effort: Pulmonary effort is normal. Tachypnea present.      Breath sounds: Decreased breath sounds present.      Comments: Crackles   Abdominal:      General: Bowel sounds are normal.      Palpations: Abdomen is soft.   Musculoskeletal:         General: Normal range of motion.      Cervical back: Normal range of motion and neck supple.      Right lower leg: Edema present.      Left lower leg: Edema present.      Comments: [place] boots noted bilaterally.    Skin:     General: Skin is warm and dry.   Neurological:      General: No focal deficit present.      Mental Status: She is alert and oriented to person, place, and time.   Psychiatric:         Mood and Affect: Mood normal.       Procedures           ED Course  ED Course as of 07/22/23 1822   Sat Jul 22, 2023   1421 ECG 12 Lead Electrolyte Imbalance  Normal sinus rhythm, rate 81, QTc 404, no acute ST or T wave changes, poor R wave progression [CW]   1457 Potassium: 4.1 [ML]   1457 Creatinine(!): 2.49 [ML]   1457 TSH Baseline(!): 6.870 [ML]   1518 proBNP(!): 30,507.0  Previous 8,240 [ML]   1612 CT Head Without Contrast  IMPRESSION:     No acute intracranial hemorrhage,midline shift,or significant mass  effect.     This report was finalized on 7/22/2023 3:35 PM by Kristen Jara MD.           Specimen Collected: 07/22/23 15:33 EDT Last Resulted: 07/22/23 15:35 EDT         [ML]   1730 [place] boots  removed. Pt has skin breakdown noted to calves bilaterally.  Pt has edema bilaterally. N/V intact.  [ML]   1809 Dr. Sprague will admit the patient.  [ML]      ED Course User Index  [CW] Juvenal Mahajan DO  [ML] Andra Murillo PA                                           Medical Decision Making  71 yo female patient with hx of CAD, HLD, CHF, HTN, DM, and CKD presents to the ED by EMS from [facility].  [facility] called EMS due to altered mental status, lethargy, dizziness, abnormal labs, and increased swelling.  Pt was recently increased on Bumex from 0.5mg to 1mg daily. Her potassium bhupinder on 7/18 was 5.1.  Pt has valentino boots bilaterally. She has venous stasis ulcers to posterior lower legs bilaterally. Pt has had increased swelling to lower extremities.  Pt is alert and oriented. She reports she just feels bad and fatigued.  Pt reports SOB and nausea. Denies any CP.  Pt wears 2LNC all the time. Pt will be admitted to telemetry for CHF exacerbation.      Problems Addressed:  Acute congestive heart failure, unspecified heart failure type: complicated acute illness or injury    Amount and/or Complexity of Data Reviewed  Labs: ordered. Decision-making details documented in ED Course.  Radiology: ordered. Decision-making details documented in ED Course.  ECG/medicine tests: ordered.    Risk  Prescription drug management.  Decision regarding hospitalization.        Final diagnoses:   Acute congestive heart failure, unspecified heart failure type       ED Disposition  ED Disposition       ED Disposition   Decision to Admit    Condition   --    Comment   Level of Care: Telemetry [5]   Diagnosis: Acute on chronic diastolic CHF (congestive heart failure) [[id number]]   Admitting Physician: PAUL SPRAGUE [[id number]]                 No follow-up provider specified.       Medication List      No changes were made to your prescriptions during this visit.            Andra Murillo PA  07/22/23 3637        Andra Murillo PA  07/22/23 1822

## 2023-07-23 ENCOUNTER — APPOINTMENT (OUTPATIENT)
Dept: CARDIOLOGY | Facility: HOSPITAL | Age: 73
DRG: 280 | End: 2023-07-23
Payer: MEDICARE

## 2023-07-23 LAB
ABSOLUTE LUNG FLUID CONTENT: 23 % (ref 20–35)
ACANTHOCYTES BLD QL SMEAR: NORMAL
ALBUMIN SERPL-MCNC: 3.4 G/DL (ref 3.5–5.2)
ALBUMIN/GLOB SERPL: 1.1 G/DL
ALP SERPL-CCNC: 111 U/L (ref 39–117)
ALT SERPL W P-5'-P-CCNC: 9 U/L (ref 1–33)
ANION GAP SERPL CALCULATED.3IONS-SCNC: 10.3 MMOL/L (ref 5–15)
ANISOCYTOSIS BLD QL: NORMAL
AST SERPL-CCNC: 12 U/L (ref 1–32)
BASOPHILS # BLD AUTO: 0.06 10*3/MM3 (ref 0–0.2)
BASOPHILS NFR BLD AUTO: 0.8 % (ref 0–1.5)
BH CV ECHO MEAS - ACS: 1.7 CM
BH CV ECHO MEAS - AO MAX PG: 6.2 MMHG
BH CV ECHO MEAS - AO MEAN PG: 3 MMHG
BH CV ECHO MEAS - AO ROOT DIAM: 3.3 CM
BH CV ECHO MEAS - AO V2 MAX: 124 CM/SEC
BH CV ECHO MEAS - AO V2 VTI: 23.4 CM
BH CV ECHO MEAS - EDV(MOD-SP4): 64.2 ML
BH CV ECHO MEAS - EF(MOD-SP4): 57.3 %
BH CV ECHO MEAS - ESV(MOD-SP4): 27.4 ML
BH CV ECHO MEAS - LA DIMENSION: 4.1 CM
BH CV ECHO MEAS - LAT PEAK E' VEL: 10.6 CM/SEC
BH CV ECHO MEAS - LV DIASTOLIC VOL/BSA (35-75): 25.6 CM2
BH CV ECHO MEAS - LV SYSTOLIC VOL/BSA (12-30): 10.9 CM2
BH CV ECHO MEAS - LVOT AREA: 3.1 CM2
BH CV ECHO MEAS - LVOT DIAM: 2 CM
BH CV ECHO MEAS - MED PEAK E' VEL: 4.2 CM/SEC
BH CV ECHO MEAS - MV A MAX VEL: 33.4 CM/SEC
BH CV ECHO MEAS - MV E MAX VEL: 96 CM/SEC
BH CV ECHO MEAS - MV E/A: 2.9
BH CV ECHO MEAS - PA ACC TIME: 0.08 SEC
BH CV ECHO MEAS - RAP SYSTOLE: 10 MMHG
BH CV ECHO MEAS - RVSP: 68.7 MMHG
BH CV ECHO MEAS - SI(MOD-SP4): 14.7 ML/M2
BH CV ECHO MEAS - SV(MOD-SP4): 36.8 ML
BH CV ECHO MEAS - TAPSE (>1.6): 0.25 CM
BH CV ECHO MEAS - TR MAX PG: 58.7 MMHG
BH CV ECHO MEAS - TR MAX VEL: 383 CM/SEC
BH CV ECHO MEASUREMENTS AVERAGE E/E' RATIO: 12.97
BILIRUB SERPL-MCNC: 0.4 MG/DL (ref 0–1.2)
BUN SERPL-MCNC: 65 MG/DL (ref 8–23)
BUN/CREAT SERPL: 25.6 (ref 7–25)
CALCIUM SPEC-SCNC: 8.8 MG/DL (ref 8.6–10.5)
CHLORIDE SERPL-SCNC: 102 MMOL/L (ref 98–107)
CO2 SERPL-SCNC: 24.7 MMOL/L (ref 22–29)
CREAT SERPL-MCNC: 2.54 MG/DL (ref 0.57–1)
CREAT UR-MCNC: 83.5 MG/DL
DEPRECATED RDW RBC AUTO: 72 FL (ref 37–54)
EGFRCR SERPLBLD CKD-EPI 2021: 19.6 ML/MIN/1.73
EOSINOPHIL # BLD AUTO: 0.2 10*3/MM3 (ref 0–0.4)
EOSINOPHIL NFR BLD AUTO: 2.8 % (ref 0.3–6.2)
ERYTHROCYTE [DISTWIDTH] IN BLOOD BY AUTOMATED COUNT: 21.8 % (ref 12.3–15.4)
GLOBULIN UR ELPH-MCNC: 3 GM/DL
GLUCOSE BLDC GLUCOMTR-MCNC: 125 MG/DL (ref 70–130)
GLUCOSE BLDC GLUCOMTR-MCNC: 137 MG/DL (ref 70–130)
GLUCOSE BLDC GLUCOMTR-MCNC: 139 MG/DL (ref 70–130)
GLUCOSE BLDC GLUCOMTR-MCNC: 162 MG/DL (ref 70–130)
GLUCOSE SERPL-MCNC: 127 MG/DL (ref 65–99)
HCT VFR BLD AUTO: 33.2 % (ref 34–46.6)
HGB BLD-MCNC: 9.9 G/DL (ref 12–15.9)
HYPOCHROMIA BLD QL: NORMAL
IMM GRANULOCYTES # BLD AUTO: 0.03 10*3/MM3 (ref 0–0.05)
IMM GRANULOCYTES NFR BLD AUTO: 0.4 % (ref 0–0.5)
LEFT ATRIUM VOLUME INDEX: 15.9 ML/M2
LYMPHOCYTES # BLD AUTO: 1.24 10*3/MM3 (ref 0.7–3.1)
LYMPHOCYTES NFR BLD AUTO: 17.2 % (ref 19.6–45.3)
MCH RBC QN AUTO: 26.9 PG (ref 26.6–33)
MCHC RBC AUTO-ENTMCNC: 29.8 G/DL (ref 31.5–35.7)
MCV RBC AUTO: 90.2 FL (ref 79–97)
MONOCYTES # BLD AUTO: 0.54 10*3/MM3 (ref 0.1–0.9)
MONOCYTES NFR BLD AUTO: 7.5 % (ref 5–12)
NEUTROPHILS NFR BLD AUTO: 5.16 10*3/MM3 (ref 1.7–7)
NEUTROPHILS NFR BLD AUTO: 71.3 % (ref 42.7–76)
NRBC BLD AUTO-RTO: 0 /100 WBC (ref 0–0.2)
PLAT MORPH BLD: NORMAL
PLATELET # BLD AUTO: 298 10*3/MM3 (ref 140–450)
PMV BLD AUTO: 8.4 FL (ref 6–12)
POTASSIUM SERPL-SCNC: 3.8 MMOL/L (ref 3.5–5.2)
PROT ?TM UR-MCNC: 12.8 MG/DL
PROT SERPL-MCNC: 6.4 G/DL (ref 6–8.5)
PROT/CREAT UR: 153.3 MG/G CREA (ref 0–200)
QT INTERVAL: 396 MS
QTC INTERVAL: 465 MS
RBC # BLD AUTO: 3.68 10*6/MM3 (ref 3.77–5.28)
SCHISTOCYTES BLD QL SMEAR: NORMAL
SODIUM SERPL-SCNC: 137 MMOL/L (ref 136–145)
WBC NRBC COR # BLD: 7.23 10*3/MM3 (ref 3.4–10.8)

## 2023-07-23 PROCEDURE — 99232 SBSQ HOSP IP/OBS MODERATE 35: CPT

## 2023-07-23 PROCEDURE — G0378 HOSPITAL OBSERVATION PER HR: HCPCS

## 2023-07-23 PROCEDURE — 63710000001 INSULIN LISPRO (HUMAN) PER 5 UNITS: Performed by: INTERNAL MEDICINE

## 2023-07-23 PROCEDURE — 25010000002 FUROSEMIDE PER 20 MG: Performed by: INTERNAL MEDICINE

## 2023-07-23 PROCEDURE — 82948 REAGENT STRIP/BLOOD GLUCOSE: CPT

## 2023-07-23 PROCEDURE — 80053 COMPREHEN METABOLIC PANEL: CPT | Performed by: INTERNAL MEDICINE

## 2023-07-23 PROCEDURE — 93306 TTE W/DOPPLER COMPLETE: CPT | Performed by: SPECIALIST

## 2023-07-23 PROCEDURE — 63710000001 DIPHENHYDRAMINE PER 50 MG: Performed by: HOSPITALIST

## 2023-07-23 PROCEDURE — 94726 PLETHYSMOGRAPHY LUNG VOLUMES: CPT

## 2023-07-23 PROCEDURE — 85007 BL SMEAR W/DIFF WBC COUNT: CPT | Performed by: INTERNAL MEDICINE

## 2023-07-23 PROCEDURE — 85025 COMPLETE CBC W/AUTO DIFF WBC: CPT | Performed by: INTERNAL MEDICINE

## 2023-07-23 PROCEDURE — 93306 TTE W/DOPPLER COMPLETE: CPT

## 2023-07-23 RX ADMIN — PANTOPRAZOLE SODIUM 40 MG: 40 TABLET, DELAYED RELEASE ORAL at 05:15

## 2023-07-23 RX ADMIN — INSULIN LISPRO 2 UNITS: 100 INJECTION, SOLUTION INTRAVENOUS; SUBCUTANEOUS at 11:04

## 2023-07-23 RX ADMIN — Medication 1000 MCG: at 08:28

## 2023-07-23 RX ADMIN — ATORVASTATIN CALCIUM 20 MG: 20 TABLET, FILM COATED ORAL at 20:32

## 2023-07-23 RX ADMIN — LATANOPROST 1 DROP: 50 SOLUTION OPHTHALMIC at 00:29

## 2023-07-23 RX ADMIN — ATORVASTATIN CALCIUM 20 MG: 20 TABLET, FILM COATED ORAL at 00:29

## 2023-07-23 RX ADMIN — FLUOXETINE HYDROCHLORIDE 20 MG: 20 CAPSULE ORAL at 08:28

## 2023-07-23 RX ADMIN — APIXABAN 5 MG: 5 TABLET, FILM COATED ORAL at 20:32

## 2023-07-23 RX ADMIN — APIXABAN 5 MG: 5 TABLET, FILM COATED ORAL at 08:28

## 2023-07-23 RX ADMIN — HYDRALAZINE HYDROCHLORIDE 25 MG: 25 TABLET ORAL at 17:21

## 2023-07-23 RX ADMIN — DIPHENHYDRAMINE HYDROCHLORIDE 25 MG: 25 CAPSULE ORAL at 22:39

## 2023-07-23 RX ADMIN — HYDRALAZINE HYDROCHLORIDE 25 MG: 25 TABLET ORAL at 00:30

## 2023-07-23 RX ADMIN — FERROUS SULFATE TAB 325 MG (65 MG ELEMENTAL FE) 325 MG: 325 (65 FE) TAB at 08:28

## 2023-07-23 RX ADMIN — GUAIFENESIN 400 MG: 200 TABLET ORAL at 20:32

## 2023-07-23 RX ADMIN — GUAIFENESIN 400 MG: 200 TABLET ORAL at 08:28

## 2023-07-23 RX ADMIN — Medication 1 TABLET: at 08:28

## 2023-07-23 RX ADMIN — LINACLOTIDE 145 MCG: 145 CAPSULE, GELATIN COATED ORAL at 08:28

## 2023-07-23 RX ADMIN — APIXABAN 5 MG: 5 TABLET, FILM COATED ORAL at 00:30

## 2023-07-23 RX ADMIN — GUAIFENESIN 400 MG: 200 TABLET ORAL at 00:28

## 2023-07-23 RX ADMIN — HYDRALAZINE HYDROCHLORIDE 25 MG: 25 TABLET ORAL at 08:29

## 2023-07-23 RX ADMIN — GUAIFENESIN 400 MG: 200 TABLET ORAL at 17:21

## 2023-07-23 RX ADMIN — ASPIRIN 81 MG: 81 TABLET, CHEWABLE ORAL at 08:27

## 2023-07-23 RX ADMIN — FUROSEMIDE 100 MG: 10 INJECTION, SOLUTION INTRAMUSCULAR; INTRAVENOUS at 13:43

## 2023-07-23 RX ADMIN — NYSTATIN: 100000 POWDER TOPICAL at 20:33

## 2023-07-23 RX ADMIN — LATANOPROST 1 DROP: 50 SOLUTION OPHTHALMIC at 20:34

## 2023-07-23 RX ADMIN — FLUTICASONE PROPIONATE 2 SPRAY: 50 SPRAY, METERED NASAL at 08:27

## 2023-07-23 RX ADMIN — METOPROLOL TARTRATE 50 MG: 50 TABLET, FILM COATED ORAL at 20:32

## 2023-07-23 RX ADMIN — NYSTATIN: 100000 POWDER TOPICAL at 08:27

## 2023-07-23 RX ADMIN — METOPROLOL TARTRATE 50 MG: 50 TABLET, FILM COATED ORAL at 00:24

## 2023-07-23 RX ADMIN — METOPROLOL TARTRATE 50 MG: 50 TABLET, FILM COATED ORAL at 08:28

## 2023-07-23 RX ADMIN — ISOSORBIDE MONONITRATE 30 MG: 30 TABLET, EXTENDED RELEASE ORAL at 08:28

## 2023-07-23 NOTE — PROGRESS NOTES
Patient Identification:  Name:  Jolly Garcia  Age:  72 y.o.  Sex:  female  :  1950  MRN:  [id number]  Visit Number:  [id number]  Primary Care Provider:  Whit Cadet APRN    Length of stay:  0    Subjective/Interval History/Consultants/Procedures     Chief Complaint:   Chief Complaint   Patient presents with    Shortness of Breath    Nausea    Abnormal Lab       Subjective/Interval History:    72 y.o. female who was admitted on 2023 with Acute on chronic heart failure with preserved ejection fraction     PMH is significant for CAD, HTN, HLD, T2DM, HFpEF, chronic hypoxic respiratory failure, CKD stage IV, history of nephrotic syndrome, Hx EDER   For complete admission information, please see history and physical.     Consultations:  nephrology     Procedures/Scans:  CXR  CT head without contrast  TTE    Today, the patient was resting in no acute distress. She reports overall feeling about the same today. No new complaints. She denies increased shortness of breath or cough. No difficulty with urination or dysuria.  She denies any further visual hallucinations today.    Room location at the time of evaluation was 303a.    ----------------------------------------------------------------------------------------------------------------------  Current Hospital Meds:  apixaban, 5 mg, Oral, Q12H  aspirin, 81 mg, Oral, Daily  atorvastatin, 20 mg, Oral, Nightly  ferrous sulfate, 325 mg, Oral, Daily With Breakfast  FLUoxetine, 20 mg, Oral, Daily  fluticasone, 2 spray, Each Nare, Daily  guaiFENesin, 400 mg, Oral, TID  hydrALAZINE, 25 mg, Oral, TID  insulin glargine, 40 Units, Subcutaneous, Nightly  insulin lispro, 2-9 Units, Subcutaneous, 4x Daily AC & at Bedtime  isosorbide mononitrate, 30 mg, Oral, Daily  latanoprost, 1 drop, Both Eyes, Nightly  linaclotide, 145 mcg, Oral, QAM AC  metoprolol tartrate, 50 mg, Oral, BID  multivitamin with minerals, 1 tablet, Oral, Daily  nystatin, , Topical,  Q12H  pantoprazole, 40 mg, Oral, Q AM  Patiromer Sorbitex Calcium, 1 each, Oral, Daily  vitamin B-12, 1,000 mcg, Oral, Daily         ----------------------------------------------------------------------------------------------------------------------      Objective     Vital Signs:  Temp:  [97.5 °F (36.4 °C)-98.3 °F (36.8 °C)] 97.8 °F (36.6 °C)  Heart Rate:  [79-85] 79  Resp:  [20-24] 20  BP: (117-141)/(65-94) 120/66      07/22/23  1405 07/22/23  1915 07/23/23  0500   Weight: 125 kg (275 lb) (!) 155 kg (340 lb 11.2 oz) (!) 155 kg (340 lb 11.2 oz) (New admit less than 24 hours)     Body mass index is 54.99 kg/m².    Intake/Output Summary (Last 24 hours) at 7/23/2023 1003  Last data filed at 7/23/2023 0500  Gross per 24 hour   Intake --   Output 1950 ml   Net -1950 ml     No intake/output data recorded.  Diet: Cardiac Diets, Diabetic Diets, Renal Diets; Healthy Heart (2-3 Na+); Consistent Carbohydrate; Low Sodium (2-3g), Low Potassium, Low Phosphorus; Texture: Regular Texture (IDDSI 7); Fluid Consistency: Thin (IDDSI 0)  ----------------------------------------------------------------------------------------------------------------------    Physical Exam  Vitals and nursing note reviewed.   Constitutional:       General: She is not in acute distress.  HENT:      Head: Normocephalic and atraumatic.   Eyes:      Extraocular Movements: Extraocular movements intact.      Conjunctiva/sclera: Conjunctivae normal.   Cardiovascular:      Rate and Rhythm: Normal rate and regular rhythm.   Pulmonary:      Effort: Pulmonary effort is normal.      Comments: Diminished bilaterally  Abdominal:      Palpations: Abdomen is soft.      Tenderness: There is no abdominal tenderness.   Musculoskeletal:      Right lower leg: Edema present.      Left lower leg: Edema present.      Comments: 1+   Skin:     General: Skin is warm and dry.   Neurological:      Mental Status: She is alert. Mental status is at baseline.   Psychiatric:          Mood and Affect: Mood normal.         Behavior: Behavior normal.              ----------------------------------------------------------------------------------------------------------------------  Tele:      ----------------------------------------------------------------------------------------------------------------------  Results from last 7 days   Lab Units 07/22/23  1636 07/22/23  1413   HSTROP T ng/L 53* 57*   PROBNP pg/mL  --  30,507.0*     Results from last 7 days   Lab Units 07/23/23  0806 07/22/23  1413   CRP mg/dL  --  <0.30   LACTATE mmol/L  --  0.8   WBC 10*3/mm3 7.23 7.58   HEMOGLOBIN g/dL 9.9* 10.0*   HEMATOCRIT % 33.2* 33.7*   MCV fL 90.2 88.5   MCHC g/dL 29.8* 29.7*   PLATELETS 10*3/mm3 298 313     Results from last 7 days   Lab Units 07/22/23  1515   PH, ARTERIAL pH units 7.329*   PO2 ART mm Hg 78.3*   PCO2, ARTERIAL mm Hg 50.5*   HCO3 ART mmol/L 26.5*     Results from last 7 days   Lab Units 07/23/23  0806 07/22/23  1413   SODIUM mmol/L 137 137   POTASSIUM mmol/L 3.8 4.1   MAGNESIUM mg/dL  --  2.2   CHLORIDE mmol/L 102 102   CO2 mmol/L 24.7 25.1   BUN mg/dL 65* 64*   CREATININE mg/dL 2.54* 2.49*   CALCIUM mg/dL 8.8 8.9   GLUCOSE mg/dL 127* 130*   ALBUMIN g/dL 3.4* 3.4*   BILIRUBIN mg/dL 0.4 0.4   ALK PHOS U/L 111 117   AST (SGOT) U/L 12 19   ALT (SGPT) U/L 9 9   Estimated Creatinine Clearance: 30.8 mL/min (A) (by C-G formula based on SCr of 2.54 mg/dL (H)).  No results found for: AMMONIA      No results found for: BLOODCX  No results found for: URINECX  No results found for: WOUNDCX  No results found for: STOOLCX  ----------------------------------------------------------------------------------------------------------------------  Imaging Results (Last 24 Hours)       Procedure Component Value Units Date/Time    XR Chest 1 View [[id number]] Collected: 07/22/23 1638     Updated: 07/22/23 1702    Narrative:         Portable upright AP view chest.     Comparison: 6/5/2023     Findings: Median  sternotomy wires and hardware again noted.  Cardiac  silhouette is enlarged, allowing for technique, diffuse interstitial  prominence noted.  No pleural effusion.  Elevation of the right  hemidiaphragm noted.     No confluent opacification.       Impression:         Mild bilateral interstitial prominence which may suggest volume overload  in the proper clinical setting.     This report was finalized on 7/22/2023 4:41 PM by Kristen Jara MD.       CT Head Without Contrast [[id number]] Collected: 07/22/23 1533     Updated: 07/22/23 1537    Narrative:      EXAMINATION:Computed tomography(CT)of the head without IV contrast     TECHNIQUE:CT of the head was performed in the supine position without  intravenous contrast according to as low as reasonably achievable dose  protocol. Axial CT utilized with slice thickness of 5 mm presented in  soft tissue and bone algorithms.     COMPARISON:No prior studies are available for review at the time of this  dictation.     FINDINGS:     No acute intra-or extra-axial hemorrhage or fluid collections are  identified. The ventricles are of normal size,shape,and morphology. The  basilar cisterns are patent. No mass effect or midline shift is seen.  The gray-white matter differentiation is normal. The visualized portions  of the orbits,paranasal sinuses,and mastoids appear normal. No acute  fracture is identified.       Impression:         No acute intracranial hemorrhage,midline shift,or significant mass  effect.     This report was finalized on 7/22/2023 3:35 PM by Kristen Jara MD.             ----------------------------------------------------------------------------------------------------------------------   I have reviewed the above laboratory values for 07/23/23    Assessment/Plan     Active Hospital Problems    Diagnosis  POA    **Acute on chronic diastolic CHF (congestive heart failure) [I50.33]  Yes         ASSESSMENT/PLAN:    Acute on chronic heart failure with preserved  ejection fraction, POA  NSTEMI, T1 versus T2, POA, possibly 2/2 above  WENDY on chronic CKD stage IV, POA, likely 2/2 cardiorenal syndrome  Acute encephalopathy, POA, currently unclear etiology  Patient presents from local SNF secondary to AMS, lethargy and increased swelling.  Patient AOx3 on arrival to the ED per ED provider note though does admit to visual hallucinations  Work-up revealed HS troponin 57 with repeat at 53.  proBNP is 30,507.  CXR suggestive of volume overload.  EKG with normal sinus rhythm, low voltage QRS.  Creatinine also increased at 2.49.  Patient received Bumex 2 mg in the ED  She will be admitted to telemetry for further observation with continuous cardiac monitoring/pulse oximetry  TTE noted normal LVEF at 56 to 60%.  Diastolic function with grade 2 with high LAP pseudonormalization.  Review of I's and O's notes nearly 2 L of output yesterday  A.m. labs with interval increase in creatinine to 2.54.  Otherwise stable overall.  Continue Daily weights, strictly monitor I's and O's and clinical volume status closely.  Continue to diurese as indicated  Inpatient heart failure navigator consult placed  We will obtain a ReDs vest value-remains pending  Avoid nephrotoxins as able  Per review of recent discharge summary patient with history of nephrotic syndrome 6 to 8 months ago and failed to follow-up with nephrology.  We will consult nephrology for further assistance, appreciated.  Suspect WENDY likely 2/2 cardiorenal syndrome type I. Recommend IV lasix 100 mg over 10 hours today as well as sodium and fluid restriction.   Continue supportive care  Patient remains AOx3, denies any further visual hallucinations     Chronic:  CAD s/p CABG  A-fib/flutter  HTN  HLD  T2DM-insulin-dependent  JESIKA  Chronic hypoxic respiratory failure  Hx EDER  Plan to restart home meds as indicated pending med rec  Initiate insulin regimen at appropriate doses.  Accu-Cheks to monitor with hypoglycemia protocol in place  H&H  appears around baseline continue to monitor with serial labs  Monitor vital signs per protocol      -----------  -DVT prophylaxis: Chronically anticoagulated with eliquis  -Disposition plans/anticipated needs: pending course, plan to return to SNF following clinical improvement.         The patient is high risk due to the following diagnoses/reasons: Acute on chronic HFpEF        Jose Cook PA-C  07/23/23  10:03 EDT

## 2023-07-23 NOTE — PLAN OF CARE
Goal Outcome Evaluation:                      Daily Care Plan Summary: Heart Failure    Diuretic in use (IV or PO):    IV        Daily weight (up or down):  unchanged       Output > Intake (yes/no):Yes      O2 Requirements (current, any change?):  2 liters/min via nasal cannula      Symptoms noted with Activity (Respiratory Tolerance, functional state):     shortness of air with rolling in bed      Anticipated Discharge Plans:     back to NH

## 2023-07-23 NOTE — CONSULTS
Nephrology Consult Note    Referring Provider: Dr. Gleason  Reason for Consultation: WENDY    Subjective       History of present illness:  Jolly Garcia is a 72 y.o. female who presented to [facility] emergency department with chief complaint of shortness of breath.  Patient is known to have coronary artery disease, essential hypertension, type 2 diabetes mellitus and history of decompensated heart failure who has been recently discharged from the hospital to nursing home.  Came back with complaint of worsening shortness of breath.  Patient is known to have a chronic kidney disease stage IV and her baseline creatinine appears to be around 2.0, on admission her creatinine was 2.5.  Patient stated that she has been drinking a little bit more fluid and not sure whether she has been given high salt diet.  Patient denied chronic NSAIDS use. Patient denies hematuria, dysuria, difficulty passing urine. No prior history of renal stones. No family history of renal disease    History  Past Medical History:   Diagnosis Date    Anxiety     CAD (coronary artery disease)     s/p CABG    Chronic hypoxemic respiratory failure     Wears 2 L/min at home    Essential hypertension     Hyperlipidemia     Type 2 diabetes mellitus    ,   Past Surgical History:   Procedure Laterality Date    CORONARY ARTERY BYPASS GRAFT  2011    HYSTERECTOMY     ,   Family History   Problem Relation Age of Onset    Diabetes Mother    ,   Social History     Tobacco Use    Smoking status: Former     Passive exposure: Past   Vaping Use    Vaping Use: Never used   Substance Use Topics    Alcohol use: Never    Drug use: Never   ,   Medications Prior to Admission   Medication Sig Dispense Refill Last Dose    aspirin 81 MG chewable tablet Chew 1 tablet Daily.   7/22/2023    atorvastatin (LIPITOR) 20 MG tablet Take 1 tablet by mouth Every Night.   7/21/2023    bumetanide (BUMEX) 1 MG tablet Take 1 tablet by mouth Daily.   7/22/2023    Coenzyme Q10  (CoQ10) 50 MG capsule Take 2 capsules by mouth Daily.   7/22/2023    diphenhydrAMINE (BENADRYL) 25 mg capsule Take 1 capsule by mouth Every 8 (Eight) Hours As Needed for Itching.   Past Month    Eliquis 5 MG tablet tablet Take 1 tablet by mouth Every 12 (Twelve) Hours.   7/22/2023 at 0800    ferrous sulfate 325 (65 FE) MG tablet Take 1 tablet by mouth Daily With Breakfast.   7/22/2023    FLUoxetine (PROzac) 20 MG capsule Take 1 capsule by mouth Daily.   7/22/2023    fluticasone (FLONASE) 50 MCG/ACT nasal spray 2 sprays by Each Nare route Daily. 9.9 mL 0 7/22/2023    guaiFENesin (HUMIBID 3) 400 MG tablet Take 1 tablet by mouth 3 (Three) Times a Day.   7/22/2023 at 1200    hydrALAZINE (APRESOLINE) 25 MG tablet Take 1 tablet by mouth 3 (Three) Times a Day.   7/22/2023 at 1200    HYDROcodone-acetaminophen (NORCO) 5-325 MG per tablet Take 1 tablet by mouth Every 8 (Eight) Hours As Needed for Moderate Pain.   7/22/2023    insulin aspart (novoLOG FLEXPEN) 100 UNIT/ML solution pen-injector sc pen Inject 2-10 Units under the skin into the appropriate area as directed 4 (Four) Times a Day Before Meals & at Bedtime.   7/22/2023 at 1100    Insulin Glargine (Lantus SoloStar) 100 UNIT/ML injection pen Inject 40 Units under the skin into the appropriate area as directed Every Night.   7/21/2023    isosorbide mononitrate (IMDUR) 30 MG 24 hr tablet Take 1 tablet by mouth Daily. 90 tablet 0 7/22/2023    latanoprost (XALATAN) 0.005 % ophthalmic solution Administer 1 drop to both eyes Every Night.   7/21/2023    linaclotide (LINZESS) 145 MCG capsule capsule Take 1 capsule by mouth Every Morning Before Breakfast.   7/22/2023    LORazepam (ATIVAN) 0.5 MG tablet Take 1 tablet by mouth Every 12 (Twelve) Hours As Needed for Anxiety.   7/22/2023    metoprolol tartrate (LOPRESSOR) 50 MG tablet Take 1 tablet by mouth 2 (Two) Times a Day.   7/22/2023 at 0800    multivitamin with minerals tablet tablet Take 1 tablet by mouth Daily.   7/22/2023     multivitamin with minerals tablet tablet Take 1 tablet by mouth Daily.   7/22/2023    pantoprazole (PROTONIX) 40 MG EC tablet Take 1 tablet by mouth Every Morning. 90 tablet 0 7/22/2023    Patiromer Sorbitex Calcium (Veltassa) 16.8 g pack Take 1 packet by mouth Daily.   7/22/2023    Semaglutide, 1 MG/DOSE, (Ozempic, 1 MG/DOSE,) 4 MG/3ML solution pen-injector Inject 1 mg under the skin into the appropriate area as directed 1 (One) Time Per Week.   7/21/2023    vitamin B-12 (CYANOCOBALAMIN) 1000 MCG tablet Take 1 tablet by mouth Daily. 30 tablet 0 7/22/2023    acetaminophen (TYLENOL) 500 MG tablet Take 2 tablets by mouth Every 8 (Eight) Hours As Needed for Mild Pain.   Unknown    dextromethorphan-guaifenesin (ROBITUSSIN-DM)  MG/5ML syrup Take 5 mL by mouth Every 4 (Four) Hours As Needed (Cough).   Unknown    ondansetron (ZOFRAN) 4 MG tablet Take 1 tablet by mouth Every 6 (Six) Hours As Needed for Nausea or Vomiting.   Unknown    sodium chloride 0.65 % nasal spray 2 sprays into the nostril(s) as directed by provider Every 2 (Two) Hours As Needed for Congestion.   Unknown   , Scheduled Meds:  apixaban, 5 mg, Oral, Q12H  aspirin, 81 mg, Oral, Daily  atorvastatin, 20 mg, Oral, Nightly  ferrous sulfate, 325 mg, Oral, Daily With Breakfast  FLUoxetine, 20 mg, Oral, Daily  fluticasone, 2 spray, Each Nare, Daily  furosemide (LASIX) in NS IVPB, 100 mg, Intravenous, Once  guaiFENesin, 400 mg, Oral, TID  hydrALAZINE, 25 mg, Oral, TID  insulin glargine, 40 Units, Subcutaneous, Nightly  insulin lispro, 2-9 Units, Subcutaneous, 4x Daily AC & at Bedtime  isosorbide mononitrate, 30 mg, Oral, Daily  latanoprost, 1 drop, Both Eyes, Nightly  linaclotide, 145 mcg, Oral, QAM AC  metoprolol tartrate, 50 mg, Oral, BID  multivitamin with minerals, 1 tablet, Oral, Daily  nystatin, , Topical, Q12H  pantoprazole, 40 mg, Oral, Q AM  Patiromer Sorbitex Calcium, 1 each, Oral, Daily  vitamin B-12, 1,000 mcg, Oral, Daily    , Continuous  Infusions:   , PRN Meds:    acetaminophen    dextrose    dextrose    diphenhydrAMINE    glucagon (human recombinant)    HYDROcodone-acetaminophen    LORazepam    magic barrier cream    nitroglycerin    ondansetron    [COMPLETED] Insert Peripheral IV **AND** sodium chloride    sodium chloride and Allergies:  Patient has no known allergies.    Review of Systems  More than 10 point review of systems was done. Pertinent items are noted in HPI, all other systems reviewed and negative    Objective     Vital Signs  Temp:  [97.5 °F (36.4 °C)-98.3 °F (36.8 °C)] 97.9 °F (36.6 °C)  Heart Rate:  [79-85] 80  Resp:  [16-24] 16  BP: (110-141)/(60-94) 110/60    Intake/Output                   07/22/23 0701 - 07/23/23 0700     [id number] [id number] Total              Intake    Total Intake -- -- --       Output    Urine  1000  950 1950    Total Output [id number]             Physical Examination:  General Appearance: not in acute distress  No JVD  Lungs: Bilateral decreased intensity of breath sounds  Heart: Regular rhythm & normal rate, normal S1, S2, no murmur, no gallop, no rub   Abdomen: Normal bowel sounds, no masses and soft non-tender  Extremities: 2+ edema  Neurologic: Orientated to person, place, time, grossly no focal deficitis    Laboratory Data :      WBC WBC   Date Value Ref Range Status   07/23/2023 7.23 3.40 - 10.80 10*3/mm3 Final   07/22/2023 7.58 3.40 - 10.80 10*3/mm3 Final      HGB Hemoglobin   Date Value Ref Range Status   07/23/2023 9.9 (L) 12.0 - 15.9 g/dL Final   07/22/2023 10.0 (L) 12.0 - 15.9 g/dL Final      HCT Hematocrit   Date Value Ref Range Status   07/23/2023 33.2 (L) 34.0 - 46.6 % Final   07/22/2023 33.7 (L) 34.0 - 46.6 % Final      Platlets No results found for: LABPLAT   MCV MCV   Date Value Ref Range Status   07/23/2023 90.2 79.0 - 97.0 fL Final   07/22/2023 88.5 79.0 - 97.0 fL Final          Sodium Sodium   Date Value Ref Range Status   07/23/2023 137 136 - 145 mmol/L Final   07/22/2023 137 136 -  145 mmol/L Final      Potassium Potassium   Date Value Ref Range Status   07/23/2023 3.8 3.5 - 5.2 mmol/L Final   07/22/2023 4.1 3.5 - 5.2 mmol/L Final     Comment:     Slight hemolysis detected by analyzer. Results may be affected.      Chloride Chloride   Date Value Ref Range Status   07/23/2023 102 98 - 107 mmol/L Final   07/22/2023 102 98 - 107 mmol/L Final      CO2 CO2   Date Value Ref Range Status   07/23/2023 24.7 22.0 - 29.0 mmol/L Final   07/22/2023 25.1 22.0 - 29.0 mmol/L Final      BUN BUN   Date Value Ref Range Status   07/23/2023 65 (H) 8 - 23 mg/dL Final   07/22/2023 64 (H) 8 - 23 mg/dL Final      Creatinine Creatinine   Date Value Ref Range Status   07/23/2023 2.54 (H) 0.57 - 1.00 mg/dL Final   07/22/2023 2.49 (H) 0.57 - 1.00 mg/dL Final      Calcium Calcium   Date Value Ref Range Status   07/23/2023 8.8 8.6 - 10.5 mg/dL Final   07/22/2023 8.9 8.6 - 10.5 mg/dL Final      PO4 No results found for: CAPO4   Albumin Albumin   Date Value Ref Range Status   07/23/2023 3.4 (L) 3.5 - 5.2 g/dL Final   07/22/2023 3.4 (L) 3.5 - 5.2 g/dL Final      Magnesium Magnesium   Date Value Ref Range Status   07/22/2023 2.2 1.6 - 2.4 mg/dL Final      Uric Acid No results found for: URICACID     Radiology results :     Imaging Results (Last 72 Hours)       Procedure Component Value Units Date/Time    XR Chest 1 View [[id number]] Collected: 07/22/23 1638     Updated: 07/22/23 1702    Narrative:         Portable upright AP view chest.     Comparison: 6/5/2023     Findings: Median sternotomy wires and hardware again noted.  Cardiac  silhouette is enlarged, allowing for technique, diffuse interstitial  prominence noted.  No pleural effusion.  Elevation of the right  hemidiaphragm noted.     No confluent opacification.       Impression:         Mild bilateral interstitial prominence which may suggest volume overload  in the proper clinical setting.     This report was finalized on 7/22/2023 4:41 PM by Kristen Jara MD.        CT Head Without Contrast [[id number]] Collected: 07/22/23 1533     Updated: 07/22/23 1537    Narrative:      EXAMINATION:Computed tomography(CT)of the head without IV contrast     TECHNIQUE:CT of the head was performed in the supine position without  intravenous contrast according to as low as reasonably achievable dose  protocol. Axial CT utilized with slice thickness of 5 mm presented in  soft tissue and bone algorithms.     COMPARISON:No prior studies are available for review at the time of this  dictation.     FINDINGS:     No acute intra-or extra-axial hemorrhage or fluid collections are  identified. The ventricles are of normal size,shape,and morphology. The  basilar cisterns are patent. No mass effect or midline shift is seen.  The gray-white matter differentiation is normal. The visualized portions  of the orbits,paranasal sinuses,and mastoids appear normal. No acute  fracture is identified.       Impression:         No acute intracranial hemorrhage,midline shift,or significant mass  effect.     This report was finalized on 7/22/2023 3:35 PM by Kristen Jara MD.                 Medications:      apixaban, 5 mg, Oral, Q12H  aspirin, 81 mg, Oral, Daily  atorvastatin, 20 mg, Oral, Nightly  ferrous sulfate, 325 mg, Oral, Daily With Breakfast  FLUoxetine, 20 mg, Oral, Daily  fluticasone, 2 spray, Each Nare, Daily  furosemide (LASIX) in NS IVPB, 100 mg, Intravenous, Once  guaiFENesin, 400 mg, Oral, TID  hydrALAZINE, 25 mg, Oral, TID  insulin glargine, 40 Units, Subcutaneous, Nightly  insulin lispro, 2-9 Units, Subcutaneous, 4x Daily AC & at Bedtime  isosorbide mononitrate, 30 mg, Oral, Daily  latanoprost, 1 drop, Both Eyes, Nightly  linaclotide, 145 mcg, Oral, QAM AC  metoprolol tartrate, 50 mg, Oral, BID  multivitamin with minerals, 1 tablet, Oral, Daily  nystatin, , Topical, Q12H  pantoprazole, 40 mg, Oral, Q AM  Patiromer Sorbitex Calcium, 1 each, Oral, Daily  vitamin B-12, 1,000 mcg, Oral, Daily            Assessment & Plan       Acute on chronic diastolic CHF (congestive heart failure)      -WENDY, nonoliguric  - Chronic kidney disease stage IV A3  - Type 2 diabetes mellitus  - Acute on chronic decompensated heart failure, preserved ejection fraction  - Morbid obesity  - History of nephrotic syndrome    WENDY on CKD most likely due to cardiorenal syndrome type I  Baseline creatinine appears to be 2 admitted with 2.5  Significant fluid overload clinically  - Start on IV Lasix drip 100 mg over 10 hours  - Get UA with microscopy  - Strict intake and output  - 1 g salt restriction and 1 L fluid restriction per day  - Please avoid any nephrotoxic agents, hypotension and adjust medications according to estimated GFR    Thanks Dr Gleason for the consult. Nephrology will follow the patient.   I discussed the patient's findings and my recommendations with patient    Elaina Bell MD  07/23/23  12:17 EDT

## 2023-07-23 NOTE — PLAN OF CARE
Goal Outcome Evaluation:         Patient resting in bed throughout shift. Patient is alert and oriented x4. Patient is currently on O2 @ 2L NC. Patient is ST on the telemetry monitor. VSS. No acute s/s of distress noted at this time. Will continue to follow plan of care throughout shift.

## 2023-07-23 NOTE — PLAN OF CARE
Goal Outcome Evaluation:      Daily Care Plan Summary: Heart Failure    Diuretic in use (IV or PO):   PO        Daily weight (up or down):    loss: 0lbs      Output > Intake (yes/no):Yes      O2 Requirements (current, any change?):  2 liters/min via nasal cannula      Symptoms noted with Activity (Respiratory Tolerance, functional state):     none      Anticipated Discharge Plans:     [facility] when medically stable.

## 2023-07-23 NOTE — PLAN OF CARE
Goal Outcome Evaluation:                      Patient restingin bed, has slept off and on all da. VSS. Lasix gtt continued. VSS. Will continue to monitor and follow plan of care.

## 2023-07-24 PROBLEM — N18.9 ACUTE ON CHRONIC KIDNEY FAILURE: Status: ACTIVE | Noted: 2023-07-24

## 2023-07-24 PROBLEM — N17.9 ACUTE ON CHRONIC KIDNEY FAILURE: Status: ACTIVE | Noted: 2023-07-24

## 2023-07-24 LAB
ACANTHOCYTES BLD QL SMEAR: NORMAL
ALBUMIN SERPL-MCNC: 3.1 G/DL (ref 3.5–5.2)
ALBUMIN/GLOB SERPL: 1.1 G/DL
ALP SERPL-CCNC: 105 U/L (ref 39–117)
ALT SERPL W P-5'-P-CCNC: 8 U/L (ref 1–33)
ANION GAP SERPL CALCULATED.3IONS-SCNC: 10.6 MMOL/L (ref 5–15)
ANISOCYTOSIS BLD QL: NORMAL
AST SERPL-CCNC: 14 U/L (ref 1–32)
BASOPHILS # BLD AUTO: 0.04 10*3/MM3 (ref 0–0.2)
BASOPHILS NFR BLD AUTO: 0.5 % (ref 0–1.5)
BILIRUB SERPL-MCNC: 0.3 MG/DL (ref 0–1.2)
BUN SERPL-MCNC: 61 MG/DL (ref 8–23)
BUN/CREAT SERPL: 23.1 (ref 7–25)
CALCIUM SPEC-SCNC: 8.4 MG/DL (ref 8.6–10.5)
CHLORIDE SERPL-SCNC: 103 MMOL/L (ref 98–107)
CO2 SERPL-SCNC: 23.4 MMOL/L (ref 22–29)
CREAT SERPL-MCNC: 2.64 MG/DL (ref 0.57–1)
DEPRECATED RDW RBC AUTO: 72.2 FL (ref 37–54)
EGFRCR SERPLBLD CKD-EPI 2021: 18.7 ML/MIN/1.73
EOSINOPHIL # BLD AUTO: 0.15 10*3/MM3 (ref 0–0.4)
EOSINOPHIL NFR BLD AUTO: 2 % (ref 0.3–6.2)
ERYTHROCYTE [DISTWIDTH] IN BLOOD BY AUTOMATED COUNT: 21.6 % (ref 12.3–15.4)
GLOBULIN UR ELPH-MCNC: 2.8 GM/DL
GLUCOSE BLDC GLUCOMTR-MCNC: 146 MG/DL (ref 70–130)
GLUCOSE BLDC GLUCOMTR-MCNC: 154 MG/DL (ref 70–130)
GLUCOSE BLDC GLUCOMTR-MCNC: 161 MG/DL (ref 70–130)
GLUCOSE BLDC GLUCOMTR-MCNC: 168 MG/DL (ref 70–130)
GLUCOSE BLDC GLUCOMTR-MCNC: 190 MG/DL (ref 70–130)
GLUCOSE BLDC GLUCOMTR-MCNC: 228 MG/DL (ref 70–130)
GLUCOSE SERPL-MCNC: 170 MG/DL (ref 65–99)
HCT VFR BLD AUTO: 33.5 % (ref 34–46.6)
HGB BLD-MCNC: 9.7 G/DL (ref 12–15.9)
HYPOCHROMIA BLD QL: NORMAL
IMM GRANULOCYTES # BLD AUTO: 0.02 10*3/MM3 (ref 0–0.05)
IMM GRANULOCYTES NFR BLD AUTO: 0.3 % (ref 0–0.5)
LYMPHOCYTES # BLD AUTO: 1.31 10*3/MM3 (ref 0.7–3.1)
LYMPHOCYTES NFR BLD AUTO: 17.2 % (ref 19.6–45.3)
MCH RBC QN AUTO: 26.4 PG (ref 26.6–33)
MCHC RBC AUTO-ENTMCNC: 29 G/DL (ref 31.5–35.7)
MCV RBC AUTO: 91 FL (ref 79–97)
MONOCYTES # BLD AUTO: 0.55 10*3/MM3 (ref 0.1–0.9)
MONOCYTES NFR BLD AUTO: 7.2 % (ref 5–12)
NEUTROPHILS NFR BLD AUTO: 5.56 10*3/MM3 (ref 1.7–7)
NEUTROPHILS NFR BLD AUTO: 72.8 % (ref 42.7–76)
NRBC BLD AUTO-RTO: 0 /100 WBC (ref 0–0.2)
PLAT MORPH BLD: NORMAL
PLATELET # BLD AUTO: 286 10*3/MM3 (ref 140–450)
PMV BLD AUTO: 8.8 FL (ref 6–12)
POTASSIUM SERPL-SCNC: 4.2 MMOL/L (ref 3.5–5.2)
PROT SERPL-MCNC: 5.9 G/DL (ref 6–8.5)
QT INTERVAL: 388 MS
QTC INTERVAL: 436 MS
RBC # BLD AUTO: 3.68 10*6/MM3 (ref 3.77–5.28)
SCHISTOCYTES BLD QL SMEAR: NORMAL
SODIUM SERPL-SCNC: 137 MMOL/L (ref 136–145)
WBC NRBC COR # BLD: 7.63 10*3/MM3 (ref 3.4–10.8)

## 2023-07-24 PROCEDURE — 25010000002 FUROSEMIDE PER 20 MG: Performed by: INTERNAL MEDICINE

## 2023-07-24 PROCEDURE — 82948 REAGENT STRIP/BLOOD GLUCOSE: CPT

## 2023-07-24 PROCEDURE — 99231 SBSQ HOSP IP/OBS SF/LOW 25: CPT

## 2023-07-24 PROCEDURE — 80053 COMPREHEN METABOLIC PANEL: CPT | Performed by: INTERNAL MEDICINE

## 2023-07-24 PROCEDURE — 93010 ELECTROCARDIOGRAM REPORT: CPT | Performed by: INTERNAL MEDICINE

## 2023-07-24 PROCEDURE — 25010000002 CHLOROTHIAZIDE PER 500 MG: Performed by: INTERNAL MEDICINE

## 2023-07-24 PROCEDURE — 93005 ELECTROCARDIOGRAM TRACING: CPT | Performed by: HOSPITALIST

## 2023-07-24 PROCEDURE — 63710000001 INSULIN LISPRO (HUMAN) PER 5 UNITS: Performed by: INTERNAL MEDICINE

## 2023-07-24 PROCEDURE — 85025 COMPLETE CBC W/AUTO DIFF WBC: CPT | Performed by: INTERNAL MEDICINE

## 2023-07-24 PROCEDURE — 85007 BL SMEAR W/DIFF WBC COUNT: CPT | Performed by: INTERNAL MEDICINE

## 2023-07-24 PROCEDURE — 63710000001 INSULIN GLARGINE PER 5 UNITS

## 2023-07-24 RX ORDER — HYDRALAZINE HYDROCHLORIDE 25 MG/1
25 TABLET, FILM COATED ORAL 3 TIMES DAILY PRN
Status: DISCONTINUED | OUTPATIENT
Start: 2023-07-24 | End: 2023-07-28 | Stop reason: HOSPADM

## 2023-07-24 RX ORDER — BISACODYL 5 MG/1
5 TABLET, DELAYED RELEASE ORAL DAILY PRN
Status: DISCONTINUED | OUTPATIENT
Start: 2023-07-24 | End: 2023-07-24

## 2023-07-24 RX ORDER — BISACODYL 10 MG
10 SUPPOSITORY, RECTAL RECTAL DAILY PRN
Status: DISCONTINUED | OUTPATIENT
Start: 2023-07-24 | End: 2023-07-24

## 2023-07-24 RX ORDER — POLYETHYLENE GLYCOL 3350 17 G/17G
17 POWDER, FOR SOLUTION ORAL DAILY PRN
Status: DISCONTINUED | OUTPATIENT
Start: 2023-07-24 | End: 2023-07-25

## 2023-07-24 RX ORDER — POLYETHYLENE GLYCOL 3350 17 G/17G
17 POWDER, FOR SOLUTION ORAL DAILY PRN
Status: DISCONTINUED | OUTPATIENT
Start: 2023-07-24 | End: 2023-07-24

## 2023-07-24 RX ORDER — BISACODYL 10 MG
10 SUPPOSITORY, RECTAL RECTAL DAILY PRN
Status: DISCONTINUED | OUTPATIENT
Start: 2023-07-24 | End: 2023-07-25

## 2023-07-24 RX ORDER — FUROSEMIDE 10 MG/ML
40 INJECTION INTRAMUSCULAR; INTRAVENOUS ONCE
Status: COMPLETED | OUTPATIENT
Start: 2023-07-24 | End: 2023-07-24

## 2023-07-24 RX ORDER — AMOXICILLIN 250 MG
2 CAPSULE ORAL 2 TIMES DAILY
Status: DISCONTINUED | OUTPATIENT
Start: 2023-07-24 | End: 2023-07-25

## 2023-07-24 RX ORDER — AMOXICILLIN 250 MG
2 CAPSULE ORAL 2 TIMES DAILY
Status: DISCONTINUED | OUTPATIENT
Start: 2023-07-24 | End: 2023-07-24

## 2023-07-24 RX ORDER — BISACODYL 5 MG/1
5 TABLET, DELAYED RELEASE ORAL DAILY PRN
Status: DISCONTINUED | OUTPATIENT
Start: 2023-07-24 | End: 2023-07-25

## 2023-07-24 RX ADMIN — LINACLOTIDE 145 MCG: 145 CAPSULE, GELATIN COATED ORAL at 08:50

## 2023-07-24 RX ADMIN — FUROSEMIDE 100 MG: 10 INJECTION, SOLUTION INTRAMUSCULAR; INTRAVENOUS at 14:50

## 2023-07-24 RX ADMIN — CHLOROTHIAZIDE SODIUM 500 MG: 500 INJECTION, POWDER, LYOPHILIZED, FOR SOLUTION INTRAVENOUS at 14:15

## 2023-07-24 RX ADMIN — FLUTICASONE PROPIONATE 2 SPRAY: 50 SPRAY, METERED NASAL at 08:51

## 2023-07-24 RX ADMIN — LORAZEPAM 0.5 MG: 0.5 TABLET ORAL at 22:37

## 2023-07-24 RX ADMIN — GUAIFENESIN 400 MG: 200 TABLET ORAL at 16:39

## 2023-07-24 RX ADMIN — BISACODYL 5 MG: 5 TABLET, COATED ORAL at 22:10

## 2023-07-24 RX ADMIN — APIXABAN 5 MG: 5 TABLET, FILM COATED ORAL at 08:50

## 2023-07-24 RX ADMIN — APIXABAN 5 MG: 5 TABLET, FILM COATED ORAL at 22:10

## 2023-07-24 RX ADMIN — Medication 1000 MCG: at 08:50

## 2023-07-24 RX ADMIN — GUAIFENESIN 400 MG: 200 TABLET ORAL at 08:50

## 2023-07-24 RX ADMIN — HYDRALAZINE HYDROCHLORIDE 25 MG: 25 TABLET ORAL at 08:50

## 2023-07-24 RX ADMIN — ATORVASTATIN CALCIUM 20 MG: 20 TABLET, FILM COATED ORAL at 22:10

## 2023-07-24 RX ADMIN — FERROUS SULFATE TAB 325 MG (65 MG ELEMENTAL FE) 325 MG: 325 (65 FE) TAB at 08:50

## 2023-07-24 RX ADMIN — Medication 1 TABLET: at 08:50

## 2023-07-24 RX ADMIN — PANTOPRAZOLE SODIUM 40 MG: 40 TABLET, DELAYED RELEASE ORAL at 05:02

## 2023-07-24 RX ADMIN — INSULIN LISPRO 2 UNITS: 100 INJECTION, SOLUTION INTRAVENOUS; SUBCUTANEOUS at 17:15

## 2023-07-24 RX ADMIN — INSULIN GLARGINE 20 UNITS: 100 INJECTION, SOLUTION SUBCUTANEOUS at 22:34

## 2023-07-24 RX ADMIN — INSULIN LISPRO 2 UNITS: 100 INJECTION, SOLUTION INTRAVENOUS; SUBCUTANEOUS at 22:34

## 2023-07-24 RX ADMIN — ISOSORBIDE MONONITRATE 30 MG: 30 TABLET, EXTENDED RELEASE ORAL at 08:50

## 2023-07-24 RX ADMIN — DOCUSATE SODIUM 50 MG AND SENNOSIDES 8.6 MG 2 TABLET: 8.6; 5 TABLET, FILM COATED ORAL at 22:11

## 2023-07-24 RX ADMIN — NYSTATIN: 100000 POWDER TOPICAL at 22:12

## 2023-07-24 RX ADMIN — ASPIRIN 81 MG: 81 TABLET, CHEWABLE ORAL at 08:50

## 2023-07-24 RX ADMIN — FLUOXETINE HYDROCHLORIDE 20 MG: 20 CAPSULE ORAL at 08:50

## 2023-07-24 RX ADMIN — LORAZEPAM 0.5 MG: 0.5 TABLET ORAL at 05:45

## 2023-07-24 RX ADMIN — LATANOPROST 1 DROP: 50 SOLUTION OPHTHALMIC at 22:12

## 2023-07-24 RX ADMIN — METOPROLOL TARTRATE 50 MG: 50 TABLET, FILM COATED ORAL at 22:10

## 2023-07-24 RX ADMIN — GUAIFENESIN 400 MG: 200 TABLET ORAL at 22:34

## 2023-07-24 RX ADMIN — METOPROLOL TARTRATE 50 MG: 50 TABLET, FILM COATED ORAL at 08:50

## 2023-07-24 RX ADMIN — INSULIN LISPRO 4 UNITS: 100 INJECTION, SOLUTION INTRAVENOUS; SUBCUTANEOUS at 11:11

## 2023-07-24 RX ADMIN — NYSTATIN: 100000 POWDER TOPICAL at 08:51

## 2023-07-24 RX ADMIN — FUROSEMIDE 40 MG: 10 INJECTION, SOLUTION INTRAMUSCULAR; INTRAVENOUS at 14:50

## 2023-07-24 NOTE — PLAN OF CARE
Goal Outcome Evaluation:  Patient resting in bed with television on. No acute signs or symptoms of distress. Patient on 2L oxygen at baseline and tolerating well. Will continue to follow plan of care.

## 2023-07-24 NOTE — PAYOR COMM NOTE
"CONTACT: JARRETT DAVIES RN  UTILIZATION MANAGEMENT DEPT.  [facility]  [address] [place] [address]  PHONE: [phone]  FAX: [phone]      INPATIENT AUTH REQUEST    PT WAS PLACED IN OBSERVATION ON 7/22/23 AND CONVERTED TO INPATIENT STATUS ON 7/24/23.    REF# [id number]    Yvonne Nolan (72 y.o. Female)       Date of Birth   1950    Social Security Number       Address   [address]  [facility] [address]    Home Phone   [phone]    MRN   [id number]       Spiritism   None    Marital Status   Single                            Admission Date   7/22/23    Admission Type   Emergency    Admitting Provider   Santhosh Sotelo DO    Attending Provider   Juvenal Mahajan DO    Department, Room/Bed   [facility], 3303/1S       Discharge Date       Discharge Disposition       Discharge Destination                                 Attending Provider: Juvenal Mahajan DO    Allergies: No Known Allergies    Isolation: None   Infection: None   Code Status: CPR    Ht: 167.6 cm (66\")   Wt: 155 kg (342 lb 1.6 oz)    Admission Cmt: None   Principal Problem: Acute on chronic diastolic CHF (congestive heart failure) [I50.33]                   Active Insurance as of 7/22/2023       Primary Coverage       Payor Plan Insurance Group Employer/Plan Group    HUMANA MEDICARE REPLACEMENT HUMANA MEDICARE REPLACEMENT [id number]       Payor Plan Address Payor Plan Phone Number Payor Plan Fax Number Effective Dates    [address] [address] [phone]  1/1/2018 - None Entered    [facility] [address]         Subscriber Name Subscriber Birth Date Member ID       YVONNE NOLAN 1950 [id number]               Secondary Coverage       Payor Plan Insurance Group Employer/Plan Group    [employer]        Payor Plan Address Payor Plan Phone Number Payor Plan Fax Number Effective Dates    [address] [phone]  1/1/2022 - None " Entered    [facility] [id number]         Subscriber Name Subscriber Birth Date Member ID       YVONNE NOLAN 1950 [id number]                     Emergency Contacts        (Rel.) Home Phone Work Phone Mobile Phone    DARCI MARTIN (Relative) [phone] -- --                 History & Physical        Jose Cook PA-C at 23       Attestation signed by Santhosh Sotelo DO at 23 1912    I have reviewed this documentation and agree. Pt seen and examined on 3S.                       [facility] Medicine Services  History & Physical    Patient Identification:  Name:  Yvonne Nolan  Age:  72 y.o.  Sex:  female  :  1950  MRN:  [id number]   Visit Number:  [id number]  Admit Date: 2023   Primary Care Physician:  Whit Cadet APRN    Subjective     Chief complaint: Shortness of breath, nausea, abnormal lab    History of presenting illness:      Yvonne Nolan is a 72 y.o. female with past medical history significant for CAD, HTN, HLD, T2DM, HFpEF, chronic hypoxic respiratory failure, CKD stage IV, history of nephrotic syndrome, Hx EDER    Per handoff patient presents from local SNF with reported AMS lethargy and increased swelling.  Bumex recently increased from 0.5 to 1 mg daily.  She was alert and oriented on arrival to the ED though reports visual hallucinations recently.    Upon arrival to the ED, vital signs were temp 98.3, heart rate 81, respirations 24, /67, SPO2 98% on 2 L per nasal cannula.  ABG in the ED with pH 7.329, PCO2 increased at 50.5, PO2 78.3.  HS troponin on arrival was 57 with repeat at 53.  proBNP is 30,000.  Baseline creatinine appears to be around 2 and is elevated at 2.49 on arrival.  BUN is 64.  TSH is 6.87 though free T4 is WNL at 1.17.  CXR notable for mild bilateral interstitial prominence which may suggest volume overload.  CT head is negative for acute change.  EKG with normal sinus  rhythm, low voltage QRS and left posterior fascicular block.    On exam patient is pleasant and cooperative resting quietly in no acute distress.  She reports over the past 2 to 3 weeks she has become increasingly fatigued and intolerant to any sort of exertion.  She reports she is generally weak.  She is increasingly short of breath as well.  She has noted increased lower extremity swelling which has remained somewhat controlled with wraps ordered by the doctor at her nursing home.  She denies decreased urinary output stating it has actually increased over the past week with increased dosing of her Bumex.  Denies any dysuria.  She denies cough no recent fever or chills.  She states at baseline she is ambulatory with a walker or a cane though his symptoms have progressed she has been essentially bound to her bed.  She denies any chest pain, does note occasional palpitations which are chronic she states secondary to her A-fib.  She wears 2 L per nasal cannula continuously at baseline.  Further review of systems only notable for chronic constipation.  See below.    Known Emergency Department medications received prior to my evaluation included Bumex 2 mg.   Emergency Department Room location at the time of my evaluation was 401.     ---------------------------------------------------------------------------------------------------------------------   Review of Systems   Constitutional:  Positive for fatigue. Negative for chills and fever.   HENT:  Negative for congestion and rhinorrhea.    Respiratory:  Positive for shortness of breath. Negative for cough.    Cardiovascular:  Positive for palpitations (Chronic, occasional) and leg swelling. Negative for chest pain.   Gastrointestinal:  Positive for constipation. Negative for abdominal pain, diarrhea, nausea and vomiting.   Genitourinary:  Negative for difficulty urinating and dysuria.   Musculoskeletal:  Negative for arthralgias and myalgias.   Skin:  Negative for  rash and wound.   Neurological:  Positive for weakness. Negative for dizziness and light-headedness.   Psychiatric/Behavioral:  Positive for hallucinations.       ---------------------------------------------------------------------------------------------------------------------   Past Medical History:   Diagnosis Date    Anxiety     CAD (coronary artery disease)     s/p CABG    Chronic hypoxemic respiratory failure     Wears 2 L/min at home    Essential hypertension     Hyperlipidemia     Type 2 diabetes mellitus      Past Surgical History:   Procedure Laterality Date    CORONARY ARTERY BYPASS GRAFT  2011    HYSTERECTOMY       Family History   Problem Relation Age of Onset    Diabetes Mother      Social History     Socioeconomic History    Marital status: Single   Tobacco Use    Smoking status: Former     Passive exposure: Past   Vaping Use    Vaping Use: Never used   Substance and Sexual Activity    Alcohol use: Never    Drug use: Never    Sexual activity: Defer     ---------------------------------------------------------------------------------------------------------------------   Allergies:  Patient has no known allergies.  ---------------------------------------------------------------------------------------------------------------------   Home medications:    Medications below are reported home medications pulling from within the system; at this time, these medications have not been reconciled unless otherwise specified and are in the verification process for further verifcation as current home medications.  (Not in a hospital admission)      Hospital Scheduled Meds:          Current listed hospital scheduled medications may not yet reflect those currently placed in orders that are signed and held awaiting patient's arrival to floor.   ---------------------------------------------------------------------------------------------------------------------     Objective     Vital Signs:  Temp:  [98.3 °F (36.8  °C)] 98.3 °F (36.8 °C)  Heart Rate:  [80-81] 80  Resp:  [24] 24  BP: (117-127)/(65-94) 127/65      07/22/23  1405   Weight: 125 kg (275 lb)     Body mass index is 43.07 kg/m².  ---------------------------------------------------------------------------------------------------------------------       Physical Exam  Vitals and nursing note reviewed.   Constitutional:       General: She is not in acute distress.  HENT:      Head: Normocephalic and atraumatic.   Eyes:      Extraocular Movements: Extraocular movements intact.      Conjunctiva/sclera: Conjunctivae normal.   Cardiovascular:      Rate and Rhythm: Normal rate and regular rhythm.   Pulmonary:      Effort: Pulmonary effort is normal.      Comments: Diminished breath sounds bilaterally  Abdominal:      Palpations: Abdomen is soft.      Tenderness: There is no abdominal tenderness.   Musculoskeletal:      Right lower leg: No edema.      Left lower leg: No edema.   Skin:     General: Skin is warm and dry.   Neurological:      Mental Status: She is alert and oriented to person, place, and time. Mental status is at baseline.   Psychiatric:         Mood and Affect: Mood normal.         Behavior: Behavior normal.             ---------------------------------------------------------------------------------------------------------------------  EKG:        I have personally looked at the EKG.  ---------------------------------------------------------------------------------------------------------------------   Results from last 7 days   Lab Units 07/22/23  1413   CRP mg/dL <0.30   LACTATE mmol/L 0.8   WBC 10*3/mm3 7.58   HEMOGLOBIN g/dL 10.0*   HEMATOCRIT % 33.7*   MCV fL 88.5   MCHC g/dL 29.7*   PLATELETS 10*3/mm3 313     Results from last 7 days   Lab Units 07/22/23  1515   PH, ARTERIAL pH units 7.329*   PO2 ART mm Hg 78.3*   PCO2, ARTERIAL mm Hg 50.5*   HCO3 ART mmol/L 26.5*     Results from last 7 days   Lab Units 07/22/23  1413   SODIUM mmol/L 137   POTASSIUM  mmol/L 4.1   MAGNESIUM mg/dL 2.2   CHLORIDE mmol/L 102   CO2 mmol/L 25.1   BUN mg/dL 64*   CREATININE mg/dL 2.49*   CALCIUM mg/dL 8.9   GLUCOSE mg/dL 130*   ALBUMIN g/dL 3.4*   BILIRUBIN mg/dL 0.4   ALK PHOS U/L 117   AST (SGOT) U/L 19   ALT (SGPT) U/L 9   Estimated Creatinine Clearance: 28 mL/min (A) (by C-G formula based on SCr of 2.49 mg/dL (H)).  No results found for: AMMONIA  Results from last 7 days   Lab Units 07/22/23  1636 07/22/23  1413   HSTROP T ng/L 53* 57*     Results from last 7 days   Lab Units 07/22/23  1413   PROBNP pg/mL 30,507.0*     Lab Results   Component Value Date    HGBA1C 7.40 (H) 06/01/2023     Lab Results   Component Value Date    TSH 6.870 (H) 07/22/2023     No results found for: PREGTESTUR, PREGSERUM, HCG, HCGQUANT  Pain Management Panel  More data exists         Latest Ref Rng & Units 6/4/2023 9/15/2022   Pain Management Panel   Creatinine, Urine mg/dL 111.5  111.5  26.6  26.6      No results found for: BLOODCX  No results found for: URINECX  No results found for: WOUNDCX  No results found for: STOOLCX      ---------------------------------------------------------------------------------------------------------------------  Imaging Results (Last 7 Days)       Procedure Component Value Units Date/Time    XR Chest 1 View [[id number]] Collected: 07/22/23 1638     Updated: 07/22/23 1702    Narrative:         Portable upright AP view chest.     Comparison: 6/5/2023     Findings: Median sternotomy wires and hardware again noted.  Cardiac  silhouette is enlarged, allowing for technique, diffuse interstitial  prominence noted.  No pleural effusion.  Elevation of the right  hemidiaphragm noted.     No confluent opacification.       Impression:         Mild bilateral interstitial prominence which may suggest volume overload  in the proper clinical setting.     This report was finalized on 7/22/2023 4:41 PM by Kristen Jara MD.       CT Head Without Contrast [[id number]] Collected: 07/22/23 4895      Updated: 07/22/23 1537    Narrative:      EXAMINATION:Computed tomography(CT)of the head without IV contrast     TECHNIQUE:CT of the head was performed in the supine position without  intravenous contrast according to as low as reasonably achievable dose  protocol. Axial CT utilized with slice thickness of 5 mm presented in  soft tissue and bone algorithms.     COMPARISON:No prior studies are available for review at the time of this  dictation.     FINDINGS:     No acute intra-or extra-axial hemorrhage or fluid collections are  identified. The ventricles are of normal size,shape,and morphology. The  basilar cisterns are patent. No mass effect or midline shift is seen.  The gray-white matter differentiation is normal. The visualized portions  of the orbits,paranasal sinuses,and mastoids appear normal. No acute  fracture is identified.       Impression:         No acute intracranial hemorrhage,midline shift,or significant mass  effect.     This report was finalized on 7/22/2023 3:35 PM by Kristen Jara MD.               Cultures:  No results found for: BLOODCX, URINECX, WOUNDCX, MRSACX, RESPCX, STOOLCX    Last echocardiogram:  Results for orders placed during the hospital encounter of 09/11/22    Adult Transthoracic Echo Limited W/ Cont if Necessary Per Protocol    Interpretation Summary  · Left ventricular ejection fraction appears to be 61 - 65%. Left ventricular systolic function is normal.  · Left ventricular diastolic function was not assessed.  · The right ventricular cavity is mildly dilated.  · Estimated right ventricular systolic pressure from tricuspid regurgitation is markedly elevated ( 78 mmHg).  · This is a technically limited study.          I have personally reviewed the above radiology images and read the final radiology report on 07/22/23  ---------------------------------------------------------------------------------------------------------------------  Assessment / Plan     Active Hospital  Problems    Diagnosis  POA    **Acute on chronic diastolic CHF (congestive heart failure) [I50.33]  Yes       ASSESSMENT/PLAN:    Acute on chronic heart failure with preserved ejection fraction, POA  NSTEMI, T1 versus T2, POA, likely 2/2 above  Acute on chronic CKD stage IV, POA  Acute encephalopathy, POA, currently unclear etiology  Patient presents from local SNF secondary to AMS, lethargy and increased swelling.  Patient AOx3 on arrival to the ED per ED provider note though does admit to visual hallucinations  Work-up revealed HS troponin 57 with repeat at 53.  proBNP is 30,507.  CXR suggestive of volume overload.  EKG with normal sinus rhythm, low voltage QRS.  Creatinine also increased at 2.49.  Patient received Bumex 2 mg in the ED  She will be admitted to telemetry for further observation with continuous cardiac monitoring/pulse oximetry  We will obtain a TTE  Daily weights, strictly monitor I's and O's and clinical volume status closely  Continue to diurese as indicated  Inpatient heart failure navigator consult placed  We will obtain a ReDs vest value  Avoid nephrotoxins as able  Per review of recent discharge summary patient with history of nephrotic syndrome 6 to 8 months ago and failed to follow-up with nephrology.  We will consult nephrology for further assistance, appreciated.  Admission UA unremarkable.  Continue supportive care    Chronic:  CAD s/p CABG  A-fib/flutter  HTN  HLD  T2DM-insulin-dependent  JESIKA  Chronic hypoxic respiratory failure  Hx EDER  Plan to restart home meds as indicated pending med rec  Initiate insulin regimen at appropriate doses.  Accu-Cheks to monitor with hypoglycemia protocol in place  H&H appears around baseline continue to monitor with serial labs  Monitor vital signs per protocol  ----------  -DVT prophylaxis: Chronically anticoagulated with Eliquis  -Activity: Ad oliverio.  -Expected length of stay: OBSERVATION status; however, if further evaluation or treatment plans  warrant, status may change.  Based upon current information, I predict patient's care encounter to be less than or equal to 2 midnights   -Disposition pending course, likely return to SNF following clinical improvement    High risk secondary to acute on chronic congestive heart failure    There are no questions and answers to display.       Jose Cook PA-C   07/22/23  18:26 EDT     Electronically signed by Santhosh Sotelo DO at 07/22/23 1912          Emergency Department Notes        Andra Murillo PA at 07/22/23 1709       Attestation signed by Leeroy Tolbert MD at 07/23/23 0726        SUPERVISE: For this patient encounter, I reviewed the APC's documentation, treatment plan, and medical decision making.  Leeroy Tolbert MD 7/23/2023 07:26 EDT                         Subjective   History of Present Illness  73 yo female patient with hx of CAD, HLD, CHF, HTN, DM, and CKD presents to the ED by EMS from [facility].  [facility] called EMS due to altered mental status, lethargy, dizziness, abnormal labs, and increased swelling.  Pt was recently increased on Bumex from 0.5mg to 1mg daily. Her potassium bhupinder on 7/18 was 5.1.  Pt has valentino boots bilaterally. She has venous stasis ulcers to posterior lower legs bilaterally. Pt has had increased swelling to lower extremities.  Pt is alert and oriented. She reports she just feels bad and fatigued.  Pt reports SOB and nausea. Denies any CP.  Pt wears 2LNC all the time.     History provided by:  Patient   used: No      Review of Systems   Constitutional: Negative.  Positive for fatigue.   Eyes: Negative.    Respiratory:  Positive for shortness of breath.    Cardiovascular:  Positive for leg swelling.   Gastrointestinal:  Positive for nausea.   Endocrine: Negative.    Genitourinary: Negative.    Musculoskeletal: Negative.    Skin: Negative.    Allergic/Immunologic: Negative.    Neurological: Negative.    Hematological:  Negative.    Psychiatric/Behavioral: Negative.     All other systems reviewed and are negative.    Past Medical History:   Diagnosis Date    Anxiety     CAD (coronary artery disease)     s/p CABG    Chronic hypoxemic respiratory failure     Wears 2 L/min at home    Essential hypertension     Hyperlipidemia     Type 2 diabetes mellitus        No Known Allergies    Past Surgical History:   Procedure Laterality Date    CORONARY ARTERY BYPASS GRAFT  2011    HYSTERECTOMY         Family History   Problem Relation Age of Onset    Diabetes Mother        Social History     Socioeconomic History    Marital status: Single   Tobacco Use    Smoking status: Former     Passive exposure: Past   Vaping Use    Vaping Use: Never used   Substance and Sexual Activity    Alcohol use: Never    Drug use: Never    Sexual activity: Defer           Objective   Physical Exam  Vitals reviewed.   Constitutional:       Appearance: She is well-developed. She is obese. She is ill-appearing.   HENT:      Head: Normocephalic and atraumatic.      Mouth/Throat:      Mouth: Mucous membranes are moist.      Pharynx: Oropharynx is clear.   Eyes:      Extraocular Movements: Extraocular movements intact.      Pupils: Pupils are equal, round, and reactive to light.   Cardiovascular:      Rate and Rhythm: Normal rate and regular rhythm.      Pulses: Normal pulses.      Heart sounds: Normal heart sounds.   Pulmonary:      Effort: Pulmonary effort is normal. Tachypnea present.      Breath sounds: Decreased breath sounds present.      Comments: Crackles   Abdominal:      General: Bowel sounds are normal.      Palpations: Abdomen is soft.   Musculoskeletal:         General: Normal range of motion.      Cervical back: Normal range of motion and neck supple.      Right lower leg: Edema present.      Left lower leg: Edema present.      Comments: Kenyetta boots noted bilaterally.    Skin:     General: Skin is warm and dry.   Neurological:      General: No focal deficit  present.      Mental Status: She is alert and oriented to person, place, and time.   Psychiatric:         Mood and Affect: Mood normal.       Procedures          ED Course  ED Course as of 07/22/23 1822   Sat Jul 22, 2023   1421 ECG 12 Lead Electrolyte Imbalance  Normal sinus rhythm, rate 81, QTc 404, no acute ST or T wave changes, poor R wave progression [CW]   1457 Potassium: 4.1 [ML]   1457 Creatinine(!): 2.49 [ML]   1457 TSH Baseline(!): 6.870 [ML]   1518 proBNP(!): 30,507.0  Previous 8,240 [ML]   1612 CT Head Without Contrast  IMPRESSION:     No acute intracranial hemorrhage,midline shift,or significant mass  effect.     This report was finalized on 7/22/2023 3:35 PM by Kristen Jaar MD.           Specimen Collected: 07/22/23 15:33 EDT Last Resulted: 07/22/23 15:35 EDT         [ML]   1730 Kenyetta boots removed. Pt has skin breakdown noted to calves bilaterally.  Pt has edema bilaterally. N/V intact.  [ML]   1809 Dr. Sotelo will admit the patient.  [ML]      ED Course User Index  [CW] Juvenal Mahajan DO  [ML] Andra Murillo PA                                           Medical Decision Making  73 yo female patient with hx of CAD, HLD, CHF, HTN, DM, and CKD presents to the ED by EMS from [facility].  [facility] called EMS due to altered mental status, lethargy, dizziness, abnormal labs, and increased swelling.  Pt was recently increased on Bumex from 0.5mg to 1mg daily. Her potassium bhupinder on 7/18 was 5.1.  Pt has kenyetta boots bilaterally. She has venous stasis ulcers to posterior lower legs bilaterally. Pt has had increased swelling to lower extremities.  Pt is alert and oriented. She reports she just feels bad and fatigued.  Pt reports SOB and nausea. Denies any CP.  Pt wears 2LNC all the time. Pt will be admitted to telemetry for CHF exacerbation.      Problems Addressed:  Acute congestive heart failure, unspecified heart failure type: complicated acute illness or injury    Amount and/or Complexity  of Data Reviewed  Labs: ordered. Decision-making details documented in ED Course.  Radiology: ordered. Decision-making details documented in ED Course.  ECG/medicine tests: ordered.    Risk  Prescription drug management.  Decision regarding hospitalization.        Final diagnoses:   Acute congestive heart failure, unspecified heart failure type       ED Disposition  ED Disposition       ED Disposition   Decision to Admit    Condition   --    Comment   Level of Care: Telemetry [5]   Diagnosis: Acute on chronic diastolic CHF (congestive heart failure) [3852087]   Admitting Physician: SANTHOSH SPRAGUE [[id number]]                 No follow-up provider specified.       Medication List      No changes were made to your prescriptions during this visit.            Andra Murillo PA  07/22/23 1813       Andra Murillo PA  07/22/23 1822      Electronically signed by Leeroy Tolbert MD at 07/23/23 0726       Facility-Administered Medications as of 7/24/2023   Medication Dose Route Frequency Provider Last Rate Last Admin    acetaminophen (TYLENOL) tablet 1,000 mg  1,000 mg Oral Q8H PRN Wilian Lombardi MD        apixaban (ELIQUIS) tablet 5 mg  5 mg Oral Q12H Wilian Lombardi MD   5 mg at 07/24/23 0850    aspirin chewable tablet 81 mg  81 mg Oral Daily Wilian Lombardi MD   81 mg at 07/24/23 0850    atorvastatin (LIPITOR) tablet 20 mg  20 mg Oral Nightly Wilian Lombardi MD   20 mg at 07/23/23 2032    [COMPLETED] bumetanide (BUMEX) injection 2 mg  2 mg Intravenous Once Andra Murillo PA   2 mg at 07/22/23 1542    chlorothiazide (DIURIL) 500 mg in sodium chloride 0.9 % 100 mL IVPB  500 mg Intravenous Once Elaina Bell MD        dextrose (D50W) (25 g/50 mL) IV injection 25 g  25 g Intravenous Q15 Min PRN Santhosh Sprague DO        dextrose (GLUTOSE) oral gel 15 g  15 g Oral Q15 Min PRN Santhosh Sprague DO        diphenhydrAMINE (BENADRYL) capsule 25 mg   25 mg Oral Q8H PRN Wilian Lombardi MD   25 mg at 07/23/23 2239    ferrous sulfate tablet 325 mg  325 mg Oral Daily With Breakfast Wilian Lombardi MD   325 mg at 07/24/23 0850    FLUoxetine (PROzac) capsule 20 mg  20 mg Oral Daily Wilian Lombardi MD   20 mg at 07/24/23 0850    fluticasone (FLONASE) 50 MCG/ACT nasal spray 2 spray  2 spray Each Nare Daily Wilian Lombardi MD   2 spray at 07/24/23 0851    furosemide (LASIX) 100 mg in sodium chloride 0.9 % 50 mL IVPB  100 mg Intravenous Once Elaina Bell MD        [COMPLETED] furosemide (LASIX) 100 mg in sodium chloride 0.9 % 60 mL IVPB  100 mg Intravenous Once Elaina Bell MD   Stopped at 07/23/23 2356    furosemide (LASIX) injection 40 mg  40 mg Intravenous Once Elaina Bell MD        glucagon HCl (Diagnostic) injection 1 mg  1 mg Intramuscular Q15 Min PRN Santhosh Sotelo DO        guaiFENesin tablet 400 mg  400 mg Oral TID Wilian Lombardi MD   400 mg at 07/24/23 0850    hydrALAZINE (APRESOLINE) tablet 25 mg  25 mg Oral TID PRN Elaina Bell MD        HYDROcodone-acetaminophen (NORCO) 5-325 MG per tablet 1 tablet  1 tablet Oral Q8H PRN Wilian Lombardi MD        insulin glargine (LANTUS, SEMGLEE) injection 40 Units  40 Units Subcutaneous Nightly Wilian Lombardi MD        Insulin Lispro (humaLOG) injection 2-9 Units  2-9 Units Subcutaneous 4x Daily AC & at Bedtime Santhosh Sotelo DO   4 Units at 07/24/23 1111    isosorbide mononitrate (IMDUR) 24 hr tablet 30 mg  30 mg Oral Daily Wilian Lombardi MD   30 mg at 07/24/23 0850    latanoprost (XALATAN) 0.005 % ophthalmic solution 1 drop  1 drop Both Eyes Nightly Wilian Lombardi MD   1 drop at 07/23/23 2034    linaclotide (LINZESS) capsule 145 mcg  145 mcg Oral QAM AC Wilian Lombardi MD   145 mcg at 07/24/23 0850    LORazepam (ATIVAN) tablet 0.5 mg  0.5 mg Oral Q12H PRN Wilian Lombardi MD   0.5 mg at 07/24/23 0594    magic barrier  cream 1 application   1 application  Topical 4x Daily PRN Any Leigh PA-C        metoprolol tartrate (LOPRESSOR) tablet 50 mg  50 mg Oral BID Wilian Lombardi MD   50 mg at 07/24/23 0850    multivitamin with minerals 1 tablet  1 tablet Oral Daily Wilian Lombardi MD   1 tablet at 07/24/23 0850    nitroglycerin (NITROSTAT) SL tablet 0.4 mg  0.4 mg Sublingual Q5 Min PRN Santhosh Sotelo DO        nystatin (MYCOSTATIN) powder   Topical Q12H Any Leigh PA-C   Given at 07/24/23 0851    ondansetron (ZOFRAN) tablet 4 mg  4 mg Oral Q6H PRN Wilian Lombardi MD        pantoprazole (PROTONIX) EC tablet 40 mg  40 mg Oral Q AM Wilian Lombardi MD   40 mg at 07/24/23 0502    sodium chloride 0.9 % flush 10 mL  10 mL Intravenous PRN Andra Murillo PA        sodium chloride nasal spray 2 spray  2 spray Nasal Q2H PRN Wilian Lombardi MD        vitamin B-12 (CYANOCOBALAMIN) tablet 1,000 mcg  1,000 mcg Oral Daily Wilian Lombardi MD   1,000 mcg at 07/24/23 0850     Orders (all)        Start     Ordered    07/25/23 0600  Comprehensive Metabolic Panel  Morning Draw         07/24/23 0957    07/25/23 0600  CBC (No Diff)  Morning Draw         07/24/23 0957    07/24/23 1245  chlorothiazide (DIURIL) 500 mg in sodium chloride 0.9 % 100 mL IVPB  Once         07/24/23 1147    07/24/23 1245  furosemide (LASIX) injection 40 mg  Once         07/24/23 1147    07/24/23 1245  furosemide (LASIX) 100 mg in sodium chloride 0.9 % 50 mL IVPB  Once         07/24/23 1147    07/24/23 1030  hydrALAZINE (APRESOLINE) tablet 25 mg  3 Times Daily PRN         07/24/23 1015    07/24/23 1017  Plz do bladder scan  Nursing Communication  Once        Comments: Plz do bladder scan    07/24/23 1016    07/24/23 1016  POC Glucose Once  PROCEDURE ONCE         07/24/23 1010    07/24/23 0903  Inpatient Admission  Once         07/24/23 0903    07/24/23 0708  Case Management  Consult  Once         Provider:  (Not yet assigned)    07/24/23 0708    07/24/23 0645  POC Glucose Once  PROCEDURE ONCE         07/24/23 0621    07/24/23 0645  POC Glucose Once  PROCEDURE ONCE         07/24/23 0628    07/24/23 0600  CBC & Differential  Morning Draw         07/23/23 0741    07/24/23 0600  Comprehensive Metabolic Panel  Morning Draw         07/23/23 0741    07/24/23 0600  CBC Auto Differential  PROCEDURE ONCE         07/23/23 2203    07/24/23 0430  Scan Slide  Once         07/24/23 0429    07/24/23 0210  ECG 12 Lead Rhythm Change  STAT         07/24/23 0206    07/23/23 2038  POC Glucose Once  PROCEDURE ONCE         07/23/23 2031    07/23/23 1619  POC Glucose Once  PROCEDURE ONCE         07/23/23 1612    07/23/23 1300  furosemide (LASIX) 100 mg in sodium chloride 0.9 % 60 mL IVPB  Once         07/23/23 1117    07/23/23 1222  Diet: Cardiac Diets, Diabetic Diets, Renal Diets; Healthy Heart (2-3 Na+); Consistent Carbohydrate; Low Potassium, Low Phosphorus, Low Sodium (2-3g); Texture: Regular Texture (IDDSI 7); Fluid Consistency: Thin (IDDSI 0)  Diet Effective Now        Comments: 1 g salt restriction    07/23/23 1221    07/23/23 1206  PT Consult: Eval & Treat Functional Mobility Below Baseline  Once         07/23/23 1205    07/23/23 1206  OT Consult: Eval & Treat  Once         07/23/23 1205    07/23/23 1014  POC Glucose Once  PROCEDURE ONCE         07/23/23 1007    07/23/23 0900  vitamin B-12 (CYANOCOBALAMIN) tablet 1,000 mcg  Daily         07/22/23 2230 07/23/23 0900  isosorbide mononitrate (IMDUR) 24 hr tablet 30 mg  Daily         07/22/23 2230 07/23/23 0900  aspirin chewable tablet 81 mg  Daily         07/22/23 2230 07/23/23 0900  multivitamin with minerals 1 tablet  Daily         07/22/23 2230 07/23/23 0900  fluticasone (FLONASE) 50 MCG/ACT nasal spray 2 spray  Daily         07/22/23 2230 07/23/23 0900  FLUoxetine (PROzac) capsule 20 mg  Daily         07/22/23 2230 07/23/23 0900  Patiromer Sorbitex  Calcium pack 1 packet  Daily,   Status:  Discontinued         07/22/23 2230 07/23/23 0836  Scan Slide  Once         07/23/23 0835    07/23/23 0800  ferrous sulfate tablet 325 mg  Daily With Breakfast         07/22/23 2230 07/23/23 0741  Comprehensive Metabolic Panel  STAT         07/23/23 0740    07/23/23 0741  CBC & Differential  STAT         07/23/23 0740    07/23/23 0741  CBC Auto Differential  PROCEDURE ONCE         07/23/23 0740    07/23/23 0730  linaclotide (LINZESS) capsule 145 mcg  Every Morning Before Breakfast         07/22/23 2230 07/23/23 0631  POC Glucose Once  PROCEDURE ONCE         07/23/23 0625    07/23/23 0600  pantoprazole (PROTONIX) EC tablet 40 mg  Every Early Morning         07/22/23 2230 07/23/23 0000  atorvastatin (LIPITOR) tablet 20 mg  Nightly         07/22/23 2230 07/23/23 0000  latanoprost (XALATAN) 0.005 % ophthalmic solution 1 drop  Nightly         07/22/23 2230 07/23/23 0000  hydrALAZINE (APRESOLINE) tablet 25 mg  3 Times Daily,   Status:  Discontinued         07/22/23 2230 07/23/23 0000  metoprolol tartrate (LOPRESSOR) tablet 50 mg  2 Times Daily         07/22/23 2230 07/23/23 0000  insulin glargine (LANTUS, SEMGLEE) injection 40 Units  Nightly         07/22/23 2230 07/23/23 0000  guaiFENesin tablet 400 mg  3 Times Daily         07/22/23 2230 07/23/23 0000  apixaban (ELIQUIS) tablet 5 mg  Every 12 Hours Scheduled         07/22/23 2230 07/22/23 2211  sodium chloride nasal spray 2 spray  Every 2 Hours PRN         07/22/23 2230 07/22/23 2211  acetaminophen (TYLENOL) tablet 1,000 mg  Every 8 Hours PRN         07/22/23 2230 07/22/23 2211  diphenhydrAMINE (BENADRYL) capsule 25 mg  Every 8 Hours PRN         07/22/23 2230 07/22/23 2211  ondansetron (ZOFRAN) tablet 4 mg  Every 6 Hours PRN         07/22/23 2230 07/22/23 2211  HYDROcodone-acetaminophen (NORCO) 5-325 MG per tablet 1 tablet  Every 8 Hours PRN         07/22/23 2230 07/22/23 2211   LORazepam (ATIVAN) tablet 0.5 mg  Every 12 Hours PRN         07/22/23 2230 07/22/23 2200  POC Glucose 4x Daily AC & at Bedtime  4 Times Daily Before Meals & at Bedtime       07/22/23 1910 07/22/23 2200  nystatin (MYCOSTATIN) powder  Every 12 Hours Scheduled         07/22/23 2015 07/22/23 2149  POC Glucose Once  PROCEDURE ONCE         07/22/23 2139 07/22/23 2100  Insulin Lispro (humaLOG) injection 2-9 Units  4 Times Daily Before Meals & Nightly         07/22/23 1910 07/22/23 2015  magic barrier cream 1 application   4 Times Daily PRN         07/22/23 2015 07/22/23 2000  Strict Intake & Output  Every 4 Hours       07/22/23 1910 07/22/23 1936  ECG 12 Lead Rhythm Change  STAT         07/22/23 1935 07/22/23 1911  Daily Weights  Daily       07/22/23 1910 07/22/23 1911  Vital Signs  Per Hospital Policy         07/22/23 1910 07/22/23 1911  Continuous Cardiac Monitoring  Continuous        Comments: Follow Standing Orders As Outlined in Process Instructions (Open Order Report to View Full Instructions)    07/22/23 1910 07/22/23 1911  Telemetry - Maintain IV Access  Continuous         07/22/23 1910 07/22/23 1911  Telemetry - Place Orders & Notify Provider of Results When Patient Experiences Acute Chest Pain, Dysrhythmia or Respiratory Distress  Until Discontinued         07/22/23 1910 07/22/23 1911  May Be Off Telemetry for Tests  Continuous         07/22/23 1910 07/22/23 1911  ReDs Vest  Once         07/22/23 1910 07/22/23 1911  Inpatient Heart Failure Navigator Consult  Once        Provider:  (Not yet assigned)    07/22/23 1910 07/22/23 1911  Code Status and Medical Interventions:  Continuous         07/22/23 1910 07/22/23 1911  VTE Prophylaxis Not Indicated: Other: Patient Currently Anticoagulated / Receiving Prophylaxis  Once         07/22/23 1910 07/22/23 1911  Diet: Cardiac Diets, Diabetic Diets, Renal Diets; Healthy Heart (2-3 Na+); Consistent Carbohydrate; Low  Sodium (2-3g), Low Potassium, Low Phosphorus; Texture: Regular Texture (IDDSI 7); Fluid Consistency: Thin (IDDSI 0)  Diet Effective Now,   Status:  Canceled         07/22/23 1910 07/22/23 1911  Adult Transthoracic Echo Complete With Contrast if Necessary Per Protocol  Once         07/22/23 1910    07/22/23 1911  Inpatient Nephrology Consult  Once        Specialty:  Nephrology  Provider:  Elaina Bell MD    07/22/23 1910 07/22/23 1910  nitroglycerin (NITROSTAT) SL tablet 0.4 mg  Every 5 Minutes PRN         07/22/23 1910    07/22/23 1910  dextrose (GLUTOSE) oral gel 15 g  Every 15 Minutes PRN         07/22/23 1910    07/22/23 1910  dextrose (D50W) (25 g/50 mL) IV injection 25 g  Every 15 Minutes PRN         07/22/23 1910    07/22/23 1910  glucagon HCl (Diagnostic) injection 1 mg  Every 15 Minutes PRN         07/22/23 1910    07/22/23 1825  Protein / Creatinine Ratio, Urine - Urine, Clean Catch  Once         07/22/23 1824    07/22/23 1811  Initiate Observation Status  Once         07/22/23 1810    07/22/23 1809  T4, Free  Once         07/22/23 1808    07/22/23 1613  High Sensitivity Troponin T 2Hr  PROCEDURE ONCE         07/22/23 1500    07/22/23 1533  bumetanide (BUMEX) injection 2 mg  Once         07/22/23 1517    07/22/23 1516  Blood Gas, Arterial With Co-Ox  PROCEDURE ONCE         07/22/23 1515    07/22/23 1457  Scan Slide  Once         07/22/23 1456    07/22/23 1441  Blood Gas, Arterial -With Co-Ox Panel: Yes  Once         07/22/23 1440    07/22/23 1408  Monitor Blood Pressure  Per Hospital Policy         07/22/23 1407    07/22/23 1408  Cardiac Monitoring  Continuous,   Status:  Canceled        Comments: Follow Standing Orders As Outlined in Process Instructions (Open Order Report to View Full Instructions)    07/22/23 1407    07/22/23 1408  Pulse Oximetry, Continuous  Continuous         07/22/23 1407    07/22/23 1408  CT Head Without Contrast  1 Time Imaging         07/22/23 1407    07/22/23 1408  ECG 12  Lead Electrolyte Imbalance  Once         07/22/23 1407    07/22/23 1408  XR Chest 1 View  1 Time Imaging         07/22/23 1407    07/22/23 1408  High Sensitivity Troponin T  Once         07/22/23 1407    07/22/23 1408  BNP  Once         07/22/23 1407    07/22/23 1408  C-reactive Protein  Once         07/22/23 1407    07/22/23 1408  Magnesium  Once         07/22/23 1407    07/22/23 1408  TSH  Once         07/22/23 1407    07/22/23 1408  COVID-19 and FLU A/B PCR - Swab, Nasopharynx  Once         07/22/23 1407    07/22/23 1408  Blood Culture - Blood, Arm, Left  Once         07/22/23 1407    07/22/23 1408  Blood Culture - Blood, Arm, Right  Once         07/22/23 1407    07/22/23 1408  Lactic Acid, Plasma  Once         07/22/23 1407    07/22/23 1407  CBC & Differential  Once         07/22/23 1407    07/22/23 1407  Comprehensive Metabolic Panel  Once         07/22/23 1407    07/22/23 1407  Urinalysis With Microscopic If Indicated (No Culture) - Urine, Clean Catch  Once         07/22/23 1407    07/22/23 1407  [place] Draw  Once         07/22/23 1407    07/22/23 1407  Insert Peripheral IV  Once        See Hyperspace for full Linked Orders Report.    07/22/23 1407    07/22/23 1407  sodium chloride 0.9 % flush 10 mL  As Needed        See Hyperspace for full Linked Orders Report.    07/22/23 1407    07/22/23 1407  CBC Auto Differential  PROCEDURE ONCE         07/22/23 1407    07/22/23 1407  Green Top (Gel)  PROCEDURE ONCE         07/22/23 1407    07/22/23 1407  Lavender Top  PROCEDURE ONCE         07/22/23 1407    07/22/23 1407  Gold Top - SST  PROCEDURE ONCE         07/22/23 1407    07/22/23 1407  Light Blue Top  PROCEDURE ONCE         07/22/23 1407    Unscheduled  Follow Hypoglycemia Standing Orders For Blood Glucose <70 & Notify Provider of Treatment  As Needed      Comments: Follow Hypoglycemia Orders As Outlined in Process Instructions (Open Order Report to View Full Instructions)  Notify Provider Any Time Hypoglycemia  Treatment is Administered    07/22/23 1910    Pending  ECG 12 Lead  Once         Pending    --  FLUoxetine (PROzac) 20 MG capsule  Daily         07/22/23 1915    --  guaiFENesin (HUMIBID 3) 400 MG tablet  3 Times Daily         07/22/23 1921    --  dextromethorphan-guaifenesin (ROBITUSSIN-DM)  MG/5ML syrup  Every 4 Hours PRN         07/22/23 1921    --  HYDROcodone-acetaminophen (NORCO) 5-325 MG per tablet  Every 8 Hours PRN         07/22/23 1921    --  Patiromer Sorbitex Calcium ([place]) 16.8 g pack  Daily         07/22/23 1921    --  ferrous sulfate 325 (65 FE) MG tablet  Daily With Breakfast         07/22/23 1921    --  LORazepam (ATIVAN) 0.5 MG tablet  Every 12 Hours PRN         07/22/23 1921    --  bumetanide (BUMEX) 1 MG tablet  Daily         07/22/23 1921    --  multivitamin with minerals tablet tablet  Daily         07/22/23 1921    --  SCANNED - TELEMETRY           07/22/23 0000    --  SCANNED - TELEMETRY           07/22/23 0000                     Physician Progress Notes (all)        Elaina Bell MD at 07/24/23 1012          Nephrology Progress Note      Subjective     Patient has continuous shortness of breath feels like she has slightly better compared to yesterday    Objective       Vital signs :     Temp:  [97.5 °F (36.4 °C)-98.4 °F (36.9 °C)] 97.8 °F (36.6 °C)  Heart Rate:  [77-94] 81  Resp:  [16-20] 18  BP: ()/(60-80) 121/70    Intake/Output                               07/22/23 0701 - 07/23/23 0700 07/23/23 0701 - 07/24/23 0700 07/24/23 0701 - 07/25/23 0700 0701-1900 1901-0700 Total 0701-1900 1901-0700 Total 0701-1900 1901-0700 Total                    Intake    P.O.  --  -- --  700  250 950  470  -- 470    Total Intake -- -- -- 700 250 950 470 -- 470       Output    Urine  1000  950 1950  0  900 900  200  -- 200    Total Output 7568 621 5028 0 900 900 200 -- 200             Physical Exam:    General Appearance : alert, pleasant, appears stated age, cooperative and alert  Lungs :   bilateral decreased intensity of breath sounds  Heart :  regular rhythm & normal rate, normal S1, S2 and no murmur, no rub  Abdomen : soft, non distended  Extremities : 2+ edema  Neurologic :   orientated to person, place, time and situation, Grossly no focal deficits        Laboratory Data :     Albumin Albumin   Date Value Ref Range Status   07/24/2023 3.1 (L) 3.5 - 5.2 g/dL Final   07/23/2023 3.4 (L) 3.5 - 5.2 g/dL Final   07/22/2023 3.4 (L) 3.5 - 5.2 g/dL Final      Magnesium Magnesium   Date Value Ref Range Status   07/22/2023 2.2 1.6 - 2.4 mg/dL Final          PTH               No results found for: PTH    CBC and coagulation:  Results from last 7 days   Lab Units 07/24/23  0254 07/23/23  0806 07/22/23  1413   LACTATE mmol/L  --   --  0.8   CRP mg/dL  --   --  <0.30   WBC 10*3/mm3 7.63 7.23 7.58   HEMOGLOBIN g/dL 9.7* 9.9* 10.0*   HEMATOCRIT % 33.5* 33.2* 33.7*   MCV fL 91.0 90.2 88.5   MCHC g/dL 29.0* 29.8* 29.7*   PLATELETS 10*3/mm3 286 298 313     Acid/base balance:  Results from last 7 days   Lab Units 07/22/23  1515   PH, ARTERIAL pH units 7.329*   PO2 ART mm Hg 78.3*   PCO2, ARTERIAL mm Hg 50.5*   HCO3 ART mmol/L 26.5*     Renal and electrolytes:    Results from last 7 days   Lab Units 07/24/23  0254 07/23/23  0806 07/22/23  1413   SODIUM mmol/L 137 137 137   POTASSIUM mmol/L 4.2 3.8 4.1   MAGNESIUM mg/dL  --   --  2.2   CHLORIDE mmol/L 103 102 102   CO2 mmol/L 23.4 24.7 25.1   BUN mg/dL 61* 65* 64*   CREATININE mg/dL 2.64* 2.54* 2.49*   CALCIUM mg/dL 8.4* 8.8 8.9     Estimated Creatinine Clearance: 29.7 mL/min (A) (by C-G formula based on SCr of 2.64 mg/dL (H)).  @GFRCG:3@   Liver and pancreatic function:  Results from last 7 days   Lab Units 07/24/23  0254 07/23/23  0806 07/22/23  1413   ALBUMIN g/dL 3.1* 3.4* 3.4*   BILIRUBIN mg/dL 0.3 0.4 0.4   ALK PHOS U/L 105 111 117   AST (SGOT) U/L 14 12 19   ALT (SGPT) U/L 8 9 9         Cardiac:  Results from last 7 days   Lab Units 07/22/23  1413   PROBNP  pg/mL 30,507.0*     Liver and pancreatic function:  Results from last 7 days   Lab Units 07/24/23  0254 07/23/23  0806 07/22/23  1413   ALBUMIN g/dL 3.1* 3.4* 3.4*   BILIRUBIN mg/dL 0.3 0.4 0.4   ALK PHOS U/L 105 111 117   AST (SGOT) U/L 14 12 19   ALT (SGPT) U/L 8 9 9       Medications :     apixaban, 5 mg, Oral, Q12H  aspirin, 81 mg, Oral, Daily  atorvastatin, 20 mg, Oral, Nightly  ferrous sulfate, 325 mg, Oral, Daily With Breakfast  FLUoxetine, 20 mg, Oral, Daily  fluticasone, 2 spray, Each Nare, Daily  guaiFENesin, 400 mg, Oral, TID  hydrALAZINE, 25 mg, Oral, TID  insulin glargine, 40 Units, Subcutaneous, Nightly  insulin lispro, 2-9 Units, Subcutaneous, 4x Daily AC & at Bedtime  isosorbide mononitrate, 30 mg, Oral, Daily  latanoprost, 1 drop, Both Eyes, Nightly  linaclotide, 145 mcg, Oral, QAM AC  metoprolol tartrate, 50 mg, Oral, BID  multivitamin with minerals, 1 tablet, Oral, Daily  nystatin, , Topical, Q12H  pantoprazole, 40 mg, Oral, Q AM  vitamin B-12, 1,000 mcg, Oral, Daily             Assessment & Plan     -WENDY, nonoliguric  - Chronic kidney disease stage IV A3  - Type 2 diabetes mellitus  - Acute on chronic decompensated heart failure, preserved ejection fraction  - Morbid obesity  - History of nephrotic syndrome     No improvement in renal functions, in adequate urine output in last 24 hours.  Have done bladder scan that did not show any urinary retention.  Patient has had episodes of hypotension, will stop hydralazine scheduled and start on as needed for blood pressure more than 160 mmHg.  We will titrate up diuretics  WENDY on CKD most likely due to cardiorenal syndrome type I  Baseline creatinine appears to be 2 admitted with 2.5  Significant fluid overload clinically  -Diuril 500 mg IV once, 30 minutes after  - Lasix 40 mg IV push followed by 100 mg over 5 hours  - Follow-up get UA with microscopy  - Strict intake and output  - 1 g salt restriction and 1 L fluid restriction per day  - Please avoid  any nephrotoxic agents, hypotension and adjust medications according to estimated GFR      Elaina Bell MD  23  10:13 EDT      Electronically signed by Elaina Bell MD at 23 [id number]       Jose Cook PA-C at 23 0818       Attestation signed by Juvenal Mahajan DO at 23 1050    I have reviewed this documentation and agree.                  Patient Identification:  Name:  Jolly Garcia  Age:  72 y.o.  Sex:  female  :  1950  MRN:  [id number]  Visit Number:  [id number]  Primary Care Provider:  Whit Cadet APRN    Length of stay:  0    Subjective/Interval History/Consultants/Procedures     Chief Complaint:   Chief Complaint   Patient presents with    Shortness of Breath    Nausea    Abnormal Lab       Subjective/Interval History:    72 y.o. female who was admitted on 2023 with Acute on chronic heart failure with preserved ejection fraction      PMH is significant for  CAD, HTN, HLD, T2DM, HFpEF, chronic hypoxic respiratory failure, CKD stage IV, history of nephrotic syndrome, Hx EDER    For complete admission information, please see history and physical.     Consultations:  nephrology      Procedures/Scans:  CXR  CT head without contrast  TTE    Today, the patient was seen and examined with RN at bedside. Patient was having a mild nosebleed when I entered though had resolved within a few minutes during our conversation with pressure.  She reports overall feeling the same. No increased shortness of breath. Lower extremity edema appears increased again today. No new complaints noted. Patient did state she was constipated despite her home Linzess. Will monitor and adjust regimen as needed.     Room location at the time of evaluation was 303.    ----------------------------------------------------------------------------------------------------------------------  Current Hospital Meds:  apixaban, 5 mg, Oral, Q12H  aspirin, 81 mg, Oral,  Daily  atorvastatin, 20 mg, Oral, Nightly  ferrous sulfate, 325 mg, Oral, Daily With Breakfast  FLUoxetine, 20 mg, Oral, Daily  fluticasone, 2 spray, Each Nare, Daily  guaiFENesin, 400 mg, Oral, TID  hydrALAZINE, 25 mg, Oral, TID  insulin glargine, 40 Units, Subcutaneous, Nightly  insulin lispro, 2-9 Units, Subcutaneous, 4x Daily AC & at Bedtime  isosorbide mononitrate, 30 mg, Oral, Daily  latanoprost, 1 drop, Both Eyes, Nightly  linaclotide, 145 mcg, Oral, QAM AC  metoprolol tartrate, 50 mg, Oral, BID  multivitamin with minerals, 1 tablet, Oral, Daily  nystatin, , Topical, Q12H  pantoprazole, 40 mg, Oral, Q AM  Patiromer Sorbitex Calcium, 1 each, Oral, Daily  vitamin B-12, 1,000 mcg, Oral, Daily         ----------------------------------------------------------------------------------------------------------------------      Objective     Vital Signs:  Temp:  [97.5 °F (36.4 °C)-98.4 °F (36.9 °C)] 97.8 °F (36.6 °C)  Heart Rate:  [77-94] 81  Resp:  [16-20] 18  BP: ()/(60-80) 121/70      07/22/23  1915 07/23/23  0500 07/24/23  0500   Weight: (!) 155 kg (340 lb 11.2 oz) (!) 155 kg (340 lb 11.2 oz) (New admit less than 24 hours) (!) 155 kg (342 lb 1.6 oz)     Body mass index is 55.22 kg/m².    Intake/Output Summary (Last 24 hours) at 7/24/2023 0845  Last data filed at 7/24/2023 0707  Gross per 24 hour   Intake 830 ml   Output 1100 ml   Net -270 ml     I/O this shift:  In: -   Out: 200 [Urine:200]  Diet: Cardiac Diets, Diabetic Diets, Renal Diets; Healthy Heart (2-3 Na+); Consistent Carbohydrate; Low Potassium, Low Phosphorus, Low Sodium (2-3g); Texture: Regular Texture (IDDSI 7); Fluid Consistency: Thin (IDDSI 0)  ----------------------------------------------------------------------------------------------------------------------    Physical Exam  Vitals and nursing note reviewed.   Constitutional:       General: She is not in acute distress.     Appearance: She is obese.   HENT:      Head: Normocephalic and  atraumatic.   Eyes:      Extraocular Movements: Extraocular movements intact.      Conjunctiva/sclera: Conjunctivae normal.   Cardiovascular:      Rate and Rhythm: Normal rate and regular rhythm.   Pulmonary:      Effort: Pulmonary effort is normal.      Breath sounds: Normal breath sounds.   Abdominal:      Palpations: Abdomen is soft.      Tenderness: There is no abdominal tenderness.   Musculoskeletal:      Right lower leg: Edema present.      Left lower leg: Edema present.      Comments: 2+   Skin:     General: Skin is warm and dry.   Neurological:      Mental Status: She is alert. Mental status is at baseline.   Psychiatric:         Mood and Affect: Mood normal.         Behavior: Behavior normal.              ----------------------------------------------------------------------------------------------------------------------  Tele:        ----------------------------------------------------------------------------------------------------------------------  Results from last 7 days   Lab Units 07/22/23  1636 07/22/23  1413   HSTROP T ng/L 53* 57*   PROBNP pg/mL  --  30,507.0*     Results from last 7 days   Lab Units 07/24/23  0254 07/23/23  0806 07/22/23  1413   CRP mg/dL  --   --  <0.30   LACTATE mmol/L  --   --  0.8   WBC 10*3/mm3 7.63 7.23 7.58   HEMOGLOBIN g/dL 9.7* 9.9* 10.0*   HEMATOCRIT % 33.5* 33.2* 33.7*   MCV fL 91.0 90.2 88.5   MCHC g/dL 29.0* 29.8* 29.7*   PLATELETS 10*3/mm3 286 298 313     Results from last 7 days   Lab Units 07/22/23  1515   PH, ARTERIAL pH units 7.329*   PO2 ART mm Hg 78.3*   PCO2, ARTERIAL mm Hg 50.5*   HCO3 ART mmol/L 26.5*     Results from last 7 days   Lab Units 07/24/23  0254 07/23/23  0806 07/22/23  1413   SODIUM mmol/L 137 137 137   POTASSIUM mmol/L 4.2 3.8 4.1   MAGNESIUM mg/dL  --   --  2.2   CHLORIDE mmol/L 103 102 102   CO2 mmol/L 23.4 24.7 25.1   BUN mg/dL 61* 65* 64*   CREATININE mg/dL 2.64* 2.54* 2.49*   CALCIUM mg/dL 8.4* 8.8 8.9   GLUCOSE mg/dL 170* 127* 130*    ALBUMIN g/dL 3.1* 3.4* 3.4*   BILIRUBIN mg/dL 0.3 0.4 0.4   ALK PHOS U/L 105 111 117   AST (SGOT) U/L 14 12 19   ALT (SGPT) U/L 8 9 9   Estimated Creatinine Clearance: 29.7 mL/min (A) (by C-G formula based on SCr of 2.64 mg/dL (H)).  No results found for: AMMONIA      Blood Culture   Date Value Ref Range Status   07/22/2023 No growth at 24 hours  Preliminary   07/22/2023 No growth at 24 hours  Preliminary     No results found for: URINECX  No results found for: WOUNDCX  No results found for: STOOLCX  ----------------------------------------------------------------------------------------------------------------------  Imaging Results (Last 24 Hours)       ** No results found for the last 24 hours. **          ----------------------------------------------------------------------------------------------------------------------   I have reviewed the above laboratory values for 07/24/23    Assessment/Plan     Active Hospital Problems    Diagnosis  POA    **Acute on chronic diastolic CHF (congestive heart failure) [I50.33]  Yes         ASSESSMENT/PLAN:  Acute on chronic heart failure with preserved ejection fraction, POA  NSTEMI, T1 versus T2, POA, possibly 2/2 above  WENDY on chronic CKD stage IV, POA, likely 2/2 cardiorenal syndrome  Patient presents from local SNF secondary to AMS, lethargy and increased swelling.  Patient AOx3 on arrival to the ED per ED provider note though does admit to visual hallucinations prior to arrival  Work-up revealed HS troponin 57 with repeat at 53.  proBNP is 30,507.  CXR suggestive of volume overload.  EKG with normal sinus rhythm, low voltage QRS. Patient denies any chest pain.  Creatinine also increased at 2.49 on arrival from baseline 2.0  Patient received Bumex 2 mg in the ED  She will be admitted to telemetry for further observation with continuous cardiac monitoring/pulse oximetry  TTE noted normal LVEF at 56 to 60%.  Diastolic function with grade 2 with high LAP  pseudonormalization.  Diuresed well yesterday with approximately 2L net negative however renal function continues to decline, A.m. labs with interval increase in creatinine to 2.64.  Labs otherwise stable overall.  Continue Daily weights, strictly monitor I's and O's and clinical volume status closely.  Inpatient heart failure navigator consult placed  ReDs vest value yesterday normal at 23%  Avoid nephrotoxins as able  Nephrology consulted for further assistance. Appreciated. Suspect WENDY likely 2/2 cardiorenal syndrome type I. Patient diuresed with Lasix gtt yesterday. Follow up further recommendations.  Continue supportive care  Patient remains AOx3, denies any further visual hallucinations     Chronic:  CAD s/p CABG  A-fib/flutter  HTN  HLD  T2DM-insulin-dependent  JESIKA  Chronic hypoxic respiratory failure  Hx EDER  Plan to restart home meds as indicated pending med rec  Initiate insulin regimen at appropriate doses.  Accu-Cheks to monitor with hypoglycemia protocol in place  H&H appears around baseline continue to monitor with serial labs  Monitor vital signs per protocol        -----------  -DVT prophylaxis: Chronically anticoagulated with eliquis  -Disposition plans/anticipated needs:Plan to return to SNF following clinical improvement. Case management consulted to assist.         The patient is high risk due to the following diagnoses/reasons:  CHFe, WENDY, cardiorenal syndrome        Jose Cook PA-C  23  08:45 EDT     Electronically signed by Juvenal Mahajan DO at 23 1059       Jose Cook PA-C at 23 1003       Attestation signed by Santhosh Sotelo DO at 23 1508    I have reviewed this documentation and agree. Pt seen and examined with FABIAN Albarran. Discussed with nephrology with input appreciated.                   Patient Identification:  Name:  Jolly Garcia  Age:  72 y.o.  Sex:  female  :  1950  MRN:  [id number]  Visit Number:   [id number]  Primary Care Provider:  Whit Cadet APRN    Length of stay:  0    Subjective/Interval History/Consultants/Procedures     Chief Complaint:   Chief Complaint   Patient presents with    Shortness of Breath    Nausea    Abnormal Lab       Subjective/Interval History:    72 y.o. female who was admitted on 7/22/2023 with Acute on chronic heart failure with preserved ejection fraction     PMH is significant for CAD, HTN, HLD, T2DM, HFpEF, chronic hypoxic respiratory failure, CKD stage IV, history of nephrotic syndrome, Hx EDER   For complete admission information, please see history and physical.     Consultations:  nephrology     Procedures/Scans:  CXR  CT head without contrast  TTE    Today, the patient was resting in no acute distress. She reports overall feeling about the same today. No new complaints. She denies increased shortness of breath or cough. No difficulty with urination or dysuria.  She denies any further visual hallucinations today.    Room location at the time of evaluation was 303a.    ----------------------------------------------------------------------------------------------------------------------  Current Hospital Meds:  apixaban, 5 mg, Oral, Q12H  aspirin, 81 mg, Oral, Daily  atorvastatin, 20 mg, Oral, Nightly  ferrous sulfate, 325 mg, Oral, Daily With Breakfast  FLUoxetine, 20 mg, Oral, Daily  fluticasone, 2 spray, Each Nare, Daily  guaiFENesin, 400 mg, Oral, TID  hydrALAZINE, 25 mg, Oral, TID  insulin glargine, 40 Units, Subcutaneous, Nightly  insulin lispro, 2-9 Units, Subcutaneous, 4x Daily AC & at Bedtime  isosorbide mononitrate, 30 mg, Oral, Daily  latanoprost, 1 drop, Both Eyes, Nightly  linaclotide, 145 mcg, Oral, QAM AC  metoprolol tartrate, 50 mg, Oral, BID  multivitamin with minerals, 1 tablet, Oral, Daily  nystatin, , Topical, Q12H  pantoprazole, 40 mg, Oral, Q AM  Patiromer Sorbitex Calcium, 1 each, Oral, Daily  vitamin B-12, 1,000 mcg, Oral, Daily          ----------------------------------------------------------------------------------------------------------------------      Objective     Vital Signs:  Temp:  [97.5 °F (36.4 °C)-98.3 °F (36.8 °C)] 97.8 °F (36.6 °C)  Heart Rate:  [79-85] 79  Resp:  [20-24] 20  BP: (117-141)/(65-94) 120/66      07/22/23  1405 07/22/23  1915 07/23/23  0500   Weight: 125 kg (275 lb) (!) 155 kg (340 lb 11.2 oz) (!) 155 kg (340 lb 11.2 oz) (New admit less than 24 hours)     Body mass index is 54.99 kg/m².    Intake/Output Summary (Last 24 hours) at 7/23/2023 1003  Last data filed at 7/23/2023 0500  Gross per 24 hour   Intake --   Output 1950 ml   Net -1950 ml     No intake/output data recorded.  Diet: Cardiac Diets, Diabetic Diets, Renal Diets; Healthy Heart (2-3 Na+); Consistent Carbohydrate; Low Sodium (2-3g), Low Potassium, Low Phosphorus; Texture: Regular Texture (IDDSI 7); Fluid Consistency: Thin (IDDSI 0)  ----------------------------------------------------------------------------------------------------------------------    Physical Exam  Vitals and nursing note reviewed.   Constitutional:       General: She is not in acute distress.  HENT:      Head: Normocephalic and atraumatic.   Eyes:      Extraocular Movements: Extraocular movements intact.      Conjunctiva/sclera: Conjunctivae normal.   Cardiovascular:      Rate and Rhythm: Normal rate and regular rhythm.   Pulmonary:      Effort: Pulmonary effort is normal.      Comments: Diminished bilaterally  Abdominal:      Palpations: Abdomen is soft.      Tenderness: There is no abdominal tenderness.   Musculoskeletal:      Right lower leg: Edema present.      Left lower leg: Edema present.      Comments: 1+   Skin:     General: Skin is warm and dry.   Neurological:      Mental Status: She is alert. Mental status is at baseline.   Psychiatric:         Mood and Affect: Mood normal.         Behavior: Behavior normal.               ----------------------------------------------------------------------------------------------------------------------  Tele:      ----------------------------------------------------------------------------------------------------------------------  Results from last 7 days   Lab Units 07/22/23  1636 07/22/23  1413   HSTROP T ng/L 53* 57*   PROBNP pg/mL  --  30,507.0*     Results from last 7 days   Lab Units 07/23/23  0806 07/22/23  1413   CRP mg/dL  --  <0.30   LACTATE mmol/L  --  0.8   WBC 10*3/mm3 7.23 7.58   HEMOGLOBIN g/dL 9.9* 10.0*   HEMATOCRIT % 33.2* 33.7*   MCV fL 90.2 88.5   MCHC g/dL 29.8* 29.7*   PLATELETS 10*3/mm3 298 313     Results from last 7 days   Lab Units 07/22/23  1515   PH, ARTERIAL pH units 7.329*   PO2 ART mm Hg 78.3*   PCO2, ARTERIAL mm Hg 50.5*   HCO3 ART mmol/L 26.5*     Results from last 7 days   Lab Units 07/23/23  0806 07/22/23  1413   SODIUM mmol/L 137 137   POTASSIUM mmol/L 3.8 4.1   MAGNESIUM mg/dL  --  2.2   CHLORIDE mmol/L 102 102   CO2 mmol/L 24.7 25.1   BUN mg/dL 65* 64*   CREATININE mg/dL 2.54* 2.49*   CALCIUM mg/dL 8.8 8.9   GLUCOSE mg/dL 127* 130*   ALBUMIN g/dL 3.4* 3.4*   BILIRUBIN mg/dL 0.4 0.4   ALK PHOS U/L 111 117   AST (SGOT) U/L 12 19   ALT (SGPT) U/L 9 9   Estimated Creatinine Clearance: 30.8 mL/min (A) (by C-G formula based on SCr of 2.54 mg/dL (H)).  No results found for: AMMONIA      No results found for: BLOODCX  No results found for: URINECX  No results found for: WOUNDCX  No results found for: STOOLCX  ----------------------------------------------------------------------------------------------------------------------  Imaging Results (Last 24 Hours)       Procedure Component Value Units Date/Time    XR Chest 1 View [[id number]] Collected: 07/22/23 1638     Updated: 07/22/23 1702    Narrative:         Portable upright AP view chest.     Comparison: 6/5/2023     Findings: Median sternotomy wires and hardware again noted.  Cardiac  silhouette is enlarged,  allowing for technique, diffuse interstitial  prominence noted.  No pleural effusion.  Elevation of the right  hemidiaphragm noted.     No confluent opacification.       Impression:         Mild bilateral interstitial prominence which may suggest volume overload  in the proper clinical setting.     This report was finalized on 7/22/2023 4:41 PM by Kristen Jara MD.       CT Head Without Contrast [[id number]] Collected: 07/22/23 1533     Updated: 07/22/23 1537    Narrative:      EXAMINATION:Computed tomography(CT)of the head without IV contrast     TECHNIQUE:CT of the head was performed in the supine position without  intravenous contrast according to as low as reasonably achievable dose  protocol. Axial CT utilized with slice thickness of 5 mm presented in  soft tissue and bone algorithms.     COMPARISON:No prior studies are available for review at the time of this  dictation.     FINDINGS:     No acute intra-or extra-axial hemorrhage or fluid collections are  identified. The ventricles are of normal size,shape,and morphology. The  basilar cisterns are patent. No mass effect or midline shift is seen.  The gray-white matter differentiation is normal. The visualized portions  of the orbits,paranasal sinuses,and mastoids appear normal. No acute  fracture is identified.       Impression:         No acute intracranial hemorrhage,midline shift,or significant mass  effect.     This report was finalized on 7/22/2023 3:35 PM by Kristen Jara MD.             ----------------------------------------------------------------------------------------------------------------------   I have reviewed the above laboratory values for 07/23/23    Assessment/Plan     Active Hospital Problems    Diagnosis  POA    **Acute on chronic diastolic CHF (congestive heart failure) [I50.33]  Yes         ASSESSMENT/PLAN:    Acute on chronic heart failure with preserved ejection fraction, POA  NSTEMI, T1 versus T2, POA, possibly 2/2 above  WENDY  on chronic CKD stage IV, POA, likely 2/2 cardiorenal syndrome  Acute encephalopathy, POA, currently unclear etiology  Patient presents from local SNF secondary to AMS, lethargy and increased swelling.  Patient AOx3 on arrival to the ED per ED provider note though does admit to visual hallucinations  Work-up revealed HS troponin 57 with repeat at 53.  proBNP is 30,507.  CXR suggestive of volume overload.  EKG with normal sinus rhythm, low voltage QRS.  Creatinine also increased at 2.49.  Patient received Bumex 2 mg in the ED  She will be admitted to telemetry for further observation with continuous cardiac monitoring/pulse oximetry  TTE noted normal LVEF at 56 to 60%.  Diastolic function with grade 2 with high LAP pseudonormalization.  Review of I's and O's notes nearly 2 L of output yesterday  A.m. labs with interval increase in creatinine to 2.54.  Otherwise stable overall.  Continue Daily weights, strictly monitor I's and O's and clinical volume status closely.  Continue to diurese as indicated  Inpatient heart failure navigator consult placed  We will obtain a ReDs vest value-remains pending  Avoid nephrotoxins as able  Per review of recent discharge summary patient with history of nephrotic syndrome 6 to 8 months ago and failed to follow-up with nephrology.  We will consult nephrology for further assistance, appreciated.  Suspect WENDY likely 2/2 cardiorenal syndrome type I. Recommend IV lasix 100 mg over 10 hours today as well as sodium and fluid restriction.   Continue supportive care  Patient remains AOx3, denies any further visual hallucinations     Chronic:  CAD s/p CABG  A-fib/flutter  HTN  HLD  T2DM-insulin-dependent  JESIKA  Chronic hypoxic respiratory failure  Hx EDER  Plan to restart home meds as indicated pending med rec  Initiate insulin regimen at appropriate doses.  Accu-Cheks to monitor with hypoglycemia protocol in place  H&H appears around baseline continue to monitor with serial labs  Monitor vital  signs per protocol      -----------  -DVT prophylaxis: Chronically anticoagulated with eliquis  -Disposition plans/anticipated needs: pending course, plan to return to SNF following clinical improvement.         The patient is high risk due to the following diagnoses/reasons: Acute on chronic HFpEF        Jose Cook PA-C  07/23/23  10:03 EDT     Electronically signed by Santhosh Sotelo DO at 07/23/23 1508          Consult Notes (all)        Elaina Bell MD at 07/23/23 1217        Consult Orders    1. Inpatient Nephrology Consult [[id number]] ordered by Santhosh Sotelo DO at 07/22/23 1815                 Nephrology Consult Note    Referring Provider: Dr. Gleason  Reason for Consultation: WENDY    Subjective       History of present illness:  Jolly Garcia is a 72 y.o. female who presented to [facility] emergency department with chief complaint of shortness of breath.  Patient is known to have coronary artery disease, essential hypertension, type 2 diabetes mellitus and history of decompensated heart failure who has been recently discharged from the hospital to nursing home.  Came back with complaint of worsening shortness of breath.  Patient is known to have a chronic kidney disease stage IV and her baseline creatinine appears to be around 2.0, on admission her creatinine was 2.5.  Patient stated that she has been drinking a little bit more fluid and not sure whether she has been given high salt diet.  Patient denied chronic NSAIDS use. Patient denies hematuria, dysuria, difficulty passing urine. No prior history of renal stones. No family history of renal disease    History  Past Medical History:   Diagnosis Date    Anxiety     CAD (coronary artery disease)     s/p CABG    Chronic hypoxemic respiratory failure     Wears 2 L/min at home    Essential hypertension     Hyperlipidemia     Type 2 diabetes mellitus    ,   Past Surgical History:   Procedure Laterality Date    CORONARY  ARTERY BYPASS GRAFT  2011    HYSTERECTOMY     ,   Family History   Problem Relation Age of Onset    Diabetes Mother    ,   Social History     Tobacco Use    Smoking status: Former     Passive exposure: Past   Vaping Use    Vaping Use: Never used   Substance Use Topics    Alcohol use: Never    Drug use: Never   ,   Medications Prior to Admission   Medication Sig Dispense Refill Last Dose    aspirin 81 MG chewable tablet Chew 1 tablet Daily.   7/22/2023    atorvastatin (LIPITOR) 20 MG tablet Take 1 tablet by mouth Every Night.   7/21/2023    bumetanide (BUMEX) 1 MG tablet Take 1 tablet by mouth Daily.   7/22/2023    Coenzyme Q10 (CoQ10) 50 MG capsule Take 2 capsules by mouth Daily.   7/22/2023    diphenhydrAMINE (BENADRYL) 25 mg capsule Take 1 capsule by mouth Every 8 (Eight) Hours As Needed for Itching.   Past Month    Eliquis 5 MG tablet tablet Take 1 tablet by mouth Every 12 (Twelve) Hours.   7/22/2023 at 0800    ferrous sulfate 325 (65 FE) MG tablet Take 1 tablet by mouth Daily With Breakfast.   7/22/2023    FLUoxetine (PROzac) 20 MG capsule Take 1 capsule by mouth Daily.   7/22/2023    fluticasone (FLONASE) 50 MCG/ACT nasal spray 2 sprays by Each Nare route Daily. 9.9 mL 0 7/22/2023    guaiFENesin (HUMIBID 3) 400 MG tablet Take 1 tablet by mouth 3 (Three) Times a Day.   7/22/2023 at 1200    hydrALAZINE (APRESOLINE) 25 MG tablet Take 1 tablet by mouth 3 (Three) Times a Day.   7/22/2023 at 1200    HYDROcodone-acetaminophen (NORCO) 5-325 MG per tablet Take 1 tablet by mouth Every 8 (Eight) Hours As Needed for Moderate Pain.   7/22/2023    insulin aspart (novoLOG FLEXPEN) 100 UNIT/ML solution pen-injector sc pen Inject 2-10 Units under the skin into the appropriate area as directed 4 (Four) Times a Day Before Meals & at Bedtime.   7/22/2023 at 1100    Insulin Glargine (Lantus SoloStar) 100 UNIT/ML injection pen Inject 40 Units under the skin into the appropriate area as directed Every Night.   7/21/2023    isosorbide  mononitrate (IMDUR) 30 MG 24 hr tablet Take 1 tablet by mouth Daily. 90 tablet 0 7/22/2023    latanoprost (XALATAN) 0.005 % ophthalmic solution Administer 1 drop to both eyes Every Night.   7/21/2023    linaclotide (LINZESS) 145 MCG capsule capsule Take 1 capsule by mouth Every Morning Before Breakfast.   7/22/2023    LORazepam (ATIVAN) 0.5 MG tablet Take 1 tablet by mouth Every 12 (Twelve) Hours As Needed for Anxiety.   7/22/2023    metoprolol tartrate (LOPRESSOR) 50 MG tablet Take 1 tablet by mouth 2 (Two) Times a Day.   7/22/2023 at 0800    multivitamin with minerals tablet tablet Take 1 tablet by mouth Daily.   7/22/2023    multivitamin with minerals tablet tablet Take 1 tablet by mouth Daily.   7/22/2023    pantoprazole (PROTONIX) 40 MG EC tablet Take 1 tablet by mouth Every Morning. 90 tablet 0 7/22/2023    Patiromer Sorbitex Calcium (Veltassa) 16.8 g pack Take 1 packet by mouth Daily.   7/22/2023    Semaglutide, 1 MG/DOSE, (Ozempic, 1 MG/DOSE,) 4 MG/3ML solution pen-injector Inject 1 mg under the skin into the appropriate area as directed 1 (One) Time Per Week.   7/21/2023    vitamin B-12 (CYANOCOBALAMIN) 1000 MCG tablet Take 1 tablet by mouth Daily. 30 tablet 0 7/22/2023    acetaminophen (TYLENOL) 500 MG tablet Take 2 tablets by mouth Every 8 (Eight) Hours As Needed for Mild Pain.   Unknown    dextromethorphan-guaifenesin (ROBITUSSIN-DM)  MG/5ML syrup Take 5 mL by mouth Every 4 (Four) Hours As Needed (Cough).   Unknown    ondansetron (ZOFRAN) 4 MG tablet Take 1 tablet by mouth Every 6 (Six) Hours As Needed for Nausea or Vomiting.   Unknown    sodium chloride 0.65 % nasal spray 2 sprays into the nostril(s) as directed by provider Every 2 (Two) Hours As Needed for Congestion.   Unknown   , Scheduled Meds:  apixaban, 5 mg, Oral, Q12H  aspirin, 81 mg, Oral, Daily  atorvastatin, 20 mg, Oral, Nightly  ferrous sulfate, 325 mg, Oral, Daily With Breakfast  FLUoxetine, 20 mg, Oral, Daily  fluticasone, 2 spray,  Each Nare, Daily  furosemide (LASIX) in NS IVPB, 100 mg, Intravenous, Once  guaiFENesin, 400 mg, Oral, TID  hydrALAZINE, 25 mg, Oral, TID  insulin glargine, 40 Units, Subcutaneous, Nightly  insulin lispro, 2-9 Units, Subcutaneous, 4x Daily AC & at Bedtime  isosorbide mononitrate, 30 mg, Oral, Daily  latanoprost, 1 drop, Both Eyes, Nightly  linaclotide, 145 mcg, Oral, QAM AC  metoprolol tartrate, 50 mg, Oral, BID  multivitamin with minerals, 1 tablet, Oral, Daily  nystatin, , Topical, Q12H  pantoprazole, 40 mg, Oral, Q AM  Patiromer Sorbitex Calcium, 1 each, Oral, Daily  vitamin B-12, 1,000 mcg, Oral, Daily    , Continuous Infusions:   , PRN Meds:    acetaminophen    dextrose    dextrose    diphenhydrAMINE    glucagon (human recombinant)    HYDROcodone-acetaminophen    LORazepam    magic barrier cream    nitroglycerin    ondansetron    [COMPLETED] Insert Peripheral IV **AND** sodium chloride    sodium chloride and Allergies:  Patient has no known allergies.    Review of Systems  More than 10 point review of systems was done. Pertinent items are noted in HPI, all other systems reviewed and negative    Objective     Vital Signs  Temp:  [97.5 °F (36.4 °C)-98.3 °F (36.8 °C)] 97.9 °F (36.6 °C)  Heart Rate:  [79-85] 80  Resp:  [16-24] 16  BP: (110-141)/(60-94) 110/60    Intake/Output                   07/22/23 0701 - 07/23/23 0700     [id number] 1740-0124 Total              Intake    Total Intake -- -- --       Output    Urine  1000  950 1950    Total Output 8503 [id number]             Physical Examination:  General Appearance: not in acute distress  No JVD  Lungs: Bilateral decreased intensity of breath sounds  Heart: Regular rhythm & normal rate, normal S1, S2, no murmur, no gallop, no rub   Abdomen: Normal bowel sounds, no masses and soft non-tender  Extremities: 2+ edema  Neurologic: Orientated to person, place, time, grossly no focal deficitis    Laboratory Data :      WBC WBC   Date Value Ref Range Status   07/23/2023  7.23 3.40 - 10.80 10*3/mm3 Final   07/22/2023 7.58 3.40 - 10.80 10*3/mm3 Final      HGB Hemoglobin   Date Value Ref Range Status   07/23/2023 9.9 (L) 12.0 - 15.9 g/dL Final   07/22/2023 10.0 (L) 12.0 - 15.9 g/dL Final      HCT Hematocrit   Date Value Ref Range Status   07/23/2023 33.2 (L) 34.0 - 46.6 % Final   07/22/2023 33.7 (L) 34.0 - 46.6 % Final      Platlets No results found for: LABPLAT   MCV MCV   Date Value Ref Range Status   07/23/2023 90.2 79.0 - 97.0 fL Final   07/22/2023 88.5 79.0 - 97.0 fL Final          Sodium Sodium   Date Value Ref Range Status   07/23/2023 137 136 - 145 mmol/L Final   07/22/2023 137 136 - 145 mmol/L Final      Potassium Potassium   Date Value Ref Range Status   07/23/2023 3.8 3.5 - 5.2 mmol/L Final   07/22/2023 4.1 3.5 - 5.2 mmol/L Final     Comment:     Slight hemolysis detected by analyzer. Results may be affected.      Chloride Chloride   Date Value Ref Range Status   07/23/2023 102 98 - 107 mmol/L Final   07/22/2023 102 98 - 107 mmol/L Final      CO2 CO2   Date Value Ref Range Status   07/23/2023 24.7 22.0 - 29.0 mmol/L Final   07/22/2023 25.1 22.0 - 29.0 mmol/L Final      BUN BUN   Date Value Ref Range Status   07/23/2023 65 (H) 8 - 23 mg/dL Final   07/22/2023 64 (H) 8 - 23 mg/dL Final      Creatinine Creatinine   Date Value Ref Range Status   07/23/2023 2.54 (H) 0.57 - 1.00 mg/dL Final   07/22/2023 2.49 (H) 0.57 - 1.00 mg/dL Final      Calcium Calcium   Date Value Ref Range Status   07/23/2023 8.8 8.6 - 10.5 mg/dL Final   07/22/2023 8.9 8.6 - 10.5 mg/dL Final      PO4 No results found for: CAPO4   Albumin Albumin   Date Value Ref Range Status   07/23/2023 3.4 (L) 3.5 - 5.2 g/dL Final   07/22/2023 3.4 (L) 3.5 - 5.2 g/dL Final      Magnesium Magnesium   Date Value Ref Range Status   07/22/2023 2.2 1.6 - 2.4 mg/dL Final      Uric Acid No results found for: URICACID     Radiology results :     Imaging Results (Last 72 Hours)       Procedure Component Value Units Date/Time    XR  Chest 1 View [[id number]] Collected: 07/22/23 1638     Updated: 07/22/23 1702    Narrative:         Portable upright AP view chest.     Comparison: 6/5/2023     Findings: Median sternotomy wires and hardware again noted.  Cardiac  silhouette is enlarged, allowing for technique, diffuse interstitial  prominence noted.  No pleural effusion.  Elevation of the right  hemidiaphragm noted.     No confluent opacification.       Impression:         Mild bilateral interstitial prominence which may suggest volume overload  in the proper clinical setting.     This report was finalized on 7/22/2023 4:41 PM by Kristen Jara MD.       CT Head Without Contrast [[id number]] Collected: 07/22/23 1533     Updated: 07/22/23 1537    Narrative:      EXAMINATION:Computed tomography(CT)of the head without IV contrast     TECHNIQUE:CT of the head was performed in the supine position without  intravenous contrast according to as low as reasonably achievable dose  protocol. Axial CT utilized with slice thickness of 5 mm presented in  soft tissue and bone algorithms.     COMPARISON:No prior studies are available for review at the time of this  dictation.     FINDINGS:     No acute intra-or extra-axial hemorrhage or fluid collections are  identified. The ventricles are of normal size,shape,and morphology. The  basilar cisterns are patent. No mass effect or midline shift is seen.  The gray-white matter differentiation is normal. The visualized portions  of the orbits,paranasal sinuses,and mastoids appear normal. No acute  fracture is identified.       Impression:         No acute intracranial hemorrhage,midline shift,or significant mass  effect.     This report was finalized on 7/22/2023 3:35 PM by Kristen Jara MD.                 Medications:      apixaban, 5 mg, Oral, Q12H  aspirin, 81 mg, Oral, Daily  atorvastatin, 20 mg, Oral, Nightly  ferrous sulfate, 325 mg, Oral, Daily With Breakfast  FLUoxetine, 20 mg, Oral, Daily  fluticasone, 2  spray, Each Nare, Daily  furosemide (LASIX) in NS IVPB, 100 mg, Intravenous, Once  guaiFENesin, 400 mg, Oral, TID  hydrALAZINE, 25 mg, Oral, TID  insulin glargine, 40 Units, Subcutaneous, Nightly  insulin lispro, 2-9 Units, Subcutaneous, 4x Daily AC & at Bedtime  isosorbide mononitrate, 30 mg, Oral, Daily  latanoprost, 1 drop, Both Eyes, Nightly  linaclotide, 145 mcg, Oral, QAM AC  metoprolol tartrate, 50 mg, Oral, BID  multivitamin with minerals, 1 tablet, Oral, Daily  nystatin, , Topical, Q12H  pantoprazole, 40 mg, Oral, Q AM  Patiromer Sorbitex Calcium, 1 each, Oral, Daily  vitamin B-12, 1,000 mcg, Oral, Daily           Assessment & Plan       Acute on chronic diastolic CHF (congestive heart failure)      -WENDY, nonoliguric  - Chronic kidney disease stage IV A3  - Type 2 diabetes mellitus  - Acute on chronic decompensated heart failure, preserved ejection fraction  - Morbid obesity  - History of nephrotic syndrome    WENDY on CKD most likely due to cardiorenal syndrome type I  Baseline creatinine appears to be 2 admitted with 2.5  Significant fluid overload clinically  - Start on IV Lasix drip 100 mg over 10 hours  - Get UA with microscopy  - Strict intake and output  - 1 g salt restriction and 1 L fluid restriction per day  - Please avoid any nephrotoxic agents, hypotension and adjust medications according to estimated GFR    Thanks Dr Gleason for the consult. Nephrology will follow the patient.   I discussed the patient's findings and my recommendations with patient    Elaina Bell MD  07/23/23  12:17 EDT      Electronically signed by Elaina Bell MD at 07/23/23 5513

## 2023-07-24 NOTE — CONSULTS
Consult received for HF Education. Per Dr. Bell note today, patient with CKD IV A3 and acute on chronic decompensated HFpEF likely due to cardiorenal syndrome type I. Patient being given one time dose of diuril today followed by lasix 40 mg IVP and then IV lasix drip. Patient is resident of SNF where meds, diet, weights are monitored by facility staff.

## 2023-07-24 NOTE — PAYOR COMM NOTE
"CONTACT: JARRETT DAVIES RN  UTILIZATION MANAGEMENT DEPT.  [facility]  [address] [place] [address]  PHONE: [phone]  FAX: [phone]        INPATIENT AUTH REQUEST     PT WAS PLACED IN OBSERVATION ON 7/22/23 AND CONVERTED TO INPATIENT STATUS ON 7/24/23.     REF# [id number] SUBMITTED VIA [employer]            MRN# [id number]                  Yvonne Nolan (72 y.o. Female)       Date of Birth   1950    Social Security Number       Address   [address]  [facility] [address]    Home Phone   [phone]    MRN   [id number]       Muslim   None    Marital Status   Single                            Admission Date   7/22/23    Admission Type   Emergency    Admitting Provider   Santhosh Sotelo DO    Attending Provider   Juvenal Mahajan DO    Department, Room/Bed   [facility], 3303/1S       Discharge Date       Discharge Disposition       Discharge Destination                                 Attending Provider: Juvenal Mahajan DO    Allergies: No Known Allergies    Isolation: None   Infection: None   Code Status: CPR    Ht: 167.6 cm (66\")   Wt: 155 kg (342 lb 1.6 oz)    Admission Cmt: None   Principal Problem: Acute on chronic diastolic CHF (congestive heart failure) [I50.33]                   Active Insurance as of 7/22/2023       Primary Coverage       Payor Plan Insurance Group Employer/Plan Group    HUMANA MEDICARE REPLACEMENT HUMANA MEDICARE REPLACEMENT [id number]       Payor Plan Address Payor Plan Phone Number Payor Plan Fax Number Effective Dates    [address] [address] [phone]  1/1/2018 - None Entered    [facility] [address]         Subscriber Name Subscriber Birth Date Member ID       YVONNE NOLAN 1950 [id number]               Secondary Coverage       Payor Plan Insurance Group Employer/Plan Group    [employer]        Payor Plan Address Payor Plan Phone Number Payor Plan Fax " Number Effective Dates    [address] [phone]  1/1/2022 - None Entered    [facility] [id number]         Subscriber Name Subscriber Birth Date Member ID       YVONNE NOLAN 1950 [id number]                     Emergency Contacts        (Rel.) Home Phone Work Phone Mobile Phone    DARCI MARTIN (Relative) [phone] -- --

## 2023-07-24 NOTE — PLAN OF CARE
Goal Outcome Evaluation:    Pt is resting in bed with no s/s of distress noted. VSS. IV access maintained. Increased urine output this shift. Will continue with plan of care.       Daily Care Plan Summary: Heart Failure    Diuretic in use (IV or PO):   IV        Daily weight (up or down):    Decreased      Output > Intake (yes/no): Yes       O2 Requirements (current, any change?):  2 L NC      Symptoms noted with Activity (Respiratory Tolerance, functional state):    Shortness of breath with exertion       Anticipated Discharge Plans: Return to Nursing home at discharge.

## 2023-07-24 NOTE — PROGRESS NOTES
Patient Identification:  Name:  Jolly Garcia  Age:  72 y.o.  Sex:  female  :  1950  MRN:  [id number]  Visit Number:  [id number]  Primary Care Provider:  Whit Cadet APRN    Length of stay:  0    Subjective/Interval History/Consultants/Procedures     Chief Complaint:   Chief Complaint   Patient presents with    Shortness of Breath    Nausea    Abnormal Lab       Subjective/Interval History:    72 y.o. female who was admitted on 2023 with Acute on chronic heart failure with preserved ejection fraction      PMH is significant for  CAD, HTN, HLD, T2DM, HFpEF, chronic hypoxic respiratory failure, CKD stage IV, history of nephrotic syndrome, Hx EDER    For complete admission information, please see history and physical.     Consultations:  nephrology      Procedures/Scans:  CXR  CT head without contrast  TTE    Today, the patient was seen and examined with RN at bedside. Patient was having a mild nosebleed when I entered though had resolved within a few minutes during our conversation with pressure.  She reports overall feeling the same. No increased shortness of breath. Lower extremity edema appears increased again today. No new complaints noted. Patient did state she was constipated despite her home Linzess. Will monitor and adjust regimen as needed.     Room location at the time of evaluation was 303.    ----------------------------------------------------------------------------------------------------------------------  Current Hospital Meds:  apixaban, 5 mg, Oral, Q12H  aspirin, 81 mg, Oral, Daily  atorvastatin, 20 mg, Oral, Nightly  ferrous sulfate, 325 mg, Oral, Daily With Breakfast  FLUoxetine, 20 mg, Oral, Daily  fluticasone, 2 spray, Each Nare, Daily  guaiFENesin, 400 mg, Oral, TID  hydrALAZINE, 25 mg, Oral, TID  insulin glargine, 40 Units, Subcutaneous, Nightly  insulin lispro, 2-9 Units, Subcutaneous, 4x Daily AC & at Bedtime  isosorbide mononitrate, 30 mg, Oral,  Daily  latanoprost, 1 drop, Both Eyes, Nightly  linaclotide, 145 mcg, Oral, QAM AC  metoprolol tartrate, 50 mg, Oral, BID  multivitamin with minerals, 1 tablet, Oral, Daily  nystatin, , Topical, Q12H  pantoprazole, 40 mg, Oral, Q AM  Patiromer Sorbitex Calcium, 1 each, Oral, Daily  vitamin B-12, 1,000 mcg, Oral, Daily         ----------------------------------------------------------------------------------------------------------------------      Objective     Vital Signs:  Temp:  [97.5 °F (36.4 °C)-98.4 °F (36.9 °C)] 97.8 °F (36.6 °C)  Heart Rate:  [77-94] 81  Resp:  [16-20] 18  BP: ()/(60-80) 121/70      07/22/23  1915 07/23/23  0500 07/24/23  0500   Weight: (!) 155 kg (340 lb 11.2 oz) (!) 155 kg (340 lb 11.2 oz) (New admit less than 24 hours) (!) 155 kg (342 lb 1.6 oz)     Body mass index is 55.22 kg/m².    Intake/Output Summary (Last 24 hours) at 7/24/2023 0845  Last data filed at 7/24/2023 0707  Gross per 24 hour   Intake 830 ml   Output 1100 ml   Net -270 ml     I/O this shift:  In: -   Out: 200 [Urine:200]  Diet: Cardiac Diets, Diabetic Diets, Renal Diets; Healthy Heart (2-3 Na+); Consistent Carbohydrate; Low Potassium, Low Phosphorus, Low Sodium (2-3g); Texture: Regular Texture (IDDSI 7); Fluid Consistency: Thin (IDDSI 0)  ----------------------------------------------------------------------------------------------------------------------    Physical Exam  Vitals and nursing note reviewed.   Constitutional:       General: She is not in acute distress.     Appearance: She is obese.   HENT:      Head: Normocephalic and atraumatic.   Eyes:      Extraocular Movements: Extraocular movements intact.      Conjunctiva/sclera: Conjunctivae normal.   Cardiovascular:      Rate and Rhythm: Normal rate and regular rhythm.   Pulmonary:      Effort: Pulmonary effort is normal.      Breath sounds: Normal breath sounds.   Abdominal:      Palpations: Abdomen is soft.      Tenderness: There is no abdominal tenderness.    Musculoskeletal:      Right lower leg: Edema present.      Left lower leg: Edema present.      Comments: 2+   Skin:     General: Skin is warm and dry.   Neurological:      Mental Status: She is alert. Mental status is at baseline.   Psychiatric:         Mood and Affect: Mood normal.         Behavior: Behavior normal.              ----------------------------------------------------------------------------------------------------------------------  Tele:        ----------------------------------------------------------------------------------------------------------------------  Results from last 7 days   Lab Units 07/22/23  1636 07/22/23  1413   HSTROP T ng/L 53* 57*   PROBNP pg/mL  --  30,507.0*     Results from last 7 days   Lab Units 07/24/23  0254 07/23/23  0806 07/22/23  1413   CRP mg/dL  --   --  <0.30   LACTATE mmol/L  --   --  0.8   WBC 10*3/mm3 7.63 7.23 7.58   HEMOGLOBIN g/dL 9.7* 9.9* 10.0*   HEMATOCRIT % 33.5* 33.2* 33.7*   MCV fL 91.0 90.2 88.5   MCHC g/dL 29.0* 29.8* 29.7*   PLATELETS 10*3/mm3 286 298 313     Results from last 7 days   Lab Units 07/22/23  1515   PH, ARTERIAL pH units 7.329*   PO2 ART mm Hg 78.3*   PCO2, ARTERIAL mm Hg 50.5*   HCO3 ART mmol/L 26.5*     Results from last 7 days   Lab Units 07/24/23  0254 07/23/23  0806 07/22/23  1413   SODIUM mmol/L 137 137 137   POTASSIUM mmol/L 4.2 3.8 4.1   MAGNESIUM mg/dL  --   --  2.2   CHLORIDE mmol/L 103 102 102   CO2 mmol/L 23.4 24.7 25.1   BUN mg/dL 61* 65* 64*   CREATININE mg/dL 2.64* 2.54* 2.49*   CALCIUM mg/dL 8.4* 8.8 8.9   GLUCOSE mg/dL 170* 127* 130*   ALBUMIN g/dL 3.1* 3.4* 3.4*   BILIRUBIN mg/dL 0.3 0.4 0.4   ALK PHOS U/L 105 111 117   AST (SGOT) U/L 14 12 19   ALT (SGPT) U/L 8 9 9   Estimated Creatinine Clearance: 29.7 mL/min (A) (by C-G formula based on SCr of 2.64 mg/dL (H)).  No results found for: AMMONIA      Blood Culture   Date Value Ref Range Status   07/22/2023 No growth at 24 hours  Preliminary   07/22/2023 No growth at 24  hours  Preliminary     No results found for: URINECX  No results found for: WOUNDCX  No results found for: STOOLCX  ----------------------------------------------------------------------------------------------------------------------  Imaging Results (Last 24 Hours)       ** No results found for the last 24 hours. **          ----------------------------------------------------------------------------------------------------------------------   I have reviewed the above laboratory values for 07/24/23    Assessment/Plan     Active Hospital Problems    Diagnosis  POA    **Acute on chronic diastolic CHF (congestive heart failure) [I50.33]  Yes         ASSESSMENT/PLAN:  Acute on chronic heart failure with preserved ejection fraction, POA  NSTEMI, T1 versus T2, POA, possibly 2/2 above  WENDY on chronic CKD stage IV, POA, likely 2/2 cardiorenal syndrome  Patient presents from local SNF secondary to AMS, lethargy and increased swelling.  Patient AOx3 on arrival to the ED per ED provider note though does admit to visual hallucinations prior to arrival  Work-up revealed HS troponin 57 with repeat at 53.  proBNP is 30,507.  CXR suggestive of volume overload.  EKG with normal sinus rhythm, low voltage QRS. Patient denies any chest pain.  Creatinine also increased at 2.49 on arrival from baseline 2.0  Patient received Bumex 2 mg in the ED  She will be admitted to telemetry for further observation with continuous cardiac monitoring/pulse oximetry  TTE noted normal LVEF at 56 to 60%.  Diastolic function with grade 2 with high LAP pseudonormalization.  Diuresed well yesterday with approximately 2L net negative however renal function continues to decline, A.m. labs with interval increase in creatinine to 2.64.  Labs otherwise stable overall.  Continue Daily weights, strictly monitor I's and O's and clinical volume status closely.  Inpatient heart failure navigator consult placed  ReDs vest value yesterday normal at 23%  Avoid  nephrotoxins as able  Nephrology consulted for further assistance. Appreciated. Suspect WENDY likely 2/2 cardiorenal syndrome type I. Patient diuresed with Lasix gtt yesterday. Follow up further recommendations.  Continue supportive care  Patient remains AOx3, denies any further visual hallucinations     Chronic:  CAD s/p CABG  A-fib/flutter  HTN  HLD  T2DM-insulin-dependent  JESIKA  Chronic hypoxic respiratory failure  Hx EDER  Plan to restart home meds as indicated pending med rec  Initiate insulin regimen at appropriate doses.  Accu-Cheks to monitor with hypoglycemia protocol in place  H&H appears around baseline continue to monitor with serial labs  Monitor vital signs per protocol        -----------  -DVT prophylaxis: Chronically anticoagulated with eliquis  -Disposition plans/anticipated needs:Plan to return to SNF following clinical improvement. Case management consulted to assist.         The patient is high risk due to the following diagnoses/reasons:  CHFe, WENDY, cardiorenal syndrome        Jose Cook PA-C  07/24/23  08:45 EDT

## 2023-07-24 NOTE — PROGRESS NOTES
Nephrology Progress Note      Subjective     Patient has continuous shortness of breath feels like she has slightly better compared to yesterday    Objective       Vital signs :     Temp:  [97.5 °F (36.4 °C)-98.4 °F (36.9 °C)] 97.8 °F (36.6 °C)  Heart Rate:  [77-94] 81  Resp:  [16-20] 18  BP: ()/(60-80) 121/70    Intake/Output                               07/22/23 0701 - 07/23/23 0700 07/23/23 0701 - 07/24/23 0700 07/24/23 0701 - 07/25/23 0700     0201-1368 2680-6179 Total 5880-1520 3836-3610 Total 8133-1502 5600-4303 Total                    Intake    P.O.  --  -- --  700  250 950  470  -- 470    Total Intake -- -- -- 700 250 950 470 -- 470       Output    Urine  1000  950 1950  0  900 900  200  -- 200    Total Output 7926 782 1332 0 900 900 200 -- 200             Physical Exam:    General Appearance : alert, pleasant, appears stated age, cooperative and alert  Lungs :  bilateral decreased intensity of breath sounds  Heart :  regular rhythm & normal rate, normal S1, S2 and no murmur, no rub  Abdomen : soft, non distended  Extremities : 2+ edema  Neurologic :   orientated to person, place, time and situation, Grossly no focal deficits        Laboratory Data :     Albumin Albumin   Date Value Ref Range Status   07/24/2023 3.1 (L) 3.5 - 5.2 g/dL Final   07/23/2023 3.4 (L) 3.5 - 5.2 g/dL Final   07/22/2023 3.4 (L) 3.5 - 5.2 g/dL Final      Magnesium Magnesium   Date Value Ref Range Status   07/22/2023 2.2 1.6 - 2.4 mg/dL Final          PTH               No results found for: PTH    CBC and coagulation:  Results from last 7 days   Lab Units 07/24/23  0254 07/23/23  0806 07/22/23  1413   LACTATE mmol/L  --   --  0.8   CRP mg/dL  --   --  <0.30   WBC 10*3/mm3 7.63 7.23 7.58   HEMOGLOBIN g/dL 9.7* 9.9* 10.0*   HEMATOCRIT % 33.5* 33.2* 33.7*   MCV fL 91.0 90.2 88.5   MCHC g/dL 29.0* 29.8* 29.7*   PLATELETS 10*3/mm3 286 298 313     Acid/base balance:  Results from last 7 days   Lab Units 07/22/23  1515   PH, ARTERIAL  pH units 7.329*   PO2 ART mm Hg 78.3*   PCO2, ARTERIAL mm Hg 50.5*   HCO3 ART mmol/L 26.5*     Renal and electrolytes:    Results from last 7 days   Lab Units 07/24/23  0254 07/23/23  0806 07/22/23  1413   SODIUM mmol/L 137 137 137   POTASSIUM mmol/L 4.2 3.8 4.1   MAGNESIUM mg/dL  --   --  2.2   CHLORIDE mmol/L 103 102 102   CO2 mmol/L 23.4 24.7 25.1   BUN mg/dL 61* 65* 64*   CREATININE mg/dL 2.64* 2.54* 2.49*   CALCIUM mg/dL 8.4* 8.8 8.9     Estimated Creatinine Clearance: 29.7 mL/min (A) (by C-G formula based on SCr of 2.64 mg/dL (H)).  @GFRCG:3@   Liver and pancreatic function:  Results from last 7 days   Lab Units 07/24/23  0254 07/23/23  0806 07/22/23  1413   ALBUMIN g/dL 3.1* 3.4* 3.4*   BILIRUBIN mg/dL 0.3 0.4 0.4   ALK PHOS U/L 105 111 117   AST (SGOT) U/L 14 12 19   ALT (SGPT) U/L 8 9 9         Cardiac:  Results from last 7 days   Lab Units 07/22/23  1413   PROBNP pg/mL 30,507.0*     Liver and pancreatic function:  Results from last 7 days   Lab Units 07/24/23  0254 07/23/23  0806 07/22/23  1413   ALBUMIN g/dL 3.1* 3.4* 3.4*   BILIRUBIN mg/dL 0.3 0.4 0.4   ALK PHOS U/L 105 111 117   AST (SGOT) U/L 14 12 19   ALT (SGPT) U/L 8 9 9       Medications :     apixaban, 5 mg, Oral, Q12H  aspirin, 81 mg, Oral, Daily  atorvastatin, 20 mg, Oral, Nightly  ferrous sulfate, 325 mg, Oral, Daily With Breakfast  FLUoxetine, 20 mg, Oral, Daily  fluticasone, 2 spray, Each Nare, Daily  guaiFENesin, 400 mg, Oral, TID  hydrALAZINE, 25 mg, Oral, TID  insulin glargine, 40 Units, Subcutaneous, Nightly  insulin lispro, 2-9 Units, Subcutaneous, 4x Daily AC & at Bedtime  isosorbide mononitrate, 30 mg, Oral, Daily  latanoprost, 1 drop, Both Eyes, Nightly  linaclotide, 145 mcg, Oral, QAM AC  metoprolol tartrate, 50 mg, Oral, BID  multivitamin with minerals, 1 tablet, Oral, Daily  nystatin, , Topical, Q12H  pantoprazole, 40 mg, Oral, Q AM  vitamin B-12, 1,000 mcg, Oral, Daily             Assessment & Plan     -WENDY, nonoliguric  - Chronic  kidney disease stage IV A3  - Type 2 diabetes mellitus  - Acute on chronic decompensated heart failure, preserved ejection fraction  - Morbid obesity  - History of nephrotic syndrome     No improvement in renal functions, in adequate urine output in last 24 hours.  Have done bladder scan that did not show any urinary retention.  Patient has had episodes of hypotension, will stop hydralazine scheduled and start on as needed for blood pressure more than 160 mmHg.  We will titrate up diuretics  WENDY on CKD most likely due to cardiorenal syndrome type I  Baseline creatinine appears to be 2 admitted with 2.5  Significant fluid overload clinically  -Diuril 500 mg IV once, 30 minutes after  - Lasix 40 mg IV push followed by 100 mg over 5 hours  - Follow-up get UA with microscopy  - Strict intake and output  - 1 g salt restriction and 1 L fluid restriction per day  - Please avoid any nephrotoxic agents, hypotension and adjust medications according to estimated GFR      Elaina Bell MD  07/24/23  10:13 EDT

## 2023-07-24 NOTE — CASE MANAGEMENT/SOCIAL WORK
Discharge Planning Assessment   Sina     Patient Name: Jolly Garcia  MRN: [id number]  Today's Date: 7/24/2023    Admit Date: 7/22/2023     Discharge Plan       Row Name 07/24/23 1213       Plan    Plan Pt was admitted from [facility] and [facility]. SS contacted [facility] per Vicky who states pt has a 9 day bed hold. SS to follow and assist as needed with discharge planning.                  Continued Care and Services - Admitted Since 7/22/2023       Destination       Service Provider Request Status Selected Services Address Phone Fax Patient Preferred    [facility] Pending - No Request Sent N/A [address] [place] [id number] [phone] --                  Selected Continued Care - Prior Encounters Includes continued care and service providers with selected services from prior encounters from 4/23/2023 to 7/24/2023      Discharged on 6/6/2023 Admission date: 6/1/2023 - Discharge disposition: Skilled Nursing Facility (DC - External)      Destination       Service Provider Selected Services Address Phone Fax Patient Preferred    [facility] [address] [place] [phone] [phone] --                          Expected Discharge Date and Time       Expected Discharge Date Expected Discharge Time    Jul 25, 2023            Demographic Summary       Row Name 07/24/23 1212       General Information    Referral Source nursing    Reason for Consult --  SS received consult for From [facility].             TRACI Germain

## 2023-07-25 LAB
ALBUMIN SERPL-MCNC: 3.1 G/DL (ref 3.5–5.2)
ALBUMIN/GLOB SERPL: 1 G/DL
ALP SERPL-CCNC: 103 U/L (ref 39–117)
ALT SERPL W P-5'-P-CCNC: 8 U/L (ref 1–33)
ANION GAP SERPL CALCULATED.3IONS-SCNC: 10.6 MMOL/L (ref 5–15)
AST SERPL-CCNC: 13 U/L (ref 1–32)
BILIRUB SERPL-MCNC: 0.4 MG/DL (ref 0–1.2)
BUN SERPL-MCNC: 66 MG/DL (ref 8–23)
BUN/CREAT SERPL: 24.9 (ref 7–25)
CALCIUM SPEC-SCNC: 8.5 MG/DL (ref 8.6–10.5)
CHLORIDE SERPL-SCNC: 101 MMOL/L (ref 98–107)
CO2 SERPL-SCNC: 25.4 MMOL/L (ref 22–29)
CREAT SERPL-MCNC: 2.65 MG/DL (ref 0.57–1)
DEPRECATED RDW RBC AUTO: 68.9 FL (ref 37–54)
EGFRCR SERPLBLD CKD-EPI 2021: 18.6 ML/MIN/1.73
ERYTHROCYTE [DISTWIDTH] IN BLOOD BY AUTOMATED COUNT: 21.4 % (ref 12.3–15.4)
GLOBULIN UR ELPH-MCNC: 3 GM/DL
GLUCOSE BLDC GLUCOMTR-MCNC: 110 MG/DL (ref 70–130)
GLUCOSE BLDC GLUCOMTR-MCNC: 116 MG/DL (ref 70–130)
GLUCOSE BLDC GLUCOMTR-MCNC: 144 MG/DL (ref 70–130)
GLUCOSE BLDC GLUCOMTR-MCNC: 153 MG/DL (ref 70–130)
GLUCOSE BLDC GLUCOMTR-MCNC: 170 MG/DL (ref 70–130)
GLUCOSE SERPL-MCNC: 145 MG/DL (ref 65–99)
HCT VFR BLD AUTO: 32.9 % (ref 34–46.6)
HGB BLD-MCNC: 9.8 G/DL (ref 12–15.9)
MCH RBC QN AUTO: 26.6 PG (ref 26.6–33)
MCHC RBC AUTO-ENTMCNC: 29.8 G/DL (ref 31.5–35.7)
MCV RBC AUTO: 89.4 FL (ref 79–97)
PLATELET # BLD AUTO: 281 10*3/MM3 (ref 140–450)
PMV BLD AUTO: 8.5 FL (ref 6–12)
POTASSIUM SERPL-SCNC: 3.9 MMOL/L (ref 3.5–5.2)
PROT SERPL-MCNC: 6.1 G/DL (ref 6–8.5)
RBC # BLD AUTO: 3.68 10*6/MM3 (ref 3.77–5.28)
SODIUM SERPL-SCNC: 137 MMOL/L (ref 136–145)
WBC NRBC COR # BLD: 8.59 10*3/MM3 (ref 3.4–10.8)

## 2023-07-25 PROCEDURE — 85027 COMPLETE CBC AUTOMATED: CPT

## 2023-07-25 PROCEDURE — 25010000002 FUROSEMIDE PER 20 MG: Performed by: INTERNAL MEDICINE

## 2023-07-25 PROCEDURE — 97167 OT EVAL HIGH COMPLEX 60 MIN: CPT | Performed by: OCCUPATIONAL THERAPIST

## 2023-07-25 PROCEDURE — 93010 ELECTROCARDIOGRAM REPORT: CPT | Performed by: INTERNAL MEDICINE

## 2023-07-25 PROCEDURE — 25010000002 CHLOROTHIAZIDE PER 500 MG: Performed by: INTERNAL MEDICINE

## 2023-07-25 PROCEDURE — 80053 COMPREHEN METABOLIC PANEL: CPT

## 2023-07-25 PROCEDURE — 99231 SBSQ HOSP IP/OBS SF/LOW 25: CPT

## 2023-07-25 PROCEDURE — 97162 PT EVAL MOD COMPLEX 30 MIN: CPT

## 2023-07-25 PROCEDURE — 63710000001 INSULIN GLARGINE PER 5 UNITS

## 2023-07-25 PROCEDURE — 93005 ELECTROCARDIOGRAM TRACING: CPT

## 2023-07-25 PROCEDURE — 63710000001 INSULIN LISPRO (HUMAN) PER 5 UNITS: Performed by: INTERNAL MEDICINE

## 2023-07-25 PROCEDURE — 82948 REAGENT STRIP/BLOOD GLUCOSE: CPT

## 2023-07-25 PROCEDURE — 63710000001 ONDANSETRON PER 8 MG: Performed by: HOSPITALIST

## 2023-07-25 RX ORDER — BISACODYL 10 MG
10 SUPPOSITORY, RECTAL RECTAL DAILY PRN
Status: DISCONTINUED | OUTPATIENT
Start: 2023-07-25 | End: 2023-07-28 | Stop reason: HOSPADM

## 2023-07-25 RX ORDER — LACTULOSE 10 G/15ML
10 SOLUTION ORAL EVERY 8 HOURS PRN
Status: DISCONTINUED | OUTPATIENT
Start: 2023-07-25 | End: 2023-07-28 | Stop reason: HOSPADM

## 2023-07-25 RX ORDER — BISACODYL 5 MG/1
5 TABLET, DELAYED RELEASE ORAL DAILY
Status: DISCONTINUED | OUTPATIENT
Start: 2023-07-26 | End: 2023-07-28 | Stop reason: HOSPADM

## 2023-07-25 RX ORDER — AMOXICILLIN 250 MG
2 CAPSULE ORAL 2 TIMES DAILY
Status: DISCONTINUED | OUTPATIENT
Start: 2023-07-25 | End: 2023-07-28 | Stop reason: HOSPADM

## 2023-07-25 RX ORDER — POLYETHYLENE GLYCOL 3350 17 G/17G
17 POWDER, FOR SOLUTION ORAL 2 TIMES DAILY
Status: DISCONTINUED | OUTPATIENT
Start: 2023-07-25 | End: 2023-07-28 | Stop reason: HOSPADM

## 2023-07-25 RX ORDER — FUROSEMIDE 10 MG/ML
40 INJECTION INTRAMUSCULAR; INTRAVENOUS ONCE
Status: COMPLETED | OUTPATIENT
Start: 2023-07-25 | End: 2023-07-25

## 2023-07-25 RX ADMIN — LATANOPROST 1 DROP: 50 SOLUTION OPHTHALMIC at 22:32

## 2023-07-25 RX ADMIN — FUROSEMIDE 100 MG: 10 INJECTION, SOLUTION INTRAMUSCULAR; INTRAVENOUS at 09:53

## 2023-07-25 RX ADMIN — METOPROLOL TARTRATE 50 MG: 50 TABLET, FILM COATED ORAL at 22:29

## 2023-07-25 RX ADMIN — PANTOPRAZOLE SODIUM 40 MG: 40 TABLET, DELAYED RELEASE ORAL at 05:01

## 2023-07-25 RX ADMIN — Medication 1 TABLET: at 08:15

## 2023-07-25 RX ADMIN — ATORVASTATIN CALCIUM 20 MG: 20 TABLET, FILM COATED ORAL at 22:29

## 2023-07-25 RX ADMIN — Medication 1000 MCG: at 08:16

## 2023-07-25 RX ADMIN — APIXABAN 5 MG: 5 TABLET, FILM COATED ORAL at 08:15

## 2023-07-25 RX ADMIN — INSULIN LISPRO 2 UNITS: 100 INJECTION, SOLUTION INTRAVENOUS; SUBCUTANEOUS at 08:15

## 2023-07-25 RX ADMIN — BISACODYL 10 MG: 10 SUPPOSITORY RECTAL at 04:36

## 2023-07-25 RX ADMIN — INSULIN GLARGINE 20 UNITS: 100 INJECTION, SOLUTION SUBCUTANEOUS at 22:58

## 2023-07-25 RX ADMIN — ASPIRIN 81 MG: 81 TABLET, CHEWABLE ORAL at 08:16

## 2023-07-25 RX ADMIN — CHLOROTHIAZIDE SODIUM 500 MG: 500 INJECTION, POWDER, LYOPHILIZED, FOR SOLUTION INTRAVENOUS at 08:56

## 2023-07-25 RX ADMIN — NYSTATIN: 100000 POWDER TOPICAL at 08:17

## 2023-07-25 RX ADMIN — LORAZEPAM 0.5 MG: 0.5 TABLET ORAL at 22:45

## 2023-07-25 RX ADMIN — INSULIN LISPRO 2 UNITS: 100 INJECTION, SOLUTION INTRAVENOUS; SUBCUTANEOUS at 12:04

## 2023-07-25 RX ADMIN — GUAIFENESIN 400 MG: 200 TABLET ORAL at 16:42

## 2023-07-25 RX ADMIN — POLYETHYLENE GLYCOL (3350) 17 G: 17 POWDER, FOR SOLUTION ORAL at 22:29

## 2023-07-25 RX ADMIN — FERROUS SULFATE TAB 325 MG (65 MG ELEMENTAL FE) 325 MG: 325 (65 FE) TAB at 08:16

## 2023-07-25 RX ADMIN — DOCUSATE SODIUM 50 MG AND SENNOSIDES 8.6 MG 2 TABLET: 8.6; 5 TABLET, FILM COATED ORAL at 22:29

## 2023-07-25 RX ADMIN — DOCUSATE SODIUM 50 MG AND SENNOSIDES 8.6 MG 2 TABLET: 8.6; 5 TABLET, FILM COATED ORAL at 08:15

## 2023-07-25 RX ADMIN — ISOSORBIDE MONONITRATE 30 MG: 30 TABLET, EXTENDED RELEASE ORAL at 08:16

## 2023-07-25 RX ADMIN — METOPROLOL TARTRATE 50 MG: 50 TABLET, FILM COATED ORAL at 08:16

## 2023-07-25 RX ADMIN — NYSTATIN: 100000 POWDER TOPICAL at 22:30

## 2023-07-25 RX ADMIN — Medication 10 ML: at 08:17

## 2023-07-25 RX ADMIN — ONDANSETRON HYDROCHLORIDE 4 MG: 4 TABLET, FILM COATED ORAL at 15:12

## 2023-07-25 RX ADMIN — FUROSEMIDE 40 MG: 10 INJECTION, SOLUTION INTRAMUSCULAR; INTRAVENOUS at 09:53

## 2023-07-25 RX ADMIN — POLYETHYLENE GLYCOL (3350) 17 G: 17 POWDER, FOR SOLUTION ORAL at 04:35

## 2023-07-25 RX ADMIN — FLUTICASONE PROPIONATE 2 SPRAY: 50 SPRAY, METERED NASAL at 08:16

## 2023-07-25 RX ADMIN — APIXABAN 5 MG: 5 TABLET, FILM COATED ORAL at 22:28

## 2023-07-25 RX ADMIN — GUAIFENESIN 400 MG: 200 TABLET ORAL at 08:16

## 2023-07-25 RX ADMIN — FLUOXETINE HYDROCHLORIDE 20 MG: 20 CAPSULE ORAL at 08:16

## 2023-07-25 RX ADMIN — GUAIFENESIN 400 MG: 200 TABLET ORAL at 22:41

## 2023-07-25 RX ADMIN — LINACLOTIDE 145 MCG: 145 CAPSULE, GELATIN COATED ORAL at 08:56

## 2023-07-25 NOTE — PROGRESS NOTES
Patient Identification:  Name:  Jolly Garcia  Age:  72 y.o.  Sex:  female  :  1950  MRN:  [id number]  Visit Number:  [id number]  Primary Care Provider:  Whit Cadet APRN    Length of stay:  1    Subjective/Interval History/Consultants/Procedures     Chief Complaint:   Chief Complaint   Patient presents with    Shortness of Breath    Nausea    Abnormal Lab       Subjective/Interval History:    72 y.o. female who was admitted on 2023 with Acute on chronic heart failure with preserved ejection fraction, WENDY on CKD IV      PMH is significant for  CAD, HTN, HLD, T2DM, HFpEF, chronic hypoxic respiratory failure, CKD stage IV, history of nephrotic syndrome, Hx EDER     For complete admission information, please see history and physical.     Consultations:  nephrology      Procedures/Scans:  CXR  CT head without contrast  TTE    Today, the patient was resting in no acute distress. She reports overall feeling about the same today. Maybe breathing slightly easier. Lower extremity edema remains, 2+. She reports no further nose bleeds overnight. No new complaints noted. States she things bowel regimen starting to help.      Room location at the time of evaluation was 303a.    ----------------------------------------------------------------------------------------------------------------------  Current Hospital Meds:  apixaban, 5 mg, Oral, Q12H  aspirin, 81 mg, Oral, Daily  atorvastatin, 20 mg, Oral, Nightly  senna-docusate sodium, 2 tablet, Oral, BID   And  polyethylene glycol, 17 g, Oral, BID   And  [START ON 2023] bisacodyl, 5 mg, Oral, Daily  ferrous sulfate, 325 mg, Oral, Daily With Breakfast  FLUoxetine, 20 mg, Oral, Daily  fluticasone, 2 spray, Each Nare, Daily  furosemide (LASIX) in NS IVPB, 100 mg, Intravenous, Once  guaiFENesin, 400 mg, Oral, TID  insulin glargine, 20 Units, Subcutaneous, Nightly  insulin lispro, 2-9 Units, Subcutaneous, 4x Daily AC & at Bedtime  isosorbide  mononitrate, 30 mg, Oral, Daily  latanoprost, 1 drop, Both Eyes, Nightly  linaclotide, 145 mcg, Oral, QAM AC  metoprolol tartrate, 50 mg, Oral, BID  multivitamin with minerals, 1 tablet, Oral, Daily  nystatin, , Topical, Q12H  pantoprazole, 40 mg, Oral, Q AM  vitamin B-12, 1,000 mcg, Oral, Daily         ----------------------------------------------------------------------------------------------------------------------      Objective     Vital Signs:  Temp:  [97.7 °F (36.5 °C)-98.5 °F (36.9 °C)] 98 °F (36.7 °C)  Heart Rate:  [70-85] 85  Resp:  [18-20] 18  BP: (116-143)/(61-82) 139/82      07/24/23  0500 07/24/23  1423 07/25/23  0500   Weight: (!) 155 kg (342 lb 1.6 oz) (!) 155 kg (341 lb 11.4 oz) (!) 156 kg (343 lb 12.8 oz)     Body mass index is 55.52 kg/m².    Intake/Output Summary (Last 24 hours) at 7/25/2023 1205  Last data filed at 7/25/2023 1035  Gross per 24 hour   Intake 1007.73 ml   Output 2275 ml   Net -1267.27 ml     I/O this shift:  In: 360 [P.O.:360]  Out: 300 [Urine:300]  Diet: Cardiac Diets, Diabetic Diets, Renal Diets, Fluid Restriction (240 mL/tray) Diets; Low Sodium (2g); Consistent Carbohydrate; Low Potassium, Low Phosphorus, Low Sodium (2-3g); 1000 mL/day; Texture: Regular Texture (IDDSI 7); Fluid Consistency...  ----------------------------------------------------------------------------------------------------------------------    Physical Exam  Vitals and nursing note reviewed.   Constitutional:       General: She is not in acute distress.  HENT:      Head: Normocephalic and atraumatic.   Eyes:      Extraocular Movements: Extraocular movements intact.      Conjunctiva/sclera: Conjunctivae normal.   Cardiovascular:      Rate and Rhythm: Normal rate and regular rhythm.   Pulmonary:      Effort: Pulmonary effort is normal.      Breath sounds: Normal breath sounds.   Musculoskeletal:      Right lower leg: Edema present.      Left lower leg: Edema present.      Comments: 2+   Skin:     General:  Skin is warm and dry.   Neurological:      Mental Status: She is alert. Mental status is at baseline.   Psychiatric:         Mood and Affect: Mood normal.         Behavior: Behavior normal.            ----------------------------------------------------------------------------------------------------------------------  Tele:      ----------------------------------------------------------------------------------------------------------------------  Results from last 7 days   Lab Units 07/22/23  1636 07/22/23  1413   HSTROP T ng/L 53* 57*   PROBNP pg/mL  --  30,507.0*     Results from last 7 days   Lab Units 07/25/23  0148 07/24/23  0254 07/23/23  0806 07/22/23  1413   CRP mg/dL  --   --   --  <0.30   LACTATE mmol/L  --   --   --  0.8   WBC 10*3/mm3 8.59 7.63 7.23 7.58   HEMOGLOBIN g/dL 9.8* 9.7* 9.9* 10.0*   HEMATOCRIT % 32.9* 33.5* 33.2* 33.7*   MCV fL 89.4 91.0 90.2 88.5   MCHC g/dL 29.8* 29.0* 29.8* 29.7*   PLATELETS 10*3/mm3 281 286 298 313     Results from last 7 days   Lab Units 07/22/23  1515   PH, ARTERIAL pH units 7.329*   PO2 ART mm Hg 78.3*   PCO2, ARTERIAL mm Hg 50.5*   HCO3 ART mmol/L 26.5*     Results from last 7 days   Lab Units 07/25/23  0148 07/24/23  0254 07/23/23  0806 07/22/23  1413   SODIUM mmol/L 137 137 137 137   POTASSIUM mmol/L 3.9 4.2 3.8 4.1   MAGNESIUM mg/dL  --   --   --  2.2   CHLORIDE mmol/L 101 103 102 102   CO2 mmol/L 25.4 23.4 24.7 25.1   BUN mg/dL 66* 61* 65* 64*   CREATININE mg/dL 2.65* 2.64* 2.54* 2.49*   CALCIUM mg/dL 8.5* 8.4* 8.8 8.9   GLUCOSE mg/dL 145* 170* 127* 130*   ALBUMIN g/dL 3.1* 3.1* 3.4* 3.4*   BILIRUBIN mg/dL 0.4 0.3 0.4 0.4   ALK PHOS U/L 103 105 111 117   AST (SGOT) U/L 13 14 12 19   ALT (SGPT) U/L 8 8 9 9   Estimated Creatinine Clearance: 29.7 mL/min (A) (by C-G formula based on SCr of 2.65 mg/dL (H)).  No results found for: AMMONIA      Blood Culture   Date Value Ref Range Status   07/22/2023 No growth at 2 days  Preliminary   07/22/2023 No growth at 2 days   Preliminary     No results found for: URINECX  No results found for: WOUNDCX  No results found for: STOOLCX  ----------------------------------------------------------------------------------------------------------------------  Imaging Results (Last 24 Hours)       ** No results found for the last 24 hours. **          ----------------------------------------------------------------------------------------------------------------------   I have reviewed the above laboratory values for 07/25/23    Assessment/Plan     Active Hospital Problems    Diagnosis  POA    **Acute on chronic diastolic CHF (congestive heart failure) [I50.33]  Yes    Acute on chronic kidney failure [N17.9, N18.9]  Yes         ASSESSMENT/PLAN:    Acute on chronic heart failure with preserved ejection fraction, POA  NSTEMI, T1 versus T2, POA, possibly 2/2 above  WENDY on chronic CKD stage IV, POA, likely 2/2 cardiorenal syndrome  Patient presents from local SNF secondary to AMS, lethargy and increased swelling.  Patient AOx3 on arrival to the ED per ED provider note though does admit to visual hallucinations prior to arrival  Work-up revealed HS troponin 57 with repeat at 53.  proBNP is 30,507.  CXR suggestive of volume overload.  EKG with normal sinus rhythm, low voltage QRS. Patient denies any chest pain.  Creatinine also increased at 2.49 on arrival from baseline 2.0  Patient received Bumex 2 mg in the ED  She will be admitted to telemetry for further observation with continuous cardiac monitoring/pulse oximetry  TTE noted normal LVEF at 56 to 60%.  Diastolic function with grade 2 with high LAP pseudonormalization.  Continues to diurese well, additional net 3L out yesterday  Creatinine has remained stable overnight. 2.65 this AM  Continue Daily weights, strictly monitor I's and O's and clinical volume status closely.  Inpatient heart failure navigator consult placed  ReDs vest value normal at 23%  Avoid nephrotoxins as able  Nephrology consulted  for further assistance. Appreciated. Suspect WENDY likely 2/2 cardiorenal syndrome type I. Patient diuresed with Lasix gtt yesterday. Follow up further recommendations. Additional diuril and lasix gtt running today.   Continue supportive care  Patient remains AOx3, denies any further visual hallucinations     Chronic:  CAD s/p CABG  A-fib/flutter  HTN  HLD  T2DM-insulin-dependent  JESIKA  Chronic hypoxic respiratory failure  Hx EDER  Plan to restart home meds as indicated pending med rec  Initiate insulin regimen at appropriate doses.  Accu-Cheks to monitor with hypoglycemia protocol in place  H&H appears around baseline continue to monitor with serial labs  Monitor vital signs per protocol      -----------  -DVT prophylaxis: Chronically anticoagulated with eliquis  -Disposition plans/anticipated needs:Plan to return to SNF following clinical improvement        The patient is high risk due to the following diagnoses/reasons:  CHFe, WENDY, cardiorenal syndrome        Jose Cook PA-C  07/25/23  12:05 EDT

## 2023-07-25 NOTE — THERAPY EVALUATION
Acute Care - Occupational Therapy Initial Evaluation   [place]     Patient Name: Jolly Garcia  : 1950  MRN: [id number]  Today's Date: 2023  Onset of Illness/Injury or Date of Surgery: 23  Date of Referral to OT: 23  Referring Physician: Dr. Sotelo    Admit Date: 2023       ICD-10-CM ICD-9-CM   1. Acute congestive heart failure, unspecified heart failure type  I50.9 428.0     Patient Active Problem List   Diagnosis    UTI (urinary tract infection)    Sepsis    Type 2 diabetes mellitus    Essential hypertension    ASCVD (arteriosclerotic cardiovascular disease)    Chronic respiratory failure with hypoxia    Anxiety    Hyperlipidemia    Chronic kidney disease    Diabetic retinopathy    Class 3 severe obesity due to excess calories with body mass index (BMI) of 50.0 to 59.9 in adult    JESIKA (obstructive sleep apnea)    Diabetic nephropathy    Physical debility    (HFpEF) heart failure with preserved ejection fraction    Pneumonia of right lower lobe due to infectious organism    Acute kidney injury    Hyponatremia    Iron deficiency anemia, unspecified    Acute on chronic diastolic CHF (congestive heart failure)    Acute on chronic kidney failure     Past Medical History:   Diagnosis Date    Anxiety     CAD (coronary artery disease)     s/p CABG    Chronic hypoxemic respiratory failure     Wears 2 L/min at home    Essential hypertension     Hyperlipidemia     Type 2 diabetes mellitus      Past Surgical History:   Procedure Laterality Date    CORONARY ARTERY BYPASS GRAFT      HYSTERECTOMY           OT ASSESSMENT FLOWSHEET (last 12 hours)       OT Evaluation and Treatment       Row Name 23 1427                   OT Time and Intention    Subjective Information complains of;fatigue  -BF        Document Type evaluation  -BF        Mode of Treatment occupational therapy  -BF        Patient Effort poor  -BF        Symptoms Noted During/After Treatment fatigue  -BF        Comment  "Pt very fatigued during evaluation, requires max/total assist with ADLs at this time.  -BF           General Information    Patient Profile Reviewed yes  -BF        Onset of Illness/Injury or Date of Surgery 07/22/23  -BF        Referring Physician Dr. Sotelo  -BF        Patient/Family/Caregiver Comments/Observations Pt supine in bed, initially agreeable to OT evaluation and then later stating she was too tired and \"not up for it\" any further.  -BF        Prior Level of Function mod assist:;ADL's  -BF        Pertinent History of Current Functional Problem Pt admitted from SNF with SOA and nausea. Per chart review, pt normally walks with a walker but has been bedbound since experiencing symptoms.  -BF        Existing Precautions/Restrictions fall;oxygen therapy device and L/min  -BF        Barriers to Rehab previous functional deficit;medically complex  -BF           Living Environment    Current Living Arrangements residential facility  -BF           Cognition    Orientation Status (Cognition) oriented x 3  -BF        Follows Commands (Cognition) WFL  -BF           Range of Motion Comprehensive    Comment, General Range of Motion BUE shoulders 75%, remaining BUE AROM WFL  -BF           Strength Comprehensive (MMT)    Comment, General Manual Muscle Testing (MMT) Assessment BUE shoulders 3-/5, remaining grossly 3/5  -BF           Activities of Daily Living    BADL Assessment/Intervention bathing;lower body dressing;upper body dressing;grooming;feeding;toileting  -BF           Bathing Assessment/Intervention    Comment, (Bathing) Max A  -BF           Upper Body Dressing Assessment/Training    Comment, (Upper Body Dressing) Mod A  -BF           Lower Body Dressing Assessment/Training    Comment, (Lower Body Dressing) Total A  -BF           Grooming Assessment/Training    Comment, (Grooming) Mod A  -BF           Self-Feeding Assessment/Training    Comment, (Feeding) Set-up/Min A  -BF           Toileting " Assessment/Training    Comment, (Toileting) Total A  -BF           BADL Safety/Performance    Impairments, BADL Safety/Performance balance;endurance/activity tolerance;range of motion;shortness of breath;strength  -BF           Motor Skills    Motor Skills functional endurance  -BF        Functional Endurance Poor  -BF           Wound 07/22/23 1926 Right anterior greater trochanter MASD (Moisture associated skin damage)    Wound - Properties Group Placement Date: 07/22/23  -DS Placement Time: 1926  -DS Present on Hospital Admission: Y  -DS Side: Right  -DS Orientation: anterior  -DS Location: greater trochanter  -DS Primary Wound Type: MASD  -DS    Retired Wound - Properties Group Placement Date: 07/22/23  -DS Placement Time: 1926  -DS Present on Hospital Admission: Y  -DS Side: Right  -DS Orientation: anterior  -DS Location: greater trochanter  -DS Primary Wound Type: MASD  -DS    Retired Wound - Properties Group Date first assessed: 07/22/23  -DS Time first assessed: 1926  -DS Present on Hospital Admission: Y  -DS Side: Right  -DS Location: greater trochanter  -DS Primary Wound Type: MASD  -DS       Positioning and Restraints    Post Treatment Position bed  -BF        In Bed supine;call light within reach;encouraged to call for assist  -BF           Therapy Assessment/Plan (OT)    Date of Referral to OT 07/25/23  -BF        Patient/Family Therapy Goal Statement (OT) To return to PLOF  -BF        OT Diagnosis Debility  -BF        Rehab Potential (OT) fair, will monitor progress closely  -BF        Criteria for Skilled Therapeutic Interventions Met (OT) yes  -BF        Predicted Duration of Therapy Intervention (OT) 2 weeks  -BF        Problem List (OT) problems related to;balance;mobility;range of motion (ROM);strength  activity tolerance  -BF        Activity Limitations Related to Problem List (OT) unable to transfer safely;BADLs not performed adequately or safely  -BF        Planned Therapy Interventions (OT)  activity tolerance training;adaptive equipment training;BADL retraining;strengthening exercise;ROM/therapeutic exercise;transfer/mobility retraining  -BF           Therapy Plan Review/Discharge Plan (OT)    Therapy Plan Review (OT) patient  -BF        Anticipated Discharge Disposition (OT) skilled nursing facility  -BF           OT Goals    Activity Tolerance Goal Selection (OT) activity tolerance, OT goal 1  -BF            Activity Tolerance Goal 1 (OT)    Activity Tolerance Goal 1 (OT) Pt will complete 15 minute theract with fair activity tolerance to enhance independence with ADLs.  -BF        Activity Level (Endurance Goal 1, OT) 15 min activity  -BF        Time Frame (Activity Tolerance Goal 1, OT) by discharge  -BF                  User Key  (r) = Recorded By, (t) = Taken By, (c) = Cosigned By      Initials Name Effective Dates    BF Yanely Agee OT 07/11/23 -     Micaela Denis RN 01/27/23 -                            OT Recommendation and Plan  Planned Therapy Interventions (OT): activity tolerance training, adaptive equipment training, BADL retraining, strengthening exercise, ROM/therapeutic exercise, transfer/mobility retraining           Time Calculation:    Time Calculation- OT       Row Name 07/25/23 1439             Time Calculation- OT    OT Start Time 1410  -BF                User Key  (r) = Recorded By, (t) = Taken By, (c) = Cosigned By      Initials Name Provider Type    BF Yanely Agee OT Occupational Therapist                  Therapy Charges for Today       Code Description Service Date Service Provider Modifiers Qty    [id number]  OT EVAL HIGH COMPLEXITY 4 7/25/2023 Yanely Agee OT GO 1                 Yanely Agee OT  7/25/2023

## 2023-07-25 NOTE — PROGRESS NOTES
Nephrology Progress Note      Subjective     Feels slightly better less short of breath compared to yesterday.    Objective       Vital signs :     Temp:  [97.7 °F (36.5 °C)-98.5 °F (36.9 °C)] 97.9 °F (36.6 °C)  Heart Rate:  [70-84] 84  Resp:  [18-20] 20  BP: (116-143)/(61-82) 129/73    Intake/Output                         07/23/23 0701 - 07/24/23 0700 07/24/23 0701 - 07/25/23 0700     [id number] 6397-8986 Total 5256-6958 1889-9130 Total                 Intake    P.O.  700  250 950  650  458 1108    I.V.  --  -- --  129.7  -- 129.7    Total Intake 700 250 950 779.7 458 1237.7       Output    Urine  0  900 900  850  1475 2325    Total Output 0 900  2325             Physical Exam:    General Appearance : alert, pleasant, appears stated age, cooperative and alert  Lungs :  bilateral decreased intensity of breath sounds  Heart :  regular rhythm & normal rate, normal S1, S2 and no murmur, no rub  Abdomen : soft, non distended  Extremities : 2+ edema  Neurologic :   orientated to person, place, time and situation, Grossly no focal deficits        Laboratory Data :     Albumin Albumin   Date Value Ref Range Status   07/25/2023 3.1 (L) 3.5 - 5.2 g/dL Final   07/24/2023 3.1 (L) 3.5 - 5.2 g/dL Final   07/23/2023 3.4 (L) 3.5 - 5.2 g/dL Final   07/22/2023 3.4 (L) 3.5 - 5.2 g/dL Final      Magnesium Magnesium   Date Value Ref Range Status   07/22/2023 2.2 1.6 - 2.4 mg/dL Final          PTH               No results found for: PTH    CBC and coagulation:  Results from last 7 days   Lab Units 07/25/23  0148 07/24/23  0254 07/23/23  0806 07/22/23  1413   LACTATE mmol/L  --   --   --  0.8   CRP mg/dL  --   --   --  <0.30   WBC 10*3/mm3 8.59 7.63 7.23 7.58   HEMOGLOBIN g/dL 9.8* 9.7* 9.9* 10.0*   HEMATOCRIT % 32.9* 33.5* 33.2* 33.7*   MCV fL 89.4 91.0 90.2 88.5   MCHC g/dL 29.8* 29.0* 29.8* 29.7*   PLATELETS 10*3/mm3 281 286 298 313     Acid/base balance:  Results from last 7 days   Lab Units 07/22/23  1515   PH, ARTERIAL  pH units 7.329*   PO2 ART mm Hg 78.3*   PCO2, ARTERIAL mm Hg 50.5*   HCO3 ART mmol/L 26.5*     Renal and electrolytes:    Results from last 7 days   Lab Units 07/25/23  0148 07/24/23 0254 07/23/23  0806 07/22/23  1413   SODIUM mmol/L 137 137 137 137   POTASSIUM mmol/L 3.9 4.2 3.8 4.1   MAGNESIUM mg/dL  --   --   --  2.2   CHLORIDE mmol/L 101 103 102 102   CO2 mmol/L 25.4 23.4 24.7 25.1   BUN mg/dL 66* 61* 65* 64*   CREATININE mg/dL 2.65* 2.64* 2.54* 2.49*   CALCIUM mg/dL 8.5* 8.4* 8.8 8.9     Estimated Creatinine Clearance: 29.7 mL/min (A) (by C-G formula based on SCr of 2.65 mg/dL (H)).  @GFRCG:3@   Liver and pancreatic function:  Results from last 7 days   Lab Units 07/25/23  0148 07/24/23  0254 07/23/23  0806   ALBUMIN g/dL 3.1* 3.1* 3.4*   BILIRUBIN mg/dL 0.4 0.3 0.4   ALK PHOS U/L 103 105 111   AST (SGOT) U/L 13 14 12   ALT (SGPT) U/L 8 8 9         Cardiac:  Results from last 7 days   Lab Units 07/22/23  1413   PROBNP pg/mL 30,507.0*     Liver and pancreatic function:  Results from last 7 days   Lab Units 07/25/23 0148 07/24/23  0254 07/23/23  0806   ALBUMIN g/dL 3.1* 3.1* 3.4*   BILIRUBIN mg/dL 0.4 0.3 0.4   ALK PHOS U/L 103 105 111   AST (SGOT) U/L 13 14 12   ALT (SGPT) U/L 8 8 9       Medications :     apixaban, 5 mg, Oral, Q12H  aspirin, 81 mg, Oral, Daily  atorvastatin, 20 mg, Oral, Nightly  ferrous sulfate, 325 mg, Oral, Daily With Breakfast  FLUoxetine, 20 mg, Oral, Daily  fluticasone, 2 spray, Each Nare, Daily  guaiFENesin, 400 mg, Oral, TID  insulin glargine, 20 Units, Subcutaneous, Nightly  insulin lispro, 2-9 Units, Subcutaneous, 4x Daily AC & at Bedtime  isosorbide mononitrate, 30 mg, Oral, Daily  latanoprost, 1 drop, Both Eyes, Nightly  linaclotide, 145 mcg, Oral, QAM AC  metoprolol tartrate, 50 mg, Oral, BID  multivitamin with minerals, 1 tablet, Oral, Daily  nystatin, , Topical, Q12H  pantoprazole, 40 mg, Oral, Q AM  senna-docusate sodium, 2 tablet, Oral, BID  vitamin B-12, 1,000 mcg, Oral,  Daily             Assessment & Plan     -WENDY, nonoliguric  - Chronic kidney disease stage IV A3  - Type 2 diabetes mellitus  - Acute on chronic decompensated heart failure, preserved ejection fraction  - Morbid obesity  - History of nephrotic syndrome     Creatinine improved to 2.1 from 2.6 with excellent diuresis and overall net negative fluid balance.  We will continue on diuretics.  Blood pressure is relatively better, continue holding scheduled hydralazine    WENDY on CKD most likely due to cardiorenal syndrome type I  Baseline creatinine appears to be 2 admitted with 2.5  Significant fluid overload clinically  -Diuril 500 mg IV once, 30 minutes after  - Lasix 40 mg IV push followed by 100 mg over 5 hours  - Follow-up get UA with microscopy  - Strict intake and output  - 1 g salt restriction and 1 L fluid restriction per day  - Please avoid any nephrotoxic agents, hypotension and adjust medications according to estimated GFR      Elaina Bell MD  07/25/23  07:13 EDT

## 2023-07-25 NOTE — PLAN OF CARE
Goal Outcome Evaluation:    Pt is resting in bed with no s/s of distress noted. VSS. IV access maintained. Xlarge BM this shift. Will continue with plan of care.       Daily Care Plan Summary: Heart Failure    Diuretic in use (IV or PO):   IV        Daily weight (up or down):    Up      Output > Intake (yes/no): Yes      O2 Requirements (current, any change?): 2 L NC      Symptoms noted with Activity (Respiratory Tolerance, functional state):    Shortness of breath present with exertion   .    Anticipated Discharge Plans: Return to nursing home on discharge.

## 2023-07-25 NOTE — PLAN OF CARE
Goal Outcome Evaluation:    Pt is resting in bed awake. No s/s of acute distress noted. Pt has c/o constipation. See mar.       Daily Care Plan Summary: Heart Failure        Diuretic in use (IV or PO):   PO        Daily weight (up or down):    gain: 2lbs      Output > Intake (yes/no):Yes    2    O2 Requirements (current, any change?):  2 liters/min via nasal cannula      Symptoms noted with Activity (Respiratory Tolerance, functional state):     SOA on exertion       Anticipated Discharge Plans:     Discharged back to [facility] when medically stable.

## 2023-07-25 NOTE — THERAPY EVALUATION
Acute Care - Physical Therapy Initial Evaluation   Sina     Patient Name: Jolly Garcia  : 1950  MRN: [id number]  Today's Date: 2023      Visit Dx:     ICD-10-CM ICD-9-CM   1. Acute congestive heart failure, unspecified heart failure type  I50.9 428.0     Patient Active Problem List   Diagnosis    UTI (urinary tract infection)    Sepsis    Type 2 diabetes mellitus    Essential hypertension    ASCVD (arteriosclerotic cardiovascular disease)    Chronic respiratory failure with hypoxia    Anxiety    Hyperlipidemia    Chronic kidney disease    Diabetic retinopathy    Class 3 severe obesity due to excess calories with body mass index (BMI) of 50.0 to 59.9 in adult    JESIKA (obstructive sleep apnea)    Diabetic nephropathy    Physical debility    (HFpEF) heart failure with preserved ejection fraction    Pneumonia of right lower lobe due to infectious organism    Acute kidney injury    Hyponatremia    Iron deficiency anemia, unspecified    Acute on chronic diastolic CHF (congestive heart failure)    Acute on chronic kidney failure     Past Medical History:   Diagnosis Date    Anxiety     CAD (coronary artery disease)     s/p CABG    Chronic hypoxemic respiratory failure     Wears 2 L/min at home    Essential hypertension     Hyperlipidemia     Type 2 diabetes mellitus      Past Surgical History:   Procedure Laterality Date    CORONARY ARTERY BYPASS GRAFT      HYSTERECTOMY       PT Assessment (last 12 hours)       PT Evaluation and Treatment       Row Name 23 1300          Physical Therapy Time and Intention    Subjective Information complains of;weakness;fatigue  -KM     Document Type evaluation  -KM     Mode of Treatment physical therapy  -KM     Patient Effort poor  -KM       Row Name 23 1300          General Information    Patient Profile Reviewed yes  -KM     Patient Observations alert  -KM     Prior Level of Function independent:;all household mobility;ADL's  per pt. report  -KM      Existing Precautions/Restrictions fall  -KM     Risks Reviewed patient:;LOB;nausea/vomiting;dizziness;increased discomfort  -KM     Benefits Reviewed patient:;improve function;increase independence;increase strength;increase balance  -KM       Row Name 07/25/23 1300          Living Environment    Current Living Arrangements residential facility  -KM       Row Name 07/25/23 1300          Home Use of Assistive/Adaptive Equipment    Equipment Currently Used at Home walker, standard;cane, straight  -KM       Row Name 07/25/23 1300          Cognition    Affect/Mental Status (Cognition) WFL  -KM     Orientation Status (Cognition) oriented x 3  -KM     Follows Commands (Cognition) WFL  -KM       Row Name 07/25/23 1300          Range of Motion (ROM)    Range of Motion --  BLE limited d/t body habitus  -KM       Row Name 07/25/23 1300          Strength (Manual Muscle Testing)    Strength (Manual Muscle Testing) --  BLE strength grossly 2+/5  -KM       Row Name 07/25/23 1300          Bed Mobility    Comment, (Bed Mobility) Pt. deferred all mobility skills, but was agreeable to work on further follow up visits.  -KM       Row Name 07/25/23 1300          Safety Issues, Functional Mobility    Safety Issues Affecting Function (Mobility) awareness of need for assistance;friction/shear risk;insight into deficits/self-awareness  -KM     Impairments Affecting Function (Mobility) endurance/activity tolerance;range of motion (ROM);strength  -KM       Row Name             Wound 07/22/23 1926 Right anterior greater trochanter MASD (Moisture associated skin damage)    Wound - Properties Group Placement Date: 07/22/23  -DS Placement Time: 1926  -DS Present on Hospital Admission: Y  -DS Side: Right  -DS Orientation: anterior  -DS Location: greater trochanter  -DS Primary Wound Type: MASD  -DS    Retired Wound - Properties Group Placement Date: 07/22/23  -DS Placement Time: 1926 -DS Present on Hospital Admission: Y  -DS Side: Right  -DS  Orientation: anterior  -DS Location: greater trochanter  -DS Primary Wound Type: MASD  -DS    Retired Wound - Properties Group Date first assessed: 07/22/23  -DS Time first assessed: 1926  -DS Present on Hospital Admission: Y  -DS Side: Right  -DS Location: greater trochanter  -DS Primary Wound Type: MASD  -DS      Row Name 07/25/23 1300          Plan of Care Review    Plan of Care Reviewed With patient  -KM     Outcome Evaluation Pt. evaluation started during PT session. She deferred all mobility skills at time of evaluation. She demonstrated limited ROM and strength. Pt. would benefit from skilled PT services as she improved willingness to participate.  -KM       Row Name 07/25/23 1300          Therapy Assessment/Plan (PT)    Functional Level at Time of Evaluation (PT) unable to assess  -KM     PT Diagnosis (PT) decreased mobility  -KM     Rehab Potential (PT) fair, will monitor progress closely  -KM     Criteria for Skilled Interventions Met (PT) yes;skilled treatment is necessary  -KM     Therapy Frequency (PT) 2 times/wk  2-5x/wk  -KM     Predicted Duration of Therapy Intervention (PT) until discharge  -KM     Problem List (PT) problems related to;balance;mobility;range of motion (ROM);strength  -KM     Activity Limitations Related to Problem List (PT) unable to ambulate safely;unable to transfer safely  -KM       Row Name 07/25/23 1300          Therapy Plan Review/Discharge Plan (PT)    Therapy Plan Review (PT) evaluation/treatment results reviewed;care plan/treatment goals reviewed;risks/benefits reviewed;patient  -KM       Row Name 07/25/23 1300          Physical Therapy Goals    Bed Mobility Goal Selection (PT) bed mobility, PT goal 1  -KM     Transfer Goal Selection (PT) transfer, PT goal 1  -KM     Gait Training Goal Selection (PT) gait training, PT goal 1  -KM       Row Name 07/25/23 1300          Bed Mobility Goal 1 (PT)    Activity/Assistive Device (Bed Mobility Goal 1, PT) bed mobility activities, all   -KM     [place] Level/Cues Needed (Bed Mobility Goal 1, PT) minimum assist (75% or more patient effort)  -KM     Time Frame (Bed Mobility Goal 1, PT) by discharge  -KM       Row Name 07/25/23 1300          Transfer Goal 1 (PT)    Activity/Assistive Device (Transfer Goal 1, PT) sit-to-stand/stand-to-sit;bed-to-chair/chair-to-bed;walker, rolling  -KM     [place] Level/Cues Needed (Transfer Goal 1, PT) standby assist  -KM     Time Frame (Transfer Goal 1, PT) by discharge  -KM       Row Name 07/25/23 1300          Gait Training Goal 1 (PT)    Activity/Assistive Device (Gait Training Goal 1, PT) gait (walking locomotion);assistive device use;walker, rolling  -KM     [place] Level (Gait Training Goal 1, PT) standby assist  -KM     Distance (Gait Training Goal 1, PT) 30'  -KM     Time Frame (Gait Training Goal 1, PT) by discharge  -               User Key  (r) = Recorded By, (t) = Taken By, (c) = Cosigned By      Initials Name Provider Type    Sina Cardozo, PT Physical Therapist    Micaela Denis, RN Registered Nurse                    Physical Therapy Education       Title: PT OT SLP Therapies (Done)       Topic: Physical Therapy (Done)       Point: Mobility training (Done)       Learning Progress Summary             Patient Acceptance, E,TB, VU by NOE at 7/25/2023 1412    Acceptance, E,TB, VU by MP at 7/23/2023 0849                         Point: Home exercise program (Done)       Learning Progress Summary             Patient Acceptance, E,TB, VU by KM at 7/25/2023 1412    Acceptance, E,TB, VU by MP at 7/23/2023 0849                         Point: Body mechanics (Done)       Learning Progress Summary             Patient Acceptance, E,TB, VU by KM at 7/25/2023 1412    Acceptance, E,TB, VU by MP at 7/23/2023 0849                         Point: Precautions (Done)       Learning Progress Summary             Patient Acceptance, E,TB, VU by KM at 7/25/2023 1412    Acceptance, E,TB, VU by MP at  7/23/2023 0849                                         User Key       Initials Effective Dates Name Provider Type Discipline     06/16/21 -  Avis Sadler RN Registered Nurse Nurse     05/24/22 -  Sina Alberto PT Physical Therapist PT                  PT Recommendation and Plan  Planned Therapy Interventions (PT): balance training, bed mobility training, gait training, home exercise program, patient/family education, postural re-education, ROM (range of motion), stair training, strengthening, stretching, transfer training  Therapy Frequency (PT): 2 times/wk (2-5x/wk)  Plan of Care Reviewed With: patient  Outcome Evaluation: Pt. evaluation started during PT session. She deferred all mobility skills at time of evaluation. She demonstrated limited ROM and strength. Pt. would benefit from skilled PT services as she improved willingness to participate.       Time Calculation:    PT Charges       Row Name 07/25/23 1348             Time Calculation    PT Received On 07/25/23  -KM      PT Goal Re-Cert Due Date 08/08/23  -KM                User Key  (r) = Recorded By, (t) = Taken By, (c) = Cosigned By      Initials Name Provider Type     Sina Alberto, PT Physical Therapist                  Therapy Charges for Today       Code Description Service Date Service Provider Modifiers Qty    91415056085 HC PT EVAL MOD COMPLEXITY 4 7/25/2023 Sina Alberto, PT GP 1            PT G-Codes  AM-PAC 6 Clicks Score (PT): 10    Sina Alberto, PT  7/25/2023

## 2023-07-26 LAB
ANION GAP SERPL CALCULATED.3IONS-SCNC: 8.1 MMOL/L (ref 5–15)
ANISOCYTOSIS BLD QL: NORMAL
BASOPHILS # BLD AUTO: 0.04 10*3/MM3 (ref 0–0.2)
BASOPHILS NFR BLD AUTO: 0.6 % (ref 0–1.5)
BUN SERPL-MCNC: 65 MG/DL (ref 8–23)
BUN/CREAT SERPL: 28.6 (ref 7–25)
CALCIUM SPEC-SCNC: 8.8 MG/DL (ref 8.6–10.5)
CHLORIDE SERPL-SCNC: 100 MMOL/L (ref 98–107)
CO2 SERPL-SCNC: 31.9 MMOL/L (ref 22–29)
CREAT SERPL-MCNC: 2.27 MG/DL (ref 0.57–1)
DEPRECATED RDW RBC AUTO: 70.3 FL (ref 37–54)
EGFRCR SERPLBLD CKD-EPI 2021: 22.4 ML/MIN/1.73
EOSINOPHIL # BLD AUTO: 0.2 10*3/MM3 (ref 0–0.4)
EOSINOPHIL NFR BLD AUTO: 2.8 % (ref 0.3–6.2)
ERYTHROCYTE [DISTWIDTH] IN BLOOD BY AUTOMATED COUNT: 21.3 % (ref 12.3–15.4)
GLUCOSE BLDC GLUCOMTR-MCNC: 104 MG/DL (ref 70–130)
GLUCOSE BLDC GLUCOMTR-MCNC: 120 MG/DL (ref 70–130)
GLUCOSE BLDC GLUCOMTR-MCNC: 161 MG/DL (ref 70–130)
GLUCOSE BLDC GLUCOMTR-MCNC: 86 MG/DL (ref 70–130)
GLUCOSE BLDC GLUCOMTR-MCNC: 86 MG/DL (ref 70–130)
GLUCOSE SERPL-MCNC: 76 MG/DL (ref 65–99)
HCT VFR BLD AUTO: 34.8 % (ref 34–46.6)
HGB BLD-MCNC: 10.2 G/DL (ref 12–15.9)
HYPOCHROMIA BLD QL: NORMAL
IMM GRANULOCYTES # BLD AUTO: 0.02 10*3/MM3 (ref 0–0.05)
IMM GRANULOCYTES NFR BLD AUTO: 0.3 % (ref 0–0.5)
LYMPHOCYTES # BLD AUTO: 1.34 10*3/MM3 (ref 0.7–3.1)
LYMPHOCYTES NFR BLD AUTO: 18.5 % (ref 19.6–45.3)
MCH RBC QN AUTO: 26.6 PG (ref 26.6–33)
MCHC RBC AUTO-ENTMCNC: 29.3 G/DL (ref 31.5–35.7)
MCV RBC AUTO: 90.6 FL (ref 79–97)
MONOCYTES # BLD AUTO: 0.56 10*3/MM3 (ref 0.1–0.9)
MONOCYTES NFR BLD AUTO: 7.7 % (ref 5–12)
NEUTROPHILS NFR BLD AUTO: 5.09 10*3/MM3 (ref 1.7–7)
NEUTROPHILS NFR BLD AUTO: 70.1 % (ref 42.7–76)
NRBC BLD AUTO-RTO: 0 /100 WBC (ref 0–0.2)
OVALOCYTES BLD QL SMEAR: NORMAL
PLAT MORPH BLD: NORMAL
PLATELET # BLD AUTO: 268 10*3/MM3 (ref 140–450)
PMV BLD AUTO: 8.2 FL (ref 6–12)
POTASSIUM SERPL-SCNC: 3.7 MMOL/L (ref 3.5–5.2)
QT INTERVAL: 410 MS
QTC INTERVAL: 481 MS
RBC # BLD AUTO: 3.84 10*6/MM3 (ref 3.77–5.28)
SODIUM SERPL-SCNC: 140 MMOL/L (ref 136–145)
WBC NRBC COR # BLD: 7.25 10*3/MM3 (ref 3.4–10.8)

## 2023-07-26 PROCEDURE — 85007 BL SMEAR W/DIFF WBC COUNT: CPT | Performed by: STUDENT IN AN ORGANIZED HEALTH CARE EDUCATION/TRAINING PROGRAM

## 2023-07-26 PROCEDURE — 25010000002 ALBUMIN HUMAN 25% PER 50 ML: Performed by: INTERNAL MEDICINE

## 2023-07-26 PROCEDURE — 25010000002 FUROSEMIDE PER 20 MG: Performed by: INTERNAL MEDICINE

## 2023-07-26 PROCEDURE — 82948 REAGENT STRIP/BLOOD GLUCOSE: CPT

## 2023-07-26 PROCEDURE — 63710000001 INSULIN GLARGINE PER 5 UNITS

## 2023-07-26 PROCEDURE — P9047 ALBUMIN (HUMAN), 25%, 50ML: HCPCS | Performed by: INTERNAL MEDICINE

## 2023-07-26 PROCEDURE — 63710000001 INSULIN LISPRO (HUMAN) PER 5 UNITS: Performed by: INTERNAL MEDICINE

## 2023-07-26 PROCEDURE — 25010000002 CHLOROTHIAZIDE PER 500 MG: Performed by: INTERNAL MEDICINE

## 2023-07-26 PROCEDURE — 63710000001 DIPHENHYDRAMINE PER 50 MG: Performed by: HOSPITALIST

## 2023-07-26 PROCEDURE — 36410 VNPNXR 3YR/> PHY/QHP DX/THER: CPT

## 2023-07-26 PROCEDURE — 85025 COMPLETE CBC W/AUTO DIFF WBC: CPT | Performed by: STUDENT IN AN ORGANIZED HEALTH CARE EDUCATION/TRAINING PROGRAM

## 2023-07-26 PROCEDURE — 80048 BASIC METABOLIC PNL TOTAL CA: CPT | Performed by: STUDENT IN AN ORGANIZED HEALTH CARE EDUCATION/TRAINING PROGRAM

## 2023-07-26 PROCEDURE — 99233 SBSQ HOSP IP/OBS HIGH 50: CPT | Performed by: PHYSICIAN ASSISTANT

## 2023-07-26 RX ORDER — METOPROLOL SUCCINATE 50 MG/1
50 TABLET, EXTENDED RELEASE ORAL
Status: DISCONTINUED | OUTPATIENT
Start: 2023-07-26 | End: 2023-07-28 | Stop reason: HOSPADM

## 2023-07-26 RX ORDER — FUROSEMIDE 10 MG/ML
40 INJECTION INTRAMUSCULAR; INTRAVENOUS ONCE
Status: COMPLETED | OUTPATIENT
Start: 2023-07-26 | End: 2023-07-26

## 2023-07-26 RX ORDER — ALBUMIN (HUMAN) 12.5 G/50ML
12.5 SOLUTION INTRAVENOUS ONCE
Status: COMPLETED | OUTPATIENT
Start: 2023-07-26 | End: 2023-07-26

## 2023-07-26 RX ADMIN — INSULIN LISPRO 2 UNITS: 100 INJECTION, SOLUTION INTRAVENOUS; SUBCUTANEOUS at 18:21

## 2023-07-26 RX ADMIN — FUROSEMIDE 100 MG: 10 INJECTION, SOLUTION INTRAMUSCULAR; INTRAVENOUS at 13:44

## 2023-07-26 RX ADMIN — INSULIN GLARGINE 20 UNITS: 100 INJECTION, SOLUTION SUBCUTANEOUS at 23:11

## 2023-07-26 RX ADMIN — METOPROLOL TARTRATE 50 MG: 50 TABLET, FILM COATED ORAL at 09:48

## 2023-07-26 RX ADMIN — ASPIRIN 81 MG: 81 TABLET, CHEWABLE ORAL at 09:48

## 2023-07-26 RX ADMIN — DIPHENHYDRAMINE HYDROCHLORIDE 25 MG: 25 CAPSULE ORAL at 23:11

## 2023-07-26 RX ADMIN — GUAIFENESIN 400 MG: 200 TABLET ORAL at 09:48

## 2023-07-26 RX ADMIN — APIXABAN 5 MG: 5 TABLET, FILM COATED ORAL at 09:48

## 2023-07-26 RX ADMIN — APIXABAN 5 MG: 5 TABLET, FILM COATED ORAL at 22:52

## 2023-07-26 RX ADMIN — ALBUMIN HUMAN 12.5 G: 0.25 SOLUTION INTRAVENOUS at 10:23

## 2023-07-26 RX ADMIN — FERROUS SULFATE TAB 325 MG (65 MG ELEMENTAL FE) 325 MG: 325 (65 FE) TAB at 09:48

## 2023-07-26 RX ADMIN — METOPROLOL SUCCINATE 50 MG: 50 TABLET, EXTENDED RELEASE ORAL at 13:44

## 2023-07-26 RX ADMIN — NYSTATIN: 100000 POWDER TOPICAL at 09:49

## 2023-07-26 RX ADMIN — POLYETHYLENE GLYCOL (3350) 17 G: 17 POWDER, FOR SOLUTION ORAL at 22:51

## 2023-07-26 RX ADMIN — ATORVASTATIN CALCIUM 20 MG: 20 TABLET, FILM COATED ORAL at 22:52

## 2023-07-26 RX ADMIN — POLYETHYLENE GLYCOL (3350) 17 G: 17 POWDER, FOR SOLUTION ORAL at 09:48

## 2023-07-26 RX ADMIN — BISACODYL 5 MG: 5 TABLET, COATED ORAL at 09:48

## 2023-07-26 RX ADMIN — ISOSORBIDE MONONITRATE 30 MG: 30 TABLET, EXTENDED RELEASE ORAL at 09:48

## 2023-07-26 RX ADMIN — FUROSEMIDE 40 MG: 10 INJECTION, SOLUTION INTRAMUSCULAR; INTRAVENOUS at 13:44

## 2023-07-26 RX ADMIN — FLUOXETINE HYDROCHLORIDE 20 MG: 20 CAPSULE ORAL at 09:48

## 2023-07-26 RX ADMIN — NYSTATIN: 100000 POWDER TOPICAL at 22:53

## 2023-07-26 RX ADMIN — Medication 10 ML: at 22:52

## 2023-07-26 RX ADMIN — FLUTICASONE PROPIONATE 2 SPRAY: 50 SPRAY, METERED NASAL at 09:49

## 2023-07-26 RX ADMIN — Medication 1000 MCG: at 09:48

## 2023-07-26 RX ADMIN — LORAZEPAM 0.5 MG: 0.5 TABLET ORAL at 17:33

## 2023-07-26 RX ADMIN — LINACLOTIDE 145 MCG: 145 CAPSULE, GELATIN COATED ORAL at 13:43

## 2023-07-26 RX ADMIN — GUAIFENESIN 400 MG: 200 TABLET ORAL at 16:14

## 2023-07-26 RX ADMIN — Medication 1 TABLET: at 09:48

## 2023-07-26 RX ADMIN — LATANOPROST 1 DROP: 50 SOLUTION OPHTHALMIC at 22:53

## 2023-07-26 RX ADMIN — GUAIFENESIN 400 MG: 200 TABLET ORAL at 22:51

## 2023-07-26 RX ADMIN — DOCUSATE SODIUM 50 MG AND SENNOSIDES 8.6 MG 2 TABLET: 8.6; 5 TABLET, FILM COATED ORAL at 09:48

## 2023-07-26 RX ADMIN — CHLOROTHIAZIDE SODIUM 500 MG: 500 INJECTION, POWDER, LYOPHILIZED, FOR SOLUTION INTRAVENOUS at 13:01

## 2023-07-26 RX ADMIN — DOCUSATE SODIUM 50 MG AND SENNOSIDES 8.6 MG 2 TABLET: 8.6; 5 TABLET, FILM COATED ORAL at 22:52

## 2023-07-26 NOTE — DISCHARGE PLACEMENT REQUEST
"Yvonne Nolan (72 y.o. Female)       Date of Birth   1950    Social Security Number       Address   [address]  [facility] [address]    Home Phone   [phone]    MRN   [id number]       Baptism   None    Marital Status   Single                            Admission Date   7/22/23    Admission Type   Emergency    Admitting Provider   Santhosh Sotelo DO    Attending Provider   Juvenal Mahajan DO    Department, Room/Bed   [address], 3303/1S       Discharge Date       Discharge Disposition       Discharge Destination                                 Attending Provider: Juvenal Mhaajan DO    Allergies: No Known Allergies    Isolation: None   Infection: None   Code Status: CPR    Ht: 167.6 cm (65.98\")   Wt: 152 kg (336 lb 1.6 oz)    Admission Cmt: None   Principal Problem: Acute on chronic diastolic CHF (congestive heart failure) [I50.33]                   Active Insurance as of 7/22/2023       Primary Coverage       Payor Plan Insurance Group Employer/Plan Group    HUMANA MEDICARE REPLACEMENT HUMANA MEDICARE REPLACEMENT [id number]       Payor Plan Address Payor Plan Phone Number Payor Plan Fax Number Effective Dates    [address] [address] [phone]  1/1/2018 - None Entered    [facility] [address]         Subscriber Name Subscriber Birth Date Member ID       YVONNE NOLAN 1950 [id number]               Secondary Coverage       Payor Plan Insurance Group Employer/Plan Group    [employer]        Payor Plan Address Payor Plan Phone Number Payor Plan Fax Number Effective Dates    [address] [phone]  1/1/2022 - None Entered    [facility] [address]         Subscriber Name Subscriber Birth Date Member ID       YVONNE NOLAN 1950 [id number]                     Emergency Contacts        (Rel.) Home Phone Work Phone Mobile Phone    DARCI MARTIN (Relative) [phone] -- --      "         Emergency Contact Information       Name Relation Home Work Mobile    DARCI MARTIN Relative [phone]            Insurance Information                  HUMANA MEDICARE REPLACEMENT/HUMANA MEDICARE REPLACEMENT Phone: [phone]    Subscriber: Jolly Garcia Subscriber#: [id number]    Group#: [id number] Precert#: [id number]        [employer] INSURANCE Phone: [phone]    Subscriber: Jolly Garcia Subscriber#: [id number]    Group#: -- Precert#: --             History & Physical        Jose Cook PA-C at 23 182       Attestation signed by Santhosh Sotelo DO at 23 1912    I have reviewed this documentation and agree. Pt seen and examined on 3S.                       [facility] Medicine Services  History & Physical    Patient Identification:  Name:  Jolly Garcia  Age:  72 y.o.  Sex:  female  :  1950  MRN:  [id number]   Visit Number:  [id number]  Admit Date: 2023   Primary Care Physician:  Whit Cadet APRN    Subjective     Chief complaint: Shortness of breath, nausea, abnormal lab    History of presenting illness:      Jolly Garcia is a 72 y.o. female with past medical history significant for CAD, HTN, HLD, T2DM, HFpEF, chronic hypoxic respiratory failure, CKD stage IV, history of nephrotic syndrome, Hx EDER    Per handoff patient presents from local SNF with reported AMS lethargy and increased swelling.  Bumex recently increased from 0.5 to 1 mg daily.  She was alert and oriented on arrival to the ED though reports visual hallucinations recently.    Upon arrival to the ED, vital signs were temp 98.3, heart rate 81, respirations 24, /67, SPO2 98% on 2 L per nasal cannula.  ABG in the ED with pH 7.329, PCO2 increased at 50.5, PO2 78.3.  HS troponin on arrival was 57 with repeat at 53.  proBNP is 30,000.  Baseline creatinine appears to be around 2 and is elevated at 2.49 on  arrival.  BUN is 64.  TSH is 6.87 though free T4 is WNL at 1.17.  CXR notable for mild bilateral interstitial prominence which may suggest volume overload.  CT head is negative for acute change.  EKG with normal sinus rhythm, low voltage QRS and left posterior fascicular block.    On exam patient is pleasant and cooperative resting quietly in no acute distress.  She reports over the past 2 to 3 weeks she has become increasingly fatigued and intolerant to any sort of exertion.  She reports she is generally weak.  She is increasingly short of breath as well.  She has noted increased lower extremity swelling which has remained somewhat controlled with wraps ordered by the doctor at her nursing home.  She denies decreased urinary output stating it has actually increased over the past week with increased dosing of her Bumex.  Denies any dysuria.  She denies cough no recent fever or chills.  She states at baseline she is ambulatory with a walker or a cane though his symptoms have progressed she has been essentially bound to her bed.  She denies any chest pain, does note occasional palpitations which are chronic she states secondary to her A-fib.  She wears 2 L per nasal cannula continuously at baseline.  Further review of systems only notable for chronic constipation.  See below.    Known Emergency Department medications received prior to my evaluation included Bumex 2 mg.   Emergency Department Room location at the time of my evaluation was 401.     ---------------------------------------------------------------------------------------------------------------------   Review of Systems   Constitutional:  Positive for fatigue. Negative for chills and fever.   HENT:  Negative for congestion and rhinorrhea.    Respiratory:  Positive for shortness of breath. Negative for cough.    Cardiovascular:  Positive for palpitations (Chronic, occasional) and leg swelling. Negative for chest pain.   Gastrointestinal:  Positive for  constipation. Negative for abdominal pain, diarrhea, nausea and vomiting.   Genitourinary:  Negative for difficulty urinating and dysuria.   Musculoskeletal:  Negative for arthralgias and myalgias.   Skin:  Negative for rash and wound.   Neurological:  Positive for weakness. Negative for dizziness and light-headedness.   Psychiatric/Behavioral:  Positive for hallucinations.       ---------------------------------------------------------------------------------------------------------------------   Past Medical History:   Diagnosis Date    Anxiety     CAD (coronary artery disease)     s/p CABG    Chronic hypoxemic respiratory failure     Wears 2 L/min at home    Essential hypertension     Hyperlipidemia     Type 2 diabetes mellitus      Past Surgical History:   Procedure Laterality Date    CORONARY ARTERY BYPASS GRAFT  2011    HYSTERECTOMY       Family History   Problem Relation Age of Onset    Diabetes Mother      Social History     Socioeconomic History    Marital status: Single   Tobacco Use    Smoking status: Former     Passive exposure: Past   Vaping Use    Vaping Use: Never used   Substance and Sexual Activity    Alcohol use: Never    Drug use: Never    Sexual activity: Defer     ---------------------------------------------------------------------------------------------------------------------   Allergies:  Patient has no known allergies.  ---------------------------------------------------------------------------------------------------------------------   Home medications:    Medications below are reported home medications pulling from within the system; at this time, these medications have not been reconciled unless otherwise specified and are in the verification process for further verifcation as current home medications.  (Not in a hospital admission)      Hospital Scheduled Meds:          Current listed hospital scheduled medications may not yet reflect those currently placed in orders that are signed  and held awaiting patient's arrival to floor.   ---------------------------------------------------------------------------------------------------------------------     Objective     Vital Signs:  Temp:  [98.3 °F (36.8 °C)] 98.3 °F (36.8 °C)  Heart Rate:  [80-81] 80  Resp:  [24] 24  BP: (117-127)/(65-94) 127/65      07/22/23  1405   Weight: 125 kg (275 lb)     Body mass index is 43.07 kg/m².  ---------------------------------------------------------------------------------------------------------------------       Physical Exam  Vitals and nursing note reviewed.   Constitutional:       General: She is not in acute distress.  HENT:      Head: Normocephalic and atraumatic.   Eyes:      Extraocular Movements: Extraocular movements intact.      Conjunctiva/sclera: Conjunctivae normal.   Cardiovascular:      Rate and Rhythm: Normal rate and regular rhythm.   Pulmonary:      Effort: Pulmonary effort is normal.      Comments: Diminished breath sounds bilaterally  Abdominal:      Palpations: Abdomen is soft.      Tenderness: There is no abdominal tenderness.   Musculoskeletal:      Right lower leg: No edema.      Left lower leg: No edema.   Skin:     General: Skin is warm and dry.   Neurological:      Mental Status: She is alert and oriented to person, place, and time. Mental status is at baseline.   Psychiatric:         Mood and Affect: Mood normal.         Behavior: Behavior normal.             ---------------------------------------------------------------------------------------------------------------------  EKG:        I have personally looked at the EKG.  ---------------------------------------------------------------------------------------------------------------------   Results from last 7 days   Lab Units 07/22/23  1413   CRP mg/dL <0.30   LACTATE mmol/L 0.8   WBC 10*3/mm3 7.58   HEMOGLOBIN g/dL 10.0*   HEMATOCRIT % 33.7*   MCV fL 88.5   MCHC g/dL 29.7*   PLATELETS 10*3/mm3 313     Results from last 7 days   Lab  Units 07/22/23  1515   PH, ARTERIAL pH units 7.329*   PO2 ART mm Hg 78.3*   PCO2, ARTERIAL mm Hg 50.5*   HCO3 ART mmol/L 26.5*     Results from last 7 days   Lab Units 07/22/23  1413   SODIUM mmol/L 137   POTASSIUM mmol/L 4.1   MAGNESIUM mg/dL 2.2   CHLORIDE mmol/L 102   CO2 mmol/L 25.1   BUN mg/dL 64*   CREATININE mg/dL 2.49*   CALCIUM mg/dL 8.9   GLUCOSE mg/dL 130*   ALBUMIN g/dL 3.4*   BILIRUBIN mg/dL 0.4   ALK PHOS U/L 117   AST (SGOT) U/L 19   ALT (SGPT) U/L 9   Estimated Creatinine Clearance: 28 mL/min (A) (by C-G formula based on SCr of 2.49 mg/dL (H)).  No results found for: AMMONIA  Results from last 7 days   Lab Units 07/22/23  1636 07/22/23  1413   HSTROP T ng/L 53* 57*     Results from last 7 days   Lab Units 07/22/23  1413   PROBNP pg/mL 30,507.0*     Lab Results   Component Value Date    HGBA1C 7.40 (H) 06/01/2023     Lab Results   Component Value Date    TSH 6.870 (H) 07/22/2023     No results found for: PREGTESTUR, PREGSERUM, HCG, HCGQUANT  Pain Management Panel  More data exists         Latest Ref Rng & Units 6/4/2023 9/15/2022   Pain Management Panel   Creatinine, Urine mg/dL 111.5  111.5  26.6  26.6      No results found for: BLOODCX  No results found for: URINECX  No results found for: WOUNDCX  No results found for: STOOLCX      ---------------------------------------------------------------------------------------------------------------------  Imaging Results (Last 7 Days)       Procedure Component Value Units Date/Time    XR Chest 1 View [[id number]] Collected: 07/22/23 1638     Updated: 07/22/23 1702    Narrative:         Portable upright AP view chest.     Comparison: 6/5/2023     Findings: Median sternotomy wires and hardware again noted.  Cardiac  silhouette is enlarged, allowing for technique, diffuse interstitial  prominence noted.  No pleural effusion.  Elevation of the right  hemidiaphragm noted.     No confluent opacification.       Impression:         Mild bilateral interstitial  prominence which may suggest volume overload  in the proper clinical setting.     This report was finalized on 7/22/2023 4:41 PM by Kristen Jara MD.       CT Head Without Contrast [[id number]] Collected: 07/22/23 1533     Updated: 07/22/23 1537    Narrative:      EXAMINATION:Computed tomography(CT)of the head without IV contrast     TECHNIQUE:CT of the head was performed in the supine position without  intravenous contrast according to as low as reasonably achievable dose  protocol. Axial CT utilized with slice thickness of 5 mm presented in  soft tissue and bone algorithms.     COMPARISON:No prior studies are available for review at the time of this  dictation.     FINDINGS:     No acute intra-or extra-axial hemorrhage or fluid collections are  identified. The ventricles are of normal size,shape,and morphology. The  basilar cisterns are patent. No mass effect or midline shift is seen.  The gray-white matter differentiation is normal. The visualized portions  of the orbits,paranasal sinuses,and mastoids appear normal. No acute  fracture is identified.       Impression:         No acute intracranial hemorrhage,midline shift,or significant mass  effect.     This report was finalized on 7/22/2023 3:35 PM by Kristen Jara MD.               Cultures:  No results found for: BLOODCX, URINECX, WOUNDCX, MRSACX, RESPCX, STOOLCX    Last echocardiogram:  Results for orders placed during the hospital encounter of 09/11/22    Adult Transthoracic Echo Limited W/ Cont if Necessary Per Protocol    Interpretation Summary  · Left ventricular ejection fraction appears to be 61 - 65%. Left ventricular systolic function is normal.  · Left ventricular diastolic function was not assessed.  · The right ventricular cavity is mildly dilated.  · Estimated right ventricular systolic pressure from tricuspid regurgitation is markedly elevated ( 78 mmHg).  · This is a technically limited study.          I have personally reviewed the above  radiology images and read the final radiology report on 07/22/23  ---------------------------------------------------------------------------------------------------------------------  Assessment / Plan     Active Hospital Problems    Diagnosis  POA    **Acute on chronic diastolic CHF (congestive heart failure) [I50.33]  Yes       ASSESSMENT/PLAN:    Acute on chronic heart failure with preserved ejection fraction, POA  NSTEMI, T1 versus T2, POA, likely 2/2 above  Acute on chronic CKD stage IV, POA  Acute encephalopathy, POA, currently unclear etiology  Patient presents from local SNF secondary to AMS, lethargy and increased swelling.  Patient AOx3 on arrival to the ED per ED provider note though does admit to visual hallucinations  Work-up revealed HS troponin 57 with repeat at 53.  proBNP is 30,507.  CXR suggestive of volume overload.  EKG with normal sinus rhythm, low voltage QRS.  Creatinine also increased at 2.49.  Patient received Bumex 2 mg in the ED  She will be admitted to telemetry for further observation with continuous cardiac monitoring/pulse oximetry  We will obtain a TTE  Daily weights, strictly monitor I's and O's and clinical volume status closely  Continue to diurese as indicated  Inpatient heart failure navigator consult placed  We will obtain a ReDs vest value  Avoid nephrotoxins as able  Per review of recent discharge summary patient with history of nephrotic syndrome 6 to 8 months ago and failed to follow-up with nephrology.  We will consult nephrology for further assistance, appreciated.  Admission UA unremarkable.  Continue supportive care    Chronic:  CAD s/p CABG  A-fib/flutter  HTN  HLD  T2DM-insulin-dependent  JESIKA  Chronic hypoxic respiratory failure  Hx EDER  Plan to restart home meds as indicated pending med rec  Initiate insulin regimen at appropriate doses.  Accu-Cheks to monitor with hypoglycemia protocol in place  H&H appears around baseline continue to monitor with serial  labs  Monitor vital signs per protocol  ----------  -DVT prophylaxis: Chronically anticoagulated with Eliquis  -Activity: Ad oliverio.  -Expected length of stay: OBSERVATION status; however, if further evaluation or treatment plans warrant, status may change.  Based upon current information, I predict patient's care encounter to be less than or equal to 2 midnights   -Disposition pending course, likely return to SNF following clinical improvement    High risk secondary to acute on chronic congestive heart failure    There are no questions and answers to display.       Jose Cook PA-C   07/22/23  18:26 EDT     Electronically signed by Santhosh Sotelo DO at 07/22/23 1912       Lines, Drains & Airways       Active LDAs       Name Placement date Placement time Site Days    Peripheral IV 07/22/23 1413 Left;Posterior Hand 07/22/23  1413  Hand  4    Peripheral IV 07/26/23 1215 Anterior;Right;Upper Arm 07/26/23  1215  Arm  less than 1    External Urinary Catheter --  --  --  --                  Current Facility-Administered Medications   Medication Dose Route Frequency Provider Last Rate Last Admin    acetaminophen (TYLENOL) tablet 1,000 mg  1,000 mg Oral Q8H PRN Wilian Lombardi MD        apixaban (ELIQUIS) tablet 5 mg  5 mg Oral Q12H Wilian Lombardi MD   5 mg at 07/26/23 0948    aspirin chewable tablet 81 mg  81 mg Oral Daily Wilian Lombardi MD   81 mg at 07/26/23 0948    atorvastatin (LIPITOR) tablet 20 mg  20 mg Oral Nightly Wilian Lombardi MD   20 mg at 07/25/23 2229    sennosides-docusate (PERICOLACE) 8.6-50 MG per tablet 2 tablet  2 tablet Oral BID Juvenal Mahajan DO   2 tablet at 07/26/23 0948    And    polyethylene glycol (MIRALAX) packet 17 g  17 g Oral BID Juvenal Mahajan DO   17 g at 07/26/23 0948    And    bisacodyl (DULCOLAX) EC tablet 5 mg  5 mg Oral Daily Juvenal Mahajan DO   5 mg at 07/26/23 0948    And    bisacodyl (DULCOLAX) suppository 10  mg  10 mg Rectal Daily PRN Juvenal Mahajan DO        dextrose (D50W) (25 g/50 mL) IV injection 25 g  25 g Intravenous Q15 Min PRN Santhosh Sotelo DO        dextrose (GLUTOSE) oral gel 15 g  15 g Oral Q15 Min PRN Santhosh Sotelo DO        diphenhydrAMINE (BENADRYL) capsule 25 mg  25 mg Oral Q8H PRN Wilian Lombardi MD   25 mg at 07/23/23 2239    ferrous sulfate tablet 325 mg  325 mg Oral Daily With Breakfast Wilian Lombardi MD   325 mg at 07/26/23 0948    FLUoxetine (PROzac) capsule 20 mg  20 mg Oral Daily Wilian Lombardi MD   20 mg at 07/26/23 0948    fluticasone (FLONASE) 50 MCG/ACT nasal spray 2 spray  2 spray Each Nare Daily Wilian Lombardi MD   2 spray at 07/26/23 0949    furosemide (LASIX) 100 mg in sodium chloride 0.9 % 60 mL IVPB  100 mg Intravenous Once Elaina Bell MD 12 mL/hr at 07/26/23 1344 100 mg at 07/26/23 1344    glucagon HCl (Diagnostic) injection 1 mg  1 mg Intramuscular Q15 Min PRN Santhosh Sotelo DO        guaiFENesin tablet 400 mg  400 mg Oral TID Wilian Lombardi MD   400 mg at 07/26/23 0948    hydrALAZINE (APRESOLINE) tablet 25 mg  25 mg Oral TID PRN Elaina Bell MD        HYDROcodone-acetaminophen (NORCO) 5-325 MG per tablet 1 tablet  1 tablet Oral Q8H PRN Wilian Lombardi MD        insulin glargine (LANTUS, SEMGLEE) injection 20 Units  20 Units Subcutaneous Nightly Any Leigh PA-C   20 Units at 07/25/23 2258    Insulin Lispro (humaLOG) injection 2-9 Units  2-9 Units Subcutaneous 4x Daily AC & at Bedtime Santhosh Sotelo DO   2 Units at 07/25/23 1204    isosorbide mononitrate (IMDUR) 24 hr tablet 30 mg  30 mg Oral Daily Wilian Lombardi MD   30 mg at 07/26/23 0948    lactulose (CHRONULAC) 10 GM/15ML solution 10 g  10 g Oral Q8H PRN Juvenal Mahajan DO        latanoprost (XALATAN) 0.005 % ophthalmic solution 1 drop  1 drop Both Eyes Nightly Wilian Lombardi MD   1 drop at 07/25/23 2454     linaclotide (LINZESS) capsule 145 mcg  145 mcg Oral QAM AC Wilian Lombardi MD   145 mcg at 07/26/23 1343    LORazepam (ATIVAN) tablet 0.5 mg  0.5 mg Oral Q12H PRN Wilian Lombardi MD   0.5 mg at 07/25/23 2245    magic barrier cream 1 application   1 application  Topical 4x Daily PRN Any Leigh PA-C        metoprolol succinate XL (TOPROL-XL) 24 hr tablet 50 mg  50 mg Oral Q24H Juvenal Mahajan DO   50 mg at 07/26/23 1344    multivitamin with minerals 1 tablet  1 tablet Oral Daily Wilian Lombardi MD   1 tablet at 07/26/23 0948    nitroglycerin (NITROSTAT) SL tablet 0.4 mg  0.4 mg Sublingual Q5 Min PRN Santhosh Sotelo DO        nystatin (MYCOSTATIN) powder   Topical Q12H Any Leigh PA-C   Given at 07/26/23 0949    ondansetron (ZOFRAN) tablet 4 mg  4 mg Oral Q6H PRN Wilian Lombardi MD   4 mg at 07/25/23 1512    pantoprazole (PROTONIX) EC tablet 40 mg  40 mg Oral Q AM Wilian Lombardi MD   40 mg at 07/25/23 0501    sodium chloride 0.9 % flush 10 mL  10 mL Intravenous PRN Andra Murillo PA   10 mL at 07/25/23 0817    sodium chloride nasal spray 2 spray  2 spray Nasal Q2H PRN Wilian Lombardi MD        vitamin B-12 (CYANOCOBALAMIN) tablet 1,000 mcg  1,000 mcg Oral Daily Wilian Lombardi MD   1,000 mcg at 07/26/23 0948     Lab Results (last 24 hours)       Procedure Component Value Units Date/Time    Blood Culture - Blood, Arm, Right [[id number]]  (Normal) Collected: 07/22/23 1438    Specimen: Blood from Arm, Right Updated: 07/26/23 1500     Blood Culture No growth at 4 days    Blood Culture - Blood, Arm, Left [[id number]]  (Normal) Collected: 07/22/23 1438    Specimen: Blood from Arm, Left Updated: 07/26/23 1500     Blood Culture No growth at 4 days    POC Glucose Once [[id number]]  (Normal) Collected: 07/26/23 1119    Specimen: Blood Updated: 07/26/23 1210     Glucose 104 mg/dL      Comment: Meter: [id number] : [id number] OPAL GARCIA        Basic Metabolic Panel [[id number]]  (Abnormal) Collected: 07/26/23 0838    Specimen: Blood Updated: 07/26/23 0905     Glucose 76 mg/dL      BUN 65 mg/dL      Creatinine 2.27 mg/dL      Sodium 140 mmol/L      Potassium 3.7 mmol/L      Chloride 100 mmol/L      CO2 31.9 mmol/L      Calcium 8.8 mg/dL      BUN/Creatinine Ratio 28.6     Anion Gap 8.1 mmol/L      eGFR 22.4 mL/min/1.73     Narrative:      GFR Normal >60  Chronic Kidney Disease <60  Kidney Failure <15    The GFR formula is only valid for adults with stable renal function between ages 18 and 70.    CBC & Differential [[id number]]  (Abnormal) Collected: 07/26/23 0838    Specimen: Blood Updated: 07/26/23 0901    Narrative:      The following orders were created for panel order CBC & Differential.  Procedure                               Abnormality         Status                     ---------                               -----------         ------                     CBC Auto Differential[[id number]]        Abnormal            Final result               Scan Slide[[id number]]                                       Final result                 Please view results for these tests on the individual orders.    Scan Slide [[id number]] Collected: 07/26/23 0838    Specimen: Blood Updated: 07/26/23 0901     Anisocytosis Large/3+     Hypochromia Mod/2+     Ovalocytes Slight/1+     Platelet Morphology Normal    CBC Auto Differential [[id number]]  (Abnormal) Collected: 07/26/23 0838    Specimen: Blood Updated: 07/26/23 0848     WBC 7.25 10*3/mm3      RBC 3.84 10*6/mm3      Hemoglobin 10.2 g/dL      Hematocrit 34.8 %      MCV 90.6 fL      MCH 26.6 pg      MCHC 29.3 g/dL      RDW 21.3 %      RDW-SD 70.3 fl      MPV 8.2 fL      Platelets 268 10*3/mm3      Neutrophil % 70.1 %      Lymphocyte % 18.5 %      Monocyte % 7.7 %      Eosinophil % 2.8 %      Basophil % 0.6 %      Immature Grans % 0.3 %      Neutrophils, Absolute 5.09 10*3/mm3      Lymphocytes, Absolute 1.34 10*3/mm3       Monocytes, Absolute 0.56 10*3/mm3      Eosinophils, Absolute 0.20 10*3/mm3      Basophils, Absolute 0.04 10*3/mm3      Immature Grans, Absolute 0.02 10*3/mm3      nRBC 0.0 /100 WBC     POC Glucose Once [[id number]]  (Normal) Collected: 07/26/23 0632    Specimen: Blood Updated: 07/26/23 0725     Glucose 86 mg/dL      Comment: Meter: [id number] : [id number] LYNDON MOORE       POC Glucose Once [[id number]]  (Normal) Collected: 07/26/23 0414    Specimen: Blood Updated: 07/26/23 0421     Glucose 86 mg/dL      Comment: Meter: [id number] : [id number] Dean Anette       POC Glucose Once [[id number]]  (Normal) Collected: 07/25/23 2225    Specimen: Blood Updated: 07/25/23 2309     Glucose 110 mg/dL      Comment: Meter: [id number] : [id number] Dean Anette       POC Glucose Once [[id number]]  (Normal) Collected: 07/25/23 2107    Specimen: Blood Updated: 07/25/23 2113     Glucose 116 mg/dL      Comment: RN Notified Meter: [id number] : [id number] BRUNO TORIBIO       POC Glucose Once [[id number]]  (Abnormal) Collected: 07/25/23 1639    Specimen: Blood Updated: 07/25/23 1649     Glucose 144 mg/dL      Comment: Meter: [id number] : [id number] MARCOS LOLITA             Orders (last 24 hrs)        Start     Ordered    07/27/23 0600  Basic Metabolic Panel  Morning Draw         07/26/23 1043    07/27/23 0600  Magnesium  Morning Draw         07/26/23 1043    07/26/23 1211  POC Glucose Once  PROCEDURE ONCE         07/26/23 1119    07/26/23 1200  metoprolol succinate XL (TOPROL-XL) 24 hr tablet 50 mg  Every 24 Hours Scheduled         07/26/23 0957    07/26/23 1100  furosemide (LASIX) 100 mg in sodium chloride 0.9 % 60 mL IVPB  Once         07/26/23 0757    07/26/23 1100  furosemide (LASIX) injection 40 mg  Once         07/26/23 0757    07/26/23 1000  chlorothiazide (DIURIL) 500 mg in sodium chloride 0.9 % 100 mL IVPB  Once         07/26/23 0757    07/26/23 1000  albumin human 25 % IV SOLN 12.5 g  Once          07/26/23 0757    07/26/23 0957  Reason For No Aldosterone Antagonist on Admission / Arrival  Renal Dysfunction  Once         07/26/23 0957    07/26/23 0957  Reason For No ARB ACEI or ARNI on Admission / Arrival  Renal Dysfunction  Once         07/26/23 0957    07/26/23 0900  bisacodyl (DULCOLAX) EC tablet 5 mg  Daily        See Hyperspace for full Linked Orders Report.    07/25/23 0807    07/26/23 0849  Scan Slide  Once         07/26/23 0848    07/26/23 0827  CBC & Differential  Daily       07/26/23 0826    07/26/23 0827  Basic Metabolic Panel  STAT         07/26/23 0826    07/26/23 0827  CBC Auto Differential  PROCEDURE ONCE         07/26/23 0827    07/26/23 0726  POC Glucose Once  PROCEDURE ONCE         07/26/23 0632    07/26/23 0422  POC Glucose Once  PROCEDURE ONCE         07/26/23 0414    07/25/23 2310  POC Glucose Once  PROCEDURE ONCE         07/25/23 2225    07/25/23 2212  ECG 12 Lead Rhythm Change  STAT         07/25/23 2212 07/25/23 2114  POC Glucose Once  PROCEDURE ONCE         07/25/23 2107    07/25/23 2100  insulin glargine (LANTUS, SEMGLEE) injection 20 Units  Nightly,   Status:  Discontinued         07/24/23 2217 07/25/23 2100  polyethylene glycol (MIRALAX) packet 17 g  2 Times Daily        See Hyperspace for full Linked Orders Report.    07/25/23 0807    07/25/23 1650  POC Glucose Once  PROCEDURE ONCE         07/25/23 1639    07/25/23 0900  sennosides-docusate (PERICOLACE) 8.6-50 MG per tablet 2 tablet  2 Times Daily        See Hyperspace for full Linked Orders Report.    07/25/23 0807    07/25/23 0807  lactulose (CHRONULAC) 10 GM/15ML solution 10 g  Every 8 Hours PRN         07/25/23 0807    07/25/23 0806  bisacodyl (DULCOLAX) suppository 10 mg  Daily PRN        See Hyperspace for full Linked Orders Report.    07/25/23 0807    07/24/23 2330  insulin glargine (LANTUS, SEMGLEE) injection 20 Units  Nightly         07/24/23 2225 07/24/23 1030  hydrALAZINE (APRESOLINE) tablet 25 mg  3 Times Daily  PRN         07/24/23 1015    07/23/23 0900  vitamin B-12 (CYANOCOBALAMIN) tablet 1,000 mcg  Daily         07/22/23 2230 07/23/23 0900  isosorbide mononitrate (IMDUR) 24 hr tablet 30 mg  Daily         07/22/23 2230 07/23/23 0900  aspirin chewable tablet 81 mg  Daily         07/22/23 2230 07/23/23 0900  multivitamin with minerals 1 tablet  Daily         07/22/23 2230 07/23/23 0900  fluticasone (FLONASE) 50 MCG/ACT nasal spray 2 spray  Daily         07/22/23 2230 07/23/23 0900  FLUoxetine (PROzac) capsule 20 mg  Daily         07/22/23 2230 07/23/23 0800  ferrous sulfate tablet 325 mg  Daily With Breakfast         07/22/23 2230 07/23/23 0730  linaclotide (LINZESS) capsule 145 mcg  Every Morning Before Breakfast         07/22/23 2230 07/23/23 0600  pantoprazole (PROTONIX) EC tablet 40 mg  Every Early Morning         07/22/23 2230 07/23/23 0000  atorvastatin (LIPITOR) tablet 20 mg  Nightly         07/22/23 2230 07/23/23 0000  latanoprost (XALATAN) 0.005 % ophthalmic solution 1 drop  Nightly         07/22/23 2230 07/23/23 0000  metoprolol tartrate (LOPRESSOR) tablet 50 mg  2 Times Daily,   Status:  Discontinued         07/22/23 2230 07/23/23 0000  guaiFENesin tablet 400 mg  3 Times Daily         07/22/23 2230 07/23/23 0000  apixaban (ELIQUIS) tablet 5 mg  Every 12 Hours Scheduled         07/22/23 2230 07/22/23 2211  sodium chloride nasal spray 2 spray  Every 2 Hours PRN         07/22/23 2230 07/22/23 2211  acetaminophen (TYLENOL) tablet 1,000 mg  Every 8 Hours PRN         07/22/23 2230 07/22/23 2211  diphenhydrAMINE (BENADRYL) capsule 25 mg  Every 8 Hours PRN         07/22/23 2230 07/22/23 2211  ondansetron (ZOFRAN) tablet 4 mg  Every 6 Hours PRN         07/22/23 2230 07/22/23 2211  HYDROcodone-acetaminophen (NORCO) 5-325 MG per tablet 1 tablet  Every 8 Hours PRN         07/22/23 2230 07/22/23 2211  LORazepam (ATIVAN) tablet 0.5 mg  Every 12 Hours PRN          07/22/23 2230 07/22/23 2200  POC Glucose 4x Daily AC & at Bedtime  4 Times Daily Before Meals & at Bedtime       07/22/23 1910 07/22/23 2200  nystatin (MYCOSTATIN) powder  Every 12 Hours Scheduled         07/22/23 2015 07/22/23 2100  Insulin Lispro (humaLOG) injection 2-9 Units  4 Times Daily Before Meals & Nightly         07/22/23 1910 07/22/23 2015  magic barrier cream 1 application   4 Times Daily PRN         07/22/23 2015 07/22/23 2000  Strict Intake & Output  Every 4 Hours       07/22/23 1910 07/22/23 1911  Daily Weights  Daily       07/22/23 1910 07/22/23 1910  nitroglycerin (NITROSTAT) SL tablet 0.4 mg  Every 5 Minutes PRN         07/22/23 1910 07/22/23 1910  dextrose (GLUTOSE) oral gel 15 g  Every 15 Minutes PRN         07/22/23 1910 07/22/23 1910  dextrose (D50W) (25 g/50 mL) IV injection 25 g  Every 15 Minutes PRN         07/22/23 1910 07/22/23 1910  glucagon HCl (Diagnostic) injection 1 mg  Every 15 Minutes PRN         07/22/23 1910 07/22/23 1407  sodium chloride 0.9 % flush 10 mL  As Needed        See Hyperspace for full Linked Orders Report.    07/22/23 1407    Unscheduled  Follow Hypoglycemia Standing Orders For Blood Glucose <70 & Notify Provider of Treatment  As Needed      Comments: Follow Hypoglycemia Orders As Outlined in Process Instructions (Open Order Report to View Full Instructions)  Notify Provider Any Time Hypoglycemia Treatment is Administered    07/22/23 1910    Pending  ECG 12 Lead  Once         Pending    --  FLUoxetine (PROzac) 20 MG capsule  Daily         07/22/23 1915    --  guaiFENesin (HUMIBID 3) 400 MG tablet  3 Times Daily         07/22/23 1921    --  dextromethorphan-guaifenesin (ROBITUSSIN-DM)  MG/5ML syrup  Every 4 Hours PRN         07/22/23 1921    --  HYDROcodone-acetaminophen (NORCO) 5-325 MG per tablet  Every 8 Hours PRN         07/22/23 1921    --  Patiromer Sorbitex Calcium (Veltassa) 16.8 g pack  Daily         07/22/23 1921    --   ferrous sulfate 325 (65 FE) MG tablet  Daily With Breakfast         23    --  LORazepam (ATIVAN) 0.5 MG tablet  Every 12 Hours PRN         23    --  bumetanide (BUMEX) 1 MG tablet  Daily         23    --  multivitamin with minerals tablet tablet  Daily         23    --  SCANNED - TELEMETRY           23 0000    --  SCANNED - TELEMETRY           23 0000    --  SCANNED - TELEMETRY           23 0000    --  SCANNED - TELEMETRY           23 0000                     Physician Progress Notes (last 24 hours)        Lor Salazar PA at 23 0904       Attestation signed by Juvenal Mahajan DO at 23 1048    I have reviewed this documentation and agree.  Continue IV diuresis per nephrology, follow-up on a.m. labs                                            [facility] Medicine Services  PROGRESS NOTE     Patient Identification:  Name:  Jolly Garcia  Age:  72 y.o.  Sex:  female  :  1950  MRN:  [id number]  Visit Number:  [id number]  Primary Care Provider:  Whti Cadet APRN    Length of stay:  2    ----------------------------------------------------------------------------------------------------------------------  Subjective     Chief Complaint:  Follow up for CHF exacerbation, renal failure      History of Presenting Illness/Hospital Course:  Patient is a 73 yo female nursing home resident with past medical history significant for essential hypertension, hyperlipidemia, coronary artery disease s/p CABG in the past, paroxysmal atrial fibrillation/flutter chronically anticoagulated with Eliquis, HFpEF, JESIKA, restrictive lung disease/interstitial lung disease, chronic hypoxic respiratory failure (2 LPM NC), insulin dependent type II diabetes mellitus, stage IV CKD, history of nephrotic syndrome, iron deficiency anemia, chronic venous stasis bilateral lower extremities, GERD, anxiety/depression,  former smoker, and obesity by BMI that presented to the [facility] emergency department for evaluation of AMS, lethargy, shortness of breath, increased swelling, and abnormal labs. Please see admitting history and physical for further and complete details. Patient was found to have acute on chronic HFpEF, NSTEMI type I vs type II, and acute on chronic stage IV CKD. Patient also with metabolic encephalopathy on presentation. Patient was admitted to the telemetry floor for further evaluation and treatment. Patient did have reported visual hallucinations. Head CT was unremarkable for acute intracranial abnormalities. UA unremarkable for UTI. Imaging of chest consistent with volume overload. High sensitivity troponin elevated with flat trend, negative delta, felt to be type II due to acute CHF exacerbation. IV Bumex given initially in ED, due to patient's history of nephrotic syndrome nephrology was consulted and evaluated the patient. WENDY on CKD felt to be due to cardiorenal syndrome. Patient was started on lasix gtt, later diuril gtt as well. Diuresis has been managed per nephrology orders. Repeat TTE obtained with again preserved EF 56-60% with grade II diastolic dysfunction. Creatinine improving with diuresis. Good UOP. Encephalopathy resolved, mentation remains at baseline. PT/OT on board for debility.     Subjective:  Today, the patient was lying in bed per my evaluation this morning. No acute distress noted. No family present at bedside. No overnight events reported per nursing staff. Patient states that other than being tired, she is of no complaints this AM. She denies any dyspnea, remains on home 2 LPM NC. She denies any chest pain. Denies any fevers or chills. No upper respiratory complaints. Denies any abdominal pain, nauseas, vomiting. Denies any urinary complaints.     Present during exam: N/A    ----------------------------------------------------------------------------------------------------------------------  Objective     Consults:  Nephrology     Procedures:  7/23/2023: Transthoracic echocardiogram     Left ventricular systolic function is normal. Left ventricular ejection fraction appears to be 56 - 60%.    Left ventricular diastolic function is consistent with (grade II w/high LAP) pseudonormalization.    The right ventricular cavity is mild to moderately dilated.    The left atrial cavity is mildly dilated.    The right atrial cavity is mildly  dilated.    Estimated right ventricular systolic pressure from tricuspid regurgitation is markedly elevated (>55 mmHg).    Current Hospital Meds:  albumin human, 12.5 g, Intravenous, Once  apixaban, 5 mg, Oral, Q12H  aspirin, 81 mg, Oral, Daily  atorvastatin, 20 mg, Oral, Nightly  senna-docusate sodium, 2 tablet, Oral, BID   And  polyethylene glycol, 17 g, Oral, BID   And  bisacodyl, 5 mg, Oral, Daily  chlorothiazide (DIURIL) IVPB, 500 mg, Intravenous, Once  ferrous sulfate, 325 mg, Oral, Daily With Breakfast  FLUoxetine, 20 mg, Oral, Daily  fluticasone, 2 spray, Each Nare, Daily  furosemide (LASIX) in NS IVPB, 100 mg, Intravenous, Once  furosemide, 40 mg, Intravenous, Once  guaiFENesin, 400 mg, Oral, TID  insulin glargine, 20 Units, Subcutaneous, Nightly  insulin lispro, 2-9 Units, Subcutaneous, 4x Daily AC & at Bedtime  isosorbide mononitrate, 30 mg, Oral, Daily  latanoprost, 1 drop, Both Eyes, Nightly  linaclotide, 145 mcg, Oral, QAM AC  metoprolol succinate XL, 50 mg, Oral, Q24H  multivitamin with minerals, 1 tablet, Oral, Daily  nystatin, , Topical, Q12H  pantoprazole, 40 mg, Oral, Q AM  vitamin B-12, 1,000 mcg, Oral, Daily         ----------------------------------------------------------------------------------------------------------------------  Vital Signs:  Temp:  [97.4 °F (36.3 °C)-98.4 °F (36.9 °C)] 97.8 °F (36.6 °C)  Heart Rate:  [78-86]  80  Resp:  [18] 18  BP: (110-132)/(52-77) 132/75  Mean Arterial Pressure (Non-Invasive) for the past 24 hrs (Last 3 readings):   Noninvasive MAP (mmHg)   07/26/23 0625 96   07/26/23 0300 82   07/25/23 2359 85     SpO2 Percentage    07/25/23 2359 07/26/23 0300 07/26/23 0625   SpO2: 96% 95% 96%     SpO2:  [95 %-96 %] 96 %  on  Flow (L/min):  [2] 2;   Device (Oxygen Therapy): humidified;nasal cannula    Body mass index is 54.27 kg/m².  Wt Readings from Last 3 Encounters:   07/26/23 (!) 152 kg (336 lb 1.6 oz)   06/27/23 124 kg (273 lb)   06/06/23 (!) 140 kg (308 lb 14.4 oz)        Intake/Output Summary (Last 24 hours) at 7/26/2023 1040  Last data filed at 7/26/2023 0900  Gross per 24 hour   Intake 1389.79 ml   Output 1775 ml   Net -385.21 ml     Diet: Cardiac Diets, Diabetic Diets, Renal Diets, Fluid Restriction (240 mL/tray) Diets; Low Sodium (2g); Consistent Carbohydrate; Low Potassium, Low Phosphorus, Low Sodium (2-3g); 1000 mL/day; Texture: Regular Texture (IDDSI 7); Fluid Consistency...  ----------------------------------------------------------------------------------------------------------------------  Physical exam:  Physical Exam  Nursing note reviewed.   Constitutional:       General: She is awake. She is not in acute distress.     Appearance: She is well-developed. She is morbidly obese. She is not toxic-appearing.      Interventions: Nasal cannula in place.      Comments: Lying in bed. No acute distress noted. No family present at bedside.    HENT:      Head: Normocephalic and atraumatic.      Mouth/Throat:      Mouth: Mucous membranes are moist.   Eyes:      Conjunctiva/sclera: Conjunctivae normal.      Pupils: Pupils are equal, round, and reactive to light.   Cardiovascular:      Rate and Rhythm: Normal rate and regular rhythm.      Pulses:           Dorsalis pedis pulses are 2+ on the right side and 2+ on the left side.      Heart sounds: Normal heart sounds. No murmur heard.    No friction rub. No  gallop.   Pulmonary:      Effort: Pulmonary effort is normal.      Breath sounds: Normal air entry. Decreased breath sounds (Diminished bilaterally) present. No wheezing, rhonchi or rales.   Abdominal:      General: Bowel sounds are normal. There is no distension.      Palpations: Abdomen is soft.      Tenderness: There is no abdominal tenderness. There is no guarding or rebound.   Genitourinary:     Comments: No mata catheter in place.  Musculoskeletal:      Cervical back: Neck supple.      Right lower le+ Edema present.      Left lower le+ Edema present.   Skin:     General: Skin is warm and dry.      Capillary Refill: Capillary refill takes less than 2 seconds.   Neurological:      General: No focal deficit present.      Mental Status: She is alert and oriented to person, place, and time.      Sensory: Sensation is intact.      Motor: Motor function is intact.      Comments: Awake and alert. Follows commands. Answers questions appropriately. Moves all extremities equally. Strength and sensation intact. No focal neuro deficit on exam.   Psychiatric:         Mood and Affect: Mood and affect normal.         Speech: Speech normal.         Behavior: Behavior is cooperative.      ----------------------------------------------------------------------------------------------------------------------  Tele:    Sinus 80s     I have personally reviewed the EKG/Telemetry strips   ----------------------------------------------------------------------------------------------------------------------  Results from last 7 days   Lab Units 23  1636 23  1413   HSTROP T ng/L 53* 57*     Results from last 7 days   Lab Units 23  1413   PROBNP pg/mL 30,507.0*       Results from last 7 days   Lab Units 23  1515   PH, ARTERIAL pH units 7.329*   PO2 ART mm Hg 78.3*   PCO2, ARTERIAL mm Hg 50.5*   HCO3 ART mmol/L 26.5*     Results from last 7 days   Lab Units 23  0838 23  0148 23  0254  07/23/23  0806 07/22/23  1413   CRP mg/dL  --   --   --   --  <0.30   LACTATE mmol/L  --   --   --   --  0.8   WBC 10*3/mm3 7.25 8.59 7.63   < > 7.58   HEMOGLOBIN g/dL 10.2* 9.8* 9.7*   < > 10.0*   HEMATOCRIT % 34.8 32.9* 33.5*   < > 33.7*   MCV fL 90.6 89.4 91.0   < > 88.5   MCHC g/dL 29.3* 29.8* 29.0*   < > 29.7*   PLATELETS 10*3/mm3 268 281 286   < > 313    < > = values in this interval not displayed.     Results from last 7 days   Lab Units 07/26/23  0838 07/25/23  0148 07/24/23  0254 07/23/23  0806 07/22/23  1413   SODIUM mmol/L 140 137 137 137 137   POTASSIUM mmol/L 3.7 3.9 4.2 3.8 4.1   MAGNESIUM mg/dL  --   --   --   --  2.2   CHLORIDE mmol/L 100 101 103 102 102   CO2 mmol/L 31.9* 25.4 23.4 24.7 25.1   BUN mg/dL 65* 66* 61* 65* 64*   CREATININE mg/dL 2.27* 2.65* 2.64* 2.54* 2.49*   CALCIUM mg/dL 8.8 8.5* 8.4* 8.8 8.9   GLUCOSE mg/dL 76 145* 170* 127* 130*   ALBUMIN g/dL  --  3.1* 3.1* 3.4* 3.4*   BILIRUBIN mg/dL  --  0.4 0.3 0.4 0.4   ALK PHOS U/L  --  103 105 111 117   AST (SGOT) U/L  --  13 14 12 19   ALT (SGPT) U/L  --  8 8 9 9   Estimated Creatinine Clearance: 34.1 mL/min (A) (by C-G formula based on SCr of 2.27 mg/dL (H)).    Glucose   Date/Time Value Ref Range Status   07/26/2023 0632 86 70 - 130 mg/dL Final     Comment:     Meter: [id number] : [id number] LYNDON MOORE   07/26/2023 0414 86 70 - 130 mg/dL Final     Comment:     Meter: [id number] : [id number] Dean Anette   07/25/2023 2225 110 70 - 130 mg/dL Final     Comment:     Meter: [id number] : [id number] Dean Maotany   07/25/2023 2107 116 70 - 130 mg/dL Final     Comment:     RN Notified Meter: [id number] : [id number] BRUNO TORIBIO   07/25/2023 1639 144 (H) 70 - 130 mg/dL Final     Comment:     Meter: [id number] : [id number] MARCOS MCHUGH   07/25/2023 1125 170 (H) 70 - 130 mg/dL Final     Comment:     Meter: [id number] : [id number] OPAL GARCIA   07/25/2023 0647 153 (H) 70 - 130 mg/dL Final     Comment:     Meter:  [id number] : [id number] OPAL GARCIA   07/24/2023 2208 168 (H) 70 - 130 mg/dL Final     Comment:     Meter: [id number] : [id number] Dean Cheema     Lab Results   Component Value Date    HGBA1C 7.40 (H) 06/01/2023     Lab Results   Component Value Date    TSH 6.870 (H) 07/22/2023    FREET4 1.17 07/22/2023     Microbiology Results (last 10 days)       Procedure Component Value - Date/Time    Blood Culture - Blood, Arm, Left [[id number]]  (Normal) Collected: 07/22/23 1438    Lab Status: Preliminary result Specimen: Blood from Arm, Left Updated: 07/25/23 1500     Blood Culture No growth at 3 days    Blood Culture - Blood, Arm, Right [[id number]]  (Normal) Collected: 07/22/23 1438    Lab Status: Preliminary result Specimen: Blood from Arm, Right Updated: 07/25/23 1500     Blood Culture No growth at 3 days    COVID-19 and FLU A/B PCR - Swab, Nasopharynx [[id number]]  (Normal) Collected: 07/22/23 1414    Lab Status: Final result Specimen: Swab from Nasopharynx Updated: 07/22/23 1444     COVID19 Not Detected     Influenza A PCR Not Detected     Influenza B PCR Not Detected    Narrative:      Fact sheet for providers: https://www.fda.gov/media/366338/download    Fact sheet for patients: https://www.fda.gov/media/313339/download    Test performed by PCR.           Pain Management Panel  More data exists         Latest Ref Rng & Units 7/22/2023 6/4/2023   Pain Management Panel   Creatinine, Urine mg/dL 83.5  111.5  111.5      I have personally reviewed the above laboratory results.   ----------------------------------------------------------------------------------------------------------------------  Imaging Results (Last 24 Hours)       ** No results found for the last 24 hours. **          I have personally reviewed the above radiology results.   ----------------------------------------------------------------------------------------------------------------------  Assessment/Plan     ACUTE HOSPITAL  PROBLEMS    -Acute on chronic HFpEF, present on admission   -NSTEMI, type 1 vs type 2, likely type 2 secondary to above   -WENDY on CKD stage IV, present on admission, likely cardiorenal syndrome   -History of nephrotic syndrome   -Chronic hypoxic respiratory failure (2 LPM NC)  Imaging of chest on admit with evidence of volume overload   Heart failure pathway per protocol   TTE with preserved EF 56-60% and grade II diastolic dysfunction   HS troponin T elevated on admit with flat trend, negative delta. Likely due to acute CHF exacerbation.   Nephrology on board aiding in diuresis, input/assistance is much appreciated   Suspect WENDY due to cardiorenal syndrome. Baseline creatinine around 2.0. Creatinine peaked at 2.64, improved today to 2.27.   Albumin, Diuril 500 mg x 1, and lasix 40 mg IV x 1 today per nephro. Previously given lasix gtt. Diuresing well.   Weight decreased per review of chart. Net negative 3.6 L since admit.    Patient remains on home 2 LPM, tolerating   Closely monitor on telemetry    Strict Is and Os, daily weights   Avoid nephrotoxins as much as possible  Repeat labs in AM    -?Metabolic encephalopathy on presentation   Patient A&O x 3 during my eval   No further visual hallucinations reported   Per review of chart, she was A&Ox3 on admission per H&P   Head CT without acute intracranial abnormalities  No focal deficits on exam   UA unremarkable   Could be metabolic encephalopathy due to acute illness.   Monitor     -Chronic physical debility   PT/OT     -Hypoalbuminemia, multifactorial     CHRONIC MEDICAL PROBLEMS    -Coronary artery disease s/p CABG in the past: HS trop elevated with flat trend, negative delta. Patient denies any chest pain, EKG without acute ischemic changes. Continue current home medication regimen. Closely monitor on telemetry.   -Essential hypertension  BP appears well controlled   Continue current antihypertensive regimen  with appropriate holding parameters to prevent  hypotension and/or bradycardia   Closely monitor BP per hospital protocol, titrate medications as necessary  -Hyperlipidemia: Statin   -Type II diabetes mellitus, insulin dependent  HgbA1C 7.4 recently   BG levels well controlled   At home, patient is on dose 2-10 units Novolog SSI and 40 units Lantus nightly   Currently on 20 units basal insulin, continue without dosage adjustment today   Continue current dose of SSI, titrate dosage as necessary  Closely monitor blood glucose levels with accuchecks QAC and QHS  Hypoglycemia protocol in place should it be necessary  -Paroxysmal atrial flutter/fibrillation: Currently rate controlled. Continue home Eliquis for anticoagulation. Continue PO Metoprolol. Closely monitor on telemetry.   -Chronic venous stasis bilateral lower extremities   -Interstitial lung disease/restrictive lung disease: Appears to be at baseline. On home 2 LPM NC.   -JESIKA  -History of iron deficiency anemia: Hemoglobin stable. Monitor H&H closely, transfuse if necessary. Repeat CBC in AM. Continue home ferrous sulfate.    -Anxiety/depression: Supportive care, continue home medication regimen   -GERD: PPI   -Former smoker   -Obesity by BMI, Body mass index is 54.27 kg/m².  Affecting all aspects of care  --------------------------------------------------  DVT Prophylaxis: Eliquis to serve   GI Prophylaxis: PPI   FEN: No IV fluids. Replace electrolytes per protocol as necessary. Cardiac/renal diet.   Activity: Up with assistance as tolerated   --------------------------------------------------  Disposition:  Pending clinical course   --------------------------------------------------    I have discussed the patient's assessment and plan with the patient, nursing staff, and attending physician Juvenal Acuna*    Lor Salazar PA-C  Hospitalist Service -- [facility]   Pager: [phone]    07/26/23  10:40 EDT    Attending Physician: Juvenal Mahajan  Kyle*    ----------------------------------------------------------------------------------------------------------------------        Electronically signed by Juvenal Mahajan DO at 07/26/23 1048          Consult Notes (most recent note)        Elaina Bell MD at 07/23/23 1217        Consult Orders    1. Inpatient Nephrology Consult [[id number]] ordered by Santhosh Sotelo DO at 07/22/23 1815                 Nephrology Consult Note    Referring Provider: Dr. Gleason  Reason for Consultation: WENDY    Subjective       History of present illness:  Jolly Garcia is a 72 y.o. female who presented to [facility] emergency department with chief complaint of shortness of breath.  Patient is known to have coronary artery disease, essential hypertension, type 2 diabetes mellitus and history of decompensated heart failure who has been recently discharged from the hospital to nursing home.  Came back with complaint of worsening shortness of breath.  Patient is known to have a chronic kidney disease stage IV and her baseline creatinine appears to be around 2.0, on admission her creatinine was 2.5.  Patient stated that she has been drinking a little bit more fluid and not sure whether she has been given high salt diet.  Patient denied chronic NSAIDS use. Patient denies hematuria, dysuria, difficulty passing urine. No prior history of renal stones. No family history of renal disease    History  Past Medical History:   Diagnosis Date    Anxiety     CAD (coronary artery disease)     s/p CABG    Chronic hypoxemic respiratory failure     Wears 2 L/min at home    Essential hypertension     Hyperlipidemia     Type 2 diabetes mellitus    ,   Past Surgical History:   Procedure Laterality Date    CORONARY ARTERY BYPASS GRAFT  2011    HYSTERECTOMY     ,   Family History   Problem Relation Age of Onset    Diabetes Mother    ,   Social History     Tobacco Use    Smoking status: Former     Passive exposure:  Past   Vaping Use    Vaping Use: Never used   Substance Use Topics    Alcohol use: Never    Drug use: Never   ,   Medications Prior to Admission   Medication Sig Dispense Refill Last Dose    aspirin 81 MG chewable tablet Chew 1 tablet Daily.   7/22/2023    atorvastatin (LIPITOR) 20 MG tablet Take 1 tablet by mouth Every Night.   7/21/2023    bumetanide (BUMEX) 1 MG tablet Take 1 tablet by mouth Daily.   7/22/2023    Coenzyme Q10 (CoQ10) 50 MG capsule Take 2 capsules by mouth Daily.   7/22/2023    diphenhydrAMINE (BENADRYL) 25 mg capsule Take 1 capsule by mouth Every 8 (Eight) Hours As Needed for Itching.   Past Month    Eliquis 5 MG tablet tablet Take 1 tablet by mouth Every 12 (Twelve) Hours.   7/22/2023 at 0800    ferrous sulfate 325 (65 FE) MG tablet Take 1 tablet by mouth Daily With Breakfast.   7/22/2023    FLUoxetine (PROzac) 20 MG capsule Take 1 capsule by mouth Daily.   7/22/2023    fluticasone (FLONASE) 50 MCG/ACT nasal spray 2 sprays by Each Nare route Daily. 9.9 mL 0 7/22/2023    guaiFENesin (HUMIBID 3) 400 MG tablet Take 1 tablet by mouth 3 (Three) Times a Day.   7/22/2023 at 1200    hydrALAZINE (APRESOLINE) 25 MG tablet Take 1 tablet by mouth 3 (Three) Times a Day.   7/22/2023 at 1200    HYDROcodone-acetaminophen (NORCO) 5-325 MG per tablet Take 1 tablet by mouth Every 8 (Eight) Hours As Needed for Moderate Pain.   7/22/2023    insulin aspart (novoLOG FLEXPEN) 100 UNIT/ML solution pen-injector sc pen Inject 2-10 Units under the skin into the appropriate area as directed 4 (Four) Times a Day Before Meals & at Bedtime.   7/22/2023 at 1100    Insulin Glargine (Lantus SoloStar) 100 UNIT/ML injection pen Inject 40 Units under the skin into the appropriate area as directed Every Night.   7/21/2023    isosorbide mononitrate (IMDUR) 30 MG 24 hr tablet Take 1 tablet by mouth Daily. 90 tablet 0 7/22/2023    latanoprost (XALATAN) 0.005 % ophthalmic solution Administer 1 drop to both eyes Every Night.   7/21/2023     linaclotide (LINZESS) 145 MCG capsule capsule Take 1 capsule by mouth Every Morning Before Breakfast.   7/22/2023    LORazepam (ATIVAN) 0.5 MG tablet Take 1 tablet by mouth Every 12 (Twelve) Hours As Needed for Anxiety.   7/22/2023    metoprolol tartrate (LOPRESSOR) 50 MG tablet Take 1 tablet by mouth 2 (Two) Times a Day.   7/22/2023 at 0800    multivitamin with minerals tablet tablet Take 1 tablet by mouth Daily.   7/22/2023    multivitamin with minerals tablet tablet Take 1 tablet by mouth Daily.   7/22/2023    pantoprazole (PROTONIX) 40 MG EC tablet Take 1 tablet by mouth Every Morning. 90 tablet 0 7/22/2023    Patiromer Sorbitex Calcium (Veltassa) 16.8 g pack Take 1 packet by mouth Daily.   7/22/2023    Semaglutide, 1 MG/DOSE, (Ozempic, 1 MG/DOSE,) 4 MG/3ML solution pen-injector Inject 1 mg under the skin into the appropriate area as directed 1 (One) Time Per Week.   7/21/2023    vitamin B-12 (CYANOCOBALAMIN) 1000 MCG tablet Take 1 tablet by mouth Daily. 30 tablet 0 7/22/2023    acetaminophen (TYLENOL) 500 MG tablet Take 2 tablets by mouth Every 8 (Eight) Hours As Needed for Mild Pain.   Unknown    dextromethorphan-guaifenesin (ROBITUSSIN-DM)  MG/5ML syrup Take 5 mL by mouth Every 4 (Four) Hours As Needed (Cough).   Unknown    ondansetron (ZOFRAN) 4 MG tablet Take 1 tablet by mouth Every 6 (Six) Hours As Needed for Nausea or Vomiting.   Unknown    sodium chloride 0.65 % nasal spray 2 sprays into the nostril(s) as directed by provider Every 2 (Two) Hours As Needed for Congestion.   Unknown   , Scheduled Meds:  apixaban, 5 mg, Oral, Q12H  aspirin, 81 mg, Oral, Daily  atorvastatin, 20 mg, Oral, Nightly  ferrous sulfate, 325 mg, Oral, Daily With Breakfast  FLUoxetine, 20 mg, Oral, Daily  fluticasone, 2 spray, Each Nare, Daily  furosemide (LASIX) in NS IVPB, 100 mg, Intravenous, Once  guaiFENesin, 400 mg, Oral, TID  hydrALAZINE, 25 mg, Oral, TID  insulin glargine, 40 Units, Subcutaneous, Nightly  insulin  lispro, 2-9 Units, Subcutaneous, 4x Daily AC & at Bedtime  isosorbide mononitrate, 30 mg, Oral, Daily  latanoprost, 1 drop, Both Eyes, Nightly  linaclotide, 145 mcg, Oral, QAM AC  metoprolol tartrate, 50 mg, Oral, BID  multivitamin with minerals, 1 tablet, Oral, Daily  nystatin, , Topical, Q12H  pantoprazole, 40 mg, Oral, Q AM  Patiromer Sorbitex Calcium, 1 each, Oral, Daily  vitamin B-12, 1,000 mcg, Oral, Daily    , Continuous Infusions:   , PRN Meds:    acetaminophen    dextrose    dextrose    diphenhydrAMINE    glucagon (human recombinant)    HYDROcodone-acetaminophen    LORazepam    magic barrier cream    nitroglycerin    ondansetron    [COMPLETED] Insert Peripheral IV **AND** sodium chloride    sodium chloride and Allergies:  Patient has no known allergies.    Review of Systems  More than 10 point review of systems was done. Pertinent items are noted in HPI, all other systems reviewed and negative    Objective     Vital Signs  Temp:  [97.5 °F (36.4 °C)-98.3 °F (36.8 °C)] 97.9 °F (36.6 °C)  Heart Rate:  [79-85] 80  Resp:  [16-24] 16  BP: (110-141)/(60-94) 110/60    Intake/Output                   07/22/23 0701 - 07/23/23 0700     [id number] Total              Intake    Total Intake -- -- --       Output    Urine  1000  950 1950    Total Output [id number]             Physical Examination:  General Appearance: not in acute distress  No JVD  Lungs: Bilateral decreased intensity of breath sounds  Heart: Regular rhythm & normal rate, normal S1, S2, no murmur, no gallop, no rub   Abdomen: Normal bowel sounds, no masses and soft non-tender  Extremities: 2+ edema  Neurologic: Orientated to person, place, time, grossly no focal deficitis    Laboratory Data :      WBC WBC   Date Value Ref Range Status   07/23/2023 7.23 3.40 - 10.80 10*3/mm3 Final   07/22/2023 7.58 3.40 - 10.80 10*3/mm3 Final      HGB Hemoglobin   Date Value Ref Range Status   07/23/2023 9.9 (L) 12.0 - 15.9 g/dL Final   07/22/2023 10.0 (L) 12.0  - 15.9 g/dL Final      HCT Hematocrit   Date Value Ref Range Status   07/23/2023 33.2 (L) 34.0 - 46.6 % Final   07/22/2023 33.7 (L) 34.0 - 46.6 % Final      Platlets No results found for: LABPLAT   MCV MCV   Date Value Ref Range Status   07/23/2023 90.2 79.0 - 97.0 fL Final   07/22/2023 88.5 79.0 - 97.0 fL Final          Sodium Sodium   Date Value Ref Range Status   07/23/2023 137 136 - 145 mmol/L Final   07/22/2023 137 136 - 145 mmol/L Final      Potassium Potassium   Date Value Ref Range Status   07/23/2023 3.8 3.5 - 5.2 mmol/L Final   07/22/2023 4.1 3.5 - 5.2 mmol/L Final     Comment:     Slight hemolysis detected by analyzer. Results may be affected.      Chloride Chloride   Date Value Ref Range Status   07/23/2023 102 98 - 107 mmol/L Final   07/22/2023 102 98 - 107 mmol/L Final      CO2 CO2   Date Value Ref Range Status   07/23/2023 24.7 22.0 - 29.0 mmol/L Final   07/22/2023 25.1 22.0 - 29.0 mmol/L Final      BUN BUN   Date Value Ref Range Status   07/23/2023 65 (H) 8 - 23 mg/dL Final   07/22/2023 64 (H) 8 - 23 mg/dL Final      Creatinine Creatinine   Date Value Ref Range Status   07/23/2023 2.54 (H) 0.57 - 1.00 mg/dL Final   07/22/2023 2.49 (H) 0.57 - 1.00 mg/dL Final      Calcium Calcium   Date Value Ref Range Status   07/23/2023 8.8 8.6 - 10.5 mg/dL Final   07/22/2023 8.9 8.6 - 10.5 mg/dL Final      PO4 No results found for: CAPO4   Albumin Albumin   Date Value Ref Range Status   07/23/2023 3.4 (L) 3.5 - 5.2 g/dL Final   07/22/2023 3.4 (L) 3.5 - 5.2 g/dL Final      Magnesium Magnesium   Date Value Ref Range Status   07/22/2023 2.2 1.6 - 2.4 mg/dL Final      Uric Acid No results found for: URICACID     Radiology results :     Imaging Results (Last 72 Hours)       Procedure Component Value Units Date/Time    XR Chest 1 View [[id number]] Collected: 07/22/23 1638     Updated: 07/22/23 1702    Narrative:         Portable upright AP view chest.     Comparison: 6/5/2023     Findings: Median sternotomy wires and  hardware again noted.  Cardiac  silhouette is enlarged, allowing for technique, diffuse interstitial  prominence noted.  No pleural effusion.  Elevation of the right  hemidiaphragm noted.     No confluent opacification.       Impression:         Mild bilateral interstitial prominence which may suggest volume overload  in the proper clinical setting.     This report was finalized on 7/22/2023 4:41 PM by Kristen Jara MD.       CT Head Without Contrast [[id number]] Collected: 07/22/23 1533     Updated: 07/22/23 1537    Narrative:      EXAMINATION:Computed tomography(CT)of the head without IV contrast     TECHNIQUE:CT of the head was performed in the supine position without  intravenous contrast according to as low as reasonably achievable dose  protocol. Axial CT utilized with slice thickness of 5 mm presented in  soft tissue and bone algorithms.     COMPARISON:No prior studies are available for review at the time of this  dictation.     FINDINGS:     No acute intra-or extra-axial hemorrhage or fluid collections are  identified. The ventricles are of normal size,shape,and morphology. The  basilar cisterns are patent. No mass effect or midline shift is seen.  The gray-white matter differentiation is normal. The visualized portions  of the orbits,paranasal sinuses,and mastoids appear normal. No acute  fracture is identified.       Impression:         No acute intracranial hemorrhage,midline shift,or significant mass  effect.     This report was finalized on 7/22/2023 3:35 PM by Kristen Jara MD.                 Medications:      apixaban, 5 mg, Oral, Q12H  aspirin, 81 mg, Oral, Daily  atorvastatin, 20 mg, Oral, Nightly  ferrous sulfate, 325 mg, Oral, Daily With Breakfast  FLUoxetine, 20 mg, Oral, Daily  fluticasone, 2 spray, Each Nare, Daily  furosemide (LASIX) in NS IVPB, 100 mg, Intravenous, Once  guaiFENesin, 400 mg, Oral, TID  hydrALAZINE, 25 mg, Oral, TID  insulin glargine, 40 Units, Subcutaneous,  Nightly  insulin lispro, 2-9 Units, Subcutaneous, 4x Daily AC & at Bedtime  isosorbide mononitrate, 30 mg, Oral, Daily  latanoprost, 1 drop, Both Eyes, Nightly  linaclotide, 145 mcg, Oral, QAM AC  metoprolol tartrate, 50 mg, Oral, BID  multivitamin with minerals, 1 tablet, Oral, Daily  nystatin, , Topical, Q12H  pantoprazole, 40 mg, Oral, Q AM  Patiromer Sorbitex Calcium, 1 each, Oral, Daily  vitamin B-12, 1,000 mcg, Oral, Daily           Assessment & Plan       Acute on chronic diastolic CHF (congestive heart failure)      -WENDY, nonoliguric  - Chronic kidney disease stage IV A3  - Type 2 diabetes mellitus  - Acute on chronic decompensated heart failure, preserved ejection fraction  - Morbid obesity  - History of nephrotic syndrome    WENDY on CKD most likely due to cardiorenal syndrome type I  Baseline creatinine appears to be 2 admitted with 2.5  Significant fluid overload clinically  - Start on IV Lasix drip 100 mg over 10 hours  - Get UA with microscopy  - Strict intake and output  - 1 g salt restriction and 1 L fluid restriction per day  - Please avoid any nephrotoxic agents, hypotension and adjust medications according to estimated GFR    Thanks Dr Gleason for the consult. Nephrology will follow the patient.   I discussed the patient's findings and my recommendations with patient    Elaina Bell MD  07/23/23  12:17 EDT      Electronically signed by Elaina Bell MD at 07/23/23 1221          Physical Therapy Notes (most recent note)        Sina Alberto, PT at 07/26/23 1441  Version 1 of 1 07/26/23 1440   OTHER   Discipline physical therapist   Rehab Time/Intention   Session Not Performed patient/family declined treatment  (pt. was sleeping and asked to let her remain sleeping.)         Electronically signed by Sina Alberto, PT at 07/26/23 1441          Occupational Therapy Notes (most recent note)        Yanely Agee OT at 07/25/23 1440          Acute Care - Occupational Therapy Initial  Evaluation  RADHA Andino     Patient Name: Jolly Garcia  : 1950  MRN: [id number]  Today's Date: 2023  Onset of Illness/Injury or Date of Surgery: 23  Date of Referral to OT: 23  Referring Physician: Dr. Sotelo    Admit Date: 2023       ICD-10-CM ICD-9-CM   1. Acute congestive heart failure, unspecified heart failure type  I50.9 428.0     Patient Active Problem List   Diagnosis    UTI (urinary tract infection)    Sepsis    Type 2 diabetes mellitus    Essential hypertension    ASCVD (arteriosclerotic cardiovascular disease)    Chronic respiratory failure with hypoxia    Anxiety    Hyperlipidemia    Chronic kidney disease    Diabetic retinopathy    Class 3 severe obesity due to excess calories with body mass index (BMI) of 50.0 to 59.9 in adult    JESIKA (obstructive sleep apnea)    Diabetic nephropathy    Physical debility    (HFpEF) heart failure with preserved ejection fraction    Pneumonia of right lower lobe due to infectious organism    Acute kidney injury    Hyponatremia    Iron deficiency anemia, unspecified    Acute on chronic diastolic CHF (congestive heart failure)    Acute on chronic kidney failure     Past Medical History:   Diagnosis Date    Anxiety     CAD (coronary artery disease)     s/p CABG    Chronic hypoxemic respiratory failure     Wears 2 L/min at home    Essential hypertension     Hyperlipidemia     Type 2 diabetes mellitus      Past Surgical History:   Procedure Laterality Date    CORONARY ARTERY BYPASS GRAFT      HYSTERECTOMY           OT ASSESSMENT FLOWSHEET (last 12 hours)       OT Evaluation and Treatment       Row Name 23 1427                   OT Time and Intention    Subjective Information complains of;fatigue  -BF        Document Type evaluation  -BF        Mode of Treatment occupational therapy  -BF        Patient Effort poor  -BF        Symptoms Noted During/After Treatment fatigue  -BF        Comment Pt very fatigued during evaluation,  "requires max/total assist with ADLs at this time.  -BF           General Information    Patient Profile Reviewed yes  -BF        Onset of Illness/Injury or Date of Surgery 07/22/23  -BF        Referring Physician Dr. Sotelo  -BF        Patient/Family/Caregiver Comments/Observations Pt supine in bed, initially agreeable to OT evaluation and then later stating she was too tired and \"not up for it\" any further.  -BF        Prior Level of Function mod assist:;ADL's  -BF        Pertinent History of Current Functional Problem Pt admitted from SNF with SOA and nausea. Per chart review, pt normally walks with a walker but has been bedbound since experiencing symptoms.  -BF        Existing Precautions/Restrictions fall;oxygen therapy device and L/min  -BF        Barriers to Rehab previous functional deficit;medically complex  -BF           Living Environment    Current Living Arrangements residential facility  -BF           Cognition    Orientation Status (Cognition) oriented x 3  -BF        Follows Commands (Cognition) WFL  -BF           Range of Motion Comprehensive    Comment, General Range of Motion BUE shoulders 75%, remaining BUE AROM WFL  -BF           Strength Comprehensive (MMT)    Comment, General Manual Muscle Testing (MMT) Assessment BUE shoulders 3-/5, remaining grossly 3/5  -BF           Activities of Daily Living    BADL Assessment/Intervention bathing;lower body dressing;upper body dressing;grooming;feeding;toileting  -BF           Bathing Assessment/Intervention    Comment, (Bathing) Max A  -BF           Upper Body Dressing Assessment/Training    Comment, (Upper Body Dressing) Mod A  -BF           Lower Body Dressing Assessment/Training    Comment, (Lower Body Dressing) Total A  -BF           Grooming Assessment/Training    Comment, (Grooming) Mod A  -BF           Self-Feeding Assessment/Training    Comment, (Feeding) Set-up/Min A  -BF           Toileting Assessment/Training    Comment, (Toileting) Total A  " -BF           BADL Safety/Performance    Impairments, BADL Safety/Performance balance;endurance/activity tolerance;range of motion;shortness of breath;strength  -BF           Motor Skills    Motor Skills functional endurance  -BF        Functional Endurance Poor  -BF           Wound 07/22/23 1926 Right anterior greater trochanter MASD (Moisture associated skin damage)    Wound - Properties Group Placement Date: 07/22/23  -DS Placement Time: 1926  -DS Present on Hospital Admission: Y  -DS Side: Right  -DS Orientation: anterior  -DS Location: greater trochanter  -DS Primary Wound Type: MASD  -DS    Retired Wound - Properties Group Placement Date: 07/22/23  -DS Placement Time: 1926  -DS Present on Hospital Admission: Y  -DS Side: Right  -DS Orientation: anterior  -DS Location: greater trochanter  -DS Primary Wound Type: MASD  -DS    Retired Wound - Properties Group Date first assessed: 07/22/23 -DS Time first assessed: 1926  -DS Present on Hospital Admission: Y  -DS Side: Right  -DS Location: greater trochanter  -DS Primary Wound Type: MASD  -DS       Positioning and Restraints    Post Treatment Position bed  -BF        In Bed supine;call light within reach;encouraged to call for assist  -BF           Therapy Assessment/Plan (OT)    Date of Referral to OT 07/25/23  -BF        Patient/Family Therapy Goal Statement (OT) To return to PLOF  -BF        OT Diagnosis Debility  -BF        Rehab Potential (OT) fair, will monitor progress closely  -BF        Criteria for Skilled Therapeutic Interventions Met (OT) yes  -BF        Predicted Duration of Therapy Intervention (OT) 2 weeks  -BF        Problem List (OT) problems related to;balance;mobility;range of motion (ROM);strength  activity tolerance  -BF        Activity Limitations Related to Problem List (OT) unable to transfer safely;BADLs not performed adequately or safely  -BF        Planned Therapy Interventions (OT) activity tolerance training;adaptive equipment  training;BADL retraining;strengthening exercise;ROM/therapeutic exercise;transfer/mobility retraining  -BF           Therapy Plan Review/Discharge Plan (OT)    Therapy Plan Review (OT) patient  -BF        Anticipated Discharge Disposition (OT) skilled nursing facility  -BF           OT Goals    Activity Tolerance Goal Selection (OT) activity tolerance, OT goal 1  -BF            Activity Tolerance Goal 1 (OT)    Activity Tolerance Goal 1 (OT) Pt will complete 15 minute theract with fair activity tolerance to enhance independence with ADLs.  -BF        Activity Level (Endurance Goal 1, OT) 15 min activity  -BF        Time Frame (Activity Tolerance Goal 1, OT) by discharge  -BF                  User Key  (r) = Recorded By, (t) = Taken By, (c) = Cosigned By      Initials Name Effective Dates    BF Yanely Agee OT 07/11/23 -     Micaela Denis RN 01/27/23 -                            OT Recommendation and Plan  Planned Therapy Interventions (OT): activity tolerance training, adaptive equipment training, BADL retraining, strengthening exercise, ROM/therapeutic exercise, transfer/mobility retraining           Time Calculation:    Time Calculation- OT       Row Name 07/25/23 1439             Time Calculation- OT    OT Start Time 1410  -BF                User Key  (r) = Recorded By, (t) = Taken By, (c) = Cosigned By      Initials Name Provider Type    BF Yanely Agee OT Occupational Therapist                  Therapy Charges for Today       Code Description Service Date Service Provider Modifiers Qty    [id number]  OT EVAL HIGH COMPLEXITY 4 7/25/2023 Yanely Agee OT GO 1                 Yanely Agee OT  7/25/2023    Electronically signed by Yanely Agee OT at 07/25/23 1441       Speech Language Pathology Notes (most recent note)    No notes exist for this encounter.       ADL Documentation (last day)       Date/Time Transferring Toileting Bathing Dressing  Eating Communication Swallowing Equipment Currently Used at Home    07/26/23 0845 3 - assistive equipment and person 3 - assistive equipment and person 2 - assistive person 2 - assistive person 0 - independent 0 - understands/communicates without difficulty 0 - swallows foods/liquids without difficulty --    07/25/23 2228 3 - assistive equipment and person 3 - assistive equipment and person 2 - assistive person 2 - assistive person 0 - independent 0 - understands/communicates without difficulty 0 - swallows foods/liquids without difficulty --    07/25/23 1300 -- -- -- -- -- -- -- walker, standard;cane, straight    07/25/23 0715 3 - assistive equipment and person 3 - assistive equipment and person 2 - assistive person 2 - assistive person 0 - independent 0 - understands/communicates without difficulty 0 - swallows foods/liquids without difficulty --            Discharge Summary    No notes of this type exist for this encounter.       Discharge Order (From admission, onward)      None

## 2023-07-26 NOTE — PLAN OF CARE
Goal Outcome Evaluation: Pt becomes irritated frequently & argumentative regarding care. Pt refused to allow RN to assess her backside. PRN anxiety meds administered this shift. Pt has been diuresed this shift per MD orders. Will continue to monitor & follow plan of care.

## 2023-07-26 NOTE — PLAN OF CARE
Goal Outcome Evaluation:     Pt is resting in bed with eyes closed. Chest is rising and falling evenly. No s/s of acute distress noted. No complaints verbalized at this time.     Daily Care Plan Summary: Heart Failure    Diuretic in use (IV or PO):   IV        Daily weight (up or down):    loss: 7lbs      Output > Intake (yes/no):Yes      O2 Requirements (current, any change?):  2 liters/min via nasal cannula      Symptoms noted with Activity (Respiratory Tolerance, functional state):     SOA on exertion       Anticipated Discharge Plans:     Back to SNF when medically stable

## 2023-07-26 NOTE — SIGNIFICANT NOTE
07/26/23 1440   OTHER   Discipline physical therapist   Rehab Time/Intention   Session Not Performed patient/family declined treatment  (pt. was sleeping and asked to let her remain sleeping.)

## 2023-07-26 NOTE — CASE MANAGEMENT/SOCIAL WORK
Discharge Planning Assessment  [facility]     Patient Name: Jolly Garcia  MRN: [id number]  Today's Date: 7/26/2023    Admit Date: 7/22/2023    Plan: SS notified Krysten at [facility] that per Physician pre authorization could be started for pt to return to facility.  SS will follow.       Discharge Plan       Row Name 07/26/23 3337       Plan    Plan SS notified Krysten at [facility] that per Physician pre authorization could be started for pt to return to facility.  SS will follow.      Row Name 07/26/23 0426       Plan    Plan SS spoke with pt at bedside.  Pt stated she planned to return to [facility] at discharge.  Per Krysten pt will need pre authorization before she returns to [facility].  SS will follow.                 Christine Hernandez, LUCINAW

## 2023-07-26 NOTE — PROGRESS NOTES
Nephrology Progress Note      Subjective     No chest pain, shortness of breath    Objective       Vital signs :     Temp:  [97.5 °F (36.4 °C)-98.7 °F (37.1 °C)] 98 °F (36.7 °C)  Heart Rate:  [71-88] 82  Resp:  [18] 18  BP: (118-134)/(66-78) 134/76    Intake/Output                               07/24/23 0701 - 07/25/23 0700 07/25/23 0701 - 07/26/23 0700 07/26/23 0701 - 07/27/23 0700     5571-1079 8304-9640 Total 0982-1993 0699-8968 Total 1809-0483 4768-6220 Total                    Intake    P.O.  650  458 1108  600  500 1100  600  -- 600    I.V.  129.7  -- 129.7  289.8  -- 289.8  --  -- --    Total Intake 779.7 458 1237.7 889.8 500 1389.8 600 -- 600       Output    Urine  850  1475 2325  1050  1025 2075  650  -- 650    Total Output 850 1475 2325 1050 1025 2075 650 -- 650             Physical Exam:    General Appearance : alert, pleasant, appears stated age, cooperative and alert  Lungs :  bilateral decreased intensity of breath sounds  Heart :  regular rhythm & normal rate, normal S1, S2 and no murmur, no rub  Abdomen : soft, non distended  Extremities : 2+ edema  Neurologic :   orientated to person, place, time and situation, Grossly no focal deficits        Laboratory Data :     Albumin Albumin   Date Value Ref Range Status   07/25/2023 3.1 (L) 3.5 - 5.2 g/dL Final   07/24/2023 3.1 (L) 3.5 - 5.2 g/dL Final      Magnesium No results found for: MG         PTH               No results found for: PTH    CBC and coagulation:  Results from last 7 days   Lab Units 07/26/23  0838 07/25/23  0148 07/24/23  0254 07/23/23  0806 07/22/23  1413   LACTATE mmol/L  --   --   --   --  0.8   CRP mg/dL  --   --   --   --  <0.30   WBC 10*3/mm3 7.25 8.59 7.63   < > 7.58   HEMOGLOBIN g/dL 10.2* 9.8* 9.7*   < > 10.0*   HEMATOCRIT % 34.8 32.9* 33.5*   < > 33.7*   MCV fL 90.6 89.4 91.0   < > 88.5   MCHC g/dL 29.3* 29.8* 29.0*   < > 29.7*   PLATELETS 10*3/mm3 268 281 286   < > 313    < > = values in this interval not displayed.      Acid/base balance:  Results from last 7 days   Lab Units 07/22/23  1515   PH, ARTERIAL pH units 7.329*   PO2 ART mm Hg 78.3*   PCO2, ARTERIAL mm Hg 50.5*   HCO3 ART mmol/L 26.5*     Renal and electrolytes:    Results from last 7 days   Lab Units 07/26/23  0838 07/25/23  0148 07/24/23  0254 07/23/23  0806 07/22/23  1413   SODIUM mmol/L 140 137 137 137 137   POTASSIUM mmol/L 3.7 3.9 4.2 3.8 4.1   MAGNESIUM mg/dL  --   --   --   --  2.2   CHLORIDE mmol/L 100 101 103 102 102   CO2 mmol/L 31.9* 25.4 23.4 24.7 25.1   BUN mg/dL 65* 66* 61* 65* 64*   CREATININE mg/dL 2.27* 2.65* 2.64* 2.54* 2.49*   CALCIUM mg/dL 8.8 8.5* 8.4* 8.8 8.9     Estimated Creatinine Clearance: 34.1 mL/min (A) (by C-G formula based on SCr of 2.27 mg/dL (H)).  @GFRCG:3@   Liver and pancreatic function:  Results from last 7 days   Lab Units 07/25/23  0148 07/24/23  0254 07/23/23  0806   ALBUMIN g/dL 3.1* 3.1* 3.4*   BILIRUBIN mg/dL 0.4 0.3 0.4   ALK PHOS U/L 103 105 111   AST (SGOT) U/L 13 14 12   ALT (SGPT) U/L 8 8 9         Cardiac:  Results from last 7 days   Lab Units 07/22/23  1413   PROBNP pg/mL 30,507.0*     Liver and pancreatic function:  Results from last 7 days   Lab Units 07/25/23  0148 07/24/23  0254 07/23/23  0806   ALBUMIN g/dL 3.1* 3.1* 3.4*   BILIRUBIN mg/dL 0.4 0.3 0.4   ALK PHOS U/L 103 105 111   AST (SGOT) U/L 13 14 12   ALT (SGPT) U/L 8 8 9       Medications :     apixaban, 5 mg, Oral, Q12H  aspirin, 81 mg, Oral, Daily  atorvastatin, 20 mg, Oral, Nightly  senna-docusate sodium, 2 tablet, Oral, BID   And  polyethylene glycol, 17 g, Oral, BID   And  bisacodyl, 5 mg, Oral, Daily  ferrous sulfate, 325 mg, Oral, Daily With Breakfast  FLUoxetine, 20 mg, Oral, Daily  fluticasone, 2 spray, Each Nare, Daily  guaiFENesin, 400 mg, Oral, TID  insulin glargine, 20 Units, Subcutaneous, Nightly  insulin lispro, 2-9 Units, Subcutaneous, 4x Daily AC & at Bedtime  isosorbide mononitrate, 30 mg, Oral, Daily  latanoprost, 1 drop, Both Eyes,  Nightly  linaclotide, 145 mcg, Oral, QAM AC  metoprolol succinate XL, 50 mg, Oral, Q24H  multivitamin with minerals, 1 tablet, Oral, Daily  nystatin, , Topical, Q12H  pantoprazole, 40 mg, Oral, Q AM  vitamin B-12, 1,000 mcg, Oral, Daily             Assessment & Plan     -WENDY, nonoliguric  - Chronic kidney disease stage IV A3  - Type 2 diabetes mellitus  - Acute on chronic decompensated heart failure, preserved ejection fraction  - Morbid obesity  - History of nephrotic syndrome     Renal functions continue to improve creatinine 2.2 today.  Accident urine output with overall net negative fluid balance will continue on aggressive diuresis today as well.  Educated and counseled the patient for fluid restriction and salt restriction.    WENDY on CKD most likely due to cardiorenal syndrome type I  Baseline creatinine appears to be 2 admitted with 2.5  Significant fluid overload clinically  -Diuril 500 mg IV once, 30 minutes after  - Lasix 40 mg IV push followed by 100 mg over 5 hours  - Follow-up get UA with microscopy  - Strict intake and output  - 1 g salt restriction and 1 L fluid restriction per day  - Please avoid any nephrotoxic agents, hypotension and adjust medications according to estimated GFR      Elaina Bell MD  07/26/23  22:22 EDT

## 2023-07-26 NOTE — CONSULTS
Received call from pt nurse that pt had lost iv access and unable to reestablish.    Pt agreeable to ultrasound guided iv placement.    AccuCath Ace Intravascular Catheter; 18 g; 2.25 in   Upper Right Cephalic    AccuCath Ace Intravascular Catheter placed with ultrasound guidance and verified by blood return. Minimal blood loss noted. Catheter flushes easily. Blood return noted. Patient nurse, Veronica PERALTA, made aware that catheter is in upper R arm and ready for use.    Miranda Hassan RN

## 2023-07-26 NOTE — CASE MANAGEMENT/SOCIAL WORK
Discharge Planning Assessment  [facility]     Patient Name: Jolly Garcia  MRN: [id number]  Today's Date: 7/26/2023    Admit Date: 7/22/2023    Plan: Per Krysten [facility] will begin pre authorization in am to return to their facility.  SS will follow.       Discharge Plan       Row Name 07/26/23 1633       Plan    Plan Per Krysten [facility] will begin pre authorization in am to return to their facility.  SS will follow.      Row Name 07/26/23 5607       Plan    Plan SS notified Krysten at [facility] that per Physician pre authorization could be started for pt to return to facility.  SS will follow.      Row Name 07/26/23 3742       Plan    Plan SS spoke with pt at bedside.  Pt stated she planned to return to [facility] at discharge.  Per Krysten pt will need pre authorization before she returns to [facility].  SS will follow.                      DEREK Wells

## 2023-07-26 NOTE — PROGRESS NOTES
[facility] Medicine Services  PROGRESS NOTE     Patient Identification:  Name:  Jolly Garcia  Age:  72 y.o.  Sex:  female  :  1950  MRN:  [id number]  Visit Number:  [id number]  Primary Care Provider:  Whit Cadet APRN    Length of stay:  2    ----------------------------------------------------------------------------------------------------------------------  Subjective     Chief Complaint:  Follow up for CHF exacerbation, renal failure      History of Presenting Illness/Hospital Course:  Patient is a 71 yo female nursing home resident with past medical history significant for essential hypertension, hyperlipidemia, coronary artery disease s/p CABG in the past, paroxysmal atrial fibrillation/flutter chronically anticoagulated with Eliquis, HFpEF, JESIKA, restrictive lung disease/interstitial lung disease, chronic hypoxic respiratory failure (2 LPM NC), insulin dependent type II diabetes mellitus, stage IV CKD, history of nephrotic syndrome, iron deficiency anemia, chronic venous stasis bilateral lower extremities, GERD, anxiety/depression, former smoker, and obesity by BMI that presented to the [facility] emergency department for evaluation of AMS, lethargy, shortness of breath, increased swelling, and abnormal labs. Please see admitting history and physical for further and complete details. Patient was found to have acute on chronic HFpEF, NSTEMI type I vs type II, and acute on chronic stage IV CKD. Patient also with metabolic encephalopathy on presentation. Patient was admitted to the telemetry floor for further evaluation and treatment. Patient did have reported visual hallucinations. Head CT was unremarkable for acute intracranial abnormalities. UA unremarkable for UTI. Imaging of chest consistent with volume overload. High sensitivity troponin elevated with flat trend, negative delta, felt to be type II due to acute CHF  exacerbation. IV Bumex given initially in ED, due to patient's history of nephrotic syndrome nephrology was consulted and evaluated the patient. WENDY on CKD felt to be due to cardiorenal syndrome. Patient was started on lasix gtt, later diuril gtt as well. Diuresis has been managed per nephrology orders. Repeat TTE obtained with again preserved EF 56-60% with grade II diastolic dysfunction. Creatinine improving with diuresis. Good UOP. Encephalopathy resolved, mentation remains at baseline. PT/OT on board for debility.     Subjective:  Today, the patient was lying in bed per my evaluation this morning. No acute distress noted. No family present at bedside. No overnight events reported per nursing staff. Patient states that other than being tired, she is of no complaints this AM. She denies any dyspnea, remains on home 2 LPM NC. She denies any chest pain. Denies any fevers or chills. No upper respiratory complaints. Denies any abdominal pain, nauseas, vomiting. Denies any urinary complaints.     Present during exam: N/A   ----------------------------------------------------------------------------------------------------------------------  Objective     Consults:  Nephrology     Procedures:  7/23/2023: Transthoracic echocardiogram     Left ventricular systolic function is normal. Left ventricular ejection fraction appears to be 56 - 60%.    Left ventricular diastolic function is consistent with (grade II w/high LAP) pseudonormalization.    The right ventricular cavity is mild to moderately dilated.    The left atrial cavity is mildly dilated.    The right atrial cavity is mildly  dilated.    Estimated right ventricular systolic pressure from tricuspid regurgitation is markedly elevated (>55 mmHg).    Current Hospital Meds:  albumin human, 12.5 g, Intravenous, Once  apixaban, 5 mg, Oral, Q12H  aspirin, 81 mg, Oral, Daily  atorvastatin, 20 mg, Oral, Nightly  senna-docusate sodium, 2 tablet, Oral, BID   And  polyethylene  glycol, 17 g, Oral, BID   And  bisacodyl, 5 mg, Oral, Daily  chlorothiazide (DIURIL) IVPB, 500 mg, Intravenous, Once  ferrous sulfate, 325 mg, Oral, Daily With Breakfast  FLUoxetine, 20 mg, Oral, Daily  fluticasone, 2 spray, Each Nare, Daily  furosemide (LASIX) in NS IVPB, 100 mg, Intravenous, Once  furosemide, 40 mg, Intravenous, Once  guaiFENesin, 400 mg, Oral, TID  insulin glargine, 20 Units, Subcutaneous, Nightly  insulin lispro, 2-9 Units, Subcutaneous, 4x Daily AC & at Bedtime  isosorbide mononitrate, 30 mg, Oral, Daily  latanoprost, 1 drop, Both Eyes, Nightly  linaclotide, 145 mcg, Oral, QAM AC  metoprolol succinate XL, 50 mg, Oral, Q24H  multivitamin with minerals, 1 tablet, Oral, Daily  nystatin, , Topical, Q12H  pantoprazole, 40 mg, Oral, Q AM  vitamin B-12, 1,000 mcg, Oral, Daily         ----------------------------------------------------------------------------------------------------------------------  Vital Signs:  Temp:  [97.4 °F (36.3 °C)-98.4 °F (36.9 °C)] 97.8 °F (36.6 °C)  Heart Rate:  [78-86] 80  Resp:  [18] 18  BP: (110-132)/(52-77) 132/75  Mean Arterial Pressure (Non-Invasive) for the past 24 hrs (Last 3 readings):   Noninvasive MAP (mmHg)   07/26/23 0625 96   07/26/23 0300 82   07/25/23 2359 85     SpO2 Percentage    07/25/23 2359 07/26/23 0300 07/26/23 0625   SpO2: 96% 95% 96%     SpO2:  [95 %-96 %] 96 %  on  Flow (L/min):  [2] 2;   Device (Oxygen Therapy): humidified;nasal cannula    Body mass index is 54.27 kg/m².  Wt Readings from Last 3 Encounters:   07/26/23 (!) 152 kg (336 lb 1.6 oz)   06/27/23 124 kg (273 lb)   06/06/23 (!) 140 kg (308 lb 14.4 oz)        Intake/Output Summary (Last 24 hours) at 7/26/2023 1040  Last data filed at 7/26/2023 0900  Gross per 24 hour   Intake 1389.79 ml   Output 1775 ml   Net -385.21 ml     Diet: Cardiac Diets, Diabetic Diets, Renal Diets, Fluid Restriction (240 mL/tray) Diets; Low Sodium (2g); Consistent Carbohydrate; Low Potassium, Low Phosphorus, Low  Sodium (2-3g); 1000 mL/day; Texture: Regular Texture (IDDSI 7); Fluid Consistency...  ----------------------------------------------------------------------------------------------------------------------  Physical exam:  Physical Exam  Nursing note reviewed.   Constitutional:       General: She is awake. She is not in acute distress.     Appearance: She is well-developed. She is morbidly obese. She is not toxic-appearing.      Interventions: Nasal cannula in place.      Comments: Lying in bed. No acute distress noted. No family present at bedside.    HENT:      Head: Normocephalic and atraumatic.      Mouth/Throat:      Mouth: Mucous membranes are moist.   Eyes:      Conjunctiva/sclera: Conjunctivae normal.      Pupils: Pupils are equal, round, and reactive to light.   Cardiovascular:      Rate and Rhythm: Normal rate and regular rhythm.      Pulses:           Dorsalis pedis pulses are 2+ on the right side and 2+ on the left side.      Heart sounds: Normal heart sounds. No murmur heard.    No friction rub. No gallop.   Pulmonary:      Effort: Pulmonary effort is normal.      Breath sounds: Normal air entry. Decreased breath sounds (Diminished bilaterally) present. No wheezing, rhonchi or rales.   Abdominal:      General: Bowel sounds are normal. There is no distension.      Palpations: Abdomen is soft.      Tenderness: There is no abdominal tenderness. There is no guarding or rebound.   Genitourinary:     Comments: No mata catheter in place.  Musculoskeletal:      Cervical back: Neck supple.      Right lower le+ Edema present.      Left lower le+ Edema present.   Skin:     General: Skin is warm and dry.      Capillary Refill: Capillary refill takes less than 2 seconds.   Neurological:      General: No focal deficit present.      Mental Status: She is alert and oriented to person, place, and time.      Sensory: Sensation is intact.      Motor: Motor function is intact.      Comments: Awake and alert.  Follows commands. Answers questions appropriately. Moves all extremities equally. Strength and sensation intact. No focal neuro deficit on exam.   Psychiatric:         Mood and Affect: Mood and affect normal.         Speech: Speech normal.         Behavior: Behavior is cooperative.      ----------------------------------------------------------------------------------------------------------------------  Tele:    Sinus 80s     I have personally reviewed the EKG/Telemetry strips   ----------------------------------------------------------------------------------------------------------------------  Results from last 7 days   Lab Units 07/22/23  1636 07/22/23  1413   HSTROP T ng/L 53* 57*     Results from last 7 days   Lab Units 07/22/23  1413   PROBNP pg/mL 30,507.0*       Results from last 7 days   Lab Units 07/22/23  1515   PH, ARTERIAL pH units 7.329*   PO2 ART mm Hg 78.3*   PCO2, ARTERIAL mm Hg 50.5*   HCO3 ART mmol/L 26.5*     Results from last 7 days   Lab Units 07/26/23  0838 07/25/23  0148 07/24/23  0254 07/23/23  0806 07/22/23  1413   CRP mg/dL  --   --   --   --  <0.30   LACTATE mmol/L  --   --   --   --  0.8   WBC 10*3/mm3 7.25 8.59 7.63   < > 7.58   HEMOGLOBIN g/dL 10.2* 9.8* 9.7*   < > 10.0*   HEMATOCRIT % 34.8 32.9* 33.5*   < > 33.7*   MCV fL 90.6 89.4 91.0   < > 88.5   MCHC g/dL 29.3* 29.8* 29.0*   < > 29.7*   PLATELETS 10*3/mm3 268 281 286   < > 313    < > = values in this interval not displayed.     Results from last 7 days   Lab Units 07/26/23  0838 07/25/23  0148 07/24/23  0254 07/23/23  0806 07/22/23  1413   SODIUM mmol/L 140 137 137 137 137   POTASSIUM mmol/L 3.7 3.9 4.2 3.8 4.1   MAGNESIUM mg/dL  --   --   --   --  2.2   CHLORIDE mmol/L 100 101 103 102 102   CO2 mmol/L 31.9* 25.4 23.4 24.7 25.1   BUN mg/dL 65* 66* 61* 65* 64*   CREATININE mg/dL 2.27* 2.65* 2.64* 2.54* 2.49*   CALCIUM mg/dL 8.8 8.5* 8.4* 8.8 8.9   GLUCOSE mg/dL 76 145* 170* 127* 130*   ALBUMIN g/dL  --  3.1* 3.1* 3.4* 3.4*    BILIRUBIN mg/dL  --  0.4 0.3 0.4 0.4   ALK PHOS U/L  --  103 105 111 117   AST (SGOT) U/L  --  13 14 12 19   ALT (SGPT) U/L  --  8 8 9 9   Estimated Creatinine Clearance: 34.1 mL/min (A) (by C-G formula based on SCr of 2.27 mg/dL (H)).    Glucose   Date/Time Value Ref Range Status   07/26/2023 0632 86 70 - 130 mg/dL Final     Comment:     Meter: [id number] : [id number] LYNDON SESAYIRMA   07/26/2023 0414 86 70 - 130 mg/dL Final     Comment:     Meter: [id number] : [id number] Dean Cheema   07/25/2023 2225 110 70 - 130 mg/dL Final     Comment:     Meter: [id number] : [id number] Dean Cheema   07/25/2023 2107 116 70 - 130 mg/dL Final     Comment:     RN Notified Meter: [id number] : [id number] BRUNO TORIBIO   07/25/2023 1639 144 (H) 70 - 130 mg/dL Final     Comment:     Meter: [id number] : [id number] MARCOS LOLITA   07/25/2023 1125 170 (H) 70 - 130 mg/dL Final     Comment:     Meter: [id number] : [id number] OPAL GARCIA   07/25/2023 0647 153 (H) 70 - 130 mg/dL Final     Comment:     Meter: [id number] : [id number] OPAL GARCIA   07/24/2023 2208 168 (H) 70 - 130 mg/dL Final     Comment:     Meter: [id number] : [id number] Dean Cheema     Lab Results   Component Value Date    HGBA1C 7.40 (H) 06/01/2023     Lab Results   Component Value Date    TSH 6.870 (H) 07/22/2023    FREET4 1.17 07/22/2023     Microbiology Results (last 10 days)       Procedure Component Value - Date/Time    Blood Culture - Blood, Arm, Left [[id number]]  (Normal) Collected: 07/22/23 1438    Lab Status: Preliminary result Specimen: Blood from Arm, Left Updated: 07/25/23 1500     Blood Culture No growth at 3 days    Blood Culture - Blood, Arm, Right [[id number]]  (Normal) Collected: 07/22/23 1438    Lab Status: Preliminary result Specimen: Blood from Arm, Right Updated: 07/25/23 1500     Blood Culture No growth at 3 days    COVID-19 and FLU A/B PCR - Swab, Nasopharynx [[id number]]  (Normal) Collected: 07/22/23  1414    Lab Status: Final result Specimen: Swab from Nasopharynx Updated: 07/22/23 1444     COVID19 Not Detected     Influenza A PCR Not Detected     Influenza B PCR Not Detected    Narrative:      Fact sheet for providers: https://www.fda.gov/media/242390/download    Fact sheet for patients: https://www.fda.gov/media/686257/download    Test performed by PCR.           Pain Management Panel  More data exists         Latest Ref Rng & Units 7/22/2023 6/4/2023   Pain Management Panel   Creatinine, Urine mg/dL 83.5  111.5  111.5      I have personally reviewed the above laboratory results.   ----------------------------------------------------------------------------------------------------------------------  Imaging Results (Last 24 Hours)       ** No results found for the last 24 hours. **          I have personally reviewed the above radiology results.   ----------------------------------------------------------------------------------------------------------------------  Assessment/Plan     ACUTE HOSPITAL PROBLEMS    -Acute on chronic HFpEF, present on admission   -NSTEMI, type 1 vs type 2, likely type 2 secondary to above   -WENDY on CKD stage IV, present on admission, likely cardiorenal syndrome   -History of nephrotic syndrome   -Chronic hypoxic respiratory failure (2 LPM NC)  Imaging of chest on admit with evidence of volume overload   Heart failure pathway per protocol   TTE with preserved EF 56-60% and grade II diastolic dysfunction   HS troponin T elevated on admit with flat trend, negative delta. Likely due to acute CHF exacerbation.   Nephrology on board aiding in diuresis, input/assistance is much appreciated   Suspect WENDY due to cardiorenal syndrome. Baseline creatinine around 2.0. Creatinine peaked at 2.64, improved today to 2.27.   Albumin, Diuril 500 mg x 1, and lasix 40 mg IV x 1 today per nephro. Previously given lasix gtt. Diuresing well.   Weight decreased per review of chart. Net negative 3.6 L  since admit.    Patient remains on home 2 LPM, tolerating   Closely monitor on telemetry    Strict Is and Os, daily weights   Avoid nephrotoxins as much as possible  Repeat labs in AM    -?Metabolic encephalopathy on presentation   Patient A&O x 3 during my eval   No further visual hallucinations reported   Per review of chart, she was A&Ox3 on admission per H&P   Head CT without acute intracranial abnormalities  No focal deficits on exam   UA unremarkable   Could be metabolic encephalopathy due to acute illness.   Monitor     -Chronic physical debility   PT/OT     -Hypoalbuminemia, multifactorial     CHRONIC MEDICAL PROBLEMS    -Coronary artery disease s/p CABG in the past: HS trop elevated with flat trend, negative delta. Patient denies any chest pain, EKG without acute ischemic changes. Continue current home medication regimen. Closely monitor on telemetry.   -Essential hypertension  BP appears well controlled   Continue current antihypertensive regimen  with appropriate holding parameters to prevent hypotension and/or bradycardia   Closely monitor BP per hospital protocol, titrate medications as necessary  -Hyperlipidemia: Statin   -Type II diabetes mellitus, insulin dependent  HgbA1C 7.4 recently   BG levels well controlled   At home, patient is on dose 2-10 units Novolog SSI and 40 units Lantus nightly   Currently on 20 units basal insulin, continue without dosage adjustment today   Continue current dose of SSI, titrate dosage as necessary  Closely monitor blood glucose levels with accuchecks QAC and QHS  Hypoglycemia protocol in place should it be necessary  -Paroxysmal atrial flutter/fibrillation: Currently rate controlled. Continue home Eliquis for anticoagulation. Continue PO Metoprolol. Closely monitor on telemetry.   -Chronic venous stasis bilateral lower extremities   -Interstitial lung disease/restrictive lung disease: Appears to be at baseline. On home 2 LPM NC.   -JESIKA  -History of iron deficiency  anemia: Hemoglobin stable. Monitor H&H closely, transfuse if necessary. Repeat CBC in AM. Continue home ferrous sulfate.    -Anxiety/depression: Supportive care, continue home medication regimen   -GERD: PPI   -Former smoker   -Obesity by BMI, Body mass index is 54.27 kg/m².  Affecting all aspects of care  --------------------------------------------------  DVT Prophylaxis: Eliquis to serve   GI Prophylaxis: PPI   FEN: No IV fluids. Replace electrolytes per protocol as necessary. Cardiac/renal diet.   Activity: Up with assistance as tolerated   --------------------------------------------------  Disposition:  Pending clinical course   --------------------------------------------------    I have discussed the patient's assessment and plan with the patient, nursing staff, and attending physician Juvenal Acuna*    Lor Salazar PA-C  Hospitalist Service -- [facility]   Pager: [phone]    07/26/23  10:40 EDT    Attending Physician: Juvenal Mahajan*    ----------------------------------------------------------------------------------------------------------------------

## 2023-07-26 NOTE — CASE MANAGEMENT/SOCIAL WORK
Discharge Planning Assessment   [place]     Patient Name: Jolly Garcia  MRN: [id number]  Today's Date: 7/26/2023    Admit Date: 7/22/2023    Plan: SS spoke with pt at bedside.  Pt stated she planned to return to [facility] at discharge.  Per Krysten pt will need pre authorization before she returns to [facility].  SS will follow.       Discharge Plan       Row Name 07/26/23 1504       Plan    Plan SS spoke with pt at bedside.  Pt stated she planned to return to [facility] at discharge.  Per Krysten pt will need pre authorization before she returns to [facility].  SS will follow.                  Continued Care and Services - Admitted Since 7/22/2023       Destination       Service Provider Request Status Selected Services Address Phone Fax Patient Preferred    [facility] Pending - Request Sent N/A [address] [phone] --                      Christine Hernandez, BSW

## 2023-07-27 LAB
ANION GAP SERPL CALCULATED.3IONS-SCNC: 11.3 MMOL/L (ref 5–15)
BACTERIA SPEC AEROBE CULT: NORMAL
BACTERIA SPEC AEROBE CULT: NORMAL
BASOPHILS # BLD AUTO: 0.06 10*3/MM3 (ref 0–0.2)
BASOPHILS NFR BLD AUTO: 0.7 % (ref 0–1.5)
BUN SERPL-MCNC: 65 MG/DL (ref 8–23)
BUN/CREAT SERPL: 30.4 (ref 7–25)
CALCIUM SPEC-SCNC: 8.6 MG/DL (ref 8.6–10.5)
CHLORIDE SERPL-SCNC: 99 MMOL/L (ref 98–107)
CO2 SERPL-SCNC: 27.7 MMOL/L (ref 22–29)
CREAT SERPL-MCNC: 2.14 MG/DL (ref 0.57–1)
DEPRECATED RDW RBC AUTO: 67.7 FL (ref 37–54)
EGFRCR SERPLBLD CKD-EPI 2021: 24.1 ML/MIN/1.73
EOSINOPHIL # BLD AUTO: 0.22 10*3/MM3 (ref 0–0.4)
EOSINOPHIL NFR BLD AUTO: 2.5 % (ref 0.3–6.2)
ERYTHROCYTE [DISTWIDTH] IN BLOOD BY AUTOMATED COUNT: 21 % (ref 12.3–15.4)
GLUCOSE BLDC GLUCOMTR-MCNC: 126 MG/DL (ref 70–130)
GLUCOSE BLDC GLUCOMTR-MCNC: 127 MG/DL (ref 70–130)
GLUCOSE BLDC GLUCOMTR-MCNC: 147 MG/DL (ref 70–130)
GLUCOSE BLDC GLUCOMTR-MCNC: 88 MG/DL (ref 70–130)
GLUCOSE SERPL-MCNC: 103 MG/DL (ref 65–99)
HCT VFR BLD AUTO: 32.1 % (ref 34–46.6)
HGB BLD-MCNC: 9.8 G/DL (ref 12–15.9)
IMM GRANULOCYTES # BLD AUTO: 0.03 10*3/MM3 (ref 0–0.05)
IMM GRANULOCYTES NFR BLD AUTO: 0.3 % (ref 0–0.5)
LYMPHOCYTES # BLD AUTO: 1.58 10*3/MM3 (ref 0.7–3.1)
LYMPHOCYTES NFR BLD AUTO: 17.7 % (ref 19.6–45.3)
MAGNESIUM SERPL-MCNC: 2.1 MG/DL (ref 1.6–2.4)
MCH RBC QN AUTO: 27.2 PG (ref 26.6–33)
MCHC RBC AUTO-ENTMCNC: 30.5 G/DL (ref 31.5–35.7)
MCV RBC AUTO: 89.2 FL (ref 79–97)
MONOCYTES # BLD AUTO: 0.7 10*3/MM3 (ref 0.1–0.9)
MONOCYTES NFR BLD AUTO: 7.8 % (ref 5–12)
NEUTROPHILS NFR BLD AUTO: 6.35 10*3/MM3 (ref 1.7–7)
NEUTROPHILS NFR BLD AUTO: 71 % (ref 42.7–76)
NRBC BLD AUTO-RTO: 0 /100 WBC (ref 0–0.2)
PLATELET # BLD AUTO: 292 10*3/MM3 (ref 140–450)
PMV BLD AUTO: 8.7 FL (ref 6–12)
POTASSIUM SERPL-SCNC: 3.6 MMOL/L (ref 3.5–5.2)
RBC # BLD AUTO: 3.6 10*6/MM3 (ref 3.77–5.28)
SODIUM SERPL-SCNC: 138 MMOL/L (ref 136–145)
WBC NRBC COR # BLD: 8.94 10*3/MM3 (ref 3.4–10.8)

## 2023-07-27 PROCEDURE — 63710000001 INSULIN GLARGINE PER 5 UNITS: Performed by: STUDENT IN AN ORGANIZED HEALTH CARE EDUCATION/TRAINING PROGRAM

## 2023-07-27 PROCEDURE — 25010000002 ALBUMIN HUMAN 25% PER 50 ML: Performed by: INTERNAL MEDICINE

## 2023-07-27 PROCEDURE — 25010000002 CHLOROTHIAZIDE PER 500 MG: Performed by: INTERNAL MEDICINE

## 2023-07-27 PROCEDURE — 99233 SBSQ HOSP IP/OBS HIGH 50: CPT | Performed by: PHYSICIAN ASSISTANT

## 2023-07-27 PROCEDURE — 97110 THERAPEUTIC EXERCISES: CPT

## 2023-07-27 PROCEDURE — P9047 ALBUMIN (HUMAN), 25%, 50ML: HCPCS | Performed by: INTERNAL MEDICINE

## 2023-07-27 PROCEDURE — 85025 COMPLETE CBC W/AUTO DIFF WBC: CPT | Performed by: STUDENT IN AN ORGANIZED HEALTH CARE EDUCATION/TRAINING PROGRAM

## 2023-07-27 PROCEDURE — 82948 REAGENT STRIP/BLOOD GLUCOSE: CPT

## 2023-07-27 PROCEDURE — 80048 BASIC METABOLIC PNL TOTAL CA: CPT | Performed by: PHYSICIAN ASSISTANT

## 2023-07-27 PROCEDURE — 83735 ASSAY OF MAGNESIUM: CPT | Performed by: PHYSICIAN ASSISTANT

## 2023-07-27 PROCEDURE — 25010000002 FUROSEMIDE PER 20 MG: Performed by: INTERNAL MEDICINE

## 2023-07-27 RX ORDER — FUROSEMIDE 10 MG/ML
40 INJECTION INTRAMUSCULAR; INTRAVENOUS ONCE
Status: COMPLETED | OUTPATIENT
Start: 2023-07-27 | End: 2023-07-27

## 2023-07-27 RX ORDER — POTASSIUM CHLORIDE 20 MEQ/1
40 TABLET, EXTENDED RELEASE ORAL ONCE
Status: COMPLETED | OUTPATIENT
Start: 2023-07-27 | End: 2023-07-27

## 2023-07-27 RX ORDER — ALBUMIN (HUMAN) 12.5 G/50ML
12.5 SOLUTION INTRAVENOUS ONCE
Status: COMPLETED | OUTPATIENT
Start: 2023-07-27 | End: 2023-07-27

## 2023-07-27 RX ADMIN — APIXABAN 5 MG: 5 TABLET, FILM COATED ORAL at 08:34

## 2023-07-27 RX ADMIN — POLYETHYLENE GLYCOL (3350) 17 G: 17 POWDER, FOR SOLUTION ORAL at 20:37

## 2023-07-27 RX ADMIN — POLYETHYLENE GLYCOL (3350) 17 G: 17 POWDER, FOR SOLUTION ORAL at 08:35

## 2023-07-27 RX ADMIN — BISACODYL 5 MG: 5 TABLET, COATED ORAL at 08:35

## 2023-07-27 RX ADMIN — FUROSEMIDE 40 MG: 10 INJECTION, SOLUTION INTRAMUSCULAR; INTRAVENOUS at 12:39

## 2023-07-27 RX ADMIN — Medication 1 TABLET: at 08:35

## 2023-07-27 RX ADMIN — Medication 10 ML: at 20:36

## 2023-07-27 RX ADMIN — ATORVASTATIN CALCIUM 20 MG: 20 TABLET, FILM COATED ORAL at 20:37

## 2023-07-27 RX ADMIN — GUAIFENESIN 400 MG: 200 TABLET ORAL at 17:15

## 2023-07-27 RX ADMIN — NYSTATIN: 100000 POWDER TOPICAL at 20:37

## 2023-07-27 RX ADMIN — FUROSEMIDE 100 MG: 10 INJECTION, SOLUTION INTRAMUSCULAR; INTRAVENOUS at 12:39

## 2023-07-27 RX ADMIN — PANTOPRAZOLE SODIUM 40 MG: 40 TABLET, DELAYED RELEASE ORAL at 05:07

## 2023-07-27 RX ADMIN — POTASSIUM CHLORIDE 40 MEQ: 1500 TABLET, EXTENDED RELEASE ORAL at 05:07

## 2023-07-27 RX ADMIN — FLUOXETINE HYDROCHLORIDE 20 MG: 20 CAPSULE ORAL at 08:35

## 2023-07-27 RX ADMIN — ISOSORBIDE MONONITRATE 30 MG: 30 TABLET, EXTENDED RELEASE ORAL at 08:34

## 2023-07-27 RX ADMIN — FLUTICASONE PROPIONATE 2 SPRAY: 50 SPRAY, METERED NASAL at 08:35

## 2023-07-27 RX ADMIN — ASPIRIN 81 MG: 81 TABLET, CHEWABLE ORAL at 08:34

## 2023-07-27 RX ADMIN — CHLOROTHIAZIDE SODIUM 500 MG: 500 INJECTION, POWDER, LYOPHILIZED, FOR SOLUTION INTRAVENOUS at 11:22

## 2023-07-27 RX ADMIN — APIXABAN 5 MG: 5 TABLET, FILM COATED ORAL at 20:37

## 2023-07-27 RX ADMIN — VITAMINS A AND D OINTMENT 1 APPLICATION: 15.5; 53.4 OINTMENT TOPICAL at 20:37

## 2023-07-27 RX ADMIN — GUAIFENESIN 400 MG: 200 TABLET ORAL at 20:37

## 2023-07-27 RX ADMIN — LATANOPROST 1 DROP: 50 SOLUTION OPHTHALMIC at 20:37

## 2023-07-27 RX ADMIN — LORAZEPAM 0.5 MG: 0.5 TABLET ORAL at 20:37

## 2023-07-27 RX ADMIN — FERROUS SULFATE TAB 325 MG (65 MG ELEMENTAL FE) 325 MG: 325 (65 FE) TAB at 08:34

## 2023-07-27 RX ADMIN — INSULIN GLARGINE 15 UNITS: 100 INJECTION, SOLUTION SUBCUTANEOUS at 20:42

## 2023-07-27 RX ADMIN — Medication 1000 MCG: at 08:35

## 2023-07-27 RX ADMIN — ALBUMIN HUMAN 12.5 G: 0.25 SOLUTION INTRAVENOUS at 10:36

## 2023-07-27 RX ADMIN — DOCUSATE SODIUM 50 MG AND SENNOSIDES 8.6 MG 2 TABLET: 8.6; 5 TABLET, FILM COATED ORAL at 20:37

## 2023-07-27 RX ADMIN — LINACLOTIDE 145 MCG: 145 CAPSULE, GELATIN COATED ORAL at 12:39

## 2023-07-27 RX ADMIN — NYSTATIN: 100000 POWDER TOPICAL at 08:35

## 2023-07-27 RX ADMIN — GUAIFENESIN 400 MG: 200 TABLET ORAL at 08:34

## 2023-07-27 RX ADMIN — DOCUSATE SODIUM 50 MG AND SENNOSIDES 8.6 MG 2 TABLET: 8.6; 5 TABLET, FILM COATED ORAL at 08:35

## 2023-07-27 RX ADMIN — METOPROLOL SUCCINATE 50 MG: 50 TABLET, EXTENDED RELEASE ORAL at 08:34

## 2023-07-27 NOTE — PLAN OF CARE
Goal Outcome Evaluation:  Patient is resting in bed watching television. Patient A&Ox4. Patient is currently on 2L nasal cannula. Patient diuresed this shift. Patient has tolerated all interventions. No complaints or concerns at this time. No signs of acute distress noted. Will continue to follow plan of care.      Daily Care Plan Summary: Heart Failure    Diuretic in use (IV or PO):   IV        Daily weight (up or down):    loss: 2lbs      Output > Intake (yes/no):Yes      O2 Requirements (current, any change?):  2 liters/min via nasal cannula      Symptoms noted with Activity (Respiratory Tolerance, functional state):     SOA on excertion       Anticipated Discharge Plans:     SNF

## 2023-07-27 NOTE — PROGRESS NOTES
Nephrology Progress Note      Subjective     Shortness of breath is much better    Objective       Vital signs :     Temp:  [97.5 °F (36.4 °C)-98.7 °F (37.1 °C)] 97.8 °F (36.6 °C)  Heart Rate:  [71-88] 80  Resp:  [18-20] 18  BP: (120-141)/(60-83) 120/60    Intake/Output                         07/25/23 0701 - 07/26/23 0700 07/26/23 0701 - 07/27/23 0700     [id number] 3190-0978 Total 3618-6443 9180-0190 Total                 Intake    P.O.  600  500 1100  600  -- 600    I.V.  289.8  -- 289.8  --  -- --    Total Intake 889.8 500 1389.8 600 -- 600       Output    Urine  1050  1025 2075  650  925 1575    Total Output 1050 1025 2075              Physical Exam:    General Appearance : alert, pleasant, appears stated age, cooperative and alert  Lungs :  bilateral decreased intensity of breath sounds  Heart :  regular rhythm & normal rate, normal S1, S2 and no murmur, no rub  Abdomen : soft, non distended  Extremities : 2+ edema  Neurologic :   orientated to person, place, time and situation, Grossly no focal deficits        Laboratory Data :     Albumin Albumin   Date Value Ref Range Status   07/25/2023 3.1 (L) 3.5 - 5.2 g/dL Final      Magnesium Magnesium   Date Value Ref Range Status   07/27/2023 2.1 1.6 - 2.4 mg/dL Final            PTH               No results found for: PTH    CBC and coagulation:  Results from last 7 days   Lab Units 07/27/23  0148 07/26/23  0838 07/25/23  0148 07/23/23  0806 07/22/23  1413   LACTATE mmol/L  --   --   --   --  0.8   CRP mg/dL  --   --   --   --  <0.30   WBC 10*3/mm3 8.94 7.25 8.59   < > 7.58   HEMOGLOBIN g/dL 9.8* 10.2* 9.8*   < > 10.0*   HEMATOCRIT % 32.1* 34.8 32.9*   < > 33.7*   MCV fL 89.2 90.6 89.4   < > 88.5   MCHC g/dL 30.5* 29.3* 29.8*   < > 29.7*   PLATELETS 10*3/mm3 292 268 281   < > 313    < > = values in this interval not displayed.     Acid/base balance:  Results from last 7 days   Lab Units 07/22/23  1515   PH, ARTERIAL pH units 7.329*   PO2 ART mm Hg 78.3*    PCO2, ARTERIAL mm Hg 50.5*   HCO3 ART mmol/L 26.5*     Renal and electrolytes:    Results from last 7 days   Lab Units 07/27/23  0148 07/26/23  0838 07/25/23 0148 07/24/23  0254 07/23/23  0806 07/22/23  1413   SODIUM mmol/L 138 140 137 137 137 137   POTASSIUM mmol/L 3.6 3.7 3.9 4.2 3.8 4.1   MAGNESIUM mg/dL 2.1  --   --   --   --  2.2   CHLORIDE mmol/L 99 100 101 103 102 102   CO2 mmol/L 27.7 31.9* 25.4 23.4 24.7 25.1   BUN mg/dL 65* 65* 66* 61* 65* 64*   CREATININE mg/dL 2.14* 2.27* 2.65* 2.64* 2.54* 2.49*   CALCIUM mg/dL 8.6 8.8 8.5* 8.4* 8.8 8.9     Estimated Creatinine Clearance: 36.2 mL/min (A) (by C-G formula based on SCr of 2.14 mg/dL (H)).  @GFRCG:3@   Liver and pancreatic function:  Results from last 7 days   Lab Units 07/25/23  0148 07/24/23  0254 07/23/23  0806   ALBUMIN g/dL 3.1* 3.1* 3.4*   BILIRUBIN mg/dL 0.4 0.3 0.4   ALK PHOS U/L 103 105 111   AST (SGOT) U/L 13 14 12   ALT (SGPT) U/L 8 8 9         Cardiac:  Results from last 7 days   Lab Units 07/22/23  1413   PROBNP pg/mL 30,507.0*     Liver and pancreatic function:  Results from last 7 days   Lab Units 07/25/23  0148 07/24/23  0254 07/23/23  0806   ALBUMIN g/dL 3.1* 3.1* 3.4*   BILIRUBIN mg/dL 0.4 0.3 0.4   ALK PHOS U/L 103 105 111   AST (SGOT) U/L 13 14 12   ALT (SGPT) U/L 8 8 9       Medications :     apixaban, 5 mg, Oral, Q12H  aspirin, 81 mg, Oral, Daily  atorvastatin, 20 mg, Oral, Nightly  senna-docusate sodium, 2 tablet, Oral, BID   And  polyethylene glycol, 17 g, Oral, BID   And  bisacodyl, 5 mg, Oral, Daily  ferrous sulfate, 325 mg, Oral, Daily With Breakfast  FLUoxetine, 20 mg, Oral, Daily  fluticasone, 2 spray, Each Nare, Daily  guaiFENesin, 400 mg, Oral, TID  insulin glargine, 20 Units, Subcutaneous, Nightly  insulin lispro, 2-9 Units, Subcutaneous, 4x Daily AC & at Bedtime  isosorbide mononitrate, 30 mg, Oral, Daily  latanoprost, 1 drop, Both Eyes, Nightly  linaclotide, 145 mcg, Oral, QAM AC  metoprolol succinate XL, 50 mg, Oral,  Q24H  multivitamin with minerals, 1 tablet, Oral, Daily  nystatin, , Topical, Q12H  pantoprazole, 40 mg, Oral, Q AM  vitamin B-12, 1,000 mcg, Oral, Daily             Assessment & Plan     -WENDY, nonoliguric  - Chronic kidney disease stage IV A3  - Type 2 diabetes mellitus  - Acute on chronic decompensated heart failure, preserved ejection fraction  - Morbid obesity  - History of nephrotic syndrome     Creatinine continues to improve 2.1 today adequate urine output with excellent overall net negative fluid balance.  We will continue on IV diuretics today as well.  And most likely transition to p.o. diuretics tomorrow morning patient is not far from euvolemic state.    WENDY on CKD most likely due to cardiorenal syndrome type I  Baseline creatinine appears to be 2 admitted with 2.5  No hematuria no proteinuria    -Diuril 500 mg IV once, 30 minutes after  - Lasix 40 mg IV push followed by 100 mg over 5 hours  - Strict intake and output  - 1 g salt restriction and 1 L fluid restriction per day  - Please avoid any nephrotoxic agents, hypotension and adjust medications according to estimated GFR      Elaina Bell MD  07/27/23  08:18 EDT

## 2023-07-27 NOTE — PLAN OF CARE
Goal Outcome Evaluation:     Pt is resting in bed with eyes closed. Chest is rising and falling evenly. No s/s of acute distress noted. No complaints verbalized at this time.     Daily Care Plan Summary: Heart Failure    Diuretic in use (IV or PO):   IV        Daily weight (up or down):    loss: 2lbs      Output > Intake (yes/no):Yes      O2 Requirements (current, any change?):  2 liters/min via nasal cannula      Symptoms noted with Activity (Respiratory Tolerance, functional state):     SOA on exertion       Anticipated Discharge Plans:     Discharged when medically stable

## 2023-07-27 NOTE — DISCHARGE PLACEMENT REQUEST
"Patient needs post acute skilled nursing for physical and occupational therapy.     ICD-10: 150.33   ICD-10: N17.9  ICD-10: R53.81    Jolly Nolan (72 y.o. Female)       Date of Birth   1950    Social Security Number       Address   [address]  [facility] [address]    Home Phone   [phone]    MRN   [id number]       Jehovah's witness   None    Marital Status   Single                            Admission Date   7/22/23    Admission Type   Emergency    Admitting Provider   Santhosh Sotelo DO    Attending Provider   Juvenal Mahajan DO    Department, Room/Bed   [address], 3303/1S       Discharge Date       Discharge Disposition       Discharge Destination                                 Attending Provider: Juvenal Mahajan DO    Allergies: No Known Allergies    Isolation: None   Infection: None   Code Status: CPR    Ht: 167.6 cm (65.98\")   Wt: 152 kg (334 lb 11.2 oz)    Admission Cmt: None   Principal Problem: Acute on chronic diastolic CHF (congestive heart failure) [I50.33]                   Active Insurance as of 7/22/2023       Primary Coverage       Payor Plan Insurance Group Employer/Plan Group    HUMANA MEDICARE REPLACEMENT HUMANA MEDICARE REPLACEMENT [id number]       Payor Plan Address Payor Plan Phone Number Payor Plan Fax Number Effective Dates    [address] [phone]  1/1/2018 - None Entered    [facility] [id number]         Subscriber Name Subscriber Birth Date Member ID       JOLLY NOLAN 1950 [id number]               Secondary Coverage       Payor Plan Insurance Group Employer/Plan Group    [employer]        Payor Plan Address Payor Plan Phone Number Payor Plan Fax Number Effective Dates    [address] [phone]  1/1/2022 - None Entered    [facility] [id number]         Subscriber Name Subscriber Birth Date Member ID       JOLLY NOLAN 1950 [id number]               "       Emergency Contacts        (Rel.) Home Phone Work Phone Mobile Phone    DARCI MARTIN (Relative) [phone] -- --                 History & Physical        Jose Cook PA-C at 23       Attestation signed by Santhosh Sotelo DO at 23 191    I have reviewed this documentation and agree. Pt seen and examined on 3S.                       [facility] Medicine Services  History & Physical    Patient Identification:  Name:  Jolly Garcia  Age:  72 y.o.  Sex:  female  :  1950  MRN:  [id number]   Visit Number:  [id number]  Admit Date: 2023   Primary Care Physician:  Whit Cadet APRN    Subjective     Chief complaint: Shortness of breath, nausea, abnormal lab    History of presenting illness:      Jolly Garcia is a 72 y.o. female with past medical history significant for CAD, HTN, HLD, T2DM, HFpEF, chronic hypoxic respiratory failure, CKD stage IV, history of nephrotic syndrome, Hx EDER    Per handoff patient presents from local SNF with reported AMS lethargy and increased swelling.  Bumex recently increased from 0.5 to 1 mg daily.  She was alert and oriented on arrival to the ED though reports visual hallucinations recently.    Upon arrival to the ED, vital signs were temp 98.3, heart rate 81, respirations 24, /67, SPO2 98% on 2 L per nasal cannula.  ABG in the ED with pH 7.329, PCO2 increased at 50.5, PO2 78.3.  HS troponin on arrival was 57 with repeat at 53.  proBNP is 30,000.  Baseline creatinine appears to be around 2 and is elevated at 2.49 on arrival.  BUN is 64.  TSH is 6.87 though free T4 is WNL at 1.17.  CXR notable for mild bilateral interstitial prominence which may suggest volume overload.  CT head is negative for acute change.  EKG with normal sinus rhythm, low voltage QRS and left posterior fascicular block.    On exam patient is pleasant and cooperative resting quietly in no acute distress.   She reports over the past 2 to 3 weeks she has become increasingly fatigued and intolerant to any sort of exertion.  She reports she is generally weak.  She is increasingly short of breath as well.  She has noted increased lower extremity swelling which has remained somewhat controlled with wraps ordered by the doctor at her nursing home.  She denies decreased urinary output stating it has actually increased over the past week with increased dosing of her Bumex.  Denies any dysuria.  She denies cough no recent fever or chills.  She states at baseline she is ambulatory with a walker or a cane though his symptoms have progressed she has been essentially bound to her bed.  She denies any chest pain, does note occasional palpitations which are chronic she states secondary to her A-fib.  She wears 2 L per nasal cannula continuously at baseline.  Further review of systems only notable for chronic constipation.  See below.    Known Emergency Department medications received prior to my evaluation included Bumex 2 mg.   Emergency Department Room location at the time of my evaluation was 401.     ---------------------------------------------------------------------------------------------------------------------   Review of Systems   Constitutional:  Positive for fatigue. Negative for chills and fever.   HENT:  Negative for congestion and rhinorrhea.    Respiratory:  Positive for shortness of breath. Negative for cough.    Cardiovascular:  Positive for palpitations (Chronic, occasional) and leg swelling. Negative for chest pain.   Gastrointestinal:  Positive for constipation. Negative for abdominal pain, diarrhea, nausea and vomiting.   Genitourinary:  Negative for difficulty urinating and dysuria.   Musculoskeletal:  Negative for arthralgias and myalgias.   Skin:  Negative for rash and wound.   Neurological:  Positive for weakness. Negative for dizziness and light-headedness.   Psychiatric/Behavioral:  Positive for  hallucinations.       ---------------------------------------------------------------------------------------------------------------------   Past Medical History:   Diagnosis Date    Anxiety     CAD (coronary artery disease)     s/p CABG    Chronic hypoxemic respiratory failure     Wears 2 L/min at home    Essential hypertension     Hyperlipidemia     Type 2 diabetes mellitus      Past Surgical History:   Procedure Laterality Date    CORONARY ARTERY BYPASS GRAFT  2011    HYSTERECTOMY       Family History   Problem Relation Age of Onset    Diabetes Mother      Social History     Socioeconomic History    Marital status: Single   Tobacco Use    Smoking status: Former     Passive exposure: Past   Vaping Use    Vaping Use: Never used   Substance and Sexual Activity    Alcohol use: Never    Drug use: Never    Sexual activity: Defer     ---------------------------------------------------------------------------------------------------------------------   Allergies:  Patient has no known allergies.  ---------------------------------------------------------------------------------------------------------------------   Home medications:    Medications below are reported home medications pulling from within the system; at this time, these medications have not been reconciled unless otherwise specified and are in the verification process for further verifcation as current home medications.  (Not in a hospital admission)      Hospital Scheduled Meds:          Current listed hospital scheduled medications may not yet reflect those currently placed in orders that are signed and held awaiting patient's arrival to floor.   ---------------------------------------------------------------------------------------------------------------------     Objective     Vital Signs:  Temp:  [98.3 °F (36.8 °C)] 98.3 °F (36.8 °C)  Heart Rate:  [80-81] 80  Resp:  [24] 24  BP: (117-127)/(65-94) 127/65      07/22/23  1405   Weight: 125 kg (275 lb)      Body mass index is 43.07 kg/m².  ---------------------------------------------------------------------------------------------------------------------       Physical Exam  Vitals and nursing note reviewed.   Constitutional:       General: She is not in acute distress.  HENT:      Head: Normocephalic and atraumatic.   Eyes:      Extraocular Movements: Extraocular movements intact.      Conjunctiva/sclera: Conjunctivae normal.   Cardiovascular:      Rate and Rhythm: Normal rate and regular rhythm.   Pulmonary:      Effort: Pulmonary effort is normal.      Comments: Diminished breath sounds bilaterally  Abdominal:      Palpations: Abdomen is soft.      Tenderness: There is no abdominal tenderness.   Musculoskeletal:      Right lower leg: No edema.      Left lower leg: No edema.   Skin:     General: Skin is warm and dry.   Neurological:      Mental Status: She is alert and oriented to person, place, and time. Mental status is at baseline.   Psychiatric:         Mood and Affect: Mood normal.         Behavior: Behavior normal.             ---------------------------------------------------------------------------------------------------------------------  EKG:        I have personally looked at the EKG.  ---------------------------------------------------------------------------------------------------------------------   Results from last 7 days   Lab Units 07/22/23  1413   CRP mg/dL <0.30   LACTATE mmol/L 0.8   WBC 10*3/mm3 7.58   HEMOGLOBIN g/dL 10.0*   HEMATOCRIT % 33.7*   MCV fL 88.5   MCHC g/dL 29.7*   PLATELETS 10*3/mm3 313     Results from last 7 days   Lab Units 07/22/23  1515   PH, ARTERIAL pH units 7.329*   PO2 ART mm Hg 78.3*   PCO2, ARTERIAL mm Hg 50.5*   HCO3 ART mmol/L 26.5*     Results from last 7 days   Lab Units 07/22/23  1413   SODIUM mmol/L 137   POTASSIUM mmol/L 4.1   MAGNESIUM mg/dL 2.2   CHLORIDE mmol/L 102   CO2 mmol/L 25.1   BUN mg/dL 64*   CREATININE mg/dL 2.49*   CALCIUM mg/dL 8.9   GLUCOSE  mg/dL 130*   ALBUMIN g/dL 3.4*   BILIRUBIN mg/dL 0.4   ALK PHOS U/L 117   AST (SGOT) U/L 19   ALT (SGPT) U/L 9   Estimated Creatinine Clearance: 28 mL/min (A) (by C-G formula based on SCr of 2.49 mg/dL (H)).  No results found for: AMMONIA  Results from last 7 days   Lab Units 07/22/23  1636 07/22/23  1413   HSTROP T ng/L 53* 57*     Results from last 7 days   Lab Units 07/22/23  1413   PROBNP pg/mL 30,507.0*     Lab Results   Component Value Date    HGBA1C 7.40 (H) 06/01/2023     Lab Results   Component Value Date    TSH 6.870 (H) 07/22/2023     No results found for: PREGTESTUR, PREGSERUM, HCG, HCGQUANT  Pain Management Panel  More data exists         Latest Ref Rng & Units 6/4/2023 9/15/2022   Pain Management Panel   Creatinine, Urine mg/dL 111.5  111.5  26.6  26.6      No results found for: BLOODCX  No results found for: URINECX  No results found for: WOUNDCX  No results found for: STOOLCX      ---------------------------------------------------------------------------------------------------------------------  Imaging Results (Last 7 Days)       Procedure Component Value Units Date/Time    XR Chest 1 View [[id number]] Collected: 07/22/23 1638     Updated: 07/22/23 1702    Narrative:         Portable upright AP view chest.     Comparison: 6/5/2023     Findings: Median sternotomy wires and hardware again noted.  Cardiac  silhouette is enlarged, allowing for technique, diffuse interstitial  prominence noted.  No pleural effusion.  Elevation of the right  hemidiaphragm noted.     No confluent opacification.       Impression:         Mild bilateral interstitial prominence which may suggest volume overload  in the proper clinical setting.     This report was finalized on 7/22/2023 4:41 PM by Kristen Jara MD.       CT Head Without Contrast [[id number]] Collected: 07/22/23 1533     Updated: 07/22/23 1537    Narrative:      EXAMINATION:Computed tomography(CT)of the head without IV contrast     TECHNIQUE:CT of the head  was performed in the supine position without  intravenous contrast according to as low as reasonably achievable dose  protocol. Axial CT utilized with slice thickness of 5 mm presented in  soft tissue and bone algorithms.     COMPARISON:No prior studies are available for review at the time of this  dictation.     FINDINGS:     No acute intra-or extra-axial hemorrhage or fluid collections are  identified. The ventricles are of normal size,shape,and morphology. The  basilar cisterns are patent. No mass effect or midline shift is seen.  The gray-white matter differentiation is normal. The visualized portions  of the orbits,paranasal sinuses,and mastoids appear normal. No acute  fracture is identified.       Impression:         No acute intracranial hemorrhage,midline shift,or significant mass  effect.     This report was finalized on 7/22/2023 3:35 PM by Kristen Jara MD.               Cultures:  No results found for: BLOODCX, URINECX, WOUNDCX, MRSACX, RESPCX, STOOLCX    Last echocardiogram:  Results for orders placed during the hospital encounter of 09/11/22    Adult Transthoracic Echo Limited W/ Cont if Necessary Per Protocol    Interpretation Summary  · Left ventricular ejection fraction appears to be 61 - 65%. Left ventricular systolic function is normal.  · Left ventricular diastolic function was not assessed.  · The right ventricular cavity is mildly dilated.  · Estimated right ventricular systolic pressure from tricuspid regurgitation is markedly elevated ( 78 mmHg).  · This is a technically limited study.          I have personally reviewed the above radiology images and read the final radiology report on 07/22/23  ---------------------------------------------------------------------------------------------------------------------  Assessment / Plan     Active Hospital Problems    Diagnosis  POA    **Acute on chronic diastolic CHF (congestive heart failure) [I50.33]  Yes       ASSESSMENT/PLAN:    Acute on  chronic heart failure with preserved ejection fraction, POA  NSTEMI, T1 versus T2, POA, likely 2/2 above  Acute on chronic CKD stage IV, POA  Acute encephalopathy, POA, currently unclear etiology  Patient presents from local SNF secondary to AMS, lethargy and increased swelling.  Patient AOx3 on arrival to the ED per ED provider note though does admit to visual hallucinations  Work-up revealed HS troponin 57 with repeat at 53.  proBNP is 30,507.  CXR suggestive of volume overload.  EKG with normal sinus rhythm, low voltage QRS.  Creatinine also increased at 2.49.  Patient received Bumex 2 mg in the ED  She will be admitted to telemetry for further observation with continuous cardiac monitoring/pulse oximetry  We will obtain a TTE  Daily weights, strictly monitor I's and O's and clinical volume status closely  Continue to diurese as indicated  Inpatient heart failure navigator consult placed  We will obtain a ReDs vest value  Avoid nephrotoxins as able  Per review of recent discharge summary patient with history of nephrotic syndrome 6 to 8 months ago and failed to follow-up with nephrology.  We will consult nephrology for further assistance, appreciated.  Admission UA unremarkable.  Continue supportive care    Chronic:  CAD s/p CABG  A-fib/flutter  HTN  HLD  T2DM-insulin-dependent  JESIKA  Chronic hypoxic respiratory failure  Hx EDER  Plan to restart home meds as indicated pending med rec  Initiate insulin regimen at appropriate doses.  Accu-Cheks to monitor with hypoglycemia protocol in place  H&H appears around baseline continue to monitor with serial labs  Monitor vital signs per protocol  ----------  -DVT prophylaxis: Chronically anticoagulated with Eliquis  -Activity: Ad oliverio.  -Expected length of stay: OBSERVATION status; however, if further evaluation or treatment plans warrant, status may change.  Based upon current information, I predict patient's care encounter to be less than or equal to 2 midnights    -Disposition pending course, likely return to SNF following clinical improvement    High risk secondary to acute on chronic congestive heart failure    There are no questions and answers to display.       Jose Cook PA-C   07/22/23  18:26 EDT     Electronically signed by Santhosh Sotelo DO at 07/22/23 1912       Current Facility-Administered Medications   Medication Dose Route Frequency Provider Last Rate Last Admin    acetaminophen (TYLENOL) tablet 1,000 mg  1,000 mg Oral Q8H PRN Wilian Lombardi MD        apixaban (ELIQUIS) tablet 5 mg  5 mg Oral Q12H Wilian Lombardi MD   5 mg at 07/27/23 0834    aspirin chewable tablet 81 mg  81 mg Oral Daily Wilian Lombardi MD   81 mg at 07/27/23 0834    atorvastatin (LIPITOR) tablet 20 mg  20 mg Oral Nightly Wilian Lombardi MD   20 mg at 07/26/23 2252    sennosides-docusate (PERICOLACE) 8.6-50 MG per tablet 2 tablet  2 tablet Oral BID Juvenal Mahajan DO   2 tablet at 07/27/23 0835    And    polyethylene glycol (MIRALAX) packet 17 g  17 g Oral BID Juvenal Mahajan DO   17 g at 07/27/23 0835    And    bisacodyl (DULCOLAX) EC tablet 5 mg  5 mg Oral Daily Juvenal Mahajan DO   5 mg at 07/27/23 0835    And    bisacodyl (DULCOLAX) suppository 10 mg  10 mg Rectal Daily PRN Juvenal Mahajan DO        dextrose (D50W) (25 g/50 mL) IV injection 25 g  25 g Intravenous Q15 Min PRN Santhosh Sotelo DO        dextrose (GLUTOSE) oral gel 15 g  15 g Oral Q15 Min PRN Santhosh Sotelo DO        diphenhydrAMINE (BENADRYL) capsule 25 mg  25 mg Oral Q8H PRN Wilian Lombardi MD   25 mg at 07/26/23 2311    ferrous sulfate tablet 325 mg  325 mg Oral Daily With Breakfast Wilian Lombardi MD   325 mg at 07/27/23 0834    FLUoxetine (PROzac) capsule 20 mg  20 mg Oral Daily Wilian Lombardi MD   20 mg at 07/27/23 0835    fluticasone (FLONASE) 50 MCG/ACT nasal spray 2 spray  2 spray Each Nare  Daily Wilian Lombardi MD   2 spray at 07/27/23 0835    furosemide (LASIX) 100 mg in sodium chloride 0.9 % 60 mL IVPB  100 mg Intravenous Once Elaina Bell MD 12 mL/hr at 07/27/23 1239 100 mg at 07/27/23 1239    glucagon HCl (Diagnostic) injection 1 mg  1 mg Intramuscular Q15 Min PRN Santhosh Sotelo DO        guaiFENesin tablet 400 mg  400 mg Oral TID Wilian Lombardi MD   400 mg at 07/27/23 0834    hydrALAZINE (APRESOLINE) tablet 25 mg  25 mg Oral TID PRN Elaina Bell MD        HYDROcodone-acetaminophen (NORCO) 5-325 MG per tablet 1 tablet  1 tablet Oral Q8H PRN Wilian Lombardi MD        insulin glargine (LANTUS, SEMGLEE) injection 20 Units  20 Units Subcutaneous Nightly Any Leigh PA-C   20 Units at 07/26/23 2311    Insulin Lispro (humaLOG) injection 2-9 Units  2-9 Units Subcutaneous 4x Daily AC & at Bedtime Santhosh Sotelo DO   2 Units at 07/26/23 1821    isosorbide mononitrate (IMDUR) 24 hr tablet 30 mg  30 mg Oral Daily Wilian Lombardi MD   30 mg at 07/27/23 0834    lactulose (CHRONULAC) 10 GM/15ML solution 10 g  10 g Oral Q8H PRN Juvenal Mahajan DO        latanoprost (XALATAN) 0.005 % ophthalmic solution 1 drop  1 drop Both Eyes Nightly Wilian Lombardi MD   1 drop at 07/26/23 2253    linaclotide (LINZESS) capsule 145 mcg  145 mcg Oral QAM AC Wilian Lombardi MD   145 mcg at 07/27/23 1239    LORazepam (ATIVAN) tablet 0.5 mg  0.5 mg Oral Q12H PRN Wilian Lombardi MD   0.5 mg at 07/26/23 1733    magic barrier cream 1 application   1 application  Topical 4x Daily PRN Any Leigh PA-C        metoprolol succinate XL (TOPROL-XL) 24 hr tablet 50 mg  50 mg Oral Q24H Juvenal Mahajan DO   50 mg at 07/27/23 0834    multivitamin with minerals 1 tablet  1 tablet Oral Daily Wilian Lombardi MD   1 tablet at 07/27/23 0835    nitroglycerin (NITROSTAT) SL tablet 0.4 mg  0.4 mg Sublingual Q5 Min PRN Santhosh Sotelo DO         nystatin (MYCOSTATIN) powder   Topical Q12H Any Leigh PA-C   Given at 23 0835    ondansetron (ZOFRAN) tablet 4 mg  4 mg Oral Q6H PRN Wilian Lombardi MD   4 mg at 23 1512    pantoprazole (PROTONIX) EC tablet 40 mg  40 mg Oral Q AM Wilian Lombardi MD   40 mg at 23 0507    sodium chloride 0.9 % flush 10 mL  10 mL Intravenous PRN Andra Murillo PA   10 mL at 23 2252    sodium chloride nasal spray 2 spray  2 spray Nasal Q2H PRN Wilian Lombardi MD        vitamin B-12 (CYANOCOBALAMIN) tablet 1,000 mcg  1,000 mcg Oral Daily Wilian Lombardi MD   1,000 mcg at 23 0835     Operative/Procedure Notes (most recent note)    No notes of this type exist for this encounter.          Physician Progress Notes (most recent note)        Lor Salazar PA at 23 0721       Attestation signed by Juvenal Mahajan DO at 23 1210    I have reviewed this documentation and agree. Follow up labs in am, potentially able to go back to [facility] tomorrow                                            [facility] Medicine Services  PROGRESS NOTE     Patient Identification:  Name:  Jolly Garcia  Age:  72 y.o.  Sex:  female  :  1950  MRN:  [id number]  Visit Number:  [id number]  Primary Care Provider:  Whit Cadet APRN    Length of stay:  3    ----------------------------------------------------------------------------------------------------------------------  Subjective     Chief Complaint:  Follow up for CHF exacerbation, renal failure      History of Presenting Illness/Hospital Course:  Patient is a 71 yo female nursing home resident with past medical history significant for essential hypertension, hyperlipidemia, coronary artery disease s/p CABG in the past, paroxysmal atrial fibrillation/flutter chronically anticoagulated with Eliquis, HFpEF, JESIKA, restrictive lung disease/interstitial lung disease, chronic hypoxic  respiratory failure (2 LPM NC), insulin dependent type II diabetes mellitus, stage IV CKD, history of nephrotic syndrome, iron deficiency anemia, chronic venous stasis bilateral lower extremities, GERD, anxiety/depression, former smoker, and obesity by BMI that presented to the [facility] emergency department for evaluation of AMS, lethargy, shortness of breath, increased swelling, and abnormal labs. Please see admitting history and physical for further and complete details. Patient was found to have acute on chronic HFpEF, NSTEMI type I vs type II, and acute on chronic stage IV CKD. Patient also with metabolic encephalopathy on presentation. Patient was admitted to the telemetry floor for further evaluation and treatment. Patient did have reported visual hallucinations. Head CT was unremarkable for acute intracranial abnormalities. UA unremarkable for UTI. Imaging of chest consistent with volume overload. High sensitivity troponin elevated with flat trend, negative delta, felt to be type II due to acute CHF exacerbation. IV Bumex given initially in ED, due to patient's history of nephrotic syndrome nephrology was consulted and evaluated the patient. WENDY on CKD felt to be due to cardiorenal syndrome. Patient was started on lasix gtt, later diuril gtt as well. Diuresis has been managed per nephrology orders. Repeat TTE obtained with again preserved EF 56-60% with grade II diastolic dysfunction. Creatinine improving with diuresis. Good UOP. Encephalopathy resolved, mentation remains at baseline. PT/OT on board for debility.     Subjective:  Today, the patient was lying in bed per my evaluation this morning. No acute distress noted. No family present at bedside. No overnight events reported per nursing staff. Patient denies any acute complaints. Continues on aggressive diuresis, renal function continues to improve. Denies any chest pain or shortness of breath.     Present during exam: N/A    ----------------------------------------------------------------------------------------------------------------------  Objective     Consults:  Nephrology     Procedures:  7/23/2023: Transthoracic echocardiogram     Left ventricular systolic function is normal. Left ventricular ejection fraction appears to be 56 - 60%.    Left ventricular diastolic function is consistent with (grade II w/high LAP) pseudonormalization.    The right ventricular cavity is mild to moderately dilated.    The left atrial cavity is mildly dilated.    The right atrial cavity is mildly  dilated.    Estimated right ventricular systolic pressure from tricuspid regurgitation is markedly elevated (>55 mmHg).    Current Hospital Meds:  apixaban, 5 mg, Oral, Q12H  aspirin, 81 mg, Oral, Daily  atorvastatin, 20 mg, Oral, Nightly  senna-docusate sodium, 2 tablet, Oral, BID   And  polyethylene glycol, 17 g, Oral, BID   And  bisacodyl, 5 mg, Oral, Daily  ferrous sulfate, 325 mg, Oral, Daily With Breakfast  FLUoxetine, 20 mg, Oral, Daily  fluticasone, 2 spray, Each Nare, Daily  guaiFENesin, 400 mg, Oral, TID  insulin glargine, 20 Units, Subcutaneous, Nightly  insulin lispro, 2-9 Units, Subcutaneous, 4x Daily AC & at Bedtime  isosorbide mononitrate, 30 mg, Oral, Daily  latanoprost, 1 drop, Both Eyes, Nightly  linaclotide, 145 mcg, Oral, QAM AC  metoprolol succinate XL, 50 mg, Oral, Q24H  multivitamin with minerals, 1 tablet, Oral, Daily  nystatin, , Topical, Q12H  pantoprazole, 40 mg, Oral, Q AM  vitamin B-12, 1,000 mcg, Oral, Daily         ----------------------------------------------------------------------------------------------------------------------  Vital Signs:  Temp:  [97.5 °F (36.4 °C)-98.7 °F (37.1 °C)] 97.8 °F (36.6 °C)  Heart Rate:  [71-88] 80  Resp:  [18-20] 18  BP: (120-141)/(60-83) 120/60  Mean Arterial Pressure (Non-Invasive) for the past 24 hrs (Last 3 readings):   Noninvasive MAP (mmHg)   07/27/23 0650 84   07/27/23 0300 99    07/26/23 2327 109       SpO2 Percentage    07/26/23 2327 07/27/23 0300 07/27/23 0650   SpO2: 99% 96% 94%     SpO2:  [94 %-99 %] 94 %  on  Flow (L/min):  [2] 2;   Device (Oxygen Therapy): humidified;nasal cannula    Body mass index is 54.05 kg/m².  Wt Readings from Last 3 Encounters:   07/27/23 (!) 152 kg (334 lb 11.2 oz)   06/27/23 124 kg (273 lb)   06/06/23 (!) 140 kg (308 lb 14.4 oz)        Intake/Output Summary (Last 24 hours) at 7/27/2023 0722  Last data filed at 7/27/2023 0514  Gross per 24 hour   Intake 600 ml   Output 1575 ml   Net -975 ml       Diet: Cardiac Diets, Diabetic Diets, Renal Diets, Fluid Restriction (240 mL/tray) Diets; Low Sodium (2g); Consistent Carbohydrate; Low Potassium, Low Phosphorus, Low Sodium (2-3g); 1000 mL/day; Texture: Regular Texture (IDDSI 7); Fluid Consistency...  ----------------------------------------------------------------------------------------------------------------------  Physical exam:  Physical Exam  Nursing note reviewed.   Constitutional:       General: She is awake. She is not in acute distress.     Appearance: She is well-developed. She is morbidly obese. She is not toxic-appearing.      Interventions: Nasal cannula in place.      Comments: Lying in bed. No acute distress noted. No family present at bedside.    HENT:      Head: Normocephalic and atraumatic.      Mouth/Throat:      Mouth: Mucous membranes are moist.   Eyes:      Conjunctiva/sclera: Conjunctivae normal.      Pupils: Pupils are equal, round, and reactive to light.   Cardiovascular:      Rate and Rhythm: Normal rate and regular rhythm.      Pulses:           Dorsalis pedis pulses are 2+ on the right side and 2+ on the left side.      Heart sounds: Normal heart sounds. No murmur heard.    No friction rub. No gallop.   Pulmonary:      Effort: Pulmonary effort is normal.      Breath sounds: Normal air entry. Decreased breath sounds (Diminished bilaterally) present. No wheezing, rhonchi or rales.    Abdominal:      General: Bowel sounds are normal. There is no distension.      Palpations: Abdomen is soft.      Tenderness: There is no abdominal tenderness. There is no guarding or rebound.   Genitourinary:     Comments: No mata catheter in place.  Musculoskeletal:      Cervical back: Neck supple.      Right lower le+ Edema present.      Left lower le+ Edema present.   Skin:     General: Skin is warm and dry.      Capillary Refill: Capillary refill takes less than 2 seconds.   Neurological:      General: No focal deficit present.      Mental Status: She is alert and oriented to person, place, and time.      Sensory: Sensation is intact.      Motor: Motor function is intact.      Comments: Awake and alert. Follows commands. Answers questions appropriately. Moves all extremities equally. Strength and sensation intact. No focal neuro deficit on exam.   Psychiatric:         Mood and Affect: Mood and affect normal.         Speech: Speech normal.         Behavior: Behavior is cooperative.      No change on exam today   ----------------------------------------------------------------------------------------------------------------------  Tele:    Sinus 70s-80s     I have personally reviewed the EKG/Telemetry strips   ----------------------------------------------------------------------------------------------------------------------  Results from last 7 days   Lab Units 23  1636 23  1413   HSTROP T ng/L 53* 57*       Results from last 7 days   Lab Units 23  1413   PROBNP pg/mL 30,507.0*         Results from last 7 days   Lab Units 23  1515   PH, ARTERIAL pH units 7.329*   PO2 ART mm Hg 78.3*   PCO2, ARTERIAL mm Hg 50.5*   HCO3 ART mmol/L 26.5*       Results from last 7 days   Lab Units 23  0148 23  0838 23  0148 23  0806 23  1413   CRP mg/dL  --   --   --   --  <0.30   LACTATE mmol/L  --   --   --   --  0.8   WBC 10*3/mm3 8.94 7.25 8.59   < > 7.58    HEMOGLOBIN g/dL 9.8* 10.2* 9.8*   < > 10.0*   HEMATOCRIT % 32.1* 34.8 32.9*   < > 33.7*   MCV fL 89.2 90.6 89.4   < > 88.5   MCHC g/dL 30.5* 29.3* 29.8*   < > 29.7*   PLATELETS 10*3/mm3 292 268 281   < > 313    < > = values in this interval not displayed.       Results from last 7 days   Lab Units 07/27/23  0148 07/26/23  0838 07/25/23  0148 07/24/23  0254 07/23/23  0806 07/22/23  1413   SODIUM mmol/L 138 140 137 137 137 137   POTASSIUM mmol/L 3.6 3.7 3.9 4.2 3.8 4.1   MAGNESIUM mg/dL 2.1  --   --   --   --  2.2   CHLORIDE mmol/L 99 100 101 103 102 102   CO2 mmol/L 27.7 31.9* 25.4 23.4 24.7 25.1   BUN mg/dL 65* 65* 66* 61* 65* 64*   CREATININE mg/dL 2.14* 2.27* 2.65* 2.64* 2.54* 2.49*   CALCIUM mg/dL 8.6 8.8 8.5* 8.4* 8.8 8.9   GLUCOSE mg/dL 103* 76 145* 170* 127* 130*   ALBUMIN g/dL  --   --  3.1* 3.1* 3.4* 3.4*   BILIRUBIN mg/dL  --   --  0.4 0.3 0.4 0.4   ALK PHOS U/L  --   --  103 105 111 117   AST (SGOT) U/L  --   --  13 14 12 19   ALT (SGPT) U/L  --   --  8 8 9 9     Estimated Creatinine Clearance: 36.2 mL/min (A) (by C-G formula based on SCr of 2.14 mg/dL (H)).    Glucose   Date/Time Value Ref Range Status   07/27/2023 0700 88 70 - 130 mg/dL Final     Comment:     Meter: [id number] : [id number] OPAL GARCIA   07/26/2023 2249 120 70 - 130 mg/dL Final     Comment:     Meter: [id number] : [id number] Dean Cheema   07/26/2023 1610 161 (H) 70 - 130 mg/dL Final     Comment:     Meter: [id number] : [id number] OPAL GARCIA   07/26/2023 1119 104 70 - 130 mg/dL Final     Comment:     Meter: [id number] : [id number] OPAL GARCIA   07/26/2023 0632 86 70 - 130 mg/dL Final     Comment:     Meter: [id number] : [id number] LYNDONTERRIE MOORE   07/26/2023 0414 86 70 - 130 mg/dL Final     Comment:     Meter: [id number] : [id number] Dean Cheema   07/25/2023 2225 110 70 - 130 mg/dL Final     Comment:     Meter: [id number] : [id number] Dean Cheema   07/25/2023 2107 116 70 - 130 mg/dL  Final     Comment:     RN Notified Meter: [id number] : [id number] BRUNO TORIBIO     Lab Results   Component Value Date    HGBA1C 7.40 (H) 06/01/2023     Lab Results   Component Value Date    TSH 6.870 (H) 07/22/2023    FREET4 1.17 07/22/2023     Microbiology Results (last 10 days)       Procedure Component Value - Date/Time    Blood Culture - Blood, Arm, Left [[id number]]  (Normal) Collected: 07/22/23 1438    Lab Status: Preliminary result Specimen: Blood from Arm, Left Updated: 07/26/23 1500     Blood Culture No growth at 4 days    Blood Culture - Blood, Arm, Right [[id number]]  (Normal) Collected: 07/22/23 1438    Lab Status: Preliminary result Specimen: Blood from Arm, Right Updated: 07/26/23 1500     Blood Culture No growth at 4 days    COVID-19 and FLU A/B PCR - Swab, Nasopharynx [[id number]]  (Normal) Collected: 07/22/23 1414    Lab Status: Final result Specimen: Swab from Nasopharynx Updated: 07/22/23 1444     COVID19 Not Detected     Influenza A PCR Not Detected     Influenza B PCR Not Detected    Narrative:      Fact sheet for providers: https://www.fda.gov/media/102401/download    Fact sheet for patients: https://www.fda.gov/media/519584/download    Test performed by PCR.           Pain Management Panel  More data exists         Latest Ref Rng & Units 7/22/2023 6/4/2023   Pain Management Panel   Creatinine, Urine mg/dL 83.5  111.5  111.5    I have personally reviewed the above laboratory results.   ----------------------------------------------------------------------------------------------------------------------  Imaging Results (Last 24 Hours)       ** No results found for the last 24 hours. **          I have personally reviewed the above radiology results.   ----------------------------------------------------------------------------------------------------------------------  Assessment/Plan     ACUTE HOSPITAL PROBLEMS    -Acute on chronic HFpEF, present on admission   -NSTEMI, type 1 vs type 2,  likely type 2 secondary to above   -WENDY on CKD stage IV, present on admission, likely cardiorenal syndrome   -History of nephrotic syndrome   -Chronic hypoxic respiratory failure (2 LPM NC)  Imaging of chest on admit with evidence of volume overload   Heart failure pathway per protocol   TTE with preserved EF 56-60% and grade II diastolic dysfunction   HS troponin T elevated on admit with flat trend, negative delta. Likely due to acute CHF exacerbation.   Nephrology on board aiding in aggressive diuresis, input/assistance is much appreciated   Suspect WENDY due to cardiorenal syndrome. Baseline creatinine around 2.0. Creatinine peaked at 2.64, improved today to 2.1  Today, albumin x 1, 500 mg Diuril x 1, 40 mg of IV lasix x1 with 100 mg IV lasix given over 5 hours (gtt) per nephro orders   Weight decreased per review of chart. Net negative 4.2 L since admit.    Patient remains on home 2 LPM, tolerating   Closely monitor on telemetry    Strict Is and Os, daily weights   Avoid nephrotoxins as much as possible  Repeat labs in AM    -?Metabolic encephalopathy on presentation   Patient A&O x 3 during my eval   No further visual hallucinations reported   Per review of chart, she was A&Ox 3 on admission per H&P   Head CT without acute intracranial abnormalities  No focal deficits on exam   UA unremarkable   Could be metabolic encephalopathy due to acute illness.   Monitor     -Chronic physical debility   PT/OT     -Hypoalbuminemia, multifactorial     CHRONIC MEDICAL PROBLEMS    -Coronary artery disease s/p CABG in the past: HS trop elevated with flat trend, negative delta. Patient denies any chest pain, EKG without acute ischemic changes. Continue current home medication regimen. Closely monitor on telemetry.   -Essential hypertension  BP appears well controlled   Continue current antihypertensive regimen  with appropriate holding parameters to prevent hypotension and/or bradycardia   Closely monitor BP per hospital  protocol, titrate medications as necessary  -Hyperlipidemia: Statin   -Type II diabetes mellitus, insulin dependent  HgbA1C 7.4 recently   BG levels well controlled   At home, patient is on dose 2-10 units Novolog SSI and 40 units Lantus nightly   Currently on 20 units basal insulin, continue without dosage adjustment today   Continue current dose of SSI, titrate dosage as necessary  Closely monitor blood glucose levels with accuchecks QAC and QHS  Hypoglycemia protocol in place should it be necessary  -Paroxysmal atrial flutter/fibrillation: Currently rate controlled. Continue home Eliquis for anticoagulation. Continue PO Metoprolol. Closely monitor on telemetry.   -Chronic venous stasis bilateral lower extremities   -Interstitial lung disease/restrictive lung disease: Appears to be at baseline. On home 2 LPM NC.   -JESIKA  -History of iron deficiency anemia: Hemoglobin stable. Monitor H&H closely, transfuse if necessary. Repeat CBC in AM. Continue home ferrous sulfate.    -Anxiety/depression: Supportive care, continue home medication regimen   -GERD: PPI   -Former smoker   -Obesity by BMI, Body mass index is 54.05 kg/m².  Affecting all aspects of care  --------------------------------------------------  DVT Prophylaxis: Eliquis to serve   GI Prophylaxis: PPI   FEN: No IV fluids. Replace electrolytes per protocol as necessary. Cardiac/renal diet.   Activity: Up with assistance as tolerated   --------------------------------------------------  Disposition:  Back to NH once medically stable, ~48 hours   --------------------------------------------------    I have discussed the patient's assessment and plan with the patient, nursing staff, and attending physician Juvenal Acuna*    Lor Salazar PA-C  Hospitalist Service -- [facility]   Pager: [phone]    07/27/23  07:22 EDT    Attending Physician: Juvenal Mahajan  Kyle*    ----------------------------------------------------------------------------------------------------------------------        Electronically signed by Juvenal Mahajan DO at 07/27/23 1210          Consult Notes (most recent note)        Elaina Bell MD at 07/23/23 1217        Consult Orders    1. Inpatient Nephrology Consult [[id number]] ordered by Santhosh Sotelo DO at 07/22/23 1815                 Nephrology Consult Note    Referring Provider: Dr. Gleason  Reason for Consultation: WENDY    Subjective       History of present illness:  Jolly Garcia is a 72 y.o. female who presented to [facility] emergency department with chief complaint of shortness of breath.  Patient is known to have coronary artery disease, essential hypertension, type 2 diabetes mellitus and history of decompensated heart failure who has been recently discharged from the hospital to nursing home.  Came back with complaint of worsening shortness of breath.  Patient is known to have a chronic kidney disease stage IV and her baseline creatinine appears to be around 2.0, on admission her creatinine was 2.5.  Patient stated that she has been drinking a little bit more fluid and not sure whether she has been given high salt diet.  Patient denied chronic NSAIDS use. Patient denies hematuria, dysuria, difficulty passing urine. No prior history of renal stones. No family history of renal disease    History  Past Medical History:   Diagnosis Date    Anxiety     CAD (coronary artery disease)     s/p CABG    Chronic hypoxemic respiratory failure     Wears 2 L/min at home    Essential hypertension     Hyperlipidemia     Type 2 diabetes mellitus    ,   Past Surgical History:   Procedure Laterality Date    CORONARY ARTERY BYPASS GRAFT  2011    HYSTERECTOMY     ,   Family History   Problem Relation Age of Onset    Diabetes Mother    ,   Social History     Tobacco Use    Smoking status: Former     Passive exposure:  Past   Vaping Use    Vaping Use: Never used   Substance Use Topics    Alcohol use: Never    Drug use: Never   ,   Medications Prior to Admission   Medication Sig Dispense Refill Last Dose    aspirin 81 MG chewable tablet Chew 1 tablet Daily.   7/22/2023    atorvastatin (LIPITOR) 20 MG tablet Take 1 tablet by mouth Every Night.   7/21/2023    bumetanide (BUMEX) 1 MG tablet Take 1 tablet by mouth Daily.   7/22/2023    Coenzyme Q10 (CoQ10) 50 MG capsule Take 2 capsules by mouth Daily.   7/22/2023    diphenhydrAMINE (BENADRYL) 25 mg capsule Take 1 capsule by mouth Every 8 (Eight) Hours As Needed for Itching.   Past Month    Eliquis 5 MG tablet tablet Take 1 tablet by mouth Every 12 (Twelve) Hours.   7/22/2023 at 0800    ferrous sulfate 325 (65 FE) MG tablet Take 1 tablet by mouth Daily With Breakfast.   7/22/2023    FLUoxetine (PROzac) 20 MG capsule Take 1 capsule by mouth Daily.   7/22/2023    fluticasone (FLONASE) 50 MCG/ACT nasal spray 2 sprays by Each Nare route Daily. 9.9 mL 0 7/22/2023    guaiFENesin (HUMIBID 3) 400 MG tablet Take 1 tablet by mouth 3 (Three) Times a Day.   7/22/2023 at 1200    hydrALAZINE (APRESOLINE) 25 MG tablet Take 1 tablet by mouth 3 (Three) Times a Day.   7/22/2023 at 1200    HYDROcodone-acetaminophen (NORCO) 5-325 MG per tablet Take 1 tablet by mouth Every 8 (Eight) Hours As Needed for Moderate Pain.   7/22/2023    insulin aspart (novoLOG FLEXPEN) 100 UNIT/ML solution pen-injector sc pen Inject 2-10 Units under the skin into the appropriate area as directed 4 (Four) Times a Day Before Meals & at Bedtime.   7/22/2023 at 1100    Insulin Glargine (Lantus SoloStar) 100 UNIT/ML injection pen Inject 40 Units under the skin into the appropriate area as directed Every Night.   7/21/2023    isosorbide mononitrate (IMDUR) 30 MG 24 hr tablet Take 1 tablet by mouth Daily. 90 tablet 0 7/22/2023    latanoprost (XALATAN) 0.005 % ophthalmic solution Administer 1 drop to both eyes Every Night.   7/21/2023     linaclotide (LINZESS) 145 MCG capsule capsule Take 1 capsule by mouth Every Morning Before Breakfast.   7/22/2023    LORazepam (ATIVAN) 0.5 MG tablet Take 1 tablet by mouth Every 12 (Twelve) Hours As Needed for Anxiety.   7/22/2023    metoprolol tartrate (LOPRESSOR) 50 MG tablet Take 1 tablet by mouth 2 (Two) Times a Day.   7/22/2023 at 0800    multivitamin with minerals tablet tablet Take 1 tablet by mouth Daily.   7/22/2023    multivitamin with minerals tablet tablet Take 1 tablet by mouth Daily.   7/22/2023    pantoprazole (PROTONIX) 40 MG EC tablet Take 1 tablet by mouth Every Morning. 90 tablet 0 7/22/2023    Patiromer Sorbitex Calcium (Veltassa) 16.8 g pack Take 1 packet by mouth Daily.   7/22/2023    Semaglutide, 1 MG/DOSE, (Ozempic, 1 MG/DOSE,) 4 MG/3ML solution pen-injector Inject 1 mg under the skin into the appropriate area as directed 1 (One) Time Per Week.   7/21/2023    vitamin B-12 (CYANOCOBALAMIN) 1000 MCG tablet Take 1 tablet by mouth Daily. 30 tablet 0 7/22/2023    acetaminophen (TYLENOL) 500 MG tablet Take 2 tablets by mouth Every 8 (Eight) Hours As Needed for Mild Pain.   Unknown    dextromethorphan-guaifenesin (ROBITUSSIN-DM)  MG/5ML syrup Take 5 mL by mouth Every 4 (Four) Hours As Needed (Cough).   Unknown    ondansetron (ZOFRAN) 4 MG tablet Take 1 tablet by mouth Every 6 (Six) Hours As Needed for Nausea or Vomiting.   Unknown    sodium chloride 0.65 % nasal spray 2 sprays into the nostril(s) as directed by provider Every 2 (Two) Hours As Needed for Congestion.   Unknown   , Scheduled Meds:  apixaban, 5 mg, Oral, Q12H  aspirin, 81 mg, Oral, Daily  atorvastatin, 20 mg, Oral, Nightly  ferrous sulfate, 325 mg, Oral, Daily With Breakfast  FLUoxetine, 20 mg, Oral, Daily  fluticasone, 2 spray, Each Nare, Daily  furosemide (LASIX) in NS IVPB, 100 mg, Intravenous, Once  guaiFENesin, 400 mg, Oral, TID  hydrALAZINE, 25 mg, Oral, TID  insulin glargine, 40 Units, Subcutaneous, Nightly  insulin  lispro, 2-9 Units, Subcutaneous, 4x Daily AC & at Bedtime  isosorbide mononitrate, 30 mg, Oral, Daily  latanoprost, 1 drop, Both Eyes, Nightly  linaclotide, 145 mcg, Oral, QAM AC  metoprolol tartrate, 50 mg, Oral, BID  multivitamin with minerals, 1 tablet, Oral, Daily  nystatin, , Topical, Q12H  pantoprazole, 40 mg, Oral, Q AM  Patiromer Sorbitex Calcium, 1 each, Oral, Daily  vitamin B-12, 1,000 mcg, Oral, Daily    , Continuous Infusions:   , PRN Meds:    acetaminophen    dextrose    dextrose    diphenhydrAMINE    glucagon (human recombinant)    HYDROcodone-acetaminophen    LORazepam    magic barrier cream    nitroglycerin    ondansetron    [COMPLETED] Insert Peripheral IV **AND** sodium chloride    sodium chloride and Allergies:  Patient has no known allergies.    Review of Systems  More than 10 point review of systems was done. Pertinent items are noted in HPI, all other systems reviewed and negative    Objective     Vital Signs  Temp:  [97.5 °F (36.4 °C)-98.3 °F (36.8 °C)] 97.9 °F (36.6 °C)  Heart Rate:  [79-85] 80  Resp:  [16-24] 16  BP: (110-141)/(60-94) 110/60    Intake/Output                   07/22/23 0701 - 07/23/23 0700     [id number] [id number] Total              Intake    Total Intake -- -- --       Output    Urine  1000  950 1950    Total Output 0061 018 4720             Physical Examination:  General Appearance: not in acute distress  No JVD  Lungs: Bilateral decreased intensity of breath sounds  Heart: Regular rhythm & normal rate, normal S1, S2, no murmur, no gallop, no rub   Abdomen: Normal bowel sounds, no masses and soft non-tender  Extremities: 2+ edema  Neurologic: Orientated to person, place, time, grossly no focal deficitis    Laboratory Data :      WBC WBC   Date Value Ref Range Status   07/23/2023 7.23 3.40 - 10.80 10*3/mm3 Final   07/22/2023 7.58 3.40 - 10.80 10*3/mm3 Final      HGB Hemoglobin   Date Value Ref Range Status   07/23/2023 9.9 (L) 12.0 - 15.9 g/dL Final   07/22/2023 10.0 (L) 12.0  - 15.9 g/dL Final      HCT Hematocrit   Date Value Ref Range Status   07/23/2023 33.2 (L) 34.0 - 46.6 % Final   07/22/2023 33.7 (L) 34.0 - 46.6 % Final      Platlets No results found for: LABPLAT   MCV MCV   Date Value Ref Range Status   07/23/2023 90.2 79.0 - 97.0 fL Final   07/22/2023 88.5 79.0 - 97.0 fL Final          Sodium Sodium   Date Value Ref Range Status   07/23/2023 137 136 - 145 mmol/L Final   07/22/2023 137 136 - 145 mmol/L Final      Potassium Potassium   Date Value Ref Range Status   07/23/2023 3.8 3.5 - 5.2 mmol/L Final   07/22/2023 4.1 3.5 - 5.2 mmol/L Final     Comment:     Slight hemolysis detected by analyzer. Results may be affected.      Chloride Chloride   Date Value Ref Range Status   07/23/2023 102 98 - 107 mmol/L Final   07/22/2023 102 98 - 107 mmol/L Final      CO2 CO2   Date Value Ref Range Status   07/23/2023 24.7 22.0 - 29.0 mmol/L Final   07/22/2023 25.1 22.0 - 29.0 mmol/L Final      BUN BUN   Date Value Ref Range Status   07/23/2023 65 (H) 8 - 23 mg/dL Final   07/22/2023 64 (H) 8 - 23 mg/dL Final      Creatinine Creatinine   Date Value Ref Range Status   07/23/2023 2.54 (H) 0.57 - 1.00 mg/dL Final   07/22/2023 2.49 (H) 0.57 - 1.00 mg/dL Final      Calcium Calcium   Date Value Ref Range Status   07/23/2023 8.8 8.6 - 10.5 mg/dL Final   07/22/2023 8.9 8.6 - 10.5 mg/dL Final      PO4 No results found for: CAPO4   Albumin Albumin   Date Value Ref Range Status   07/23/2023 3.4 (L) 3.5 - 5.2 g/dL Final   07/22/2023 3.4 (L) 3.5 - 5.2 g/dL Final      Magnesium Magnesium   Date Value Ref Range Status   07/22/2023 2.2 1.6 - 2.4 mg/dL Final      Uric Acid No results found for: URICACID     Radiology results :     Imaging Results (Last 72 Hours)       Procedure Component Value Units Date/Time    XR Chest 1 View [[id number]] Collected: 07/22/23 1638     Updated: 07/22/23 1702    Narrative:         Portable upright AP view chest.     Comparison: 6/5/2023     Findings: Median sternotomy wires and  hardware again noted.  Cardiac  silhouette is enlarged, allowing for technique, diffuse interstitial  prominence noted.  No pleural effusion.  Elevation of the right  hemidiaphragm noted.     No confluent opacification.       Impression:         Mild bilateral interstitial prominence which may suggest volume overload  in the proper clinical setting.     This report was finalized on 7/22/2023 4:41 PM by Kristen Jara MD.       CT Head Without Contrast [[id number]] Collected: 07/22/23 1533     Updated: 07/22/23 1537    Narrative:      EXAMINATION:Computed tomography(CT)of the head without IV contrast     TECHNIQUE:CT of the head was performed in the supine position without  intravenous contrast according to as low as reasonably achievable dose  protocol. Axial CT utilized with slice thickness of 5 mm presented in  soft tissue and bone algorithms.     COMPARISON:No prior studies are available for review at the time of this  dictation.     FINDINGS:     No acute intra-or extra-axial hemorrhage or fluid collections are  identified. The ventricles are of normal size,shape,and morphology. The  basilar cisterns are patent. No mass effect or midline shift is seen.  The gray-white matter differentiation is normal. The visualized portions  of the orbits,paranasal sinuses,and mastoids appear normal. No acute  fracture is identified.       Impression:         No acute intracranial hemorrhage,midline shift,or significant mass  effect.     This report was finalized on 7/22/2023 3:35 PM by Kristen Jara MD.                 Medications:      apixaban, 5 mg, Oral, Q12H  aspirin, 81 mg, Oral, Daily  atorvastatin, 20 mg, Oral, Nightly  ferrous sulfate, 325 mg, Oral, Daily With Breakfast  FLUoxetine, 20 mg, Oral, Daily  fluticasone, 2 spray, Each Nare, Daily  furosemide (LASIX) in NS IVPB, 100 mg, Intravenous, Once  guaiFENesin, 400 mg, Oral, TID  hydrALAZINE, 25 mg, Oral, TID  insulin glargine, 40 Units, Subcutaneous,  Nightly  insulin lispro, 2-9 Units, Subcutaneous, 4x Daily AC & at Bedtime  isosorbide mononitrate, 30 mg, Oral, Daily  latanoprost, 1 drop, Both Eyes, Nightly  linaclotide, 145 mcg, Oral, QAM AC  metoprolol tartrate, 50 mg, Oral, BID  multivitamin with minerals, 1 tablet, Oral, Daily  nystatin, , Topical, Q12H  pantoprazole, 40 mg, Oral, Q AM  Patiromer Sorbitex Calcium, 1 each, Oral, Daily  vitamin B-12, 1,000 mcg, Oral, Daily           Assessment & Plan       Acute on chronic diastolic CHF (congestive heart failure)      -WENDY, nonoliguric  - Chronic kidney disease stage IV A3  - Type 2 diabetes mellitus  - Acute on chronic decompensated heart failure, preserved ejection fraction  - Morbid obesity  - History of nephrotic syndrome    WENDY on CKD most likely due to cardiorenal syndrome type I  Baseline creatinine appears to be 2 admitted with 2.5  Significant fluid overload clinically  - Start on IV Lasix drip 100 mg over 10 hours  - Get UA with microscopy  - Strict intake and output  - 1 g salt restriction and 1 L fluid restriction per day  - Please avoid any nephrotoxic agents, hypotension and adjust medications according to estimated GFR    Thanks Dr Gleason for the consult. Nephrology will follow the patient.   I discussed the patient's findings and my recommendations with patient    Elaina Bell MD  23  12:17 EDT      Electronically signed by Elaina Bell MD at 23 1221       Nutrition Notes (most recent note)    No notes exist for this encounter.          Physical Therapy Notes (most recent note)        Snow Crawford, PT at 23 1203  Version 1 of 1         Acute Care - Physical Therapy Treatment Note  [facility] Andino     Patient Name: Jolly Garcia  : 1950  MRN: [id number]  Today's Date: 2023   Onset of Illness/Injury or Date of Surgery: 23  Visit Dx:     ICD-10-CM ICD-9-CM   1. Acute congestive heart failure, unspecified heart failure type  I50.9 428.0     Patient Active  Problem List   Diagnosis    UTI (urinary tract infection)    Sepsis    Type 2 diabetes mellitus    Essential hypertension    ASCVD (arteriosclerotic cardiovascular disease)    Chronic respiratory failure with hypoxia    Anxiety    Hyperlipidemia    Chronic kidney disease    Diabetic retinopathy    Class 3 severe obesity due to excess calories with body mass index (BMI) of 50.0 to 59.9 in adult    JESIKA (obstructive sleep apnea)    Diabetic nephropathy    Physical debility    (HFpEF) heart failure with preserved ejection fraction    Pneumonia of right lower lobe due to infectious organism    Acute kidney injury    Hyponatremia    Iron deficiency anemia, unspecified    Acute on chronic diastolic CHF (congestive heart failure)    Acute on chronic kidney failure     Past Medical History:   Diagnosis Date    Anxiety     CAD (coronary artery disease)     s/p CABG    Chronic hypoxemic respiratory failure     Wears 2 L/min at home    Essential hypertension     Hyperlipidemia     Type 2 diabetes mellitus      Past Surgical History:   Procedure Laterality Date    CORONARY ARTERY BYPASS GRAFT  2011    HYSTERECTOMY       PT Assessment (last 12 hours)       PT Evaluation and Treatment       Row Name 07/27/23 1154          Physical Therapy Time and Intention    Subjective Information complains of;weakness;pain;dyspnea  -AG     Document Type therapy note (daily note)  -AG     Mode of Treatment physical therapy  -AG     Patient Effort poor  -AG     Symptoms Noted During/After Treatment fatigue;shortness of breath  -AG       Row Name 07/27/23 1158          General Information    Patient Profile Reviewed yes  -AG     Patient Observations alert  -AG     Patient/Family/Caregiver Comments/Observations pt. is supine on 2.5L NC O2, morbidly obese, observed to be SOA at rest.  B LE pitting edema  -AG     General Observations of Patient pt. agreeable to brief treatment session; not agreeable to attempts to mobilize  -AG     Pertinent History  of Current Functional Problem pt. admitted with acute on chronic CHF  -AG     Existing Precautions/Restrictions fall;oxygen therapy device and L/min  -AG     Benefits Reviewed patient:;improve function;increase independence;increase strength;increase knowledge  -     Barriers to Rehab previous functional deficit;medically complex  -       Row Name 07/27/23 1154          Pain    Additional Documentation Pain Scale: FACES Pre/Post-Treatment (Group)  -       Row Name 07/27/23 1154          Pain Scale: FACES Pre/Post-Treatment    Pain: FACES Scale, Pretreatment 0-->no hurt  -AG     Posttreatment Pain Rating --  painful palpation of B lower legs, incr with attempts at PROM/ AAROM  -       Row Name 07/27/23 1154          Cognition    Affect/Mental Status (Cognition) WFL  -     Orientation Status (Cognition) oriented x 3  -AG     Follows Commands (Cognition) WFL  -       Row Name 07/27/23 1154          Range of Motion (ROM)    Range of Motion --  impaired d/t obesity, pitting edema, discomfort  -       Row Name 07/27/23 1154          Bed Mobility    Bed Mobility rolling left;rolling right  -AG     Rolling Left [place] (Bed Mobility) dependent (less than 25% patient effort)  -     Rolling Right [place] (Bed Mobility) dependent (less than 25% patient effort)  -       Row Name 07/27/23 1154          Motor Skills    Motor Skills coordination;functional endurance;motor control/coordination interventions  -     Functional Endurance P  -     Motor Control/Coordination Interventions therapeutic exercise/ROM  -     Therapeutic Exercise hip;knee;ankle  -       Row Name 07/27/23 1154          Hip (Therapeutic Exercise)    Hip (Therapeutic Exercise) PROM (passive range of motion);isometric exercises  -     Hip PROM (Therapeutic Exercise) bilateral;external rotation;internal rotation;supine  attempts at heel slides were unsuccessful  -     Hip Isometrics (Therapeutic Exercise) bilateral;gluteal  sets;supine  -AG       Row Name 07/27/23 1154          Knee (Therapeutic Exercise)    Knee (Therapeutic Exercise) isometric exercises  -AG     Knee Isometrics (Therapeutic Exercise) bilateral;quad sets  -AG       Row Name 07/27/23 1154          Ankle (Therapeutic Exercise)    Ankle (Therapeutic Exercise) AAROM (active assistive range of motion)  -AG     Ankle AAROM (Therapeutic Exercise) bilateral;dorsiflexion;plantarflexion;supine  -AG       Row Name             Wound 07/22/23 1926 Right anterior greater trochanter MASD (Moisture associated skin damage)    Wound - Properties Group Placement Date: 07/22/23  -DS Placement Time: 1926  -DS Present on Hospital Admission: Y  -DS Side: Right  -DS Orientation: anterior  -DS Location: greater trochanter  -DS Primary Wound Type: MASD  -DS    Retired Wound - Properties Group Placement Date: 07/22/23  -DS Placement Time: 1926  -DS Present on Hospital Admission: Y  -DS Side: Right  -DS Orientation: anterior  -DS Location: greater trochanter  -DS Primary Wound Type: MASD  -DS    Retired Wound - Properties Group Date first assessed: 07/22/23  -DS Time first assessed: 1926  -DS Present on Hospital Admission: Y  -DS Side: Right  -DS Location: greater trochanter  -DS Primary Wound Type: MASD  -DS      Row Name 07/27/23 1154          Coping    Observed Emotional State cooperative  -AG     Verbalized Emotional State acceptance  -AG     Trust Relationship/Rapport care explained;choices provided;thoughts/feelings acknowledged  -AG     Involvement in Care not present at bedside  -AG       Row Name 07/27/23 1154          Plan of Care Review    Plan of Care Reviewed With patient  -AG     Progress no change  -AG     Outcome Evaluation Pt. able to minimally tolerate PT treatment d/t B lower leg edema/ discomfort / pain with minimal touch.  Did perform minimal B LE PROM, AAROM, isometric exercise.  Dependent functional mobility skills at this time.  -AG       Row Name 07/27/23 1154           Positioning and Restraints    Pre-Treatment Position in bed  -AG     Post Treatment Position bed  -AG     In Bed supine;call light within reach;encouraged to call for assist;side rails up x3;heels elevated  -AG       Row Name 07/27/23 1154          Therapy Plan Review/Discharge Plan (PT)    Therapy Plan Review (PT) evaluation/treatment results reviewed;care plan/treatment goals reviewed;risks/benefits reviewed;current/potential barriers reviewed;participants voiced agreement with care plan;participants included;patient  -AG               User Key  (r) = Recorded By, (t) = Taken By, (c) = Cosigned By      Initials Name Provider Type    Snow Marino, PT Physical Therapist    Micaela Denis, RN Registered Nurse                    Physical Therapy Education       Title: PT OT SLP Therapies (In Progress)       Topic: Physical Therapy (In Progress)       Point: Mobility training (In Progress)       Learning Progress Summary             Patient Acceptance, E, NR by AG at 7/27/2023 1153    Acceptance, E,TB, VU by KM at 7/25/2023 1412    Acceptance, E,TB, VU by  at 7/23/2023 0849                         Point: Home exercise program (In Progress)       Learning Progress Summary             Patient Acceptance, E, NR by AG at 7/27/2023 1153    Acceptance, E,TB, VU by KM at 7/25/2023 1412    Acceptance, E,TB, VU by  at 7/23/2023 0849                         Point: Body mechanics (In Progress)       Learning Progress Summary             Patient Acceptance, E, NR by AG at 7/27/2023 1153    Acceptance, E,TB, VU by KM at 7/25/2023 1412    Acceptance, E,TB, VU by  at 7/23/2023 0849                         Point: Precautions (In Progress)       Learning Progress Summary             Patient Acceptance, E, NR by AG at 7/27/2023 1153    Acceptance, E,TB, VU by KM at 7/25/2023 1412    Acceptance, E,TB, VU by MP at 7/23/2023 0849                                         User Key       Initials Effective Dates Name Provider  Type Discipline     21 -  Avis Sadler, RN Registered Nurse Nurse     21 -  Snow Crawford, PT Physical Therapist PT     22 -  Sina Alberto PT Physical Therapist PT                  PT Recommendation and Plan     Plan of Care Reviewed With: patient  Progress: no change  Outcome Evaluation: Pt. able to minimally tolerate PT treatment d/t B lower leg edema/ discomfort / pain with minimal touch.  Did perform minimal B LE PROM, AAROM, isometric exercise.  Dependent functional mobility skills at this time.       Time Calculation:    PT Charges       Row Name 23 1153             Time Calculation    PT Received On 23  -                User Key  (r) = Recorded By, (t) = Taken By, (c) = Cosigned By      Initials Name Provider Type    AG Snow Crawford, PT Physical Therapist                  Therapy Charges for Today       Code Description Service Date Service Provider Modifiers Qty    08056232375 HC PT THER PROC EA 15 MIN 2023 Snow Crawford, PT GP 1            PT G-Codes  AM-PAC 6 Clicks Score (PT): 11    Snow Crawford PT  2023      Electronically signed by Snow Crawford, PT at 23 1203          Occupational Therapy Notes (most recent note)        Pauline Chang, OT at 23 1125          Acute Care - Occupational Therapy Treatment Note  [facility] Andino     Patient Name: Jolly Garcia  : 1950  MRN: [id number]  Today's Date: 2023  Onset of Illness/Injury or Date of Surgery: 23  Date of Referral to OT: 23  Referring Physician: Dr. Sotelo    Admit Date: 2023       ICD-10-CM ICD-9-CM   1. Acute congestive heart failure, unspecified heart failure type  I50.9 428.0     Patient Active Problem List   Diagnosis    UTI (urinary tract infection)    Sepsis    Type 2 diabetes mellitus    Essential hypertension    ASCVD (arteriosclerotic cardiovascular disease)    Chronic respiratory failure with hypoxia    Anxiety    Hyperlipidemia    Chronic  "kidney disease    Diabetic retinopathy    Class 3 severe obesity due to excess calories with body mass index (BMI) of 50.0 to 59.9 in adult    JESIKA (obstructive sleep apnea)    Diabetic nephropathy    Physical debility    (HFpEF) heart failure with preserved ejection fraction    Pneumonia of right lower lobe due to infectious organism    Acute kidney injury    Hyponatremia    Iron deficiency anemia, unspecified    Acute on chronic diastolic CHF (congestive heart failure)    Acute on chronic kidney failure     Past Medical History:   Diagnosis Date    Anxiety     CAD (coronary artery disease)     s/p CABG    Chronic hypoxemic respiratory failure     Wears 2 L/min at home    Essential hypertension     Hyperlipidemia     Type 2 diabetes mellitus      Past Surgical History:   Procedure Laterality Date    CORONARY ARTERY BYPASS GRAFT  2011    HYSTERECTOMY           OT ASSESSMENT FLOWSHEET (last 12 hours)       OT Evaluation and Treatment       Row Name 07/27/23 1121                   OT Time and Intention    Subjective Information complains of;dyspnea;weakness;fatigue  -LA        Document Type therapy note (daily note)  -LA        Mode of Treatment occupational therapy  -LA        Patient Effort poor  -LA        Symptoms Noted During/After Treatment fatigue;shortness of breath  -LA           General Information    Patient Profile Reviewed yes  -LA        General Observations of Patient Patient agreeable to therapy for a short period of time. Patient able to participate in UE exercises 10 x2 before requesting to stop. patient reported she was too tired to particpate and \"just wanted to curl up and sleep\". Patient required long rest periods between exercises with cues for proper breathing. O2 stat at 95 or better throughout treatment however patient c/o shortness of air  -LA        Existing Precautions/Restrictions fall;oxygen therapy device and L/min  -LA           Cognition    Affect/Mental Status (Cognition) WFL  -LA     "    Orientation Status (Cognition) oriented x 3  -LA        Follows Commands (Cognition) WFL  -LA           Motor Skills    Motor Skills coordination;functional endurance  -LA        Functional Endurance poor  -LA        Therapeutic Exercise --  UE exercises completed from fowlers bed position 10 x1. Long rest periods requried due to c/o SOA. patient requested to end treatment due to fatigue and wanting to sleep.  -LA           Wound 07/22/23 1926 Right anterior greater trochanter MASD (Moisture associated skin damage)    Wound - Properties Group Placement Date: 07/22/23  -DS Placement Time: 1926  -DS Present on Hospital Admission: Y  -DS Side: Right  -DS Orientation: anterior  -DS Location: greater trochanter  -DS Primary Wound Type: MASD  -DS    Retired Wound - Properties Group Placement Date: 07/22/23  -DS Placement Time: 1926  -DS Present on Hospital Admission: Y  -DS Side: Right  -DS Orientation: anterior  -DS Location: greater trochanter  -DS Primary Wound Type: MASD  -DS    Retired Wound - Properties Group Date first assessed: 07/22/23  -DS Time first assessed: 1926  -DS Present on Hospital Admission: Y  -DS Side: Right  -DS Location: greater trochanter  -DS Primary Wound Type: MASD  -DS       Positioning and Restraints    Pre-Treatment Position in bed  -LA        Post Treatment Position bed  -LA        In Bed supine;call light within reach;encouraged to call for assist;exit alarm on  -LA                  User Key  (r) = Recorded By, (t) = Taken By, (c) = Cosigned By      Initials Name Effective Dates    Pauline Campos OT 02/14/22 -     Micaela Denis RN 01/27/23 -                            OT Recommendation and Plan              Time Calculation:     Therapy Charges for Today       Code Description Service Date Service Provider Modifiers Qty    45916265395  OT THER PROC EA 15 MIN 7/27/2023 Pauline Chang OT GO 1                 Pauline Chang OT  7/27/2023    Electronically signed by Bernardo  Pauline OT at 23 1125     Sina Alberto, PT   Physical Therapist  Physical Therapy  Therapy Evaluation      Signed  Date of Service:  23  Creation Time:  23     Signed        Expand All Collapse All[]Expand All by Default                                                                                                                                                                                                                                                    Acute Care - Physical Therapy Initial Evaluation  [facility] Andino     Patient Name: Jolly Garcia               : 1950                      MRN: [id number]  Today's Date: 2023                                  Visit Dx:   Visit Diagnosis       ICD-10-CM ICD-9-CM   1. Acute congestive heart failure, unspecified heart failure type  I50.9 428.0         Problem List       Patient Active Problem List   Diagnosis    UTI (urinary tract infection)    Sepsis    Type 2 diabetes mellitus    Essential hypertension    ASCVD (arteriosclerotic cardiovascular disease)    Chronic respiratory failure with hypoxia    Anxiety    Hyperlipidemia    Chronic kidney disease    Diabetic retinopathy    Class 3 severe obesity due to excess calories with body mass index (BMI) of 50.0 to 59.9 in adult    JESIKA (obstructive sleep apnea)    Diabetic nephropathy    Physical debility    (HFpEF) heart failure with preserved ejection fraction    Pneumonia of right lower lobe due to infectious organism    Acute kidney injury    Hyponatremia    Iron deficiency anemia, unspecified    Acute on chronic diastolic CHF (congestive heart failure)    Acute on chronic kidney failure         Medical History        Past Medical History:   Diagnosis Date    Anxiety      CAD (coronary artery disease)       s/p CABG    Chronic hypoxemic respiratory failure       Wears 2 L/min at home    Essential hypertension      Hyperlipidemia      Type 2 diabetes mellitus            Surgical History         Past Surgical History:   Procedure Laterality Date    CORONARY ARTERY BYPASS GRAFT   2011    HYSTERECTOMY             PT Assessment (last 12 hours)            PT Evaluation and Treatment         Row Name 07/25/23 1300                 Physical Therapy Time and Intention     Subjective Information complains of;weakness;fatigue  -KM       Document Type evaluation  -KM       Mode of Treatment physical therapy  -KM       Patient Effort poor  -KM          Row Name 07/25/23 1300                 General Information     Patient Profile Reviewed yes  -KM       Patient Observations alert  -KM       Prior Level of Function independent:;all household mobility;ADL's  per pt. report  -KM       Existing Precautions/Restrictions fall  -KM       Risks Reviewed patient:;LOB;nausea/vomiting;dizziness;increased discomfort  -KM       Benefits Reviewed patient:;improve function;increase independence;increase strength;increase balance  -KM          Row Name 07/25/23 1300                 Living Environment     Current Living Arrangements residential facility  -KM          Row Name 07/25/23 1300                 Home Use of Assistive/Adaptive Equipment     Equipment Currently Used at Home walker, standard;cane, straight  -KM          Row Name 07/25/23 1300                 Cognition     Affect/Mental Status (Cognition) WFL  -KM       Orientation Status (Cognition) oriented x 3  -KM       Follows Commands (Cognition) WFL  -KM          Row Name 07/25/23 1300                 Range of Motion (ROM)     Range of Motion --  BLE limited d/t body habitus  -KM          Row Name 07/25/23 1300                 Strength (Manual Muscle Testing)     Strength (Manual Muscle Testing) --  BLE strength grossly 2+/5  -KM          Row Name 07/25/23 1300                 Bed Mobility     Comment, (Bed Mobility) Pt. deferred all mobility skills, but was agreeable to work on further follow up visits.  -KM          Row Name 07/25/23 1300                  Safety Issues, Functional Mobility     Safety Issues Affecting Function (Mobility) awareness of need for assistance;friction/shear risk;insight into deficits/self-awareness  -KM       Impairments Affecting Function (Mobility) endurance/activity tolerance;range of motion (ROM);strength  -KM          Row Name                      Wound 07/22/23 1926 Right anterior greater trochanter MASD (Moisture associated skin damage)     Wound - Properties Group Placement Date: 07/22/23  -DS Placement Time: 1926  -DS Present on Hospital Admission: Y  -DS Side: Right  -DS Orientation: anterior  -DS Location: greater trochanter  -DS Primary Wound Type: MASD  -DS     Retired Wound - Properties Group Placement Date: 07/22/23  -DS Placement Time: 1926  -DS Present on Hospital Admission: Y  -DS Side: Right  -DS Orientation: anterior  -DS Location: greater trochanter  -DS Primary Wound Type: MASD  -DS     Retired Wound - Properties Group Date first assessed: 07/22/23  -DS Time first assessed: 1926  -DS Present on Hospital Admission: Y  -DS Side: Right  -DS Location: greater trochanter  -DS Primary Wound Type: MASD  -DS        Row Name 07/25/23 1300                 Plan of Care Review     Plan of Care Reviewed With patient  -KM       Outcome Evaluation Pt. evaluation started during PT session. She deferred all mobility skills at time of evaluation. She demonstrated limited ROM and strength. Pt. would benefit from skilled PT services as she improved willingness to participate.  -KM          Row Name 07/25/23 1300                 Therapy Assessment/Plan (PT)     Functional Level at Time of Evaluation (PT) unable to assess  -KM       PT Diagnosis (PT) decreased mobility  -KM       Rehab Potential (PT) fair, will monitor progress closely  -KM       Criteria for Skilled Interventions Met (PT) yes;skilled treatment is necessary  -KM       Therapy Frequency (PT) 2 times/wk  2-5x/wk  -KM       Predicted Duration of Therapy Intervention  (PT) until discharge  -KM       Problem List (PT) problems related to;balance;mobility;range of motion (ROM);strength  -KM       Activity Limitations Related to Problem List (PT) unable to ambulate safely;unable to transfer safely  -KM          Row Name 07/25/23 1300                 Therapy Plan Review/Discharge Plan (PT)     Therapy Plan Review (PT) evaluation/treatment results reviewed;care plan/treatment goals reviewed;risks/benefits reviewed;patient  -KM          Row Name 07/25/23 1300                 Physical Therapy Goals     Bed Mobility Goal Selection (PT) bed mobility, PT goal 1  -KM       Transfer Goal Selection (PT) transfer, PT goal 1  -KM       Gait Training Goal Selection (PT) gait training, PT goal 1  -KM          Row Name 07/25/23 1300                 Bed Mobility Goal 1 (PT)     Activity/Assistive Device (Bed Mobility Goal 1, PT) bed mobility activities, all  -KM       [place] Level/Cues Needed (Bed Mobility Goal 1, PT) minimum assist (75% or more patient effort)  -KM       Time Frame (Bed Mobility Goal 1, PT) by discharge  -          Row Name 07/25/23 1300                 Transfer Goal 1 (PT)     Activity/Assistive Device (Transfer Goal 1, PT) sit-to-stand/stand-to-sit;bed-to-chair/chair-to-bed;walker, rolling  -KM       [place] Level/Cues Needed (Transfer Goal 1, PT) standby assist  -KM       Time Frame (Transfer Goal 1, PT) by discharge  -          Row Name 07/25/23 1300                 Gait Training Goal 1 (PT)     Activity/Assistive Device (Gait Training Goal 1, PT) gait (walking locomotion);assistive device use;walker, rolling  -KM       [place] Level (Gait Training Goal 1, PT) standby assist  -KM       Distance (Gait Training Goal 1, PT) 30'  -KM       Time Frame (Gait Training Goal 1, PT) by [facility]                    User Key  (r) = Recorded By, (t) = Taken By, (c) = Cosigned By        Initials Name Provider Type     Sina Cardzoo, PT Physical Therapist     DS  Micaela Grier, RN Registered Nurse                             Physical Therapy Education            Title: PT OT SLP Therapies (Done)         Topic: Physical Therapy (Done)         Point: Mobility training (Done)         Learning Progress Summary               Patient Acceptance, E,TB, VU by  at 7/25/2023 1412     Acceptance, E,TB, VU by  at 7/23/2023 0849                               Point: Home exercise program (Done)         Learning Progress Summary               Patient Acceptance, E,TB, VU by  at 7/25/2023 1412     Acceptance, E,TB, VU by  at 7/23/2023 0849                               Point: Body mechanics (Done)         Learning Progress Summary               Patient Acceptance, E,TB, VU by  at 7/25/2023 1412     Acceptance, E,TB, VU by  at 7/23/2023 0849                               Point: Precautions (Done)         Learning Progress Summary               Patient Acceptance, E,TB, VU by  at 7/25/2023 1412     Acceptance, E,TB, VU by  at 7/23/2023 0849                                                   User Key         Initials Effective Dates Name Provider Type Discipline      06/16/21 -  Avis Sadler, RN Registered Nurse Nurse      05/24/22 -  Sina Alberto, ANDRÉS Physical Therapist PT                          PT Recommendation and Plan  Planned Therapy Interventions (PT): balance training, bed mobility training, gait training, home exercise program, patient/family education, postural re-education, ROM (range of motion), stair training, strengthening, stretching, transfer training  Therapy Frequency (PT): 2 times/wk (2-5x/wk)  Plan of Care Reviewed With: patient  Outcome Evaluation: Pt. evaluation started during PT session. She deferred all mobility skills at time of evaluation. She demonstrated limited ROM and strength. Pt. would benefit from skilled PT services as she improved willingness to participate.               Time Calculation:     PT Charges         Row Name 07/25/23  1348                       Time Calculation     PT Received On 07/25/23  -KM         PT Goal Re-Cert Due Date 08/08/23  -KM                      User Key  (r) = Recorded By, (t) = Taken By, (c) = Cosigned By        Initials Name Provider Type     Sina Cardozo, PT Physical Therapist                       Therapy Charges for Today         Code Description Service Date Service Provider Modifiers Qty     15433793357 HC PT EVAL MOD COMPLEXITY 4 7/25/2023 Sina Alberto, PT GP 1                PT G-Codes  AM-PAC 6 Clicks Score (PT): 10     Sina Alberto, PT                   7/25/2023

## 2023-07-27 NOTE — PROGRESS NOTES
[facility] Medicine Services  PROGRESS NOTE     Patient Identification:  Name:  Jolly Garcia  Age:  72 y.o.  Sex:  female  :  1950  MRN:  [id number]  Visit Number:  [id number]  Primary Care Provider:  Whit Cadet APRN    Length of stay:  3    ----------------------------------------------------------------------------------------------------------------------  Subjective     Chief Complaint:  Follow up for CHF exacerbation, renal failure      History of Presenting Illness/Hospital Course:  Patient is a 71 yo female nursing home resident with past medical history significant for essential hypertension, hyperlipidemia, coronary artery disease s/p CABG in the past, paroxysmal atrial fibrillation/flutter chronically anticoagulated with Eliquis, HFpEF, JESIKA, restrictive lung disease/interstitial lung disease, chronic hypoxic respiratory failure (2 LPM NC), insulin dependent type II diabetes mellitus, stage IV CKD, history of nephrotic syndrome, iron deficiency anemia, chronic venous stasis bilateral lower extremities, GERD, anxiety/depression, former smoker, and obesity by BMI that presented to the [facility] emergency department for evaluation of AMS, lethargy, shortness of breath, increased swelling, and abnormal labs. Please see admitting history and physical for further and complete details. Patient was found to have acute on chronic HFpEF, NSTEMI type I vs type II, and acute on chronic stage IV CKD. Patient also with metabolic encephalopathy on presentation. Patient was admitted to the telemetry floor for further evaluation and treatment. Patient did have reported visual hallucinations. Head CT was unremarkable for acute intracranial abnormalities. UA unremarkable for UTI. Imaging of chest consistent with volume overload. High sensitivity troponin elevated with flat trend, negative delta, felt to be type II due to acute CHF  exacerbation. IV Bumex given initially in ED, due to patient's history of nephrotic syndrome nephrology was consulted and evaluated the patient. WENDY on CKD felt to be due to cardiorenal syndrome. Patient was started on lasix gtt, later diuril gtt as well. Diuresis has been managed per nephrology orders. Repeat TTE obtained with again preserved EF 56-60% with grade II diastolic dysfunction. Creatinine improving with diuresis. Good UOP. Encephalopathy resolved, mentation remains at baseline. PT/OT on board for debility.     Subjective:  Today, the patient was lying in bed per my evaluation this morning. No acute distress noted. No family present at bedside. No overnight events reported per nursing staff. Patient denies any acute complaints. Continues on aggressive diuresis, renal function continues to improve. Denies any chest pain or shortness of breath.     Present during exam: N/A   ----------------------------------------------------------------------------------------------------------------------  Objective     Consults:  Nephrology     Procedures:  7/23/2023: Transthoracic echocardiogram     Left ventricular systolic function is normal. Left ventricular ejection fraction appears to be 56 - 60%.    Left ventricular diastolic function is consistent with (grade II w/high LAP) pseudonormalization.    The right ventricular cavity is mild to moderately dilated.    The left atrial cavity is mildly dilated.    The right atrial cavity is mildly  dilated.    Estimated right ventricular systolic pressure from tricuspid regurgitation is markedly elevated (>55 mmHg).    Current Hospital Meds:  apixaban, 5 mg, Oral, Q12H  aspirin, 81 mg, Oral, Daily  atorvastatin, 20 mg, Oral, Nightly  senna-docusate sodium, 2 tablet, Oral, BID   And  polyethylene glycol, 17 g, Oral, BID   And  bisacodyl, 5 mg, Oral, Daily  ferrous sulfate, 325 mg, Oral, Daily With Breakfast  FLUoxetine, 20 mg, Oral, Daily  fluticasone, 2 spray, Each Nare,  Daily  guaiFENesin, 400 mg, Oral, TID  insulin glargine, 20 Units, Subcutaneous, Nightly  insulin lispro, 2-9 Units, Subcutaneous, 4x Daily AC & at Bedtime  isosorbide mononitrate, 30 mg, Oral, Daily  latanoprost, 1 drop, Both Eyes, Nightly  linaclotide, 145 mcg, Oral, QAM AC  metoprolol succinate XL, 50 mg, Oral, Q24H  multivitamin with minerals, 1 tablet, Oral, Daily  nystatin, , Topical, Q12H  pantoprazole, 40 mg, Oral, Q AM  vitamin B-12, 1,000 mcg, Oral, Daily         ----------------------------------------------------------------------------------------------------------------------  Vital Signs:  Temp:  [97.5 °F (36.4 °C)-98.7 °F (37.1 °C)] 97.8 °F (36.6 °C)  Heart Rate:  [71-88] 80  Resp:  [18-20] 18  BP: (120-141)/(60-83) 120/60  Mean Arterial Pressure (Non-Invasive) for the past 24 hrs (Last 3 readings):   Noninvasive MAP (mmHg)   07/27/23 0650 84   07/27/23 0300 99   07/26/23 2327 109       SpO2 Percentage    07/26/23 2327 07/27/23 0300 07/27/23 0650   SpO2: 99% 96% 94%     SpO2:  [94 %-99 %] 94 %  on  Flow (L/min):  [2] 2;   Device (Oxygen Therapy): humidified;nasal cannula    Body mass index is 54.05 kg/m².  Wt Readings from Last 3 Encounters:   07/27/23 (!) 152 kg (334 lb 11.2 oz)   06/27/23 124 kg (273 lb)   06/06/23 (!) 140 kg (308 lb 14.4 oz)        Intake/Output Summary (Last 24 hours) at 7/27/2023 0722  Last data filed at 7/27/2023 0514  Gross per 24 hour   Intake 600 ml   Output 1575 ml   Net -975 ml       Diet: Cardiac Diets, Diabetic Diets, Renal Diets, Fluid Restriction (240 mL/tray) Diets; Low Sodium (2g); Consistent Carbohydrate; Low Potassium, Low Phosphorus, Low Sodium (2-3g); 1000 mL/day; Texture: Regular Texture (IDDSI 7); Fluid Consistency...  ----------------------------------------------------------------------------------------------------------------------  Physical exam:  Physical Exam  Nursing note reviewed.   Constitutional:       General: She is awake. She is not in acute  distress.     Appearance: She is well-developed. She is morbidly obese. She is not toxic-appearing.      Interventions: Nasal cannula in place.      Comments: Lying in bed. No acute distress noted. No family present at bedside.    HENT:      Head: Normocephalic and atraumatic.      Mouth/Throat:      Mouth: Mucous membranes are moist.   Eyes:      Conjunctiva/sclera: Conjunctivae normal.      Pupils: Pupils are equal, round, and reactive to light.   Cardiovascular:      Rate and Rhythm: Normal rate and regular rhythm.      Pulses:           Dorsalis pedis pulses are 2+ on the right side and 2+ on the left side.      Heart sounds: Normal heart sounds. No murmur heard.    No friction rub. No gallop.   Pulmonary:      Effort: Pulmonary effort is normal.      Breath sounds: Normal air entry. Decreased breath sounds (Diminished bilaterally) present. No wheezing, rhonchi or rales.   Abdominal:      General: Bowel sounds are normal. There is no distension.      Palpations: Abdomen is soft.      Tenderness: There is no abdominal tenderness. There is no guarding or rebound.   Genitourinary:     Comments: No mata catheter in place.  Musculoskeletal:      Cervical back: Neck supple.      Right lower le+ Edema present.      Left lower le+ Edema present.   Skin:     General: Skin is warm and dry.      Capillary Refill: Capillary refill takes less than 2 seconds.   Neurological:      General: No focal deficit present.      Mental Status: She is alert and oriented to person, place, and time.      Sensory: Sensation is intact.      Motor: Motor function is intact.      Comments: Awake and alert. Follows commands. Answers questions appropriately. Moves all extremities equally. Strength and sensation intact. No focal neuro deficit on exam.   Psychiatric:         Mood and Affect: Mood and affect normal.         Speech: Speech normal.         Behavior: Behavior is cooperative.      No change on exam today    ----------------------------------------------------------------------------------------------------------------------  Tele:    Sinus 70s-80s     I have personally reviewed the EKG/Telemetry strips   ----------------------------------------------------------------------------------------------------------------------  Results from last 7 days   Lab Units 07/22/23  1636 07/22/23  1413   HSTROP T ng/L 53* 57*       Results from last 7 days   Lab Units 07/22/23  1413   PROBNP pg/mL 30,507.0*         Results from last 7 days   Lab Units 07/22/23  1515   PH, ARTERIAL pH units 7.329*   PO2 ART mm Hg 78.3*   PCO2, ARTERIAL mm Hg 50.5*   HCO3 ART mmol/L 26.5*       Results from last 7 days   Lab Units 07/27/23  0148 07/26/23  0838 07/25/23  0148 07/23/23  0806 07/22/23  1413   CRP mg/dL  --   --   --   --  <0.30   LACTATE mmol/L  --   --   --   --  0.8   WBC 10*3/mm3 8.94 7.25 8.59   < > 7.58   HEMOGLOBIN g/dL 9.8* 10.2* 9.8*   < > 10.0*   HEMATOCRIT % 32.1* 34.8 32.9*   < > 33.7*   MCV fL 89.2 90.6 89.4   < > 88.5   MCHC g/dL 30.5* 29.3* 29.8*   < > 29.7*   PLATELETS 10*3/mm3 292 268 281   < > 313    < > = values in this interval not displayed.       Results from last 7 days   Lab Units 07/27/23  0148 07/26/23  0838 07/25/23  0148 07/24/23  0254 07/23/23  0806 07/22/23  1413   SODIUM mmol/L 138 140 137 137 137 137   POTASSIUM mmol/L 3.6 3.7 3.9 4.2 3.8 4.1   MAGNESIUM mg/dL 2.1  --   --   --   --  2.2   CHLORIDE mmol/L 99 100 101 103 102 102   CO2 mmol/L 27.7 31.9* 25.4 23.4 24.7 25.1   BUN mg/dL 65* 65* 66* 61* 65* 64*   CREATININE mg/dL 2.14* 2.27* 2.65* 2.64* 2.54* 2.49*   CALCIUM mg/dL 8.6 8.8 8.5* 8.4* 8.8 8.9   GLUCOSE mg/dL 103* 76 145* 170* 127* 130*   ALBUMIN g/dL  --   --  3.1* 3.1* 3.4* 3.4*   BILIRUBIN mg/dL  --   --  0.4 0.3 0.4 0.4   ALK PHOS U/L  --   --  103 105 111 117   AST (SGOT) U/L  --   --  13 14 12 19   ALT (SGPT) U/L  --   --  8 8 9 9     Estimated Creatinine Clearance: 36.2 mL/min (A) (by C-G  formula based on SCr of 2.14 mg/dL (H)).    Glucose   Date/Time Value Ref Range Status   07/27/2023 0700 88 70 - 130 mg/dL Final     Comment:     Meter: [id number] : [id number] OPAL SANCHEZTIEN   07/26/2023 2249 120 70 - 130 mg/dL Final     Comment:     Meter: [id number] : [id number] Dean Centenoy   07/26/2023 1610 161 (H) 70 - 130 mg/dL Final     Comment:     Meter: [id number] : [id number] OPAL SANCHEZTIEN   07/26/2023 1119 104 70 - 130 mg/dL Final     Comment:     Meter: [id number] : [id number] OPAL SANCHEZTIEN   07/26/2023 0632 86 70 - 130 mg/dL Final     Comment:     Meter: [id number] : [id number] LYNDON BOWLING   07/26/2023 0414 86 70 - 130 mg/dL Final     Comment:     Meter: [id number] : [id number] Dean Anette   07/25/2023 2225 110 70 - 130 mg/dL Final     Comment:     Meter: [id number] : [id number] Dean Anette   07/25/2023 2107 116 70 - 130 mg/dL Final     Comment:     RN Notified Meter: [id number] : [id number] BRUNO TORIBIO     Lab Results   Component Value Date    HGBA1C 7.40 (H) 06/01/2023     Lab Results   Component Value Date    TSH 6.870 (H) 07/22/2023    FREET4 1.17 07/22/2023     Microbiology Results (last 10 days)       Procedure Component Value - Date/Time    Blood Culture - Blood, Arm, Left [[id number]]  (Normal) Collected: 07/22/23 1438    Lab Status: Preliminary result Specimen: Blood from Arm, Left Updated: 07/26/23 1500     Blood Culture No growth at 4 days    Blood Culture - Blood, Arm, Right [[id number]]  (Normal) Collected: 07/22/23 1438    Lab Status: Preliminary result Specimen: Blood from Arm, Right Updated: 07/26/23 1500     Blood Culture No growth at 4 days    COVID-19 and FLU A/B PCR - Swab, Nasopharynx [[id number]]  (Normal) Collected: 07/22/23 1414    Lab Status: Final result Specimen: Swab from Nasopharynx Updated: 07/22/23 1444     COVID19 Not Detected     Influenza A PCR Not Detected     Influenza B PCR Not Detected    Narrative:      Fact  sheet for providers: https://www.fda.gov/media/810788/download    Fact sheet for patients: https://www.fda.gov/media/048612/download    Test performed by PCR.           Pain Management Panel  More data exists         Latest Ref Rng & Units 7/22/2023 6/4/2023   Pain Management Panel   Creatinine, Urine mg/dL 83.5  111.5  111.5    I have personally reviewed the above laboratory results.   ----------------------------------------------------------------------------------------------------------------------  Imaging Results (Last 24 Hours)       ** No results found for the last 24 hours. **          I have personally reviewed the above radiology results.   ----------------------------------------------------------------------------------------------------------------------  Assessment/Plan     ACUTE HOSPITAL PROBLEMS    -Acute on chronic HFpEF, present on admission   -NSTEMI, type 1 vs type 2, likely type 2 secondary to above   -WENDY on CKD stage IV, present on admission, likely cardiorenal syndrome   -History of nephrotic syndrome   -Chronic hypoxic respiratory failure (2 LPM NC)  Imaging of chest on admit with evidence of volume overload   Heart failure pathway per protocol   TTE with preserved EF 56-60% and grade II diastolic dysfunction   HS troponin T elevated on admit with flat trend, negative delta. Likely due to acute CHF exacerbation.   Nephrology on board aiding in aggressive diuresis, input/assistance is much appreciated   Suspect WENDY due to cardiorenal syndrome. Baseline creatinine around 2.0. Creatinine peaked at 2.64, improved today to 2.1  Today, albumin x 1, 500 mg Diuril x 1, 40 mg of IV lasix x1 with 100 mg IV lasix given over 5 hours (gtt) per nephro orders   Weight decreased per review of chart. Net negative 4.2 L since admit.    Patient remains on home 2 LPM, tolerating   Closely monitor on telemetry    Strict Is and Os, daily weights   Avoid nephrotoxins as much as possible  Repeat labs in  AM    -?Metabolic encephalopathy on presentation   Patient A&O x 3 during my eval   No further visual hallucinations reported   Per review of chart, she was A&Ox 3 on admission per H&P   Head CT without acute intracranial abnormalities  No focal deficits on exam   UA unremarkable   Could be metabolic encephalopathy due to acute illness.   Monitor     -Chronic physical debility   PT/OT     -Hypoalbuminemia, multifactorial     CHRONIC MEDICAL PROBLEMS    -Coronary artery disease s/p CABG in the past: HS trop elevated with flat trend, negative delta. Patient denies any chest pain, EKG without acute ischemic changes. Continue current home medication regimen. Closely monitor on telemetry.   -Essential hypertension  BP appears well controlled   Continue current antihypertensive regimen  with appropriate holding parameters to prevent hypotension and/or bradycardia   Closely monitor BP per hospital protocol, titrate medications as necessary  -Hyperlipidemia: Statin   -Type II diabetes mellitus, insulin dependent  HgbA1C 7.4 recently   BG levels well controlled   At home, patient is on dose 2-10 units Novolog SSI and 40 units Lantus nightly   Currently on 20 units basal insulin, continue without dosage adjustment today   Continue current dose of SSI, titrate dosage as necessary  Closely monitor blood glucose levels with accuchecks QAC and QHS  Hypoglycemia protocol in place should it be necessary  -Paroxysmal atrial flutter/fibrillation: Currently rate controlled. Continue home Eliquis for anticoagulation. Continue PO Metoprolol. Closely monitor on telemetry.   -Chronic venous stasis bilateral lower extremities   -Interstitial lung disease/restrictive lung disease: Appears to be at baseline. On home 2 LPM NC.   -JESIKA  -History of iron deficiency anemia: Hemoglobin stable. Monitor H&H closely, transfuse if necessary. Repeat CBC in AM. Continue home ferrous sulfate.    -Anxiety/depression: Supportive care, continue home  medication regimen   -GERD: PPI   -Former smoker   -Obesity by BMI, Body mass index is 54.05 kg/m².  Affecting all aspects of care  --------------------------------------------------  DVT Prophylaxis: Eliquis to serve   GI Prophylaxis: PPI   FEN: No IV fluids. Replace electrolytes per protocol as necessary. Cardiac/renal diet.   Activity: Up with assistance as tolerated   --------------------------------------------------  Disposition:  Back to NH once medically stable, ~48 hours   --------------------------------------------------    I have discussed the patient's assessment and plan with the patient, nursing staff, and attending physician Juvenal Acuna*    Lor Salazar PA-C  Hospitalist Service -- [facility]   Pager: [phone]    07/27/23  07:22 EDT    Attending Physician: Juvenal Mahajan*    ----------------------------------------------------------------------------------------------------------------------

## 2023-07-27 NOTE — THERAPY TREATMENT NOTE
Acute Care - Physical Therapy Treatment Note  RADHA Andino     Patient Name: Jolly Garcia  : 1950  MRN: [id number]  Today's Date: 2023   Onset of Illness/Injury or Date of Surgery: 23  Visit Dx:     ICD-10-CM ICD-9-CM   1. Acute congestive heart failure, unspecified heart failure type  I50.9 428.0     Patient Active Problem List   Diagnosis    UTI (urinary tract infection)    Sepsis    Type 2 diabetes mellitus    Essential hypertension    ASCVD (arteriosclerotic cardiovascular disease)    Chronic respiratory failure with hypoxia    Anxiety    Hyperlipidemia    Chronic kidney disease    Diabetic retinopathy    Class 3 severe obesity due to excess calories with body mass index (BMI) of 50.0 to 59.9 in adult    JESIKA (obstructive sleep apnea)    Diabetic nephropathy    Physical debility    (HFpEF) heart failure with preserved ejection fraction    Pneumonia of right lower lobe due to infectious organism    Acute kidney injury    Hyponatremia    Iron deficiency anemia, unspecified    Acute on chronic diastolic CHF (congestive heart failure)    Acute on chronic kidney failure     Past Medical History:   Diagnosis Date    Anxiety     CAD (coronary artery disease)     s/p CABG    Chronic hypoxemic respiratory failure     Wears 2 L/min at home    Essential hypertension     Hyperlipidemia     Type 2 diabetes mellitus      Past Surgical History:   Procedure Laterality Date    CORONARY ARTERY BYPASS GRAFT      HYSTERECTOMY       PT Assessment (last 12 hours)       PT Evaluation and Treatment       Row Name 23 1154          Physical Therapy Time and Intention    Subjective Information complains of;weakness;pain;dyspnea  -AG     Document Type therapy note (daily note)  -AG     Mode of Treatment physical therapy  -AG     Patient Effort poor  -AG     Symptoms Noted During/After Treatment fatigue;shortness of breath  -AG       Row Name 23 3312          General Information    Patient Profile  Reviewed yes  -AG     Patient Observations alert  -AG     Patient/Family/Caregiver Comments/Observations pt. is supine on 2.5L NC O2, morbidly obese, observed to be SOA at rest.  B LE pitting edema  -AG     General Observations of Patient pt. agreeable to brief treatment session; not agreeable to attempts to mobilize  -AG     Pertinent History of Current Functional Problem pt. admitted with acute on chronic CHF  -AG     Existing Precautions/Restrictions fall;oxygen therapy device and L/min  -AG     Benefits Reviewed patient:;improve function;increase independence;increase strength;increase knowledge  -AG     Barriers to Rehab previous functional deficit;medically complex  -AG       Row Name 07/27/23 1154          Pain    Additional Documentation Pain Scale: FACES Pre/Post-Treatment (Group)  -AG       Row Name 07/27/23 1154          Pain Scale: FACES Pre/Post-Treatment    Pain: FACES Scale, Pretreatment 0-->no hurt  -AG     Posttreatment Pain Rating --  painful palpation of B lower legs, incr with attempts at PROM/ AAROM  -AG       Row Name 07/27/23 1154          Cognition    Affect/Mental Status (Cognition) WFL  -AG     Orientation Status (Cognition) oriented x 3  -AG     Follows Commands (Cognition) WFL  -AG       Row Name 07/27/23 1154          Range of Motion (ROM)    Range of Motion --  impaired d/t obesity, pitting edema, discomfort  -AG       Row Name 07/27/23 1154          Bed Mobility    Bed Mobility rolling left;rolling right  -AG     Rolling Left Izard (Bed Mobility) dependent (less than 25% patient effort)  -AG     Rolling Right Izard (Bed Mobility) dependent (less than 25% patient effort)  -AG       Row Name 07/27/23 1154          Motor Skills    Motor Skills coordination;functional endurance;motor control/coordination interventions  -AG     Functional Endurance P  -AG     Motor Control/Coordination Interventions therapeutic exercise/ROM  -AG     Therapeutic Exercise hip;knee;ankle  -AG        Row Name 07/27/23 1154          Hip (Therapeutic Exercise)    Hip (Therapeutic Exercise) PROM (passive range of motion);isometric exercises  -AG     Hip PROM (Therapeutic Exercise) bilateral;external rotation;internal rotation;supine  attempts at heel slides were unsuccessful  -     Hip Isometrics (Therapeutic Exercise) bilateral;gluteal sets;supine  -AG       Row Name 07/27/23 1154          Knee (Therapeutic Exercise)    Knee (Therapeutic Exercise) isometric exercises  -     Knee Isometrics (Therapeutic Exercise) bilateral;quad sets  -       Row Name 07/27/23 1154          Ankle (Therapeutic Exercise)    Ankle (Therapeutic Exercise) AAROM (active assistive range of motion)  -     Ankle AAROM (Therapeutic Exercise) bilateral;dorsiflexion;plantarflexion;supine  -AG       Row Name             Wound 07/22/23 1926 Right anterior greater trochanter MASD (Moisture associated skin damage)    Wound - Properties Group Placement Date: 07/22/23  -DS Placement Time: 1926  -DS Present on Hospital Admission: Y  -DS Side: Right  -DS Orientation: anterior  -DS Location: greater trochanter  -DS Primary Wound Type: MASD  -DS    Retired Wound - Properties Group Placement Date: 07/22/23  -DS Placement Time: 1926  -DS Present on Hospital Admission: Y  -DS Side: Right  -DS Orientation: anterior  -DS Location: greater trochanter  -DS Primary Wound Type: MASD  -DS    Retired Wound - Properties Group Date first assessed: 07/22/23  -DS Time first assessed: 1926  -DS Present on Hospital Admission: Y  -DS Side: Right  -DS Location: greater trochanter  -DS Primary Wound Type: MASD  -DS      Row Name 07/27/23 1154          Coping    Observed Emotional State cooperative  -AG     Verbalized Emotional State acceptance  -AG     Trust Relationship/Rapport care explained;choices provided;thoughts/feelings acknowledged  -AG     Involvement in Care not present at bedside  -AG       Row Name 07/27/23 1154          Plan of Care Review    Plan  of Care Reviewed With patient  -AG     Progress no change  -AG     Outcome Evaluation Pt. able to minimally tolerate PT treatment d/t B lower leg edema/ discomfort / pain with minimal touch.  Did perform minimal B LE PROM, AAROM, isometric exercise.  Dependent functional mobility skills at this time.  -AG       Row Name 07/27/23 1154          Positioning and Restraints    Pre-Treatment Position in bed  -AG     Post Treatment Position bed  -AG     In Bed supine;call light within reach;encouraged to call for assist;side rails up x3;heels elevated  -AG       Row Name 07/27/23 1154          Therapy Plan Review/Discharge Plan (PT)    Therapy Plan Review (PT) evaluation/treatment results reviewed;care plan/treatment goals reviewed;risks/benefits reviewed;current/potential barriers reviewed;participants voiced agreement with care plan;participants included;patient  -AG               User Key  (r) = Recorded By, (t) = Taken By, (c) = Cosigned By      Initials Name Provider Type    Snow Marino, PT Physical Therapist    Micaela Denis, RN Registered Nurse                    Physical Therapy Education       Title: PT OT SLP Therapies (In Progress)       Topic: Physical Therapy (In Progress)       Point: Mobility training (In Progress)       Learning Progress Summary             Patient Acceptance, E, NR by AG at 7/27/2023 1153    Acceptance, E,TB, VU by KM at 7/25/2023 1412    Acceptance, E,TB, VU by  at 7/23/2023 0849                         Point: Home exercise program (In Progress)       Learning Progress Summary             Patient Acceptance, E, NR by AG at 7/27/2023 1153    Acceptance, E,TB, VU by KM at 7/25/2023 1412    Acceptance, E,TB, VU by  at 7/23/2023 0849                         Point: Body mechanics (In Progress)       Learning Progress Summary             Patient Acceptance, E, NR by AG at 7/27/2023 1153    Acceptance, E,TB, VU by KM at 7/25/2023 1412    Acceptance, E,TB, VU by MP at 7/23/2023  0849                         Point: Precautions (In Progress)       Learning Progress Summary             Patient Acceptance, E, NR by  at 7/27/2023 1153    Acceptance, E,TB, VU by  at 7/25/2023 1412    Acceptance, E,TB, VU by  at 7/23/2023 0849                                         User Key       Initials Effective Dates Name Provider Type Discipline     06/16/21 -  Avis Sadler, RN Registered Nurse Nurse     06/16/21 -  Snow Crawford, PT Physical Therapist PT     05/24/22 -  Sina Alberto, PT Physical Therapist PT                  PT Recommendation and Plan     Plan of Care Reviewed With: patient  Progress: no change  Outcome Evaluation: Pt. able to minimally tolerate PT treatment d/t B lower leg edema/ discomfort / pain with minimal touch.  Did perform minimal B LE PROM, AAROM, isometric exercise.  Dependent functional mobility skills at this time.       Time Calculation:    PT Charges       Row Name 07/27/23 1153             Time Calculation    PT Received On 07/27/23  -                User Key  (r) = Recorded By, (t) = Taken By, (c) = Cosigned By      Initials Name Provider Type     Snow Crawford, PT Physical Therapist                  Therapy Charges for Today       Code Description Service Date Service Provider Modifiers Qty    48518938194 HC PT THER PROC EA 15 MIN 7/27/2023 Snow Crawford, PT GP 1            PT G-Codes  AM-PAC 6 Clicks Score (PT): 11    Snow Crawford, PT  7/27/2023

## 2023-07-27 NOTE — THERAPY TREATMENT NOTE
Acute Care - Occupational Therapy Treatment Note  RADHA Andino     Patient Name: Jolly Garcia  : 1950  MRN: [id number]  Today's Date: 2023  Onset of Illness/Injury or Date of Surgery: 23  Date of Referral to OT: 23  Referring Physician: Dr. Sotelo    Admit Date: 2023       ICD-10-CM ICD-9-CM   1. Acute congestive heart failure, unspecified heart failure type  I50.9 428.0     Patient Active Problem List   Diagnosis    UTI (urinary tract infection)    Sepsis    Type 2 diabetes mellitus    Essential hypertension    ASCVD (arteriosclerotic cardiovascular disease)    Chronic respiratory failure with hypoxia    Anxiety    Hyperlipidemia    Chronic kidney disease    Diabetic retinopathy    Class 3 severe obesity due to excess calories with body mass index (BMI) of 50.0 to 59.9 in adult    JESIKA (obstructive sleep apnea)    Diabetic nephropathy    Physical debility    (HFpEF) heart failure with preserved ejection fraction    Pneumonia of right lower lobe due to infectious organism    Acute kidney injury    Hyponatremia    Iron deficiency anemia, unspecified    Acute on chronic diastolic CHF (congestive heart failure)    Acute on chronic kidney failure     Past Medical History:   Diagnosis Date    Anxiety     CAD (coronary artery disease)     s/p CABG    Chronic hypoxemic respiratory failure     Wears 2 L/min at home    Essential hypertension     Hyperlipidemia     Type 2 diabetes mellitus      Past Surgical History:   Procedure Laterality Date    CORONARY ARTERY BYPASS GRAFT      HYSTERECTOMY           OT ASSESSMENT FLOWSHEET (last 12 hours)       OT Evaluation and Treatment       Row Name 23 1121                   OT Time and Intention    Subjective Information complains of;dyspnea;weakness;fatigue  -LA        Document Type therapy note (daily note)  -LA        Mode of Treatment occupational therapy  -LA        Patient Effort poor  -LA        Symptoms Noted During/After Treatment  "fatigue;shortness of breath  -LA           General Information    Patient Profile Reviewed yes  -LA        General Observations of Patient Patient agreeable to therapy for a short period of time. Patient able to participate in UE exercises 10 x2 before requesting to stop. patient reported she was too tired to particpate and \"just wanted to curl up and sleep\". Patient required long rest periods between exercises with cues for proper breathing. O2 stat at 95 or better throughout treatment however patient c/o shortness of air  -LA        Existing Precautions/Restrictions fall;oxygen therapy device and L/min  -LA           Cognition    Affect/Mental Status (Cognition) WFL  -LA        Orientation Status (Cognition) oriented x 3  -LA        Follows Commands (Cognition) WFL  -LA           Motor Skills    Motor Skills coordination;functional endurance  -LA        Functional Endurance poor  -LA        Therapeutic Exercise --  UE exercises completed from fowlers bed position 10 x1. Long rest periods requried due to c/o SOA. patient requested to end treatment due to fatigue and wanting to sleep.  -LA           Wound 07/22/23 1926 Right anterior greater trochanter MASD (Moisture associated skin damage)    Wound - Properties Group Placement Date: 07/22/23  -DS Placement Time: 1926  -DS Present on Hospital Admission: Y  -DS Side: Right  -DS Orientation: anterior  -DS Location: greater trochanter  -DS Primary Wound Type: MASD  -DS    Retired Wound - Properties Group Placement Date: 07/22/23  -DS Placement Time: 1926  -DS Present on Hospital Admission: Y  -DS Side: Right  -DS Orientation: anterior  -DS Location: greater trochanter  -DS Primary Wound Type: MASD  -DS    Retired Wound - Properties Group Date first assessed: 07/22/23  -DS Time first assessed: 1926  -DS Present on Hospital Admission: Y  -DS Side: Right  -DS Location: greater trochanter  -DS Primary Wound Type: MASD  -DS       Positioning and Restraints    Pre-Treatment " Position in bed  -LA        Post Treatment Position bed  -LA        In Bed supine;call light within reach;encouraged to call for assist;exit alarm on  -LA                  User Key  (r) = Recorded By, (t) = Taken By, (c) = Cosigned By      Initials Name Effective Dates    Pauline Campos OT 02/14/22 -     DS Micaela Grier, RN 01/27/23 -                            OT Recommendation and Plan              Time Calculation:     Therapy Charges for Today       Code Description Service Date Service Provider Modifiers Qty    69612305347 HC OT THER PROC EA 15 MIN 7/27/2023 aPuline Chang OT GO 1                 Pauline Chang OT  7/27/2023

## 2023-07-27 NOTE — CASE MANAGEMENT/SOCIAL WORK
Discharge Planning Assessment   [place]     Patient Name: Jolly Garcia  MRN: [id number]  Today's Date: 7/27/2023    Admit Date: 7/22/2023       Discharge Plan       Row Name 07/27/23 1659       Plan    Plan SS contacted Krysten at [facility] and she voices pre-auth request has not been started. Pt was admitted from [facility] and had a 9 day bed hold upon admission, therefore should not require a pre-auth approval prior to returning to the nursing home. SS will follow up with [facility] on 7/28/23 regarding pre-auth request. SS to follow.                 Continued Care and Services - Admitted Since 7/22/2023       Destination       Service Provider Request Status Selected Services Address Phone Fax Patient Preferred    [facility] Pending - Request Sent N/A [address] [address] [phone] --                  Selected Continued Care - Prior Encounters Includes continued care and service providers with selected services from prior encounters from 4/23/2023 to 7/27/2023      Discharged on 6/6/2023 Admission date: 6/1/2023 - Discharge disposition: Skilled Nursing Facility (DC - External)      Destination       Service Provider Selected Services Address Phone Fax Patient Preferred    [facility] [address] [place] [address] [phone] --                          Expected Discharge Date and Time       Expected Discharge Date Expected Discharge Time    Jul 28, 2023         TRACI Germain

## 2023-07-27 NOTE — CASE MANAGEMENT/SOCIAL WORK
Discharge Planning Assessment   [place]     Patient Name: Jolly Garcia  MRN: [id number]  Today's Date: 7/27/2023    Admit Date: 7/22/2023    Plan: Per Krysten at [facility] pre authorization has not been started.  SS notified Lead  and Exec Director.  SS will follow.       Discharge Plan       Row Name 07/27/23 1529       Plan    Plan Per Krysten at [facility] pre authorization has not been started.  SS notified Lead  and Exec Director.  SS will follow.                  Continued Care and Services - Admitted Since 7/22/2023       Destination       Service Provider Request Status Selected Services Address Phone Fax Patient Preferred    [facility] Pending - Request Sent N/A [address] [address] [phone] --                  Selected Continued Care - Prior Encounters Includes continued care and service providers with selected services from prior encounters from 4/23/2023 to 7/27/2023           LUCINA WellsW

## 2023-07-28 VITALS
DIASTOLIC BLOOD PRESSURE: 87 MMHG | BODY MASS INDEX: 47.09 KG/M2 | HEIGHT: 66 IN | OXYGEN SATURATION: 97 % | TEMPERATURE: 97.8 F | HEART RATE: 84 BPM | WEIGHT: 293 LBS | RESPIRATION RATE: 18 BRPM | SYSTOLIC BLOOD PRESSURE: 146 MMHG

## 2023-07-28 PROBLEM — I50.32 CHRONIC HEART FAILURE WITH PRESERVED EJECTION FRACTION (HFPEF): Status: ACTIVE | Noted: 2023-07-28

## 2023-07-28 LAB
ANION GAP SERPL CALCULATED.3IONS-SCNC: 8.3 MMOL/L (ref 5–15)
ANISOCYTOSIS BLD QL: NORMAL
BASOPHILS # BLD AUTO: 0.06 10*3/MM3 (ref 0–0.2)
BASOPHILS NFR BLD AUTO: 0.9 % (ref 0–1.5)
BUN SERPL-MCNC: 61 MG/DL (ref 8–23)
BUN/CREAT SERPL: 28.9 (ref 7–25)
CALCIUM SPEC-SCNC: 8.8 MG/DL (ref 8.6–10.5)
CHLORIDE SERPL-SCNC: 98 MMOL/L (ref 98–107)
CO2 SERPL-SCNC: 31.7 MMOL/L (ref 22–29)
CREAT SERPL-MCNC: 2.11 MG/DL (ref 0.57–1)
DEPRECATED RDW RBC AUTO: 76.8 FL (ref 37–54)
EGFRCR SERPLBLD CKD-EPI 2021: 24.5 ML/MIN/1.73
EOSINOPHIL # BLD AUTO: 0.25 10*3/MM3 (ref 0–0.4)
EOSINOPHIL NFR BLD AUTO: 3.6 % (ref 0.3–6.2)
ERYTHROCYTE [DISTWIDTH] IN BLOOD BY AUTOMATED COUNT: 21.6 % (ref 12.3–15.4)
GLUCOSE BLDC GLUCOMTR-MCNC: 75 MG/DL (ref 70–130)
GLUCOSE BLDC GLUCOMTR-MCNC: 99 MG/DL (ref 70–130)
GLUCOSE SERPL-MCNC: 100 MG/DL (ref 65–99)
HCT VFR BLD AUTO: 35.8 % (ref 34–46.6)
HGB BLD-MCNC: 9.9 G/DL (ref 12–15.9)
HYPOCHROMIA BLD QL: NORMAL
IMM GRANULOCYTES # BLD AUTO: 0.02 10*3/MM3 (ref 0–0.05)
IMM GRANULOCYTES NFR BLD AUTO: 0.3 % (ref 0–0.5)
LYMPHOCYTES # BLD AUTO: 1.46 10*3/MM3 (ref 0.7–3.1)
LYMPHOCYTES NFR BLD AUTO: 20.8 % (ref 19.6–45.3)
MAGNESIUM SERPL-MCNC: 2.2 MG/DL (ref 1.6–2.4)
MCH RBC QN AUTO: 26.8 PG (ref 26.6–33)
MCHC RBC AUTO-ENTMCNC: 27.7 G/DL (ref 31.5–35.7)
MCV RBC AUTO: 96.8 FL (ref 79–97)
MONOCYTES # BLD AUTO: 0.58 10*3/MM3 (ref 0.1–0.9)
MONOCYTES NFR BLD AUTO: 8.3 % (ref 5–12)
NEUTROPHILS NFR BLD AUTO: 4.65 10*3/MM3 (ref 1.7–7)
NEUTROPHILS NFR BLD AUTO: 66.1 % (ref 42.7–76)
NRBC BLD AUTO-RTO: 0 /100 WBC (ref 0–0.2)
OVALOCYTES BLD QL SMEAR: NORMAL
PLAT MORPH BLD: NORMAL
PLATELET # BLD AUTO: 266 10*3/MM3 (ref 140–450)
PMV BLD AUTO: 8.8 FL (ref 6–12)
POTASSIUM SERPL-SCNC: 3.6 MMOL/L (ref 3.5–5.2)
RBC # BLD AUTO: 3.7 10*6/MM3 (ref 3.77–5.28)
SODIUM SERPL-SCNC: 138 MMOL/L (ref 136–145)
WBC NRBC COR # BLD: 7.02 10*3/MM3 (ref 3.4–10.8)

## 2023-07-28 PROCEDURE — 80048 BASIC METABOLIC PNL TOTAL CA: CPT | Performed by: PHYSICIAN ASSISTANT

## 2023-07-28 PROCEDURE — 85025 COMPLETE CBC W/AUTO DIFF WBC: CPT | Performed by: STUDENT IN AN ORGANIZED HEALTH CARE EDUCATION/TRAINING PROGRAM

## 2023-07-28 PROCEDURE — 99239 HOSP IP/OBS DSCHRG MGMT >30: CPT | Performed by: PHYSICIAN ASSISTANT

## 2023-07-28 PROCEDURE — 97110 THERAPEUTIC EXERCISES: CPT

## 2023-07-28 PROCEDURE — 97530 THERAPEUTIC ACTIVITIES: CPT

## 2023-07-28 PROCEDURE — 85007 BL SMEAR W/DIFF WBC COUNT: CPT | Performed by: STUDENT IN AN ORGANIZED HEALTH CARE EDUCATION/TRAINING PROGRAM

## 2023-07-28 PROCEDURE — 82948 REAGENT STRIP/BLOOD GLUCOSE: CPT

## 2023-07-28 PROCEDURE — 83735 ASSAY OF MAGNESIUM: CPT | Performed by: PHYSICIAN ASSISTANT

## 2023-07-28 RX ORDER — POLYETHYLENE GLYCOL 3350 17 G/17G
17 POWDER, FOR SOLUTION ORAL 2 TIMES DAILY
Qty: 60 PACKET | Refills: 0 | Status: SHIPPED | OUTPATIENT
Start: 2023-07-28 | End: 2023-08-27

## 2023-07-28 RX ORDER — AMOXICILLIN 250 MG
1 CAPSULE ORAL 2 TIMES DAILY
Qty: 60 TABLET | Refills: 0 | Status: SHIPPED | OUTPATIENT
Start: 2023-07-28 | End: 2023-08-27

## 2023-07-28 RX ORDER — LACTULOSE 10 G/15ML
10 SOLUTION ORAL 3 TIMES DAILY PRN
Qty: 1350 ML | Refills: 0 | Status: SHIPPED | OUTPATIENT
Start: 2023-07-28 | End: 2023-08-27

## 2023-07-28 RX ORDER — BUMETANIDE 1 MG/1
2 TABLET ORAL DAILY
Status: DISCONTINUED | OUTPATIENT
Start: 2023-07-28 | End: 2023-07-28 | Stop reason: HOSPADM

## 2023-07-28 RX ORDER — ATORVASTATIN CALCIUM 40 MG/1
40 TABLET, FILM COATED ORAL NIGHTLY
Qty: 30 TABLET | Refills: 0 | Status: SHIPPED | OUTPATIENT
Start: 2023-07-28 | End: 2023-08-27

## 2023-07-28 RX ORDER — BUMETANIDE 2 MG/1
2 TABLET ORAL DAILY
Qty: 30 TABLET | Refills: 0 | Status: SHIPPED | OUTPATIENT
Start: 2023-07-28 | End: 2023-08-27

## 2023-07-28 RX ORDER — INSULIN GLARGINE 100 [IU]/ML
10 INJECTION, SOLUTION SUBCUTANEOUS NIGHTLY
Qty: 3 ML | Refills: 0 | Status: SHIPPED | OUTPATIENT
Start: 2023-07-28 | End: 2023-08-27

## 2023-07-28 RX ORDER — METOPROLOL SUCCINATE 50 MG/1
50 TABLET, EXTENDED RELEASE ORAL
Qty: 30 TABLET | Refills: 0 | Status: SHIPPED | OUTPATIENT
Start: 2023-07-29 | End: 2023-08-28

## 2023-07-28 RX ORDER — NITROGLYCERIN 0.4 MG/1
0.4 TABLET SUBLINGUAL
Qty: 30 TABLET | Refills: 12 | Status: SHIPPED | OUTPATIENT
Start: 2023-07-28

## 2023-07-28 RX ORDER — HYDROCODONE BITARTRATE AND ACETAMINOPHEN 5; 325 MG/1; MG/1
1 TABLET ORAL EVERY 8 HOURS PRN
Qty: 12 TABLET | Refills: 0 | Status: SHIPPED | OUTPATIENT
Start: 2023-07-28

## 2023-07-28 RX ADMIN — FLUTICASONE PROPIONATE 2 SPRAY: 50 SPRAY, METERED NASAL at 08:26

## 2023-07-28 RX ADMIN — APIXABAN 5 MG: 5 TABLET, FILM COATED ORAL at 08:26

## 2023-07-28 RX ADMIN — ISOSORBIDE MONONITRATE 30 MG: 30 TABLET, EXTENDED RELEASE ORAL at 08:25

## 2023-07-28 RX ADMIN — LINACLOTIDE 145 MCG: 145 CAPSULE, GELATIN COATED ORAL at 08:25

## 2023-07-28 RX ADMIN — PANTOPRAZOLE SODIUM 40 MG: 40 TABLET, DELAYED RELEASE ORAL at 05:48

## 2023-07-28 RX ADMIN — FERROUS SULFATE TAB 325 MG (65 MG ELEMENTAL FE) 325 MG: 325 (65 FE) TAB at 08:25

## 2023-07-28 RX ADMIN — NYSTATIN: 100000 POWDER TOPICAL at 08:26

## 2023-07-28 RX ADMIN — Medication 1000 MCG: at 08:26

## 2023-07-28 RX ADMIN — DOCUSATE SODIUM 50 MG AND SENNOSIDES 8.6 MG 2 TABLET: 8.6; 5 TABLET, FILM COATED ORAL at 08:25

## 2023-07-28 RX ADMIN — POLYETHYLENE GLYCOL (3350) 17 G: 17 POWDER, FOR SOLUTION ORAL at 08:26

## 2023-07-28 RX ADMIN — BUMETANIDE 2 MG: 1 TABLET ORAL at 10:45

## 2023-07-28 RX ADMIN — BISACODYL 5 MG: 5 TABLET, COATED ORAL at 08:26

## 2023-07-28 RX ADMIN — GUAIFENESIN 400 MG: 200 TABLET ORAL at 08:26

## 2023-07-28 RX ADMIN — FLUOXETINE HYDROCHLORIDE 20 MG: 20 CAPSULE ORAL at 08:26

## 2023-07-28 RX ADMIN — METOPROLOL SUCCINATE 50 MG: 50 TABLET, EXTENDED RELEASE ORAL at 08:26

## 2023-07-28 RX ADMIN — ASPIRIN 81 MG: 81 TABLET, CHEWABLE ORAL at 08:26

## 2023-07-28 RX ADMIN — Medication 1 TABLET: at 08:25

## 2023-07-28 NOTE — PLAN OF CARE
Goal Outcome Evaluation:   Daily Care Plan Summary: Heart Failure    Diuretic in use (IV or PO):   IV Lasix       Daily weight (up or down):    gain: 1lb      Output > Intake (yes/no):Yes      O2 Requirements (current, any change?):  2 liters/min via nasal cannula      Symptoms noted with Activity (Respiratory Tolerance, functional state):     Patient reported SOA when turning in bed      Anticipated Discharge Plans:     Return to [facility]      VSS on 2 L nasal cannula, PILAR Teran on the monitor. No indicators of acute distress noted.

## 2023-07-28 NOTE — NURSING NOTE
Report called to Carine PERALTA at [facility] and [facility]

## 2023-07-28 NOTE — CASE MANAGEMENT/SOCIAL WORK
Discharge Planning Assessment  [facility]     Patient Name: Jolly Garcia  MRN: [id number]  Today's Date: 7/28/2023    Admit Date: 7/22/2023    Plan: [facility] scheduled per [facility].       Discharge Plan       Row Name 07/28/23 1112       Plan    Plan [facility] scheduled per [facility]PCM.      Row Name 07/28/23 1110       Plan    Plan Pt to be discharged to [facility] on this date.  Facility aware and agreeable per Krysten.  Pt aware and agreeable.  Pt alert and oriented and does not want SS to notify family of transport back to [facility].    Final Discharge Disposition Code 03 - skilled nursing facility (SNF)    Final Note Pt to be discharged back to [facility] on this date.      Row Name 07/28/23 0835       Plan    Plan SS [facility] Vicky and pt had a 9 day bed hold that was being billed to long-term care Medicaid, therefore she can return when medically stable.  notified Dr. Mahajan. SS to follow.                  Continued Care and Services - Admitted Since 7/22/2023       Destination Coordination complete.      Service Provider Request Status Selected Services Address Phone Fax Patient Preferred    [facility]  Selected Skilled Nursing [facility] [address] [place] [address] [phone] --                    DEREK Wells

## 2023-07-28 NOTE — PROGRESS NOTES
Nephrology Progress Note      Subjective     Feeling better but continues to have shortness of breath that has much improved    Objective       Vital signs :     Temp:  [97.8 °F (36.6 °C)-98.1 °F (36.7 °C)] 97.8 °F (36.6 °C)  Heart Rate:  [80-85] 84  Resp:  [18] 18  BP: (117-148)/(64-87) 146/87    Intake/Output                         07/26/23 0701 - 07/27/23 0700 07/27/23 0701 - 07/28/23 0700     2034-0626 3198-9641 Total 6780-9090 8247-5451 Total                 Intake    P.O.  600  -- 600  1080  240 1320    Total Intake 600 -- 600 2747 328 2108       Output    Urine  650  925 1575  750  900 1650    Total Output               Physical Exam:    General Appearance : alert, pleasant, appears stated age, cooperative and alert  Lungs :  bilateral decreased intensity of breath sounds better than before  Heart :  regular rhythm & normal rate, normal S1, S2 and no murmur, no rub  Abdomen : soft, non distended  Extremities : 2+ edema  Neurologic :   orientated to person, place, time and situation, Grossly no focal deficits        Laboratory Data :     Albumin No results found for: ALBUMIN     Magnesium Magnesium   Date Value Ref Range Status   07/28/2023 2.2 1.6 - 2.4 mg/dL Final   07/27/2023 2.1 1.6 - 2.4 mg/dL Final            PTH               No results found for: PTH    CBC and coagulation:  Results from last 7 days   Lab Units 07/28/23  0137 07/27/23  0148 07/26/23  0838 07/23/23  0806 07/22/23  1413   LACTATE mmol/L  --   --   --   --  0.8   CRP mg/dL  --   --   --   --  <0.30   WBC 10*3/mm3 7.02 8.94 7.25   < > 7.58   HEMOGLOBIN g/dL 9.9* 9.8* 10.2*   < > 10.0*   HEMATOCRIT % 35.8 32.1* 34.8   < > 33.7*   MCV fL 96.8 89.2 90.6   < > 88.5   MCHC g/dL 27.7* 30.5* 29.3*   < > 29.7*   PLATELETS 10*3/mm3 266 292 268   < > 313    < > = values in this interval not displayed.     Acid/base balance:  Results from last 7 days   Lab Units 07/22/23  1515   PH, ARTERIAL pH units 7.329*   PO2 ART mm Hg  78.3*   PCO2, ARTERIAL mm Hg 50.5*   HCO3 ART mmol/L 26.5*     Renal and electrolytes:    Results from last 7 days   Lab Units 07/28/23  0240 07/27/23  0148 07/26/23  0838 07/25/23  0148 07/24/23  0254 07/23/23  0806 07/22/23  1413   SODIUM mmol/L 138 138 140 137 137   < > 137   POTASSIUM mmol/L 3.6 3.6 3.7 3.9 4.2   < > 4.1   MAGNESIUM mg/dL 2.2 2.1  --   --   --   --  2.2   CHLORIDE mmol/L 98 99 100 101 103   < > 102   CO2 mmol/L 31.7* 27.7 31.9* 25.4 23.4   < > 25.1   BUN mg/dL 61* 65* 65* 66* 61*   < > 64*   CREATININE mg/dL 2.11* 2.14* 2.27* 2.65* 2.64*   < > 2.49*   CALCIUM mg/dL 8.8 8.6 8.8 8.5* 8.4*   < > 8.9    < > = values in this interval not displayed.     Estimated Creatinine Clearance: 36.7 mL/min (A) (by C-G formula based on SCr of 2.11 mg/dL (H)).  @GFRCG:3@   Liver and pancreatic function:  Results from last 7 days   Lab Units 07/25/23  0148 07/24/23  0254 07/23/23  0806   ALBUMIN g/dL 3.1* 3.1* 3.4*   BILIRUBIN mg/dL 0.4 0.3 0.4   ALK PHOS U/L 103 105 111   AST (SGOT) U/L 13 14 12   ALT (SGPT) U/L 8 8 9         Cardiac:  Results from last 7 days   Lab Units 07/22/23  1413   PROBNP pg/mL 30,507.0*     Liver and pancreatic function:  Results from last 7 days   Lab Units 07/25/23  0148 07/24/23  0254 07/23/23  0806   ALBUMIN g/dL 3.1* 3.1* 3.4*   BILIRUBIN mg/dL 0.4 0.3 0.4   ALK PHOS U/L 103 105 111   AST (SGOT) U/L 13 14 12   ALT (SGPT) U/L 8 8 9       Medications :     apixaban, 5 mg, Oral, Q12H  aspirin, 81 mg, Oral, Daily  atorvastatin, 20 mg, Oral, Nightly  senna-docusate sodium, 2 tablet, Oral, BID   And  polyethylene glycol, 17 g, Oral, BID   And  bisacodyl, 5 mg, Oral, Daily  ferrous sulfate, 325 mg, Oral, Daily With Breakfast  FLUoxetine, 20 mg, Oral, Daily  fluticasone, 2 spray, Each Nare, Daily  guaiFENesin, 400 mg, Oral, TID  insulin glargine, 15 Units, Subcutaneous, Nightly  insulin lispro, 2-9 Units, Subcutaneous, 4x Daily AC & at Bedtime  isosorbide mononitrate, 30 mg, Oral,  Daily  latanoprost, 1 drop, Both Eyes, Nightly  linaclotide, 145 mcg, Oral, QAM AC  metoprolol succinate XL, 50 mg, Oral, Q24H  multivitamin with minerals, 1 tablet, Oral, Daily  nystatin, , Topical, Q12H  pantoprazole, 40 mg, Oral, Q AM  vitamin B-12, 1,000 mcg, Oral, Daily             Assessment & Plan     -WENDY, nonoliguric  - Chronic kidney disease stage IV A3  - Type 2 diabetes mellitus  - Acute on chronic decompensated heart failure, preserved ejection fraction  - Morbid obesity  - History of nephrotic syndrome     Stable renal functions creatinine currently at baseline.  Adequate urine output.  Almost achieved euvolemic state, will transition to p.o. Bumex 2 mg once a day.  Educated and counseled the patient for fluid restriction and salt restriction.  Discussed with Dr. Mahajan, I will sign off please call if any questions.  Follow-up in clinic after 4 weeks    WENDY on CKD most likely due to cardiorenal syndrome type I  Baseline creatinine appears to be 2 admitted with 2.5  No hematuria no proteinuria  - 1 g salt restriction and 1 L fluid restriction per day  - Please avoid any nephrotoxic agents, hypotension and adjust medications according to estimated GFR      Elaina Bell MD  07/28/23  07:35 EDT

## 2023-07-28 NOTE — THERAPY TREATMENT NOTE
Acute Care - Occupational Therapy Treatment Note  RADHA Andino     Patient Name: Jolly Garcia  : 1950  MRN: [id number]  Today's Date: 2023  Onset of Illness/Injury or Date of Surgery: 23  Date of Referral to OT: 23  Referring Physician: Dr. Sotelo    Admit Date: 2023       ICD-10-CM ICD-9-CM   1. Acute congestive heart failure, unspecified heart failure type  I50.9 428.0   2. Chronic pain syndrome  G89.4 338.4     Patient Active Problem List   Diagnosis    UTI (urinary tract infection)    Sepsis    Type 2 diabetes mellitus    Essential hypertension    ASCVD (arteriosclerotic cardiovascular disease)    Chronic respiratory failure with hypoxia    Anxiety    Hyperlipidemia    Chronic kidney disease    Diabetic retinopathy    Class 3 severe obesity due to excess calories with body mass index (BMI) of 50.0 to 59.9 in adult    JESIKA (obstructive sleep apnea)    Diabetic nephropathy    Physical debility    (HFpEF) heart failure with preserved ejection fraction    Pneumonia of right lower lobe due to infectious organism    Acute kidney injury    Hyponatremia    Iron deficiency anemia, unspecified    Acute on chronic diastolic CHF (congestive heart failure)    Acute on chronic kidney failure    Chronic heart failure with preserved ejection fraction (HFpEF)     Past Medical History:   Diagnosis Date    Anxiety     CAD (coronary artery disease)     s/p CABG    Chronic hypoxemic respiratory failure     Wears 2 L/min at home    Essential hypertension     Hyperlipidemia     Type 2 diabetes mellitus      Past Surgical History:   Procedure Laterality Date    CORONARY ARTERY BYPASS GRAFT      HYSTERECTOMY           OT ASSESSMENT FLOWSHEET (last 12 hours)       OT Evaluation and Treatment       Row Name 23 0939                   OT Time and Intention    Subjective Information complains of;weakness;fatigue  -LA        Document Type therapy note (daily note)  -LA        Patient Effort fair  -LA            General Information    Patient Profile Reviewed yes  -LA        General Observations of Patient Patient agreeable to therapy this date. Patient with increased participation and increased fx activity tolerance noted. Patient engaged in BUE exercises in all planes 10 x2 sets. Patient continues to require long rest periods between reps/sets but with less complaint of SOA. patient with improved demonstration of breathing techniques during exercises. Bed mobility/ rolling this date with maxA for repositioning.  -LA        Existing Precautions/Restrictions fall;oxygen therapy device and L/min  -LA           Cognition    Affect/Mental Status (Cognition) WFL  -LA        Orientation Status (Cognition) oriented x 3  -LA        Follows Commands (Cognition) WFL  -LA           Bed Mobility    Rolling Left [place] (Bed Mobility) maximum assist (25% patient effort)  -LA        Rolling Right [place] (Bed Mobility) maximum assist (25% patient effort)  -LA           Motor Skills    Motor Skills coordination;functional endurance  -LA        Coordination WFL  -LA        Functional Endurance poor+  -LA        Motor Control/Coordination Interventions therapeutic exercise/ROM;gross motor coordination activities  -LA        Therapeutic Exercise shoulder;elbow/forearm;wrist;hand  -LA        Additional Documentation --  BUE AROM in all planes 10x2. Increased ease and tolerance with exercises with decreased c/o SOA  -LA           Wound 07/22/23 1926 Right anterior greater trochanter MASD (Moisture associated skin damage)    Wound - Properties Group Placement Date: 07/22/23  -DS Placement Time: 1926  -DS Present on Hospital Admission: Y  -DS Side: Right  -DS Orientation: anterior  -DS Location: greater trochanter  -DS Primary Wound Type: MASD  -DS    Retired Wound - Properties Group Placement Date: 07/22/23  -DS Placement Time: 1926  -DS Present on Hospital Admission: Y  -DS Side: Right  -DS Orientation: anterior  -DS  Location: greater trochanter  -DS Primary Wound Type: MASD  -DS    Retired Wound - Properties Group Date first assessed: 07/22/23  -DS Time first assessed: 1926  -DS Present on Hospital Admission: Y  -DS Side: Right  -DS Location: greater trochanter  -DS Primary Wound Type: MASD  -DS       Plan of Care Review    Plan of Care Reviewed With patient  -LA           Positioning and Restraints    Pre-Treatment Position in bed  -LA        In Bed supine;call light within reach;encouraged to call for assist;exit alarm on  -LA                  User Key  (r) = Recorded By, (t) = Taken By, (c) = Cosigned By      Initials Name Effective Dates    Pauline Campos OT 02/14/22 -     Micaela Denis, RN 01/27/23 -                            OT Recommendation and Plan     Plan of Care Review  Plan of Care Reviewed With: patient  Plan of Care Reviewed With: patient        Time Calculation:     Therapy Charges for Today       Code Description Service Date Service Provider Modifiers Qty    49442491444 HC OT THER PROC EA 15 MIN 7/27/2023 Pauline Chang OT GO 1    19583169655 HC OT THERAPEUTIC ACT EA 15 MIN 7/28/2023 Pauline Chang OT GO 1    77734040440 HC OT THER PROC EA 15 MIN 7/28/2023 Pauline Chang OT GO 1                 Pauline Chang OT  7/28/2023

## 2023-07-28 NOTE — DISCHARGE SUMMARY
[facility] Medicine Services  DISCHARGE SUMMARY    Patient Identification:  Name:  Jolly Garcia  Age:  72 y.o.  Sex:  female  :  1950  MRN:  [id number]  Visit Number:  [id number]    Date of Admission: 2023  Date of Discharge: 2023    PCP: Whit Cadet, TUCKER    Discharging Provider: Lor Salazar PA-C / Juvenal Acuna*    Admission/Discharge Diagnoses     Discharge Diagnoses:  -Acute on chronic HFpEF, present on admission   -NSTEMI, type 1 vs type 2, likely type 2 secondary to above   -WENDY on CKD stage IV, present on admission, likely cardiorenal syndrome   -History of nephrotic syndrome   -Chronic hypoxic respiratory failure (2 LPM NC)  -?Metabolic encephalopathy on presentation, resolved   -Chronic physical debility   -Coronary artery disease s/p CABG in the past   -Essential hypertension  -Hyperlipidemia   -Insulin dependent type II diabetes mellitus   -Paroxysmal atrial fibrillation/flutter, chronically anticoagulated with Eliquis   -Chronic venous stasis bilateral lower extremities   -Interstitial lung disease/restrictive lung disease   -JESIKA  -History of iron deficiency anemia   -Anxiety/depression   -GERD  -Former smoker   -Obesity by BMI    Consults/Procedures     Consults:   Nephrology -- Dr. Bell     Procedures Performed:  2023: Transthoracic echocardiogram     Left ventricular systolic function is normal. Left ventricular ejection fraction appears to be 56 - 60%.    Left ventricular diastolic function is consistent with (grade II w/high LAP) pseudonormalization.    The right ventricular cavity is mild to moderately dilated.    The left atrial cavity is mildly dilated.    The right atrial cavity is mildly  dilated.    Estimated right ventricular systolic pressure from tricuspid regurgitation is markedly elevated (>55 mmHg).    History of Presenting Illness     Jolly Garcia is a 72 y.o. female nursing home resident with past  medical history significant for essential hypertension, hyperlipidemia, coronary artery disease s/p CABG in the past, paroxysmal atrial fibrillation/flutter chronically anticoagulated with Eliquis, HFpEF, JESIKA, restrictive lung disease/interstitial lung disease, chronic hypoxic respiratory failure (2 LPM NC), insulin dependent type II diabetes mellitus, stage IV CKD, history of nephrotic syndrome, iron deficiency anemia, chronic venous stasis bilateral lower extremities, GERD, anxiety/depression, former smoker, and obesity by BMI that presented to the [facility] emergency department for evaluation of AMS, lethargy, shortness of breath, increased swelling, and abnormal labs. Please see admitting history and physical for further and complete details. Patient was found to have acute on chronic HFpEF, NSTEMI type I vs type II, and acute on chronic stage IV CKD. Patient also with metabolic encephalopathy on presentation.     Hospital Course     Patient was admitted to the telemetry floor for further evaluation and treatment. Patient did have reported visual hallucinations. Head CT was unremarkable for acute intracranial abnormalities. UA was unremarkable for UTI. Imaging of chest was consistent with volume overload. High sensitivity troponin was elevated with flat trend, negative delta, felt to be type II due to acute CHF exacerbation. IV Bumex given initially in ED. Due to patient's history of nephrotic syndrome, nephrology was consulted and evaluated the patient. WENDY on CKD felt to be due to cardiorenal syndrome. Patient was started on lasix gtt, later diuril gtt as well. Patient underwent agressive diuresis per nephrology orders. Repeat TTE obtained with again preserved EF 56-60% with grade II diastolic dysfunction. Creatinine continued to improve with diuresis. She remained with good UOP. Encephalopathy resolved, mentation remains at baseline. PT/OT was on board for debility.     On day of discharge, it was  felt that she had reached maximal inpatient benefit and was stable for discharge back to nursing home. Patient felt to be euvolemic. Patient was cleared for discharge back to NH per nephrology. IV diuresis discontinued, she will be transitioned to 2 mg PO Bumex daily. Patient counseled on fluid restriction (1 liter) and salt restriction (1 gram) per nephrology. Patient will be scheduled to follow up with nephrology in the clinic in 4 weeks per recommendations. Patient will also be scheduled to follow up with PCP in 1 week for hospital follow up. Please see changes to previously prescribed medications below, please see discharge medications on discharge MAR listed below.     Discharge Vitals/Physical Examination     Vital Signs:  Temp:  [97.8 °F (36.6 °C)-98.1 °F (36.7 °C)] 97.8 °F (36.6 °C)  Heart Rate:  [80-85] 84  Resp:  [18] 18  BP: (117-148)/(64-87) 146/87  Mean Arterial Pressure (Non-Invasive) for the past 24 hrs (Last 3 readings):   Noninvasive MAP (mmHg)   07/28/23 0622 110   07/28/23 0354 79   07/27/23 2336 93     SpO2 Percentage    07/27/23 2336 07/28/23 0354 07/28/23 0622   SpO2: 96% 96% 97%     SpO2:  [96 %-97 %] 97 %  on  Flow (L/min):  [2] 2;   Device (Oxygen Therapy): nasal cannula    Body mass index is 54.15 kg/m².  Wt Readings from Last 3 Encounters:   07/28/23 (!) 152 kg (335 lb 4.8 oz)   06/27/23 124 kg (273 lb)   06/06/23 (!) 140 kg (308 lb 14.4 oz)       Physical Exam:  Physical Exam  Nursing note reviewed.   Constitutional:       General: She is awake. She is not in acute distress.     Appearance: She is well-developed. She is morbidly obese. She is not toxic-appearing.      Interventions: Nasal cannula in place.      Comments: Lying in bed. No acute distress noted.    HENT:      Head: Normocephalic and atraumatic.      Mouth/Throat:      Mouth: Mucous membranes are moist.   Eyes:      Conjunctiva/sclera: Conjunctivae normal.      Pupils: Pupils are equal, round, and reactive to light.    Cardiovascular:      Rate and Rhythm: Normal rate and regular rhythm.      Heart sounds: Normal heart sounds. No murmur heard.    No friction rub. No gallop.   Pulmonary:      Effort: Pulmonary effort is normal.      Breath sounds: Normal breath sounds and air entry. No wheezing, rhonchi or rales.   Abdominal:      General: Bowel sounds are normal. There is no distension.      Palpations: Abdomen is soft.      Tenderness: There is no abdominal tenderness.   Musculoskeletal:      Cervical back: Neck supple.      Right lower le+ Edema present.      Left lower le+ Edema present.   Skin:     General: Skin is warm and dry.      Capillary Refill: Capillary refill takes less than 2 seconds.   Neurological:      General: No focal deficit present.      Mental Status: She is alert and oriented to person, place, and time.      Sensory: Sensation is intact.      Motor: Motor function is intact.      Comments: Awake and alert. Follows commands. Answers questions appropriately. Moves all extremities equally. Strength and sensation intact. No focal neuro deficit on exam.   Psychiatric:         Mood and Affect: Mood and affect normal.         Speech: Speech normal.         Behavior: Behavior is cooperative.     Pertinent Laboratory/Radiology Results     Pertinent Laboratory Results:  Results from last 7 days   Lab Units 23  1636 23  1413   HSTROP T ng/L 53* 57*     Results from last 7 days   Lab Units 23  1413   PROBNP pg/mL 30,507.0*       Results from last 7 days   Lab Units 23  1515   PH, ARTERIAL pH units 7.329*   PO2 ART mm Hg 78.3*   PCO2, ARTERIAL mm Hg 50.5*   HCO3 ART mmol/L 26.5*     Results from last 7 days   Lab Units 23  0137 23  0148 23  0838 23  0806 23  1413   CRP mg/dL  --   --   --   --  <0.30   LACTATE mmol/L  --   --   --   --  0.8   WBC 10*3/mm3 7.02 8.94 7.25   < > 7.58   HEMOGLOBIN g/dL 9.9* 9.8* 10.2*   < > 10.0*   HEMATOCRIT % 35.8 32.1* 34.8    < > 33.7*   MCV fL 96.8 89.2 90.6   < > 88.5   MCHC g/dL 27.7* 30.5* 29.3*   < > 29.7*   PLATELETS 10*3/mm3 266 292 268   < > 313    < > = values in this interval not displayed.     Results from last 7 days   Lab Units 07/28/23  0240 07/27/23  0148 07/26/23  0838 07/25/23  0148 07/24/23  0254 07/23/23  0806 07/22/23  1413   SODIUM mmol/L 138 138 140 137 137 137 137   POTASSIUM mmol/L 3.6 3.6 3.7 3.9 4.2 3.8 4.1   MAGNESIUM mg/dL 2.2 2.1  --   --   --   --  2.2   CHLORIDE mmol/L 98 99 100 101 103 102 102   CO2 mmol/L 31.7* 27.7 31.9* 25.4 23.4 24.7 25.1   BUN mg/dL 61* 65* 65* 66* 61* 65* 64*   CREATININE mg/dL 2.11* 2.14* 2.27* 2.65* 2.64* 2.54* 2.49*   CALCIUM mg/dL 8.8 8.6 8.8 8.5* 8.4* 8.8 8.9   GLUCOSE mg/dL 100* 103* 76 145* 170* 127* 130*   ALBUMIN g/dL  --   --   --  3.1* 3.1* 3.4* 3.4*   BILIRUBIN mg/dL  --   --   --  0.4 0.3 0.4 0.4   ALK PHOS U/L  --   --   --  103 105 111 117   AST (SGOT) U/L  --   --   --  13 14 12 19   ALT (SGPT) U/L  --   --   --  8 8 9 9   Estimated Creatinine Clearance: 36.7 mL/min (A) (by C-G formula based on SCr of 2.11 mg/dL (H)).    Glucose   Date/Time Value Ref Range Status   07/28/2023 0604 75 70 - 130 mg/dL Final     Comment:     Meter: [id number] : [id number] ROGELIO ART   07/27/2023 1917 147 (H) 70 - 130 mg/dL Final     Comment:     RN Notified Meter: [id number] : [id number] JOHN CHAU   07/27/2023 1639 126 70 - 130 mg/dL Final     Comment:     Meter: [id number] : [id number] OPAL GARCIA   07/27/2023 1131 127 70 - 130 mg/dL Final     Comment:     Meter: [id number] : [id number] OPAL GARCIA   07/27/2023 0700 88 70 - 130 mg/dL Final     Comment:     Meter: [id number] : [id number] OPAL GARCIA   07/26/2023 2249 120 70 - 130 mg/dL Final     Comment:     Meter: [id number] : [id number] Dean Cheema   07/26/2023 1610 161 (H) 70 - 130 mg/dL Final     Comment:     Meter: [id number] : [id number] OPAL GARCIA   07/26/2023 1119 104 70 - 130  mg/dL Final     Comment:     Meter: [id number] : [id number] OPAL GARCIA     Lab Results   Component Value Date    HGBA1C 7.40 (H) 06/01/2023     Lab Results   Component Value Date    TSH 6.870 (H) 07/22/2023    FREET4 1.17 07/22/2023     Microbiology Results (last 10 days)       Procedure Component Value - Date/Time    Blood Culture - Blood, Arm, Left [[id number]]  (Normal) Collected: 07/22/23 1438    Lab Status: Final result Specimen: Blood from Arm, Left Updated: 07/27/23 1501     Blood Culture No growth at 5 days    Blood Culture - Blood, Arm, Right [[id number]]  (Normal) Collected: 07/22/23 1438    Lab Status: Final result Specimen: Blood from Arm, Right Updated: 07/27/23 1501     Blood Culture No growth at 5 days    COVID-19 and FLU A/B PCR - Swab, Nasopharynx [[id number]]  (Normal) Collected: 07/22/23 1414    Lab Status: Final result Specimen: Swab from Nasopharynx Updated: 07/22/23 1444     COVID19 Not Detected     Influenza A PCR Not Detected     Influenza B PCR Not Detected    Narrative:      Fact sheet for providers: https://www.fda.gov/media/523878/download    Fact sheet for patients: https://www.fda.gov/media/066716/download    Test performed by PCR.          Pertinent Radiology Results:  Imaging Results (All)       Procedure Component Value Units Date/Time    XR Chest 1 View [[id number]] Collected: 07/22/23 1638     Updated: 07/22/23 1702    Narrative:      Findings: Median sternotomy wires and hardware again noted.  Cardiac  silhouette is enlarged, allowing for technique, diffuse interstitial  prominence noted.  No pleural effusion.  Elevation of the right  hemidiaphragm noted.  No confluent opacification.    Impression:      Mild bilateral interstitial prominence which may suggest volume overload  in the proper clinical setting.     This report was finalized on 7/22/2023 4:41 PM by Kristen Jara MD.    CT Head Without Contrast [[id number]] Collected: 07/22/23 1533     Updated: 07/22/23 1537     Narrative:      FINDINGS:  No acute intra-or extra-axial hemorrhage or fluid collections are  identified. The ventricles are of normal size,shape,and morphology. The  basilar cisterns are patent. No mass effect or midline shift is seen.  The gray-white matter differentiation is normal. The visualized portions  of the orbits,paranasal sinuses,and mastoids appear normal. No acute  fracture is identified.    Impression:      No acute intracranial hemorrhage,midline shift,or significant mass  effect.     This report was finalized on 7/22/2023 3:35 PM by Kristen Jara MD.     Discharge Disposition/Discharge Medications/Discharge Appointments     Discharge Disposition:   Skilled Nursing Facility (DC - External)    Condition at Discharge:  Stable     DME Prescribed at Discharge:  None    Discharge Diet:  Cardiac/renal/consistent carbohydrate diet    Discharge Activity:  As tolerated     Code Status While Inpatient:  Code Status and Medical Interventions:   Ordered at: 07/22/23 1910     Code Status (Patient has no pulse and is not breathing):    CPR (Attempt to Resuscitate)     Medical Interventions (Patient has pulse or is breathing):    Full Support     Release to patient:    Routine Release     Discharge Medications:     Discharge Medications        New Medications        Instructions Start Date   lactulose 10 GM/15ML solution  Commonly known as: CHRONULAC   10 g, Oral, 3 Times Daily PRN      metoprolol succinate XL 50 MG 24 hr tablet  Commonly known as: TOPROL-XL   50 mg, Oral, Every 24 Hours Scheduled   Start Date: July 29, 2023     nitroglycerin 0.4 MG SL tablet  Commonly known as: NITROSTAT   0.4 mg, Sublingual, Every 5 Minutes PRN, Take no more than 3 doses in 15 minutes.      polyethylene glycol 17 g packet  Commonly known as: MIRALAX   17 g, Oral, 2 Times Daily      sennosides-docusate 8.6-50 MG per tablet  Commonly known as: PERICOLACE   1 tablet, Oral, 2 Times Daily             Changes to Medications         Instructions Start Date   atorvastatin 40 MG tablet  Commonly known as: LIPITOR  What changed:   medication strength  how much to take   40 mg, Oral, Nightly      bumetanide 2 MG tablet  Commonly known as: BUMEX  What changed:   medication strength  how much to take   2 mg, Oral, Daily      Lantus SoloStar 100 UNIT/ML injection pen  Generic drug: Insulin Glargine  What changed: how much to take   10 Units, Subcutaneous, Nightly      multivitamin with minerals tablet tablet  What changed: Another medication with the same name was removed. Continue taking this medication, and follow the directions you see here.   1 tablet, Oral, Daily             Continue These Medications        Instructions Start Date   acetaminophen 500 MG tablet  Commonly known as: TYLENOL   1,000 mg, Oral, Every 8 Hours PRN      aspirin 81 MG chewable tablet   81 mg, Oral, Daily      CoQ10 50 MG capsule   100 mg, Oral, Daily      dextromethorphan-guaifenesin  MG/5ML syrup  Commonly known as: ROBITUSSIN-DM   5 mL, Oral, Every 4 Hours PRN      diphenhydrAMINE 25 mg capsule  Commonly known as: BENADRYL   25 mg, Oral, Every 8 Hours PRN      Eliquis 5 MG tablet tablet  Generic drug: apixaban   5 mg, Oral, Every 12 Hours Scheduled      ferrous sulfate 325 (65 FE) MG tablet   325 mg, Oral, Daily With Breakfast      FLUoxetine 20 MG capsule  Commonly known as: PROzac   20 mg, Oral, Daily      fluticasone 50 MCG/ACT nasal spray  Commonly known as: FLONASE   2 sprays, Each Nare, Daily      guaiFENesin 400 MG tablet  Commonly known as: HUMIBID 3   400 mg, Oral, 3 Times Daily      hydrALAZINE 25 MG tablet  Commonly known as: APRESOLINE   25 mg, Oral, 3 Times Daily      HYDROcodone-acetaminophen 5-325 MG per tablet  Commonly known as: NORCO   1 tablet, Oral, Every 8 Hours PRN      insulin aspart 100 UNIT/ML solution pen-injector sc pen  Commonly known as: novoLOG FLEXPEN   2-10 Units, Subcutaneous, 4 Times Daily Before Meals & Nightly      isosorbide  mononitrate 30 MG 24 hr tablet  Commonly known as: IMDUR   30 mg, Oral, Daily      latanoprost 0.005 % ophthalmic solution  Commonly known as: XALATAN   1 drop, Both Eyes, Nightly      linaclotide 145 MCG capsule capsule  Commonly known as: LINZESS   145 mcg, Oral, Every Morning Before Breakfast      LORazepam 0.5 MG tablet  Commonly known as: ATIVAN   0.5 mg, Oral, Every 12 Hours PRN      ondansetron 4 MG tablet  Commonly known as: ZOFRAN   4 mg, Oral, Every 6 Hours PRN      Ozempic (1 MG/DOSE) 4 MG/3ML solution pen-injector  Generic drug: Semaglutide (1 MG/DOSE)   1 mg, Subcutaneous, Weekly      pantoprazole 40 MG EC tablet  Commonly known as: PROTONIX   40 mg, Oral, Every Early Morning      sodium chloride 0.65 % nasal spray   2 sprays, Nasal, Every 2 Hours PRN      Veltassa 16.8 g pack  Generic drug: Patiromer Sorbitex Calcium   1 each, Oral, Daily      vitamin B-12 1000 MCG tablet  Commonly known as: CYANOCOBALAMIN   1,000 mcg, Oral, Daily             Stop These Medications      metoprolol tartrate 50 MG tablet  Commonly known as: LOPRESSOR            Discharge Appointments:  Additional Instructions for the Follow-ups that You Need to Schedule       Discharge Follow-up with PCP   As directed       Currently Documented PCP:    Whit Cadet APRN    PCP Phone Number:    [phone]     [facility]        Discharge Follow-up with Specialty: nephro; 2 Weeks   As directed      Specialty: nephro   Follow Up: 2 Weeks   Follow Up Details: CKD 3b/4, hypervolemia, increased bumex at dc              Test Results Pending at Discharge:   None     Attestation: I personally discussed the patient's hospital course, disposition, discharge planning, and discharge medications with attending physician, Juvenal Acuna*, prior to time of discharge.     Lor Salazar PA-C  Hospitalist Service -- [facility]       07/28/23  10:02 EDT    Discharge Time: Greater than 30 minutes

## 2023-07-28 NOTE — CASE MANAGEMENT/SOCIAL WORK
Discharge Planning Assessment   [place]     Patient Name: Jolly Garcia  MRN: [id number]  Today's Date: 7/28/2023    Admit Date: 7/22/2023    Plan: Pt to be discharged to [facility] on this date.  Facility aware and agreeable per Krysten.  Pt aware and agreeable.  Pt alert and oriented and does not want SS to notify family of transport back to [facility].     Discharge Plan       Row Name 07/28/23 1110       Plan    Plan Pt to be discharged to [facility] on this date.  Facility aware and agreeable per Krysten.  Pt aware and agreeable.  Pt alert and oriented and does not want SS to notify family of transport back [facility].    Final Discharge Disposition Code 03 - skilled nursing facility (SNF)    Final Note Pt to be discharged back to [facility] on this date.      Row Name 07/28/23 0835       Plan    Plan SS [facility] Vicky and pt had a 9 day bed hold that was being billed to long-term care Medicaid, therefore she can return when medically stable. SS notified Dr. Mahajan. SS to follow.                  Continued Care and Services - Admitted Since 7/22/2023       Destination       Service Provider Request Status Selected Services Address Phone Fax Patient Preferred    [facility] Pending - Request Sent N/A [address] [address] [phone] --                          DEREK Wells

## 2023-07-28 NOTE — PLAN OF CARE
Goal Outcome Evaluation:  Patient discharged back to [facility]. IV and telemetry D/C'd. Patient has tolerated all interventions. No complaints or concerns at this time. No signs of acute distress noted. Will continue to monitor until patient off unit.

## 2023-07-28 NOTE — CASE MANAGEMENT/SOCIAL WORK
Discharge Planning Assessment   [place]     Patient Name: Jolly Garcia  MRN: [id number]  Today's Date: 7/28/2023    Admit Date: 7/22/2023       Discharge Plan       Row Name 07/28/23 0835       Plan    Plan SS contacted [facility] per Vicky and pt had a 9 day bed hold that was being billed to long-term care Medicaid, therefore she can return when medically stable. SS notified Dr. Mahajan. SS to follow.                  Continued Care and Services - Admitted Since 7/22/2023       Destination       Service Provider Request Status Selected Services Address Phone Fax Patient Preferred    [facility] Pending - Request Sent N/A [address] [place] [address] [phone] --                  Selected Continued Care - Prior Encounters Includes continued care and service providers with selected services from prior encounters from 4/23/2023 to 7/28/2023      Discharged on 6/6/2023 Admission date: 6/1/2023 - Discharge disposition: Skilled Nursing Facility (DC - External)      Destination       Service Provider Selected Services Address Phone Fax Patient Preferred    [facility] [address] [place] [id number] [phone] --                          Expected Discharge Date and Time       Expected Discharge Date Expected Discharge Time    Jul 28, 2023         TRACI Germain

## 2023-09-14 ENCOUNTER — OFFICE VISIT (OUTPATIENT)
Dept: CARDIOLOGY | Facility: CLINIC | Age: 73
End: 2023-09-14
Payer: MEDICARE

## 2023-09-14 VITALS
OXYGEN SATURATION: 99 % | HEART RATE: 97 BPM | SYSTOLIC BLOOD PRESSURE: 146 MMHG | BODY MASS INDEX: 47.09 KG/M2 | HEIGHT: 66 IN | WEIGHT: 293 LBS | DIASTOLIC BLOOD PRESSURE: 84 MMHG

## 2023-09-14 DIAGNOSIS — I25.10 ASCVD (ARTERIOSCLEROTIC CARDIOVASCULAR DISEASE): ICD-10-CM

## 2023-09-14 DIAGNOSIS — I48.19 ATRIAL FIBRILLATION, PERSISTENT: ICD-10-CM

## 2023-09-14 DIAGNOSIS — I50.33 ACUTE ON CHRONIC DIASTOLIC CHF (CONGESTIVE HEART FAILURE): Primary | ICD-10-CM

## 2023-09-14 DIAGNOSIS — E78.2 MIXED HYPERLIPIDEMIA: ICD-10-CM

## 2023-09-14 DIAGNOSIS — I27.20 PULMONARY HYPERTENSION: ICD-10-CM

## 2023-09-14 DIAGNOSIS — I10 ESSENTIAL HYPERTENSION: ICD-10-CM

## 2023-09-14 RX ORDER — LACTULOSE 10 G/15ML
20 SOLUTION ORAL 2 TIMES DAILY PRN
COMMUNITY

## 2023-09-14 NOTE — PROGRESS NOTES
[facility]             Maanda TUCKER Crockett Kari Renee, APRN  Jolly Garcia  1950 09/14/2023    Patient Active Problem List   Diagnosis    UTI (urinary tract infection)    Sepsis    Type 2 diabetes mellitus    Essential hypertension    ASCVD (arteriosclerotic cardiovascular disease)    Chronic respiratory failure with hypoxia    Anxiety    Hyperlipidemia    Chronic kidney disease    Diabetic retinopathy    Class 3 severe obesity due to excess calories with body mass index (BMI) of 50.0 to 59.9 in adult    JESIKA (obstructive sleep apnea)    Diabetic nephropathy    Physical debility    (HFpEF) heart failure with preserved ejection fraction    Pneumonia of right lower lobe due to infectious organism    Acute kidney injury    Hyponatremia    Iron deficiency anemia, unspecified    Acute on chronic diastolic CHF (congestive heart failure)    Acute on chronic kidney failure    Chronic heart failure with preserved ejection fraction (HFpEF)    Pulmonary hypertension    Atrial fibrillation, persistent       Dear Whit Cadet APRN:    Subjective     Chief Complaint   Patient presents with    ASCVD (arteriosclerotic cardiovascular disease)     Follow up       HPI:     This is a 73 y.o. female with known past medical history of acute on chronic HFpEF, essential pretension, atrial fibrillation, coronary disease status post CABG, hyperlipidemia, diabetes mellitus type 2, pulmonary hypertension and morbid obesity.     Jolly L Lillianyanet presents today for routine cardiology follow up.  Patient was recently admitted to [facility] in July 2023 for acute on chronic HFpEF, NSTEMI likely type II, WENDY on CKD and metabolic encephalopathy.atient was admitted to the telemetry floor for further evaluation and treatment. Patient did have reported visual hallucinations. Head CT was unremarkable for acute intracranial abnormalities. UA was unremarkable for  UTI. Imaging of chest was consistent with volume overload. High sensitivity troponin was elevated with flat trend, negative delta, felt to be type II due to acute CHF exacerbation. IV Bumex given initially in ED. Due to patient's history of nephrotic syndrome, nephrology was consulted and evaluated the patient. WENDY on CKD felt to be due to cardiorenal syndrome. Patient was started on lasix gtt, later diuril gtt as well. Patient underwent agressive diuresis per nephrology orders. Repeat TTE obtained with again preserved EF 56-60% with grade II diastolic dysfunction. Creatinine continued to improve with diuresis. She remained with good UOP.  IV diuresis discontinued, she will be transitioned to 2 mg PO Bumex daily. Patient counseled on fluid restriction (1 liter) and salt restriction (1 gram) per nephrology.     Patient is currently resident of the local nursing home.  Reports she has been doing overall well since discharge.  Denies any shortness of breath or chest pain.  Pressure mildly elevated in the office today but reports in the nursing home it is usually normal.  Brings in a list of medications today however it appears she is not taking Eliquis, Bumex or cholesterol medicine and is unsure why.    Diagnostic Testing  Echocardiogram 9/2022: EF 56 to 60%, moderate tricuspid valve regurgitation with RVSP of 110 mmHg consistent with severe pulmonary hypertension.  Nuclear stress test 9/2022: Small fixed mid anterior and anteroseptal wall defect with normal wall motion, defect is consistent with infarction and above area versus breast attenuation artifact, no ischemia seen, TID 1.03.  Echocardiogram 9/2022: EF 61 to 65% with RVSP of 78 mmHg     All other systems were reviewed and were negative.    Patient Active Problem List   Diagnosis    UTI (urinary tract infection)    Sepsis    Type 2 diabetes mellitus    Essential hypertension    ASCVD (arteriosclerotic cardiovascular disease)    Chronic respiratory failure with  hypoxia    Anxiety    Hyperlipidemia    Chronic kidney disease    Diabetic retinopathy    Class 3 severe obesity due to excess calories with body mass index (BMI) of 50.0 to 59.9 in adult    JESIKA (obstructive sleep apnea)    Diabetic nephropathy    Physical debility    (HFpEF) heart failure with preserved ejection fraction    Pneumonia of right lower lobe due to infectious organism    Acute kidney injury    Hyponatremia    Iron deficiency anemia, unspecified    Acute on chronic diastolic CHF (congestive heart failure)    Acute on chronic kidney failure    Chronic heart failure with preserved ejection fraction (HFpEF)    Pulmonary hypertension    Atrial fibrillation, persistent       family history includes Diabetes in her mother.     reports that she has quit smoking. She has been exposed to tobacco smoke. She does not have any smokeless tobacco history on file. She reports that she does not drink alcohol and does not use drugs.    No Known Allergies      Current Outpatient Medications:     acetaminophen (TYLENOL) 500 MG tablet, Take 2 tablets by mouth Every 8 (Eight) Hours As Needed for Mild Pain., Disp: , Rfl:     aspirin 81 MG chewable tablet, Chew 1 tablet Daily., Disp: , Rfl:     Coenzyme Q10 (CoQ10) 50 MG capsule, Take 2 capsules by mouth Daily., Disp: , Rfl:     dextromethorphan-guaifenesin (ROBITUSSIN-DM)  MG/5ML syrup, Take 5 mL by mouth Every 4 (Four) Hours As Needed (Cough)., Disp: , Rfl:     diphenhydrAMINE (BENADRYL) 25 mg capsule, Take 1 capsule by mouth Every 8 (Eight) Hours As Needed for Itching., Disp: , Rfl:     ferrous sulfate 325 (65 FE) MG tablet, Take 1 tablet by mouth Daily With Breakfast., Disp: , Rfl:     FLUoxetine (PROzac) 20 MG capsule, Take 1 capsule by mouth Daily., Disp: , Rfl:     fluticasone (FLONASE) 50 MCG/ACT nasal spray, 2 sprays by Each Nare route Daily., Disp: 9.9 mL, Rfl: 0    guaiFENesin (HUMIBID 3) 400 MG tablet, Take 1 tablet by mouth 3 (Three) Times a Day., Disp: ,  Rfl:     hydrALAZINE (APRESOLINE) 25 MG tablet, Take 1 tablet by mouth 3 (Three) Times a Day., Disp: , Rfl:     HYDROcodone-acetaminophen (NORCO) 5-325 MG per tablet, Take 1 tablet by mouth Every 8 (Eight) Hours As Needed for Moderate Pain., Disp: 12 tablet, Rfl: 0    insulin aspart (novoLOG FLEXPEN) 100 UNIT/ML solution pen-injector sc pen, Inject 2-10 Units under the skin into the appropriate area as directed 4 (Four) Times a Day Before Meals & at Bedtime., Disp: , Rfl:     isosorbide mononitrate (IMDUR) 30 MG 24 hr tablet, Take 1 tablet by mouth Daily., Disp: 90 tablet, Rfl: 0    lactulose (CHRONULAC) 10 GM/15ML solution, Take 30 mL by mouth 2 (Two) Times a Day As Needed., Disp: , Rfl:     metoprolol succinate XL (TOPROL-XL) 50 MG 24 hr tablet, Take 1 tablet by mouth Daily for 30 days., Disp: 30 tablet, Rfl: 0    multivitamin with minerals tablet tablet, Take 1 tablet by mouth Daily., Disp: , Rfl:     nitroglycerin (NITROSTAT) 0.4 MG SL tablet, Place 1 tablet under the tongue Every 5 (Five) Minutes As Needed for Chest Pain (Only if SBP Greater Than 100). Take no more than 3 doses in 15 minutes., Disp: 30 tablet, Rfl: 12    ondansetron (ZOFRAN) 4 MG tablet, Take 1 tablet by mouth Every 6 (Six) Hours As Needed for Nausea or Vomiting., Disp: , Rfl:     Semaglutide, 1 MG/DOSE, (Ozempic, 1 MG/DOSE,) 4 MG/3ML solution pen-injector, Inject 1 mg under the skin into the appropriate area as directed 1 (One) Time Per Week., Disp: , Rfl:     sodium chloride 0.65 % nasal spray, 2 sprays into the nostril(s) as directed by provider Every 2 (Two) Hours As Needed for Congestion., Disp: , Rfl:     bumetanide (BUMEX) 2 MG tablet, Take 1 tablet by mouth Daily for 30 days., Disp: 30 tablet, Rfl: 0    Eliquis 5 MG tablet tablet, Take 1 tablet by mouth Every 12 (Twelve) Hours. (Patient not taking: Reported on 9/14/2023), Disp: , Rfl:     Insulin Glargine (Lantus SoloStar) 100 UNIT/ML injection pen, Inject 10 Units under the skin into  the appropriate area as directed Every Night for 30 days., Disp: 3 mL, Rfl: 0    latanoprost (XALATAN) 0.005 % ophthalmic solution, Administer 1 drop to both eyes Every Night. (Patient not taking: Reported on 9/14/2023), Disp: , Rfl:     linaclotide (LINZESS) 145 MCG capsule capsule, Take 1 capsule by mouth Every Morning Before Breakfast. (Patient not taking: Reported on 9/14/2023), Disp: , Rfl:     LORazepam (ATIVAN) 0.5 MG tablet, Take 1 tablet by mouth Every 12 (Twelve) Hours As Needed for Anxiety., Disp: , Rfl:     pantoprazole (PROTONIX) 40 MG EC tablet, Take 1 tablet by mouth Every Morning. (Patient not taking: Reported on 9/14/2023), Disp: 90 tablet, Rfl: 0    Patiromer Sorbitex Calcium (Veltassa) 16.8 g pack, Take 1 packet by mouth Daily. (Patient not taking: Reported on 9/14/2023), Disp: , Rfl:     vitamin B-12 (CYANOCOBALAMIN) 1000 MCG tablet, Take 1 tablet by mouth Daily. (Patient not taking: Reported on 9/14/2023), Disp: 30 tablet, Rfl: 0      Physical Exam:  I have reviewed the patient's current vital signs as documented in the patient's EMR.   Vitals:    09/14/23 1439   BP: 146/84   Pulse: 97   SpO2: 99%     Body mass index is 54.43 kg/m².       09/14/23  1439   Weight: (!) 153 kg (337 lb)      Physical Exam  Constitutional:       General: She is not in acute distress.     Appearance: Normal appearance. She is well-developed and normal weight.   HENT:      Head: Normocephalic and atraumatic.   Eyes:      General: Lids are normal.      Conjunctiva/sclera: Conjunctivae normal.      Pupils: Pupils are equal, round, and reactive to light.   Neck:      Vascular: No carotid bruit or JVD.   Cardiovascular:      Rate and Rhythm: Normal rate and regular rhythm.      Pulses: Normal pulses.      Heart sounds: Normal heart sounds, S1 normal and S2 normal. No murmur heard.  Pulmonary:      Effort: Pulmonary effort is normal. No respiratory distress.      Breath sounds: Normal breath sounds. No wheezing.    Abdominal:      General: Bowel sounds are normal. There is no distension.      Palpations: Abdomen is soft. There is no hepatomegaly or splenomegaly.      Tenderness: There is no abdominal tenderness.   Musculoskeletal:         General: No swelling. Normal range of motion.      Cervical back: Normal range of motion and neck supple.      Right lower leg: No edema.      Left lower leg: No edema.   Skin:     General: Skin is warm and dry.      Coloration: Skin is not jaundiced.      Findings: No rash.   Neurological:      General: No focal deficit present.      Mental Status: She is alert and oriented to person, place, and time. Mental status is at baseline.   Psychiatric:         Mood and Affect: Mood normal.         Speech: Speech normal.         Behavior: Behavior normal.         Thought Content: Thought content normal.         Judgment: Judgment normal.          DATA REVIEWED:     TTE/BREANNA:  Results for orders placed during the hospital encounter of 07/22/23    Adult Transthoracic Echo Complete With Contrast if Necessary Per Protocol    Interpretation Summary    Left ventricular systolic function is normal. Left ventricular ejection fraction appears to be 56 - 60%.    Left ventricular diastolic function is consistent with (grade II w/high LAP) pseudonormalization.    The right ventricular cavity is mild to moderately dilated.    The left atrial cavity is mildly dilated.    The right atrial cavity is mildly  dilated.    Estimated right ventricular systolic pressure from tricuspid regurgitation is markedly elevated (>55 mmHg).      Laboratory evaluations:    Lab Results   Component Value Date    GLUCOSE 100 (H) 07/28/2023    BUN 61 (H) 07/28/2023    CREATININE 2.11 (H) 07/28/2023    BCR 28.9 (H) 07/28/2023    K 3.6 07/28/2023    CO2 31.7 (H) 07/28/2023    CALCIUM 8.8 07/28/2023    PROTENTOTREF 5.9 (L) 09/16/2022    ALBUMIN 3.1 (L) 07/25/2023    LABIL2 1.0 09/16/2022    AST 13 07/25/2023    ALT 8 07/25/2023     Lab  Results   Component Value Date    WBC 7.02 07/28/2023    HGB 9.9 (L) 07/28/2023    HCT 35.8 07/28/2023    MCV 96.8 07/28/2023     07/28/2023     Lab Results   Component Value Date    CHOL 64 06/02/2023    TRIG 27 06/02/2023    HDL 38 (L) 06/02/2023    LDL 15 06/02/2023     Lab Results   Component Value Date    TSH 6.870 (H) 07/22/2023     Lab Results   Component Value Date    HGBA1C 7.40 (H) 06/01/2023     Lab Results   Component Value Date    ALT 8 07/25/2023     Lab Results   Component Value Date    HGBA1C 7.40 (H) 06/01/2023    HGBA1C 7.60 (H) 09/12/2022     Lab Results   Component Value Date    MICROALBUR 23.1 06/04/2023    CREATININE 2.11 (H) 07/28/2023     Lab Results   Component Value Date    IRON 18 (L) 06/04/2023    TIBC 340 06/04/2023    FERRITIN 41.51 06/04/2023     Lab Results   Component Value Date    INR 1.31 (H) 06/01/2023    INR 1.08 09/22/2022    INR 1.07 09/11/2022    PROTIME 16.9 (H) 06/01/2023    PROTIME 14.2 09/22/2022    PROTIME 14.1 09/11/2022        Lab Results   Component Value Date    ABSOLUTELUNG 23 07/23/2023       --------------------------------------------------------------------------------------------------------------------------    ASSESSMENT/PLAN:      Diagnosis Plan   1. Acute on chronic diastolic CHF (congestive heart failure)        2. Mixed hyperlipidemia        3. Essential hypertension        4. Pulmonary hypertension        5. Atrial fibrillation, persistent        6. ASCVD (arteriosclerotic cardiovascular disease)            Acute on chronic HFpEF  Appears compensated.  Was discharged home on Bumex 2 mg daily after her hospitalization however is not currently on her medication list at the nursing home.  Continue with hydralazine, Imdur and metoprolol.    Essential hypertension  Blood pressure mildly elevated in the office today however reports it is controlled at the nursing home.  Continue current management.    3.  Hyperlipidemia  4.  ASCVD  Not currently on  cholesterol medicine for unclear reason.  Patient with a history of coronary artery disease therefore would strive for LDL goal less than 70.  I did send word to nursing home regarding the need for this as it was previously on her medication list.  Denies any chest pain.  Continue aspirin.      This document has been @Electronically signed by TUCKER Byrd, 09/14/23, 2:53 PM EDT.       Dictated Utilizing Dragon Dictation: Part of this note may be an electronic transcription/translation of spoken language to printed text using the Dragon Dictation System.    Follow-up appointment and medication changes provided in hand delivered After Visit Summary as well as reviewed in the room.   d

## 2023-09-20 ENCOUNTER — HOSPITAL ENCOUNTER (OUTPATIENT)
Dept: CT IMAGING | Facility: HOSPITAL | Age: 73
Discharge: HOME | End: 2023-09-20
Admitting: PHYSICIAN ASSISTANT
Payer: MEDICARE

## 2023-09-20 DIAGNOSIS — G47.33 OSA (OBSTRUCTIVE SLEEP APNEA): ICD-10-CM

## 2023-09-20 PROCEDURE — 71250 CT THORAX DX C-: CPT

## 2023-09-28 ENCOUNTER — OFFICE VISIT (OUTPATIENT)
Dept: PULMONOLOGY | Facility: CLINIC | Age: 73
End: 2023-09-28
Payer: MEDICARE

## 2023-09-28 VITALS
BODY MASS INDEX: 53.58 KG/M2 | OXYGEN SATURATION: 99 % | DIASTOLIC BLOOD PRESSURE: 72 MMHG | HEIGHT: 67 IN | SYSTOLIC BLOOD PRESSURE: 128 MMHG | HEART RATE: 107 BPM | TEMPERATURE: 97.9 F

## 2023-09-28 DIAGNOSIS — Z87.891 FORMER SMOKER: ICD-10-CM

## 2023-09-28 DIAGNOSIS — R91.1 PULMONARY NODULE: ICD-10-CM

## 2023-09-28 DIAGNOSIS — Z87.01 HISTORY OF RECENT PNEUMONIA: ICD-10-CM

## 2023-09-28 DIAGNOSIS — E66.01 MORBID OBESITY WITH BMI OF 50.0-59.9, ADULT: ICD-10-CM

## 2023-09-28 DIAGNOSIS — J84.9 ILD (INTERSTITIAL LUNG DISEASE): ICD-10-CM

## 2023-09-28 DIAGNOSIS — J98.4 RESTRICTIVE LUNG DISEASE: Primary | ICD-10-CM

## 2023-09-28 DIAGNOSIS — J96.11 CHRONIC RESPIRATORY FAILURE WITH HYPOXIA: ICD-10-CM

## 2023-09-28 DIAGNOSIS — I50.32 CHRONIC HEART FAILURE WITH PRESERVED EJECTION FRACTION (HFPEF): ICD-10-CM

## 2023-09-28 PROCEDURE — 1160F RVW MEDS BY RX/DR IN RCRD: CPT | Performed by: PHYSICIAN ASSISTANT

## 2023-09-28 PROCEDURE — 99214 OFFICE O/P EST MOD 30 MIN: CPT | Performed by: PHYSICIAN ASSISTANT

## 2023-09-28 PROCEDURE — 3078F DIAST BP <80 MM HG: CPT | Performed by: PHYSICIAN ASSISTANT

## 2023-09-28 PROCEDURE — 3074F SYST BP LT 130 MM HG: CPT | Performed by: PHYSICIAN ASSISTANT

## 2023-09-28 PROCEDURE — 1159F MED LIST DOCD IN RCRD: CPT | Performed by: PHYSICIAN ASSISTANT

## 2023-09-28 RX ORDER — ATORVASTATIN CALCIUM 40 MG/1
TABLET, FILM COATED ORAL
COMMUNITY
Start: 2023-09-25

## 2023-09-28 NOTE — PROGRESS NOTES
Subjective      Chief Complaint  Restrictive lung disease    Subjective      History of Present Illness  Jolly Garcia is a 73 y.o. female who presents today to [facility] with past medical history of CKD stage IV, iron deficiency anemia, CAD s/p CABG, hyperlipidemia, chronic HFpEF, chronic atrial fib/flutter on Eliquis, essential hypertension, insulin-dependent type II diabetes mellitus, JESIKA, and obesity who presents today for Restrictive lung disease. This visit is a follow up appointment.     Restrictive lung disease:  Patient was recently hospitalized from 07/22/2023 to 07/28/2023 for acute on chronic HFpEF, NSTEMI type 1 versus type II suspected to be due to CHF exacerbation, and WENDY on CKD stage IV. Neprhology was consulted due to patient's history of nephrotic syndrome. Patient's WENDY on CKD felt to be due to cardiorenal syndrome. She was started on Lasix gtt and later added diuril gtt. Patient's creatinine improved with diuresis. She was transitioned to Bumex 2 mg PO at discharge.     Patient reports that she is doing well. She reports that her breathing is about the same and hasn't really changed much. She is compliant with wearing her supplemental 2 L oxygen. She had a CT chest without contrast recently to follow-up on her recent history of pneumonia that she had in June 2023 requiring hospitalization which revealed resolution of right basilar airspace disease but did show pulmonary vascular congestion and minuscule left pleural effusion as well as mild anasarca and mild upper abdominal ascites. Patient had home sleep study ordered previously but never received results. Contacted [facility] and they reported that RN at the nursing home tried putting the equipment for the sleep study on the patient but she wasn't able to tolerate wearing it. She also reports that she had an in-lab sleep study done in the past but was not able to sleep well during  testing and is not interested in having another one done in-lab at this time.      Current Outpatient Medications:     acetaminophen (TYLENOL) 500 MG tablet, Take 2 tablets by mouth Every 8 (Eight) Hours As Needed for Mild Pain., Disp: , Rfl:     aspirin 81 MG chewable tablet, Chew 1 tablet Daily., Disp: , Rfl:     atorvastatin (LIPITOR) 40 MG tablet, , Disp: , Rfl:     bumetanide (BUMEX) 2 MG tablet, Take 1 tablet by mouth Daily for 30 days., Disp: 30 tablet, Rfl: 0    Coenzyme Q10 (CoQ10) 50 MG capsule, Take 2 capsules by mouth Daily., Disp: , Rfl:     dextromethorphan-guaifenesin (ROBITUSSIN-DM)  MG/5ML syrup, Take 5 mL by mouth Every 4 (Four) Hours As Needed (Cough)., Disp: , Rfl:     diphenhydrAMINE (BENADRYL) 25 mg capsule, Take 1 capsule by mouth Every 8 (Eight) Hours As Needed for Itching., Disp: , Rfl:     Eliquis 5 MG tablet tablet, Take 1 tablet by mouth Every 12 (Twelve) Hours., Disp: , Rfl:     ferrous sulfate 325 (65 FE) MG tablet, Take 1 tablet by mouth Daily With Breakfast., Disp: , Rfl:     FLUoxetine (PROzac) 20 MG capsule, Take 1 capsule by mouth Daily., Disp: , Rfl:     fluticasone (FLONASE) 50 MCG/ACT nasal spray, 2 sprays by Each Nare route Daily., Disp: 9.9 mL, Rfl: 0    guaiFENesin (HUMIBID 3) 400 MG tablet, Take 1 tablet by mouth 3 (Three) Times a Day., Disp: , Rfl:     hydrALAZINE (APRESOLINE) 25 MG tablet, Take 1 tablet by mouth 3 (Three) Times a Day., Disp: , Rfl:     HYDROcodone-acetaminophen (NORCO) 5-325 MG per tablet, Take 1 tablet by mouth Every 8 (Eight) Hours As Needed for Moderate Pain., Disp: 12 tablet, Rfl: 0    insulin aspart (novoLOG FLEXPEN) 100 UNIT/ML solution pen-injector sc pen, Inject 2-10 Units under the skin into the appropriate area as directed 4 (Four) Times a Day Before Meals & at Bedtime., Disp: , Rfl:     isosorbide mononitrate (IMDUR) 30 MG 24 hr tablet, Take 1 tablet by mouth Daily., Disp: 90 tablet, Rfl: 0    lactulose (CHRONULAC) 10 GM/15ML solution,  Take 30 mL by mouth 2 (Two) Times a Day As Needed., Disp: , Rfl:     latanoprost (XALATAN) 0.005 % ophthalmic solution, Administer 1 drop to both eyes Every Night., Disp: , Rfl:     linaclotide (LINZESS) 145 MCG capsule capsule, Take 1 capsule by mouth Every Morning Before Breakfast., Disp: , Rfl:     LORazepam (ATIVAN) 0.5 MG tablet, Take 1 tablet by mouth Every 12 (Twelve) Hours As Needed for Anxiety., Disp: , Rfl:     multivitamin with minerals tablet tablet, Take 1 tablet by mouth Daily., Disp: , Rfl:     nitroglycerin (NITROSTAT) 0.4 MG SL tablet, Place 1 tablet under the tongue Every 5 (Five) Minutes As Needed for Chest Pain (Only if SBP Greater Than 100). Take no more than 3 doses in 15 minutes., Disp: 30 tablet, Rfl: 12    ondansetron (ZOFRAN) 4 MG tablet, Take 1 tablet by mouth Every 6 (Six) Hours As Needed for Nausea or Vomiting., Disp: , Rfl:     pantoprazole (PROTONIX) 40 MG EC tablet, Take 1 tablet by mouth Every Morning., Disp: 90 tablet, Rfl: 0    Patiromer Sorbitex Calcium (Veltassa) 16.8 g pack, Take 1 packet by mouth Daily., Disp: , Rfl:     Semaglutide, 1 MG/DOSE, (Ozempic, 1 MG/DOSE,) 4 MG/3ML solution pen-injector, Inject 1 mg under the skin into the appropriate area as directed 1 (One) Time Per Week., Disp: , Rfl:     sodium chloride 0.65 % nasal spray, 2 sprays into the nostril(s) as directed by provider Every 2 (Two) Hours As Needed for Congestion., Disp: , Rfl:     vitamin B-12 (CYANOCOBALAMIN) 1000 MCG tablet, Take 1 tablet by mouth Daily., Disp: 30 tablet, Rfl: 0    Insulin Glargine (Lantus SoloStar) 100 UNIT/ML injection pen, Inject 10 Units under the skin into the appropriate area as directed Every Night for 30 days., Disp: 3 mL, Rfl: 0    metoprolol succinate XL (TOPROL-XL) 50 MG 24 hr tablet, Take 1 tablet by mouth Daily for 30 days., Disp: 30 tablet, Rfl: 0      No Known Allergies    Objective     Objective   Vital Signs:  /72 (BP Location: Left arm, Patient Position: Sitting)   " Pulse 107   Temp 97.9 °F (36.6 °C)   Ht 168.9 cm (66.5\")   SpO2 99% Comment: on 2L TANK  BMI 53.58 kg/m²   Estimated body mass index is 53.58 kg/m² as calculated from the following:    Height as of this encounter: 168.9 cm (66.5\").    Weight as of 9/14/23: 153 kg (337 lb).    Past Medical History:   Diagnosis Date    Anxiety     CAD (coronary artery disease)     s/p CABG    Chronic hypoxemic respiratory failure     Wears 2 L/min at home    Essential hypertension     Hyperlipidemia     Type 2 diabetes mellitus      Past Surgical History:   Procedure Laterality Date    CORONARY ARTERY BYPASS GRAFT  2011    HYSTERECTOMY       Social History     Socioeconomic History    Marital status: Single   Tobacco Use    Smoking status: Former     Passive exposure: Past   Vaping Use    Vaping Use: Never used   Substance and Sexual Activity    Alcohol use: Never    Drug use: Never    Sexual activity: Defer      Physical Exam  Constitutional:       General: She is awake.      Appearance: Normal appearance. She is morbidly obese.      Interventions: Nasal cannula in place.   HENT:      Head: Normocephalic and atraumatic.      Nose: Nose normal.      Mouth/Throat:      Mouth: Mucous membranes are moist.      Pharynx: Oropharynx is clear.   Eyes:      Conjunctiva/sclera: Conjunctivae normal.      Pupils: Pupils are equal, round, and reactive to light.   Cardiovascular:      Rate and Rhythm: Normal rate and regular rhythm.      Pulses: Normal pulses.      Heart sounds: Normal heart sounds. No murmur heard.    No friction rub. No gallop.   Pulmonary:      Effort: Pulmonary effort is normal. No tachypnea, accessory muscle usage or respiratory distress.      Breath sounds: Normal breath sounds. No decreased breath sounds, wheezing, rhonchi or rales.      Comments: On 2 L supplemental oxygen during exam  Musculoskeletal:         General: Normal range of motion.      Cervical back: Full passive range of motion without pain and normal " range of motion.   Skin:     General: Skin is warm and dry.   Neurological:      General: No focal deficit present.      Mental Status: She is alert. Mental status is at baseline.   Psychiatric:         Mood and Affect: Mood normal.         Behavior: Behavior normal. Behavior is cooperative.         Thought Content: Thought content normal.     Result Review :  The following labs and radiology results have been reviewed.    Lab Review:   No results found for: FEV1, FVC, WHO5EPI, TLC, DLCO  No results displayed because visit has over 200 results.         Reviewed previous HRCT imaging from 01/2015     Narrative & Impression      HIGH-RESOLUTION CT OF THE THORAX     REASON FOR EXAM:  Shortness of breath. Evaluate for interstitial lung  disease.     Scans were obtained with the patient prone and supine. The patient had  some difficulty holding her breath for the scans. The scans obtained  through the lungs show no alveolar infiltrates in the lungs to suggest  pneumonia. There were some increased interstitial markings in the lungs.  There were calcified granulomas in the hilar areas. There were no  significant changes of bronchiectasis.     IMPRESSION- Very mild interstitial lung disease. There were no  inflammatory infiltrates or significant emphysematous changes in the  lungs.             Reading Radiologist- VIKKI RAM       Releasing Radiologist- VIKKI RAM       Released Date Time- 01/13/15 1644       Blanca WEINSTEIN   ------------------------------------------------------------------------------     Reviewed CT without contrast from 09/20/2023   Personal findings:  Image 27: 2 mm solid right lung nodule stable since September 2022  Image 28: 2 calcified nodules stable since September 2022    Narrative & Impression   EXAM:    CT Chest Without Intravenous Contrast     EXAM DATE:    9/20/2023 12:35 PM     CLINICAL HISTORY:    Ensure resolution of right lower lobe  consolidation;  G47.33-Obstructive sleep apnea (adult) (pediatric)     TECHNIQUE:    Axial computed tomography images of the chest without intravenous  contrast.  Sagittal and coronal reformatted images were created and  reviewed.  This CT exam was performed using one or more of the following  dose reduction techniques:  automated exposure control, adjustment of  the mA and/or kV according to patient size, and/or use of iterative  reconstruction technique.     COMPARISON:    6/1/2023     FINDINGS:    Lungs and pleural spaces:  Minuscule left pleural effusion,  nonloculated.  Pulmonary vascular congestion.  No focal consolidative  airspace disease.    Heart:  Marked megaly and changes of prior CABG.  No significant  pericardial effusion.  No significant coronary artery calcifications.    Bones/joints:  Healing left-sided rib fractures.  No dislocation.    Soft tissues:  Unremarkable as visualized.    Vasculature:  See above.    Lymph nodes:  Unremarkable as visualized.  No enlarged lymph nodes.    Liver:  Liver cirrhosis.    Gallbladder and bile ducts:  Cholelithiasis.    Intraperitoneal space:  Perihepatic ascites, small volume.  Small  volume perisplenic ascites.     IMPRESSION:  1.  CHF with marked cardiomegaly and central pulmonary vascular  congestion. No significant airspace edema.  2.  Minuscule left pleural effusion which is nonloculated.  3.  Right basilar airspace disease has resolved from previous exam.  4.  Mild anasarca and mild upper abdominal ascites.  5.  Cholelithiasis.  6.  No consolidative airspace disease. Otherwise stable exam with other  nonacute/incidental findings as detailed above.        This report was finalized on 9/20/2023 12:27 PM by Dr. Chance Plunkett MD.          Reviewed full PFT from 01/31/2023      Assessment / Plan         Assessment   Diagnoses and all orders for this visit:    1. Restrictive lung disease (Primary)  2. ILD (interstitial lung disease)  3. Chronic heart failure  with preserved ejection fraction (HFpEF)  Suspect restriction on PFT is multifactorial due to body habitus, pleural effusions/pulmonary vascular congestion in setting of CHF, and mild ILD.   Previous HRCT from 2015 imaging noted mild interstitial lung disease with increased interstitial markings in the lungs. Previous autoimmune lab workup from 09/2022 was negative. May consider repeating HRCT scan and PFT at next visit to evaluate stability of ILD.   Recent hospitalization due to acute on chronic HFpEF and dose of Bumex increased from 1 to 2 mg upon discharge. Recommended to monitor fluid and salt intake closely to assist with keeping an adequate volume status.    4. Chronic respiratory failure with hypoxia  Reports compliance with as needed and nocturnal supplemental oxygen use.   Wearing 2 L oxygen during today's visit with saturations 99%. Educated to keep saturations 90-95% and nursing home staff can titrate oxygen accordingly.     5. History of recent pneumonia  Recent hospitalization from 06/01-06/06 for acute on chronic hypoxic respiratory failure due to RLL pneumonia and received 5-day course of IV antibiotics.  Recent imaging from 09/20/2023 revealed resolution of right lower lobe consolidation.    6. Morbid obesity with BMI of 50.0-59.9, adult  Previously ordered home sleep study to evaluate for underlying JESIKA which could be contributing to CHF and volume overload status. [facility] reports that patient received equipment but was not able to tolerate wearing it when RN at NH was placing it on patient.   Patient reports that she previously had in-lab sleep study but was not able to sleep and doesn't want to have one ordered at this time.     7. Former smoker  8. Pulmonary nodule  Nodules present on recent imaging from 09/20/2023 have remained stable since September 2022.   Since nodules are solid, <6mm in size, and have been stable for 1 year, no additional follow-up is needed per Fleishner guidelines. Can  repeat imaging on as needed basis.     I spent 30 minutes caring for Jolly on this date of service. This time includes time spent by me in the following activities:preparing for the visit, reviewing tests, obtaining and/or reviewing a separately obtained history, performing a medically appropriate examination and/or evaluation , counseling and educating the patient/family/caregiver, ordering medications, tests, or procedures, referring and communicating with other health care professionals , documenting information in the medical record, and independently interpreting results and communicating that information with the patient/family/caregiver    Follow Up   Return in about 6 months (around 3/28/2024), or if symptoms worsen or fail to improve, for Next scheduled follow up.    Patient was given instructions and counseling regarding her condition or for health maintenance advice. Please see specific information pulled into the AVS if appropriate.       This document has been electronically signed by Kim Soares PA-C   September 29, 2023 10:07 EDT    Dictated Utilizing Dragon Dictation: Part of this note may be an electronic transcription/translation of spoken language to printed text using the Dragon Dictation System.

## 2023-10-17 ENCOUNTER — APPOINTMENT (OUTPATIENT)
Dept: GENERAL RADIOLOGY | Facility: HOSPITAL | Age: 73
End: 2023-10-17
Payer: MEDICARE

## 2023-10-17 ENCOUNTER — APPOINTMENT (OUTPATIENT)
Dept: CT IMAGING | Facility: HOSPITAL | Age: 73
End: 2023-10-17
Payer: MEDICARE

## 2023-10-17 ENCOUNTER — HOSPITAL ENCOUNTER (INPATIENT)
Facility: HOSPITAL | Age: 73
LOS: 7 days | Discharge: NURSING, REHAB OR LONG-TERM CARE | End: 2023-10-24
Attending: STUDENT IN AN ORGANIZED HEALTH CARE EDUCATION/TRAINING PROGRAM | Admitting: INTERNAL MEDICINE
Payer: MEDICARE

## 2023-10-17 DIAGNOSIS — Z79.01 CHRONIC ANTICOAGULATION: ICD-10-CM

## 2023-10-17 DIAGNOSIS — D64.9 SYMPTOMATIC ANEMIA: Primary | ICD-10-CM

## 2023-10-17 DIAGNOSIS — R41.0 CONFUSED: ICD-10-CM

## 2023-10-17 DIAGNOSIS — N18.4 CKD (CHRONIC KIDNEY DISEASE) STAGE 4, GFR 15-29 ML/MIN: ICD-10-CM

## 2023-10-17 LAB
A-A DO2: 53.9 MMHG (ref 0–300)
ABO GROUP BLD: NORMAL
ABO GROUP BLD: NORMAL
ALBUMIN SERPL-MCNC: 3.2 G/DL (ref 3.5–5.2)
ALBUMIN/GLOB SERPL: 1.1 G/DL
ALP SERPL-CCNC: 90 U/L (ref 39–117)
ALT SERPL W P-5'-P-CCNC: 5 U/L (ref 1–33)
ANION GAP SERPL CALCULATED.3IONS-SCNC: 11.4 MMOL/L (ref 5–15)
ARTERIAL PATENCY WRIST A: ABNORMAL
AST SERPL-CCNC: 13 U/L (ref 1–32)
ATMOSPHERIC PRESS: 729 MMHG
BACTERIA UR QL AUTO: ABNORMAL /HPF
BASE EXCESS BLDA CALC-SCNC: 6 MMOL/L (ref 0–2)
BASOPHILS # BLD AUTO: 0.03 10*3/MM3 (ref 0–0.2)
BASOPHILS NFR BLD AUTO: 0.3 % (ref 0–1.5)
BDY SITE: ABNORMAL
BILIRUB SERPL-MCNC: 0.5 MG/DL (ref 0–1.2)
BILIRUB UR QL STRIP: NEGATIVE
BLD GP AB SCN SERPL QL: NEGATIVE
BUN SERPL-MCNC: 89 MG/DL (ref 8–23)
BUN/CREAT SERPL: 36 (ref 7–25)
CALCIUM SPEC-SCNC: 8.5 MG/DL (ref 8.6–10.5)
CHLORIDE SERPL-SCNC: 98 MMOL/L (ref 98–107)
CK SERPL-CCNC: 31 U/L (ref 20–180)
CLARITY UR: CLEAR
CO2 BLDA-SCNC: 32 MMOL/L (ref 22–33)
CO2 SERPL-SCNC: 27.6 MMOL/L (ref 22–29)
COHGB MFR BLD: 2.4 % (ref 0–5)
COLOR UR: YELLOW
CREAT SERPL-MCNC: 2.47 MG/DL (ref 0.57–1)
DEPRECATED RDW RBC AUTO: 56.1 FL (ref 37–54)
EGFRCR SERPLBLD CKD-EPI 2021: 20.1 ML/MIN/1.73
EOSINOPHIL # BLD AUTO: 0.01 10*3/MM3 (ref 0–0.4)
EOSINOPHIL NFR BLD AUTO: 0.1 % (ref 0.3–6.2)
ERYTHROCYTE [DISTWIDTH] IN BLOOD BY AUTOMATED COUNT: 17 % (ref 12.3–15.4)
FERRITIN SERPL-MCNC: 44.33 NG/ML (ref 13–150)
GAS FLOW AIRWAY: 2 LPM
GLOBULIN UR ELPH-MCNC: 2.9 GM/DL
GLUCOSE SERPL-MCNC: 169 MG/DL (ref 65–99)
GLUCOSE UR STRIP-MCNC: NEGATIVE MG/DL
HCO3 BLDA-SCNC: 30.6 MMOL/L (ref 20–26)
HCT VFR BLD AUTO: 18.6 % (ref 34–46.6)
HCT VFR BLD CALC: 16.8 % (ref 38–51)
HGB BLD-MCNC: 5.5 G/DL (ref 12–15.9)
HGB BLDA-MCNC: 5.5 G/DL (ref 13.5–17.5)
HGB UR QL STRIP.AUTO: NEGATIVE
HOLD SPECIMEN: NORMAL
HOLD SPECIMEN: NORMAL
HYALINE CASTS UR QL AUTO: ABNORMAL /LPF
IMM GRANULOCYTES # BLD AUTO: 0.05 10*3/MM3 (ref 0–0.05)
IMM GRANULOCYTES NFR BLD AUTO: 0.4 % (ref 0–0.5)
INHALED O2 CONCENTRATION: 28 %
IRON 24H UR-MRATE: 60 MCG/DL (ref 37–145)
IRON SATN MFR SERPL: 22 % (ref 20–50)
KETONES UR QL STRIP: NEGATIVE
LEUKOCYTE ESTERASE UR QL STRIP.AUTO: ABNORMAL
LYMPHOCYTES # BLD AUTO: 1.72 10*3/MM3 (ref 0.7–3.1)
LYMPHOCYTES NFR BLD AUTO: 15.2 % (ref 19.6–45.3)
Lab: ABNORMAL
Lab: ABNORMAL
MCH RBC QN AUTO: 27.9 PG (ref 26.6–33)
MCHC RBC AUTO-ENTMCNC: 29.6 G/DL (ref 31.5–35.7)
MCV RBC AUTO: 94.4 FL (ref 79–97)
METHGB BLD QL: 0.4 % (ref 0–3)
MODALITY: ABNORMAL
MONOCYTES # BLD AUTO: 0.47 10*3/MM3 (ref 0.1–0.9)
MONOCYTES NFR BLD AUTO: 4.2 % (ref 5–12)
NEUTROPHILS NFR BLD AUTO: 79.8 % (ref 42.7–76)
NEUTROPHILS NFR BLD AUTO: 9.04 10*3/MM3 (ref 1.7–7)
NITRITE UR QL STRIP: NEGATIVE
NOTE: ABNORMAL
NOTIFIED BY: ABNORMAL
NOTIFIED WHO: ABNORMAL
NRBC BLD AUTO-RTO: 0 /100 WBC (ref 0–0.2)
OXYHGB MFR BLDV: 95.7 % (ref 94–99)
PCO2 BLDA: 44.9 MM HG (ref 35–45)
PCO2 TEMP ADJ BLD: ABNORMAL MM[HG]
PH BLDA: 7.44 PH UNITS (ref 7.35–7.45)
PH UR STRIP.AUTO: 5.5 [PH] (ref 5–8)
PH, TEMP CORRECTED: ABNORMAL
PLATELET # BLD AUTO: 310 10*3/MM3 (ref 140–450)
PMV BLD AUTO: 9 FL (ref 6–12)
PO2 BLDA: 86.9 MM HG (ref 83–108)
PO2 TEMP ADJ BLD: ABNORMAL MM[HG]
POTASSIUM SERPL-SCNC: 4.1 MMOL/L (ref 3.5–5.2)
PROT SERPL-MCNC: 6.1 G/DL (ref 6–8.5)
PROT UR QL STRIP: NEGATIVE
RBC # BLD AUTO: 1.97 10*6/MM3 (ref 3.77–5.28)
RBC # UR STRIP: ABNORMAL /HPF
REF LAB TEST METHOD: ABNORMAL
RETICS # AUTO: 0.08 10*6/MM3 (ref 0.02–0.13)
RETICS/RBC NFR AUTO: 4.24 % (ref 0.7–1.9)
RH BLD: POSITIVE
RH BLD: POSITIVE
SAO2 % BLDCOA: 98.5 % (ref 94–99)
SODIUM SERPL-SCNC: 137 MMOL/L (ref 136–145)
SP GR UR STRIP: 1.01 (ref 1–1.03)
SQUAMOUS #/AREA URNS HPF: ABNORMAL /HPF
T&S EXPIRATION DATE: NORMAL
TIBC SERPL-MCNC: 279 MCG/DL (ref 298–536)
TRANSFERRIN SERPL-MCNC: 187 MG/DL (ref 200–360)
TSH SERPL DL<=0.05 MIU/L-ACNC: 5.04 UIU/ML (ref 0.27–4.2)
UROBILINOGEN UR QL STRIP: ABNORMAL
VENTILATOR MODE: ABNORMAL
WBC # UR STRIP: ABNORMAL /HPF
WBC NRBC COR # BLD: 11.32 10*3/MM3 (ref 3.4–10.8)
WHOLE BLOOD HOLD COAG: NORMAL
WHOLE BLOOD HOLD SPECIMEN: NORMAL

## 2023-10-17 PROCEDURE — 86900 BLOOD TYPING SEROLOGIC ABO: CPT | Performed by: STUDENT IN AN ORGANIZED HEALTH CARE EDUCATION/TRAINING PROGRAM

## 2023-10-17 PROCEDURE — 36600 WITHDRAWAL OF ARTERIAL BLOOD: CPT

## 2023-10-17 PROCEDURE — 74176 CT ABD & PELVIS W/O CONTRAST: CPT

## 2023-10-17 PROCEDURE — 99285 EMERGENCY DEPT VISIT HI MDM: CPT

## 2023-10-17 PROCEDURE — 93005 ELECTROCARDIOGRAM TRACING: CPT | Performed by: STUDENT IN AN ORGANIZED HEALTH CARE EDUCATION/TRAINING PROGRAM

## 2023-10-17 PROCEDURE — 82728 ASSAY OF FERRITIN: CPT | Performed by: STUDENT IN AN ORGANIZED HEALTH CARE EDUCATION/TRAINING PROGRAM

## 2023-10-17 PROCEDURE — 86901 BLOOD TYPING SEROLOGIC RH(D): CPT

## 2023-10-17 PROCEDURE — 36415 COLL VENOUS BLD VENIPUNCTURE: CPT

## 2023-10-17 PROCEDURE — 86850 RBC ANTIBODY SCREEN: CPT | Performed by: STUDENT IN AN ORGANIZED HEALTH CARE EDUCATION/TRAINING PROGRAM

## 2023-10-17 PROCEDURE — 85025 COMPLETE CBC W/AUTO DIFF WBC: CPT | Performed by: STUDENT IN AN ORGANIZED HEALTH CARE EDUCATION/TRAINING PROGRAM

## 2023-10-17 PROCEDURE — 87086 URINE CULTURE/COLONY COUNT: CPT | Performed by: STUDENT IN AN ORGANIZED HEALTH CARE EDUCATION/TRAINING PROGRAM

## 2023-10-17 PROCEDURE — 86901 BLOOD TYPING SEROLOGIC RH(D): CPT | Performed by: STUDENT IN AN ORGANIZED HEALTH CARE EDUCATION/TRAINING PROGRAM

## 2023-10-17 PROCEDURE — 85045 AUTOMATED RETICULOCYTE COUNT: CPT | Performed by: STUDENT IN AN ORGANIZED HEALTH CARE EDUCATION/TRAINING PROGRAM

## 2023-10-17 PROCEDURE — 82375 ASSAY CARBOXYHB QUANT: CPT

## 2023-10-17 PROCEDURE — 80053 COMPREHEN METABOLIC PANEL: CPT | Performed by: STUDENT IN AN ORGANIZED HEALTH CARE EDUCATION/TRAINING PROGRAM

## 2023-10-17 PROCEDURE — 36430 TRANSFUSION BLD/BLD COMPNT: CPT

## 2023-10-17 PROCEDURE — 84466 ASSAY OF TRANSFERRIN: CPT | Performed by: STUDENT IN AN ORGANIZED HEALTH CARE EDUCATION/TRAINING PROGRAM

## 2023-10-17 PROCEDURE — 83540 ASSAY OF IRON: CPT | Performed by: STUDENT IN AN ORGANIZED HEALTH CARE EDUCATION/TRAINING PROGRAM

## 2023-10-17 PROCEDURE — 93010 ELECTROCARDIOGRAM REPORT: CPT | Performed by: INTERNAL MEDICINE

## 2023-10-17 PROCEDURE — 86923 COMPATIBILITY TEST ELECTRIC: CPT

## 2023-10-17 PROCEDURE — 82550 ASSAY OF CK (CPK): CPT | Performed by: INTERNAL MEDICINE

## 2023-10-17 PROCEDURE — 86900 BLOOD TYPING SEROLOGIC ABO: CPT

## 2023-10-17 PROCEDURE — 81001 URINALYSIS AUTO W/SCOPE: CPT | Performed by: STUDENT IN AN ORGANIZED HEALTH CARE EDUCATION/TRAINING PROGRAM

## 2023-10-17 PROCEDURE — 71045 X-RAY EXAM CHEST 1 VIEW: CPT

## 2023-10-17 PROCEDURE — 83050 HGB METHEMOGLOBIN QUAN: CPT

## 2023-10-17 PROCEDURE — 84443 ASSAY THYROID STIM HORMONE: CPT | Performed by: INTERNAL MEDICINE

## 2023-10-17 PROCEDURE — P9016 RBC LEUKOCYTES REDUCED: HCPCS

## 2023-10-17 PROCEDURE — 82746 ASSAY OF FOLIC ACID SERUM: CPT | Performed by: INTERNAL MEDICINE

## 2023-10-17 PROCEDURE — P9612 CATHETERIZE FOR URINE SPEC: HCPCS

## 2023-10-17 PROCEDURE — 82805 BLOOD GASES W/O2 SATURATION: CPT

## 2023-10-17 PROCEDURE — 74176 CT ABD & PELVIS W/O CONTRAST: CPT | Performed by: RADIOLOGY

## 2023-10-17 PROCEDURE — 71045 X-RAY EXAM CHEST 1 VIEW: CPT | Performed by: RADIOLOGY

## 2023-10-17 PROCEDURE — 82607 VITAMIN B-12: CPT | Performed by: INTERNAL MEDICINE

## 2023-10-17 RX ORDER — POLYETHYLENE GLYCOL 3350 17 G/17G
17 POWDER, FOR SOLUTION ORAL DAILY PRN
Status: DISCONTINUED | OUTPATIENT
Start: 2023-10-17 | End: 2023-10-24 | Stop reason: HOSPADM

## 2023-10-17 RX ORDER — SODIUM CHLORIDE 0.9 % (FLUSH) 0.9 %
10 SYRINGE (ML) INJECTION EVERY 12 HOURS SCHEDULED
Status: DISCONTINUED | OUTPATIENT
Start: 2023-10-17 | End: 2023-10-24 | Stop reason: HOSPADM

## 2023-10-17 RX ORDER — INSULIN LISPRO 100 [IU]/ML
2-10 INJECTION, SOLUTION INTRAVENOUS; SUBCUTANEOUS 2 TIMES DAILY
Status: CANCELLED | OUTPATIENT
Start: 2023-10-17

## 2023-10-17 RX ORDER — AMOXICILLIN 250 MG
1 CAPSULE ORAL 2 TIMES DAILY
Status: CANCELLED | OUTPATIENT
Start: 2023-10-17

## 2023-10-17 RX ORDER — FERROUS SULFATE 325(65) MG
325 TABLET ORAL
Status: CANCELLED | OUTPATIENT
Start: 2023-10-18

## 2023-10-17 RX ORDER — INSULIN GLARGINE 100 [IU]/ML
10 INJECTION, SOLUTION SUBCUTANEOUS NIGHTLY
COMMUNITY

## 2023-10-17 RX ORDER — AMOXICILLIN 250 MG
1 CAPSULE ORAL 2 TIMES DAILY
COMMUNITY

## 2023-10-17 RX ORDER — LANOLIN ALCOHOL/MO/W.PET/CERES
500 CREAM (GRAM) TOPICAL DAILY
COMMUNITY

## 2023-10-17 RX ORDER — ISOSORBIDE MONONITRATE 30 MG/1
30 TABLET, EXTENDED RELEASE ORAL DAILY
COMMUNITY

## 2023-10-17 RX ORDER — PANTOPRAZOLE SODIUM 40 MG/1
40 TABLET, DELAYED RELEASE ORAL
Status: CANCELLED | OUTPATIENT
Start: 2023-10-18

## 2023-10-17 RX ORDER — BUMETANIDE 2 MG/1
2 TABLET ORAL DAILY
COMMUNITY

## 2023-10-17 RX ORDER — NITROGLYCERIN 0.4 MG/1
0.4 TABLET SUBLINGUAL
COMMUNITY

## 2023-10-17 RX ORDER — PANTOPRAZOLE SODIUM 40 MG/10ML
40 INJECTION, POWDER, LYOPHILIZED, FOR SOLUTION INTRAVENOUS
Status: DISCONTINUED | OUTPATIENT
Start: 2023-10-18 | End: 2023-10-21

## 2023-10-17 RX ORDER — SODIUM CHLORIDE 0.9 % (FLUSH) 0.9 %
10 SYRINGE (ML) INJECTION AS NEEDED
Status: DISCONTINUED | OUTPATIENT
Start: 2023-10-17 | End: 2023-10-24 | Stop reason: HOSPADM

## 2023-10-17 RX ORDER — SPIRONOLACTONE 25 MG/1
25 TABLET ORAL DAILY
COMMUNITY
End: 2023-10-24 | Stop reason: HOSPADM

## 2023-10-17 RX ORDER — SODIUM CHLORIDE 9 MG/ML
40 INJECTION, SOLUTION INTRAVENOUS AS NEEDED
Status: DISCONTINUED | OUTPATIENT
Start: 2023-10-17 | End: 2023-10-24 | Stop reason: HOSPADM

## 2023-10-17 RX ORDER — BISACODYL 5 MG/1
5 TABLET, DELAYED RELEASE ORAL DAILY PRN
Status: DISCONTINUED | OUTPATIENT
Start: 2023-10-17 | End: 2023-10-24 | Stop reason: HOSPADM

## 2023-10-17 RX ORDER — DULOXETIN HYDROCHLORIDE 30 MG/1
30 CAPSULE, DELAYED RELEASE ORAL DAILY
COMMUNITY

## 2023-10-17 RX ORDER — BISACODYL 10 MG
10 SUPPOSITORY, RECTAL RECTAL DAILY PRN
Status: DISCONTINUED | OUTPATIENT
Start: 2023-10-17 | End: 2023-10-24 | Stop reason: HOSPADM

## 2023-10-17 RX ORDER — LORAZEPAM 0.5 MG/1
0.25 TABLET ORAL EVERY 8 HOURS PRN
COMMUNITY

## 2023-10-17 RX ORDER — DULOXETIN HYDROCHLORIDE 30 MG/1
30 CAPSULE, DELAYED RELEASE ORAL DAILY
Status: CANCELLED | OUTPATIENT
Start: 2023-10-18

## 2023-10-17 RX ORDER — DIPHENHYDRAMINE HCL 25 MG
25 CAPSULE ORAL EVERY 8 HOURS PRN
Status: CANCELLED | OUTPATIENT
Start: 2023-10-17

## 2023-10-17 RX ORDER — AMOXICILLIN 250 MG
2 CAPSULE ORAL 2 TIMES DAILY
Status: DISCONTINUED | OUTPATIENT
Start: 2023-10-17 | End: 2023-10-24 | Stop reason: HOSPADM

## 2023-10-17 RX ORDER — MULTIPLE VITAMINS W/ MINERALS TAB 9MG-400MCG
1 TAB ORAL DAILY
COMMUNITY

## 2023-10-17 RX ORDER — FLUTICASONE PROPIONATE 50 MCG
2 SPRAY, SUSPENSION (ML) NASAL DAILY
Status: CANCELLED | OUTPATIENT
Start: 2023-10-18

## 2023-10-17 RX ORDER — METOPROLOL SUCCINATE 50 MG/1
50 TABLET, EXTENDED RELEASE ORAL DAILY
COMMUNITY
End: 2023-10-24 | Stop reason: HOSPADM

## 2023-10-17 RX ORDER — ASPIRIN 81 MG/1
81 TABLET, CHEWABLE ORAL DAILY
Status: CANCELLED | OUTPATIENT
Start: 2023-10-18

## 2023-10-17 RX ADMIN — Medication 10 ML: at 22:21

## 2023-10-17 NOTE — NURSING NOTE
Admission information obtained from paper work sent by [facility] and from staff, [place]

## 2023-10-17 NOTE — ED PROVIDER NOTES
"Subjective   History of Present Illness  Patient is a 73-year-old female who was sent from local nursing home with concerns of low hemoglobin.  Patient on arrival appears slightly confused and is not sure why she is in the hospital.  She states that she has just not been feeling well, sleeping more and \"has been out of it.  \"  Review of past hemoglobin is roughly very between 9 to 10mg/dL.  Patient is currently on aspirin and anticoagulated on Eliquis 5 mg twice daily dosing.  There is no acute report from the nursing home that patient has been found to have any type of vaginal or rectal bleeding in addition to no reported episodes of emesis.        Review of Systems    Past Medical History:   Diagnosis Date    Anxiety     CAD (coronary artery disease)     s/p CABG    Chronic hypoxemic respiratory failure     Wears 2 L/min at home    Essential hypertension     Hyperlipidemia     Type 2 diabetes mellitus        No Known Allergies    Past Surgical History:   Procedure Laterality Date    CORONARY ARTERY BYPASS GRAFT  2011    HYSTERECTOMY         Family History   Problem Relation Age of Onset    Diabetes Mother        Social History     Socioeconomic History    Marital status: Single   Tobacco Use    Smoking status: Former     Passive exposure: Past   Vaping Use    Vaping Use: Never used   Substance and Sexual Activity    Alcohol use: Never    Drug use: Never    Sexual activity: Defer           Objective   Physical Exam  Vitals and nursing note reviewed.   Constitutional:       General: She is not in acute distress.     Appearance: She is well-developed. She is obese. She is not diaphoretic.      Comments: Obese and chronically ill-appearing 73-year-old female.  She appears grossly pale and to have generalized weakness.   HENT:      Head: Normocephalic and atraumatic.      Right Ear: External ear normal.      Left Ear: External ear normal.      Nose: Nose normal.   Eyes:      Extraocular Movements: Extraocular movements " intact.      Conjunctiva/sclera: Conjunctivae normal.      Pupils: Pupils are equal, round, and reactive to light.   Neck:      Vascular: No JVD.      Trachea: No tracheal deviation.   Cardiovascular:      Rate and Rhythm: Normal rate and regular rhythm.      Heart sounds: Normal heart sounds. No murmur heard.  Pulmonary:      Effort: Pulmonary effort is normal. No respiratory distress.      Breath sounds: Normal breath sounds. No wheezing.   Abdominal:      General: Bowel sounds are normal.      Palpations: Abdomen is soft.      Tenderness: There is no abdominal tenderness.   Musculoskeletal:         General: No deformity. Normal range of motion.      Cervical back: Normal range of motion and neck supple.   Skin:     General: Skin is warm and dry.      Coloration: Skin is not pale.      Findings: No erythema or rash.   Neurological:      Mental Status: She is alert and oriented to person, place, and time.      Cranial Nerves: No cranial nerve deficit.   Psychiatric:      Comments: Confused at time         Critical Care    Performed by: Karen Molina DO  Authorized by: Karen Molina DO    Critical care provider statement:     Critical care time (minutes):  35    Critical care time was exclusive of:  Separately billable procedures and treating other patients and teaching time    Critical care was necessary to treat or prevent imminent or life-threatening deterioration of the following conditions:  Metabolic crisis, endocrine crisis, cardiac failure, circulatory failure, renal failure, CNS failure or compromise, hepatic failure, dehydration, shock and respiratory failure    Critical care was time spent personally by me on the following activities:  Blood draw for specimens, development of treatment plan with patient or surrogate, evaluation of patient's response to treatment, examination of patient, interpretation of cardiac output measurements, obtaining history from patient or surrogate, ordering and performing  treatments and interventions, ordering and review of radiographic studies, ordering and review of laboratory studies, pulse oximetry, re-evaluation of patient's condition, review of old charts and vascular access procedures    I assumed direction of critical care for this patient from another provider in my specialty: no      Care discussed with: admitting provider           Results for orders placed or performed during the hospital encounter of 10/17/23   Comprehensive Metabolic Panel    Specimen: Arm, Right; Blood   Result Value Ref Range    Glucose 169 (H) 65 - 99 mg/dL    BUN 89 (H) 8 - 23 mg/dL    Creatinine 2.47 (H) 0.57 - 1.00 mg/dL    Sodium 137 136 - 145 mmol/L    Potassium 4.1 3.5 - 5.2 mmol/L    Chloride 98 98 - 107 mmol/L    CO2 27.6 22.0 - 29.0 mmol/L    Calcium 8.5 (L) 8.6 - 10.5 mg/dL    Total Protein 6.1 6.0 - 8.5 g/dL    Albumin 3.2 (L) 3.5 - 5.2 g/dL    ALT (SGPT) 5 1 - 33 U/L    AST (SGOT) 13 1 - 32 U/L    Alkaline Phosphatase 90 39 - 117 U/L    Total Bilirubin 0.5 0.0 - 1.2 mg/dL    Globulin 2.9 gm/dL    A/G Ratio 1.1 g/dL    BUN/Creatinine Ratio 36.0 (H) 7.0 - 25.0    Anion Gap 11.4 5.0 - 15.0 mmol/L    eGFR 20.1 (L) >60.0 mL/min/1.73   CBC Auto Differential    Specimen: Arm, Right; Blood   Result Value Ref Range    WBC 11.32 (H) 3.40 - 10.80 10*3/mm3    RBC 1.97 (L) 3.77 - 5.28 10*6/mm3    Hemoglobin 5.5 (C) 12.0 - 15.9 g/dL    Hematocrit 18.6 (C) 34.0 - 46.6 %    MCV 94.4 79.0 - 97.0 fL    MCH 27.9 26.6 - 33.0 pg    MCHC 29.6 (L) 31.5 - 35.7 g/dL    RDW 17.0 (H) 12.3 - 15.4 %    RDW-SD 56.1 (H) 37.0 - 54.0 fl    MPV 9.0 6.0 - 12.0 fL    Platelets 310 140 - 450 10*3/mm3    Neutrophil % 79.8 (H) 42.7 - 76.0 %    Lymphocyte % 15.2 (L) 19.6 - 45.3 %    Monocyte % 4.2 (L) 5.0 - 12.0 %    Eosinophil % 0.1 (L) 0.3 - 6.2 %    Basophil % 0.3 0.0 - 1.5 %    Immature Grans % 0.4 0.0 - 0.5 %    Neutrophils, Absolute 9.04 (H) 1.70 - 7.00 10*3/mm3    Lymphocytes, Absolute 1.72 0.70 - 3.10 10*3/mm3     Monocytes, Absolute 0.47 0.10 - 0.90 10*3/mm3    Eosinophils, Absolute 0.01 0.00 - 0.40 10*3/mm3    Basophils, Absolute 0.03 0.00 - 0.20 10*3/mm3    Immature Grans, Absolute 0.05 0.00 - 0.05 10*3/mm3    nRBC 0.0 0.0 - 0.2 /100 WBC   Reticulocytes    Specimen: Arm, Right; Blood   Result Value Ref Range    Reticulocyte % 4.24 (H) 0.70 - 1.90 %    Reticulocyte Absolute 0.0818 0.0200 - 0.1300 10*6/mm3   Ferritin    Specimen: Arm, Right; Blood   Result Value Ref Range    Ferritin 44.33 13.00 - 150.00 ng/mL   Iron Profile    Specimen: Arm, Right; Blood   Result Value Ref Range    Iron 60 37 - 145 mcg/dL    Iron Saturation (TSAT) 22 20 - 50 %    Transferrin 187 (L) 200 - 360 mg/dL    TIBC 279 (L) 298 - 536 mcg/dL   Blood Gas, Arterial With Co-Ox    Specimen: Arterial Blood   Result Value Ref Range    Site Right Brachial     Bernardo's Test N/A     pH, Arterial 7.443 7.350 - 7.450 pH units    pCO2, Arterial 44.9 35.0 - 45.0 mm Hg    pO2, Arterial 86.9 83.0 - 108.0 mm Hg    HCO3, Arterial 30.6 (H) 20.0 - 26.0 mmol/L    Base Excess, Arterial 6.0 (H) 0.0 - 2.0 mmol/L    O2 Saturation, Arterial 98.5 94.0 - 99.0 %    Hemoglobin, Blood Gas 5.5 (C) 13.5 - 17.5 g/dL    Hematocrit, Blood Gas 16.8 (L) 38.0 - 51.0 %    Oxyhemoglobin 95.7 94 - 99 %    Methemoglobin 0.40 0.00 - 3.00 %    Carboxyhemoglobin 2.4 0 - 5 %    A-a DO2 53.9 0.0 - 300.0 mmHg    CO2 Content 32.0 22 - 33 mmol/L    Barometric Pressure for Blood Gas 729 mmHg    Modality Nasal Cannula     FIO2 28 %    Flow Rate 2.0 lpm    Ventilator Mode NA     Note Read back and acknowledge     Notified Who DR LUCIA // RN     Notified By [id number]     Notified Time 10/17/2023 16:49     Collected by [id number]     pH, Temp Corrected      pCO2, Temperature Corrected      pO2, Temperature Corrected     ECG 12 Lead Other; LOW HBG   Result Value Ref Range    QT Interval 370 ms    QTC Interval 437 ms   Type & Screen    Specimen: Arm, Right; Blood   Result Value Ref Range    ABO Type B     RH type  "Positive     Antibody Screen Negative     T&S Expiration Date 10/20/2023 11:59:59 PM    Prepare RBC, 3 Units   Result Value Ref Range    Product Code A3973J34     Unit Number [id number]     UNIT  ABO B     UNIT  RH POS     Crossmatch Interpretation Compatible     Dispense Status XM     Blood Expiration Date 202311112359     Blood Type Barcode 7300     Product Code Z0325I12     Unit Number [id number]     UNIT  ABO B     UNIT  RH POS     Crossmatch Interpretation Compatible     Dispense Status XM     Blood Expiration Date 202311112359     Blood Type Barcode 7300     Product Code H4601A37     Unit Number [id number]     UNIT  ABO B     UNIT  RH POS     Crossmatch Interpretation Compatible     Dispense Status XM     Blood Expiration Date 202311142359     Blood Type Barcode 7300    ABO RH Specimen Verification    Specimen: Blood   Result Value Ref Range    ABO Type B     RH type Positive    Green Top (Gel)   Result Value Ref Range    Extra Tube Hold for add-ons.    Lavender Top   Result Value Ref Range    Extra Tube hold for add-on    Gold Top - SST   Result Value Ref Range    Extra Tube Hold for add-ons.    Light Blue Top   Result Value Ref Range    Extra Tube Hold for add-ons.      CT Abdomen Pelvis Without Contrast   Final Result   1.  Cardiomegaly.   2.  Gallstones.  Gallbladder otherwise unremarkable.           This report was finalized on 10/17/2023 4:42 PM by Dr. Maksim Woods MD.                ED Course  ED Course as of 10/17/23 1700   Tue Oct 17, 2023   1549 Patient has chronic renal insufficiency currently however to CKD 4.  Presents with symptomatic anemia with a hemoglobin of 5.5 hematocrit 18.6.  And blood pressure 95/49.  Patient is grossly weak, pale and slightly confused.  She was unaware that she was currently at the hospital she reports that she has been \"out of it; sleeping more with no energy.\" [LK]   1550 3 units of red blood cells have been ordered and transfused patient will admit " for symptomatic anemia with ST wave abnormalities on EKG showing concern for underlying ischemia. [LK]   1550 ECG 12 Lead Other; LOW HBG  Sinus rhythm with arrhythmia incomplete right bundle branch block.  ST and T wave abnormality in the inferior anterior lateral leads concerning for underlying ischemia.  Rate 84  QRS 94 QTc 437. [LK]      ED Course User Index  [LK] Karen Molina DO      Patient has been accepted for admission. By Dr Arellano                                   Medical Decision Making  Problems Addressed:  Chronic anticoagulation: complicated acute illness or injury  CKD (chronic kidney disease) stage 4, GFR 15-29 ml/min: complicated acute illness or injury  Confused: complicated acute illness or injury  Symptomatic anemia: complicated acute illness or injury    Amount and/or Complexity of Data Reviewed  Labs: ordered.  Radiology: ordered.  ECG/medicine tests: ordered. Decision-making details documented in ED Course.    Risk  Decision regarding hospitalization.        Final diagnoses:   Symptomatic anemia   Confused   Chronic anticoagulation   CKD (chronic kidney disease) stage 4, GFR 15-29 ml/min       ED Disposition  ED Disposition       ED Disposition   Decision to Admit    Condition   --    Comment   Level of Care: Progressive Care [20]   Diagnosis: Symptomatic anemia [5997402]   Certification: I Certify That Inpatient Hospital Services Are Medically Necessary For Greater Than 2 Midnights                 No follow-up provider specified.       Medication List      No changes were made to your prescriptions during this visit.            Karen Molina DO  10/17/23 1700

## 2023-10-17 NOTE — CASE MANAGEMENT/SOCIAL WORK
Discharge Planning Assessment   Sina     Patient Name: Jolly Garcia  MRN: [id number]  Today's Date: 10/17/2023    Admit Date: 10/17/2023    Plan: Patient is a resident of [facility] and will return at discharge. Patient will need EMS transport back to facility at discharge.   Discharge Needs Assessment       Row Name 10/17/23 1628       Living Environment    People in Home facility resident    Potentially Unsafe Housing Conditions none    In the past 12 months has the electric, gas, oil, or water company threatened to shut off services in your home? No    Provides Primary Care For no one    Quality of Family Relationships unable to assess    Able to Return to Prior Arrangements yes       Resource/Environmental Concerns    Resource/Environmental Concerns none    Transportation Concerns none       Transportation Needs    In the past 12 months, has lack of transportation kept you from medical appointments or from getting medications? no    In the past 12 months, has lack of transportation kept you from meetings, work, or from getting things needed for daily living? No       Food Insecurity    Within the past 12 months, you worried that your food would run out before you got the money to buy more. Never true    Within the past 12 months, the food you bought just didn't last and you didn't have money to get more. Never true       Transition Planning    Patient/Family Anticipates Transition to long-term care facility    Patient/Family Anticipated Services at Transition none    Transportation Anticipated public transportation       Discharge Needs Assessment    Readmission Within the Last 30 Days no previous admission in last 30 days    Concerns to be Addressed discharge planning    Anticipated Changes Related to Illness none    Equipment Needed After Discharge none                   Discharge Plan       Row Name 10/17/23 1790       Plan    Plan Patient is a resident of [facility] and will return at discharge. Patient will  need EMS transport back to facility at discharge.    Patient/Family in Agreement with Plan yes                  Continued Care and Services - Admitted Since 10/17/2023    Coordination has not been started for this encounter.       Selected Continued Care - Prior Encounters Includes continued care and service providers with selected services from prior encounters from 7/19/2023 to 10/17/2023      Discharged on 7/28/2023 Admission date: 7/22/2023 - Discharge disposition: Skilled Nursing Facility (DC - External)      Destination       Service Provider Selected Services Address Phone Fax Patient Preferred    [facility] [address] [place] [id number] [phone] --                         Nona Pozo

## 2023-10-17 NOTE — PLAN OF CARE
Problem: Adult Inpatient Plan of Care  Goal: Plan of Care Review  Outcome: Ongoing, Progressing  Goal: Patient-Specific Goal (Individualized)  Outcome: Ongoing, Progressing  Goal: Absence of Hospital-Acquired Illness or Injury  Outcome: Ongoing, Progressing  Intervention: Identify and Manage Fall Risk  Recent Flowsheet Documentation  Taken 10/17/2023 1800 by Radha Webster RN  Safety Promotion/Fall Prevention:   activity supervised   assistive device/personal items within reach   clutter free environment maintained   fall prevention program maintained   nonskid shoes/slippers when out of bed   room organization consistent   safety round/check completed  Intervention: Prevent and Manage VTE (Venous Thromboembolism) Risk  Recent Flowsheet Documentation  Taken 10/17/2023 1800 by Radha Webster RN  Activity Management: activity encouraged  Intervention: Prevent Infection  Recent Flowsheet Documentation  Taken 10/17/2023 1800 by Radha Websetr RN  Infection Prevention:   rest/sleep promoted   single patient room provided  Goal: Optimal Comfort and Wellbeing  Outcome: Ongoing, Progressing  Intervention: Provide Person-Centered Care  Recent Flowsheet Documentation  Taken 10/17/2023 1800 by Radha Webster RN  Trust Relationship/Rapport:   care explained   choices provided   emotional support provided   questions answered   empathic listening provided   questions encouraged   thoughts/feelings acknowledged   reassurance provided  Goal: Readiness for Transition of Care  Outcome: Ongoing, Progressing     Problem: Asthma Comorbidity  Goal: Maintenance of Asthma Control  Outcome: Ongoing, Progressing  Intervention: Maintain Asthma Symptom Control  Recent Flowsheet Documentation  Taken 10/17/2023 1800 by Radha Webster RN  Medication Review/Management: medications reviewed     Problem: Behavioral Health Comorbidity  Goal: Maintenance of Behavioral Health Symptom Control  Outcome: Ongoing, Progressing  Intervention:  Maintain Behavioral Health Symptom Control  Recent Flowsheet Documentation  Taken 10/17/2023 1800 by Radha Webster RN  Medication Review/Management: medications reviewed     Problem: COPD (Chronic Obstructive Pulmonary Disease) Comorbidity  Goal: Maintenance of COPD Symptom Control  Outcome: Ongoing, Progressing  Intervention: Maintain COPD-Symptom Control  Recent Flowsheet Documentation  Taken 10/17/2023 1800 by Radha Webster RN  Medication Review/Management: medications reviewed     Problem: Diabetes Comorbidity  Goal: Blood Glucose Level Within Targeted Range  Outcome: Ongoing, Progressing  Goal: Blood Glucose Level Within Targeted Range  Outcome: Ongoing, Progressing     Problem: Heart Failure Comorbidity  Goal: Maintenance of Heart Failure Symptom Control  Outcome: Ongoing, Progressing  Intervention: Maintain Heart Failure-Management  Recent Flowsheet Documentation  Taken 10/17/2023 1800 by Radha Webster RN  Medication Review/Management: medications reviewed  Goal: Maintenance of Heart Failure Symptom Control  Outcome: Ongoing, Progressing  Intervention: Maintain Heart Failure-Management  Recent Flowsheet Documentation  Taken 10/17/2023 1800 by Radha Webster RN  Medication Review/Management: medications reviewed     Problem: Hypertension Comorbidity  Goal: Blood Pressure in Desired Range  Outcome: Ongoing, Progressing  Intervention: Maintain Blood Pressure Management  Recent Flowsheet Documentation  Taken 10/17/2023 1800 by Radha Webster RN  Medication Review/Management: medications reviewed     Problem: Obstructive Sleep Apnea Risk or Actual Comorbidity Management  Goal: Unobstructed Breathing During Sleep  Outcome: Ongoing, Progressing     Problem: Osteoarthritis Comorbidity  Goal: Maintenance of Osteoarthritis Symptom Control  Outcome: Ongoing, Progressing  Intervention: Maintain Osteoarthritis Symptom Control  Recent Flowsheet Documentation  Taken 10/17/2023 1800 by Radha Webster RN  Activity  Management: activity encouraged  Medication Review/Management: medications reviewed     Problem: Pain Chronic (Persistent) (Comorbidity Management)  Goal: Acceptable Pain Control and Functional Ability  Outcome: Ongoing, Progressing  Intervention: Manage Persistent Pain  Recent Flowsheet Documentation  Taken 10/17/2023 1800 by Radha Webster RN  Medication Review/Management: medications reviewed  Intervention: Optimize Psychosocial Wellbeing  Recent Flowsheet Documentation  Taken 10/17/2023 1800 by Radha Webster RN  Diversional Activities: television  Family/Support System Care:   self-care encouraged   support provided     Problem: Seizure Disorder Comorbidity  Goal: Maintenance of Seizure Control  Outcome: Ongoing, Progressing     Problem: Skin Injury Risk Increased  Goal: Skin Health and Integrity  Outcome: Ongoing, Progressing   Goal Outcome Evaluation:   Pt welcomed to PCU. Pt alert and oriented. VSS and afebrile since being on the floor. No complaints from pt at this time. Blood infusing per order. Bed is locked in low position with call light in reach.

## 2023-10-17 NOTE — H&P
"    [facility] Medicine Services  History & Physical    Patient Identification:  Name:  Jolly Garcia  Age:  73 y.o.  Sex:  female  :  1950  MRN:  [id number]   Visit Number:  [id number]  Admit Date: 10/17/2023   Primary Care Physician:  Whit Cadet APRN    Subjective     Chief complaint:     History of presenting illness:      Jolly Garcia is a 73 y.o. female who presented from local SNF secondary to fatigue and low hemoglobin.  Per report from SNF patient has been somewhat lethargic and sleeping more than usual. On exam patient is alert and oriented. She confirms she has been sleeping more than usual but not feeling ill. She states \"I just love to sleep.\" She denies chills. No complaint of generalized weakness but states she hasn't been out of bed recently. She endorses vomiting earlier while in the ED but did not inspect it so unable to confirm or deny the presence of poornima blood or coffee ground emesis. She also reports she is unable to tell me if she has had any hematochezia or melena. She denies any abdominal pain. No hematuria, no dysuria. She denies shortness of breath or cough. No chest pain. She does have some bruising to the bilateral upper extremities but denies any recent trauma causing large bruises/hematomas.     Past medical history is significant for T2DM, HTN, ASCVD, chronic respiratory failure, hyperlipidemia, CKD IV, JESIKA, HFpEF, chronic iron deficiency anemia, persistent atrial fibrillation on Eliquis    Upon arrival to the ED, vital signs were temp 98.3, heart rate 87, respirations 16, BP 95/49, SPO2 100% on room air.  Laboratory work-up notable for increased BUN at 89 from baseline 50-60.  Iron profile notable for low transferrin at 187, TIBC low at 279.  CBC noted white blood cell count elevated at 11.32, RBCs low at 1.97 with H&H 5.5 and 18.6.  Baseline hemoglobin is 9-10.  CT abdomen and pelvis without contrast noted cardiomegaly and " gallstones.    Known Emergency Department medications received prior to my evaluation included 2 units PRBCs.   Emergency Department Room location at the time of my evaluation was 114.     ---------------------------------------------------------------------------------------------------------------------   Review of Systems   Constitutional:  Positive for fatigue. Negative for chills and fever.   HENT:  Negative for congestion and rhinorrhea.    Respiratory:  Negative for cough and shortness of breath.    Cardiovascular:  Negative for chest pain and leg swelling.   Gastrointestinal:  Positive for nausea and vomiting. Negative for abdominal pain and constipation.   Genitourinary:  Negative for difficulty urinating, dysuria and hematuria.   Musculoskeletal:  Negative for arthralgias and myalgias.   Skin:  Negative for rash and wound.   Neurological:  Negative for dizziness and light-headedness.      ---------------------------------------------------------------------------------------------------------------------   Past Medical History:   Diagnosis Date    Anxiety     CAD (coronary artery disease)     s/p CABG    Chronic hypoxemic respiratory failure     Wears 2 L/min at home    Essential hypertension     Hyperlipidemia     Type 2 diabetes mellitus      Past Surgical History:   Procedure Laterality Date    CORONARY ARTERY BYPASS GRAFT  2011    HYSTERECTOMY       Family History   Problem Relation Age of Onset    Diabetes Mother      Social History     Socioeconomic History    Marital status: Single   Tobacco Use    Smoking status: Former     Passive exposure: Past   Vaping Use    Vaping Use: Never used   Substance and Sexual Activity    Alcohol use: Never    Drug use: Never    Sexual activity: Defer     ---------------------------------------------------------------------------------------------------------------------   Allergies:  Patient has no known  allergies.  ---------------------------------------------------------------------------------------------------------------------   Home medications:    Medications below are reported home medications pulling from within the system; at this time, these medications have not been reconciled unless otherwise specified and are in the verification process for further verifcation as current home medications.  (Not in a hospital admission)      Hospital Scheduled Meds:    No current facility-administered medications for this encounter.      Current listed hospital scheduled medications may not yet reflect those currently placed in orders that are signed and held awaiting patient's arrival to floor.   ---------------------------------------------------------------------------------------------------------------------     Objective     Vital Signs:  Temp:  [98.2 °F (36.8 °C)-98.3 °F (36.8 °C)] 98.2 °F (36.8 °C)  Heart Rate:  [87-94] 87  Resp:  [16] 16  BP: ()/(41-58) 94/51      10/17/23  1456   Weight: 136 kg (300 lb)     Body mass index is 48.42 kg/m².  ---------------------------------------------------------------------------------------------------------------------       Physical Exam  Vitals and nursing note reviewed.   Constitutional:       General: She is not in acute distress.     Appearance: She is obese.   HENT:      Head: Normocephalic and atraumatic.   Eyes:      Extraocular Movements: Extraocular movements intact.      Conjunctiva/sclera: Conjunctivae normal.   Cardiovascular:      Rate and Rhythm: Normal rate.   Pulmonary:      Effort: Pulmonary effort is normal.      Breath sounds: Normal breath sounds.   Abdominal:      Palpations: Abdomen is soft.      Tenderness: There is no abdominal tenderness.   Musculoskeletal:      Right lower leg: No edema.      Left lower leg: No edema.   Skin:     General: Skin is warm and dry.   Neurological:      Mental Status: She is alert and oriented to person, place,  "and time. Mental status is at baseline.   Psychiatric:         Mood and Affect: Mood normal.         Behavior: Behavior normal.             ---------------------------------------------------------------------------------------------------------------------  EKG:        I have personally looked at the EKG.  ---------------------------------------------------------------------------------------------------------------------   Results from last 7 days   Lab Units 10/17/23  1514   WBC 10*3/mm3 11.32*   HEMOGLOBIN g/dL 5.5*   HEMATOCRIT % 18.6*   MCV fL 94.4   MCHC g/dL 29.6*   PLATELETS 10*3/mm3 310     Results from last 7 days   Lab Units 10/17/23  1646   PH, ARTERIAL pH units 7.443   PO2 ART mm Hg 86.9   PCO2, ARTERIAL mm Hg 44.9   HCO3 ART mmol/L 30.6*     Results from last 7 days   Lab Units 10/17/23  1514   SODIUM mmol/L 137   POTASSIUM mmol/L 4.1   CHLORIDE mmol/L 98   CO2 mmol/L 27.6   BUN mg/dL 89*   CREATININE mg/dL 2.47*   CALCIUM mg/dL 8.5*   GLUCOSE mg/dL 169*   ALBUMIN g/dL 3.2*   BILIRUBIN mg/dL 0.5   ALK PHOS U/L 90   AST (SGOT) U/L 13   ALT (SGPT) U/L 5   Estimated Creatinine Clearance: 28.8 mL/min (A) (by C-G formula based on SCr of 2.47 mg/dL (H)).  No results found for: \"AMMONIA\"          Lab Results   Component Value Date    HGBA1C 7.40 (H) 06/01/2023     Lab Results   Component Value Date    TSH 6.870 (H) 07/22/2023    FREET4 1.17 07/22/2023     No results found for: \"PREGTESTUR\", \"PREGSERUM\", \"HCG\", \"HCGQUANT\"  Pain Management Panel  More data exists         Latest Ref Rng & Units 7/22/2023 6/4/2023   Pain Management Panel   Creatinine, Urine mg/dL 83.5  111.5  111.5      No results found for: \"BLOODCX\"  No results found for: \"URINECX\"  No results found for: \"WOUNDCX\"  No results found for: \"STOOLCX\"      ---------------------------------------------------------------------------------------------------------------------  Imaging Results (Last 7 Days)       Procedure Component Value Units " Date/Time    CT Abdomen Pelvis Without Contrast [[id number]] Collected: 10/17/23 1641     Updated: 10/17/23 1644    Narrative:      EXAM:    CT Abdomen and Pelvis Without Intravenous Contrast     EXAM DATE:    10/17/2023 5:21 PM     CLINICAL HISTORY:    NEW SYMPTOMATIC ANEMIA; D64.9-Anemia, unspecified;  R41.0-Disorientation, unspecified; Z79.01-Long term (current) use of  anticoagulants; N18.4-Chronic kidney disease, stage 4 (severe)     TECHNIQUE:    Axial computed tomography images of the abdomen and pelvis without  intravenous contrast.  Sagittal and coronal reformatted images were  created and reviewed.  This CT exam was performed using one or more of  the following dose reduction techniques:  automated exposure control,  adjustment of the mA and/or kV according to patient size, and/or use of  iterative reconstruction technique.     COMPARISON:    6/1/2023     FINDINGS:    LUNG BASES:  Unremarkable as visualized.  No mass.  No consolidation.    HEART:  Cardiomegaly.      ABDOMEN:    LIVER:  Unremarkable as visualized.    GALLBLADDER AND BILE DUCTS:  Gallstones.  Gallbladder otherwise  unremarkable.  No ductal dilation.    PANCREAS:  Unremarkable as visualized.  No ductal dilation.    SPLEEN:  Unremarkable as visualized.  No splenomegaly.    ADRENALS:  Unremarkable as visualized.  No mass.    KIDNEYS AND URETERS:  Unremarkable as visualized.  No obstructing  stones.  No hydronephrosis.    STOMACH AND BOWEL:  Scattered diverticula in the colon.  No  diverticulitis.  No obstruction.      PELVIS:    APPENDIX:  No findings to suggest acute appendicitis.    BLADDER:  Unremarkable as visualized.  No stones.    REPRODUCTIVE:  Unremarkable as visualized.      ABDOMEN and PELVIS:    INTRAPERITONEAL SPACE:  Unremarkable as visualized.  No free air.  No  significant fluid collection.    BONES/JOINTS:  No acute fracture.  No dislocation.    SOFT TISSUES:  Unremarkable as visualized.    VASCULATURE:  Atherosclerotic disease.   "No abdominal aortic aneurysm.    LYMPH NODES:  Unremarkable as visualized.  No enlarged lymph nodes.       Impression:      1.  Cardiomegaly.  2.  Gallstones.  Gallbladder otherwise unremarkable.        This report was finalized on 10/17/2023 4:42 PM by Dr. Maksim Woods MD.               Cultures:  No results found for: \"BLOODCX\", \"URINECX\", \"WOUNDCX\", \"MRSACX\", \"RESPCX\", \"STOOLCX\"    Last echocardiogram:  Results for orders placed during the hospital encounter of 07/22/23    Adult Transthoracic Echo Complete With Contrast if Necessary Per Protocol    Interpretation Summary    Left ventricular systolic function is normal. Left ventricular ejection fraction appears to be 56 - 60%.    Left ventricular diastolic function is consistent with (grade II w/high LAP) pseudonormalization.    The right ventricular cavity is mild to moderately dilated.    The left atrial cavity is mildly dilated.    The right atrial cavity is mildly  dilated.    Estimated right ventricular systolic pressure from tricuspid regurgitation is markedly elevated (>55 mmHg).          I have personally reviewed the above radiology images and read the final radiology report on 10/17/23  ---------------------------------------------------------------------------------------------------------------------  Assessment / Plan     Active Hospital Problems    Diagnosis  POA    **Symptomatic anemia [D64.9]  Yes       ASSESSMENT/PLAN:    Acute, symptomatic on chronic anemia  Hx iron deficiency anemia, AOCD/renal disease  Patient presents from local SNF for further evaluation for low hemoglobin.  Staff at nursing facility noted patient to be more lethargic and sleeping more than usual.  No reported signs or symptoms of bleeding at SNF.  Work-up here noted globin low at 5.5 from baseline 9-10.  Also with BUN increased at 89 from baseline 50-60 though creatinine appears to be at baseline.  She did receive 2 units PRBCs in the ED.  We will admit to the PCU for " further work-up and management  Continue to monitor H&H closely.  Can plan to transfuse as indicated if less than 7 or less than 8 and significantly symptomatic.  Hold any home meds which could contribute to further bleeding. Initiate IV PPI  Monitor clinical volume status closely in the setting of HFpEF with multiple recent admissions for decompensated HFpEF.  Anticipate need for further diuresis.  Iron profile noted normal iron, normal ferritin, low transferrin, low TIBC.  Further work-up ordered and pending including CK, folate, B12, TSH  CXR and UA pending as well.  Check a CK.    Chronic:  IDDM 2  HTN  ASCVD  Chronic respiratory failure  Hyperlipidemia  CKD stage IV  JESIKA  HFpEF  Persistent atrial fibrillation chronically anticoagulated with Eliquis  Plan to resume home meds as indicated once med rec is complete  We will initiate insulin regimen at appropriate dosing.  Monitor with Accu-Cheks with hypoglycemia protocol in place and titrate as needed.  Hold Eliquis as above  Monitor volume status closely as above  Monitor vital signs per protocol  ----------  -DVT prophylaxis: SCDs  -Activity: Up with assistance  -Expected length of stay: INPATIENT status due to the need for care which can only be reasonably provided in an hospital setting such as aggressive/expedited ancillary services and/or consultation services, the necessity for IV medications, close physician monitoring and/or the possible need for procedures.  In such, I feel patient’s risk for adverse outcomes and need for care warrant INPATIENT evaluation and predict the patient’s care encounter to likely last beyond 2 midnights.   -Disposition pending course and further work up     High risk secondary to acute anemia    There are no questions and answers to display.       Jose Cook PA-C   10/17/23  17:46 EDT

## 2023-10-18 ENCOUNTER — APPOINTMENT (OUTPATIENT)
Dept: ULTRASOUND IMAGING | Facility: HOSPITAL | Age: 73
End: 2023-10-18
Payer: MEDICARE

## 2023-10-18 LAB
ALBUMIN SERPL-MCNC: 3.1 G/DL (ref 3.5–5.2)
ALBUMIN/GLOB SERPL: 1.2 G/DL
ALP SERPL-CCNC: 84 U/L (ref 39–117)
ALT SERPL W P-5'-P-CCNC: 6 U/L (ref 1–33)
ANION GAP SERPL CALCULATED.3IONS-SCNC: 6.9 MMOL/L (ref 5–15)
AST SERPL-CCNC: 12 U/L (ref 1–32)
BACTERIA SPEC AEROBE CULT: ABNORMAL
BASOPHILS # BLD AUTO: 0.03 10*3/MM3 (ref 0–0.2)
BASOPHILS NFR BLD AUTO: 0.4 % (ref 0–1.5)
BILIRUB SERPL-MCNC: 0.7 MG/DL (ref 0–1.2)
BUN SERPL-MCNC: 92 MG/DL (ref 8–23)
BUN/CREAT SERPL: 36.1 (ref 7–25)
CALCIUM SPEC-SCNC: 8.6 MG/DL (ref 8.6–10.5)
CHLORIDE SERPL-SCNC: 97 MMOL/L (ref 98–107)
CO2 SERPL-SCNC: 31.1 MMOL/L (ref 22–29)
CREAT SERPL-MCNC: 2.55 MG/DL (ref 0.57–1)
DEPRECATED RDW RBC AUTO: 57.1 FL (ref 37–54)
EGFRCR SERPLBLD CKD-EPI 2021: 19.4 ML/MIN/1.73
EOSINOPHIL # BLD AUTO: 0.05 10*3/MM3 (ref 0–0.4)
EOSINOPHIL NFR BLD AUTO: 0.6 % (ref 0.3–6.2)
ERYTHROCYTE [DISTWIDTH] IN BLOOD BY AUTOMATED COUNT: 18.9 % (ref 12.3–15.4)
FOLATE SERPL-MCNC: 20 NG/ML (ref 4.78–24.2)
GLOBULIN UR ELPH-MCNC: 2.6 GM/DL
GLUCOSE BLDC GLUCOMTR-MCNC: 123 MG/DL (ref 70–130)
GLUCOSE SERPL-MCNC: 132 MG/DL (ref 65–99)
HCT VFR BLD AUTO: 23.6 % (ref 34–46.6)
HCT VFR BLD AUTO: 24 % (ref 34–46.6)
HGB BLD-MCNC: 7.4 G/DL (ref 12–15.9)
HGB BLD-MCNC: 7.5 G/DL (ref 12–15.9)
IMM GRANULOCYTES # BLD AUTO: 0.03 10*3/MM3 (ref 0–0.05)
IMM GRANULOCYTES NFR BLD AUTO: 0.4 % (ref 0–0.5)
LYMPHOCYTES # BLD AUTO: 2.06 10*3/MM3 (ref 0.7–3.1)
LYMPHOCYTES NFR BLD AUTO: 25.2 % (ref 19.6–45.3)
MCH RBC QN AUTO: 27.4 PG (ref 26.6–33)
MCHC RBC AUTO-ENTMCNC: 31.8 G/DL (ref 31.5–35.7)
MCV RBC AUTO: 86.1 FL (ref 79–97)
MONOCYTES # BLD AUTO: 0.62 10*3/MM3 (ref 0.1–0.9)
MONOCYTES NFR BLD AUTO: 7.6 % (ref 5–12)
NEUTROPHILS NFR BLD AUTO: 5.4 10*3/MM3 (ref 1.7–7)
NEUTROPHILS NFR BLD AUTO: 65.8 % (ref 42.7–76)
NRBC BLD AUTO-RTO: 0 /100 WBC (ref 0–0.2)
PLATELET # BLD AUTO: 256 10*3/MM3 (ref 140–450)
PMV BLD AUTO: 8.7 FL (ref 6–12)
POTASSIUM SERPL-SCNC: 4 MMOL/L (ref 3.5–5.2)
PROT SERPL-MCNC: 5.7 G/DL (ref 6–8.5)
QT INTERVAL: 370 MS
QTC INTERVAL: 437 MS
RBC # BLD AUTO: 2.74 10*6/MM3 (ref 3.77–5.28)
SODIUM SERPL-SCNC: 135 MMOL/L (ref 136–145)
VIT B12 BLD-MCNC: 1506 PG/ML (ref 211–946)
WBC NRBC COR # BLD: 8.19 10*3/MM3 (ref 3.4–10.8)

## 2023-10-18 PROCEDURE — 82948 REAGENT STRIP/BLOOD GLUCOSE: CPT

## 2023-10-18 PROCEDURE — 80053 COMPREHEN METABOLIC PANEL: CPT | Performed by: INTERNAL MEDICINE

## 2023-10-18 PROCEDURE — 25010000002 NA FERRIC GLUC CPLX PER 12.5 MG: Performed by: INTERNAL MEDICINE

## 2023-10-18 PROCEDURE — 25810000003 SODIUM CHLORIDE 0.9 % SOLUTION 250 ML FLEX CONT: Performed by: INTERNAL MEDICINE

## 2023-10-18 PROCEDURE — 85025 COMPLETE CBC W/AUTO DIFF WBC: CPT | Performed by: INTERNAL MEDICINE

## 2023-10-18 PROCEDURE — 25810000003 SODIUM CHLORIDE 0.9 % SOLUTION: Performed by: INTERNAL MEDICINE

## 2023-10-18 PROCEDURE — 76775 US EXAM ABDO BACK WALL LIM: CPT

## 2023-10-18 PROCEDURE — 25810000003 LACTATED RINGERS PER 1000 ML: Performed by: INTERNAL MEDICINE

## 2023-10-18 PROCEDURE — 76775 US EXAM ABDO BACK WALL LIM: CPT | Performed by: RADIOLOGY

## 2023-10-18 PROCEDURE — 63710000001 INSULIN GLARGINE PER 5 UNITS: Performed by: INTERNAL MEDICINE

## 2023-10-18 PROCEDURE — 85018 HEMOGLOBIN: CPT | Performed by: INTERNAL MEDICINE

## 2023-10-18 PROCEDURE — 85014 HEMATOCRIT: CPT | Performed by: INTERNAL MEDICINE

## 2023-10-18 RX ORDER — LACTULOSE 10 G/15ML
10 SOLUTION ORAL 2 TIMES DAILY PRN
Status: DISCONTINUED | OUTPATIENT
Start: 2023-10-18 | End: 2023-10-24 | Stop reason: HOSPADM

## 2023-10-18 RX ORDER — LORAZEPAM 0.5 MG/1
0.5 TABLET ORAL NIGHTLY
Status: DISCONTINUED | OUTPATIENT
Start: 2023-10-18 | End: 2023-10-24 | Stop reason: HOSPADM

## 2023-10-18 RX ORDER — GUAIFENESIN AND DEXTROMETHORPHAN HYDROBROMIDE 100; 10 MG/5ML; MG/5ML
5 SOLUTION ORAL EVERY 4 HOURS PRN
Status: DISCONTINUED | OUTPATIENT
Start: 2023-10-18 | End: 2023-10-24 | Stop reason: HOSPADM

## 2023-10-18 RX ORDER — ONDANSETRON 4 MG/1
4 TABLET, FILM COATED ORAL EVERY 6 HOURS PRN
Status: DISCONTINUED | OUTPATIENT
Start: 2023-10-18 | End: 2023-10-24 | Stop reason: HOSPADM

## 2023-10-18 RX ORDER — NITROGLYCERIN 0.4 MG/1
0.4 TABLET SUBLINGUAL
Status: DISCONTINUED | OUTPATIENT
Start: 2023-10-18 | End: 2023-10-24 | Stop reason: HOSPADM

## 2023-10-18 RX ORDER — SODIUM CHLORIDE 0.9 % (FLUSH) 0.9 %
10 SYRINGE (ML) INJECTION EVERY 12 HOURS SCHEDULED
Status: DISCONTINUED | OUTPATIENT
Start: 2023-10-18 | End: 2023-10-24 | Stop reason: HOSPADM

## 2023-10-18 RX ORDER — INSULIN ASPART 100 [IU]/ML
2-10 INJECTION, SOLUTION INTRAVENOUS; SUBCUTANEOUS
COMMUNITY

## 2023-10-18 RX ORDER — ATORVASTATIN CALCIUM 40 MG/1
40 TABLET, FILM COATED ORAL NIGHTLY
Status: DISCONTINUED | OUTPATIENT
Start: 2023-10-18 | End: 2023-10-24 | Stop reason: HOSPADM

## 2023-10-18 RX ORDER — INSULIN GLARGINE 100 [IU]/ML
10 INJECTION, SOLUTION SUBCUTANEOUS NIGHTLY
Status: DISCONTINUED | OUTPATIENT
Start: 2023-10-18 | End: 2023-10-24 | Stop reason: HOSPADM

## 2023-10-18 RX ORDER — METOPROLOL SUCCINATE 50 MG/1
50 TABLET, EXTENDED RELEASE ORAL DAILY
Status: DISCONTINUED | OUTPATIENT
Start: 2023-10-18 | End: 2023-10-21

## 2023-10-18 RX ORDER — ACETAMINOPHEN 500 MG
1000 TABLET ORAL EVERY 8 HOURS PRN
Status: DISCONTINUED | OUTPATIENT
Start: 2023-10-18 | End: 2023-10-24 | Stop reason: HOSPADM

## 2023-10-18 RX ORDER — BUMETANIDE 1 MG/1
2 TABLET ORAL DAILY
Status: CANCELLED | OUTPATIENT
Start: 2023-10-18

## 2023-10-18 RX ORDER — ISOSORBIDE MONONITRATE 30 MG/1
30 TABLET, EXTENDED RELEASE ORAL DAILY
Status: DISCONTINUED | OUTPATIENT
Start: 2023-10-18 | End: 2023-10-24 | Stop reason: HOSPADM

## 2023-10-18 RX ORDER — GUAIFENESIN 200 MG/1
400 TABLET ORAL 3 TIMES DAILY
Status: DISCONTINUED | OUTPATIENT
Start: 2023-10-18 | End: 2023-10-24 | Stop reason: HOSPADM

## 2023-10-18 RX ORDER — SODIUM CHLORIDE 0.9 % (FLUSH) 0.9 %
10 SYRINGE (ML) INJECTION AS NEEDED
Status: DISCONTINUED | OUTPATIENT
Start: 2023-10-18 | End: 2023-10-24 | Stop reason: HOSPADM

## 2023-10-18 RX ORDER — HYDRALAZINE HYDROCHLORIDE 25 MG/1
25 TABLET, FILM COATED ORAL 3 TIMES DAILY
Status: DISCONTINUED | OUTPATIENT
Start: 2023-10-18 | End: 2023-10-24 | Stop reason: HOSPADM

## 2023-10-18 RX ORDER — HYDROCODONE BITARTRATE AND ACETAMINOPHEN 5; 325 MG/1; MG/1
1 TABLET ORAL EVERY 8 HOURS PRN
Status: DISCONTINUED | OUTPATIENT
Start: 2023-10-18 | End: 2023-10-24 | Stop reason: HOSPADM

## 2023-10-18 RX ORDER — MULTIPLE VITAMINS W/ MINERALS TAB 9MG-400MCG
1 TAB ORAL DAILY
Status: DISCONTINUED | OUTPATIENT
Start: 2023-10-18 | End: 2023-10-24 | Stop reason: HOSPADM

## 2023-10-18 RX ORDER — SODIUM CHLORIDE, SODIUM LACTATE, POTASSIUM CHLORIDE, CALCIUM CHLORIDE 600; 310; 30; 20 MG/100ML; MG/100ML; MG/100ML; MG/100ML
75 INJECTION, SOLUTION INTRAVENOUS CONTINUOUS
Status: DISCONTINUED | OUTPATIENT
Start: 2023-10-18 | End: 2023-10-24 | Stop reason: HOSPADM

## 2023-10-18 RX ORDER — LANOLIN ALCOHOL/MO/W.PET/CERES
500 CREAM (GRAM) TOPICAL DAILY
Status: DISCONTINUED | OUTPATIENT
Start: 2023-10-18 | End: 2023-10-24 | Stop reason: HOSPADM

## 2023-10-18 RX ORDER — SPIRONOLACTONE 25 MG/1
25 TABLET ORAL DAILY
Status: CANCELLED | OUTPATIENT
Start: 2023-10-18

## 2023-10-18 RX ORDER — SODIUM CHLORIDE 9 MG/ML
40 INJECTION, SOLUTION INTRAVENOUS AS NEEDED
Status: DISCONTINUED | OUTPATIENT
Start: 2023-10-18 | End: 2023-10-24 | Stop reason: HOSPADM

## 2023-10-18 RX ORDER — LORAZEPAM 0.5 MG/1
0.25 TABLET ORAL EVERY 8 HOURS PRN
Status: DISCONTINUED | OUTPATIENT
Start: 2023-10-18 | End: 2023-10-24 | Stop reason: HOSPADM

## 2023-10-18 RX ORDER — LATANOPROST 50 UG/ML
1 SOLUTION/ DROPS OPHTHALMIC NIGHTLY
Status: DISCONTINUED | OUTPATIENT
Start: 2023-10-18 | End: 2023-10-24 | Stop reason: HOSPADM

## 2023-10-18 RX ADMIN — SODIUM ZIRCONIUM CYCLOSILICATE 10 G: 10 POWDER, FOR SUSPENSION ORAL at 11:52

## 2023-10-18 RX ADMIN — SODIUM CHLORIDE 250 MG: 9 INJECTION, SOLUTION INTRAVENOUS at 09:58

## 2023-10-18 RX ADMIN — GUAIFENESIN 400 MG: 200 TABLET ORAL at 16:10

## 2023-10-18 RX ADMIN — SODIUM CHLORIDE 500 ML: 9 INJECTION, SOLUTION INTRAVENOUS at 21:04

## 2023-10-18 RX ADMIN — GUAIFENESIN 400 MG: 200 TABLET ORAL at 11:52

## 2023-10-18 RX ADMIN — Medication 1 TABLET: at 11:52

## 2023-10-18 RX ADMIN — Medication 10 ML: at 08:24

## 2023-10-18 RX ADMIN — SODIUM CHLORIDE, POTASSIUM CHLORIDE, SODIUM LACTATE AND CALCIUM CHLORIDE 125 ML/HR: 600; 310; 30; 20 INJECTION, SOLUTION INTRAVENOUS at 21:32

## 2023-10-18 RX ADMIN — Medication 500 MCG: at 11:52

## 2023-10-18 RX ADMIN — Medication 10 ML: at 21:41

## 2023-10-18 RX ADMIN — INSULIN GLARGINE 10 UNITS: 100 INJECTION, SOLUTION SUBCUTANEOUS at 21:33

## 2023-10-18 RX ADMIN — ATORVASTATIN CALCIUM 40 MG: 40 TABLET, FILM COATED ORAL at 21:33

## 2023-10-18 RX ADMIN — DOCUSATE SODIUM 50 MG AND SENNOSIDES 8.6 MG 2 TABLET: 8.6; 5 TABLET, FILM COATED ORAL at 21:32

## 2023-10-18 RX ADMIN — ISOSORBIDE MONONITRATE 30 MG: 30 TABLET, EXTENDED RELEASE ORAL at 11:52

## 2023-10-18 RX ADMIN — PANTOPRAZOLE SODIUM 40 MG: 40 INJECTION, POWDER, FOR SOLUTION INTRAVENOUS at 05:36

## 2023-10-18 RX ADMIN — LATANOPROST 1 DROP: 50 SOLUTION OPHTHALMIC at 21:41

## 2023-10-18 RX ADMIN — SODIUM CHLORIDE, POTASSIUM CHLORIDE, SODIUM LACTATE AND CALCIUM CHLORIDE 75 ML/HR: 600; 310; 30; 20 INJECTION, SOLUTION INTRAVENOUS at 09:58

## 2023-10-18 RX ADMIN — GUAIFENESIN 400 MG: 200 TABLET ORAL at 21:32

## 2023-10-18 NOTE — PLAN OF CARE
Goal Outcome Evaluation:  Plan of Care Reviewed With: patient        Progress: improving  Outcome Evaluation: VSS on 2L NC. After receiving blood (per orders), patients hgb is now 7.5. Pt is currently resting with no requests at this time. Bed in lowest position, call light in reach, and bed alarm on.         Problem: Adult Inpatient Plan of Care  Goal: Plan of Care Review  Outcome: Ongoing, Progressing  Flowsheets (Taken 10/18/2023 0451)  Progress: improving  Plan of Care Reviewed With: patient  Outcome Evaluation: VSS on 2L NC. After receiving blood (per orders), patients hgb is now 7.5. Pt is currently resting with no requests at this time. Bed in lowest position, call light in reach, and bed alarm on.  Goal: Patient-Specific Goal (Individualized)  Outcome: Ongoing, Progressing  Goal: Absence of Hospital-Acquired Illness or Injury  Outcome: Ongoing, Progressing  Intervention: Identify and Manage Fall Risk  Recent Flowsheet Documentation  Taken 10/18/2023 0300 by Annette Hurley RN  Safety Promotion/Fall Prevention:   activity supervised   assistive device/personal items within reach   clutter free environment maintained   safety round/check completed  Taken 10/18/2023 0100 by Annette Hurley RN  Safety Promotion/Fall Prevention:   activity supervised   assistive device/personal items within reach   clutter free environment maintained   fall prevention program maintained   safety round/check completed  Taken 10/17/2023 2300 by Annette Hurley RN  Safety Promotion/Fall Prevention:   activity supervised   assistive device/personal items within reach   clutter free environment maintained   fall prevention program maintained   safety round/check completed  Taken 10/17/2023 2100 by Annette Hurley RN  Safety Promotion/Fall Prevention:   activity supervised   assistive device/personal items within reach   clutter free environment maintained   fall prevention program maintained   safety round/check completed  Taken  10/17/2023 1900 by Annette Hurley RN  Safety Promotion/Fall Prevention:   activity supervised   assistive device/personal items within reach   clutter free environment maintained   fall prevention program maintained   safety round/check completed  Intervention: Prevent and Manage VTE (Venous Thromboembolism) Risk  Recent Flowsheet Documentation  Taken 10/18/2023 0200 by Annette Hurley RN  VTE Prevention/Management:   bilateral   sequential compression devices on  Intervention: Prevent Infection  Recent Flowsheet Documentation  Taken 10/18/2023 0100 by Annette Hurley RN  Infection Prevention:   single patient room provided   rest/sleep promoted  Taken 10/17/2023 2300 by Annette Hurley RN  Infection Prevention:   single patient room provided   rest/sleep promoted  Taken 10/17/2023 2100 by nAnette Hurley RN  Infection Prevention:   single patient room provided   rest/sleep promoted  Taken 10/17/2023 1900 by Annette Hurley RN  Infection Prevention:   single patient room provided   rest/sleep promoted  Goal: Optimal Comfort and Wellbeing  Outcome: Ongoing, Progressing  Intervention: Provide Person-Centered Care  Recent Flowsheet Documentation  Taken 10/18/2023 0200 by Annette Hurley RN  Trust Relationship/Rapport:   care explained   choices provided   emotional support provided   questions answered   thoughts/feelings acknowledged  Taken 10/17/2023 2000 by Annette Hurley RN  Trust Relationship/Rapport:   care explained   choices provided   emotional support provided   thoughts/feelings acknowledged   questions answered  Goal: Readiness for Transition of Care  Outcome: Ongoing, Progressing     Problem: Asthma Comorbidity  Goal: Maintenance of Asthma Control  Outcome: Ongoing, Progressing  Intervention: Maintain Asthma Symptom Control  Recent Flowsheet Documentation  Taken 10/18/2023 0300 by Annette Hurley RN  Medication Review/Management: medications reviewed  Taken 10/18/2023 0100 by Annette Hurley  RN  Medication Review/Management: medications reviewed  Taken 10/17/2023 2300 by Annette Hurley RN  Medication Review/Management: medications reviewed  Taken 10/17/2023 2100 by Annette Hurley RN  Medication Review/Management: medications reviewed  Taken 10/17/2023 1900 by Annette Hurley RN  Medication Review/Management: medications reviewed     Problem: Behavioral Health Comorbidity  Goal: Maintenance of Behavioral Health Symptom Control  Outcome: Ongoing, Progressing  Intervention: Maintain Behavioral Health Symptom Control  Recent Flowsheet Documentation  Taken 10/18/2023 0300 by Annette Hurley RN  Medication Review/Management: medications reviewed  Taken 10/18/2023 0100 by Annette Hurley RN  Medication Review/Management: medications reviewed  Taken 10/17/2023 2300 by Annette Hurley RN  Medication Review/Management: medications reviewed  Taken 10/17/2023 2100 by Annette Hurley RN  Medication Review/Management: medications reviewed  Taken 10/17/2023 1900 by Annette Hurley RN  Medication Review/Management: medications reviewed     Problem: COPD (Chronic Obstructive Pulmonary Disease) Comorbidity  Goal: Maintenance of COPD Symptom Control  Outcome: Ongoing, Progressing  Intervention: Maintain COPD-Symptom Control  Recent Flowsheet Documentation  Taken 10/18/2023 0300 by Annette Hurley RN  Medication Review/Management: medications reviewed  Taken 10/18/2023 0100 by Annette Hurley RN  Medication Review/Management: medications reviewed  Taken 10/17/2023 2300 by Annette Hurley RN  Medication Review/Management: medications reviewed  Taken 10/17/2023 2100 by Annette Hurley RN  Medication Review/Management: medications reviewed  Taken 10/17/2023 1900 by Annette Hurley RN  Medication Review/Management: medications reviewed     Problem: Diabetes Comorbidity  Goal: Blood Glucose Level Within Targeted Range  Outcome: Ongoing, Progressing  Goal: Blood Glucose Level Within Targeted Range  Outcome: Ongoing,  Progressing     Problem: Heart Failure Comorbidity  Goal: Maintenance of Heart Failure Symptom Control  Outcome: Ongoing, Progressing  Intervention: Maintain Heart Failure-Management  Recent Flowsheet Documentation  Taken 10/18/2023 0300 by Annette Hurley RN  Medication Review/Management: medications reviewed  Taken 10/18/2023 0100 by Annette Hurley RN  Medication Review/Management: medications reviewed  Taken 10/17/2023 2300 by Annette Hurley RN  Medication Review/Management: medications reviewed  Taken 10/17/2023 2100 by Annette Hurley RN  Medication Review/Management: medications reviewed  Taken 10/17/2023 1900 by Annette Hurley RN  Medication Review/Management: medications reviewed  Goal: Maintenance of Heart Failure Symptom Control  Outcome: Ongoing, Progressing  Intervention: Maintain Heart Failure-Management  Recent Flowsheet Documentation  Taken 10/18/2023 0300 by Annette Hurley RN  Medication Review/Management: medications reviewed  Taken 10/18/2023 0100 by Annette Hurley RN  Medication Review/Management: medications reviewed  Taken 10/17/2023 2300 by Annette Hurley RN  Medication Review/Management: medications reviewed  Taken 10/17/2023 2100 by Annette Hurley RN  Medication Review/Management: medications reviewed  Taken 10/17/2023 1900 by Annette Hurley RN  Medication Review/Management: medications reviewed     Problem: Hypertension Comorbidity  Goal: Blood Pressure in Desired Range  Outcome: Ongoing, Progressing  Intervention: Maintain Blood Pressure Management  Recent Flowsheet Documentation  Taken 10/18/2023 0300 by Annette Hurley RN  Medication Review/Management: medications reviewed  Taken 10/18/2023 0100 by Annette Hurley RN  Medication Review/Management: medications reviewed  Taken 10/17/2023 2300 by Annette Hurley RN  Medication Review/Management: medications reviewed  Taken 10/17/2023 2100 by Annette Hurley RN  Medication Review/Management: medications reviewed  Taken 10/17/2023 1900  by Annette Hurley RN  Medication Review/Management: medications reviewed     Problem: Obstructive Sleep Apnea Risk or Actual Comorbidity Management  Goal: Unobstructed Breathing During Sleep  Outcome: Ongoing, Progressing     Problem: Osteoarthritis Comorbidity  Goal: Maintenance of Osteoarthritis Symptom Control  Outcome: Ongoing, Progressing  Intervention: Maintain Osteoarthritis Symptom Control  Recent Flowsheet Documentation  Taken 10/18/2023 0300 by Annette Hurley RN  Medication Review/Management: medications reviewed  Taken 10/18/2023 0100 by Annette Hurley RN  Medication Review/Management: medications reviewed  Taken 10/17/2023 2300 by Annette Hurley RN  Medication Review/Management: medications reviewed  Taken 10/17/2023 2100 by Annette Hurley RN  Medication Review/Management: medications reviewed  Taken 10/17/2023 1900 by Annette Hurley RN  Medication Review/Management: medications reviewed     Problem: Pain Chronic (Persistent) (Comorbidity Management)  Goal: Acceptable Pain Control and Functional Ability  Outcome: Ongoing, Progressing  Intervention: Manage Persistent Pain  Recent Flowsheet Documentation  Taken 10/18/2023 0300 by Annette Hurley RN  Medication Review/Management: medications reviewed  Taken 10/18/2023 0100 by Annette Hurley RN  Medication Review/Management: medications reviewed  Taken 10/17/2023 2300 by Annette Hurley RN  Medication Review/Management: medications reviewed  Taken 10/17/2023 2100 by Annette Hurley RN  Medication Review/Management: medications reviewed  Taken 10/17/2023 1900 by Annette Hurley RN  Medication Review/Management: medications reviewed  Intervention: Optimize Psychosocial Wellbeing  Recent Flowsheet Documentation  Taken 10/18/2023 0200 by Annette Hurley RN  Diversional Activities: television  Family/Support System Care:   self-care encouraged   support provided  Taken 10/17/2023 2000 by Annette Hurley RN  Diversional Activities:  television  Family/Support System Care:   self-care encouraged   support provided     Problem: Seizure Disorder Comorbidity  Goal: Maintenance of Seizure Control  Outcome: Ongoing, Progressing     Problem: Skin Injury Risk Increased  Goal: Skin Health and Integrity  Outcome: Ongoing, Progressing

## 2023-10-18 NOTE — PROGRESS NOTES
[facility] HOSPITALIST PROGRESS NOTE     Patient Identification:  Name:  Jolly Garcia  Age:  73 y.o.  Sex:  female  :  1950  MRN:  [id number]  Visit Number:  [id number]  ROOM: [address]     Primary Care Provider:  Whit Cadet APRN    Length of stay in inpatient status:  1    Subjective     Chief Compliant:    Chief Complaint   Patient presents with    Abnormal Lab       History of Presenting Illness:      ROS:  Otherwise 10 point ROS negative other than documented above in HPI.     Objective     Current Hospital Meds:atorvastatin, 40 mg, Oral, Nightly  guaiFENesin, 400 mg, Oral, TID  hydrALAZINE, 25 mg, Oral, TID  insulin glargine, 10 Units, Subcutaneous, Nightly  isosorbide mononitrate, 30 mg, Oral, Daily  latanoprost, 1 drop, Both Eyes, Nightly  LORazepam, 0.5 mg, Oral, Nightly  metoprolol succinate XL, 50 mg, Oral, Daily  multivitamin with minerals, 1 tablet, Oral, Daily  pantoprazole, 40 mg, Intravenous, Q AM  senna-docusate sodium, 2 tablet, Oral, BID  sodium chloride, 10 mL, Intravenous, Q12H  sodium zirconium cyclosilicate, 10 g, Oral, Daily  vitamin B-12, 500 mcg, Oral, Daily    lactated ringers, 75 mL/hr, Last Rate: 75 mL/hr (10/18/23 0958)        Current Antimicrobial Therapy:  Anti-Infectives (From admission, onward)      None          Current Diuretic Therapy:  Diuretics (From admission, onward)      None          ----------------------------------------------------------------------------------------------------------------------  Vital Signs:  Temp:  [97.5 °F (36.4 °C)-98.7 °F (37.1 °C)] 97.5 °F (36.4 °C)  Heart Rate:  [80-96] 85  Resp:  [13-19] 16  BP: ()/(41-71) 110/58  SpO2:  [91 %-100 %] 97 %  on  Flow (L/min):  [1-2] 1;   Device (Oxygen Therapy): nasal cannula  Body mass index is 39.12 kg/m².    Wt Readings from Last 3 Encounters:   10/18/23 110 kg (242 lb 4.6 oz)   23 (!) 153 kg (337 lb)   23 (!) 152 kg (335 lb 4.8 oz)     Intake & Output  (last 3 days)         10/15 0701  10/16 0700 10/16 0701  10/17 0700 10/17 0701  10/18 0700 10/18 0701  10/19 0700    P.O.    585    I.V. (mL/kg)   635.3 (5.8)     Blood   600     Total Intake(mL/kg)   1235.3 (11.2) 585 (5.3)    Urine (mL/kg/hr)   850 350 (0.3)    Total Output   850 350    Net   +385.3 +235                  Diet: Liquid Diets; Clear Liquid; Fluid Consistency: Thin (IDDSI 0)  ----------------------------------------------------------------------------------------------------------------------  Physical exam:  Constitutional:  Well-developed and well-nourished.  No respiratory distress.      HENT:  Head:  Normocephalic and atraumatic.  Mouth:  Moist mucous membranes.    Eyes:  Conjunctivae and EOM are normal. No scleral icterus.    Neck:  Neck supple.  No JVD present.    Cardiovascular:  Normal rate, regular rhythm and normal heart sounds with no murmur.  Pulmonary/Chest:  No respiratory distress, no wheezes, no crackles, with normal breath sounds and good air movement.  Abdominal:  Soft.  Bowel sounds are normal.  No distension and no tenderness.   Musculoskeletal:  No edema, no tenderness, and no deformity.  No red or swollen joints anywhere.    Neurological:  Alert and oriented to person, place, and time.  No cranial nerve deficit.  No tongue deviation.  No facial droop.  No slurred speech.   Skin:  Skin is warm and dry. No rash noted. No pallor.   Peripheral vascular:  Pulses in all 4 extremities with no clubbing, no cyanosis, no edema.  ----------------------------------------------------------------------------------------------------------------------  Tele:    ----------------------------------------------------------------------------------------------------------------------  Results from last 7 days   Lab Units 10/18/23  0030 10/17/23  1514   WBC 10*3/mm3 8.19 11.32*   HEMOGLOBIN g/dL 7.5* 5.5*   HEMATOCRIT % 23.6* 18.6*   MCV fL 86.1 94.4   MCHC g/dL 31.8 29.6*   PLATELETS 10*3/mm3 256 310  "    Results from last 7 days   Lab Units 10/17/23  1646   PH, ARTERIAL pH units 7.443   PO2 ART mm Hg 86.9   PCO2, ARTERIAL mm Hg 44.9   HCO3 ART mmol/L 30.6*     Results from last 7 days   Lab Units 10/18/23  0030 10/17/23  1514   SODIUM mmol/L 135* 137   POTASSIUM mmol/L 4.0 4.1   CHLORIDE mmol/L 97* 98   CO2 mmol/L 31.1* 27.6   BUN mg/dL 92* 89*   CREATININE mg/dL 2.55* 2.47*   CALCIUM mg/dL 8.6 8.5*   GLUCOSE mg/dL 132* 169*   ALBUMIN g/dL 3.1* 3.2*   BILIRUBIN mg/dL 0.7 0.5   ALK PHOS U/L 84 90   AST (SGOT) U/L 12 13   ALT (SGPT) U/L 6 5   Estimated Creatinine Clearance: 24.7 mL/min (A) (by C-G formula based on SCr of 2.55 mg/dL (H)).  No results found for: \"AMMONIA\"  Results from last 7 days   Lab Units 10/17/23  1514   CK TOTAL U/L 31             No results found for: \"HGBA1C\", \"POCGLU\"  Lab Results   Component Value Date    TSH 5.040 (H) 10/17/2023    FREET4 1.17 07/22/2023     No results found for: \"PREGTESTUR\", \"PREGSERUM\", \"HCG\", \"HCGQUANT\"  Pain Management Panel  More data exists         Latest Ref Rng & Units 7/22/2023 6/4/2023   Pain Management Panel   Creatinine, Urine mg/dL 83.5  111.5  111.5      Brief Urine Lab Results  (Last result in the past 365 days)        Color   Clarity   Blood   Leuk Est   Nitrite   Protein   CREAT   Urine HCG        10/17/23 1720 Yellow   Clear   Negative   Small (1+)   Negative   Negative                 No results found for: \"BLOODCX\"  Urine Culture   Date Value Ref Range Status   10/17/2023 <25,000 CFU/mL Gram Negative Bacilli (A)  Final     No results found for: \"WOUNDCX\"  No results found for: \"STOOLCX\"  No results found for: \"RESPCX\"  No results found for: \"AFBCX\"        I have personally looked at the labs and they are summarized above.  ----------------------------------------------------------------------------------------------------------------------  Detailed radiology reports for the last 24 hours:    Imaging Results (Last 24 Hours)       Procedure Component " Value Units Date/Time    XR Chest 1 View [[id number]] Collected: 10/17/23 1931     Updated: 10/17/23 1933    Narrative:      INDICATION: Cough.     TECHNIQUE: Frontal radiograph of the chest.     COMPARISON: 7/22/2023.     FINDINGS:   Cardiomegaly. Mild pulmonary vascular congestion. Elevation of the right  hemidiaphragm. No infiltrate, pleural effusion or pneumothorax. No acute  osseous abnormality evident.       Impression:      Mild pulmonary vascular congestion.        This report was finalized on 10/17/2023 7:31 PM by Alex Pallas, DO.             Assessment & Plan    Acute, symptomatic on chronic anemia  Hx iron deficiency anemia, AOCD/renal disease  Patient's hemoglobin has been consistently 9.5-11 in the past. 5.5 on presentation today. MCV in higher 90's. Platelets wnl. BUN trended up to 89 out of proportion to increase in Cr. Iron studies most consistent of AOCD/AOCKD but cannot rule out acute upper GI bleed that has not clinically presented itself. Total bili and K wnl, do not suspect hemolysis. No history of cirrhosis. Will start protonix IV. Folate, B12, TSH wnl. Retic appropriately elevated. Hold anticoagulation and start SCDs. Repeat hemoglobin after 2 units pRBC stable at 7.5. Will repeat in AM. Does not look overloaded, will diurese if needed. Will consult surgery if hemoglobin drops or clinically a bleed.     CKD IV  Creatinine worse. Consulted nephrology. IV iron ordered. Gentle hydration Appreciate recs.     Chronic:  IDDM 2  HTN  ASCVD  Chronic respiratory failure  Hyperlipidemia  CKD stage IV  JESIKA  HFpEF  Persistent atrial fibrillation chronically anticoagulated with Eliquis      Code status: Full     Dispo: Pending clinical improvement         VTE Prophylaxis:   Mechanical Order History:        Ordered        10/17/23 1817  Place Sequential Compression Device  Once            10/17/23 1817  Maintain Sequential Compression Device  Continuous                          Pharmalogical Order History:        None                Kirt Arellano MD  [facility] Hospitalist  10/18/23  18:38 EDT

## 2023-10-18 NOTE — PLAN OF CARE
Goal Outcome Evaluation:  Plan of Care Reviewed With: patient           Outcome Evaluation: Patient is resting in bed, VSS on 2L NC. No acute changes or complaints, bed alarm set, call light in reach. Will continue POC.

## 2023-10-18 NOTE — CASE MANAGEMENT/SOCIAL WORK
Discharge Planning Assessment   Sina     Patient Name: Jolly Garcia  MRN: [id number]  Today's Date: 10/18/2023    Admit Date: 10/17/2023    Plan: Pt is a resident at [facility]. SS contacted [facility] per Vicky who states pt has a 14 day bed hold. SS to follow and assist as needed with discharge planning.     Discharge Plan       Row Name 10/18/23 1110       Plan    Plan Pt is a resident at [facility]. SS contacted [facility] per Vicky who states pt has a 14 day bed hold. SS to follow and assist as needed with discharge planning.    Patient/Family in Agreement with Plan yes                  Continued Care and Services - Admitted Since 10/17/2023       Destination       Service Provider Request Status Selected Services Address Phone Fax Patient Preferred    [facility] Pending - No Request Sent N/A [address] [address] [phone] --                  Selected Continued Care - Prior Encounters Includes continued care and service providers with selected services from prior encounters from 7/19/2023 to 10/18/2023      Discharged on 7/28/2023 Admission date: 7/22/2023 - Discharge disposition: Skilled Nursing Facility (DC - External)      Destination       Service Provider Selected Services Address Phone Fax Patient Preferred    [facility] [address] [address] [phone] --                          Expected Discharge Date and Time       Expected Discharge Date Expected Discharge Time    Oct 20, 2023            Demographic Summary       Row Name 10/18/23 1115       General Information    Referral Source nursing    Reason for Consult --  SS received consult for Resident of [facility], Discharge Planning.             TRACI Germain

## 2023-10-19 LAB
ALBUMIN SERPL-MCNC: 3.1 G/DL (ref 3.5–5.2)
ALBUMIN/GLOB SERPL: 1.2 G/DL
ALP SERPL-CCNC: 82 U/L (ref 39–117)
ALT SERPL W P-5'-P-CCNC: 7 U/L (ref 1–33)
ANION GAP SERPL CALCULATED.3IONS-SCNC: 8.1 MMOL/L (ref 5–15)
AST SERPL-CCNC: 13 U/L (ref 1–32)
BASOPHILS # BLD AUTO: 0.06 10*3/MM3 (ref 0–0.2)
BASOPHILS NFR BLD AUTO: 0.7 % (ref 0–1.5)
BILIRUB SERPL-MCNC: 0.5 MG/DL (ref 0–1.2)
BUN SERPL-MCNC: 83 MG/DL (ref 8–23)
BUN/CREAT SERPL: 39 (ref 7–25)
CALCIUM SPEC-SCNC: 8.6 MG/DL (ref 8.6–10.5)
CHLORIDE SERPL-SCNC: 102 MMOL/L (ref 98–107)
CO2 SERPL-SCNC: 30.9 MMOL/L (ref 22–29)
CREAT SERPL-MCNC: 2.13 MG/DL (ref 0.57–1)
DEPRECATED RDW RBC AUTO: 61.3 FL (ref 37–54)
EGFRCR SERPLBLD CKD-EPI 2021: 24.1 ML/MIN/1.73
EOSINOPHIL # BLD AUTO: 0.15 10*3/MM3 (ref 0–0.4)
EOSINOPHIL NFR BLD AUTO: 1.9 % (ref 0.3–6.2)
ERYTHROCYTE [DISTWIDTH] IN BLOOD BY AUTOMATED COUNT: 19.7 % (ref 12.3–15.4)
GLOBULIN UR ELPH-MCNC: 2.6 GM/DL
GLUCOSE BLDC GLUCOMTR-MCNC: 100 MG/DL (ref 70–130)
GLUCOSE BLDC GLUCOMTR-MCNC: 106 MG/DL (ref 70–130)
GLUCOSE BLDC GLUCOMTR-MCNC: 109 MG/DL (ref 70–130)
GLUCOSE BLDC GLUCOMTR-MCNC: 82 MG/DL (ref 70–130)
GLUCOSE BLDC GLUCOMTR-MCNC: 87 MG/DL (ref 70–130)
GLUCOSE BLDC GLUCOMTR-MCNC: 95 MG/DL (ref 70–130)
GLUCOSE SERPL-MCNC: 96 MG/DL (ref 65–99)
HCT VFR BLD AUTO: 24.7 % (ref 34–46.6)
HGB BLD-MCNC: 7.7 G/DL (ref 12–15.9)
IMM GRANULOCYTES # BLD AUTO: 0.03 10*3/MM3 (ref 0–0.05)
IMM GRANULOCYTES NFR BLD AUTO: 0.4 % (ref 0–0.5)
LYMPHOCYTES # BLD AUTO: 1.54 10*3/MM3 (ref 0.7–3.1)
LYMPHOCYTES NFR BLD AUTO: 19.2 % (ref 19.6–45.3)
MAGNESIUM SERPL-MCNC: 2 MG/DL (ref 1.6–2.4)
MCH RBC QN AUTO: 27.7 PG (ref 26.6–33)
MCHC RBC AUTO-ENTMCNC: 31.2 G/DL (ref 31.5–35.7)
MCV RBC AUTO: 88.8 FL (ref 79–97)
MONOCYTES # BLD AUTO: 0.51 10*3/MM3 (ref 0.1–0.9)
MONOCYTES NFR BLD AUTO: 6.4 % (ref 5–12)
NEUTROPHILS NFR BLD AUTO: 5.73 10*3/MM3 (ref 1.7–7)
NEUTROPHILS NFR BLD AUTO: 71.4 % (ref 42.7–76)
NRBC BLD AUTO-RTO: 0 /100 WBC (ref 0–0.2)
PHOSPHATE SERPL-MCNC: 3.4 MG/DL (ref 2.5–4.5)
PLATELET # BLD AUTO: 289 10*3/MM3 (ref 140–450)
PMV BLD AUTO: 9.2 FL (ref 6–12)
POTASSIUM SERPL-SCNC: 3.7 MMOL/L (ref 3.5–5.2)
PROT SERPL-MCNC: 5.7 G/DL (ref 6–8.5)
RBC # BLD AUTO: 2.78 10*6/MM3 (ref 3.77–5.28)
SODIUM SERPL-SCNC: 141 MMOL/L (ref 136–145)
T4 FREE SERPL-MCNC: 1.34 NG/DL (ref 0.93–1.7)
WBC NRBC COR # BLD: 8.02 10*3/MM3 (ref 3.4–10.8)

## 2023-10-19 PROCEDURE — 83735 ASSAY OF MAGNESIUM: CPT | Performed by: INTERNAL MEDICINE

## 2023-10-19 PROCEDURE — 85025 COMPLETE CBC W/AUTO DIFF WBC: CPT | Performed by: INTERNAL MEDICINE

## 2023-10-19 PROCEDURE — 63710000001 INSULIN GLARGINE PER 5 UNITS: Performed by: INTERNAL MEDICINE

## 2023-10-19 PROCEDURE — 84100 ASSAY OF PHOSPHORUS: CPT | Performed by: INTERNAL MEDICINE

## 2023-10-19 PROCEDURE — 25810000003 LACTATED RINGERS PER 1000 ML: Performed by: INTERNAL MEDICINE

## 2023-10-19 PROCEDURE — 84439 ASSAY OF FREE THYROXINE: CPT | Performed by: INTERNAL MEDICINE

## 2023-10-19 PROCEDURE — 80053 COMPREHEN METABOLIC PANEL: CPT | Performed by: INTERNAL MEDICINE

## 2023-10-19 PROCEDURE — 82948 REAGENT STRIP/BLOOD GLUCOSE: CPT

## 2023-10-19 RX ADMIN — ATORVASTATIN CALCIUM 40 MG: 40 TABLET, FILM COATED ORAL at 20:48

## 2023-10-19 RX ADMIN — Medication 1 TABLET: at 09:10

## 2023-10-19 RX ADMIN — LORAZEPAM 0.5 MG: 0.5 TABLET ORAL at 20:49

## 2023-10-19 RX ADMIN — DOCUSATE SODIUM 50 MG AND SENNOSIDES 8.6 MG 2 TABLET: 8.6; 5 TABLET, FILM COATED ORAL at 09:11

## 2023-10-19 RX ADMIN — GUAIFENESIN 400 MG: 200 TABLET ORAL at 09:11

## 2023-10-19 RX ADMIN — Medication 10 ML: at 20:49

## 2023-10-19 RX ADMIN — Medication 500 MCG: at 09:10

## 2023-10-19 RX ADMIN — SODIUM ZIRCONIUM CYCLOSILICATE 10 G: 10 POWDER, FOR SUSPENSION ORAL at 09:10

## 2023-10-19 RX ADMIN — Medication 10 ML: at 09:11

## 2023-10-19 RX ADMIN — HYDRALAZINE HYDROCHLORIDE 25 MG: 25 TABLET, FILM COATED ORAL at 20:49

## 2023-10-19 RX ADMIN — GUAIFENESIN 400 MG: 200 TABLET ORAL at 16:01

## 2023-10-19 RX ADMIN — APIXABAN 2.5 MG: 2.5 TABLET, FILM COATED ORAL at 13:19

## 2023-10-19 RX ADMIN — GUAIFENESIN 400 MG: 200 TABLET ORAL at 20:48

## 2023-10-19 RX ADMIN — SODIUM CHLORIDE, POTASSIUM CHLORIDE, SODIUM LACTATE AND CALCIUM CHLORIDE 125 ML/HR: 600; 310; 30; 20 INJECTION, SOLUTION INTRAVENOUS at 13:58

## 2023-10-19 RX ADMIN — PANTOPRAZOLE SODIUM 40 MG: 40 INJECTION, POWDER, FOR SOLUTION INTRAVENOUS at 06:42

## 2023-10-19 RX ADMIN — SODIUM CHLORIDE, POTASSIUM CHLORIDE, SODIUM LACTATE AND CALCIUM CHLORIDE 125 ML/HR: 600; 310; 30; 20 INJECTION, SOLUTION INTRAVENOUS at 05:19

## 2023-10-19 RX ADMIN — LATANOPROST 1 DROP: 50 SOLUTION OPHTHALMIC at 21:45

## 2023-10-19 RX ADMIN — INSULIN GLARGINE 10 UNITS: 100 INJECTION, SOLUTION SUBCUTANEOUS at 20:52

## 2023-10-19 RX ADMIN — APIXABAN 2.5 MG: 2.5 TABLET, FILM COATED ORAL at 20:48

## 2023-10-19 NOTE — PLAN OF CARE
Goal Outcome Evaluation:              Outcome Evaluation: patient disorientated to time and situation. Patient has been hypotensive this shift. was given a 500 mL bolus. LR rate was adjusted. patient has no complaints at this time. bed alarm set, call light in reach

## 2023-10-19 NOTE — PROGRESS NOTES
"Nephrology Progress Note      Subjective     Patient denied any chest pain or shortness of breath    Objective       Vital signs :     Temp:  [97.5 °F (36.4 °C)-98.4 °F (36.9 °C)] 97.9 °F (36.6 °C)  Heart Rate:  [] 96  Resp:  [12-20] 20  BP: ()/(35-79) 99/60    Intake/Output                         10/17/23 0701 - 10/18/23 0700 10/18/23 0701 - 10/19/23 0700     2426-0630 5755-8161 Total 1662-7964 3486-3531 Total                 Intake    P.O.  --  -- --  585  -- 585    I.V.  --  635.3 635.3  --  2771 2771    Blood  --  600 600  --  -- --    Volume (Transfuse RBC Infuse Each Unit Over: 2H) -- 300 300 -- -- --    Volume (Transfuse RBC Infuse Each Unit Over: 2H) -- 300 300 -- -- --    Total Intake -- 1235.3 1235.3 585 2771 3356       Output    Urine  --  850 850  350  900 1250    Total Output -- 850 850              Physical Exam:    General Appearance : Not in acute distress  Lungs : clear to auscultation, respirations regular  Heart :  regular rhythm & normal rate, normal S1, S2 and no murmur, no rub  Abdomen : Soft, nondistended  Extremities : No edema,   Neurologic :   orientated to person, place, time and situation, Grossly no focal deficits        Laboratory Data :     Albumin Albumin   Date Value Ref Range Status   10/19/2023 3.1 (L) 3.5 - 5.2 g/dL Final   10/18/2023 3.1 (L) 3.5 - 5.2 g/dL Final   10/17/2023 3.2 (L) 3.5 - 5.2 g/dL Final      Magnesium Magnesium   Date Value Ref Range Status   10/19/2023 2.0 1.6 - 2.4 mg/dL Final          PTH               No results found for: \"PTH\"    CBC and coagulation:  Results from last 7 days   Lab Units 10/19/23  0047 10/18/23  2057 10/18/23  0030 10/17/23  1514   WBC 10*3/mm3 8.02  --  8.19 11.32*   HEMOGLOBIN g/dL 7.7* 7.4* 7.5* 5.5*   HEMATOCRIT % 24.7* 24.0* 23.6* 18.6*   MCV fL 88.8  --  86.1 94.4   MCHC g/dL 31.2*  --  31.8 29.6*   PLATELETS 10*3/mm3 289  --  256 310     Acid/base balance:  Results from last 7 days   Lab Units 10/17/23  1646 "   PH, ARTERIAL pH units 7.443   PO2 ART mm Hg 86.9   PCO2, ARTERIAL mm Hg 44.9   HCO3 ART mmol/L 30.6*     Renal and electrolytes:    Results from last 7 days   Lab Units 10/19/23  0047 10/18/23  0030 10/17/23  1514   SODIUM mmol/L 141 135* 137   POTASSIUM mmol/L 3.7 4.0 4.1   MAGNESIUM mg/dL 2.0  --   --    CHLORIDE mmol/L 102 97* 98   CO2 mmol/L 30.9* 31.1* 27.6   BUN mg/dL 83* 92* 89*   CREATININE mg/dL 2.13* 2.55* 2.47*   CALCIUM mg/dL 8.6 8.6 8.5*   PHOSPHORUS mg/dL 3.4  --   --      Estimated Creatinine Clearance: 29.6 mL/min (A) (by C-G formula based on SCr of 2.13 mg/dL (H)).  @GFRCG:3@   Liver and pancreatic function:  Results from last 7 days   Lab Units 10/19/23  0047 10/18/23  0030 10/17/23  1514   ALBUMIN g/dL 3.1* 3.1* 3.2*   BILIRUBIN mg/dL 0.5 0.7 0.5   ALK PHOS U/L 82 84 90   AST (SGOT) U/L 13 12 13   ALT (SGPT) U/L 7 6 5         Cardiac:      Liver and pancreatic function:  Results from last 7 days   Lab Units 10/19/23  0047 10/18/23  0030 10/17/23  1514   ALBUMIN g/dL 3.1* 3.1* 3.2*   BILIRUBIN mg/dL 0.5 0.7 0.5   ALK PHOS U/L 82 84 90   AST (SGOT) U/L 13 12 13   ALT (SGPT) U/L 7 6 5       Medications :     atorvastatin, 40 mg, Oral, Nightly  guaiFENesin, 400 mg, Oral, TID  hydrALAZINE, 25 mg, Oral, TID  insulin glargine, 10 Units, Subcutaneous, Nightly  isosorbide mononitrate, 30 mg, Oral, Daily  latanoprost, 1 drop, Both Eyes, Nightly  LORazepam, 0.5 mg, Oral, Nightly  metoprolol succinate XL, 50 mg, Oral, Daily  multivitamin with minerals, 1 tablet, Oral, Daily  pantoprazole, 40 mg, Intravenous, Q AM  senna-docusate sodium, 2 tablet, Oral, BID  sodium chloride, 10 mL, Intravenous, Q12H  sodium chloride, 10 mL, Intravenous, Q12H  sodium zirconium cyclosilicate, 10 g, Oral, Daily  vitamin B-12, 500 mcg, Oral, Daily      lactated ringers, 125 mL/hr, Last Rate: 125 mL/hr (10/19/23 0519)          Assessment & Plan     -WENDY, nonoliguric  - Chronic kidney disease G4 A1  - Acute on chronic anemia  likely multifactorial including anemia of chronic kidney disease  - Diabetes mellitus  - Essential hypertension  - History of heart failure with preserved ejection fraction, well compensated     Creatinine started improving, down to 2.1 from 2.5.  Renal ultrasound was negative for any obstructive nephropathy.  No evidence of fluid overload clinically will continue lactated Ringer for now    WENDY on CKD most likely multifactorial including prerenal azotemia, hemodynamic changes due to relative hypotension  Baseline creatinine appears to be 12.1, admitted with 2.4.  Mild hematuria, no proteinuria  - Continue lactated Ringer 75 mL/h  - Please avoid nephrotoxic agents, hypotension and adjust medications according to estimated GFR      Elaina Bell MD  10/19/23  08:24 EDT

## 2023-10-19 NOTE — PROGRESS NOTES
Psychiatric HOSPITALIST PROGRESS NOTE     Patient Identification:  Name:  Jolly Garcia  Age:  73 y.o.  Sex:  female  :  1950  MRN:  [id number]  Visit Number:  [id number]  ROOM: [address]     Primary Care Provider:  Whit Cadet APRN    Length of stay in inpatient status:  2    Subjective     Chief Compliant:    Chief Complaint   Patient presents with    Abnormal Lab       History of Presenting Illness:    Overnight patient had isolated episode of recorded asymptomatic hypotension. Patient denies she had any symptoms. Reports feeling fatigued today otherwise feels better. No noted signs of bleeding by her or nursing staff.     ROS:  Otherwise 10 point ROS negative other than documented above in HPI.     Objective     Current Hospital Meds:apixaban, 2.5 mg, Oral, Q12H  atorvastatin, 40 mg, Oral, Nightly  guaiFENesin, 400 mg, Oral, TID  hydrALAZINE, 25 mg, Oral, TID  insulin glargine, 10 Units, Subcutaneous, Nightly  isosorbide mononitrate, 30 mg, Oral, Daily  latanoprost, 1 drop, Both Eyes, Nightly  LORazepam, 0.5 mg, Oral, Nightly  metoprolol succinate XL, 50 mg, Oral, Daily  multivitamin with minerals, 1 tablet, Oral, Daily  pantoprazole, 40 mg, Intravenous, Q AM  senna-docusate sodium, 2 tablet, Oral, BID  sodium chloride, 10 mL, Intravenous, Q12H  sodium chloride, 10 mL, Intravenous, Q12H  sodium zirconium cyclosilicate, 10 g, Oral, Daily  vitamin B-12, 500 mcg, Oral, Daily    lactated ringers, 125 mL/hr, Last Rate: 125 mL/hr (10/19/23 05)        Current Antimicrobial Therapy:  Anti-Infectives (From admission, onward)      None          Current Diuretic Therapy:  Diuretics (From admission, onward)      None          ----------------------------------------------------------------------------------------------------------------------  Vital Signs:  Temp:  [97.5 °F (36.4 °C)-98.4 °F (36.9 °C)] 97.6 °F (36.4 °C)  Heart Rate:  [] 84  Resp:  [12-20] 18  BP: ()/(35-79)  91/51  SpO2:  [95 %-100 %] 98 %  on  Flow (L/min):  [1] 1;   Device (Oxygen Therapy): nasal cannula  Body mass index is 39.16 kg/m².    Wt Readings from Last 3 Encounters:   10/19/23 110 kg (242 lb 8.1 oz)   09/14/23 (!) 153 kg (337 lb)   07/28/23 (!) 152 kg (335 lb 4.8 oz)     Intake & Output (last 3 days)         10/16 0701  10/17 0700 10/17 0701  10/18 0700 10/18 0701  10/19 0700 10/19 0701  10/20 0700    P.O.   585 200    I.V. (mL/kg)  635.3 (5.8) 2771 (25.2)     Blood  600      Total Intake(mL/kg)  1235.3 (11.2) 3356 (30.5) 200 (1.8)    Urine (mL/kg/hr)  850 1250 (0.5)     Total Output  850 1250     Net  +385.3 +2106 +200            Urine Unmeasured Occurrence    1 x          Diet: Liquid Diets; Clear Liquid; Fluid Consistency: Thin (IDDSI 0)  ----------------------------------------------------------------------------------------------------------------------  Physical exam:  Constitutional:  Well-developed and well-nourished.  No respiratory distress.      HENT:  Head:  Normocephalic and atraumatic.  Mouth:  Moist mucous membranes.    Eyes:  Conjunctivae and EOM are normal. No scleral icterus.    Neck:  Neck supple.  No JVD present.    Cardiovascular:  Normal rate, regular rhythm and normal heart sounds with no murmur.  Pulmonary/Chest:  No respiratory distress, no wheezes, no crackles, with normal breath sounds and good air movement.  Abdominal:  Soft.  Bowel sounds are normal.  No distension and no tenderness.   Musculoskeletal:  No edema, no tenderness, and no deformity.  No red or swollen joints anywhere.    Neurological:  Alert and oriented to person, place, and time.  No cranial nerve deficit.  No tongue deviation.  No facial droop.  No slurred speech.   Skin:  Skin is warm and dry. No rash noted. No pallor.   Peripheral vascular:  Pulses in all 4 extremities with no clubbing, no cyanosis, no  "edema.  ----------------------------------------------------------------------------------------------------------------------  Tele:    ----------------------------------------------------------------------------------------------------------------------  Results from last 7 days   Lab Units 10/19/23  0047 10/18/23  2057 10/18/23  0030 10/17/23  1514   WBC 10*3/mm3 8.02  --  8.19 11.32*   HEMOGLOBIN g/dL 7.7* 7.4* 7.5* 5.5*   HEMATOCRIT % 24.7* 24.0* 23.6* 18.6*   MCV fL 88.8  --  86.1 94.4   MCHC g/dL 31.2*  --  31.8 29.6*   PLATELETS 10*3/mm3 289  --  256 310     Results from last 7 days   Lab Units 10/17/23  1646   PH, ARTERIAL pH units 7.443   PO2 ART mm Hg 86.9   PCO2, ARTERIAL mm Hg 44.9   HCO3 ART mmol/L 30.6*     Results from last 7 days   Lab Units 10/19/23  0047 10/18/23  0030 10/17/23  1514   SODIUM mmol/L 141 135* 137   POTASSIUM mmol/L 3.7 4.0 4.1   MAGNESIUM mg/dL 2.0  --   --    CHLORIDE mmol/L 102 97* 98   CO2 mmol/L 30.9* 31.1* 27.6   BUN mg/dL 83* 92* 89*   CREATININE mg/dL 2.13* 2.55* 2.47*   CALCIUM mg/dL 8.6 8.6 8.5*   PHOSPHORUS mg/dL 3.4  --   --    GLUCOSE mg/dL 96 132* 169*   ALBUMIN g/dL 3.1* 3.1* 3.2*   BILIRUBIN mg/dL 0.5 0.7 0.5   ALK PHOS U/L 82 84 90   AST (SGOT) U/L 13 12 13   ALT (SGPT) U/L 7 6 5   Estimated Creatinine Clearance: 29.6 mL/min (A) (by C-G formula based on SCr of 2.13 mg/dL (H)).  No results found for: \"AMMONIA\"  Results from last 7 days   Lab Units 10/17/23  1514   CK TOTAL U/L 31             Glucose   Date/Time Value Ref Range Status   10/19/2023 1149 106 70 - 130 mg/dL Final   10/19/2023 0641 82 70 - 130 mg/dL Final   10/19/2023 0519 87 70 - 130 mg/dL Final   10/19/2023 0345 95 70 - 130 mg/dL Final   10/18/2023 2135 123 70 - 130 mg/dL Final     Lab Results   Component Value Date    TSH 5.040 (H) 10/17/2023    FREET4 1.34 10/19/2023     No results found for: \"PREGTESTUR\", \"PREGSERUM\", \"HCG\", \"HCGQUANT\"  Pain Management Panel  More data exists         Latest Ref Rng & " "Units 7/22/2023 6/4/2023   Pain Management Panel   Creatinine, Urine mg/dL 83.5  111.5  111.5      Brief Urine Lab Results  (Last result in the past 365 days)        Color   Clarity   Blood   Leuk Est   Nitrite   Protein   CREAT   Urine HCG        10/17/23 1720 Yellow   Clear   Negative   Small (1+)   Negative   Negative                 No results found for: \"BLOODCX\"  Urine Culture   Date Value Ref Range Status   10/17/2023 <25,000 CFU/mL Gram Negative Bacilli (A)  Final     No results found for: \"WOUNDCX\"  No results found for: \"STOOLCX\"  No results found for: \"RESPCX\"  No results found for: \"AFBCX\"        I have personally looked at the labs and they are summarized above.  ----------------------------------------------------------------------------------------------------------------------  Detailed radiology reports for the last 24 hours:    Imaging Results (Last 24 Hours)       Procedure Component Value Units Date/Time    US Renal Bilateral [[id number]] Collected: 10/19/23 0812     Updated: 10/19/23 0814    Narrative:      EXAM:    US Retroperitoneal Limited, Renal     EXAM DATE:    10/18/2023 8:51 PM     CLINICAL HISTORY:    malena; D64.9-Anemia, unspecified; R41.0-Disorientation, unspecified;  Z79.01-Long term (current) use of anticoagulants; N18.4-Chronic kidney  disease, stage 4 (severe)     TECHNIQUE:    Real-time limited ultrasound of the retroperitoneum with image  documentation.     COMPARISON:    No relevant prior studies available.     FINDINGS:    Right kidney:  Unremarkable as visualized.  No stones.  No  hydronephrosis.  The right kidney measures 11.9 cm in length.    Left kidney:  Unremarkable as visualized.  No stones.  No  hydronephrosis.  The left kidney measures 9.6 cm in length.    Other findings:  Gallstones or sludge in the gallbladder.       Impression:        Gallstones or sludge in the gallbladder.        This report was finalized on 10/19/2023 8:12 AM by Dr. Maksim Woods MD.         "     Assessment & Plan    Acute, symptomatic on chronic anemia  Hx iron deficiency anemia, AOCD/renal disease  Patient's hemoglobin has been consistently 9.5-11 in the past. 5.5 on presentation today. MCV in higher 90's. Platelets wnl. BUN trended up to 89 out of proportion to increase in Cr. Iron studies most consistent of AOCD/AOCKD but cannot rule out acute upper GI bleed that has not clinically presented itself. Total bili and K wnl, do not suspect hemolysis. No history of cirrhosis. Started on IV protonix. Folate, B12, TSH all wnl. Retic appropriately elevated. Held anticoagulation and start SCDs. Repeat hemoglobin after 2 units pRBC stable at 7.5. Stable again at 7.7 today. Does not look overloaded, will diurese if needed. Will consult surgery if hemoglobin drops or clinically a bleed. Given stability and not convinced anemia driven by blood loss, will restart eliquis as below. Repeat labs in AM. Again, if drops will likely need inpatient endoscopy.      CKD IV  Creatinine worse. Consulted nephrology. IV iron ordered. Gentle hydration Appreciate recs.      Persistent atrial fibrillation chronically anticoagulated with Eliquis  [id number]: 6. High stroke risk. Patient borderline qualifies for decreased 2.5 mg dose. Will decrease to 2.5 mg and monitor as above.     Chronic:  IDDM 2  HTN  ASCVD  Chronic respiratory failure  Hyperlipidemia  CKD stage IV  JESIKA  HFpEF        Code status: Full      Dispo: Pending clinical improvement     VTE Prophylaxis:   Mechanical Order History:        Ordered        10/17/23 1817  Place Sequential Compression Device  Once            10/17/23 1817  Maintain Sequential Compression Device  Continuous                          Pharmalogical Order History:        Ordered     Dose Route Frequency Stop    10/19/23 1203  apixaban (ELIQUIS) tablet 2.5 mg         2.5 mg PO Every 12 Hours Scheduled --                    Kirt Arellano MD  [facility]  10/19/23  12:19  EDT

## 2023-10-19 NOTE — PLAN OF CARE
Problem: Adult Inpatient Plan of Care  Goal: Plan of Care Review  Outcome: Ongoing, Not Progressing  Flowsheets (Taken 10/19/2023 1530)  Progress: no change  Plan of Care Reviewed With: patient  Outcome Evaluation: Pt alert but confused. 1LNC. Pt in bed verbalizing no new complaints at this time. Call light within reach. Bed locked and in lowest position.  Goal: Patient-Specific Goal (Individualized)  Outcome: Ongoing, Not Progressing  Goal: Absence of Hospital-Acquired Illness or Injury  Outcome: Ongoing, Not Progressing  Intervention: Identify and Manage Fall Risk  Recent Flowsheet Documentation  Taken 10/19/2023 1500 by Renee Zacarias RN  Safety Promotion/Fall Prevention: safety round/check completed  Taken 10/19/2023 1100 by Renee Zacarias RN  Safety Promotion/Fall Prevention: safety round/check completed  Taken 10/19/2023 0900 by Renee Zacarias RN  Safety Promotion/Fall Prevention: safety round/check completed  Taken 10/19/2023 0700 by Renee Zacarias RN  Safety Promotion/Fall Prevention: safety round/check completed  Intervention: Prevent Skin Injury  Recent Flowsheet Documentation  Taken 10/19/2023 0800 by Renee Zacarias RN  Skin Protection:   adhesive use limited   incontinence pads utilized   skin-to-skin areas padded   skin-to-device areas padded   tubing/devices free from skin contact  Intervention: Prevent and Manage VTE (Venous Thromboembolism) Risk  Recent Flowsheet Documentation  Taken 10/19/2023 0800 by Renee Zacarias RN  VTE Prevention/Management:   bilateral   sequential compression devices on  Range of Motion: active ROM (range of motion) encouraged  Goal: Optimal Comfort and Wellbeing  Outcome: Ongoing, Not Progressing  Intervention: Provide Person-Centered Care  Recent Flowsheet Documentation  Taken 10/19/2023 0800 by Renee Zacarias RN  Trust Relationship/Rapport:   care explained   choices provided   emotional support provided   empathic listening provided   questions answered    questions encouraged   reassurance provided   thoughts/feelings acknowledged  Goal: Readiness for Transition of Care  Outcome: Ongoing, Not Progressing     Problem: Asthma Comorbidity  Goal: Maintenance of Asthma Control  Outcome: Ongoing, Not Progressing     Problem: Behavioral Health Comorbidity  Goal: Maintenance of Behavioral Health Symptom Control  Outcome: Ongoing, Not Progressing     Problem: COPD (Chronic Obstructive Pulmonary Disease) Comorbidity  Goal: Maintenance of COPD Symptom Control  Outcome: Ongoing, Not Progressing  Intervention: Maintain COPD-Symptom Control  Recent Flowsheet Documentation  Taken 10/19/2023 0800 by Renee Zacarias RN  Supportive Measures:   active listening utilized   relaxation techniques promoted     Problem: Diabetes Comorbidity  Goal: Blood Glucose Level Within Targeted Range  Outcome: Ongoing, Not Progressing  Intervention: Monitor and Manage Glycemia  Recent Flowsheet Documentation  Taken 10/19/2023 1400 by Renee Zacarias RN  Glycemic Management: blood glucose monitored  Taken 10/19/2023 0800 by Renee Zacarias RN  Glycemic Management: blood glucose monitored  Goal: Blood Glucose Level Within Targeted Range  Outcome: Ongoing, Not Progressing  Intervention: Monitor and Manage Glycemia  Recent Flowsheet Documentation  Taken 10/19/2023 1400 by Renee Zacarias RN  Glycemic Management: blood glucose monitored  Taken 10/19/2023 0800 by Renee Zacarias RN  Glycemic Management: blood glucose monitored     Problem: Heart Failure Comorbidity  Goal: Maintenance of Heart Failure Symptom Control  Outcome: Ongoing, Not Progressing  Goal: Maintenance of Heart Failure Symptom Control  Outcome: Ongoing, Not Progressing     Problem: Hypertension Comorbidity  Goal: Blood Pressure in Desired Range  Outcome: Ongoing, Not Progressing     Problem: Obstructive Sleep Apnea Risk or Actual Comorbidity Management  Goal: Unobstructed Breathing During Sleep  Outcome: Ongoing, Not Progressing     Problem:  Osteoarthritis Comorbidity  Goal: Maintenance of Osteoarthritis Symptom Control  Outcome: Ongoing, Not Progressing     Problem: Pain Chronic (Persistent) (Comorbidity Management)  Goal: Acceptable Pain Control and Functional Ability  Outcome: Ongoing, Not Progressing  Intervention: Manage Persistent Pain  Recent Flowsheet Documentation  Taken 10/19/2023 0800 by Renee Zacarias RN  Sleep/Rest Enhancement:   awakenings minimized   regular sleep/rest pattern promoted   relaxation techniques promoted  Intervention: Optimize Psychosocial Wellbeing  Recent Flowsheet Documentation  Taken 10/19/2023 0800 by Renee Zacarias RN  Supportive Measures:   active listening utilized   relaxation techniques promoted  Diversional Activities: television  Family/Support System Care: support provided     Problem: Seizure Disorder Comorbidity  Goal: Maintenance of Seizure Control  Outcome: Ongoing, Not Progressing     Problem: Skin Injury Risk Increased  Goal: Skin Health and Integrity  Outcome: Ongoing, Not Progressing  Intervention: Optimize Skin Protection  Recent Flowsheet Documentation  Taken 10/19/2023 0800 by Renee Zacarias RN  Pressure Reduction Techniques:   heels elevated off bed   positioned off wounds   pressure points protected   weight shift assistance provided  Pressure Reduction Devices:   pressure-redistributing mattress utilized   specialty bed utilized   positioning supports utilized   heel offloading device utilized  Skin Protection:   adhesive use limited   incontinence pads utilized   skin-to-skin areas padded   skin-to-device areas padded   tubing/devices free from skin contact     Problem: Fall Injury Risk  Goal: Absence of Fall and Fall-Related Injury  Outcome: Ongoing, Not Progressing  Intervention: Promote Injury-Free Environment  Recent Flowsheet Documentation  Taken 10/19/2023 1500 by Renee Zacarias RN  Safety Promotion/Fall Prevention: safety round/check completed  Taken 10/19/2023 1100 by Renee Zacarias  RN  Safety Promotion/Fall Prevention: safety round/check completed  Taken 10/19/2023 0900 by Renee Zacarias RN  Safety Promotion/Fall Prevention: safety round/check completed  Taken 10/19/2023 0700 by Renee Zacarias, RN  Safety Promotion/Fall Prevention: safety round/check completed   Goal Outcome Evaluation:  Plan of Care Reviewed With: patient        Progress: no change  Outcome Evaluation: Pt alert but confused. 1LNC. Pt in bed verbalizing no new complaints at this time. Call light within reach. Bed locked and in lowest position.

## 2023-10-19 NOTE — PROGRESS NOTES
[facility] HOSPITALISTS CROSS COVER NOTE    Patient Identification:  Name:  Jolly Garcia  Age:  73 y.o.  Sex:  female  :  1950  MRN:  [id number]  Visit number:  [id number]  Primary Care Provider:  Whit Cadet APRN    Length of stay in inpatient status:  1    Brief Update     Called about a manual blood pressure of 80/40.  The patient had received 2 units of PRBCs on 10/17/2023.  Per nurse Diana, no overt signs of bleeding have been seen tonight.  Stat H and H was similar to the one 21 hours prior.  Gave 500 mL NS bolus and increased the LR up to 125 mL/hour.  Now, the blood pressure is now 107/67 mmHg (MAP 74 mmHg).  Will continue to monitor the blood pressures closely.         Nehemias Pavon MD  [facility] Hospitalist  10/18/23  21:33 EDT

## 2023-10-19 NOTE — CONSULTS
Nephrology Consult Note    Referring Provider: Dr. Arellano  Reason for Consultation: Elevated creatinine    Subjective       History of present illness:  Jolly Garcia is a 73 y.o. female who presented to [facility] from nursing home with decreased hemoglobin level.  Patient is known to have a chronic kidney disease with baseline creatinine appears to be around 2.4, admitted with 2.4 as well.  Patient has been having worsening anemia, stated she has been feeling very poorly with decreased appetite, and poor oral intake.  Patient denied chronic NSAIDS use. Patient denies hematuria, dysuria, difficulty passing urine. No prior history of renal stones. No family history of renal disease    History  Past Medical History:   Diagnosis Date    Anxiety     CAD (coronary artery disease)     s/p CABG    Chronic hypoxemic respiratory failure     Wears 2 L/min at home    Essential hypertension     Hyperlipidemia     Type 2 diabetes mellitus    ,   Past Surgical History:   Procedure Laterality Date    CORONARY ARTERY BYPASS GRAFT  2011    HYSTERECTOMY     ,   Family History   Problem Relation Age of Onset    Diabetes Mother    ,   Social History     Tobacco Use    Smoking status: Former     Passive exposure: Past   Vaping Use    Vaping Use: Never used   Substance Use Topics    Alcohol use: Never    Drug use: Never   ,   Medications Prior to Admission   Medication Sig Dispense Refill Last Dose    aspirin 81 MG chewable tablet Chew 1 tablet Daily.   10/17/2023 at 0800    atorvastatin (LIPITOR) 40 MG tablet Take 1 tablet by mouth Every Night.   10/16/2023 at 2000    bumetanide (BUMEX) 2 MG tablet Take 1 tablet by mouth Daily.   10/17/2023 at 0800    Coenzyme Q10 (CoQ10) 50 MG capsule Take 2 capsules by mouth Daily.   10/17/2023 at 0800    DULoxetine (CYMBALTA) 30 MG capsule Take 1 capsule by mouth Daily.   10/17/2023 at 0800    Eliquis 5 MG tablet tablet Take 1 tablet by mouth Every 12 (Twelve) Hours.   10/17/2023 at  0800    ferrous sulfate 325 (65 FE) MG tablet Take 1 tablet by mouth Daily With Breakfast.   10/17/2023 at 0800    fluticasone (FLONASE) 50 MCG/ACT nasal spray 2 sprays by Each Nare route Daily. 9.9 mL 0 10/17/2023 at 0800    guaiFENesin (HUMIBID 3) 400 MG tablet Take 1 tablet by mouth 3 (Three) Times a Day.   10/17/2023 at 1200    hydrALAZINE (APRESOLINE) 25 MG tablet Take 1 tablet by mouth 3 (Three) Times a Day.   10/17/2023 at 1200mu    Insulin Aspart (novoLOG) 100 UNIT/ML injection Inject 2-10 Units under the skin into the appropriate area as directed 2 (Two) Times a Day Before Meals.   10/17/2023    insulin glargine (LANTUS, SEMGLEE) 100 UNIT/ML injection Inject 10 Units under the skin into the appropriate area as directed Every Night.   10/16/2023 at 2000    isosorbide mononitrate (IMDUR) 30 MG 24 hr tablet Take 1 tablet by mouth Daily.   10/17/2023 at 0800    latanoprost (XALATAN) 0.005 % ophthalmic solution Administer 1 drop to both eyes Every Night.   10/16/2023 at 2000    linaclotide (LINZESS) 290 MCG capsule capsule Take 1 capsule by mouth Every Morning Before Breakfast.   10/17/2023 at 0800    LORazepam (ATIVAN) 0.5 MG tablet Take 1 tablet by mouth Every Night.   10/16/2023 at 2000    metoprolol succinate XL (TOPROL-XL) 50 MG 24 hr tablet Take 1 tablet by mouth Daily.   10/17/2023 at 0800    multivitamin with minerals (I-cheyenne) tablet tablet Take 1 tablet by mouth Daily.   10/17/2023 at 0800    multivitamin with minerals tablet tablet Take 1 tablet by mouth Daily.   10/17/2023 at 0800    pantoprazole (PROTONIX) 40 MG EC tablet Take 1 tablet by mouth Every Morning. 90 tablet 0 10/17/2023 at 0800    Patiromer Sorbitex Calcium (Veltassa) 16.8 g pack Take 1 packet by mouth Daily.   10/17/2023    sennosides-docusate (senna-docusate sodium) 8.6-50 MG per tablet Take 1 tablet by mouth 2 (Two) Times a Day.   10/17/2023 at 0800    spironolactone (ALDACTONE) 25 MG tablet Take 1 tablet by mouth Daily.   10/17/2023  at 0800    vitamin B-12 (CYANOCOBALAMIN) 1000 MCG tablet Take 0.5 tablets by mouth Daily.   10/17/2023 at 0800    acetaminophen (TYLENOL) 500 MG tablet Take 2 tablets by mouth Every 8 (Eight) Hours As Needed for Mild Pain.   Unknown    dextromethorphan-guaifenesin (ROBITUSSIN-DM)  MG/5ML syrup Take 5 mL by mouth Every 4 (Four) Hours As Needed (Cough).   Unknown    diphenhydrAMINE (BENADRYL) 25 mg capsule Take 1 capsule by mouth Every 8 (Eight) Hours As Needed for Itching.   Unknown    HYDROcodone-acetaminophen (NORCO) 5-325 MG per tablet Take 1 tablet by mouth Every 8 (Eight) Hours As Needed for Moderate Pain. 12 tablet 0 10/12/2023    lactulose (CHRONULAC) 10 GM/15ML solution Take 15 mL by mouth 2 (Two) Times a Day As Needed (constipation).   10/10/2023    LORazepam (ATIVAN) 0.5 MG tablet Take 0.5 tablets by mouth Every 8 (Eight) Hours As Needed for Anxiety.   10/4/2023    nitroglycerin (NITROSTAT) 0.4 MG SL tablet Place 1 tablet under the tongue Every 5 (Five) Minutes As Needed for Chest Pain. Take no more than 3 doses in 15 minutes.   Unknown    ondansetron (ZOFRAN) 4 MG tablet Take 1 tablet by mouth Every 6 (Six) Hours As Needed for Nausea or Vomiting.   Unknown    Semaglutide, 1 MG/DOSE, (Ozempic, 1 MG/DOSE,) 4 MG/3ML solution pen-injector Inject 1 mg under the skin into the appropriate area as directed 1 (One) Time Per Week.   10/13/2023   , Scheduled Meds:  atorvastatin, 40 mg, Oral, Nightly  guaiFENesin, 400 mg, Oral, TID  hydrALAZINE, 25 mg, Oral, TID  insulin glargine, 10 Units, Subcutaneous, Nightly  isosorbide mononitrate, 30 mg, Oral, Daily  latanoprost, 1 drop, Both Eyes, Nightly  LORazepam, 0.5 mg, Oral, Nightly  metoprolol succinate XL, 50 mg, Oral, Daily  multivitamin with minerals, 1 tablet, Oral, Daily  pantoprazole, 40 mg, Intravenous, Q AM  senna-docusate sodium, 2 tablet, Oral, BID  sodium chloride, 10 mL, Intravenous, Q12H  sodium chloride, 10 mL, Intravenous, Q12H  sodium zirconium  cyclosilicate, 10 g, Oral, Daily  vitamin B-12, 500 mcg, Oral, Daily    , Continuous Infusions:  lactated ringers, 125 mL/hr, Last Rate: 125 mL/hr (10/18/23 2104)    , PRN Meds:    acetaminophen    senna-docusate sodium **AND** polyethylene glycol **AND** bisacodyl **AND** bisacodyl    dextromethorphan-guaifenesin    HYDROcodone-acetaminophen    influenza vaccine    lactulose    LORazepam    nitroglycerin    ondansetron    sodium chloride    sodium chloride    sodium chloride    sodium chloride and Allergies:  Patient has no known allergies.    Review of Systems  More than 10 point review of systems was done. Pertinent items are noted in HPI, all other systems reviewed and negative    Objective     Vital Signs  Temp:  [97.5 °F (36.4 °C)-98.5 °F (36.9 °C)] 97.9 °F (36.6 °C)  Heart Rate:  [79-96] 79  Resp:  [13-18] 16  BP: ()/(35-70) 80/40    Intake/Output                         10/17/23 0701 - 10/18/23 0700 10/18/23 0701 - 10/19/23 0700     1233-0932 7776-0311 Total 9652-3481 4364-6319 Total                 Intake    P.O.  --  -- --  585  -- 585    I.V.  --  635.3 635.3  --  -- --    Blood  --  600 600  --  -- --    Volume (Transfuse RBC Infuse Each Unit Over: 2H) -- 300 300 -- -- --    Volume (Transfuse RBC Infuse Each Unit Over: 2H) -- 300 300 -- -- --    Total Intake -- 1235.3 1235.3 585 -- 585       Output    Urine  --  850 850  350  -- 350    Total Output -- 850 850 350 -- 350             Physical Examination:  General Appearance: not in acute distress  No JVD  Lungs: Clear to auscultation, respirations regular and unlabored  Heart: Regular rhythm & normal rate, normal S1, S2, no murmur, no gallop, no rub   Abdomen: Normal bowel sounds, no masses and soft non-tender  Extremities: No edema  Neurologic: No apparent focal neurological deficit    Laboratory Data :      WBC WBC   Date Value Ref Range Status   10/18/2023 8.19 3.40 - 10.80 10*3/mm3 Final   10/17/2023 11.32 (H) 3.40 - 10.80 10*3/mm3 Final     "  HGB Hemoglobin   Date Value Ref Range Status   10/18/2023 7.4 (L) 12.0 - 15.9 g/dL Final   10/18/2023 7.5 (L) 12.0 - 15.9 g/dL Final   10/17/2023 5.5 (C) 12.0 - 15.9 g/dL Final      HCT Hematocrit   Date Value Ref Range Status   10/18/2023 24.0 (L) 34.0 - 46.6 % Final   10/18/2023 23.6 (L) 34.0 - 46.6 % Final   10/17/2023 18.6 (C) 34.0 - 46.6 % Final      Platlets No results found for: \"LABPLAT\"   MCV MCV   Date Value Ref Range Status   10/18/2023 86.1 79.0 - 97.0 fL Final   10/17/2023 94.4 79.0 - 97.0 fL Final          Sodium Sodium   Date Value Ref Range Status   10/18/2023 135 (L) 136 - 145 mmol/L Final   10/17/2023 137 136 - 145 mmol/L Final      Potassium Potassium   Date Value Ref Range Status   10/18/2023 4.0 3.5 - 5.2 mmol/L Final   10/17/2023 4.1 3.5 - 5.2 mmol/L Final      Chloride Chloride   Date Value Ref Range Status   10/18/2023 97 (L) 98 - 107 mmol/L Final   10/17/2023 98 98 - 107 mmol/L Final      CO2 CO2   Date Value Ref Range Status   10/18/2023 31.1 (H) 22.0 - 29.0 mmol/L Final   10/17/2023 27.6 22.0 - 29.0 mmol/L Final      BUN BUN   Date Value Ref Range Status   10/18/2023 92 (H) 8 - 23 mg/dL Final   10/17/2023 89 (H) 8 - 23 mg/dL Final      Creatinine Creatinine   Date Value Ref Range Status   10/18/2023 2.55 (H) 0.57 - 1.00 mg/dL Final   10/17/2023 2.47 (H) 0.57 - 1.00 mg/dL Final      Calcium Calcium   Date Value Ref Range Status   10/18/2023 8.6 8.6 - 10.5 mg/dL Final   10/17/2023 8.5 (L) 8.6 - 10.5 mg/dL Final      PO4 No results found for: \"CAPO4\"   Albumin Albumin   Date Value Ref Range Status   10/18/2023 3.1 (L) 3.5 - 5.2 g/dL Final   10/17/2023 3.2 (L) 3.5 - 5.2 g/dL Final      Magnesium No results found for: \"MG\"   Uric Acid No results found for: \"URICACID\"     Radiology results :     Imaging Results (Last 72 Hours)       Procedure Component Value Units Date/Time    US Renal Bilateral [[id number]] Resulted: 10/18/23 1951     Updated: 10/18/23 1951    XR Chest 1 View [[id number]] " Collected: 10/17/23 1931     Updated: 10/17/23 1933    Narrative:      INDICATION: Cough.     TECHNIQUE: Frontal radiograph of the chest.     COMPARISON: 7/22/2023.     FINDINGS:   Cardiomegaly. Mild pulmonary vascular congestion. Elevation of the right  hemidiaphragm. No infiltrate, pleural effusion or pneumothorax. No acute  osseous abnormality evident.       Impression:      Mild pulmonary vascular congestion.        This report was finalized on 10/17/2023 7:31 PM by Alex Pallas, DO.       CT Abdomen Pelvis Without Contrast [[id number]] Collected: 10/17/23 1641     Updated: 10/17/23 1644    Narrative:      EXAM:    CT Abdomen and Pelvis Without Intravenous Contrast     EXAM DATE:    10/17/2023 5:21 PM     CLINICAL HISTORY:    NEW SYMPTOMATIC ANEMIA; D64.9-Anemia, unspecified;  R41.0-Disorientation, unspecified; Z79.01-Long term (current) use of  anticoagulants; N18.4-Chronic kidney disease, stage 4 (severe)     TECHNIQUE:    Axial computed tomography images of the abdomen and pelvis without  intravenous contrast.  Sagittal and coronal reformatted images were  created and reviewed.  This CT exam was performed using one or more of  the following dose reduction techniques:  automated exposure control,  adjustment of the mA and/or kV according to patient size, and/or use of  iterative reconstruction technique.     COMPARISON:    6/1/2023     FINDINGS:    LUNG BASES:  Unremarkable as visualized.  No mass.  No consolidation.    HEART:  Cardiomegaly.      ABDOMEN:    LIVER:  Unremarkable as visualized.    GALLBLADDER AND BILE DUCTS:  Gallstones.  Gallbladder otherwise  unremarkable.  No ductal dilation.    PANCREAS:  Unremarkable as visualized.  No ductal dilation.    SPLEEN:  Unremarkable as visualized.  No splenomegaly.    ADRENALS:  Unremarkable as visualized.  No mass.    KIDNEYS AND URETERS:  Unremarkable as visualized.  No obstructing  stones.  No hydronephrosis.    STOMACH AND BOWEL:  Scattered diverticula in the  colon.  No  diverticulitis.  No obstruction.      PELVIS:    APPENDIX:  No findings to suggest acute appendicitis.    BLADDER:  Unremarkable as visualized.  No stones.    REPRODUCTIVE:  Unremarkable as visualized.      ABDOMEN and PELVIS:    INTRAPERITONEAL SPACE:  Unremarkable as visualized.  No free air.  No  significant fluid collection.    BONES/JOINTS:  No acute fracture.  No dislocation.    SOFT TISSUES:  Unremarkable as visualized.    VASCULATURE:  Atherosclerotic disease.  No abdominal aortic aneurysm.    LYMPH NODES:  Unremarkable as visualized.  No enlarged lymph nodes.       Impression:      1.  Cardiomegaly.  2.  Gallstones.  Gallbladder otherwise unremarkable.        This report was finalized on 10/17/2023 4:42 PM by Dr. Maksim Woods MD.                 Medications:      atorvastatin, 40 mg, Oral, Nightly  guaiFENesin, 400 mg, Oral, TID  hydrALAZINE, 25 mg, Oral, TID  insulin glargine, 10 Units, Subcutaneous, Nightly  isosorbide mononitrate, 30 mg, Oral, Daily  latanoprost, 1 drop, Both Eyes, Nightly  LORazepam, 0.5 mg, Oral, Nightly  metoprolol succinate XL, 50 mg, Oral, Daily  multivitamin with minerals, 1 tablet, Oral, Daily  pantoprazole, 40 mg, Intravenous, Q AM  senna-docusate sodium, 2 tablet, Oral, BID  sodium chloride, 10 mL, Intravenous, Q12H  sodium chloride, 10 mL, Intravenous, Q12H  sodium zirconium cyclosilicate, 10 g, Oral, Daily  vitamin B-12, 500 mcg, Oral, Daily      lactated ringers, 125 mL/hr, Last Rate: 125 mL/hr (10/18/23 2104)        Assessment & Plan       Symptomatic anemia    -WENDY, nonoliguric  - Chronic kidney disease G4 A1  - Acute on chronic anemia likely multifactorial including anemia of chronic kidney disease  - Diabetes mellitus  - Essential hypertension  - History of heart failure with preserved ejection fraction, well compensated    WENDY on CKD most likely multifactorial including prerenal azotemia, hemodynamic changes due to relative hypotension  Baseline creatinine  appears to be 12.1, admitted with 2.4.  Mild hematuria, no proteinuria  -Started on lactated Ringer 75 mL/h  - Renal ultrasound  - IV iron ferric gluconate 250 mg once  - Please avoid nephrotoxic agents, hypotension and adjust medications according to estimated GFR    Thanks Dr cuellar for the consult. Nephrology will follow the patient.   I discussed the patient's findings and my recommendations with patient    Elaina Bell MD  10/18/23  21:36 EDT

## 2023-10-20 LAB
ANION GAP SERPL CALCULATED.3IONS-SCNC: 10 MMOL/L (ref 5–15)
BASOPHILS # BLD AUTO: 0.04 10*3/MM3 (ref 0–0.2)
BASOPHILS NFR BLD AUTO: 0.5 % (ref 0–1.5)
BUN SERPL-MCNC: 70 MG/DL (ref 8–23)
BUN/CREAT SERPL: 40 (ref 7–25)
CALCIUM SPEC-SCNC: 8.4 MG/DL (ref 8.6–10.5)
CHLORIDE SERPL-SCNC: 103 MMOL/L (ref 98–107)
CO2 SERPL-SCNC: 25 MMOL/L (ref 22–29)
CREAT SERPL-MCNC: 1.75 MG/DL (ref 0.57–1)
DEPRECATED RDW RBC AUTO: 65.4 FL (ref 37–54)
EGFRCR SERPLBLD CKD-EPI 2021: 30.5 ML/MIN/1.73
EOSINOPHIL # BLD AUTO: 0.12 10*3/MM3 (ref 0–0.4)
EOSINOPHIL NFR BLD AUTO: 1.4 % (ref 0.3–6.2)
ERYTHROCYTE [DISTWIDTH] IN BLOOD BY AUTOMATED COUNT: 20 % (ref 12.3–15.4)
GLUCOSE BLDC GLUCOMTR-MCNC: 114 MG/DL (ref 70–130)
GLUCOSE SERPL-MCNC: 86 MG/DL (ref 65–99)
HCT VFR BLD AUTO: 25.8 % (ref 34–46.6)
HGB BLD-MCNC: 7.6 G/DL (ref 12–15.9)
IMM GRANULOCYTES # BLD AUTO: 0.03 10*3/MM3 (ref 0–0.05)
IMM GRANULOCYTES NFR BLD AUTO: 0.3 % (ref 0–0.5)
LYMPHOCYTES # BLD AUTO: 2.09 10*3/MM3 (ref 0.7–3.1)
LYMPHOCYTES NFR BLD AUTO: 23.7 % (ref 19.6–45.3)
MCH RBC QN AUTO: 28 PG (ref 26.6–33)
MCHC RBC AUTO-ENTMCNC: 29.5 G/DL (ref 31.5–35.7)
MCV RBC AUTO: 95.2 FL (ref 79–97)
MONOCYTES # BLD AUTO: 0.71 10*3/MM3 (ref 0.1–0.9)
MONOCYTES NFR BLD AUTO: 8.1 % (ref 5–12)
NEUTROPHILS NFR BLD AUTO: 5.82 10*3/MM3 (ref 1.7–7)
NEUTROPHILS NFR BLD AUTO: 66 % (ref 42.7–76)
NRBC BLD AUTO-RTO: 0.3 /100 WBC (ref 0–0.2)
PLATELET # BLD AUTO: 245 10*3/MM3 (ref 140–450)
PMV BLD AUTO: 9.1 FL (ref 6–12)
POTASSIUM SERPL-SCNC: 3.9 MMOL/L (ref 3.5–5.2)
RBC # BLD AUTO: 2.71 10*6/MM3 (ref 3.77–5.28)
SODIUM SERPL-SCNC: 138 MMOL/L (ref 136–145)
WBC NRBC COR # BLD: 8.81 10*3/MM3 (ref 3.4–10.8)

## 2023-10-20 PROCEDURE — 25810000003 LACTATED RINGERS PER 1000 ML: Performed by: INTERNAL MEDICINE

## 2023-10-20 PROCEDURE — 82948 REAGENT STRIP/BLOOD GLUCOSE: CPT

## 2023-10-20 PROCEDURE — 63710000001 INSULIN GLARGINE PER 5 UNITS: Performed by: INTERNAL MEDICINE

## 2023-10-20 PROCEDURE — 80048 BASIC METABOLIC PNL TOTAL CA: CPT | Performed by: INTERNAL MEDICINE

## 2023-10-20 PROCEDURE — 85025 COMPLETE CBC W/AUTO DIFF WBC: CPT | Performed by: INTERNAL MEDICINE

## 2023-10-20 RX ADMIN — GUAIFENESIN 400 MG: 200 TABLET ORAL at 16:31

## 2023-10-20 RX ADMIN — SODIUM CHLORIDE, POTASSIUM CHLORIDE, SODIUM LACTATE AND CALCIUM CHLORIDE 125 ML/HR: 600; 310; 30; 20 INJECTION, SOLUTION INTRAVENOUS at 13:06

## 2023-10-20 RX ADMIN — ATORVASTATIN CALCIUM 40 MG: 40 TABLET, FILM COATED ORAL at 20:58

## 2023-10-20 RX ADMIN — PANTOPRAZOLE SODIUM 40 MG: 40 INJECTION, POWDER, FOR SOLUTION INTRAVENOUS at 05:53

## 2023-10-20 RX ADMIN — DOCUSATE SODIUM 50 MG AND SENNOSIDES 8.6 MG 2 TABLET: 8.6; 5 TABLET, FILM COATED ORAL at 09:13

## 2023-10-20 RX ADMIN — INSULIN GLARGINE 10 UNITS: 100 INJECTION, SOLUTION SUBCUTANEOUS at 21:13

## 2023-10-20 RX ADMIN — GUAIFENESIN 400 MG: 200 TABLET ORAL at 09:14

## 2023-10-20 RX ADMIN — APIXABAN 2.5 MG: 2.5 TABLET, FILM COATED ORAL at 20:58

## 2023-10-20 RX ADMIN — Medication 10 ML: at 21:00

## 2023-10-20 RX ADMIN — GUAIFENESIN 400 MG: 200 TABLET ORAL at 20:58

## 2023-10-20 RX ADMIN — Medication 10 ML: at 09:18

## 2023-10-20 RX ADMIN — LATANOPROST 1 DROP: 50 SOLUTION OPHTHALMIC at 21:00

## 2023-10-20 RX ADMIN — APIXABAN 2.5 MG: 2.5 TABLET, FILM COATED ORAL at 09:14

## 2023-10-20 RX ADMIN — SODIUM CHLORIDE, POTASSIUM CHLORIDE, SODIUM LACTATE AND CALCIUM CHLORIDE 125 ML/HR: 600; 310; 30; 20 INJECTION, SOLUTION INTRAVENOUS at 21:09

## 2023-10-20 RX ADMIN — LORAZEPAM 0.5 MG: 0.5 TABLET ORAL at 20:58

## 2023-10-20 RX ADMIN — HYDRALAZINE HYDROCHLORIDE 25 MG: 25 TABLET, FILM COATED ORAL at 20:58

## 2023-10-20 RX ADMIN — Medication 1 TABLET: at 09:14

## 2023-10-20 RX ADMIN — Medication 500 MCG: at 09:14

## 2023-10-20 RX ADMIN — ISOSORBIDE MONONITRATE 30 MG: 30 TABLET, EXTENDED RELEASE ORAL at 09:14

## 2023-10-20 NOTE — CASE MANAGEMENT/SOCIAL WORK
Discharge Planning Assessment  [facility]     Patient Name: Jolly Garcia  MRN: [id number]  Today's Date: 10/20/2023    Admit Date: 10/17/2023     Discharge Plan       Row Name 10/20/23 1348       Plan    Plan Pt is a resident at [facility]. Per Vicky who states pt had a 14 day bed hold. SS received secure chat from Dr. Arellano to see if [facility] would accept pt back over weekend. SS contacted [facility] per Kelli facility will accept pt back over weekend and requested updated clinical on pt. SS placed updated clinical in basket via [email or web address] and notified Krysten. SS to follow and assist with discharge planning.             DEREK Collado

## 2023-10-20 NOTE — PLAN OF CARE
Goal Outcome Evaluation:              Outcome Evaluation: pt alert and oriented. VSS during shift. Pt didn't want to ambulate during shift. Pt resting in bed at this time, No request. WIll continue to follow plan of care til 7p.

## 2023-10-20 NOTE — PROGRESS NOTES
"Nephrology Progress Note      Subjective     No chest pain, shortness of breath    Objective       Vital signs :     Temp:  [97.9 °F (36.6 °C)-98.6 °F (37 °C)] 98.3 °F (36.8 °C)  Heart Rate:  [] 107  Resp:  [13-27] 15  BP: ()/(43-78) 122/78    Intake/Output                         10/18/23 0701 - 10/19/23 0700 10/19/23 0701 - 10/20/23 0700     8726-2954 4366-3119 Total 4618-2471 3577-9553 Total                 Intake    P.O.  585  -- 585  500  -- 500    I.V.  --  2771 2771  4087.4  1487.2 5574.6    Total Intake 585 2771 3356 4587.4 1487.2 6074.6       Output    Urine  350  900 1250  400  700 1100    Total Output               Physical Exam:    General Appearance : Not in acute distress  Lungs : clear to auscultation, respirations regular  Heart :  regular rhythm & normal rate, normal S1, S2 and no murmur, no rub  Abdomen : Soft, nondistended  Extremities : No edema,   Neurologic :   orientated to person, place, time and situation, Grossly no focal deficits        Laboratory Data :     Albumin Albumin   Date Value Ref Range Status   10/19/2023 3.1 (L) 3.5 - 5.2 g/dL Final   10/18/2023 3.1 (L) 3.5 - 5.2 g/dL Final   10/17/2023 3.2 (L) 3.5 - 5.2 g/dL Final      Magnesium Magnesium   Date Value Ref Range Status   10/19/2023 2.0 1.6 - 2.4 mg/dL Final          PTH               No results found for: \"PTH\"    CBC and coagulation:  Results from last 7 days   Lab Units 10/20/23  0020 10/19/23  0047 10/18/23  2057 10/18/23  0030   WBC 10*3/mm3 8.81 8.02  --  8.19   HEMOGLOBIN g/dL 7.6* 7.7* 7.4* 7.5*   HEMATOCRIT % 25.8* 24.7* 24.0* 23.6*   MCV fL 95.2 88.8  --  86.1   MCHC g/dL 29.5* 31.2*  --  31.8   PLATELETS 10*3/mm3 245 289  --  256     Acid/base balance:  Results from last 7 days   Lab Units 10/17/23  1646   PH, ARTERIAL pH units 7.443   PO2 ART mm Hg 86.9   PCO2, ARTERIAL mm Hg 44.9   HCO3 ART mmol/L 30.6*     Renal and electrolytes:    Results from last 7 days   Lab Units " 10/20/23  0020 10/19/23  0047 10/18/23  0030 10/17/23  1514   SODIUM mmol/L 138 141 135* 137   POTASSIUM mmol/L 3.9 3.7 4.0 4.1   MAGNESIUM mg/dL  --  2.0  --   --    CHLORIDE mmol/L 103 102 97* 98   CO2 mmol/L 25.0 30.9* 31.1* 27.6   BUN mg/dL 70* 83* 92* 89*   CREATININE mg/dL 1.75* 2.13* 2.55* 2.47*   CALCIUM mg/dL 8.4* 8.6 8.6 8.5*   PHOSPHORUS mg/dL  --  3.4  --   --      Estimated Creatinine Clearance: 37.4 mL/min (A) (by C-G formula based on SCr of 1.75 mg/dL (H)).  @GFRCG:3@   Liver and pancreatic function:  Results from last 7 days   Lab Units 10/19/23  0047 10/18/23  0030 10/17/23  1514   ALBUMIN g/dL 3.1* 3.1* 3.2*   BILIRUBIN mg/dL 0.5 0.7 0.5   ALK PHOS U/L 82 84 90   AST (SGOT) U/L 13 12 13   ALT (SGPT) U/L 7 6 5         Cardiac:      Liver and pancreatic function:  Results from last 7 days   Lab Units 10/19/23  0047 10/18/23  0030 10/17/23  1514   ALBUMIN g/dL 3.1* 3.1* 3.2*   BILIRUBIN mg/dL 0.5 0.7 0.5   ALK PHOS U/L 82 84 90   AST (SGOT) U/L 13 12 13   ALT (SGPT) U/L 7 6 5       Medications :     apixaban, 2.5 mg, Oral, Q12H  atorvastatin, 40 mg, Oral, Nightly  guaiFENesin, 400 mg, Oral, TID  hydrALAZINE, 25 mg, Oral, TID  insulin glargine, 10 Units, Subcutaneous, Nightly  isosorbide mononitrate, 30 mg, Oral, Daily  latanoprost, 1 drop, Both Eyes, Nightly  LORazepam, 0.5 mg, Oral, Nightly  metoprolol succinate XL, 50 mg, Oral, Daily  multivitamin with minerals, 1 tablet, Oral, Daily  pantoprazole, 40 mg, Intravenous, Q AM  senna-docusate sodium, 2 tablet, Oral, BID  sodium chloride, 10 mL, Intravenous, Q12H  sodium chloride, 10 mL, Intravenous, Q12H  sodium zirconium cyclosilicate, 10 g, Oral, Daily  vitamin B-12, 500 mcg, Oral, Daily      lactated ringers, 125 mL/hr, Last Rate: 125 mL/hr (10/19/23 8766)          Assessment & Plan     -WENDY, nonoliguric  - Chronic kidney disease G4 A1  - Acute on chronic anemia likely multifactorial including anemia of chronic kidney disease  - Diabetes mellitus  -  Essential hypertension  - History of heart failure with preserved ejection fraction, well compensated     Creatinine further improved to 1.7, down from 2.1.  Hyperkalemia has improved, will stop Lokelma.  Continue on lactated Ringer for next 24 hours as well.  I have written parameters to hold hydralazine    WENDY on CKD most likely multifactorial including prerenal azotemia, hemodynamic changes due to relative hypotension  Baseline creatinine appears to be 12.1, admitted with 2.4.  Mild hematuria, no proteinuria  - Continue lactated Ringer 75 mL/h  - Please avoid nephrotoxic agents, hypotension and adjust medications according to estimated GFR      Elaina Bell MD  10/20/23  08:08 EDT

## 2023-10-20 NOTE — PLAN OF CARE
Problem: Adult Inpatient Plan of Care  Goal: Plan of Care Review  Outcome: Ongoing, Progressing  Flowsheets (Taken 10/20/2023 0521)  Outcome Evaluation: pt alert but confused. pt on 1l NC. pt bed alram set call light in reach. vss at this time  Goal: Patient-Specific Goal (Individualized)  Outcome: Ongoing, Progressing  Goal: Absence of Hospital-Acquired Illness or Injury  Outcome: Ongoing, Progressing  Intervention: Identify and Manage Fall Risk  Recent Flowsheet Documentation  Taken 10/20/2023 0300 by Saw Zaldivar RN  Safety Promotion/Fall Prevention:   safety round/check completed   room organization consistent   nonskid shoes/slippers when out of bed   lighting adjusted   fall prevention program maintained   clutter free environment maintained   assistive device/personal items within reach   activity supervised  Taken 10/20/2023 0130 by Saw Zaldivar RN  Safety Promotion/Fall Prevention:   safety round/check completed   room organization consistent   nonskid shoes/slippers when out of bed   lighting adjusted   fall prevention program maintained   clutter free environment maintained   assistive device/personal items within reach   activity supervised  Taken 10/20/2023 0100 by Saw Zaldivar RN  Safety Promotion/Fall Prevention:   safety round/check completed   room organization consistent   nonskid shoes/slippers when out of bed   lighting adjusted   fall prevention program maintained   clutter free environment maintained   assistive device/personal items within reach   activity supervised  Taken 10/19/2023 2300 by Saw Zaldivar, FABIAN  Safety Promotion/Fall Prevention:   safety round/check completed   room organization consistent   nonskid shoes/slippers when out of bed   lighting adjusted   fall prevention program maintained   clutter free environment maintained   assistive device/personal items within reach   activity supervised  Taken 10/19/2023 2100 by Saw Zaldivar RN  Safety Promotion/Fall Prevention:    safety round/check completed   room organization consistent   nonskid shoes/slippers when out of bed   lighting adjusted   fall prevention program maintained   clutter free environment maintained   assistive device/personal items within reach   activity supervised  Taken 10/19/2023 1900 by Saw Zaldivar RN  Safety Promotion/Fall Prevention:   safety round/check completed   room organization consistent   nonskid shoes/slippers when out of bed   lighting adjusted   fall prevention program maintained   clutter free environment maintained   activity supervised   assistive device/personal items within reach  Intervention: Prevent Skin Injury  Recent Flowsheet Documentation  Taken 10/20/2023 0130 by Saw Zaldivar RN  Skin Protection: adhesive use limited  Taken 10/19/2023 2100 by Saw Zaldivar RN  Skin Protection: adhesive use limited  Intervention: Prevent and Manage VTE (Venous Thromboembolism) Risk  Recent Flowsheet Documentation  Taken 10/20/2023 0130 by Saw Zaldivar RN  Activity Management: activity encouraged  VTE Prevention/Management:   bilateral   sequential compression devices on  Range of Motion: active ROM (range of motion) encouraged  Taken 10/19/2023 2100 by Saw Zaldivar RN  Activity Management: patient refuses activity  VTE Prevention/Management:   bilateral   sequential compression devices on  Range of Motion: active ROM (range of motion) encouraged  Intervention: Prevent Infection  Recent Flowsheet Documentation  Taken 10/20/2023 0300 by Saw Zaldivar RN  Infection Prevention:   single patient room provided   rest/sleep promoted   hand hygiene promoted   environmental surveillance performed  Taken 10/20/2023 0130 by Saw Zaldivar RN  Infection Prevention:   single patient room provided   rest/sleep promoted   hand hygiene promoted   environmental surveillance performed  Taken 10/20/2023 0100 by Saw Zaldivar RN  Infection Prevention:   single patient room provided   rest/sleep promoted   hand hygiene  promoted   environmental surveillance performed  Taken 10/19/2023 2300 by Saw Zaldivar RN  Infection Prevention:   single patient room provided   rest/sleep promoted   hand hygiene promoted   environmental surveillance performed  Taken 10/19/2023 2100 by Saw Zaldivar RN  Infection Prevention:   single patient room provided   rest/sleep promoted   hand hygiene promoted   environmental surveillance performed  Taken 10/19/2023 1900 by Saw Zaldivar RN  Infection Prevention:   single patient room provided   rest/sleep promoted   hand hygiene promoted   environmental surveillance performed  Goal: Optimal Comfort and Wellbeing  Outcome: Ongoing, Progressing  Intervention: Provide Person-Centered Care  Recent Flowsheet Documentation  Taken 10/20/2023 0130 by Saw Zaldivar RN  Trust Relationship/Rapport: thoughts/feelings acknowledged  Taken 10/19/2023 2100 by Saw Zaldivar RN  Trust Relationship/Rapport: thoughts/feelings acknowledged  Goal: Readiness for Transition of Care  Outcome: Ongoing, Progressing     Problem: Asthma Comorbidity  Goal: Maintenance of Asthma Control  Outcome: Ongoing, Progressing     Problem: Behavioral Health Comorbidity  Goal: Maintenance of Behavioral Health Symptom Control  Outcome: Ongoing, Progressing     Problem: COPD (Chronic Obstructive Pulmonary Disease) Comorbidity  Goal: Maintenance of COPD Symptom Control  Outcome: Ongoing, Progressing  Intervention: Maintain COPD-Symptom Control  Recent Flowsheet Documentation  Taken 10/20/2023 0130 by Saw Zaldivar RN  Supportive Measures: active listening utilized  Taken 10/19/2023 2100 by Saw Zaldivar RN  Supportive Measures: active listening utilized     Problem: Diabetes Comorbidity  Goal: Blood Glucose Level Within Targeted Range  Outcome: Ongoing, Progressing  Intervention: Monitor and Manage Glycemia  Recent Flowsheet Documentation  Taken 10/19/2023 2100 by Saw Zaldivar RN  Glycemic Management: blood glucose monitored  Goal: Blood Glucose  Level Within Targeted Range  Outcome: Ongoing, Progressing  Intervention: Monitor and Manage Glycemia  Recent Flowsheet Documentation  Taken 10/19/2023 2100 by Saw Zaldivar RN  Glycemic Management: blood glucose monitored     Problem: Heart Failure Comorbidity  Goal: Maintenance of Heart Failure Symptom Control  Outcome: Ongoing, Progressing  Goal: Maintenance of Heart Failure Symptom Control  Outcome: Ongoing, Progressing     Problem: Hypertension Comorbidity  Goal: Blood Pressure in Desired Range  Outcome: Ongoing, Progressing     Problem: Obstructive Sleep Apnea Risk or Actual Comorbidity Management  Goal: Unobstructed Breathing During Sleep  Outcome: Ongoing, Progressing     Problem: Osteoarthritis Comorbidity  Goal: Maintenance of Osteoarthritis Symptom Control  Outcome: Ongoing, Progressing  Intervention: Maintain Osteoarthritis Symptom Control  Recent Flowsheet Documentation  Taken 10/20/2023 0130 by Saw Zaldivar RN  Activity Management: activity encouraged  Taken 10/19/2023 2100 by Saw Zaldivar RN  Activity Management: patient refuses activity     Problem: Pain Chronic (Persistent) (Comorbidity Management)  Goal: Acceptable Pain Control and Functional Ability  Outcome: Ongoing, Progressing  Intervention: Manage Persistent Pain  Recent Flowsheet Documentation  Taken 10/19/2023 2100 by Saw Zaldivar RN  Sleep/Rest Enhancement: awakenings minimized  Intervention: Optimize Psychosocial Wellbeing  Recent Flowsheet Documentation  Taken 10/20/2023 0130 by Saw Zaldivar RN  Supportive Measures: active listening utilized  Diversional Activities: television  Family/Support System Care: support provided  Taken 10/19/2023 2100 by Saw Zaldivar RN  Supportive Measures: active listening utilized  Diversional Activities: television  Family/Support System Care: support provided     Problem: Seizure Disorder Comorbidity  Goal: Maintenance of Seizure Control  Outcome: Ongoing, Progressing     Problem: Skin Injury Risk  Increased  Goal: Skin Health and Integrity  Outcome: Ongoing, Progressing  Intervention: Optimize Skin Protection  Recent Flowsheet Documentation  Taken 10/20/2023 0130 by Saw Zaldivar RN  Pressure Reduction Devices: specialty bed utilized  Skin Protection: adhesive use limited  Taken 10/19/2023 2100 by Saw Zaldivar RN  Pressure Reduction Devices: specialty bed utilized  Skin Protection: adhesive use limited     Problem: Fall Injury Risk  Goal: Absence of Fall and Fall-Related Injury  Outcome: Ongoing, Progressing  Intervention: Promote Injury-Free Environment  Recent Flowsheet Documentation  Taken 10/20/2023 0300 by Saw Zaldivar RN  Safety Promotion/Fall Prevention:   safety round/check completed   room organization consistent   nonskid shoes/slippers when out of bed   lighting adjusted   fall prevention program maintained   clutter free environment maintained   assistive device/personal items within reach   activity supervised  Taken 10/20/2023 0130 by Saw Zaldivar RN  Safety Promotion/Fall Prevention:   safety round/check completed   room organization consistent   nonskid shoes/slippers when out of bed   lighting adjusted   fall prevention program maintained   clutter free environment maintained   assistive device/personal items within reach   activity supervised  Taken 10/20/2023 0100 by Saw Zaldivar RN  Safety Promotion/Fall Prevention:   safety round/check completed   room organization consistent   nonskid shoes/slippers when out of bed   lighting adjusted   fall prevention program maintained   clutter free environment maintained   assistive device/personal items within reach   activity supervised  Taken 10/19/2023 2300 by Saw Zaldivar RN  Safety Promotion/Fall Prevention:   safety round/check completed   room organization consistent   nonskid shoes/slippers when out of bed   lighting adjusted   fall prevention program maintained   clutter free environment maintained   assistive device/personal items  within reach   activity supervised  Taken 10/19/2023 2100 by Saw Zaldivar, RN  Safety Promotion/Fall Prevention:   safety round/check completed   room organization consistent   nonskid shoes/slippers when out of bed   lighting adjusted   fall prevention program maintained   clutter free environment maintained   assistive device/personal items within reach   activity supervised  Taken 10/19/2023 1900 by Saw Zaldivar, RN  Safety Promotion/Fall Prevention:   safety round/check completed   room organization consistent   nonskid shoes/slippers when out of bed   lighting adjusted   fall prevention program maintained   clutter free environment maintained   activity supervised   assistive device/personal items within reach   Goal Outcome Evaluation:              Outcome Evaluation: pt alert but confused. pt on 1l NC. pt bed alram set call light in reach. vss at this time

## 2023-10-20 NOTE — DISCHARGE PLACEMENT REQUEST
"Jolly Nolan (73 y.o. Female)       Date of Birth   1950    Social Security Number       Address   [address]  [facility] [address]    Home Phone   [phone]    MRN   [id number]       Yarsanism   None    Marital Status   Single                            Admission Date   10/17/23    Admission Type   Emergency    Admitting Provider   Kirt Arellano MD    Attending Provider   Kirt Arellano MD    Department, Room/Bed   [facility], P219/1P       Discharge Date       Discharge Disposition       Discharge Destination                                 Attending Provider: Kirt Arellano MD    Allergies: No Known Allergies    Isolation: None   Infection: None   Code Status: CPR    Ht: 167.6 cm (65.98\")   Wt: 118 kg (260 lb 9.3 oz)    Admission Cmt: None   Principal Problem: Symptomatic anemia [D64.9]                   Active Insurance as of 10/17/2023       Primary Coverage       Payor Plan Insurance Group Employer/Plan Group    HUMANA MEDICARE REPLACEMENT HUMANA MEDICARE REPLACEMENT [id number]       Payor Plan Address Payor Plan Phone Number Payor Plan Fax Number Effective Dates    [address] [address] [phone]  1/1/2018 - None Entered    [facility] [address]         Subscriber Name Subscriber Birth Date Member ID       JOLLY NOLAN 1950 [id number]               Secondary Coverage       Payor Plan Insurance Group Employer/Plan Group    [place]        Payor Plan Address Payor Plan Phone Number Payor Plan Fax Number Effective Dates    [address] [phone]  11/1/2022 - None Entered    [facility] [id number]         Subscriber Name Subscriber Birth Date Member ID       JOLLY NOLAN 1950 [id number]               Tertiary Coverage       Payor Plan Insurance Group Employer/Plan Group    [employer]ANCE        Payor Plan Address Payor Plan Phone Number Payor Plan Fax Number Effective Dates    [address] [phone]  1/1/2022 - None Entered    [facility] [address]         Subscriber Name Subscriber Birth Date Member ID       YVONNE NOLAN 1950 [id number]                     Emergency Contacts        (Rel.) Home Phone Work Phone Mobile Phone    DARCI MARTIN (Relative) [phone] -- --              Insurance Information                  HUMANA MEDICARE REPLACEMENT/HUMANA MEDICARE REPLACEMENT Phone: [phone]    Subscriber: Yvonne Nolan Subscriber#: [id number]    Group#: [id number] Precert#: [id number]        [place] Phone: [phone]    Subscriber: Yvonne Nolan Subscriber#: [id number]    Group#: -- Precert#: --        [employer] INSURANCE Phone: [phone]    Subscriber: Yvonne Nolan Subscriber#: [id number]    Group#: -- Precert#: --             History & Physical        Jose Cook PA-C at 10/17/23 1744       Attestation signed by Kirt Arellano MD at 10/17/23 1915 (Updated)    I have reviewed this documentation and agree.    Ms. Nolan is our 72 yo F with hx T2DM, HTN, ASCVD, chronic respiratory failure, hyperlipidemia, CKD IV, JESIKA, HFpEF, chronic iron deficiency anemia, persistent atrial fibrillation on Eliquis who presents from nursing home low hemoglobin. Patient denies any bleeding or signs of bleeding. No abdominal or chest pain. She reports she has had some weakness and fatigue. Patient's hemoglobin has been consistently 9.5-11 in the past. 5.5 on presentation today. MCV in higher 90's. Platelets wnl. BUN trended up to 89 out of proportion to increase in Cr. Iron studies most consistent of AOCD/AOCKD but cannot rule out acute upper GI bleed that has not clinically presented itself. Total bili and K wnl, do not suspect hemolysis. No history of cirrhosis. Will start protonix gtt. Folate, B12, TSH pending. Retic pending. Hold anticoagulation and start SCDs. Repeat hemoglobin after 2  "units pRBC. Does not look overloaded, will diurese if needed. Will consult surgery if hemoglobin drops or clinically a bleed.                       [facility] Medicine Services  History & Physical    Patient Identification:  Name:  Jolly Garcia  Age:  73 y.o.  Sex:  female  :  1950  MRN:  [id number]   Visit Number:  [id number]  Admit Date: 10/17/2023   Primary Care Physician:  Whit Cadet APRN    Subjective     Chief complaint:     History of presenting illness:      Jolly Garcia is a 73 y.o. female who presented from local SNF secondary to fatigue and low hemoglobin.  Per report from SNF patient has been somewhat lethargic and sleeping more than usual. On exam patient is alert and oriented. She confirms she has been sleeping more than usual but not feeling ill. She states \"I just love to sleep.\" She denies chills. No complaint of generalized weakness but states she hasn't been out of bed recently. She endorses vomiting earlier while in the ED but did not inspect it so unable to confirm or deny the presence of poornima blood or coffee ground emesis. She also reports she is unable to tell me if she has had any hematochezia or melena. She denies any abdominal pain. No hematuria, no dysuria. She denies shortness of breath or cough. No chest pain. She does have some bruising to the bilateral upper extremities but denies any recent trauma causing large bruises/hematomas.     Past medical history is significant for T2DM, HTN, ASCVD, chronic respiratory failure, hyperlipidemia, CKD IV, JESIKA, HFpEF, chronic iron deficiency anemia, persistent atrial fibrillation on Eliquis    Upon arrival to the ED, vital signs were temp 98.3, heart rate 87, respirations 16, BP 95/49, SPO2 100% on room air.  Laboratory work-up notable for increased BUN at 89 from baseline 50-60.  Iron profile notable for low transferrin at 187, TIBC low at 279.  CBC noted white blood cell count elevated at " 11.32, RBCs low at 1.97 with H&H 5.5 and 18.6.  Baseline hemoglobin is 9-10.  CT abdomen and pelvis without contrast noted cardiomegaly and gallstones.    Known Emergency Department medications received prior to my evaluation included 2 units PRBCs.   Emergency Department Room location at the time of my evaluation was 114.     ---------------------------------------------------------------------------------------------------------------------   Review of Systems   Constitutional:  Positive for fatigue. Negative for chills and fever.   HENT:  Negative for congestion and rhinorrhea.    Respiratory:  Negative for cough and shortness of breath.    Cardiovascular:  Negative for chest pain and leg swelling.   Gastrointestinal:  Positive for nausea and vomiting. Negative for abdominal pain and constipation.   Genitourinary:  Negative for difficulty urinating, dysuria and hematuria.   Musculoskeletal:  Negative for arthralgias and myalgias.   Skin:  Negative for rash and wound.   Neurological:  Negative for dizziness and light-headedness.      ---------------------------------------------------------------------------------------------------------------------   Past Medical History:   Diagnosis Date    Anxiety     CAD (coronary artery disease)     s/p CABG    Chronic hypoxemic respiratory failure     Wears 2 L/min at home    Essential hypertension     Hyperlipidemia     Type 2 diabetes mellitus      Past Surgical History:   Procedure Laterality Date    CORONARY ARTERY BYPASS GRAFT  2011    HYSTERECTOMY       Family History   Problem Relation Age of Onset    Diabetes Mother      Social History     Socioeconomic History    Marital status: Single   Tobacco Use    Smoking status: Former     Passive exposure: Past   Vaping Use    Vaping Use: Never used   Substance and Sexual Activity    Alcohol use: Never    Drug use: Never    Sexual activity: Defer      ---------------------------------------------------------------------------------------------------------------------   Allergies:  Patient has no known allergies.  ---------------------------------------------------------------------------------------------------------------------   Home medications:    Medications below are reported home medications pulling from within the system; at this time, these medications have not been reconciled unless otherwise specified and are in the verification process for further verifcation as current home medications.  (Not in a hospital admission)      Hospital Scheduled Meds:    No current facility-administered medications for this encounter.      Current listed hospital scheduled medications may not yet reflect those currently placed in orders that are signed and held awaiting patient's arrival to floor.   ---------------------------------------------------------------------------------------------------------------------     Objective     Vital Signs:  Temp:  [98.2 °F (36.8 °C)-98.3 °F (36.8 °C)] 98.2 °F (36.8 °C)  Heart Rate:  [87-94] 87  Resp:  [16] 16  BP: ()/(41-58) 94/51      10/17/23  1456   Weight: 136 kg (300 lb)     Body mass index is 48.42 kg/m².  ---------------------------------------------------------------------------------------------------------------------       Physical Exam  Vitals and nursing note reviewed.   Constitutional:       General: She is not in acute distress.     Appearance: She is obese.   HENT:      Head: Normocephalic and atraumatic.   Eyes:      Extraocular Movements: Extraocular movements intact.      Conjunctiva/sclera: Conjunctivae normal.   Cardiovascular:      Rate and Rhythm: Normal rate.   Pulmonary:      Effort: Pulmonary effort is normal.      Breath sounds: Normal breath sounds.   Abdominal:      Palpations: Abdomen is soft.      Tenderness: There is no abdominal tenderness.   Musculoskeletal:      Right lower leg: No edema.  "     Left lower leg: No edema.   Skin:     General: Skin is warm and dry.   Neurological:      Mental Status: She is alert and oriented to person, place, and time. Mental status is at baseline.   Psychiatric:         Mood and Affect: Mood normal.         Behavior: Behavior normal.             ---------------------------------------------------------------------------------------------------------------------  EKG:        I have personally looked at the EKG.  ---------------------------------------------------------------------------------------------------------------------   Results from last 7 days   Lab Units 10/17/23  1514   WBC 10*3/mm3 11.32*   HEMOGLOBIN g/dL 5.5*   HEMATOCRIT % 18.6*   MCV fL 94.4   MCHC g/dL 29.6*   PLATELETS 10*3/mm3 310     Results from last 7 days   Lab Units 10/17/23  1646   PH, ARTERIAL pH units 7.443   PO2 ART mm Hg 86.9   PCO2, ARTERIAL mm Hg 44.9   HCO3 ART mmol/L 30.6*     Results from last 7 days   Lab Units 10/17/23  1514   SODIUM mmol/L 137   POTASSIUM mmol/L 4.1   CHLORIDE mmol/L 98   CO2 mmol/L 27.6   BUN mg/dL 89*   CREATININE mg/dL 2.47*   CALCIUM mg/dL 8.5*   GLUCOSE mg/dL 169*   ALBUMIN g/dL 3.2*   BILIRUBIN mg/dL 0.5   ALK PHOS U/L 90   AST (SGOT) U/L 13   ALT (SGPT) U/L 5   Estimated Creatinine Clearance: 28.8 mL/min (A) (by C-G formula based on SCr of 2.47 mg/dL (H)).  No results found for: \"AMMONIA\"          Lab Results   Component Value Date    HGBA1C 7.40 (H) 06/01/2023     Lab Results   Component Value Date    TSH 6.870 (H) 07/22/2023    FREET4 1.17 07/22/2023     No results found for: \"PREGTESTUR\", \"PREGSERUM\", \"HCG\", \"HCGQUANT\"  Pain Management Panel  More data exists         Latest Ref Rng & Units 7/22/2023 6/4/2023   Pain Management Panel   Creatinine, Urine mg/dL 83.5  111.5  111.5      No results found for: \"BLOODCX\"  No results found for: \"URINECX\"  No results found for: \"WOUNDCX\"  No results found for: \"STOOLCX\"    "   ---------------------------------------------------------------------------------------------------------------------  Imaging Results (Last 7 Days)       Procedure Component Value Units Date/Time    CT Abdomen Pelvis Without Contrast [[id number]] Collected: 10/17/23 1641     Updated: 10/17/23 1644    Narrative:      EXAM:    CT Abdomen and Pelvis Without Intravenous Contrast     EXAM DATE:    10/17/2023 5:21 PM     CLINICAL HISTORY:    NEW SYMPTOMATIC ANEMIA; D64.9-Anemia, unspecified;  R41.0-Disorientation, unspecified; Z79.01-Long term (current) use of  anticoagulants; N18.4-Chronic kidney disease, stage 4 (severe)     TECHNIQUE:    Axial computed tomography images of the abdomen and pelvis without  intravenous contrast.  Sagittal and coronal reformatted images were  created and reviewed.  This CT exam was performed using one or more of  the following dose reduction techniques:  automated exposure control,  adjustment of the mA and/or kV according to patient size, and/or use of  iterative reconstruction technique.     COMPARISON:    6/1/2023     FINDINGS:    LUNG BASES:  Unremarkable as visualized.  No mass.  No consolidation.    HEART:  Cardiomegaly.      ABDOMEN:    LIVER:  Unremarkable as visualized.    GALLBLADDER AND BILE DUCTS:  Gallstones.  Gallbladder otherwise  unremarkable.  No ductal dilation.    PANCREAS:  Unremarkable as visualized.  No ductal dilation.    SPLEEN:  Unremarkable as visualized.  No splenomegaly.    ADRENALS:  Unremarkable as visualized.  No mass.    KIDNEYS AND URETERS:  Unremarkable as visualized.  No obstructing  stones.  No hydronephrosis.    STOMACH AND BOWEL:  Scattered diverticula in the colon.  No  diverticulitis.  No obstruction.      PELVIS:    APPENDIX:  No findings to suggest acute appendicitis.    BLADDER:  Unremarkable as visualized.  No stones.    REPRODUCTIVE:  Unremarkable as visualized.      ABDOMEN and PELVIS:    INTRAPERITONEAL SPACE:  Unremarkable as visualized.   "No free air.  No  significant fluid collection.    BONES/JOINTS:  No acute fracture.  No dislocation.    SOFT TISSUES:  Unremarkable as visualized.    VASCULATURE:  Atherosclerotic disease.  No abdominal aortic aneurysm.    LYMPH NODES:  Unremarkable as visualized.  No enlarged lymph nodes.       Impression:      1.  Cardiomegaly.  2.  Gallstones.  Gallbladder otherwise unremarkable.        This report was finalized on 10/17/2023 4:42 PM by Dr. Maksim Woods MD.               Cultures:  No results found for: \"BLOODCX\", \"URINECX\", \"WOUNDCX\", \"MRSACX\", \"RESPCX\", \"STOOLCX\"    Last echocardiogram:  Results for orders placed during the hospital encounter of 07/22/23    Adult Transthoracic Echo Complete With Contrast if Necessary Per Protocol    Interpretation Summary    Left ventricular systolic function is normal. Left ventricular ejection fraction appears to be 56 - 60%.    Left ventricular diastolic function is consistent with (grade II w/high LAP) pseudonormalization.    The right ventricular cavity is mild to moderately dilated.    The left atrial cavity is mildly dilated.    The right atrial cavity is mildly  dilated.    Estimated right ventricular systolic pressure from tricuspid regurgitation is markedly elevated (>55 mmHg).          I have personally reviewed the above radiology images and read the final radiology report on 10/17/23  ---------------------------------------------------------------------------------------------------------------------  Assessment / Plan     Active Hospital Problems    Diagnosis  POA    **Symptomatic anemia [D64.9]  Yes       ASSESSMENT/PLAN:    Acute, symptomatic on chronic anemia  Hx iron deficiency anemia, AOCD/renal disease  Patient presents from local SNF for further evaluation for low hemoglobin.  Staff at nursing facility noted patient to be more lethargic and sleeping more than usual.  No reported signs or symptoms of bleeding at SNF.  Work-up here noted globin low at 5.5 " from baseline 9-10.  Also with BUN increased at 89 from baseline 50-60 though creatinine appears to be at baseline.  She did receive 2 units PRBCs in the ED.  We will admit to the PCU for further work-up and management  Continue to monitor H&H closely.  Can plan to transfuse as indicated if less than 7 or less than 8 and significantly symptomatic.  Hold any home meds which could contribute to further bleeding. Initiate IV PPI  Monitor clinical volume status closely in the setting of HFpEF with multiple recent admissions for decompensated HFpEF.  Anticipate need for further diuresis.  Iron profile noted normal iron, normal ferritin, low transferrin, low TIBC.  Further work-up ordered and pending including CK, folate, B12, TSH  CXR and UA pending as well.  Check a CK.    Chronic:  IDDM 2  HTN  ASCVD  Chronic respiratory failure  Hyperlipidemia  CKD stage IV  JESIKA  HFpEF  Persistent atrial fibrillation chronically anticoagulated with Eliquis  Plan to resume home meds as indicated once med rec is complete  We will initiate insulin regimen at appropriate dosing.  Monitor with Accu-Cheks with hypoglycemia protocol in place and titrate as needed.  Hold Eliquis as above  Monitor volume status closely as above  Monitor vital signs per protocol  ----------  -DVT prophylaxis: SCDs  -Activity: Up with assistance  -Expected length of stay: INPATIENT status due to the need for care which can only be reasonably provided in an hospital setting such as aggressive/expedited ancillary services and/or consultation services, the necessity for IV medications, close physician monitoring and/or the possible need for procedures.  In such, I feel patient’s risk for adverse outcomes and need for care warrant INPATIENT evaluation and predict the patient’s care encounter to likely last beyond 2 midnights.   -Disposition pending course and further work up     High risk secondary to acute anemia    There are no questions and answers to display.        Jose Cook PA-C   10/17/23  17:46 EDT     Electronically signed by Kirt Arellano MD at 10/17/23 1915       Current Facility-Administered Medications   Medication Dose Route Frequency Provider Last Rate Last Admin    acetaminophen (TYLENOL) tablet 1,000 mg  1,000 mg Oral Q8H PRN Kirt Arellano MD        apixaban (ELIQUIS) tablet 2.5 mg  2.5 mg Oral Q12H Kirt Arellano MD   2.5 mg at 10/20/23 0914    atorvastatin (LIPITOR) tablet 40 mg  40 mg Oral Nightly Kirt Arellano MD   40 mg at 10/19/23 2048    sennosides-docusate (PERICOLACE) 8.6-50 MG per tablet 2 tablet  2 tablet Oral BID Kirt Arellano MD   2 tablet at 10/20/23 0913    And    polyethylene glycol (MIRALAX) packet 17 g  17 g Oral Daily PRN Kirt Arellano MD        And    bisacodyl (DULCOLAX) EC tablet 5 mg  5 mg Oral Daily PRN Kirt Arellano MD        And    bisacodyl (DULCOLAX) suppository 10 mg  10 mg Rectal Daily PRN Kirt Arellano MD        dextromethorphan-guaifenesin (ROBITUSSIN-DM) syrup 5 mL  5 mL Oral Q4H PRN Kirt Arellano MD        guaiFENesin tablet 400 mg  400 mg Oral TID Kirt Arellano MD   400 mg at 10/20/23 0914    hydrALAZINE (APRESOLINE) tablet 25 mg  25 mg Oral TID Elaina Bell MD   25 mg at 10/19/23 2049    HYDROcodone-acetaminophen (NORCO) 5-325 MG per tablet 1 tablet  1 tablet Oral Q8H PRN Kirt Arellano MD        Influenza Vac High-Dose Quad (FLUZONE HIGH DOSE) injection 0.7 mL  0.7 mL Intramuscular During Hospitalization Kirt Arellano MD        insulin glargine (LANTUS, SEMGLEE) injection 10 Units  10 Units Subcutaneous Nightly Kirt Arellano MD   10 Units at 10/19/23 2052    isosorbide mononitrate (IMDUR) 24 hr tablet 30 mg  30 mg Oral Daily Kirt Arellano MD   30 mg at 10/20/23 0914    lactated ringers infusion  125 mL/hr Intravenous Continuous Nehemias Pavon  mL/hr at 10/20/23 1306 125 mL/hr at 10/20/23 1306    lactulose (CHRONULAC) 10 GM/15ML solution 10 g  10 g Oral BID PRN Kirt Arellano MD         latanoprost (XALATAN) 0.005 % ophthalmic solution 1 drop  1 drop Both Eyes Nightly Kirt Arellano MD   1 drop at 10/19/23 2145    LORazepam (ATIVAN) tablet 0.25 mg  0.25 mg Oral Q8H PRN Kirt Arellano MD        LORazepam (ATIVAN) tablet 0.5 mg  0.5 mg Oral Nightly Kirt Arellano MD   0.5 mg at 10/19/23 2049    metoprolol succinate XL (TOPROL-XL) 24 hr tablet 50 mg  50 mg Oral Daily Kirt Arellano MD        multivitamin with minerals 1 tablet  1 tablet Oral Daily Kirt Arellano MD   1 tablet at 10/20/23 0914    nitroglycerin (NITROSTAT) SL tablet 0.4 mg  0.4 mg Sublingual Q5 Min PRN Kirt Arellano MD        ondansetron (ZOFRAN) tablet 4 mg  4 mg Oral Q6H PRN Kirt Arellano MD        pantoprazole (PROTONIX) injection 40 mg  40 mg Intravenous Q AM Kirt Arellano MD   40 mg at 10/20/23 0553    sodium chloride 0.9 % flush 10 mL  10 mL Intravenous Q12H Kirt Arellano MD   10 mL at 10/20/23 0918    sodium chloride 0.9 % flush 10 mL  10 mL Intravenous PRN Kirt Arellano MD        sodium chloride 0.9 % flush 10 mL  10 mL Intravenous Q12H Kirt Arellano MD   10 mL at 10/20/23 0918    sodium chloride 0.9 % flush 10 mL  10 mL Intravenous PRN Kirt Arellano MD        sodium chloride 0.9 % infusion 40 mL  40 mL Intravenous PRN Kirt Arellano MD        sodium chloride 0.9 % infusion 40 mL  40 mL Intravenous PRN Kirt Arellano MD        vitamin B-12 (CYANOCOBALAMIN) tablet 500 mcg  500 mcg Oral Daily Kirt Arellano MD   500 mcg at 10/20/23 0914     Operative/Procedure Notes (most recent note)    No notes of this type exist for this encounter.          Physician Progress Notes (most recent note)        Kirt Arellano MD at 10/19/23 1219              [facility] PROGRESS NOTE     Patient Identification:  Name:  Jolly Garcia  Age:  73 y.o.  Sex:  female  :  1950  MRN:  [id number]  Visit Number:  [id number]  ROOM: [address]     Primary Care Provider:  Whit Cadet,  APRN    Length of stay in inpatient status:  2    Subjective     Chief Compliant:    Chief Complaint   Patient presents with    Abnormal Lab       History of Presenting Illness:    Overnight patient had isolated episode of recorded asymptomatic hypotension. Patient denies she had any symptoms. Reports feeling fatigued today otherwise feels better. No noted signs of bleeding by her or nursing staff.     ROS:  Otherwise 10 point ROS negative other than documented above in HPI.     Objective     Current Hospital Meds:apixaban, 2.5 mg, Oral, Q12H  atorvastatin, 40 mg, Oral, Nightly  guaiFENesin, 400 mg, Oral, TID  hydrALAZINE, 25 mg, Oral, TID  insulin glargine, 10 Units, Subcutaneous, Nightly  isosorbide mononitrate, 30 mg, Oral, Daily  latanoprost, 1 drop, Both Eyes, Nightly  LORazepam, 0.5 mg, Oral, Nightly  metoprolol succinate XL, 50 mg, Oral, Daily  multivitamin with minerals, 1 tablet, Oral, Daily  pantoprazole, 40 mg, Intravenous, Q AM  senna-docusate sodium, 2 tablet, Oral, BID  sodium chloride, 10 mL, Intravenous, Q12H  sodium chloride, 10 mL, Intravenous, Q12H  sodium zirconium cyclosilicate, 10 g, Oral, Daily  vitamin B-12, 500 mcg, Oral, Daily    lactated ringers, 125 mL/hr, Last Rate: 125 mL/hr (10/19/23 0519)        Current Antimicrobial Therapy:  Anti-Infectives (From admission, onward)      None          Current Diuretic Therapy:  Diuretics (From admission, onward)      None          ----------------------------------------------------------------------------------------------------------------------  Vital Signs:  Temp:  [97.5 °F (36.4 °C)-98.4 °F (36.9 °C)] 97.6 °F (36.4 °C)  Heart Rate:  [] 84  Resp:  [12-20] 18  BP: ()/(35-79) 91/51  SpO2:  [95 %-100 %] 98 %  on  Flow (L/min):  [1] 1;   Device (Oxygen Therapy): nasal cannula  Body mass index is 39.16 kg/m².    Wt Readings from Last 3 Encounters:   10/19/23 110 kg (242 lb 8.1 oz)   09/14/23 (!) 153 kg (337 lb)   07/28/23 (!) 152 kg (335  lb 4.8 oz)     Intake & Output (last 3 days)         10/16 0701  10/17 0700 10/17 0701  10/18 0700 10/18 0701  10/19 0700 10/19 0701  10/20 0700    P.O.   585 200    I.V. (mL/kg)  635.3 (5.8) 2771 (25.2)     Blood  600      Total Intake(mL/kg)  1235.3 (11.2) 3356 (30.5) 200 (1.8)    Urine (mL/kg/hr)  850 1250 (0.5)     Total Output  850 1250     Net  +385.3 +2106 +200            Urine Unmeasured Occurrence    1 x          Diet: Liquid Diets; Clear Liquid; Fluid Consistency: Thin (IDDSI 0)  ----------------------------------------------------------------------------------------------------------------------  Physical exam:  Constitutional:  Well-developed and well-nourished.  No respiratory distress.      HENT:  Head:  Normocephalic and atraumatic.  Mouth:  Moist mucous membranes.    Eyes:  Conjunctivae and EOM are normal. No scleral icterus.    Neck:  Neck supple.  No JVD present.    Cardiovascular:  Normal rate, regular rhythm and normal heart sounds with no murmur.  Pulmonary/Chest:  No respiratory distress, no wheezes, no crackles, with normal breath sounds and good air movement.  Abdominal:  Soft.  Bowel sounds are normal.  No distension and no tenderness.   Musculoskeletal:  No edema, no tenderness, and no deformity.  No red or swollen joints anywhere.    Neurological:  Alert and oriented to person, place, and time.  No cranial nerve deficit.  No tongue deviation.  No facial droop.  No slurred speech.   Skin:  Skin is warm and dry. No rash noted. No pallor.   Peripheral vascular:  Pulses in all 4 extremities with no clubbing, no cyanosis, no edema.  ----------------------------------------------------------------------------------------------------------------------  Tele:    ----------------------------------------------------------------------------------------------------------------------  Results from last 7 days   Lab Units 10/19/23  0047 10/18/23  2057 10/18/23  0030 10/17/23  1514   WBC 10*3/mm3 8.02   "--  8.19 11.32*   HEMOGLOBIN g/dL 7.7* 7.4* 7.5* 5.5*   HEMATOCRIT % 24.7* 24.0* 23.6* 18.6*   MCV fL 88.8  --  86.1 94.4   MCHC g/dL 31.2*  --  31.8 29.6*   PLATELETS 10*3/mm3 289  --  256 310     Results from last 7 days   Lab Units 10/17/23  1646   PH, ARTERIAL pH units 7.443   PO2 ART mm Hg 86.9   PCO2, ARTERIAL mm Hg 44.9   HCO3 ART mmol/L 30.6*     Results from last 7 days   Lab Units 10/19/23  0047 10/18/23  0030 10/17/23  1514   SODIUM mmol/L 141 135* 137   POTASSIUM mmol/L 3.7 4.0 4.1   MAGNESIUM mg/dL 2.0  --   --    CHLORIDE mmol/L 102 97* 98   CO2 mmol/L 30.9* 31.1* 27.6   BUN mg/dL 83* 92* 89*   CREATININE mg/dL 2.13* 2.55* 2.47*   CALCIUM mg/dL 8.6 8.6 8.5*   PHOSPHORUS mg/dL 3.4  --   --    GLUCOSE mg/dL 96 132* 169*   ALBUMIN g/dL 3.1* 3.1* 3.2*   BILIRUBIN mg/dL 0.5 0.7 0.5   ALK PHOS U/L 82 84 90   AST (SGOT) U/L 13 12 13   ALT (SGPT) U/L 7 6 5   Estimated Creatinine Clearance: 29.6 mL/min (A) (by C-G formula based on SCr of 2.13 mg/dL (H)).  No results found for: \"AMMONIA\"  Results from last 7 days   Lab Units 10/17/23  1514   CK TOTAL U/L 31             Glucose   Date/Time Value Ref Range Status   10/19/2023 1149 106 70 - 130 mg/dL Final   10/19/2023 0641 82 70 - 130 mg/dL Final   10/19/2023 0519 87 70 - 130 mg/dL Final   10/19/2023 0345 95 70 - 130 mg/dL Final   10/18/2023 2135 123 70 - 130 mg/dL Final     Lab Results   Component Value Date    TSH 5.040 (H) 10/17/2023    FREET4 1.34 10/19/2023     No results found for: \"PREGTESTUR\", \"PREGSERUM\", \"HCG\", \"HCGQUANT\"  Pain Management Panel  More data exists         Latest Ref Rng & Units 7/22/2023 6/4/2023   Pain Management Panel   Creatinine, Urine mg/dL 83.5  111.5  111.5      Brief Urine Lab Results  (Last result in the past 365 days)        Color   Clarity   Blood   Leuk Est   Nitrite   Protein   CREAT   Urine HCG        10/17/23 1720 Yellow   Clear   Negative   Small (1+)   Negative   Negative                 No results found for: " "\"BLOODCX\"  Urine Culture   Date Value Ref Range Status   10/17/2023 <25,000 CFU/mL Gram Negative Bacilli (A)  Final     No results found for: \"WOUNDCX\"  No results found for: \"STOOLCX\"  No results found for: \"RESPCX\"  No results found for: \"AFBCX\"        I have personally looked at the labs and they are summarized above.  ----------------------------------------------------------------------------------------------------------------------  Detailed radiology reports for the last 24 hours:    Imaging Results (Last 24 Hours)       Procedure Component Value Units Date/Time    US Renal Bilateral [[id number]] Collected: 10/19/23 0812     Updated: 10/19/23 0814    Narrative:      EXAM:    US Retroperitoneal Limited, Renal     EXAM DATE:    10/18/2023 8:51 PM     CLINICAL HISTORY:    malena; D64.9-Anemia, unspecified; R41.0-Disorientation, unspecified;  Z79.01-Long term (current) use of anticoagulants; N18.4-Chronic kidney  disease, stage 4 (severe)     TECHNIQUE:    Real-time limited ultrasound of the retroperitoneum with image  documentation.     COMPARISON:    No relevant prior studies available.     FINDINGS:    Right kidney:  Unremarkable as visualized.  No stones.  No  hydronephrosis.  The right kidney measures 11.9 cm in length.    Left kidney:  Unremarkable as visualized.  No stones.  No  hydronephrosis.  The left kidney measures 9.6 cm in length.    Other findings:  Gallstones or sludge in the gallbladder.       Impression:        Gallstones or sludge in the gallbladder.        This report was finalized on 10/19/2023 8:12 AM by Dr. Maksim Woods MD.             Assessment & Plan    Acute, symptomatic on chronic anemia  Hx iron deficiency anemia, AOCD/renal disease  Patient's hemoglobin has been consistently 9.5-11 in the past. 5.5 on presentation today. MCV in higher 90's. Platelets wnl. BUN trended up to 89 out of proportion to increase in Cr. Iron studies most consistent of AOCD/AOCKD but cannot rule out acute upper " GI bleed that has not clinically presented itself. Total bili and K wnl, do not suspect hemolysis. No history of cirrhosis. Started on IV protonix. Folate, B12, TSH all wnl. Retic appropriately elevated. Held anticoagulation and start SCDs. Repeat hemoglobin after 2 units pRBC stable at 7.5. Stable again at 7.7 today. Does not look overloaded, will diurese if needed. Will consult surgery if hemoglobin drops or clinically a bleed. Given stability and not convinced anemia driven by blood loss, will restart eliquis as below. Repeat labs in AM. Again, if drops will likely need inpatient endoscopy.      CKD IV  Creatinine worse. Consulted nephrology. IV iron ordered. Gentle hydration Appreciate recs.      Persistent atrial fibrillation chronically anticoagulated with Eliquis  [id number]: 6. High stroke risk. Patient borderline qualifies for decreased 2.5 mg dose. Will decrease to 2.5 mg and monitor as above.     Chronic:  IDDM 2  HTN  ASCVD  Chronic respiratory failure  Hyperlipidemia  CKD stage IV  JESIKA  HFpEF        Code status: Full      Dispo: Pending clinical improvement     VTE Prophylaxis:   Mechanical Order History:        Ordered        10/17/23 1817  Place Sequential Compression Device  Once            10/17/23 1817  Maintain Sequential Compression Device  Continuous                          Pharmalogical Order History:        Ordered     Dose Route Frequency Stop    10/19/23 1203  apixaban (ELIQUIS) tablet 2.5 mg         2.5 mg PO Every 12 Hours Scheduled --                    Kirt Arellano MD  [facility]  10/19/23  12:19 EDT    Electronically signed by Kirt Arellano MD at 10/19/23 1230          Consult Notes (most recent note)        Elaina Bell MD at 10/18/23 2136        Consult Orders    1. Inpatient Nephrology Consult [[id number]] ordered by Kirt Arellano MD at 10/18/23 0815                 Nephrology Consult Note    Referring Provider: Dr. Arellano  Reason for Consultation:  Elevated creatinine    Subjective       History of present illness:  Jolly Garcia is a 73 y.o. female who presented to [facility] from nursing home with decreased hemoglobin level.  Patient is known to have a chronic kidney disease with baseline creatinine appears to be around 2.4, admitted with 2.4 as well.  Patient has been having worsening anemia, stated she has been feeling very poorly with decreased appetite, and poor oral intake.  Patient denied chronic NSAIDS use. Patient denies hematuria, dysuria, difficulty passing urine. No prior history of renal stones. No family history of renal disease    History  Past Medical History:   Diagnosis Date    Anxiety     CAD (coronary artery disease)     s/p CABG    Chronic hypoxemic respiratory failure     Wears 2 L/min at home    Essential hypertension     Hyperlipidemia     Type 2 diabetes mellitus    ,   Past Surgical History:   Procedure Laterality Date    CORONARY ARTERY BYPASS GRAFT  2011    HYSTERECTOMY     ,   Family History   Problem Relation Age of Onset    Diabetes Mother    ,   Social History     Tobacco Use    Smoking status: Former     Passive exposure: Past   Vaping Use    Vaping Use: Never used   Substance Use Topics    Alcohol use: Never    Drug use: Never   ,   Medications Prior to Admission   Medication Sig Dispense Refill Last Dose    aspirin 81 MG chewable tablet Chew 1 tablet Daily.   10/17/2023 at 0800    atorvastatin (LIPITOR) 40 MG tablet Take 1 tablet by mouth Every Night.   10/16/2023 at 2000    bumetanide (BUMEX) 2 MG tablet Take 1 tablet by mouth Daily.   10/17/2023 at 0800    Coenzyme Q10 (CoQ10) 50 MG capsule Take 2 capsules by mouth Daily.   10/17/2023 at 0800    DULoxetine (CYMBALTA) 30 MG capsule Take 1 capsule by mouth Daily.   10/17/2023 at 0800    Eliquis 5 MG tablet tablet Take 1 tablet by mouth Every 12 (Twelve) Hours.   10/17/2023 at 0800    ferrous sulfate 325 (65 FE) MG tablet Take 1 tablet by mouth Daily With  Breakfast.   10/17/2023 at 0800    fluticasone (FLONASE) 50 MCG/ACT nasal spray 2 sprays by Each Nare route Daily. 9.9 mL 0 10/17/2023 at 0800    guaiFENesin (HUMIBID 3) 400 MG tablet Take 1 tablet by mouth 3 (Three) Times a Day.   10/17/2023 at 1200    hydrALAZINE (APRESOLINE) 25 MG tablet Take 1 tablet by mouth 3 (Three) Times a Day.   10/17/2023 at 1200mu    Insulin Aspart (novoLOG) 100 UNIT/ML injection Inject 2-10 Units under the skin into the appropriate area as directed 2 (Two) Times a Day Before Meals.   10/17/2023    insulin glargine (LANTUS, SEMGLEE) 100 UNIT/ML injection Inject 10 Units under the skin into the appropriate area as directed Every Night.   10/16/2023 at 2000    isosorbide mononitrate (IMDUR) 30 MG 24 hr tablet Take 1 tablet by mouth Daily.   10/17/2023 at 0800    latanoprost (XALATAN) 0.005 % ophthalmic solution Administer 1 drop to both eyes Every Night.   10/16/2023 at 2000    linaclotide (LINZESS) 290 MCG capsule capsule Take 1 capsule by mouth Every Morning Before Breakfast.   10/17/2023 at 0800    LORazepam (ATIVAN) 0.5 MG tablet Take 1 tablet by mouth Every Night.   10/16/2023 at 2000    metoprolol succinate XL (TOPROL-XL) 50 MG 24 hr tablet Take 1 tablet by mouth Daily.   10/17/2023 at 0800    multivitamin with minerals (I-cheyenne) tablet tablet Take 1 tablet by mouth Daily.   10/17/2023 at 0800    multivitamin with minerals tablet tablet Take 1 tablet by mouth Daily.   10/17/2023 at 0800    pantoprazole (PROTONIX) 40 MG EC tablet Take 1 tablet by mouth Every Morning. 90 tablet 0 10/17/2023 at 0800    Patiromer Sorbitex Calcium (Veltassa) 16.8 g pack Take 1 packet by mouth Daily.   10/17/2023    sennosides-docusate (senna-docusate sodium) 8.6-50 MG per tablet Take 1 tablet by mouth 2 (Two) Times a Day.   10/17/2023 at 0800    spironolactone (ALDACTONE) 25 MG tablet Take 1 tablet by mouth Daily.   10/17/2023 at 0800    vitamin B-12 (CYANOCOBALAMIN) 1000 MCG tablet Take 0.5 tablets by  mouth Daily.   10/17/2023 at 0800    acetaminophen (TYLENOL) 500 MG tablet Take 2 tablets by mouth Every 8 (Eight) Hours As Needed for Mild Pain.   Unknown    dextromethorphan-guaifenesin (ROBITUSSIN-DM)  MG/5ML syrup Take 5 mL by mouth Every 4 (Four) Hours As Needed (Cough).   Unknown    diphenhydrAMINE (BENADRYL) 25 mg capsule Take 1 capsule by mouth Every 8 (Eight) Hours As Needed for Itching.   Unknown    HYDROcodone-acetaminophen (NORCO) 5-325 MG per tablet Take 1 tablet by mouth Every 8 (Eight) Hours As Needed for Moderate Pain. 12 tablet 0 10/12/2023    lactulose (CHRONULAC) 10 GM/15ML solution Take 15 mL by mouth 2 (Two) Times a Day As Needed (constipation).   10/10/2023    LORazepam (ATIVAN) 0.5 MG tablet Take 0.5 tablets by mouth Every 8 (Eight) Hours As Needed for Anxiety.   10/4/2023    nitroglycerin (NITROSTAT) 0.4 MG SL tablet Place 1 tablet under the tongue Every 5 (Five) Minutes As Needed for Chest Pain. Take no more than 3 doses in 15 minutes.   Unknown    ondansetron (ZOFRAN) 4 MG tablet Take 1 tablet by mouth Every 6 (Six) Hours As Needed for Nausea or Vomiting.   Unknown    Semaglutide, 1 MG/DOSE, (Ozempic, 1 MG/DOSE,) 4 MG/3ML solution pen-injector Inject 1 mg under the skin into the appropriate area as directed 1 (One) Time Per Week.   10/13/2023   , Scheduled Meds:  atorvastatin, 40 mg, Oral, Nightly  guaiFENesin, 400 mg, Oral, TID  hydrALAZINE, 25 mg, Oral, TID  insulin glargine, 10 Units, Subcutaneous, Nightly  isosorbide mononitrate, 30 mg, Oral, Daily  latanoprost, 1 drop, Both Eyes, Nightly  LORazepam, 0.5 mg, Oral, Nightly  metoprolol succinate XL, 50 mg, Oral, Daily  multivitamin with minerals, 1 tablet, Oral, Daily  pantoprazole, 40 mg, Intravenous, Q AM  senna-docusate sodium, 2 tablet, Oral, BID  sodium chloride, 10 mL, Intravenous, Q12H  sodium chloride, 10 mL, Intravenous, Q12H  sodium zirconium cyclosilicate, 10 g, Oral, Daily  vitamin B-12, 500 mcg, Oral, Daily    ,  Continuous Infusions:  lactated ringers, 125 mL/hr, Last Rate: 125 mL/hr (10/18/23 2104)    , PRN Meds:    acetaminophen    senna-docusate sodium **AND** polyethylene glycol **AND** bisacodyl **AND** bisacodyl    dextromethorphan-guaifenesin    HYDROcodone-acetaminophen    influenza vaccine    lactulose    LORazepam    nitroglycerin    ondansetron    sodium chloride    sodium chloride    sodium chloride    sodium chloride and Allergies:  Patient has no known allergies.    Review of Systems  More than 10 point review of systems was done. Pertinent items are noted in HPI, all other systems reviewed and negative    Objective     Vital Signs  Temp:  [97.5 °F (36.4 °C)-98.5 °F (36.9 °C)] 97.9 °F (36.6 °C)  Heart Rate:  [79-96] 79  Resp:  [13-18] 16  BP: ()/(35-70) 80/40    Intake/Output                         10/17/23 0701 - 10/18/23 0700 10/18/23 0701 - 10/19/23 0700     [id number] 2738-8062 Total 1235-0959 0110-0567 Total                 Intake    P.O.  --  -- --  585  -- 585    I.V.  --  635.3 635.3  --  -- --    Blood  --  600 600  --  -- --    Volume (Transfuse RBC Infuse Each Unit Over: 2H) -- 300 300 -- -- --    Volume (Transfuse RBC Infuse Each Unit Over: 2H) -- 300 300 -- -- --    Total Intake -- 1235.3 1235.3 585 -- 585       Output    Urine  --  850 850  350  -- 350    Total Output -- 850 850 350 -- 350             Physical Examination:  General Appearance: not in acute distress  No JVD  Lungs: Clear to auscultation, respirations regular and unlabored  Heart: Regular rhythm & normal rate, normal S1, S2, no murmur, no gallop, no rub   Abdomen: Normal bowel sounds, no masses and soft non-tender  Extremities: No edema  Neurologic: No apparent focal neurological deficit    Laboratory Data :      WBC WBC   Date Value Ref Range Status   10/18/2023 8.19 3.40 - 10.80 10*3/mm3 Final   10/17/2023 11.32 (H) 3.40 - 10.80 10*3/mm3 Final      HGB Hemoglobin   Date Value Ref Range Status   10/18/2023 7.4 (L) 12.0 -  "15.9 g/dL Final   10/18/2023 7.5 (L) 12.0 - 15.9 g/dL Final   10/17/2023 5.5 (C) 12.0 - 15.9 g/dL Final      HCT Hematocrit   Date Value Ref Range Status   10/18/2023 24.0 (L) 34.0 - 46.6 % Final   10/18/2023 23.6 (L) 34.0 - 46.6 % Final   10/17/2023 18.6 (C) 34.0 - 46.6 % Final      Platlets No results found for: \"LABPLAT\"   MCV MCV   Date Value Ref Range Status   10/18/2023 86.1 79.0 - 97.0 fL Final   10/17/2023 94.4 79.0 - 97.0 fL Final          Sodium Sodium   Date Value Ref Range Status   10/18/2023 135 (L) 136 - 145 mmol/L Final   10/17/2023 137 136 - 145 mmol/L Final      Potassium Potassium   Date Value Ref Range Status   10/18/2023 4.0 3.5 - 5.2 mmol/L Final   10/17/2023 4.1 3.5 - 5.2 mmol/L Final      Chloride Chloride   Date Value Ref Range Status   10/18/2023 97 (L) 98 - 107 mmol/L Final   10/17/2023 98 98 - 107 mmol/L Final      CO2 CO2   Date Value Ref Range Status   10/18/2023 31.1 (H) 22.0 - 29.0 mmol/L Final   10/17/2023 27.6 22.0 - 29.0 mmol/L Final      BUN BUN   Date Value Ref Range Status   10/18/2023 92 (H) 8 - 23 mg/dL Final   10/17/2023 89 (H) 8 - 23 mg/dL Final      Creatinine Creatinine   Date Value Ref Range Status   10/18/2023 2.55 (H) 0.57 - 1.00 mg/dL Final   10/17/2023 2.47 (H) 0.57 - 1.00 mg/dL Final      Calcium Calcium   Date Value Ref Range Status   10/18/2023 8.6 8.6 - 10.5 mg/dL Final   10/17/2023 8.5 (L) 8.6 - 10.5 mg/dL Final      PO4 No results found for: \"CAPO4\"   Albumin Albumin   Date Value Ref Range Status   10/18/2023 3.1 (L) 3.5 - 5.2 g/dL Final   10/17/2023 3.2 (L) 3.5 - 5.2 g/dL Final      Magnesium No results found for: \"MG\"   Uric Acid No results found for: \"URICACID\"     Radiology results :     Imaging Results (Last 72 Hours)       Procedure Component Value Units Date/Time    US Renal Bilateral [[id number]] Resulted: 10/18/23 1951     Updated: 10/18/23 1951    XR Chest 1 View [[id number]] Collected: 10/17/23 1931     Updated: 10/17/23 1933    Narrative:      " INDICATION: Cough.     TECHNIQUE: Frontal radiograph of the chest.     COMPARISON: 7/22/2023.     FINDINGS:   Cardiomegaly. Mild pulmonary vascular congestion. Elevation of the right  hemidiaphragm. No infiltrate, pleural effusion or pneumothorax. No acute  osseous abnormality evident.       Impression:      Mild pulmonary vascular congestion.        This report was finalized on 10/17/2023 7:31 PM by Alex Pallas, DO.       CT Abdomen Pelvis Without Contrast [[id number]] Collected: 10/17/23 1641     Updated: 10/17/23 1644    Narrative:      EXAM:    CT Abdomen and Pelvis Without Intravenous Contrast     EXAM DATE:    10/17/2023 5:21 PM     CLINICAL HISTORY:    NEW SYMPTOMATIC ANEMIA; D64.9-Anemia, unspecified;  R41.0-Disorientation, unspecified; Z79.01-Long term (current) use of  anticoagulants; N18.4-Chronic kidney disease, stage 4 (severe)     TECHNIQUE:    Axial computed tomography images of the abdomen and pelvis without  intravenous contrast.  Sagittal and coronal reformatted images were  created and reviewed.  This CT exam was performed using one or more of  the following dose reduction techniques:  automated exposure control,  adjustment of the mA and/or kV according to patient size, and/or use of  iterative reconstruction technique.     COMPARISON:    6/1/2023     FINDINGS:    LUNG BASES:  Unremarkable as visualized.  No mass.  No consolidation.    HEART:  Cardiomegaly.      ABDOMEN:    LIVER:  Unremarkable as visualized.    GALLBLADDER AND BILE DUCTS:  Gallstones.  Gallbladder otherwise  unremarkable.  No ductal dilation.    PANCREAS:  Unremarkable as visualized.  No ductal dilation.    SPLEEN:  Unremarkable as visualized.  No splenomegaly.    ADRENALS:  Unremarkable as visualized.  No mass.    KIDNEYS AND URETERS:  Unremarkable as visualized.  No obstructing  stones.  No hydronephrosis.    STOMACH AND BOWEL:  Scattered diverticula in the colon.  No  diverticulitis.  No obstruction.      PELVIS:    APPENDIX:   No findings to suggest acute appendicitis.    BLADDER:  Unremarkable as visualized.  No stones.    REPRODUCTIVE:  Unremarkable as visualized.      ABDOMEN and PELVIS:    INTRAPERITONEAL SPACE:  Unremarkable as visualized.  No free air.  No  significant fluid collection.    BONES/JOINTS:  No acute fracture.  No dislocation.    SOFT TISSUES:  Unremarkable as visualized.    VASCULATURE:  Atherosclerotic disease.  No abdominal aortic aneurysm.    LYMPH NODES:  Unremarkable as visualized.  No enlarged lymph nodes.       Impression:      1.  Cardiomegaly.  2.  Gallstones.  Gallbladder otherwise unremarkable.        This report was finalized on 10/17/2023 4:42 PM by Dr. Maksim Woods MD.                 Medications:      atorvastatin, 40 mg, Oral, Nightly  guaiFENesin, 400 mg, Oral, TID  hydrALAZINE, 25 mg, Oral, TID  insulin glargine, 10 Units, Subcutaneous, Nightly  isosorbide mononitrate, 30 mg, Oral, Daily  latanoprost, 1 drop, Both Eyes, Nightly  LORazepam, 0.5 mg, Oral, Nightly  metoprolol succinate XL, 50 mg, Oral, Daily  multivitamin with minerals, 1 tablet, Oral, Daily  pantoprazole, 40 mg, Intravenous, Q AM  senna-docusate sodium, 2 tablet, Oral, BID  sodium chloride, 10 mL, Intravenous, Q12H  sodium chloride, 10 mL, Intravenous, Q12H  sodium zirconium cyclosilicate, 10 g, Oral, Daily  vitamin B-12, 500 mcg, Oral, Daily      lactated ringers, 125 mL/hr, Last Rate: 125 mL/hr (10/18/23 2104)        Assessment & Plan       Symptomatic anemia    -WENDY, nonoliguric  - Chronic kidney disease G4 A1  - Acute on chronic anemia likely multifactorial including anemia of chronic kidney disease  - Diabetes mellitus  - Essential hypertension  - History of heart failure with preserved ejection fraction, well compensated    WENDY on CKD most likely multifactorial including prerenal azotemia, hemodynamic changes due to relative hypotension  Baseline creatinine appears to be 12.1, admitted with 2.4.  Mild hematuria, no  proteinuria  -Started on lactated Ringer 75 mL/h  - Renal ultrasound  - IV iron ferric gluconate 250 mg once  - Please avoid nephrotoxic agents, hypotension and adjust medications according to estimated GFR    Thanks Dr cuellar for the consult. Nephrology will follow the patient.   I discussed the patient's findings and my recommendations with patient    Elaina Bell MD  10/18/23  21:36 EDT      Electronically signed by Elaina Bell MD at 10/18/23 7329

## 2023-10-20 NOTE — PROGRESS NOTES
[facility] HOSPITALIST PROGRESS NOTE     Patient Identification:  Name:  Jolly Garcia  Age:  73 y.o.  Sex:  female  :  1950  MRN:  [id number]  Visit Number:  [id number]  ROOM: [address]     Primary Care Provider:  Whit Cadet APRN    Length of stay in inpatient status:  3    Subjective     Chief Compliant:    Chief Complaint   Patient presents with    Abnormal Lab       History of Presenting Illness:    Patient continues to report no complaints. No reported signs of bleeding.     ROS:  Otherwise 10 point ROS negative other than documented above in HPI.     Objective     Current Hospital Meds:apixaban, 2.5 mg, Oral, Q12H  atorvastatin, 40 mg, Oral, Nightly  guaiFENesin, 400 mg, Oral, TID  hydrALAZINE, 25 mg, Oral, TID  insulin glargine, 10 Units, Subcutaneous, Nightly  isosorbide mononitrate, 30 mg, Oral, Daily  latanoprost, 1 drop, Both Eyes, Nightly  LORazepam, 0.5 mg, Oral, Nightly  metoprolol succinate XL, 50 mg, Oral, Daily  multivitamin with minerals, 1 tablet, Oral, Daily  pantoprazole, 40 mg, Intravenous, Q AM  senna-docusate sodium, 2 tablet, Oral, BID  sodium chloride, 10 mL, Intravenous, Q12H  sodium chloride, 10 mL, Intravenous, Q12H  vitamin B-12, 500 mcg, Oral, Daily    lactated ringers, 125 mL/hr, Last Rate: 125 mL/hr (10/20/23 1306)        Current Antimicrobial Therapy:  Anti-Infectives (From admission, onward)      None          Current Diuretic Therapy:  Diuretics (From admission, onward)      None          ----------------------------------------------------------------------------------------------------------------------  Vital Signs:  Temp:  [97.9 °F (36.6 °C)-98.4 °F (36.9 °C)] 98.2 °F (36.8 °C)  Heart Rate:  [] 113  Resp:  [13-27] 17  BP: ()/(31-83) 106/83  SpO2:  [92 %-99 %] 99 %  on  Flow (L/min):  [1] 1;   Device (Oxygen Therapy): nasal cannula  Body mass index is 42.08 kg/m².    Wt Readings from Last 3 Encounters:   10/20/23 118 kg (260 lb  9.3 oz)   09/14/23 (!) 153 kg (337 lb)   07/28/23 (!) 152 kg (335 lb 4.8 oz)     Intake & Output (last 3 days)         10/17 0701  10/18 0700 10/18 0701  10/19 0700 10/19 0701  10/20 0700 10/20 0701  10/21 0700    P.O.  585 500 480    I.V. (mL/kg) 635.3 (5.8) 2771 (25.2) 5574.6 (47.2)     Blood 600       Total Intake(mL/kg) 1235.3 (11.2) 3356 (30.5) 6074.6 (51.5) 480 (4.1)    Urine (mL/kg/hr) 850 1250 (0.5) 1100 (0.4) 300 (0.3)    Stool   0     Total Output 850 1250 1100 300    Net +385.3 +2106 +4974.6 +180            Urine Unmeasured Occurrence   2 x     Stool Unmeasured Occurrence   0 x           Diet: Liquid Diets; Clear Liquid; Fluid Consistency: Thin (IDDSI 0)  ----------------------------------------------------------------------------------------------------------------------  Physical exam:  Constitutional:  Well-developed and well-nourished.  No respiratory distress.      HENT:  Head:  Normocephalic and atraumatic.  Mouth:  Moist mucous membranes.    Eyes:  Conjunctivae and EOM are normal. No scleral icterus.    Neck:  Neck supple.  No JVD present.    Cardiovascular:  Normal rate, regular rhythm and normal heart sounds with no murmur.  Pulmonary/Chest:  No respiratory distress, no wheezes, no crackles, with normal breath sounds and good air movement.  Abdominal:  Soft.  Bowel sounds are normal.  No distension and no tenderness.   Musculoskeletal:  No edema, no tenderness, and no deformity.  No red or swollen joints anywhere.    Neurological:  Alert and oriented to person, place, and time.  No cranial nerve deficit.  No tongue deviation.  No facial droop.  No slurred speech.   Skin:  Skin is warm and dry. No rash noted. No pallor.   Peripheral vascular:  Pulses in all 4 extremities with no clubbing, no cyanosis, no edema.  ----------------------------------------------------------------------------------------------------------------------  Tele:   "  ----------------------------------------------------------------------------------------------------------------------  Results from last 7 days   Lab Units 10/20/23  0020 10/19/23  0047 10/18/23  2057 10/18/23  0030   WBC 10*3/mm3 8.81 8.02  --  8.19   HEMOGLOBIN g/dL 7.6* 7.7* 7.4* 7.5*   HEMATOCRIT % 25.8* 24.7* 24.0* 23.6*   MCV fL 95.2 88.8  --  86.1   MCHC g/dL 29.5* 31.2*  --  31.8   PLATELETS 10*3/mm3 245 289  --  256     Results from last 7 days   Lab Units 10/17/23  1646   PH, ARTERIAL pH units 7.443   PO2 ART mm Hg 86.9   PCO2, ARTERIAL mm Hg 44.9   HCO3 ART mmol/L 30.6*     Results from last 7 days   Lab Units 10/20/23  0020 10/19/23  0047 10/18/23  0030 10/17/23  1514   SODIUM mmol/L 138 141 135* 137   POTASSIUM mmol/L 3.9 3.7 4.0 4.1   MAGNESIUM mg/dL  --  2.0  --   --    CHLORIDE mmol/L 103 102 97* 98   CO2 mmol/L 25.0 30.9* 31.1* 27.6   BUN mg/dL 70* 83* 92* 89*   CREATININE mg/dL 1.75* 2.13* 2.55* 2.47*   CALCIUM mg/dL 8.4* 8.6 8.6 8.5*   PHOSPHORUS mg/dL  --  3.4  --   --    GLUCOSE mg/dL 86 96 132* 169*   ALBUMIN g/dL  --  3.1* 3.1* 3.2*   BILIRUBIN mg/dL  --  0.5 0.7 0.5   ALK PHOS U/L  --  82 84 90   AST (SGOT) U/L  --  13 12 13   ALT (SGPT) U/L  --  7 6 5   Estimated Creatinine Clearance: 37.4 mL/min (A) (by C-G formula based on SCr of 1.75 mg/dL (H)).  No results found for: \"AMMONIA\"  Results from last 7 days   Lab Units 10/17/23  1514   CK TOTAL U/L 31             Glucose   Date/Time Value Ref Range Status   10/19/2023 2051 109 70 - 130 mg/dL Final   10/19/2023 1627 100 70 - 130 mg/dL Final   10/19/2023 1149 106 70 - 130 mg/dL Final   10/19/2023 0641 82 70 - 130 mg/dL Final   10/19/2023 0519 87 70 - 130 mg/dL Final   10/19/2023 0345 95 70 - 130 mg/dL Final   10/18/2023 2135 123 70 - 130 mg/dL Final     Lab Results   Component Value Date    TSH 5.040 (H) 10/17/2023    FREET4 1.34 10/19/2023     No results found for: \"PREGTESTUR\", \"PREGSERUM\", \"HCG\", \"HCGQUANT\"  Pain Management Panel  More " "data exists         Latest Ref Rng & Units 7/22/2023 6/4/2023   Pain Management Panel   Creatinine, Urine mg/dL 83.5  111.5  111.5      Brief Urine Lab Results  (Last result in the past 365 days)        Color   Clarity   Blood   Leuk Est   Nitrite   Protein   CREAT   Urine HCG        10/17/23 1720 Yellow   Clear   Negative   Small (1+)   Negative   Negative                 No results found for: \"BLOODCX\"  Urine Culture   Date Value Ref Range Status   10/17/2023 <25,000 CFU/mL Gram Negative Bacilli (A)  Final     No results found for: \"WOUNDCX\"  No results found for: \"STOOLCX\"  No results found for: \"RESPCX\"  No results found for: \"AFBCX\"        I have personally looked at the labs and they are summarized above.  ----------------------------------------------------------------------------------------------------------------------  Detailed radiology reports for the last 24 hours:    Imaging Results (Last 24 Hours)       ** No results found for the last 24 hours. **          Assessment & Plan    Acute, symptomatic on chronic anemia  Hx iron deficiency anemia, AOCD/renal disease  Patient's hemoglobin has been consistently 9.5-11 in the past. 5.5 on presentation today. MCV in higher 90's. Platelets wnl. BUN trended up to 89 out of proportion to increase in Cr. Iron studies most consistent of AOCD/AOCKD but cannot rule out acute upper GI bleed that has not clinically presented itself. Total bili and K wnl, do not suspect hemolysis. No history of cirrhosis. Started on IV protonix. Folate, B12, TSH all wnl. Retic appropriately elevated. Held anticoagulation and start SCDs. Repeat hemoglobin after 2 units pRBC stable at 7.5. Stable again at 7.5 after restarting eliquis yesterday evening. Will repeat again in AM and if stable will defer endoscopic evlaluation to outpatient GI.      CKD IV  Creatinine worse. Consulted nephrology. IV iron ordered. Gentle hydration Appreciate recs.      Persistent atrial fibrillation " chronically anticoagulated with Eliquis  [id number]: 6. High stroke risk. Patient borderline qualifies for decreased 2.5 mg dose. Will decrease to 2.5 mg and monitor as above.      Chronic:  IDDM 2  HTN  ASCVD  Chronic respiratory failure  Hyperlipidemia  CKD stage IV  JESIKA  HFpEF        Code status: Full      Dispo: Pending clinical improvement     VTE Prophylaxis:   Mechanical Order History:        Ordered        10/17/23 1817  Place Sequential Compression Device  Once            10/17/23 1817  Maintain Sequential Compression Device  Continuous                          Pharmalogical Order History:        Ordered     Dose Route Frequency Stop    10/19/23 1203  apixaban (ELIQUIS) tablet 2.5 mg         2.5 mg PO Every 12 Hours Scheduled --                        Kirt Arellano MD  [facility]  10/20/23  16:07 EDT

## 2023-10-21 LAB
BASOPHILS # BLD AUTO: 0.05 10*3/MM3 (ref 0–0.2)
BASOPHILS NFR BLD AUTO: 0.6 % (ref 0–1.5)
BH BB BLOOD EXPIRATION DATE: NORMAL
BH BB BLOOD TYPE BARCODE: 7300
BH BB DISPENSE STATUS: NORMAL
BH BB PRODUCT CODE: NORMAL
BH BB UNIT NUMBER: NORMAL
CROSSMATCH INTERPRETATION: NORMAL
DEPRECATED RDW RBC AUTO: 62.1 FL (ref 37–54)
EOSINOPHIL # BLD AUTO: 0.12 10*3/MM3 (ref 0–0.4)
EOSINOPHIL NFR BLD AUTO: 1.5 % (ref 0.3–6.2)
ERYTHROCYTE [DISTWIDTH] IN BLOOD BY AUTOMATED COUNT: 20.3 % (ref 12.3–15.4)
GLUCOSE BLDC GLUCOMTR-MCNC: 143 MG/DL (ref 70–130)
HCT VFR BLD AUTO: 24 % (ref 34–46.6)
HGB BLD-MCNC: 7.2 G/DL (ref 12–15.9)
IMM GRANULOCYTES # BLD AUTO: 0.03 10*3/MM3 (ref 0–0.05)
IMM GRANULOCYTES NFR BLD AUTO: 0.4 % (ref 0–0.5)
LYMPHOCYTES # BLD AUTO: 1.84 10*3/MM3 (ref 0.7–3.1)
LYMPHOCYTES NFR BLD AUTO: 22.9 % (ref 19.6–45.3)
MCH RBC QN AUTO: 27.4 PG (ref 26.6–33)
MCHC RBC AUTO-ENTMCNC: 30 G/DL (ref 31.5–35.7)
MCV RBC AUTO: 91.3 FL (ref 79–97)
MONOCYTES # BLD AUTO: 0.69 10*3/MM3 (ref 0.1–0.9)
MONOCYTES NFR BLD AUTO: 8.6 % (ref 5–12)
NEUTROPHILS NFR BLD AUTO: 5.31 10*3/MM3 (ref 1.7–7)
NEUTROPHILS NFR BLD AUTO: 66 % (ref 42.7–76)
NRBC BLD AUTO-RTO: 0 /100 WBC (ref 0–0.2)
PLATELET # BLD AUTO: 325 10*3/MM3 (ref 140–450)
PMV BLD AUTO: 9 FL (ref 6–12)
RBC # BLD AUTO: 2.63 10*6/MM3 (ref 3.77–5.28)
UNIT  ABO: NORMAL
UNIT  RH: NORMAL
WBC NRBC COR # BLD: 8.04 10*3/MM3 (ref 3.4–10.8)

## 2023-10-21 PROCEDURE — 82948 REAGENT STRIP/BLOOD GLUCOSE: CPT

## 2023-10-21 PROCEDURE — 63710000001 INSULIN GLARGINE PER 5 UNITS: Performed by: INTERNAL MEDICINE

## 2023-10-21 PROCEDURE — 93010 ELECTROCARDIOGRAM REPORT: CPT | Performed by: INTERNAL MEDICINE

## 2023-10-21 PROCEDURE — 85025 COMPLETE CBC W/AUTO DIFF WBC: CPT | Performed by: INTERNAL MEDICINE

## 2023-10-21 PROCEDURE — 25810000003 LACTATED RINGERS PER 1000 ML: Performed by: INTERNAL MEDICINE

## 2023-10-21 PROCEDURE — 93005 ELECTROCARDIOGRAM TRACING: CPT | Performed by: INTERNAL MEDICINE

## 2023-10-21 RX ORDER — PANTOPRAZOLE SODIUM 40 MG/1
40 TABLET, DELAYED RELEASE ORAL
Status: DISCONTINUED | OUTPATIENT
Start: 2023-10-21 | End: 2023-10-24 | Stop reason: HOSPADM

## 2023-10-21 RX ORDER — METOPROLOL SUCCINATE 25 MG/1
25 TABLET, EXTENDED RELEASE ORAL ONCE
Status: COMPLETED | OUTPATIENT
Start: 2023-10-21 | End: 2023-10-21

## 2023-10-21 RX ADMIN — SODIUM CHLORIDE, POTASSIUM CHLORIDE, SODIUM LACTATE AND CALCIUM CHLORIDE 125 ML/HR: 600; 310; 30; 20 INJECTION, SOLUTION INTRAVENOUS at 13:06

## 2023-10-21 RX ADMIN — DOCUSATE SODIUM 50 MG AND SENNOSIDES 8.6 MG 2 TABLET: 8.6; 5 TABLET, FILM COATED ORAL at 08:03

## 2023-10-21 RX ADMIN — PANTOPRAZOLE SODIUM 40 MG: 40 INJECTION, POWDER, FOR SOLUTION INTRAVENOUS at 05:29

## 2023-10-21 RX ADMIN — METOPROLOL SUCCINATE 50 MG: 50 TABLET, EXTENDED RELEASE ORAL at 08:04

## 2023-10-21 RX ADMIN — APIXABAN 2.5 MG: 2.5 TABLET, FILM COATED ORAL at 20:10

## 2023-10-21 RX ADMIN — Medication 10 ML: at 08:05

## 2023-10-21 RX ADMIN — GUAIFENESIN 400 MG: 200 TABLET ORAL at 15:18

## 2023-10-21 RX ADMIN — Medication 10 ML: at 20:10

## 2023-10-21 RX ADMIN — SODIUM CHLORIDE, POTASSIUM CHLORIDE, SODIUM LACTATE AND CALCIUM CHLORIDE 125 ML/HR: 600; 310; 30; 20 INJECTION, SOLUTION INTRAVENOUS at 18:32

## 2023-10-21 RX ADMIN — HYDRALAZINE HYDROCHLORIDE 25 MG: 25 TABLET, FILM COATED ORAL at 15:18

## 2023-10-21 RX ADMIN — ISOSORBIDE MONONITRATE 30 MG: 30 TABLET, EXTENDED RELEASE ORAL at 08:04

## 2023-10-21 RX ADMIN — GUAIFENESIN 400 MG: 200 TABLET ORAL at 08:04

## 2023-10-21 RX ADMIN — LORAZEPAM 0.5 MG: 0.5 TABLET ORAL at 20:14

## 2023-10-21 RX ADMIN — LATANOPROST 1 DROP: 50 SOLUTION OPHTHALMIC at 21:33

## 2023-10-21 RX ADMIN — INSULIN GLARGINE 10 UNITS: 100 INJECTION, SOLUTION SUBCUTANEOUS at 20:10

## 2023-10-21 RX ADMIN — PANTOPRAZOLE SODIUM 40 MG: 40 TABLET, DELAYED RELEASE ORAL at 12:24

## 2023-10-21 RX ADMIN — DOCUSATE SODIUM 50 MG AND SENNOSIDES 8.6 MG 2 TABLET: 8.6; 5 TABLET, FILM COATED ORAL at 20:10

## 2023-10-21 RX ADMIN — SODIUM CHLORIDE, POTASSIUM CHLORIDE, SODIUM LACTATE AND CALCIUM CHLORIDE 125 ML/HR: 600; 310; 30; 20 INJECTION, SOLUTION INTRAVENOUS at 05:28

## 2023-10-21 RX ADMIN — ATORVASTATIN CALCIUM 40 MG: 40 TABLET, FILM COATED ORAL at 20:10

## 2023-10-21 RX ADMIN — APIXABAN 2.5 MG: 2.5 TABLET, FILM COATED ORAL at 08:04

## 2023-10-21 RX ADMIN — GUAIFENESIN 400 MG: 200 TABLET ORAL at 20:10

## 2023-10-21 RX ADMIN — Medication 500 MCG: at 08:04

## 2023-10-21 RX ADMIN — METOPROLOL SUCCINATE 25 MG: 25 TABLET, EXTENDED RELEASE ORAL at 12:23

## 2023-10-21 RX ADMIN — Medication 1 TABLET: at 08:04

## 2023-10-21 NOTE — PLAN OF CARE
Goal Outcome Evaluation:  Plan of Care Reviewed With: patient           Outcome Evaluation: patient is alert and oriented with periods of confusion, she was on 1L via NC part of the day with an O2 saturation of %. we turned her down to room air to see how she did and she has maintained anywhere from 89-92%. she didnt voice any concerns, no pain at this time, patient has rested well. care ongoing, will montior for any acute changes.

## 2023-10-21 NOTE — PROGRESS NOTES
"Nephrology Progress Note      Subjective     No chest pain, shortness of breath    Objective       Vital signs :     Temp:  [98 °F (36.7 °C)-98.6 °F (37 °C)] 98.2 °F (36.8 °C)  Heart Rate:  [] 90  Resp:  [11-22] 21  BP: ()/(48-95) 111/58    Intake/Output                               10/19/23 0701 - 10/20/23 0700 10/20/23 0701 - 10/21/23 0700 10/21/23 0701 - 10/22/23 0700     3024-7717 6026-4643 Total 4033-4081 2683-6323 Total 0809-3990 9606-8595 Total                    Intake    P.O.  500  -- 500  480  -- 480  240  -- 240    I.V.  4087.4  1487.2 5574.6  --  1000 1000  --  -- --    Total Intake 4587.4 1487.2 6074.6 480 1000 1480 240 -- 240       Output    Urine  400  700 1100  300  1000 1300  --  -- --    Total Output  300 1000 1300 -- -- --             Physical Exam:    General Appearance : Not in acute distress  Lungs : clear to auscultation, respirations regular  Heart :  regular rhythm & normal rate, normal S1, S2 and no murmur, no rub  Abdomen : Soft, nondistended  Extremities : No edema,   Neurologic :   orientated to person, place, time and situation, Grossly no focal deficits        Laboratory Data :     Albumin Albumin   Date Value Ref Range Status   10/19/2023 3.1 (L) 3.5 - 5.2 g/dL Final      Magnesium Magnesium   Date Value Ref Range Status   10/19/2023 2.0 1.6 - 2.4 mg/dL Final          PTH               No results found for: \"PTH\"    CBC and coagulation:  Results from last 7 days   Lab Units 10/21/23  0036 10/20/23  0020 10/19/23  0047   WBC 10*3/mm3 8.04 8.81 8.02   HEMOGLOBIN g/dL 7.2* 7.6* 7.7*   HEMATOCRIT % 24.0* 25.8* 24.7*   MCV fL 91.3 95.2 88.8   MCHC g/dL 30.0* 29.5* 31.2*   PLATELETS 10*3/mm3 325 245 289     Acid/base balance:  Results from last 7 days   Lab Units 10/17/23  1646   PH, ARTERIAL pH units 7.443   PO2 ART mm Hg 86.9   PCO2, ARTERIAL mm Hg 44.9   HCO3 ART mmol/L 30.6*     Renal and electrolytes:    Results from last 7 days   Lab Units 10/20/23  0020 " 10/19/23  0047 10/18/23  0030 10/17/23  1514   SODIUM mmol/L 138 141 135* 137   POTASSIUM mmol/L 3.9 3.7 4.0 4.1   MAGNESIUM mg/dL  --  2.0  --   --    CHLORIDE mmol/L 103 102 97* 98   CO2 mmol/L 25.0 30.9* 31.1* 27.6   BUN mg/dL 70* 83* 92* 89*   CREATININE mg/dL 1.75* 2.13* 2.55* 2.47*   CALCIUM mg/dL 8.4* 8.6 8.6 8.5*   PHOSPHORUS mg/dL  --  3.4  --   --      Estimated Creatinine Clearance: 37.1 mL/min (A) (by C-G formula based on SCr of 1.75 mg/dL (H)).  @GFRCG:3@   Liver and pancreatic function:  Results from last 7 days   Lab Units 10/19/23  0047 10/18/23  0030 10/17/23  1514   ALBUMIN g/dL 3.1* 3.1* 3.2*   BILIRUBIN mg/dL 0.5 0.7 0.5   ALK PHOS U/L 82 84 90   AST (SGOT) U/L 13 12 13   ALT (SGPT) U/L 7 6 5         Cardiac:      Liver and pancreatic function:  Results from last 7 days   Lab Units 10/19/23  0047 10/18/23  0030 10/17/23  1514   ALBUMIN g/dL 3.1* 3.1* 3.2*   BILIRUBIN mg/dL 0.5 0.7 0.5   ALK PHOS U/L 82 84 90   AST (SGOT) U/L 13 12 13   ALT (SGPT) U/L 7 6 5       Medications :     apixaban, 2.5 mg, Oral, Q12H  atorvastatin, 40 mg, Oral, Nightly  guaiFENesin, 400 mg, Oral, TID  hydrALAZINE, 25 mg, Oral, TID  insulin glargine, 10 Units, Subcutaneous, Nightly  isosorbide mononitrate, 30 mg, Oral, Daily  latanoprost, 1 drop, Both Eyes, Nightly  LORazepam, 0.5 mg, Oral, Nightly  metoprolol succinate XL, 25 mg, Oral, Once  [START ON 10/22/2023] metoprolol succinate XL, 75 mg, Oral, Daily  multivitamin with minerals, 1 tablet, Oral, Daily  pantoprazole, 40 mg, Oral, Q AM  senna-docusate sodium, 2 tablet, Oral, BID  sodium chloride, 10 mL, Intravenous, Q12H  sodium chloride, 10 mL, Intravenous, Q12H  vitamin B-12, 500 mcg, Oral, Daily      lactated ringers, 125 mL/hr, Last Rate: 125 mL/hr (10/21/23 0528)          Assessment & Plan     -WENDY, nonoliguric  - Chronic kidney disease G4 A1  - Acute on chronic anemia likely multifactorial including anemia of chronic kidney disease  - Diabetes mellitus  -  Essential hypertension  - History of heart failure with preserved ejection fraction, well compensated     Follow-up renal functions, still oral intake is not appropriate so we will continue on lactated Ringer fluid.  No hyperkalemia    WENDY on CKD most likely multifactorial including prerenal azotemia, hemodynamic changes due to relative hypotension  Baseline creatinine appears to be 12.1, admitted with 2.4.  Mild hematuria, no proteinuria  - Continue lactated Ringer 75 mL/h  - Please avoid nephrotoxic agents, hypotension and adjust medications according to estimated GFR      Elaina Bell MD  10/21/23  12:03 EDT
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
## 2023-10-21 NOTE — PLAN OF CARE
Goal Outcome Evaluation:           Progress: improving  Outcome Evaluation: Patient remains awake and alert with some periods of confusion. VSS with patient on 1 L/min O2 via NC. Discussed ongoing plan of care with patient with no further questions or concerns verbalized. Care ongoing.

## 2023-10-21 NOTE — PROGRESS NOTES
[facility] HOSPITALIST PROGRESS NOTE     Patient Identification:  Name:  Jolly Garcia  Age:  73 y.o.  Sex:  female  :  1950  MRN:  [id number]  Visit Number:  [id number]  ROOM: [address]     Primary Care Provider:  Whit Cadet APRN    Length of stay in inpatient status:  4    Subjective     Chief Compliant:    Chief Complaint   Patient presents with    Abnormal Lab       History of Presenting Illness:    Patient notes feeling well. She notes she is ready for diet to be advanced. Denies any new complaints. No signs of bleeding.     ROS:  Otherwise 10 point ROS negative other than documented above in HPI.     Objective     Current Hospital Meds:apixaban, 2.5 mg, Oral, Q12H  atorvastatin, 40 mg, Oral, Nightly  guaiFENesin, 400 mg, Oral, TID  hydrALAZINE, 25 mg, Oral, TID  insulin glargine, 10 Units, Subcutaneous, Nightly  isosorbide mononitrate, 30 mg, Oral, Daily  latanoprost, 1 drop, Both Eyes, Nightly  LORazepam, 0.5 mg, Oral, Nightly  metoprolol succinate XL, 50 mg, Oral, Daily  multivitamin with minerals, 1 tablet, Oral, Daily  pantoprazole, 40 mg, Intravenous, Q AM  senna-docusate sodium, 2 tablet, Oral, BID  sodium chloride, 10 mL, Intravenous, Q12H  sodium chloride, 10 mL, Intravenous, Q12H  vitamin B-12, 500 mcg, Oral, Daily    lactated ringers, 125 mL/hr, Last Rate: 125 mL/hr (10/21/23 0528)        Current Antimicrobial Therapy:  Anti-Infectives (From admission, onward)      None          Current Diuretic Therapy:  Diuretics (From admission, onward)      None          ----------------------------------------------------------------------------------------------------------------------  Vital Signs:  Temp:  [98 °F (36.7 °C)-98.6 °F (37 °C)] 98.2 °F (36.8 °C)  Heart Rate:  [] 90  Resp:  [11-22] 21  BP: ()/(48-95) 111/58  SpO2:  [95 %-100 %] 100 %  on  Flow (L/min):  [1] 1;   Device (Oxygen Therapy): nasal cannula  Body mass index is 41.12 kg/m².    Wt Readings  from Last 3 Encounters:   10/21/23 115 kg (254 lb 10.1 oz)   09/14/23 (!) 153 kg (337 lb)   07/28/23 (!) 152 kg (335 lb 4.8 oz)     Intake & Output (last 3 days)         10/18 0701  10/19 0700 10/19 0701  10/20 0700 10/20 0701  10/21 0700 10/21 0701  10/22 0700    P.O. 585 500 480 240    I.V. (mL/kg) 2771 (25.2) 5574.6 (47.2) 1000 (8.7)     Blood        Total Intake(mL/kg) 3356 (30.5) 6074.6 (51.5) 1480 (12.9) 240 (2.1)    Urine (mL/kg/hr) 1250 (0.5) 1100 (0.4) 1300 (0.5)     Stool  0      Total Output 1250 1100 1300     Net +2106 +4974.6 +180 +240            Urine Unmeasured Occurrence  2 x      Stool Unmeasured Occurrence  0 x            Diet: Cardiac Diets, Diabetic Diets; Healthy Heart (2-3 Na+); Consistent Carbohydrate; Texture: Regular Texture (IDDSI 7); Fluid Consistency: Thin (IDDSI 0)  ----------------------------------------------------------------------------------------------------------------------  Physical exam:  Constitutional:  Well-developed and well-nourished.  No respiratory distress.      HENT:  Head:  Normocephalic and atraumatic.  Mouth:  Moist mucous membranes.    Eyes:  Conjunctivae and EOM are normal. No scleral icterus.    Neck:  Neck supple.  No JVD present.    Cardiovascular:  Normal rate, regular rhythm and normal heart sounds with no murmur.  Pulmonary/Chest:  No respiratory distress, no wheezes, no crackles, with normal breath sounds and good air movement.  Abdominal:  Soft.  Bowel sounds are normal.  No distension and no tenderness.   Musculoskeletal:  No edema, no tenderness, and no deformity.  No red or swollen joints anywhere.    Neurological:  Alert and oriented to person, place, and time.  No cranial nerve deficit.  No tongue deviation.  No facial droop.  No slurred speech.   Skin:  Skin is warm and dry. No rash noted. No pallor.   Peripheral vascular:  Pulses in all 4 extremities with no clubbing, no cyanosis, no  "edema.  ----------------------------------------------------------------------------------------------------------------------  Tele:    ----------------------------------------------------------------------------------------------------------------------  Results from last 7 days   Lab Units 10/21/23  0036 10/20/23  0020 10/19/23  0047   WBC 10*3/mm3 8.04 8.81 8.02   HEMOGLOBIN g/dL 7.2* 7.6* 7.7*   HEMATOCRIT % 24.0* 25.8* 24.7*   MCV fL 91.3 95.2 88.8   MCHC g/dL 30.0* 29.5* 31.2*   PLATELETS 10*3/mm3 325 668 786     Results from last 7 days   Lab Units 10/17/23  1646   PH, ARTERIAL pH units 7.443   PO2 ART mm Hg 86.9   PCO2, ARTERIAL mm Hg 44.9   HCO3 ART mmol/L 30.6*     Results from last 7 days   Lab Units 10/20/23  0020 10/19/23  0047 10/18/23  0030 10/17/23  1514   SODIUM mmol/L 138 141 135* 137   POTASSIUM mmol/L 3.9 3.7 4.0 4.1   MAGNESIUM mg/dL  --  2.0  --   --    CHLORIDE mmol/L 103 102 97* 98   CO2 mmol/L 25.0 30.9* 31.1* 27.6   BUN mg/dL 70* 83* 92* 89*   CREATININE mg/dL 1.75* 2.13* 2.55* 2.47*   CALCIUM mg/dL 8.4* 8.6 8.6 8.5*   PHOSPHORUS mg/dL  --  3.4  --   --    GLUCOSE mg/dL 86 96 132* 169*   ALBUMIN g/dL  --  3.1* 3.1* 3.2*   BILIRUBIN mg/dL  --  0.5 0.7 0.5   ALK PHOS U/L  --  82 84 90   AST (SGOT) U/L  --  13 12 13   ALT (SGPT) U/L  --  7 6 5   Estimated Creatinine Clearance: 37.1 mL/min (A) (by C-G formula based on SCr of 1.75 mg/dL (H)).  No results found for: \"AMMONIA\"  Results from last 7 days   Lab Units 10/17/23  1514   CK TOTAL U/L 31             Glucose   Date/Time Value Ref Range Status   10/20/2023 2108 114 70 - 130 mg/dL Final   10/19/2023 2051 109 70 - 130 mg/dL Final   10/19/2023 1627 100 70 - 130 mg/dL Final   10/19/2023 1149 106 70 - 130 mg/dL Final   10/19/2023 0641 82 70 - 130 mg/dL Final   10/19/2023 0519 87 70 - 130 mg/dL Final   10/19/2023 0345 95 70 - 130 mg/dL Final   10/18/2023 2135 123 70 - 130 mg/dL Final     Lab Results   Component Value Date    TSH 5.040 (H) " "10/17/2023    FREET4 1.34 10/19/2023     No results found for: \"PREGTESTUR\", \"PREGSERUM\", \"HCG\", \"HCGQUANT\"  Pain Management Panel  More data exists         Latest Ref Rng & Units 7/22/2023 6/4/2023   Pain Management Panel   Creatinine, Urine mg/dL 83.5  111.5  111.5      Brief Urine Lab Results  (Last result in the past 365 days)        Color   Clarity   Blood   Leuk Est   Nitrite   Protein   CREAT   Urine HCG        10/17/23 1720 Yellow   Clear   Negative   Small (1+)   Negative   Negative                 No results found for: \"BLOODCX\"  Urine Culture   Date Value Ref Range Status   10/17/2023 <25,000 CFU/mL Gram Negative Bacilli (A)  Final     No results found for: \"WOUNDCX\"  No results found for: \"STOOLCX\"  No results found for: \"RESPCX\"  No results found for: \"AFBCX\"        I have personally looked at the labs and they are summarized above.  ----------------------------------------------------------------------------------------------------------------------  Detailed radiology reports for the last 24 hours:    Imaging Results (Last 24 Hours)       ** No results found for the last 24 hours. **          Assessment & Plan    Acute, symptomatic on chronic anemia  Hx iron deficiency anemia, AOCD/renal disease  Patient's hemoglobin has been consistently 9.5-11 in the past. 5.5 on presentation. MCV in higher 90's. Platelets wnl. BUN trended up to 89 out of proportion to increase in Cr. Iron studies most consistent of AOCD/AOCKD but cannot rule out acute upper GI bleed that has not clinically presented itself. Total bili and K wnl, do not suspect hemolysis. No history of cirrhosis. Started on IV protonix. Folate, B12, TSH all wnl. Retic appropriately elevated. Held anticoagulation and start SCDs. Repeat hemoglobin after 2 units pRBC stable at 7.5.  Repeat now 48 hours after transfusion 7.2. Will transition to PO protonix.     CKD IV  Creatinine now at or below baseline     Persistent atrial fibrillation chronically " anticoagulated with Eliquis, now with RVR  [id number]: 6. High stroke risk. Patient borderline qualifies for decreased 2.5 mg dose. Will decrease to 2.5 mg and monitor as above.   Patient with HR in 120's this AM. Will increase metoprolol xl from 50 to 75 mg and monitor response.      Chronic:  IDDM 2  HTN  ASCVD  Chronic respiratory failure  Hyperlipidemia  CKD stage IV  JESIKA  HFpEF        Code status: Full      Dispo: Pending clinical improvement     VTE Prophylaxis:   Mechanical Order History:        Ordered        10/17/23 1817  Place Sequential Compression Device  Once            10/17/23 1817  Maintain Sequential Compression Device  Continuous                          Pharmalogical Order History:        Ordered     Dose Route Frequency Stop    10/19/23 1203  apixaban (ELIQUIS) tablet 2.5 mg         2.5 mg PO Every 12 Hours Scheduled --                        Kirt Arellano MD  [facility]  10/21/23  10:57 EDT

## 2023-10-22 LAB
ALBUMIN SERPL-MCNC: 2.8 G/DL (ref 3.5–5.2)
ALBUMIN/GLOB SERPL: 1 G/DL
ALP SERPL-CCNC: 90 U/L (ref 39–117)
ALT SERPL W P-5'-P-CCNC: 5 U/L (ref 1–33)
ANION GAP SERPL CALCULATED.3IONS-SCNC: 7.5 MMOL/L (ref 5–15)
AST SERPL-CCNC: 17 U/L (ref 1–32)
BASOPHILS # BLD AUTO: 0.07 10*3/MM3 (ref 0–0.2)
BASOPHILS NFR BLD AUTO: 0.7 % (ref 0–1.5)
BILIRUB SERPL-MCNC: 0.7 MG/DL (ref 0–1.2)
BUN SERPL-MCNC: 41 MG/DL (ref 8–23)
BUN/CREAT SERPL: 32.3 (ref 7–25)
CALCIUM SPEC-SCNC: 8.5 MG/DL (ref 8.6–10.5)
CHLORIDE SERPL-SCNC: 106 MMOL/L (ref 98–107)
CO2 SERPL-SCNC: 25.5 MMOL/L (ref 22–29)
CREAT SERPL-MCNC: 1.27 MG/DL (ref 0.57–1)
DEPRECATED RDW RBC AUTO: 66.8 FL (ref 37–54)
EGFRCR SERPLBLD CKD-EPI 2021: 44.7 ML/MIN/1.73
EOSINOPHIL # BLD AUTO: 0.21 10*3/MM3 (ref 0–0.4)
EOSINOPHIL NFR BLD AUTO: 2.2 % (ref 0.3–6.2)
ERYTHROCYTE [DISTWIDTH] IN BLOOD BY AUTOMATED COUNT: 20.3 % (ref 12.3–15.4)
GLOBULIN UR ELPH-MCNC: 2.9 GM/DL
GLUCOSE BLDC GLUCOMTR-MCNC: 110 MG/DL (ref 70–130)
GLUCOSE SERPL-MCNC: 81 MG/DL (ref 65–99)
HCT VFR BLD AUTO: 28 % (ref 34–46.6)
HGB BLD-MCNC: 8.2 G/DL (ref 12–15.9)
IMM GRANULOCYTES # BLD AUTO: 0.03 10*3/MM3 (ref 0–0.05)
IMM GRANULOCYTES NFR BLD AUTO: 0.3 % (ref 0–0.5)
LYMPHOCYTES # BLD AUTO: 1.94 10*3/MM3 (ref 0.7–3.1)
LYMPHOCYTES NFR BLD AUTO: 20.2 % (ref 19.6–45.3)
MCH RBC QN AUTO: 27.4 PG (ref 26.6–33)
MCHC RBC AUTO-ENTMCNC: 29.3 G/DL (ref 31.5–35.7)
MCV RBC AUTO: 93.6 FL (ref 79–97)
MONOCYTES # BLD AUTO: 0.75 10*3/MM3 (ref 0.1–0.9)
MONOCYTES NFR BLD AUTO: 7.8 % (ref 5–12)
NEUTROPHILS NFR BLD AUTO: 6.6 10*3/MM3 (ref 1.7–7)
NEUTROPHILS NFR BLD AUTO: 68.8 % (ref 42.7–76)
NRBC BLD AUTO-RTO: 0 /100 WBC (ref 0–0.2)
PLATELET # BLD AUTO: 373 10*3/MM3 (ref 140–450)
PMV BLD AUTO: 8.8 FL (ref 6–12)
POTASSIUM SERPL-SCNC: 4 MMOL/L (ref 3.5–5.2)
PROT SERPL-MCNC: 5.7 G/DL (ref 6–8.5)
QT INTERVAL: 338 MS
QTC INTERVAL: 451 MS
RBC # BLD AUTO: 2.99 10*6/MM3 (ref 3.77–5.28)
SODIUM SERPL-SCNC: 139 MMOL/L (ref 136–145)
WBC NRBC COR # BLD: 9.6 10*3/MM3 (ref 3.4–10.8)

## 2023-10-22 PROCEDURE — 85025 COMPLETE CBC W/AUTO DIFF WBC: CPT | Performed by: INTERNAL MEDICINE

## 2023-10-22 PROCEDURE — 80053 COMPREHEN METABOLIC PANEL: CPT | Performed by: INTERNAL MEDICINE

## 2023-10-22 PROCEDURE — 63710000001 INSULIN GLARGINE PER 5 UNITS: Performed by: INTERNAL MEDICINE

## 2023-10-22 PROCEDURE — 25810000003 LACTATED RINGERS PER 1000 ML: Performed by: INTERNAL MEDICINE

## 2023-10-22 PROCEDURE — 82948 REAGENT STRIP/BLOOD GLUCOSE: CPT

## 2023-10-22 RX ADMIN — GUAIFENESIN 400 MG: 200 TABLET ORAL at 20:17

## 2023-10-22 RX ADMIN — LORAZEPAM 0.5 MG: 0.5 TABLET ORAL at 20:17

## 2023-10-22 RX ADMIN — GUAIFENESIN 400 MG: 200 TABLET ORAL at 16:34

## 2023-10-22 RX ADMIN — Medication 10 ML: at 20:17

## 2023-10-22 RX ADMIN — ATORVASTATIN CALCIUM 40 MG: 40 TABLET, FILM COATED ORAL at 20:17

## 2023-10-22 RX ADMIN — DOCUSATE SODIUM 50 MG AND SENNOSIDES 8.6 MG 2 TABLET: 8.6; 5 TABLET, FILM COATED ORAL at 09:23

## 2023-10-22 RX ADMIN — APIXABAN 2.5 MG: 2.5 TABLET, FILM COATED ORAL at 20:17

## 2023-10-22 RX ADMIN — APIXABAN 2.5 MG: 2.5 TABLET, FILM COATED ORAL at 09:33

## 2023-10-22 RX ADMIN — Medication 1 TABLET: at 09:23

## 2023-10-22 RX ADMIN — SODIUM CHLORIDE, POTASSIUM CHLORIDE, SODIUM LACTATE AND CALCIUM CHLORIDE 125 ML/HR: 600; 310; 30; 20 INJECTION, SOLUTION INTRAVENOUS at 02:35

## 2023-10-22 RX ADMIN — SODIUM CHLORIDE, POTASSIUM CHLORIDE, SODIUM LACTATE AND CALCIUM CHLORIDE 125 ML/HR: 600; 310; 30; 20 INJECTION, SOLUTION INTRAVENOUS at 10:43

## 2023-10-22 RX ADMIN — INSULIN GLARGINE 10 UNITS: 100 INJECTION, SOLUTION SUBCUTANEOUS at 20:17

## 2023-10-22 RX ADMIN — METOPROLOL SUCCINATE 75 MG: 50 TABLET, EXTENDED RELEASE ORAL at 09:23

## 2023-10-22 RX ADMIN — GUAIFENESIN 400 MG: 200 TABLET ORAL at 09:23

## 2023-10-22 RX ADMIN — HYDRALAZINE HYDROCHLORIDE 25 MG: 25 TABLET, FILM COATED ORAL at 16:42

## 2023-10-22 RX ADMIN — PANTOPRAZOLE SODIUM 40 MG: 40 TABLET, DELAYED RELEASE ORAL at 05:38

## 2023-10-22 RX ADMIN — LATANOPROST 1 DROP: 50 SOLUTION OPHTHALMIC at 20:17

## 2023-10-22 RX ADMIN — ISOSORBIDE MONONITRATE 30 MG: 30 TABLET, EXTENDED RELEASE ORAL at 09:23

## 2023-10-22 RX ADMIN — HYDRALAZINE HYDROCHLORIDE 25 MG: 25 TABLET, FILM COATED ORAL at 09:23

## 2023-10-22 RX ADMIN — HYDRALAZINE HYDROCHLORIDE 25 MG: 25 TABLET, FILM COATED ORAL at 20:17

## 2023-10-22 RX ADMIN — Medication 500 MCG: at 09:23

## 2023-10-22 RX ADMIN — SODIUM CHLORIDE, POTASSIUM CHLORIDE, SODIUM LACTATE AND CALCIUM CHLORIDE 75 ML/HR: 600; 310; 30; 20 INJECTION, SOLUTION INTRAVENOUS at 23:10

## 2023-10-22 NOTE — PROGRESS NOTES
[facility] HOSPITALIST PROGRESS NOTE     Patient Identification:  Name:  Jolly Garcia  Age:  73 y.o.  Sex:  female  :  1950  MRN:  [id number]  Visit Number:  [id number]  ROOM: [address] [place] [address]     Primary Care Provider:  Whit Cadet APRN    Length of stay in inpatient status:  5    Subjective     Chief Compliant:    Chief Complaint   Patient presents with    Abnormal Lab       History of Presenting Illness:    Patient denies any new complaints. Patient had confusion yesterday afternoon that sounds like delirium. Patient was hopeful to get to nursing facility today but facility unable to take her back. No signs of bleeding.     ROS:  Otherwise 10 point ROS negative other than documented above in HPI.     Objective     Current Hospital Meds:apixaban, 2.5 mg, Oral, Q12H  atorvastatin, 40 mg, Oral, Nightly  guaiFENesin, 400 mg, Oral, TID  hydrALAZINE, 25 mg, Oral, TID  insulin glargine, 10 Units, Subcutaneous, Nightly  isosorbide mononitrate, 30 mg, Oral, Daily  latanoprost, 1 drop, Both Eyes, Nightly  LORazepam, 0.5 mg, Oral, Nightly  metoprolol succinate XL, 75 mg, Oral, Daily  multivitamin with minerals, 1 tablet, Oral, Daily  pantoprazole, 40 mg, Oral, Q AM  senna-docusate sodium, 2 tablet, Oral, BID  sodium chloride, 10 mL, Intravenous, Q12H  sodium chloride, 10 mL, Intravenous, Q12H  vitamin B-12, 500 mcg, Oral, Daily    lactated ringers, 75 mL/hr, Last Rate: 75 mL/hr (10/22/23 1135)        Current Antimicrobial Therapy:  Anti-Infectives (From admission, onward)      None          Current Diuretic Therapy:  Diuretics (From admission, onward)      None          ----------------------------------------------------------------------------------------------------------------------  Vital Signs:  Temp:  [97.6 °F (36.4 °C)-98.2 °F (36.8 °C)] 97.6 °F (36.4 °C)  Heart Rate:  [] 95  Resp:  [17-18] 18  BP: (100-144)/(50-87) 123/63  SpO2:  [90 %-97 %] 91 %  on  Flow (L/min):  [1] 1;    Device (Oxygen Therapy): nasal cannula  Body mass index is 43.91 kg/m².    Wt Readings from Last 3 Encounters:   10/21/23 123 kg (272 lb 0.8 oz)   09/14/23 (!) 153 kg (337 lb)   07/28/23 (!) 152 kg (335 lb 4.8 oz)     Intake & Output (last 3 days)         10/19 0701  10/20 0700 10/20 0701  10/21 0700 10/21 0701  10/22 0700 10/22 0701  10/23 0700    P.O.  120    I.V. (mL/kg) 5574.6 (47.2) 1000 (8.7) 4136 (33.6)     Total Intake(mL/kg) 6074.6 (51.5) 1480 (12.9) 5936 (48.3) 120 (1)    Urine (mL/kg/hr) 1100 (0.4) 1300 (0.5) 700 (0.2)     Stool 0  0     Total Output 1100 1300 700     Net +4974.6 +180 +5236 +120            Urine Unmeasured Occurrence 2 x  2 x 1 x    Stool Unmeasured Occurrence 0 x  1 x 1 x          Diet: Cardiac Diets, Diabetic Diets; Healthy Heart (2-3 Na+); Consistent Carbohydrate; Texture: Regular Texture (IDDSI 7); Fluid Consistency: Thin (IDDSI 0)  ----------------------------------------------------------------------------------------------------------------------  Physical exam:  Constitutional:  Well-developed and well-nourished.  No respiratory distress.      HENT:  Head:  Normocephalic and atraumatic.  Mouth:  Moist mucous membranes.    Eyes:  Conjunctivae and EOM are normal. No scleral icterus.    Neck:  Neck supple.  No JVD present.    Cardiovascular:  Normal rate, regular rhythm and normal heart sounds with no murmur.  Pulmonary/Chest:  No respiratory distress, no wheezes, no crackles, with normal breath sounds and good air movement.  Abdominal:  Soft.  Bowel sounds are normal.  No distension and no tenderness.   Musculoskeletal:  No edema, no tenderness, and no deformity.  No red or swollen joints anywhere.    Neurological:  Alert and oriented to person, place, and time.  No cranial nerve deficit.  No tongue deviation.  No facial droop.  No slurred speech.   Skin:  Skin is warm and dry. No rash noted. No pallor.   Peripheral vascular:  Pulses in all 4 extremities with no  "clubbing, no cyanosis, no edema.  ----------------------------------------------------------------------------------------------------------------------  Tele:    ----------------------------------------------------------------------------------------------------------------------  Results from last 7 days   Lab Units 10/22/23  0852 10/21/23  0036 10/20/23  0020   WBC 10*3/mm3 9.60 8.04 8.81   HEMOGLOBIN g/dL 8.2* 7.2* 7.6*   HEMATOCRIT % 28.0* 24.0* 25.8*   MCV fL 93.6 91.3 95.2   MCHC g/dL 29.3* 30.0* 29.5*   PLATELETS 10*3/mm3 373 325 245     Results from last 7 days   Lab Units 10/17/23  1646   PH, ARTERIAL pH units 7.443   PO2 ART mm Hg 86.9   PCO2, ARTERIAL mm Hg 44.9   HCO3 ART mmol/L 30.6*     Results from last 7 days   Lab Units 10/22/23  0852 10/20/23  0020 10/19/23  0047 10/18/23  0030   SODIUM mmol/L 139 138 141 135*   POTASSIUM mmol/L 4.0 3.9 3.7 4.0   MAGNESIUM mg/dL  --   --  2.0  --    CHLORIDE mmol/L 106 103 102 97*   CO2 mmol/L 25.5 25.0 30.9* 31.1*   BUN mg/dL 41* 70* 83* 92*   CREATININE mg/dL 1.27* 1.75* 2.13* 2.55*   CALCIUM mg/dL 8.5* 8.4* 8.6 8.6   PHOSPHORUS mg/dL  --   --  3.4  --    GLUCOSE mg/dL 81 86 96 132*   ALBUMIN g/dL 2.8*  --  3.1* 3.1*   BILIRUBIN mg/dL 0.7  --  0.5 0.7   ALK PHOS U/L 90  --  82 84   AST (SGOT) U/L 17  --  13 12   ALT (SGPT) U/L 5  --  7 6   Estimated Creatinine Clearance: 52.8 mL/min (A) (by C-G formula based on SCr of 1.27 mg/dL (H)).  No results found for: \"AMMONIA\"  Results from last 7 days   Lab Units 10/17/23  1514   CK TOTAL U/L 31             Glucose   Date/Time Value Ref Range Status   10/21/2023 2010 143 (H) 70 - 130 mg/dL Final   10/20/2023 2108 114 70 - 130 mg/dL Final   10/19/2023 2051 109 70 - 130 mg/dL Final     Lab Results   Component Value Date    TSH 5.040 (H) 10/17/2023    FREET4 1.34 10/19/2023     No results found for: \"PREGTESTUR\", \"PREGSERUM\", \"HCG\", \"HCGQUANT\"  Pain Management Panel  More data exists         Latest Ref Rng & Units 7/22/2023 " "6/4/2023   Pain Management Panel   Creatinine, Urine mg/dL 83.5  111.5  111.5      Brief Urine Lab Results  (Last result in the past 365 days)        Color   Clarity   Blood   Leuk Est   Nitrite   Protein   CREAT   Urine HCG        10/17/23 1720 Yellow   Clear   Negative   Small (1+)   Negative   Negative                 No results found for: \"BLOODCX\"  Urine Culture   Date Value Ref Range Status   10/17/2023 <25,000 CFU/mL Gram Negative Bacilli (A)  Final     No results found for: \"WOUNDCX\"  No results found for: \"STOOLCX\"  No results found for: \"RESPCX\"  No results found for: \"AFBCX\"        I have personally looked at the labs and they are summarized above.  ----------------------------------------------------------------------------------------------------------------------  Detailed radiology reports for the last 24 hours:    Imaging Results (Last 24 Hours)       ** No results found for the last 24 hours. **          Assessment & Plan    Acute, symptomatic on chronic anemia  Hx iron deficiency anemia, AOCD/renal disease  Patient's hemoglobin has been consistently 9.5-11 in the past. 5.5 on presentation. MCV in higher 90's. Platelets wnl. BUN trended up to 89 out of proportion to increase in Cr. Iron studies most consistent of AOCD/AOCKD but cannot rule out acute upper GI bleed that has not clinically presented itself. Total bili and K wnl, do not suspect hemolysis. No history of cirrhosis. Started on IV protonix. Folate, B12, TSH all wnl. Retic appropriately elevated. Held anticoagulation and start SCDs. Repeat hemoglobin after 2 units pRBC stable at 7.5.  Repeat now 72 hours after transfusion 8.2. Transitioned to PO protonix.      CKD IV  Creatinine now at or below baseline     Persistent atrial fibrillation chronically anticoagulated with Eliquis, now with RVR  [id number]: 6. High stroke risk. Patient borderline qualifies for decreased 2.5 mg dose. Will decrease to 2.5 mg and monitor as above.   Patient with " RVR earlier in hospitalization that has improved with increased metoprolol xl to 75 mg daily.      Chronic:  IDDM 2  HTN  ASCVD  Chronic respiratory failure  Hyperlipidemia  CKD stage IV  JESIKA  HFpEF        Code status: Full      Dispo: Pending clinical improvement     VTE Prophylaxis:   Mechanical Order History:        Ordered        10/17/23 1817  Place Sequential Compression Device  Once            10/17/23 1817  Maintain Sequential Compression Device  Continuous                          Pharmalogical Order History:        Ordered     Dose Route Frequency Stop    10/19/23 1203  apixaban (ELIQUIS) tablet 2.5 mg         2.5 mg PO Every 12 Hours Scheduled --                    Kirt Arellano MD  [facility] ERist  10/22/23  16:51 EDT

## 2023-10-22 NOTE — PLAN OF CARE
Goal Outcome Evaluation:  Plan of Care Reviewed With: patient        Progress: no change  Outcome Evaluation: Patient resting in bed at this time. VSS. Pt has been confused throughout shift. No requests or complaints at this time. Will continue with plan of care.

## 2023-10-22 NOTE — PLAN OF CARE
Goal Outcome Evaluation:           Progress: no change  Outcome Evaluation: Pt. resting in bed at this time. Pt. has had moments of confusion throughout shift. VSS, no acute changes at this time. Will continue with plan of care.

## 2023-10-22 NOTE — PROGRESS NOTES
"Nephrology Progress Note      Subjective     Feeling better no chest pain or shortness of breath    Objective       Vital signs :     Temp:  [97.6 °F (36.4 °C)-98.2 °F (36.8 °C)] 97.6 °F (36.4 °C)  Heart Rate:  [] 98  Resp:  [17-21] 18  BP: ()/() 144/87    Intake/Output                               10/20/23 0701 - 10/21/23 0700 10/21/23 0701 - 10/22/23 0700 10/22/23 0701 - 10/23/23 0700     0795-7970 5498-1078 Total 6555-0246 4159-0346 Total 2644-1216 5366-2071 Total                    Intake    P.O.  480  -- 480  600  1200 1800  0  -- 0    I.V.  --  1000 1000  2749.6  1386.4 4136  --  -- --    Total Intake 480 1000 1480 3349.6 2586.4 5936 0 -- 0       Output    Urine  300  1000 1300  550  150 700  --  -- --    Total Output 300 1000 1300 550 150 700 -- -- --             Physical Exam:    General Appearance : Not in acute distress  Lungs : clear to auscultation, respirations regular  Heart :  regular rhythm & normal rate, normal S1, S2 and no murmur, no rub  Abdomen : Soft, nondistended  Extremities : No edema,   Neurologic :   orientated to person, place, time and situation, Grossly no focal deficits        Laboratory Data :     Albumin Albumin   Date Value Ref Range Status   10/22/2023 2.8 (L) 3.5 - 5.2 g/dL Final      Magnesium No results found for: \"MG\"         PTH               No results found for: \"PTH\"    CBC and coagulation:  Results from last 7 days   Lab Units 10/22/23  0852 10/21/23  0036 10/20/23  0020   WBC 10*3/mm3 9.60 8.04 8.81   HEMOGLOBIN g/dL 8.2* 7.2* 7.6*   HEMATOCRIT % 28.0* 24.0* 25.8*   MCV fL 93.6 91.3 95.2   MCHC g/dL 29.3* 30.0* 29.5*   PLATELETS 10*3/mm3 373 325 245     Acid/base balance:  Results from last 7 days   Lab Units 10/17/23  1646   PH, ARTERIAL pH units 7.443   PO2 ART mm Hg 86.9   PCO2, ARTERIAL mm Hg 44.9   HCO3 ART mmol/L 30.6*     Renal and electrolytes:    Results from last 7 days   Lab Units 10/22/23  0852 10/20/23  0020 10/19/23  0047 10/18/23  0030 " 10/17/23  1514   SODIUM mmol/L 139 138 141 135* 137   POTASSIUM mmol/L 4.0 3.9 3.7 4.0 4.1   MAGNESIUM mg/dL  --   --  2.0  --   --    CHLORIDE mmol/L 106 103 102 97* 98   CO2 mmol/L 25.5 25.0 30.9* 31.1* 27.6   BUN mg/dL 41* 70* 83* 92* 89*   CREATININE mg/dL 1.27* 1.75* 2.13* 2.55* 2.47*   CALCIUM mg/dL 8.5* 8.4* 8.6 8.6 8.5*   PHOSPHORUS mg/dL  --   --  3.4  --   --      Estimated Creatinine Clearance: 52.8 mL/min (A) (by C-G formula based on SCr of 1.27 mg/dL (H)).  @GFRCG:3@   Liver and pancreatic function:  Results from last 7 days   Lab Units 10/22/23  0852 10/19/23  0047 10/18/23  0030   ALBUMIN g/dL 2.8* 3.1* 3.1*   BILIRUBIN mg/dL 0.7 0.5 0.7   ALK PHOS U/L 90 82 84   AST (SGOT) U/L 17 13 12   ALT (SGPT) U/L 5 7 6         Cardiac:      Liver and pancreatic function:  Results from last 7 days   Lab Units 10/22/23  0852 10/19/23  0047 10/18/23  0030   ALBUMIN g/dL 2.8* 3.1* 3.1*   BILIRUBIN mg/dL 0.7 0.5 0.7   ALK PHOS U/L 90 82 84   AST (SGOT) U/L 17 13 12   ALT (SGPT) U/L 5 7 6       Medications :     apixaban, 2.5 mg, Oral, Q12H  atorvastatin, 40 mg, Oral, Nightly  guaiFENesin, 400 mg, Oral, TID  hydrALAZINE, 25 mg, Oral, TID  insulin glargine, 10 Units, Subcutaneous, Nightly  isosorbide mononitrate, 30 mg, Oral, Daily  latanoprost, 1 drop, Both Eyes, Nightly  LORazepam, 0.5 mg, Oral, Nightly  metoprolol succinate XL, 75 mg, Oral, Daily  multivitamin with minerals, 1 tablet, Oral, Daily  pantoprazole, 40 mg, Oral, Q AM  senna-docusate sodium, 2 tablet, Oral, BID  sodium chloride, 10 mL, Intravenous, Q12H  sodium chloride, 10 mL, Intravenous, Q12H  vitamin B-12, 500 mcg, Oral, Daily      lactated ringers, 125 mL/hr, Last Rate: 125 mL/hr (10/22/23 1043)          Assessment & Plan     -WENDY, nonoliguric  - Chronic kidney disease G4 A1  - Acute on chronic anemia likely multifactorial including anemia of chronic kidney disease  - Diabetes mellitus  - Essential hypertension  - History of heart failure with  preserved ejection fraction, well compensated     Creatinine improved further, remains nonoliguric.  Renal functions are currently at baseline  We will reduce the lactated Ringer to 75 cc an hour, educated and counseled the patient.  I will sign off, please call if any questions.  Follow-up in clinic in 4 weeks    WENDY on CKD most likely multifactorial including prerenal azotemia, hemodynamic changes due to relative hypotension  Baseline creatinine appears to be 1.2, admitted with 2.4.  Mild hematuria, no proteinuria  - Continue lactated Ringer 75 mL/h  - Please avoid nephrotoxic agents, hypotension and adjust medications according to estimated GFR      Elaina Bell MD  10/22/23  10:59 EDT

## 2023-10-23 LAB
BASOPHILS # BLD AUTO: 0.05 10*3/MM3 (ref 0–0.2)
BASOPHILS NFR BLD AUTO: 0.5 % (ref 0–1.5)
DEPRECATED RDW RBC AUTO: 66.7 FL (ref 37–54)
EOSINOPHIL # BLD AUTO: 0.13 10*3/MM3 (ref 0–0.4)
EOSINOPHIL NFR BLD AUTO: 1.3 % (ref 0.3–6.2)
ERYTHROCYTE [DISTWIDTH] IN BLOOD BY AUTOMATED COUNT: 20.1 % (ref 12.3–15.4)
GLUCOSE BLDC GLUCOMTR-MCNC: 103 MG/DL (ref 70–130)
HCT VFR BLD AUTO: 27.3 % (ref 34–46.6)
HGB BLD-MCNC: 8.4 G/DL (ref 12–15.9)
IMM GRANULOCYTES # BLD AUTO: 0.04 10*3/MM3 (ref 0–0.05)
IMM GRANULOCYTES NFR BLD AUTO: 0.4 % (ref 0–0.5)
LYMPHOCYTES # BLD AUTO: 1.63 10*3/MM3 (ref 0.7–3.1)
LYMPHOCYTES NFR BLD AUTO: 16.1 % (ref 19.6–45.3)
MCH RBC QN AUTO: 28.2 PG (ref 26.6–33)
MCHC RBC AUTO-ENTMCNC: 30.8 G/DL (ref 31.5–35.7)
MCV RBC AUTO: 91.6 FL (ref 79–97)
MONOCYTES # BLD AUTO: 0.78 10*3/MM3 (ref 0.1–0.9)
MONOCYTES NFR BLD AUTO: 7.7 % (ref 5–12)
NEUTROPHILS NFR BLD AUTO: 7.5 10*3/MM3 (ref 1.7–7)
NEUTROPHILS NFR BLD AUTO: 74 % (ref 42.7–76)
NRBC BLD AUTO-RTO: 0 /100 WBC (ref 0–0.2)
PLATELET # BLD AUTO: 382 10*3/MM3 (ref 140–450)
PMV BLD AUTO: 8.7 FL (ref 6–12)
RBC # BLD AUTO: 2.98 10*6/MM3 (ref 3.77–5.28)
WBC NRBC COR # BLD: 10.13 10*3/MM3 (ref 3.4–10.8)

## 2023-10-23 PROCEDURE — 85025 COMPLETE CBC W/AUTO DIFF WBC: CPT | Performed by: INTERNAL MEDICINE

## 2023-10-23 PROCEDURE — 82948 REAGENT STRIP/BLOOD GLUCOSE: CPT

## 2023-10-23 RX ORDER — METOPROLOL SUCCINATE 25 MG/1
75 TABLET, EXTENDED RELEASE ORAL DAILY
Qty: 30 TABLET | Refills: 0 | Status: SHIPPED | OUTPATIENT
Start: 2023-10-24

## 2023-10-23 RX ADMIN — LORAZEPAM 0.5 MG: 0.5 TABLET ORAL at 21:09

## 2023-10-23 RX ADMIN — ISOSORBIDE MONONITRATE 30 MG: 30 TABLET, EXTENDED RELEASE ORAL at 08:51

## 2023-10-23 RX ADMIN — GUAIFENESIN 400 MG: 200 TABLET ORAL at 17:17

## 2023-10-23 RX ADMIN — ACETAMINOPHEN 1000 MG: 500 TABLET ORAL at 05:05

## 2023-10-23 RX ADMIN — Medication 500 MCG: at 08:51

## 2023-10-23 RX ADMIN — Medication 10 ML: at 08:56

## 2023-10-23 RX ADMIN — METOPROLOL SUCCINATE 75 MG: 50 TABLET, EXTENDED RELEASE ORAL at 08:51

## 2023-10-23 RX ADMIN — GUAIFENESIN 400 MG: 200 TABLET ORAL at 21:11

## 2023-10-23 RX ADMIN — DOCUSATE SODIUM 50 MG AND SENNOSIDES 8.6 MG 2 TABLET: 8.6; 5 TABLET, FILM COATED ORAL at 08:51

## 2023-10-23 RX ADMIN — Medication 1 TABLET: at 08:51

## 2023-10-23 RX ADMIN — ATORVASTATIN CALCIUM 40 MG: 40 TABLET, FILM COATED ORAL at 21:09

## 2023-10-23 RX ADMIN — LATANOPROST 1 DROP: 50 SOLUTION OPHTHALMIC at 21:09

## 2023-10-23 RX ADMIN — APIXABAN 2.5 MG: 2.5 TABLET, FILM COATED ORAL at 08:51

## 2023-10-23 RX ADMIN — APIXABAN 2.5 MG: 2.5 TABLET, FILM COATED ORAL at 21:10

## 2023-10-23 RX ADMIN — HYDRALAZINE HYDROCHLORIDE 25 MG: 25 TABLET, FILM COATED ORAL at 08:51

## 2023-10-23 RX ADMIN — GUAIFENESIN 400 MG: 200 TABLET ORAL at 08:51

## 2023-10-23 RX ADMIN — DOCUSATE SODIUM 50 MG AND SENNOSIDES 8.6 MG 2 TABLET: 8.6; 5 TABLET, FILM COATED ORAL at 21:09

## 2023-10-23 NOTE — PLAN OF CARE
Goal Outcome Evaluation:              Outcome Evaluation: Patient has rested in bed this shift, patient remains to have periods of confusion.  No request or complaints at this time, VSS, Will continue plan of care.

## 2023-10-23 NOTE — CASE MANAGEMENT/SOCIAL WORK
Discharge Planning Assessment   Sina     Patient Name: Jolly Garcia  MRN: [id number]  Today's Date: 10/23/2023    Admit Date: 10/17/2023            Discharge Plan       Row Name 10/23/23 0946       Plan    Final Discharge Disposition Code 03 - skilled nursing facility (SNF)    Final Note Pt is being discharged to [facility] on this date. Pt's kerry eD Paz is aware and agreeable to discharge. SS faxed AVS summary, clinical update and will fax discharge summary once available to fax [phone]. SS to provide Lead RN with report number once documents have been received by facility. SS to arrange EMS for transport.    10:35am: SS provided Lead RN report number for [facility] [phone]. SS to arrange EMS when discharge is complete.     11:28am: SS scheduled [facility] per [facility] and scheduled transport.   15:02pm: SS contacted [facility] per [facility] who states they do not have an available truck. SS contacted [facility] [phone] and scheduled transport. SS notified [facility] per [facility] that trip needed cancelled.                     LUCINA CombsW

## 2023-10-23 NOTE — DISCHARGE PLACEMENT REQUEST
"Jolly Nolan (73 y.o. Female)       Date of Birth   1950    Social Security Number       Address   [address]  [facility] [address]    Home Phone   [phone]    MRN   [id number]       Hoahaoism   None    Marital Status   Single                            Admission Date   10/17/23    Admission Type   Emergency    Admitting Provider   Kirt Arellano MD    Attending Provider   Ashely Hurley DO    Department, Room/Bed   [address], 3340/1S       Discharge Date       Discharge Disposition   Skilled Nursing Facility (DC - External)    Discharge Destination                                 Attending Provider: Ashely Hurley DO    Allergies: No Known Allergies    Isolation: None   Infection: None   Code Status: CPR    Ht: 167.6 cm (66\")   Wt: 123 kg (272 lb 0.8 oz)    Admission Cmt: None   Principal Problem: Symptomatic anemia [D64.9]                   Active Insurance as of 10/17/2023       Primary Coverage       Payor Plan Insurance Group Employer/Plan Group    HUMANA MEDICARE REPLACEMENT HUMANA MEDICARE REPLACEMENT [id number]       Payor Plan Address Payor Plan Phone Number Payor Plan Fax Number Effective Dates    [address] [address] [phone]  1/1/2018 - None Entered    [facility] [id number]         Subscriber Name Subscriber Birth Date Member ID       JOLLY NOLAN 1950 [id number]               Secondary Coverage       Payor Plan Insurance Group Employer/Plan Group    [place]        Payor Plan Address Payor Plan Phone Number Payor Plan Fax Number Effective Dates    [address] [phone]  11/1/2022 - None Entered    [facility] [id number]         Subscriber Name Subscriber Birth Date Member ID       JOLLY NOLAN 1950 [id number]               Tertiary Coverage       Payor Plan Insurance Group Employer/Plan Group    [employer]        Payor Plan Address Payor Plan Phone Number Payor Plan Fax " Number Effective Dates    [address] [phone]  2022 - None Entered    [facility] [address]         Subscriber Name Subscriber Birth Date Member ID       YVONNE NOLAN 1950 [id number]                     Emergency Contacts        (Rel.) Home Phone Work Phone Mobile Phone    DARCI MARTIN (Relative) [phone] -- --                 History & Physical        Jose Cook PA-C at 10/17/23 1744       Attestation signed by Kirt Arellano MD at 10/17/23 191 (Updated)    I have reviewed this documentation and agree.    Ms. Nolan is our 74 yo F with hx T2DM, HTN, ASCVD, chronic respiratory failure, hyperlipidemia, CKD IV, JESIKA, HFpEF, chronic iron deficiency anemia, persistent atrial fibrillation on Eliquis who presents from nursing home low hemoglobin. Patient denies any bleeding or signs of bleeding. No abdominal or chest pain. She reports she has had some weakness and fatigue. Patient's hemoglobin has been consistently 9.5-11 in the past. 5.5 on presentation today. MCV in higher 90's. Platelets wnl. BUN trended up to 89 out of proportion to increase in Cr. Iron studies most consistent of AOCD/AOCKD but cannot rule out acute upper GI bleed that has not clinically presented itself. Total bili and K wnl, do not suspect hemolysis. No history of cirrhosis. Will start protonix gtt. Folate, B12, TSH pending. Retic pending. Hold anticoagulation and start SCDs. Repeat hemoglobin after 2 units pRBC. Does not look overloaded, will diurese if needed. Will consult surgery if hemoglobin drops or clinically a bleed.                       [facility] Medicine [facility]  History & Physical    Patient Identification:  Name:  Yvonne Nolan  Age:  73 y.o.  Sex:  female  :  1950  MRN:  [id number]   Visit Number:  [id number]  Admit Date: 10/17/2023   Primary Care Physician:  Whit Cadet APRN    Subjective     Chief complaint:     History of presenting  "illness:      Jolly Garcia is a 73 y.o. female who presented from local [facility] secondary to fatigue and low hemoglobin.  Per report from SNF patient has been somewhat lethargic and sleeping more than usual. On exam patient is alert and oriented. She confirms she has been sleeping more than usual but not feeling ill. She states \"I just love to sleep.\" She denies chills. No complaint of generalized weakness but states she hasn't been out of bed recently. She endorses vomiting earlier while in the ED but did not inspect it so unable to confirm or deny the presence of poornima blood or coffee ground emesis. She also reports she is unable to tell me if she has had any hematochezia or melena. She denies any abdominal pain. No hematuria, no dysuria. She denies shortness of breath or cough. No chest pain. She does have some bruising to the bilateral upper extremities but denies any recent trauma causing large bruises/hematomas.     Past medical history is significant for T2DM, HTN, ASCVD, chronic respiratory failure, hyperlipidemia, CKD IV, JESIKA, HFpEF, chronic iron deficiency anemia, persistent atrial fibrillation on Eliquis    Upon arrival to the ED, vital signs were temp 98.3, heart rate 87, respirations 16, BP 95/49, SPO2 100% on room air.  Laboratory work-up notable for increased BUN at 89 from baseline 50-60.  Iron profile notable for low transferrin at 187, TIBC low at 279.  CBC noted white blood cell count elevated at 11.32, RBCs low at 1.97 with H&H 5.5 and 18.6.  Baseline hemoglobin is 9-10.  CT abdomen and pelvis without contrast noted cardiomegaly and gallstones.    Known Emergency Department medications received prior to my evaluation included 2 units PRBCs.   Emergency Department Room location at the time of my evaluation was 114.     ---------------------------------------------------------------------------------------------------------------------   Review of Systems   Constitutional:  Positive for fatigue. " Negative for chills and fever.   HENT:  Negative for congestion and rhinorrhea.    Respiratory:  Negative for cough and shortness of breath.    Cardiovascular:  Negative for chest pain and leg swelling.   Gastrointestinal:  Positive for nausea and vomiting. Negative for abdominal pain and constipation.   Genitourinary:  Negative for difficulty urinating, dysuria and hematuria.   Musculoskeletal:  Negative for arthralgias and myalgias.   Skin:  Negative for rash and wound.   Neurological:  Negative for dizziness and light-headedness.      ---------------------------------------------------------------------------------------------------------------------   Past Medical History:   Diagnosis Date    Anxiety     CAD (coronary artery disease)     s/p CABG    Chronic hypoxemic respiratory failure     Wears 2 L/min at home    Essential hypertension     Hyperlipidemia     Type 2 diabetes mellitus      Past Surgical History:   Procedure Laterality Date    CORONARY ARTERY BYPASS GRAFT  2011    HYSTERECTOMY       Family History   Problem Relation Age of Onset    Diabetes Mother      Social History     Socioeconomic History    Marital status: Single   Tobacco Use    Smoking status: Former     Passive exposure: Past   Vaping Use    Vaping Use: Never used   Substance and Sexual Activity    Alcohol use: Never    Drug use: Never    Sexual activity: Defer     ---------------------------------------------------------------------------------------------------------------------   Allergies:  Patient has no known allergies.  ---------------------------------------------------------------------------------------------------------------------   Home medications:    Medications below are reported home medications pulling from within the system; at this time, these medications have not been reconciled unless otherwise specified and are in the verification process for further verifcation as current home medications.  (Not in a hospital  admission)      Hospital Scheduled Meds:    No current facility-administered medications for this encounter.      Current listed hospital scheduled medications may not yet reflect those currently placed in orders that are signed and held awaiting patient's arrival to floor.   ---------------------------------------------------------------------------------------------------------------------     Objective     Vital Signs:  Temp:  [98.2 °F (36.8 °C)-98.3 °F (36.8 °C)] 98.2 °F (36.8 °C)  Heart Rate:  [87-94] 87  Resp:  [16] 16  BP: ()/(41-58) 94/51      10/17/23  1456   Weight: 136 kg (300 lb)     Body mass index is 48.42 kg/m².  ---------------------------------------------------------------------------------------------------------------------       Physical Exam  Vitals and nursing note reviewed.   Constitutional:       General: She is not in acute distress.     Appearance: She is obese.   HENT:      Head: Normocephalic and atraumatic.   Eyes:      Extraocular Movements: Extraocular movements intact.      Conjunctiva/sclera: Conjunctivae normal.   Cardiovascular:      Rate and Rhythm: Normal rate.   Pulmonary:      Effort: Pulmonary effort is normal.      Breath sounds: Normal breath sounds.   Abdominal:      Palpations: Abdomen is soft.      Tenderness: There is no abdominal tenderness.   Musculoskeletal:      Right lower leg: No edema.      Left lower leg: No edema.   Skin:     General: Skin is warm and dry.   Neurological:      Mental Status: She is alert and oriented to person, place, and time. Mental status is at baseline.   Psychiatric:         Mood and Affect: Mood normal.         Behavior: Behavior normal.             ---------------------------------------------------------------------------------------------------------------------  EKG:        I have personally looked at the EKG.  ---------------------------------------------------------------------------------------------------------------------  "  Results from last 7 days   Lab Units 10/17/23  1514   WBC 10*3/mm3 11.32*   HEMOGLOBIN g/dL 5.5*   HEMATOCRIT % 18.6*   MCV fL 94.4   MCHC g/dL 29.6*   PLATELETS 10*3/mm3 310     Results from last 7 days   Lab Units 10/17/23  1646   PH, ARTERIAL pH units 7.443   PO2 ART mm Hg 86.9   PCO2, ARTERIAL mm Hg 44.9   HCO3 ART mmol/L 30.6*     Results from last 7 days   Lab Units 10/17/23  1514   SODIUM mmol/L 137   POTASSIUM mmol/L 4.1   CHLORIDE mmol/L 98   CO2 mmol/L 27.6   BUN mg/dL 89*   CREATININE mg/dL 2.47*   CALCIUM mg/dL 8.5*   GLUCOSE mg/dL 169*   ALBUMIN g/dL 3.2*   BILIRUBIN mg/dL 0.5   ALK PHOS U/L 90   AST (SGOT) U/L 13   ALT (SGPT) U/L 5   Estimated Creatinine Clearance: 28.8 mL/min (A) (by C-G formula based on SCr of 2.47 mg/dL (H)).  No results found for: \"AMMONIA\"          Lab Results   Component Value Date    HGBA1C 7.40 (H) 06/01/2023     Lab Results   Component Value Date    TSH 6.870 (H) 07/22/2023    FREET4 1.17 07/22/2023     No results found for: \"PREGTESTUR\", \"PREGSERUM\", \"HCG\", \"HCGQUANT\"  Pain Management Panel  More data exists         Latest Ref Rng & Units 7/22/2023 6/4/2023   Pain Management Panel   Creatinine, Urine mg/dL 83.5  111.5  111.5      No results found for: \"BLOODCX\"  No results found for: \"URINECX\"  No results found for: \"WOUNDCX\"  No results found for: \"STOOLCX\"      ---------------------------------------------------------------------------------------------------------------------  Imaging Results (Last 7 Days)       Procedure Component Value Units Date/Time    CT Abdomen Pelvis Without Contrast [[id number]] Collected: 10/17/23 1641     Updated: 10/17/23 1644    Narrative:      EXAM:    CT Abdomen and Pelvis Without Intravenous Contrast     EXAM DATE:    10/17/2023 5:21 PM     CLINICAL HISTORY:    NEW SYMPTOMATIC ANEMIA; D64.9-Anemia, unspecified;  R41.0-Disorientation, unspecified; Z79.01-Long term (current) use of  anticoagulants; N18.4-Chronic kidney disease, stage 4 " "(severe)     TECHNIQUE:    Axial computed tomography images of the abdomen and pelvis without  intravenous contrast.  Sagittal and coronal reformatted images were  created and reviewed.  This CT exam was performed using one or more of  the following dose reduction techniques:  automated exposure control,  adjustment of the mA and/or kV according to patient size, and/or use of  iterative reconstruction technique.     COMPARISON:    6/1/2023     FINDINGS:    LUNG BASES:  Unremarkable as visualized.  No mass.  No consolidation.    HEART:  Cardiomegaly.      ABDOMEN:    LIVER:  Unremarkable as visualized.    GALLBLADDER AND BILE DUCTS:  Gallstones.  Gallbladder otherwise  unremarkable.  No ductal dilation.    PANCREAS:  Unremarkable as visualized.  No ductal dilation.    SPLEEN:  Unremarkable as visualized.  No splenomegaly.    ADRENALS:  Unremarkable as visualized.  No mass.    KIDNEYS AND URETERS:  Unremarkable as visualized.  No obstructing  stones.  No hydronephrosis.    STOMACH AND BOWEL:  Scattered diverticula in the colon.  No  diverticulitis.  No obstruction.      PELVIS:    APPENDIX:  No findings to suggest acute appendicitis.    BLADDER:  Unremarkable as visualized.  No stones.    REPRODUCTIVE:  Unremarkable as visualized.      ABDOMEN and PELVIS:    INTRAPERITONEAL SPACE:  Unremarkable as visualized.  No free air.  No  significant fluid collection.    BONES/JOINTS:  No acute fracture.  No dislocation.    SOFT TISSUES:  Unremarkable as visualized.    VASCULATURE:  Atherosclerotic disease.  No abdominal aortic aneurysm.    LYMPH NODES:  Unremarkable as visualized.  No enlarged lymph nodes.       Impression:      1.  Cardiomegaly.  2.  Gallstones.  Gallbladder otherwise unremarkable.        This report was finalized on 10/17/2023 4:42 PM by Dr. Maksim Woods MD.               Cultures:  No results found for: \"BLOODCX\", \"URINECX\", \"WOUNDCX\", \"MRSACX\", \"RESPCX\", \"STOOLCX\"    Last echocardiogram:  Results for " orders placed during the hospital encounter of 07/22/23    Adult Transthoracic Echo Complete With Contrast if Necessary Per Protocol    Interpretation Summary    Left ventricular systolic function is normal. Left ventricular ejection fraction appears to be 56 - 60%.    Left ventricular diastolic function is consistent with (grade II w/high LAP) pseudonormalization.    The right ventricular cavity is mild to moderately dilated.    The left atrial cavity is mildly dilated.    The right atrial cavity is mildly  dilated.    Estimated right ventricular systolic pressure from tricuspid regurgitation is markedly elevated (>55 mmHg).          I have personally reviewed the above radiology images and read the final radiology report on 10/17/23  ---------------------------------------------------------------------------------------------------------------------  Assessment / Plan     Active Hospital Problems    Diagnosis  POA    **Symptomatic anemia [D64.9]  Yes       ASSESSMENT/PLAN:    Acute, symptomatic on chronic anemia  Hx iron deficiency anemia, AOCD/renal disease  Patient presents from local SNF for further evaluation for low hemoglobin.  Staff at nursing facility noted patient to be more lethargic and sleeping more than usual.  No reported signs or symptoms of bleeding at SNF.  Work-up here noted globin low at 5.5 from baseline 9-10.  Also with BUN increased at 89 from baseline 50-60 though creatinine appears to be at baseline.  She did receive 2 units PRBCs in the ED.  We will admit to the PCU for further work-up and management  Continue to monitor H&H closely.  Can plan to transfuse as indicated if less than 7 or less than 8 and significantly symptomatic.  Hold any home meds which could contribute to further bleeding. Initiate IV PPI  Monitor clinical volume status closely in the setting of HFpEF with multiple recent admissions for decompensated HFpEF.  Anticipate need for further diuresis.  Iron profile noted  normal iron, normal ferritin, low transferrin, low TIBC.  Further work-up ordered and pending including CK, folate, B12, TSH  CXR and UA pending as well.  Check a CK.    Chronic:  IDDM 2  HTN  ASCVD  Chronic respiratory failure  Hyperlipidemia  CKD stage IV  JESIKA  HFpEF  Persistent atrial fibrillation chronically anticoagulated with Eliquis  Plan to resume home meds as indicated once med rec is complete  We will initiate insulin regimen at appropriate dosing.  Monitor with Accu-Cheks with hypoglycemia protocol in place and titrate as needed.  Hold Eliquis as above  Monitor volume status closely as above  Monitor vital signs per protocol  ----------  -DVT prophylaxis: SCDs  -Activity: Up with assistance  -Expected length of stay: INPATIENT status due to the need for care which can only be reasonably provided in an hospital setting such as aggressive/expedited ancillary services and/or consultation services, the necessity for IV medications, close physician monitoring and/or the possible need for procedures.  In such, I feel patient’s risk for adverse outcomes and need for care warrant INPATIENT evaluation and predict the patient’s care encounter to likely last beyond 2 midnights.   -Disposition pending course and further work up     High risk secondary to acute anemia    There are no questions and answers to display.       Jose Cook PA-C   10/17/23  17:46 EDT     Electronically signed by Kirt Arellano MD at 10/17/23 1915       Vital Signs (last day)       Date/Time Temp Temp src Pulse Resp BP Patient Position SpO2    10/23/23 0600 97.6 (36.4) Oral 96 19 132/63 Lying --    10/23/23 0315 97.6 (36.4) Oral 87 19 125/58 Lying --    10/22/23 2309 97.9 (36.6) Oral 90 19 134/67 Lying --    10/22/23 1846 97.4 (36.3) Oral 94 19 148/63 Lying 96    10/22/23 1642 -- -- 95 -- 123/63 -- --    10/22/23 1400 97.6 (36.4) Oral 93 18 119/69 Lying 91    10/22/23 1000 97.6 (36.4) Oral 98 18 144/87 Lying 90    10/22/23 0600 98.2  (36.8) Oral 89 18 110/57 Lying 93    10/22/23 0403 97.8 (36.6) Axillary 89 18 111/68 Lying --          Intake & Output (last day)         10/22 0701  10/23 0700 10/23 0701  10/24 0700    P.O. 210 0    I.V. (mL/kg) 3393 (27.6)     Total Intake(mL/kg) 3603 (29.3) 0 (0)    Urine (mL/kg/hr)      Stool      Total Output      Net +3603 0          Urine Unmeasured Occurrence 5 x     Stool Unmeasured Occurrence 4 x           Prior to Admission Medications       Prescriptions Last Dose Informant Patient Reported? Taking?    acetaminophen (TYLENOL) 500 MG tablet Unknown Nursing Home Yes No    Take 2 tablets by mouth Every 8 (Eight) Hours As Needed for Mild Pain.    aspirin 81 MG chewable tablet 10/17/2023 Nursing Home Yes Yes    Chew 1 tablet Daily.    atorvastatin (LIPITOR) 40 MG tablet 10/16/2023  Yes Yes    Take 1 tablet by mouth Every Night.    bumetanide (BUMEX) 2 MG tablet 10/17/2023  Yes Yes    Take 1 tablet by mouth Daily.    Coenzyme Q10 (CoQ10) 50 MG capsule 10/17/2023 Nursing Home Yes Yes    Take 2 capsules by mouth Daily.    dextromethorphan-guaifenesin (ROBITUSSIN-DM)  MG/5ML syrup Unknown Nursing Home Yes No    Take 5 mL by mouth Every 4 (Four) Hours As Needed (Cough).    diphenhydrAMINE (BENADRYL) 25 mg capsule Unknown Nursing Home Yes No    Take 1 capsule by mouth Every 8 (Eight) Hours As Needed for Itching.    DULoxetine (CYMBALTA) 30 MG capsule 10/17/2023  Yes Yes    Take 1 capsule by mouth Daily.    Eliquis 5 MG tablet tablet 10/17/2023 half-way Yes Yes    Take 1 tablet by mouth Every 12 (Twelve) Hours.    ferrous sulfate 325 (65 FE) MG tablet 10/17/2023 half-way Yes Yes    Take 1 tablet by mouth Daily With Breakfast.    fluticasone (FLONASE) 50 MCG/ACT nasal spray 10/17/2023 [facility] Home No Yes    2 sprays by Each Nare route Daily.    guaiFENesin (HUMIBID 3) 400 MG tablet 10/17/2023 half-way Yes Yes    Take 1 tablet by mouth 3 (Three) Times a Day.    hydrALAZINE (APRESOLINE) 25 MG tablet  10/17/2023 Nursing Home Yes Yes    Take 1 tablet by mouth 3 (Three) Times a Day.    HYDROcodone-acetaminophen (NORCO) 5-325 MG per tablet 10/12/2023  No No    Take 1 tablet by mouth Every 8 (Eight) Hours As Needed for Moderate Pain.    Insulin Aspart (novoLOG) 100 UNIT/ML injection 10/17/2023 [facility] Yes Yes    Inject 2-10 Units under the skin into the appropriate area as directed 2 (Two) Times a Day Before Meals.    insulin glargine (LANTUS, SEMGLEE) 100 UNIT/ML injection 10/16/2023  Yes Yes    Inject 10 Units under the skin into the appropriate area as directed Every Night.    isosorbide mononitrate (IMDUR) 30 MG 24 hr tablet 10/17/2023  Yes Yes    Take 1 tablet by mouth Daily.    lactulose (CHRONULAC) 10 GM/15ML solution 10/10/2023  Yes No    Take 15 mL by mouth 2 (Two) Times a Day As Needed (constipation).    latanoprost (XALATAN) 0.005 % ophthalmic solution 10/16/2023 [facility] Yes Yes    Administer 1 drop to both eyes Every Night.    linaclotide (LINZESS) 290 MCG capsule capsule 10/17/2023  Yes Yes    Take 1 capsule by mouth Every Morning Before Breakfast.    LORazepam (ATIVAN) 0.5 MG tablet 10/16/2023 [facility] Yes Yes    Take 1 tablet by mouth Every Night.    LORazepam (ATIVAN) 0.5 MG tablet 10/4/2023  Yes No    Take 0.5 tablets by mouth Every 8 (Eight) Hours As Needed for Anxiety.    metoprolol succinate XL (TOPROL-XL) 50 MG 24 hr tablet 10/17/2023  Yes Yes    Take 1 tablet by mouth Daily.    multivitamin with minerals (I-cheyenne) tablet tablet 10/17/2023  Yes Yes    Take 1 tablet by mouth Daily.    multivitamin with minerals tablet tablet 10/17/2023 [facility] Yes Yes    Take 1 tablet by mouth Daily.    nitroglycerin (NITROSTAT) 0.4 MG SL tablet Unknown  Yes No    Place 1 tablet under the tongue Every 5 (Five) Minutes As Needed for Chest Pain. Take no more than 3 doses in 15 minutes.    ondansetron (ZOFRAN) 4 MG tablet Unknown Nursing Home Yes No    Take 1 tablet by mouth Every 6 (Six) Hours As  Needed for Nausea or Vomiting.    pantoprazole (PROTONIX) 40 MG EC tablet 10/17/2023 Nursing Home No Yes    Take 1 tablet by mouth Every Morning.    Patiromer Sorbitex Calcium (Veltassa) 16.8 g pack 10/17/2023 Nursing Home Yes Yes    Take 1 packet by mouth Daily.    Semaglutide, 1 MG/DOSE, (Ozempic, 1 MG/DOSE,) 4 MG/3ML solution pen-injector 10/13/2023 Nursing Home Yes No    Inject 1 mg under the skin into the appropriate area as directed 1 (One) Time Per Week.    sennosides-docusate (senna-docusate sodium) 8.6-50 MG per tablet 10/17/2023  Yes Yes    Take 1 tablet by mouth 2 (Two) Times a Day.    spironolactone (ALDACTONE) 25 MG tablet 10/17/2023  Yes Yes    Take 1 tablet by mouth Daily.    vitamin B-12 (CYANOCOBALAMIN) 1000 MCG tablet 10/17/2023  Yes Yes    Take 0.5 tablets by mouth Daily.          Operative/Procedure Notes (most recent note)    No notes of this type exist for this encounter.          Physician Progress Notes (most recent note)        Kirt Arellano MD at 10/22/23 1650              [facility]     Patient Identification:  Name:  Jolly Garcia  Age:  73 y.o.  Sex:  female  :  1950  MRN:  [id number]  Visit Number:  [id number]  ROOM: [address] [place] [address]     Primary Care Provider:  Whit Cadet APRN    Length of stay in inpatient status:  5    Subjective     Chief Compliant:    Chief Complaint   Patient presents with    Abnormal Lab       History of Presenting Illness:    Patient denies any new complaints. Patient had confusion yesterday afternoon that sounds like delirium. Patient was hopeful to get to nursing facility today but facility unable to take her back. No signs of bleeding.     ROS:  Otherwise 10 point ROS negative other than documented above in HPI.     Objective     Current Hospital Meds:apixaban, 2.5 mg, Oral, Q12H  atorvastatin, 40 mg, Oral, Nightly  guaiFENesin, 400 mg, Oral, TID  hydrALAZINE, 25 mg, Oral, TID  insulin glargine, 10 Units,  Subcutaneous, Nightly  isosorbide mononitrate, 30 mg, Oral, Daily  latanoprost, 1 drop, Both Eyes, Nightly  LORazepam, 0.5 mg, Oral, Nightly  metoprolol succinate XL, 75 mg, Oral, Daily  multivitamin with minerals, 1 tablet, Oral, Daily  pantoprazole, 40 mg, Oral, Q AM  senna-docusate sodium, 2 tablet, Oral, BID  sodium chloride, 10 mL, Intravenous, Q12H  sodium chloride, 10 mL, Intravenous, Q12H  vitamin B-12, 500 mcg, Oral, Daily    lactated ringers, 75 mL/hr, Last Rate: 75 mL/hr (10/22/23 1135)        Current Antimicrobial Therapy:  Anti-Infectives (From admission, onward)      None          Current Diuretic Therapy:  Diuretics (From admission, onward)      None          ----------------------------------------------------------------------------------------------------------------------  Vital Signs:  Temp:  [97.6 °F (36.4 °C)-98.2 °F (36.8 °C)] 97.6 °F (36.4 °C)  Heart Rate:  [] 95  Resp:  [17-18] 18  BP: (100-144)/(50-87) 123/63  SpO2:  [90 %-97 %] 91 %  on  Flow (L/min):  [1] 1;   Device (Oxygen Therapy): nasal cannula  Body mass index is 43.91 kg/m².    Wt Readings from Last 3 Encounters:   10/21/23 123 kg (272 lb 0.8 oz)   09/14/23 (!) 153 kg (337 lb)   07/28/23 (!) 152 kg (335 lb 4.8 oz)     Intake & Output (last 3 days)         10/19 0701  10/20 0700 10/20 0701  10/21 0700 10/21 0701  10/22 0700 10/22 0701  10/23 0700    P.O.  120    I.V. (mL/kg) 5574.6 (47.2) 1000 (8.7) 4136 (33.6)     Total Intake(mL/kg) 6074.6 (51.5) 1480 (12.9) 5936 (48.3) 120 (1)    Urine (mL/kg/hr) 1100 (0.4) 1300 (0.5) 700 (0.2)     Stool 0  0     Total Output 1100 1300 700     Net +4974.6 +180 +5236 +120            Urine Unmeasured Occurrence 2 x  2 x 1 x    Stool Unmeasured Occurrence 0 x  1 x 1 x          Diet: Cardiac Diets, Diabetic Diets; Healthy Heart (2-3 Na+); Consistent Carbohydrate; Texture: Regular Texture (IDDSI 7); Fluid Consistency: Thin (IDDSI  0)  ----------------------------------------------------------------------------------------------------------------------  Physical exam:  Constitutional:  Well-developed and well-nourished.  No respiratory distress.      HENT:  Head:  Normocephalic and atraumatic.  Mouth:  Moist mucous membranes.    Eyes:  Conjunctivae and EOM are normal. No scleral icterus.    Neck:  Neck supple.  No JVD present.    Cardiovascular:  Normal rate, regular rhythm and normal heart sounds with no murmur.  Pulmonary/Chest:  No respiratory distress, no wheezes, no crackles, with normal breath sounds and good air movement.  Abdominal:  Soft.  Bowel sounds are normal.  No distension and no tenderness.   Musculoskeletal:  No edema, no tenderness, and no deformity.  No red or swollen joints anywhere.    Neurological:  Alert and oriented to person, place, and time.  No cranial nerve deficit.  No tongue deviation.  No facial droop.  No slurred speech.   Skin:  Skin is warm and dry. No rash noted. No pallor.   Peripheral vascular:  Pulses in all 4 extremities with no clubbing, no cyanosis, no edema.  ----------------------------------------------------------------------------------------------------------------------  Tele:    ----------------------------------------------------------------------------------------------------------------------  Results from last 7 days   Lab Units 10/22/23  0852 10/21/23  0036 10/20/23  0020   WBC 10*3/mm3 9.60 8.04 8.81   HEMOGLOBIN g/dL 8.2* 7.2* 7.6*   HEMATOCRIT % 28.0* 24.0* 25.8*   MCV fL 93.6 91.3 95.2   MCHC g/dL 29.3* 30.0* 29.5*   PLATELETS 10*3/mm3 373 325 245     Results from last 7 days   Lab Units 10/17/23  1646   PH, ARTERIAL pH units 7.443   PO2 ART mm Hg 86.9   PCO2, ARTERIAL mm Hg 44.9   HCO3 ART mmol/L 30.6*     Results from last 7 days   Lab Units 10/22/23  0852 10/20/23  0020 10/19/23  0047 10/18/23  0030   SODIUM mmol/L 139 138 141 135*   POTASSIUM mmol/L 4.0 3.9 3.7 4.0   MAGNESIUM mg/dL  " --   --  2.0  --    CHLORIDE mmol/L 106 103 102 97*   CO2 mmol/L 25.5 25.0 30.9* 31.1*   BUN mg/dL 41* 70* 83* 92*   CREATININE mg/dL 1.27* 1.75* 2.13* 2.55*   CALCIUM mg/dL 8.5* 8.4* 8.6 8.6   PHOSPHORUS mg/dL  --   --  3.4  --    GLUCOSE mg/dL 81 86 96 132*   ALBUMIN g/dL 2.8*  --  3.1* 3.1*   BILIRUBIN mg/dL 0.7  --  0.5 0.7   ALK PHOS U/L 90  --  82 84   AST (SGOT) U/L 17  --  13 12   ALT (SGPT) U/L 5  --  7 6   Estimated Creatinine Clearance: 52.8 mL/min (A) (by C-G formula based on SCr of 1.27 mg/dL (H)).  No results found for: \"AMMONIA\"  Results from last 7 days   Lab Units 10/17/23  1514   CK TOTAL U/L 31             Glucose   Date/Time Value Ref Range Status   10/21/2023 2010 143 (H) 70 - 130 mg/dL Final   10/20/2023 2108 114 70 - 130 mg/dL Final   10/19/2023 2051 109 70 - 130 mg/dL Final     Lab Results   Component Value Date    TSH 5.040 (H) 10/17/2023    FREET4 1.34 10/19/2023     No results found for: \"PREGTESTUR\", \"PREGSERUM\", \"HCG\", \"HCGQUANT\"  Pain Management Panel  More data exists         Latest Ref Rng & Units 7/22/2023 6/4/2023   Pain Management Panel   Creatinine, Urine mg/dL 83.5  111.5  111.5      Brief Urine Lab Results  (Last result in the past 365 days)        Color   Clarity   Blood   Leuk Est   Nitrite   Protein   CREAT   Urine HCG        10/17/23 1720 Yellow   Clear   Negative   Small (1+)   Negative   Negative                 No results found for: \"BLOODCX\"  Urine Culture   Date Value Ref Range Status   10/17/2023 <25,000 CFU/mL Gram Negative Bacilli (A)  Final     No results found for: \"WOUNDCX\"  No results found for: \"STOOLCX\"  No results found for: \"RESPCX\"  No results found for: \"AFBCX\"        I have personally looked at the labs and they are summarized above.  ----------------------------------------------------------------------------------------------------------------------  Detailed radiology reports for the last 24 hours:    Imaging Results (Last 24 Hours)       ** No results " found for the last 24 hours. **          Assessment & Plan    Acute, symptomatic on chronic anemia  Hx iron deficiency anemia, AOCD/renal disease  Patient's hemoglobin has been consistently 9.5-11 in the past. 5.5 on presentation. MCV in higher 90's. Platelets wnl. BUN trended up to 89 out of proportion to increase in Cr. Iron studies most consistent of AOCD/AOCKD but cannot rule out acute upper GI bleed that has not clinically presented itself. Total bili and K wnl, do not suspect hemolysis. No history of cirrhosis. Started on IV protonix. Folate, B12, TSH all wnl. Retic appropriately elevated. Held anticoagulation and start SCDs. Repeat hemoglobin after 2 units pRBC stable at 7.5.  Repeat now 72 hours after transfusion 8.2. Transitioned to PO protonix.      CKD IV  Creatinine now at or below baseline     Persistent atrial fibrillation chronically anticoagulated with Eliquis, now with RVR  [id number]: 6. High stroke risk. Patient borderline qualifies for decreased 2.5 mg dose. Will decrease to 2.5 mg and monitor as above.   Patient with RVR earlier in hospitalization that has improved with increased metoprolol xl to 75 mg daily.      Chronic:  IDDM 2  HTN  ASCVD  Chronic respiratory failure  Hyperlipidemia  CKD stage IV  JESIKA  HFpEF        Code status: Full      Dispo: Pending clinical improvement     VTE Prophylaxis:   Mechanical Order History:        Ordered        10/17/23 1817  Place Sequential Compression Device  Once            10/17/23 1817  Maintain Sequential Compression Device  Continuous                          Pharmalogical Order History:        Ordered     Dose Route Frequency Stop    10/19/23 1203  apixaban (ELIQUIS) tablet 2.5 mg         2.5 mg PO Every 12 Hours Scheduled --                    Kirt Arellano MD  [facility] Hospitalist  10/22/23  16:51 EDT    Electronically signed by Kirt Arellano MD at 10/22/23 2212          Consult Notes (most recent note)        Elaina Bell MD at  10/18/23 2136        Consult Orders    1. Inpatient Nephrology Consult [[id number]] ordered by Kirt Arellano MD at 10/18/23 0815                 Nephrology Consult Note    Referring Provider: Dr. Arellano  Reason for Consultation: Elevated creatinine    Subjective       History of present illness:  Jolly Garcia is a 73 y.o. female who [facility] from nursing home with decreased hemoglobin level.  Patient is known to have a chronic kidney disease with baseline creatinine appears to be around 2.4, admitted with 2.4 as well.  Patient has been having worsening anemia, stated she has been feeling very poorly with decreased appetite, and poor oral intake.  Patient denied chronic NSAIDS use. Patient denies hematuria, dysuria, difficulty passing urine. No prior history of renal stones. No family history of renal disease    History  Past Medical History:   Diagnosis Date    Anxiety     CAD (coronary artery disease)     s/p CABG    Chronic hypoxemic respiratory failure     Wears 2 L/min at home    Essential hypertension     Hyperlipidemia     Type 2 diabetes mellitus    ,   Past Surgical History:   Procedure Laterality Date    CORONARY ARTERY BYPASS GRAFT  2011    HYSTERECTOMY     ,   Family History   Problem Relation Age of Onset    Diabetes Mother    ,   Social History     Tobacco Use    Smoking status: Former     Passive exposure: Past   Vaping Use    Vaping Use: Never used   Substance Use Topics    Alcohol use: Never    Drug use: Never   ,   Medications Prior to Admission   Medication Sig Dispense Refill Last Dose    aspirin 81 MG chewable tablet Chew 1 tablet Daily.   10/17/2023 at 0800    atorvastatin (LIPITOR) 40 MG tablet Take 1 tablet by mouth Every Night.   10/16/2023 at 2000    bumetanide (BUMEX) 2 MG tablet Take 1 tablet by mouth Daily.   10/17/2023 at 0800    Coenzyme Q10 (CoQ10) 50 MG capsule Take 2 capsules by mouth Daily.   10/17/2023 at 0800    DULoxetine (CYMBALTA) 30 MG capsule  Take 1 capsule by mouth Daily.   10/17/2023 at 0800    Eliquis 5 MG tablet tablet Take 1 tablet by mouth Every 12 (Twelve) Hours.   10/17/2023 at 0800    ferrous sulfate 325 (65 FE) MG tablet Take 1 tablet by mouth Daily With Breakfast.   10/17/2023 at 0800    fluticasone (FLONASE) 50 MCG/ACT nasal spray 2 sprays by Each Nare route Daily. 9.9 mL 0 10/17/2023 at 0800    guaiFENesin (HUMIBID 3) 400 MG tablet Take 1 tablet by mouth 3 (Three) Times a Day.   10/17/2023 at 1200    hydrALAZINE (APRESOLINE) 25 MG tablet Take 1 tablet by mouth 3 (Three) Times a Day.   10/17/2023 at 1200mu    Insulin Aspart (novoLOG) 100 UNIT/ML injection Inject 2-10 Units under the skin into the appropriate area as directed 2 (Two) Times a Day Before Meals.   10/17/2023    insulin glargine (LANTUS, SEMGLEE) 100 UNIT/ML injection Inject 10 Units under the skin into the appropriate area as directed Every Night.   10/16/2023 at 2000    isosorbide mononitrate (IMDUR) 30 MG 24 hr tablet Take 1 tablet by mouth Daily.   10/17/2023 at 0800    latanoprost (XALATAN) 0.005 % ophthalmic solution Administer 1 drop to both eyes Every Night.   10/16/2023 at 2000    linaclotide (LINZESS) 290 MCG capsule capsule Take 1 capsule by mouth Every Morning Before Breakfast.   10/17/2023 at 0800    LORazepam (ATIVAN) 0.5 MG tablet Take 1 tablet by mouth Every Night.   10/16/2023 at 2000    metoprolol succinate XL (TOPROL-XL) 50 MG 24 hr tablet Take 1 tablet by mouth Daily.   10/17/2023 at 0800    multivitamin with minerals (I-cheyenne) tablet tablet Take 1 tablet by mouth Daily.   10/17/2023 at 0800    multivitamin with minerals tablet tablet Take 1 tablet by mouth Daily.   10/17/2023 at 0800    pantoprazole (PROTONIX) 40 MG EC tablet Take 1 tablet by mouth Every Morning. 90 tablet 0 10/17/2023 at 0800    Patiromer Sorbitex Calcium (Veltassa) 16.8 g pack Take 1 packet by mouth Daily.   10/17/2023    sennosides-docusate (senna-docusate sodium) 8.6-50 MG per tablet Take 1  tablet by mouth 2 (Two) Times a Day.   10/17/2023 at 0800    spironolactone (ALDACTONE) 25 MG tablet Take 1 tablet by mouth Daily.   10/17/2023 at 0800    vitamin B-12 (CYANOCOBALAMIN) 1000 MCG tablet Take 0.5 tablets by mouth Daily.   10/17/2023 at 0800    acetaminophen (TYLENOL) 500 MG tablet Take 2 tablets by mouth Every 8 (Eight) Hours As Needed for Mild Pain.   Unknown    dextromethorphan-guaifenesin (ROBITUSSIN-DM)  MG/5ML syrup Take 5 mL by mouth Every 4 (Four) Hours As Needed (Cough).   Unknown    diphenhydrAMINE (BENADRYL) 25 mg capsule Take 1 capsule by mouth Every 8 (Eight) Hours As Needed for Itching.   Unknown    HYDROcodone-acetaminophen (NORCO) 5-325 MG per tablet Take 1 tablet by mouth Every 8 (Eight) Hours As Needed for Moderate Pain. 12 tablet 0 10/12/2023    lactulose (CHRONULAC) 10 GM/15ML solution Take 15 mL by mouth 2 (Two) Times a Day As Needed (constipation).   10/10/2023    LORazepam (ATIVAN) 0.5 MG tablet Take 0.5 tablets by mouth Every 8 (Eight) Hours As Needed for Anxiety.   10/4/2023    nitroglycerin (NITROSTAT) 0.4 MG SL tablet Place 1 tablet under the tongue Every 5 (Five) Minutes As Needed for Chest Pain. Take no more than 3 doses in 15 minutes.   Unknown    ondansetron (ZOFRAN) 4 MG tablet Take 1 tablet by mouth Every 6 (Six) Hours As Needed for Nausea or Vomiting.   Unknown    Semaglutide, 1 MG/DOSE, (Ozempic, 1 MG/DOSE,) 4 MG/3ML solution pen-injector Inject 1 mg under the skin into the appropriate area as directed 1 (One) Time Per Week.   10/13/2023   , Scheduled Meds:  atorvastatin, 40 mg, Oral, Nightly  guaiFENesin, 400 mg, Oral, TID  hydrALAZINE, 25 mg, Oral, TID  insulin glargine, 10 Units, Subcutaneous, Nightly  isosorbide mononitrate, 30 mg, Oral, Daily  latanoprost, 1 drop, Both Eyes, Nightly  LORazepam, 0.5 mg, Oral, Nightly  metoprolol succinate XL, 50 mg, Oral, Daily  multivitamin with minerals, 1 tablet, Oral, Daily  pantoprazole, 40 mg, Intravenous, Q  AM  senna-docusate sodium, 2 tablet, Oral, BID  sodium chloride, 10 mL, Intravenous, Q12H  sodium chloride, 10 mL, Intravenous, Q12H  sodium zirconium cyclosilicate, 10 g, Oral, Daily  vitamin B-12, 500 mcg, Oral, Daily    , Continuous Infusions:  lactated ringers, 125 mL/hr, Last Rate: 125 mL/hr (10/18/23 2104)    , PRN Meds:    acetaminophen    senna-docusate sodium **AND** polyethylene glycol **AND** bisacodyl **AND** bisacodyl    dextromethorphan-guaifenesin    HYDROcodone-acetaminophen    influenza vaccine    lactulose    LORazepam    nitroglycerin    ondansetron    sodium chloride    sodium chloride    sodium chloride    sodium chloride and Allergies:  Patient has no known allergies.    Review of Systems  More than 10 point review of systems was done. Pertinent items are noted in HPI, all other systems reviewed and negative    Objective     Vital Signs  Temp:  [97.5 °F (36.4 °C)-98.5 °F (36.9 °C)] 97.9 °F (36.6 °C)  Heart Rate:  [79-96] 79  Resp:  [13-18] 16  BP: ()/(35-70) 80/40    Intake/Output                         10/17/23 0701 - 10/18/23 0700 10/18/23 0701 - 10/19/23 0700     6301-7151 8288-0696 Total 8920-3515 8375-4608 Total                 Intake    P.O.  --  -- --  585  -- 585    I.V.  --  635.3 635.3  --  -- --    Blood  --  600 600  --  -- --    Volume (Transfuse RBC Infuse Each Unit Over: 2H) -- 300 300 -- -- --    Volume (Transfuse RBC Infuse Each Unit Over: 2H) -- 300 300 -- -- --    Total Intake -- 1235.3 1235.3 585 -- 585       Output    Urine  --  850 850  350  -- 350    Total Output -- 850 850 350 -- 350             Physical Examination:  General Appearance: not in acute distress  No JVD  Lungs: Clear to auscultation, respirations regular and unlabored  Heart: Regular rhythm & normal rate, normal S1, S2, no murmur, no gallop, no rub   Abdomen: Normal bowel sounds, no masses and soft non-tender  Extremities: No edema  Neurologic: No apparent focal neurological deficit    Laboratory  "Data :      WBC WBC   Date Value Ref Range Status   10/18/2023 8.19 3.40 - 10.80 10*3/mm3 Final   10/17/2023 11.32 (H) 3.40 - 10.80 10*3/mm3 Final      HGB Hemoglobin   Date Value Ref Range Status   10/18/2023 7.4 (L) 12.0 - 15.9 g/dL Final   10/18/2023 7.5 (L) 12.0 - 15.9 g/dL Final   10/17/2023 5.5 (C) 12.0 - 15.9 g/dL Final      HCT Hematocrit   Date Value Ref Range Status   10/18/2023 24.0 (L) 34.0 - 46.6 % Final   10/18/2023 23.6 (L) 34.0 - 46.6 % Final   10/17/2023 18.6 (C) 34.0 - 46.6 % Final      Platlets No results found for: \"LABPLAT\"   MCV MCV   Date Value Ref Range Status   10/18/2023 86.1 79.0 - 97.0 fL Final   10/17/2023 94.4 79.0 - 97.0 fL Final          Sodium Sodium   Date Value Ref Range Status   10/18/2023 135 (L) 136 - 145 mmol/L Final   10/17/2023 137 136 - 145 mmol/L Final      Potassium Potassium   Date Value Ref Range Status   10/18/2023 4.0 3.5 - 5.2 mmol/L Final   10/17/2023 4.1 3.5 - 5.2 mmol/L Final      Chloride Chloride   Date Value Ref Range Status   10/18/2023 97 (L) 98 - 107 mmol/L Final   10/17/2023 98 98 - 107 mmol/L Final      CO2 CO2   Date Value Ref Range Status   10/18/2023 31.1 (H) 22.0 - 29.0 mmol/L Final   10/17/2023 27.6 22.0 - 29.0 mmol/L Final      BUN BUN   Date Value Ref Range Status   10/18/2023 92 (H) 8 - 23 mg/dL Final   10/17/2023 89 (H) 8 - 23 mg/dL Final      Creatinine Creatinine   Date Value Ref Range Status   10/18/2023 2.55 (H) 0.57 - 1.00 mg/dL Final   10/17/2023 2.47 (H) 0.57 - 1.00 mg/dL Final      Calcium Calcium   Date Value Ref Range Status   10/18/2023 8.6 8.6 - 10.5 mg/dL Final   10/17/2023 8.5 (L) 8.6 - 10.5 mg/dL Final      PO4 No results found for: \"CAPO4\"   Albumin Albumin   Date Value Ref Range Status   10/18/2023 3.1 (L) 3.5 - 5.2 g/dL Final   10/17/2023 3.2 (L) 3.5 - 5.2 g/dL Final      Magnesium No results found for: \"MG\"   Uric Acid No results found for: \"URICACID\"     Radiology results :     Imaging Results (Last 72 Hours)       Procedure " Component Value Units Date/Time    US Renal Bilateral [[id number]] Resulted: 10/18/23 1951     Updated: 10/18/23 1951    XR Chest 1 View [[id number]] Collected: 10/17/23 1931     Updated: 10/17/23 1933    Narrative:      INDICATION: Cough.     TECHNIQUE: Frontal radiograph of the chest.     COMPARISON: 7/22/2023.     FINDINGS:   Cardiomegaly. Mild pulmonary vascular congestion. Elevation of the right  hemidiaphragm. No infiltrate, pleural effusion or pneumothorax. No acute  osseous abnormality evident.       Impression:      Mild pulmonary vascular congestion.        This report was finalized on 10/17/2023 7:31 PM by Alex Pallas, DO.       CT Abdomen Pelvis Without Contrast [[id number]] Collected: 10/17/23 1641     Updated: 10/17/23 1644    Narrative:      EXAM:    CT Abdomen and Pelvis Without Intravenous Contrast     EXAM DATE:    10/17/2023 5:21 PM     CLINICAL HISTORY:    NEW SYMPTOMATIC ANEMIA; D64.9-Anemia, unspecified;  R41.0-Disorientation, unspecified; Z79.01-Long term (current) use of  anticoagulants; N18.4-Chronic kidney disease, stage 4 (severe)     TECHNIQUE:    Axial computed tomography images of the abdomen and pelvis without  intravenous contrast.  Sagittal and coronal reformatted images were  created and reviewed.  This CT exam was performed using one or more of  the following dose reduction techniques:  automated exposure control,  adjustment of the mA and/or kV according to patient size, and/or use of  iterative reconstruction technique.     COMPARISON:    6/1/2023     FINDINGS:    LUNG BASES:  Unremarkable as visualized.  No mass.  No consolidation.    HEART:  Cardiomegaly.      ABDOMEN:    LIVER:  Unremarkable as visualized.    GALLBLADDER AND BILE DUCTS:  Gallstones.  Gallbladder otherwise  unremarkable.  No ductal dilation.    PANCREAS:  Unremarkable as visualized.  No ductal dilation.    SPLEEN:  Unremarkable as visualized.  No splenomegaly.    ADRENALS:  Unremarkable as visualized.  No mass.     KIDNEYS AND URETERS:  Unremarkable as visualized.  No obstructing  stones.  No hydronephrosis.    STOMACH AND BOWEL:  Scattered diverticula in the colon.  No  diverticulitis.  No obstruction.      PELVIS:    APPENDIX:  No findings to suggest acute appendicitis.    BLADDER:  Unremarkable as visualized.  No stones.    REPRODUCTIVE:  Unremarkable as visualized.      ABDOMEN and PELVIS:    INTRAPERITONEAL SPACE:  Unremarkable as visualized.  No free air.  No  significant fluid collection.    BONES/JOINTS:  No acute fracture.  No dislocation.    SOFT TISSUES:  Unremarkable as visualized.    VASCULATURE:  Atherosclerotic disease.  No abdominal aortic aneurysm.    LYMPH NODES:  Unremarkable as visualized.  No enlarged lymph nodes.       Impression:      1.  Cardiomegaly.  2.  Gallstones.  Gallbladder otherwise unremarkable.        This report was finalized on 10/17/2023 4:42 PM by Dr. Maksim Woods MD.                 Medications:      atorvastatin, 40 mg, Oral, Nightly  guaiFENesin, 400 mg, Oral, TID  hydrALAZINE, 25 mg, Oral, TID  insulin glargine, 10 Units, Subcutaneous, Nightly  isosorbide mononitrate, 30 mg, Oral, Daily  latanoprost, 1 drop, Both Eyes, Nightly  LORazepam, 0.5 mg, Oral, Nightly  metoprolol succinate XL, 50 mg, Oral, Daily  multivitamin with minerals, 1 tablet, Oral, Daily  pantoprazole, 40 mg, Intravenous, Q AM  senna-docusate sodium, 2 tablet, Oral, BID  sodium chloride, 10 mL, Intravenous, Q12H  sodium chloride, 10 mL, Intravenous, Q12H  sodium zirconium cyclosilicate, 10 g, Oral, Daily  vitamin B-12, 500 mcg, Oral, Daily      lactated ringers, 125 mL/hr, Last Rate: 125 mL/hr (10/18/23 2104)        Assessment & Plan       Symptomatic anemia    -WENDY, nonoliguric  - Chronic kidney disease G4 A1  - Acute on chronic anemia likely multifactorial including anemia of chronic kidney disease  - Diabetes mellitus  - Essential hypertension  - History of heart failure with preserved ejection fraction, well  compensated    WENDY on CKD most likely multifactorial including prerenal azotemia, hemodynamic changes due to relative hypotension  Baseline creatinine appears to be 12.1, admitted with 2.4.  Mild hematuria, no proteinuria  -Started on lactated Ringer 75 mL/h  - Renal ultrasound  - IV iron ferric gluconate 250 mg once  - Please avoid nephrotoxic agents, hypotension and adjust medications according to estimated GFR    Thanks Dr cuellar for the consult. Nephrology will follow the patient.   I discussed the patient's findings and my recommendations with patient    Elaina Bell MD  10/18/23  21:36 EDT      Electronically signed by Elaina Bell MD at 10/18/23 6810       Physical Therapy Notes (most recent note)    No notes exist for this encounter.       Occupational Therapy Notes (most recent note)    No notes exist for this encounter.       Discharge Summary    No notes of this type exist for this encounter.       Discharge Order (From admission, onward)       Start     Ordered    10/23/23 0852  Discharge patient  Once        Expected Discharge Date: 10/23/23   Discharge Disposition: Skilled Nursing Facility (DC - External)   Physician of Record for Attribution - Please select from Treatment Team: TOMMY CUELLAR [[id number]]   Review needed by CMO to determine Physician of Record: No      Question Answer Comment   Physician of Record for Attribution - Please select from Treatment Team TOMMY CUELLAR    Review needed by CMO to determine Physician of Record No        10/23/23 0856

## 2023-10-23 NOTE — DISCHARGE SUMMARY
[facility] HOSPITALISTS DISCHARGE SUMMARY    Patient Identification:  Name:  Jolly Garcia  Age:  73 y.o.  Sex:  female  :  1950  MRN:  [id number]  Visit Number:  [id number]    Date of Admission: 10/17/2023  Date of Discharge:  10/23/2023     PCP: Whit Cadet, TUCKER    DISCHARGE DIAGNOSIS ***      CONSULTS ***      PROCEDURES PERFORMED  ***    HOSPITAL COURSE  Patient is a 73 y.o. female presented to [facility] complaining of ***.  Please see the admitting history and physical for further details.          VITAL SIGNS:  Temp:  [97.4 °F (36.3 °C)-97.9 °F (36.6 °C)] 97.9 °F (36.6 °C)  Heart Rate:  [86-96] 86  Resp:  [18-19] 19  BP: (119-148)/(58-82) 129/82  SpO2:  [91 %-96 %] 96 %  on  Flow (L/min):  [1] 1;   Device (Oxygen Therapy): nasal cannula    Body mass index is 43.91 kg/m².  Wt Readings from Last 3 Encounters:   10/21/23 123 kg (272 lb 0.8 oz)   23 (!) 153 kg (337 lb)   23 (!) 152 kg (335 lb 4.8 oz)       PHYSICAL EXAM: ***  Constitutional:  Well-developed and well-nourished.  No acute distress.      HENT:  Head:  Normocephalic and atraumatic.  Mouth:  Moist mucous membranes.    Eyes:  Conjunctivae and EOM are normal. No scleral icterus.    Neck:  Neck supple.  No JVD present.    Cardiovascular:  Normal rate, regular rhythm, and normal heart sounds. No murmur.  Pulmonary/Chest:  Normal rate and effort. Breath sounds clear to auscultation bilaterally with good air movement.  Abdominal:  Soft. No distension and no tenderness. Normal bowel sounds present.  Musculoskeletal:  No tenderness and no deformity.  No red or swollen joints anywhere.    Neurological:  Alert and oriented to person, place, and time.  No focal strength or sensory deficit.    Skin:  Skin is warm and dry. No rash noted. No pallor.   Peripheral vascular:  No clubbing, no cyanosis, no edema.  DISCHARGE DISPOSITION   Stable    DISCHARGE MEDICATIONS:     Discharge Medications         Changes to Medications        Instructions Start Date   apixaban 2.5 MG tablet tablet  Commonly known as: ELIQUIS  What changed:   medication strength  how much to take   2.5 mg, Oral, Every 12 Hours Scheduled      metoprolol succinate XL 25 MG 24 hr tablet  Commonly known as: TOPROL-XL  What changed:   medication strength  how much to take   75 mg, Oral, Daily   Start Date: October 24, 2023            Continue These Medications        Instructions Start Date   acetaminophen 500 MG tablet  Commonly known as: TYLENOL   1,000 mg, Oral, Every 8 Hours PRN      atorvastatin 40 MG tablet  Commonly known as: LIPITOR   Take 1 tablet by mouth Every Night.      bumetanide 2 MG tablet  Commonly known as: BUMEX   2 mg, Oral, Daily      CoQ10 50 MG capsule   100 mg, Oral, Daily      dextromethorphan-guaifenesin  MG/5ML syrup  Commonly known as: ROBITUSSIN-DM   5 mL, Oral, Every 4 Hours PRN      diphenhydrAMINE 25 mg capsule  Commonly known as: BENADRYL   25 mg, Oral, Every 8 Hours PRN      DULoxetine 30 MG capsule  Commonly known as: CYMBALTA   30 mg, Oral, Daily      ferrous sulfate 325 (65 FE) MG tablet   325 mg, Oral, Daily With Breakfast      fluticasone 50 MCG/ACT nasal spray  Commonly known as: FLONASE   2 sprays, Each Nare, Daily      guaiFENesin 400 MG tablet  Commonly known as: HUMIBID 3   400 mg, Oral, 3 Times Daily      hydrALAZINE 25 MG tablet  Commonly known as: APRESOLINE   25 mg, Oral, 3 Times Daily      HYDROcodone-acetaminophen 5-325 MG per tablet  Commonly known as: NORCO   1 tablet, Oral, Every 8 Hours PRN      Insulin Aspart 100 UNIT/ML injection  Commonly known as: novoLOG   2-10 Units, Subcutaneous, 2 Times Daily Before Meals      insulin glargine 100 UNIT/ML injection  Commonly known as: LANTUS, SEMGLEE   10 Units, Subcutaneous, Nightly      isosorbide mononitrate 30 MG 24 hr tablet  Commonly known as: IMDUR   30 mg, Oral, Daily      lactulose 10 GM/15ML solution  Commonly known as: CHRONULAC    10 g, Oral, 2 Times Daily PRN      latanoprost 0.005 % ophthalmic solution  Commonly known as: XALATAN   1 drop, Both Eyes, Nightly      linaclotide 290 MCG capsule capsule  Commonly known as: LINZESS   290 mcg, Oral, Every Morning Before Breakfast      LORazepam 0.5 MG tablet  Commonly known as: ATIVAN   0.5 mg, Oral, Nightly      LORazepam 0.5 MG tablet  Commonly known as: ATIVAN   0.25 mg, Oral, Every 8 Hours PRN      multivitamin with minerals tablet tablet   1 tablet, Oral, Daily      I-cheyenne tablet tablet   1 tablet, Oral, Daily      nitroglycerin 0.4 MG SL tablet  Commonly known as: NITROSTAT   0.4 mg, Sublingual, Every 5 Minutes PRN, Take no more than 3 doses in 15 minutes.      ondansetron 4 MG tablet  Commonly known as: ZOFRAN   4 mg, Oral, Every 6 Hours PRN      Ozempic (1 MG/DOSE) 4 MG/3ML solution pen-injector  Generic drug: Semaglutide (1 MG/DOSE)   1 mg, Subcutaneous, Weekly      pantoprazole 40 MG EC tablet  Commonly known as: PROTONIX   40 mg, Oral, Every Early Morning      sennosides-docusate 8.6-50 MG per tablet  Commonly known as: PERICOLACE   1 tablet, Oral, 2 Times Daily      Veltassa 16.8 g pack  Generic drug: Patiromer Sorbitex Calcium   1 each, Oral, Daily      vitamin B-12 1000 MCG tablet  Commonly known as: CYANOCOBALAMIN   500 mcg, Oral, Daily             Stop These Medications      aspirin 81 MG chewable tablet     spironolactone 25 MG tablet  Commonly known as: ALDACTONE              Diet Instructions       Diet: Cardiac Diets, Diabetic Diets; Healthy Heart (2-3 Na+); Regular Texture (IDDSI 7); Thin (IDDSI 0); Consistent Carbohydrate      Discharge Diet:  Cardiac Diets  Diabetic Diets       Cardiac Diet: Healthy Heart (2-3 Na+)    Texture: Regular Texture (IDDSI 7)    Fluid Consistency: Thin (IDDSI 0)    Diabetic Diet: Consistent Carbohydrate          Activity Instructions       Up WIth Assist             Contact information for follow-up providers       Whit Cadet APRN .     Specialty: Family Medicine  Contact information:  [address] [address]  [phone]                       Contact information for after-discharge care       Destination       [facility] .    Service: Skilled Nursing  Contact information:  [address] [place] [id number] [phone]                                       CODE STATUS  Code Status and Medical Interventions:   Ordered at: 10/18/23 0816     Code Status (Patient has no pulse and is not breathing):    CPR (Attempt to Resuscitate)     Medical Interventions (Patient has pulse or is breathing):    Full Support        Ashely Hurley DO  [facility] Hospitalist  10/23/23  13:46 EDT    Please note that this discharge summary required more than 30 minutes to complete.

## 2023-10-23 NOTE — NURSING NOTE
Follow up on patient concern from 10/21. Discussed with patient's niece Zandra. No other concerns voiced and agreeable to discharge to [facility].

## 2023-10-24 VITALS
HEART RATE: 90 BPM | WEIGHT: 272.05 LBS | BODY MASS INDEX: 43.72 KG/M2 | DIASTOLIC BLOOD PRESSURE: 64 MMHG | OXYGEN SATURATION: 97 % | RESPIRATION RATE: 19 BRPM | HEIGHT: 66 IN | SYSTOLIC BLOOD PRESSURE: 117 MMHG | TEMPERATURE: 98.2 F

## 2023-10-24 NOTE — NURSING NOTE
Called [facility] to get a ETA of when they may be able to  this pt from the floor and take her to [facility]. No answer at EMS services but I left a voice mail with a number to return my call.

## 2023-11-25 ENCOUNTER — APPOINTMENT (OUTPATIENT)
Dept: CT IMAGING | Facility: HOSPITAL | Age: 73
End: 2023-11-25
Payer: MEDICARE

## 2023-11-25 ENCOUNTER — HOSPITAL ENCOUNTER (EMERGENCY)
Facility: HOSPITAL | Age: 73
Discharge: OTHER INSTITUTION | End: 2023-11-25
Attending: STUDENT IN AN ORGANIZED HEALTH CARE EDUCATION/TRAINING PROGRAM
Payer: MEDICARE

## 2023-11-25 ENCOUNTER — LAB REQUISITION (OUTPATIENT)
Dept: LAB | Facility: HOSPITAL | Age: 73
End: 2023-11-25
Payer: MEDICARE

## 2023-11-25 ENCOUNTER — APPOINTMENT (OUTPATIENT)
Dept: GENERAL RADIOLOGY | Facility: HOSPITAL | Age: 73
End: 2023-11-25
Payer: MEDICARE

## 2023-11-25 VITALS
SYSTOLIC BLOOD PRESSURE: 121 MMHG | RESPIRATION RATE: 22 BRPM | OXYGEN SATURATION: 98 % | BODY MASS INDEX: 43.71 KG/M2 | WEIGHT: 272 LBS | DIASTOLIC BLOOD PRESSURE: 76 MMHG | HEIGHT: 66 IN | TEMPERATURE: 97.8 F | HEART RATE: 133 BPM

## 2023-11-25 DIAGNOSIS — R65.21 SEPSIS WITH ACUTE RENAL FAILURE AND SEPTIC SHOCK, DUE TO UNSPECIFIED ORGANISM, UNSPECIFIED ACUTE RENAL FAILURE TYPE: Primary | ICD-10-CM

## 2023-11-25 DIAGNOSIS — N17.9 SEPSIS WITH ACUTE RENAL FAILURE AND SEPTIC SHOCK, DUE TO UNSPECIFIED ORGANISM, UNSPECIFIED ACUTE RENAL FAILURE TYPE: Primary | ICD-10-CM

## 2023-11-25 DIAGNOSIS — N39.0 ACUTE UTI: ICD-10-CM

## 2023-11-25 DIAGNOSIS — K81.9 CHOLECYSTITIS: ICD-10-CM

## 2023-11-25 DIAGNOSIS — D50.9 IRON DEFICIENCY ANEMIA, UNSPECIFIED: ICD-10-CM

## 2023-11-25 DIAGNOSIS — R79.82 ELEVATED C-REACTIVE PROTEIN (CRP): ICD-10-CM

## 2023-11-25 DIAGNOSIS — J98.09 BRONCHIAL OBSTRUCTION: ICD-10-CM

## 2023-11-25 DIAGNOSIS — N39.0 URINARY TRACT INFECTION, SITE NOT SPECIFIED: ICD-10-CM

## 2023-11-25 DIAGNOSIS — A41.9 SEPSIS WITH ACUTE RENAL FAILURE AND SEPTIC SHOCK, DUE TO UNSPECIFIED ORGANISM, UNSPECIFIED ACUTE RENAL FAILURE TYPE: Primary | ICD-10-CM

## 2023-11-25 LAB
A-A DO2: 124.6 MMHG (ref 0–300)
ABO GROUP BLD: NORMAL
ALBUMIN SERPL-MCNC: 2.4 G/DL (ref 3.5–5.2)
ALBUMIN/GLOB SERPL: 0.7 G/DL
ALP SERPL-CCNC: 295 U/L (ref 39–117)
ALT SERPL W P-5'-P-CCNC: 47 U/L (ref 1–33)
AMYLASE SERPL-CCNC: 29 U/L (ref 28–100)
ANION GAP SERPL CALCULATED.3IONS-SCNC: 13.6 MMOL/L (ref 5–15)
ANTI-FYA: NORMAL
APTT PPP: 38.5 SECONDS (ref 26.5–34.5)
ARTERIAL PATENCY WRIST A: ABNORMAL
AST SERPL-CCNC: 105 U/L (ref 1–32)
ATMOSPHERIC PRESS: 729 MMHG
BACTERIA UR QL AUTO: ABNORMAL /HPF
BACTERIA UR QL AUTO: ABNORMAL /HPF
BASE EXCESS BLDA CALC-SCNC: 0.9 MMOL/L (ref 0–2)
BASOPHILS # BLD AUTO: 0.06 10*3/MM3 (ref 0–0.2)
BASOPHILS # BLD AUTO: 0.09 10*3/MM3 (ref 0–0.2)
BASOPHILS NFR BLD AUTO: 0.3 % (ref 0–1.5)
BASOPHILS NFR BLD AUTO: 0.4 % (ref 0–1.5)
BDY SITE: ABNORMAL
BILIRUB CONJ SERPL-MCNC: 1.8 MG/DL (ref 0–0.3)
BILIRUB SERPL-MCNC: 2.3 MG/DL (ref 0–1.2)
BILIRUB UR QL STRIP: ABNORMAL
BILIRUB UR QL STRIP: ABNORMAL
BLD GP AB SCN SERPL QL ELUTION: NEGATIVE
BLD GP AB SCN SERPL QL: POSITIVE
BUN SERPL-MCNC: 77 MG/DL (ref 8–23)
BUN/CREAT SERPL: 23.6 (ref 7–25)
CALCIUM SPEC-SCNC: 7.7 MG/DL (ref 8.6–10.5)
CHLORIDE SERPL-SCNC: 95 MMOL/L (ref 98–107)
CLARITY UR: ABNORMAL
CLARITY UR: ABNORMAL
CO2 BLDA-SCNC: 25.7 MMOL/L (ref 22–33)
CO2 SERPL-SCNC: 26.4 MMOL/L (ref 22–29)
COHGB MFR BLD: 1.1 % (ref 0–5)
COLOR UR: ABNORMAL
COLOR UR: ABNORMAL
CREAT SERPL-MCNC: 3.26 MG/DL (ref 0.57–1)
CRP SERPL-MCNC: 8.24 MG/DL (ref 0–0.5)
CRP SERPL-MCNC: 8.52 MG/DL (ref 0–0.5)
D-LACTATE SERPL-SCNC: 1.7 MMOL/L (ref 0.5–2)
DAT IGG GEL: POSITIVE
DEPRECATED RDW RBC AUTO: 51.9 FL (ref 37–54)
DEPRECATED RDW RBC AUTO: 54.1 FL (ref 37–54)
EGFRCR SERPLBLD CKD-EPI 2021: 14.4 ML/MIN/1.73
EOSINOPHIL # BLD AUTO: 0.02 10*3/MM3 (ref 0–0.4)
EOSINOPHIL # BLD AUTO: 0.03 10*3/MM3 (ref 0–0.4)
EOSINOPHIL NFR BLD AUTO: 0.1 % (ref 0.3–6.2)
EOSINOPHIL NFR BLD AUTO: 0.1 % (ref 0.3–6.2)
ERYTHROCYTE [DISTWIDTH] IN BLOOD BY AUTOMATED COUNT: 17.7 % (ref 12.3–15.4)
ERYTHROCYTE [DISTWIDTH] IN BLOOD BY AUTOMATED COUNT: 17.7 % (ref 12.3–15.4)
ERYTHROCYTE [SEDIMENTATION RATE] IN BLOOD: 18 MM/HR (ref 0–30)
FLUAV RNA RESP QL NAA+PROBE: NOT DETECTED
FLUBV RNA ISLT QL NAA+PROBE: NOT DETECTED
GAS FLOW AIRWAY: 4 LPM
GEN 5 2HR TROPONIN T REFLEX: 113 NG/L
GLOBULIN UR ELPH-MCNC: 3.6 GM/DL
GLUCOSE SERPL-MCNC: 121 MG/DL (ref 65–99)
GLUCOSE UR STRIP-MCNC: NEGATIVE MG/DL
GLUCOSE UR STRIP-MCNC: NEGATIVE MG/DL
GRAN CASTS URNS QL MICRO: ABNORMAL /LPF
HCO3 BLDA-SCNC: 24.6 MMOL/L (ref 20–26)
HCT VFR BLD AUTO: 28.7 % (ref 34–46.6)
HCT VFR BLD AUTO: 29 % (ref 34–46.6)
HCT VFR BLD CALC: 29 % (ref 38–51)
HGB BLD-MCNC: 9.3 G/DL (ref 12–15.9)
HGB BLD-MCNC: 9.6 G/DL (ref 12–15.9)
HGB BLDA-MCNC: 9.5 G/DL (ref 13.5–17.5)
HGB UR QL STRIP.AUTO: ABNORMAL
HGB UR QL STRIP.AUTO: ABNORMAL
HOLD SPECIMEN: NORMAL
HOLD SPECIMEN: NORMAL
HYALINE CASTS UR QL AUTO: ABNORMAL /LPF
HYALINE CASTS UR QL AUTO: ABNORMAL /LPF
IMM GRANULOCYTES # BLD AUTO: 0.18 10*3/MM3 (ref 0–0.05)
IMM GRANULOCYTES # BLD AUTO: 0.22 10*3/MM3 (ref 0–0.05)
IMM GRANULOCYTES NFR BLD AUTO: 0.9 % (ref 0–0.5)
IMM GRANULOCYTES NFR BLD AUTO: 1 % (ref 0–0.5)
INHALED O2 CONCENTRATION: 36 %
INR PPP: 2.29 (ref 0.9–1.1)
KETONES UR QL STRIP: ABNORMAL
KETONES UR QL STRIP: ABNORMAL
LEUKOCYTE ESTERASE UR QL STRIP.AUTO: ABNORMAL
LEUKOCYTE ESTERASE UR QL STRIP.AUTO: ABNORMAL
LIPASE SERPL-CCNC: 31 U/L (ref 13–60)
LYMPHOCYTES # BLD AUTO: 2.09 10*3/MM3 (ref 0.7–3.1)
LYMPHOCYTES # BLD AUTO: 2.12 10*3/MM3 (ref 0.7–3.1)
LYMPHOCYTES NFR BLD AUTO: 9.3 % (ref 19.6–45.3)
LYMPHOCYTES NFR BLD AUTO: 9.9 % (ref 19.6–45.3)
Lab: ABNORMAL
MAGNESIUM SERPL-MCNC: 1.9 MG/DL (ref 1.6–2.4)
MCH RBC QN AUTO: 26.9 PG (ref 26.6–33)
MCH RBC QN AUTO: 27.2 PG (ref 26.6–33)
MCHC RBC AUTO-ENTMCNC: 32.1 G/DL (ref 31.5–35.7)
MCHC RBC AUTO-ENTMCNC: 33.4 G/DL (ref 31.5–35.7)
MCV RBC AUTO: 81.3 FL (ref 79–97)
MCV RBC AUTO: 83.8 FL (ref 79–97)
METHGB BLD QL: 0 % (ref 0–3)
MODALITY: ABNORMAL
MONOCYTES # BLD AUTO: 0.99 10*3/MM3 (ref 0.1–0.9)
MONOCYTES # BLD AUTO: 1.05 10*3/MM3 (ref 0.1–0.9)
MONOCYTES NFR BLD AUTO: 4.6 % (ref 5–12)
MONOCYTES NFR BLD AUTO: 4.7 % (ref 5–12)
NEUTROPHILS NFR BLD AUTO: 17.73 10*3/MM3 (ref 1.7–7)
NEUTROPHILS NFR BLD AUTO: 19.31 10*3/MM3 (ref 1.7–7)
NEUTROPHILS NFR BLD AUTO: 84 % (ref 42.7–76)
NEUTROPHILS NFR BLD AUTO: 84.7 % (ref 42.7–76)
NITRITE UR QL STRIP: POSITIVE
NITRITE UR QL STRIP: POSITIVE
NRBC BLD AUTO-RTO: 0 /100 WBC (ref 0–0.2)
NRBC BLD AUTO-RTO: 0 /100 WBC (ref 0–0.2)
OXYHGB MFR BLDV: 96 % (ref 94–99)
PCO2 BLDA: 34.8 MM HG (ref 35–45)
PCO2 TEMP ADJ BLD: ABNORMAL MM[HG]
PH BLDA: 7.46 PH UNITS (ref 7.35–7.45)
PH UR STRIP.AUTO: 5.5 [PH] (ref 5–8)
PH UR STRIP.AUTO: <=5 [PH] (ref 5–8)
PH, TEMP CORRECTED: ABNORMAL
PLATELET # BLD AUTO: 418 10*3/MM3 (ref 140–450)
PLATELET # BLD AUTO: 445 10*3/MM3 (ref 140–450)
PMV BLD AUTO: 8.7 FL (ref 6–12)
PMV BLD AUTO: 9.2 FL (ref 6–12)
PO2 BLDA: 82.3 MM HG (ref 83–108)
PO2 TEMP ADJ BLD: ABNORMAL MM[HG]
POTASSIUM SERPL-SCNC: 3.6 MMOL/L (ref 3.5–5.2)
PROCALCITONIN SERPL-MCNC: 0.29 NG/ML (ref 0–0.25)
PROT SERPL-MCNC: 6 G/DL (ref 6–8.5)
PROT UR QL STRIP: ABNORMAL
PROT UR QL STRIP: ABNORMAL
PROTHROMBIN TIME: 25.9 SECONDS (ref 12.1–14.7)
RBC # BLD AUTO: 3.46 10*6/MM3 (ref 3.77–5.28)
RBC # BLD AUTO: 3.53 10*6/MM3 (ref 3.77–5.28)
RBC # UR STRIP: ABNORMAL /HPF
RBC # UR STRIP: ABNORMAL /HPF
REF LAB TEST METHOD: ABNORMAL
REF LAB TEST METHOD: ABNORMAL
RH BLD: POSITIVE
SAO2 % BLDCOA: 97.1 % (ref 94–99)
SARS-COV-2 RNA RESP QL NAA+PROBE: NOT DETECTED
SODIUM SERPL-SCNC: 135 MMOL/L (ref 136–145)
SP GR UR STRIP: 1.02 (ref 1–1.03)
SP GR UR STRIP: 1.02 (ref 1–1.03)
SQUAMOUS #/AREA URNS HPF: ABNORMAL /HPF
SQUAMOUS #/AREA URNS HPF: ABNORMAL /HPF
T&S EXPIRATION DATE: NORMAL
TROPONIN T DELTA: -18 NG/L
TROPONIN T SERPL HS-MCNC: 131 NG/L
UROBILINOGEN UR QL STRIP: ABNORMAL
UROBILINOGEN UR QL STRIP: ABNORMAL
VENTILATOR MODE: ABNORMAL
WBC # UR STRIP: ABNORMAL /HPF
WBC # UR STRIP: ABNORMAL /HPF
WBC NRBC COR # BLD AUTO: 21.1 10*3/MM3 (ref 3.4–10.8)
WBC NRBC COR # BLD AUTO: 22.79 10*3/MM3 (ref 3.4–10.8)
WHOLE BLOOD HOLD COAG: NORMAL
WHOLE BLOOD HOLD SPECIMEN: NORMAL
YEAST URNS QL MICRO: ABNORMAL /HPF

## 2023-11-25 PROCEDURE — 96366 THER/PROPH/DIAG IV INF ADDON: CPT

## 2023-11-25 PROCEDURE — 36600 WITHDRAWAL OF ARTERIAL BLOOD: CPT

## 2023-11-25 PROCEDURE — 25010000002 VANCOMYCIN 5 G RECONSTITUTED SOLUTION: Performed by: PHYSICIAN ASSISTANT

## 2023-11-25 PROCEDURE — 71045 X-RAY EXAM CHEST 1 VIEW: CPT

## 2023-11-25 PROCEDURE — 93005 ELECTROCARDIOGRAM TRACING: CPT | Performed by: PHYSICIAN ASSISTANT

## 2023-11-25 PROCEDURE — 80053 COMPREHEN METABOLIC PANEL: CPT | Performed by: STUDENT IN AN ORGANIZED HEALTH CARE EDUCATION/TRAINING PROGRAM

## 2023-11-25 PROCEDURE — 83690 ASSAY OF LIPASE: CPT | Performed by: PHYSICIAN ASSISTANT

## 2023-11-25 PROCEDURE — 83735 ASSAY OF MAGNESIUM: CPT | Performed by: PHYSICIAN ASSISTANT

## 2023-11-25 PROCEDURE — 85025 COMPLETE CBC W/AUTO DIFF WBC: CPT | Performed by: STUDENT IN AN ORGANIZED HEALTH CARE EDUCATION/TRAINING PROGRAM

## 2023-11-25 PROCEDURE — 96368 THER/DIAG CONCURRENT INF: CPT

## 2023-11-25 PROCEDURE — 83605 ASSAY OF LACTIC ACID: CPT | Performed by: STUDENT IN AN ORGANIZED HEALTH CARE EDUCATION/TRAINING PROGRAM

## 2023-11-25 PROCEDURE — 96376 TX/PRO/DX INJ SAME DRUG ADON: CPT

## 2023-11-25 PROCEDURE — 82248 BILIRUBIN DIRECT: CPT | Performed by: HOSPITALIST

## 2023-11-25 PROCEDURE — 87086 URINE CULTURE/COLONY COUNT: CPT | Performed by: INTERNAL MEDICINE

## 2023-11-25 PROCEDURE — 85025 COMPLETE CBC W/AUTO DIFF WBC: CPT | Performed by: INTERNAL MEDICINE

## 2023-11-25 PROCEDURE — 86920 COMPATIBILITY TEST SPIN: CPT

## 2023-11-25 PROCEDURE — 85652 RBC SED RATE AUTOMATED: CPT | Performed by: PHYSICIAN ASSISTANT

## 2023-11-25 PROCEDURE — 86870 RBC ANTIBODY IDENTIFICATION: CPT | Performed by: PHYSICIAN ASSISTANT

## 2023-11-25 PROCEDURE — 85730 THROMBOPLASTIN TIME PARTIAL: CPT | Performed by: PHYSICIAN ASSISTANT

## 2023-11-25 PROCEDURE — 86850 RBC ANTIBODY SCREEN: CPT | Performed by: PHYSICIAN ASSISTANT

## 2023-11-25 PROCEDURE — 96365 THER/PROPH/DIAG IV INF INIT: CPT

## 2023-11-25 PROCEDURE — C1751 CATH, INF, PER/CENT/MIDLINE: HCPCS

## 2023-11-25 PROCEDURE — 51702 INSERT TEMP BLADDER CATH: CPT

## 2023-11-25 PROCEDURE — 87040 BLOOD CULTURE FOR BACTERIA: CPT | Performed by: STUDENT IN AN ORGANIZED HEALTH CARE EDUCATION/TRAINING PROGRAM

## 2023-11-25 PROCEDURE — 25010000002 PIPERACILLIN SOD-TAZOBACTAM PER 1 G: Performed by: PHYSICIAN ASSISTANT

## 2023-11-25 PROCEDURE — 86140 C-REACTIVE PROTEIN: CPT | Performed by: PHYSICIAN ASSISTANT

## 2023-11-25 PROCEDURE — 82375 ASSAY CARBOXYHB QUANT: CPT

## 2023-11-25 PROCEDURE — 86902 BLOOD TYPE ANTIGEN DONOR EA: CPT

## 2023-11-25 PROCEDURE — 86140 C-REACTIVE PROTEIN: CPT | Performed by: INTERNAL MEDICINE

## 2023-11-25 PROCEDURE — 86900 BLOOD TYPING SEROLOGIC ABO: CPT | Performed by: PHYSICIAN ASSISTANT

## 2023-11-25 PROCEDURE — 25810000003 SODIUM CHLORIDE 0.9 % SOLUTION: Performed by: PHYSICIAN ASSISTANT

## 2023-11-25 PROCEDURE — 86880 COOMBS TEST DIRECT: CPT | Performed by: PHYSICIAN ASSISTANT

## 2023-11-25 PROCEDURE — 82150 ASSAY OF AMYLASE: CPT | Performed by: PHYSICIAN ASSISTANT

## 2023-11-25 PROCEDURE — 83050 HGB METHEMOGLOBIN QUAN: CPT

## 2023-11-25 PROCEDURE — 70450 CT HEAD/BRAIN W/O DYE: CPT

## 2023-11-25 PROCEDURE — 87636 SARSCOV2 & INF A&B AMP PRB: CPT | Performed by: PHYSICIAN ASSISTANT

## 2023-11-25 PROCEDURE — 84484 ASSAY OF TROPONIN QUANT: CPT | Performed by: PHYSICIAN ASSISTANT

## 2023-11-25 PROCEDURE — 86860 RBC ANTIBODY ELUTION: CPT | Performed by: PHYSICIAN ASSISTANT

## 2023-11-25 PROCEDURE — 87086 URINE CULTURE/COLONY COUNT: CPT | Performed by: PHYSICIAN ASSISTANT

## 2023-11-25 PROCEDURE — 86905 BLOOD TYPING RBC ANTIGENS: CPT

## 2023-11-25 PROCEDURE — 71250 CT THORAX DX C-: CPT

## 2023-11-25 PROCEDURE — 99291 CRITICAL CARE FIRST HOUR: CPT

## 2023-11-25 PROCEDURE — 81001 URINALYSIS AUTO W/SCOPE: CPT | Performed by: INTERNAL MEDICINE

## 2023-11-25 PROCEDURE — 93010 ELECTROCARDIOGRAM REPORT: CPT | Performed by: INTERNAL MEDICINE

## 2023-11-25 PROCEDURE — 36415 COLL VENOUS BLD VENIPUNCTURE: CPT

## 2023-11-25 PROCEDURE — 85610 PROTHROMBIN TIME: CPT | Performed by: PHYSICIAN ASSISTANT

## 2023-11-25 PROCEDURE — 81001 URINALYSIS AUTO W/SCOPE: CPT | Performed by: PHYSICIAN ASSISTANT

## 2023-11-25 PROCEDURE — 82805 BLOOD GASES W/O2 SATURATION: CPT

## 2023-11-25 PROCEDURE — 86922 COMPATIBILITY TEST ANTIGLOB: CPT

## 2023-11-25 PROCEDURE — 86901 BLOOD TYPING SEROLOGIC RH(D): CPT | Performed by: PHYSICIAN ASSISTANT

## 2023-11-25 PROCEDURE — 84145 PROCALCITONIN (PCT): CPT | Performed by: PHYSICIAN ASSISTANT

## 2023-11-25 PROCEDURE — 74176 CT ABD & PELVIS W/O CONTRAST: CPT

## 2023-11-25 PROCEDURE — 86870 RBC ANTIBODY IDENTIFICATION: CPT

## 2023-11-25 RX ORDER — NOREPINEPHRINE BITARTRATE 0.03 MG/ML
.02-.3 INJECTION, SOLUTION INTRAVENOUS
Status: DISCONTINUED | OUTPATIENT
Start: 2023-11-25 | End: 2023-11-25

## 2023-11-25 RX ORDER — NOREPINEPHRINE BITARTRATE 0.03 MG/ML
INJECTION, SOLUTION INTRAVENOUS
Status: COMPLETED
Start: 2023-11-25 | End: 2023-11-25

## 2023-11-25 RX ORDER — NOREPINEPHRINE BITARTRATE 0.03 MG/ML
.02-.3 INJECTION, SOLUTION INTRAVENOUS
Status: DISCONTINUED | OUTPATIENT
Start: 2023-11-25 | End: 2023-11-26 | Stop reason: HOSPADM

## 2023-11-25 RX ORDER — SODIUM CHLORIDE 0.9 % (FLUSH) 0.9 %
10 SYRINGE (ML) INJECTION AS NEEDED
Status: DISCONTINUED | OUTPATIENT
Start: 2023-11-25 | End: 2023-11-26 | Stop reason: HOSPADM

## 2023-11-25 RX ORDER — PANTOPRAZOLE SODIUM 40 MG/10ML
80 INJECTION, POWDER, LYOPHILIZED, FOR SOLUTION INTRAVENOUS ONCE
Status: COMPLETED | OUTPATIENT
Start: 2023-11-25 | End: 2023-11-25

## 2023-11-25 RX ORDER — SODIUM CHLORIDE 9 MG/ML
125 INJECTION, SOLUTION INTRAVENOUS CONTINUOUS
Status: DISCONTINUED | OUTPATIENT
Start: 2023-11-25 | End: 2023-11-26 | Stop reason: HOSPADM

## 2023-11-25 RX ADMIN — PIPERACILLIN SODIUM AND TAZOBACTAM SODIUM 3.38 G: 3; .375 INJECTION, POWDER, LYOPHILIZED, FOR SOLUTION INTRAVENOUS at 17:14

## 2023-11-25 RX ADMIN — Medication 0.02 MCG/KG/MIN: at 20:05

## 2023-11-25 RX ADMIN — NOREPINEPHRINE BITARTRATE 0.02 MCG/KG/MIN: 0.03 INJECTION, SOLUTION INTRAVENOUS at 20:05

## 2023-11-25 RX ADMIN — PANTOPRAZOLE SODIUM 8 MG/HR: 40 INJECTION, POWDER, FOR SOLUTION INTRAVENOUS at 20:45

## 2023-11-25 RX ADMIN — SODIUM CHLORIDE 1000 ML: 9 INJECTION, SOLUTION INTRAVENOUS at 16:07

## 2023-11-25 RX ADMIN — SODIUM CHLORIDE 125 ML/HR: 9 INJECTION, SOLUTION INTRAVENOUS at 20:06

## 2023-11-25 RX ADMIN — VANCOMYCIN HYDROCHLORIDE 2500 MG: 5 INJECTION, POWDER, LYOPHILIZED, FOR SOLUTION INTRAVENOUS at 17:45

## 2023-11-25 RX ADMIN — PANTOPRAZOLE SODIUM 8 MG/HR: 40 INJECTION, POWDER, FOR SOLUTION INTRAVENOUS at 16:13

## 2023-11-25 RX ADMIN — SODIUM CHLORIDE 1600 ML: 9 INJECTION, SOLUTION INTRAVENOUS at 17:45

## 2023-11-25 RX ADMIN — PANTOPRAZOLE SODIUM 80 MG: 40 INJECTION, POWDER, FOR SOLUTION INTRAVENOUS at 16:13

## 2023-11-25 NOTE — ED PROVIDER NOTES
Subjective   History of Present Illness  73-year-old female who presents to the ED today from [facility] with a concern of being septic.  She was found to have a white blood cell count of 22.79 and a CRP of 8.24 today.  She also had a UTI on her labs today with urine culture pending.  She was given 1 g of Invanz IM today.  She has also been on cefdinir 300 mg twice a day for pneumonia for the last 4 to 5 days.  The patient currently reports that she hurts everywhere.  She states she has had nausea and vomiting.  She denies any diarrhea.  She is alert to person and place.    History provided by:  Patient and nursing home  Illness  Severity:  Moderate  Onset quality:  Sudden  Duration: today.  Timing:  Constant  Progression:  Unchanged  Chronicity:  New  Associated symptoms: abdominal pain, congestion, cough, fatigue, myalgias, nausea, shortness of breath, vomiting and wheezing    Associated symptoms: no diarrhea and no fever        Review of Systems   Constitutional:  Positive for fatigue. Negative for fever.   HENT:  Positive for congestion.    Eyes: Negative.    Respiratory:  Positive for cough, shortness of breath and wheezing.    Cardiovascular: Negative.    Gastrointestinal:  Positive for abdominal pain, nausea and vomiting. Negative for diarrhea.   Genitourinary: Negative.    Musculoskeletal:  Positive for myalgias.   Skin: Negative.    All other systems reviewed and are negative.      Past Medical History:   Diagnosis Date    Anxiety     CAD (coronary artery disease)     s/p CABG    Chronic hypoxemic respiratory failure     Wears 2 L/min at home    Essential hypertension     Hyperlipidemia     Type 2 diabetes mellitus        No Known Allergies    Past Surgical History:   Procedure Laterality Date    CORONARY ARTERY BYPASS GRAFT  2011    HYSTERECTOMY         Family History   Problem Relation Age of Onset    Diabetes Mother        Social History     Socioeconomic History    Marital status: Single    Tobacco Use    Smoking status: Former     Passive exposure: Past   Vaping Use    Vaping Use: Never used   Substance and Sexual Activity    Alcohol use: Never    Drug use: Never    Sexual activity: Defer           Objective   Physical Exam  Vitals and nursing note reviewed.   Constitutional:       General: She is not in acute distress.     Appearance: She is obese. She is ill-appearing. She is not diaphoretic.   HENT:      Head: Normocephalic and atraumatic.      Right Ear: External ear normal.      Left Ear: External ear normal.      Nose: Nose normal.   Eyes:      Extraocular Movements: Extraocular movements intact.      Conjunctiva/sclera: Conjunctivae normal.      Pupils: Pupils are equal, round, and reactive to light.   Cardiovascular:      Rate and Rhythm: Regular rhythm. Tachycardia present.      Pulses: Normal pulses.      Heart sounds: Normal heart sounds.   Pulmonary:      Effort: No respiratory distress.      Breath sounds: Rhonchi (in all lung fields) present.   Chest:      Chest wall: No tenderness.   Abdominal:      General: Bowel sounds are normal. There is no distension.      Palpations: Abdomen is soft.      Tenderness: There is abdominal tenderness (diffuse tenderness on exam). There is no right CVA tenderness, left CVA tenderness, guarding or rebound.   Musculoskeletal:      Cervical back: Normal range of motion and neck supple. No rigidity or tenderness.   Lymphadenopathy:      Cervical: No cervical adenopathy.   Skin:     General: Skin is warm and dry.      Capillary Refill: Capillary refill takes less than 2 seconds.   Neurological:      General: No focal deficit present.      Mental Status: She is lethargic.      Comments: Disoriented to time, patient is frequently moaning and mumbling, she is confused         Central Line At Bedside    Date/Time: 11/25/2023 8:37 PM    Performed by: Lanodn Greene MD  Authorized by: Landon Greene MD    Consent:     Consent obtained:  Verbal     Consent given by:  Patient    Risks, benefits, and alternatives were discussed: yes      Risks discussed:  Arterial puncture, bleeding, incorrect placement, infection, nerve damage and pneumothorax    Alternatives discussed:  No treatment  Universal protocol:     Patient identity confirmed:  Arm band  Pre-procedure details:     Indication(s): central venous access and insufficient peripheral access      Skin preparation:  Chlorhexidine  Sedation:     Sedation type:  None  Anesthesia:     Anesthesia method:  Local infiltration    Local anesthetic:  Lidocaine 1% w/o epi  Procedure details:     Location:  R internal jugular    Patient position:  Supine    Procedural supplies:  Triple lumen    Catheter size:  7 Fr    Landmarks identified: yes      Ultrasound guidance: yes      Ultrasound guidance timing: prior to insertion and real time      Sterile ultrasound techniques: Sterile gel and sterile probe covers were used      Number of attempts:  1    Successful placement: yes    Post-procedure details:     Post-procedure:  Dressing applied and line sutured    Assessment:  Blood return through all ports, no pneumothorax on x-ray, free fluid flow and placement verified by x-ray    Procedure completion:  Tolerated         Results for orders placed or performed during the hospital encounter of 11/25/23   COVID-19 and FLU A/B PCR, 1 HR TAT - Swab, Nasopharynx    Specimen: Nasopharynx; Swab   Result Value Ref Range    COVID19 Not Detected Not Detected - Ref. Range    Influenza A PCR Not Detected Not Detected    Influenza B PCR Not Detected Not Detected   Comprehensive Metabolic Panel    Specimen: Blood   Result Value Ref Range    Glucose 121 (H) 65 - 99 mg/dL    BUN 77 (H) 8 - 23 mg/dL    Creatinine 3.26 (H) 0.57 - 1.00 mg/dL    Sodium 135 (L) 136 - 145 mmol/L    Potassium 3.6 3.5 - 5.2 mmol/L    Chloride 95 (L) 98 - 107 mmol/L    CO2 26.4 22.0 - 29.0 mmol/L    Calcium 7.7 (L) 8.6 - 10.5 mg/dL    Total Protein 6.0 6.0 - 8.5  g/dL    Albumin 2.4 (L) 3.5 - 5.2 g/dL    ALT (SGPT) 47 (H) 1 - 33 U/L    AST (SGOT) 105 (H) 1 - 32 U/L    Alkaline Phosphatase 295 (H) 39 - 117 U/L    Total Bilirubin 2.3 (H) 0.0 - 1.2 mg/dL    Globulin 3.6 gm/dL    A/G Ratio 0.7 g/dL    BUN/Creatinine Ratio 23.6 7.0 - 25.0    Anion Gap 13.6 5.0 - 15.0 mmol/L    eGFR 14.4 (L) >60.0 mL/min/1.73   Lactic Acid, Plasma    Specimen: Blood   Result Value Ref Range    Lactate 1.7 0.5 - 2.0 mmol/L   CBC Auto Differential    Specimen: Blood   Result Value Ref Range    WBC 21.10 (H) 3.40 - 10.80 10*3/mm3    RBC 3.46 (L) 3.77 - 5.28 10*6/mm3    Hemoglobin 9.3 (L) 12.0 - 15.9 g/dL    Hematocrit 29.0 (L) 34.0 - 46.6 %    MCV 83.8 79.0 - 97.0 fL    MCH 26.9 26.6 - 33.0 pg    MCHC 32.1 31.5 - 35.7 g/dL    RDW 17.7 (H) 12.3 - 15.4 %    RDW-SD 54.1 (H) 37.0 - 54.0 fl    MPV 8.7 6.0 - 12.0 fL    Platelets 418 140 - 450 10*3/mm3    Neutrophil % 84.0 (H) 42.7 - 76.0 %    Lymphocyte % 9.9 (L) 19.6 - 45.3 %    Monocyte % 4.7 (L) 5.0 - 12.0 %    Eosinophil % 0.1 (L) 0.3 - 6.2 %    Basophil % 0.4 0.0 - 1.5 %    Immature Grans % 0.9 (H) 0.0 - 0.5 %    Neutrophils, Absolute 17.73 (H) 1.70 - 7.00 10*3/mm3    Lymphocytes, Absolute 2.09 0.70 - 3.10 10*3/mm3    Monocytes, Absolute 0.99 (H) 0.10 - 0.90 10*3/mm3    Eosinophils, Absolute 0.02 0.00 - 0.40 10*3/mm3    Basophils, Absolute 0.09 0.00 - 0.20 10*3/mm3    Immature Grans, Absolute 0.18 (H) 0.00 - 0.05 10*3/mm3    nRBC 0.0 0.0 - 0.2 /100 WBC   Protime-INR    Specimen: Blood   Result Value Ref Range    Protime 25.9 (H) 12.1 - 14.7 Seconds    INR 2.29 (H) 0.90 - 1.10   aPTT    Specimen: Blood   Result Value Ref Range    PTT 38.5 (H) 26.5 - 34.5 seconds   Lipase    Specimen: Blood   Result Value Ref Range    Lipase 31 13 - 60 U/L   Amylase    Specimen: Blood   Result Value Ref Range    Amylase 29 28 - 100 U/L   Urinalysis With Culture If Indicated - Urine, Catheter    Specimen: Urine, Catheter   Result Value Ref Range    Color, UA Dark Yellow  (A) Yellow, Straw    Appearance, UA Cloudy (A) Clear    pH, UA <=5.0 5.0 - 8.0    Specific Gravity, UA 1.018 1.005 - 1.030    Glucose, UA Negative Negative    Ketones, UA Trace (A) Negative    Bilirubin, UA Moderate (2+) (A) Negative    Blood, UA Large (3+) (A) Negative    Protein, UA 30 mg/dL (1+) (A) Negative    Leuk Esterase, UA Moderate (2+) (A) Negative    Nitrite, UA Positive (A) Negative    Urobilinogen, UA 1.0 E.U./dL 0.2 - 1.0 E.U./dL   High Sensitivity Troponin T    Specimen: Blood   Result Value Ref Range    HS Troponin T 131 (C) <14 ng/L   Procalcitonin    Specimen: Blood   Result Value Ref Range    Procalcitonin 0.29 (H) 0.00 - 0.25 ng/mL   Sedimentation Rate    Specimen: Blood   Result Value Ref Range    Sed Rate 18 0 - 30 mm/hr   C-reactive Protein    Specimen: Blood   Result Value Ref Range    C-Reactive Protein 8.52 (H) 0.00 - 0.50 mg/dL   Magnesium    Specimen: Blood   Result Value Ref Range    Magnesium 1.9 1.6 - 2.4 mg/dL   Urinalysis, Microscopic Only - Urine, Catheter    Specimen: Urine, Catheter   Result Value Ref Range    RBC, UA Too Numerous to Count (A) None Seen, 0-2 /HPF    WBC, UA Too Numerous to Count (A) None Seen, 0-2 /HPF    Bacteria, UA 2+ (A) None Seen /HPF    Squamous Epithelial Cells, UA 7-12 (A) None Seen, 0-2 /HPF    Hyaline Casts, UA 3-6 None Seen /LPF    Granular Casts, UA 3-6 None Seen /LPF    Methodology Manual Light Microscopy    High Sensitivity Troponin T 2Hr    Specimen: Hand, Right; Blood   Result Value Ref Range    HS Troponin T 113 (C) <14 ng/L    Troponin T Delta -18 (L) >=-4 - <+4 ng/L   Blood Gas, Arterial With Co-Ox    Specimen: Arterial Blood   Result Value Ref Range    Site Left Brachial     Bernardo's Test N/A     pH, Arterial 7.457 (H) 7.350 - 7.450 pH units    pCO2, Arterial 34.8 (L) 35.0 - 45.0 mm Hg    pO2, Arterial 82.3 (L) 83.0 - 108.0 mm Hg    HCO3, Arterial 24.6 20.0 - 26.0 mmol/L    Base Excess, Arterial 0.9 0.0 - 2.0 mmol/L    O2 Saturation, Arterial  97.1 94.0 - 99.0 %    Hemoglobin, Blood Gas 9.5 (L) 13.5 - 17.5 g/dL    Hematocrit, Blood Gas 29.0 (L) 38.0 - 51.0 %    Oxyhemoglobin 96.0 94 - 99 %    Methemoglobin 0.00 0.00 - 3.00 %    Carboxyhemoglobin 1.1 0 - 5 %    A-a DO2 124.6 0.0 - 300.0 mmHg    CO2 Content 25.7 22 - 33 mmol/L    Barometric Pressure for Blood Gas 729 mmHg    Modality Nasal Cannula     FIO2 36 %    Flow Rate 4.0 lpm    Ventilator Mode NA     Collected by [id number]     pH, Temp Corrected      pCO2, Temperature Corrected      pO2, Temperature Corrected     Bilirubin, Direct    Specimen: Hand, Right; Blood   Result Value Ref Range    Bilirubin, Direct 1.8 (H) 0.0 - 0.3 mg/dL   ECG 12 Lead Tachycardia   Result Value Ref Range    QT Interval 324 ms    QTC Interval 450 ms   Type & Screen    Specimen: Blood   Result Value Ref Range    ABO Type B     RH type Positive     Antibody Screen Positive     T&S Expiration Date 11/28/2023 11:59:59 PM    [place] Antibody Screen    Specimen: Blood   Result Value Ref Range    Elution Negative    PENNIE, IgG    Specimen: Blood   Result Value Ref Range    PENNIE IgG Positive    Antibody Identification    Specimen: Blood   Result Value Ref Range    Anti-Fya ANTI-FYA    Prepare RBC, 1 Units   Result Value Ref Range    Product Code V7127Q05     Unit Number [id number]     UNIT  ABO B     UNIT  RH POS     Crossmatch Interpretation Compatible     Dispense Status XM     Blood Expiration Date [id number]     Blood Type Barcode 7300    Green Top (Gel)   Result Value Ref Range    Extra Tube Hold for add-ons.    Lavender Top   Result Value Ref Range    Extra Tube hold for add-on    Gold Top - SST   Result Value Ref Range    Extra Tube Hold for add-ons.    Light Blue Top   Result Value Ref Range    Extra Tube Hold for add-ons.     SEPTIC SHOCK FOCUSED EXAM ATTESTATION    I attest that I have reassessed tissue perfusion after the fluid bolus given.    Landon Greene MD  11/25/23  22:46 EST       ED Course  ED Course as of  11/25/23 2246   Sat Nov 25, 2023   1605 Patient noted to have a loose black bowel movement [AH]   1747 XR Chest 1 View  IMPRESSION:     1.  Mild enlarged heart size  2.  Hypoinflated lungs.  3.  Mild elevated right hemidiaphragm with atelectasis at the lung  bases.  4.  Coarsened bronchovascular pattern to the lungs with features of mild  central pulmonary vascular congestion and mild interstitial edema.  5.  Sternotomy.  6.  Surgical clips near the left hilum.  7.  No pneumothorax.   [AH]   1906 Awaiting reading on CT scans [AH]   1956 Patient noted to have episodes of hypotension despite receiving the sepsis bolus.  She will be started on Levophed. [AH]   1956 Endorsed to Dr. Greene []   2036 Patient notably hypotensive have initiated Levophed.  Have subsequent placed central line to right IJ without difficulty.  Patient taught procedure well.  Patient with multifocal pneumonia as well as urinary tract infection upon review of imaging as well as labs.  Have contacted hospitalist waiting callback. [BB]   2104 Have spoken to Dr. Lombardi with hospitalist who desires patient to have MRCP prior to admission [BB]   2123 Have noted that patient is on Levophed pump is not compatible for patient to receive MRI have contacted [facility] awaiting callback. [BB]   2148 Have spoken to Dr. Augustin with  MDs who is agreeable to accept transfer [BB]      ED Course User Index  [AH] Snow Cantu PA  [BB] Landon Greene MD                                           Medical Decision Making  73-year-old female who presents to the ED today from [facility] with a concern of being septic.  She was found to have a white blood cell count of 22.79 and a CRP of 8.24 today.  She also had a UTI on her labs today with urine culture pending.  She was given 1 g of Invanz IM today.  She has also been on cefdinir 300 mg twice a day for pneumonia for the last 4 to 5 days.  The patient currently reports that she hurts  everywhere.  She states she has had nausea and vomiting.  She denies any diarrhea.  She is alert to person and place.         Problems Addressed:  Acute UTI: complicated acute illness or injury  Bronchial obstruction: complicated acute illness or injury  Cholecystitis: complicated acute illness or injury  Sepsis with acute renal failure and septic shock, due to unspecified organism, unspecified acute renal failure type: complicated acute illness or injury    Amount and/or Complexity of Data Reviewed  External Data Reviewed: labs and radiology.  Labs: ordered.  Radiology: ordered. Decision-making details documented in ED Course.  ECG/medicine tests: ordered.  Discussion of management or test interpretation with external provider(s): Results for orders placed or performed during the hospital encounter of 11/25/23  -COVID-19 and FLU A/B PCR, 1 HR TAT - Swab, Nasopharynx:   Specimen: Nasopharynx; Swab       Result                      Value             Ref Range           COVID19                     Not Detected      Not Detected*       Influenza A PCR             Not Detected      Not Detected        Influenza B PCR             Not Detected      Not Detected   -Comprehensive Metabolic Panel:   Specimen: Blood       Result                      Value             Ref Range           Glucose                     121 (H)           65 - 99 mg/dL       BUN                         77 (H)            8 - 23 mg/dL        Creatinine                  3.26 (H)          0.57 - 1.00 *       Sodium                      135 (L)           136 - 145 mm*       Potassium                   3.6               3.5 - 5.2 mm*       Chloride                    95 (L)            98 - 107 mmo*       CO2                         26.4              22.0 - 29.0 *       Calcium                     7.7 (L)           8.6 - 10.5 m*       Total Protein               6.0               6.0 - 8.5 g/*       Albumin                     2.4 (L)           3.5 - 5.2  g/*       ALT (SGPT)                  47 (H)            1 - 33 U/L          AST (SGOT)                  105 (H)           1 - 32 U/L          Alkaline Phosphatase        295 (H)           39 - 117 U/L        Total Bilirubin             2.3 (H)           0.0 - 1.2 mg*       Globulin                    3.6               gm/dL               A/G Ratio                   0.7               g/dL                BUN/Creatinine Ratio        23.6              7.0 - 25.0          Anion Gap                   13.6              5.0 - 15.0 m*       eGFR                        14.4 (L)          >60.0 mL/min*  -Lactic Acid, Plasma:   Specimen: Blood       Result                      Value             Ref Range           Lactate                     1.7               0.5 - 2.0 mm*  -CBC Auto Differential:   Specimen: Blood       Result                      Value             Ref Range           WBC                         21.10 (H)         3.40 - 10.80*       RBC                         3.46 (L)          3.77 - 5.28 *       Hemoglobin                  9.3 (L)           12.0 - 15.9 *       Hematocrit                  29.0 (L)          34.0 - 46.6 %       MCV                         83.8              79.0 - 97.0 *       MCH                         26.9              26.6 - 33.0 *       MCHC                        32.1              31.5 - 35.7 *       RDW                         17.7 (H)          12.3 - 15.4 %       RDW-SD                      54.1 (H)          37.0 - 54.0 *       MPV                         8.7               6.0 - 12.0 fL       Platelets                   418               140 - 450 10*       Neutrophil %                84.0 (H)          42.7 - 76.0 %       Lymphocyte %                9.9 (L)           19.6 - 45.3 %       Monocyte %                  4.7 (L)           5.0 - 12.0 %        Eosinophil %                0.1 (L)           0.3 - 6.2 %         Basophil %                  0.4               0.0 - 1.5 %          Immature Grans %            0.9 (H)           0.0 - 0.5 %         Neutrophils, Absolute       17.73 (H)         1.70 - 7.00 *       Lymphocytes, Absolute       2.09              0.70 - 3.10 *       Monocytes, Absolute         0.99 (H)          0.10 - 0.90 *       Eosinophils, Absolute       0.02              0.00 - 0.40 *       Basophils, Absolute         0.09              0.00 - 0.20 *       Immature Grans, Absolu*     0.18 (H)          0.00 - 0.05 *       nRBC                        0.0               0.0 - 0.2 /1*  -Protime-INR:   Specimen: Blood       Result                      Value             Ref Range           Protime                     25.9 (H)          12.1 - 14.7 *       INR                         2.29 (H)          0.90 - 1.10    -aPTT:   Specimen: Blood       Result                      Value             Ref Range           PTT                         38.5 (H)          26.5 - 34.5 *  -Lipase:   Specimen: Blood       Result                      Value             Ref Range           Lipase                      31                13 - 60 U/L    -Amylase:   Specimen: Blood       Result                      Value             Ref Range           Amylase                     29                28 - 100 U/L   -Urinalysis With Culture If Indicated - Urine, Catheter:   Specimen: Urine, Catheter       Result                      Value             Ref Range           Color, UA                                     Yellow, Straw   Dark Yellow (A)       Appearance, UA              Cloudy (A)        Clear               pH, UA                      <=5.0             5.0 - 8.0           Specific Tea, UA        1.018             1.005 - 1.030       Glucose, UA                 Negative          Negative            Ketones, UA                 Trace (A)         Negative            Bilirubin, UA                                 Negative        Moderate (2+) (A)       Blood, UA                                     Negative         Large (3+) (A)       Protein, UA                                   Negative        30 mg/dL (1+) (A)       Leuk Esterase, UA                             Negative        Moderate (2+) (A)       Nitrite, UA                 Positive (A)      Negative            Urobilinogen, UA            1.0 E.U./dL       0.2 - 1.0 E.*  -High Sensitivity Troponin T:   Specimen: Blood       Result                      Value             Ref Range           HS Troponin T               131 (C)           <14 ng/L       -Procalcitonin:   Specimen: Blood       Result                      Value             Ref Range           Procalcitonin               0.29 (H)          0.00 - 0.25 *  -Sedimentation Rate:   Specimen: Blood       Result                      Value             Ref Range           Sed Rate                    18                0 - 30 mm/hr   -C-reactive Protein:   Specimen: Blood       Result                      Value             Ref Range           C-Reactive Protein          8.52 (H)          0.00 - 0.50 *  -Magnesium:   Specimen: Blood       Result                      Value             Ref Range           Magnesium                   1.9               1.6 - 2.4 mg*  -Urinalysis, Microscopic Only - Urine, Catheter:   Specimen: Urine, Catheter       Result                      Value             Ref Range           RBC, UA                                       None Seen, 0*   Too Numerous to Count (A)       WBC, UA                                       None Seen, 0*   Too Numerous to Count (A)       Bacteria, UA                2+ (A)            None Seen /H*       Squamous Epithelial Ce*     7-12 (A)          None Seen, 0*       Hyaline Casts, UA           3-6               None Seen /L*       Granular Casts, UA          3-6               None Seen /L*       Methodology                                                   Manual Light Microscopy  -High Sensitivity Troponin T 2Hr:   Specimen: Hand, Right; Blood       Result                       Value             Ref Range           HS Troponin T               113 (C)           <14 ng/L            Troponin T Delta            -18 (L)           >=-4 - <+4 n*  -Blood Gas, Arterial With Co-Ox:   Specimen: Arterial Blood       Result                      Value             Ref Range           Site                        Left Brachial                         Bernardo's Test                N/A                                   pH, Arterial                7.457 (H)         7.350 - 7.45*       pCO2, Arterial              34.8 (L)          35.0 - 45.0 *       pO2, Arterial               82.3 (L)          83.0 - 108.0*       HCO3, Arterial              24.6              20.0 - 26.0 *       Base Excess, Arterial       0.9               0.0 - 2.0 mm*       O2 Saturation, Arterial     97.1              94.0 - 99.0 %       Hemoglobin, Blood Gas       9.5 (L)           13.5 - 17.5 *       Hematocrit, Blood Gas       29.0 (L)          38.0 - 51.0 %       Oxyhemoglobin               96.0              94 - 99 %           Methemoglobin               0.00              0.00 - 3.00 %       Carboxyhemoglobin           1.1               0 - 5 %             A-a DO2                     124.6             0.0 - 300.0 *       CO2 Content                 25.7              22 - 33 mmol*       Barometric Pressure fo*     729               mmHg                Modality                    Nasal Cannula                         FIO2                        36                %                   Flow Rate                   4.0               lpm                 Ventilator Mode             NA                                    Collected by                [id number]                                pH, Temp Corrected                                                pCO2, Temperature Tammi*                                           pO2, Temperature Corre*                                      -Bilirubin, Direct:   Specimen: Hand, Right; Blood        Result                      Value             Ref Range           Bilirubin, Direct           1.8 (H)           0.0 - 0.3 mg*  -ECG 12 Lead Tachycardia:        Result                      Value             Ref Range           QT Interval                 324               ms                  QTC Interval                450               ms             -Type & Screen:   Specimen: Blood       Result                      Value             Ref Range           ABO Type                    B                                     RH type                     Positive                              Antibody Screen             Positive                              T&S Expiration Date                                           11/28/2023 11:59:59 PM  -[place] Antibody Screen:   Specimen: Blood       Result                      Value             Ref Range           El[place]                     Negative                         -PENNIE, IgG:   Specimen: Blood       Result                      Value             Ref Range           PENNIE IgG                     Positive                         -Antibody Identification:   Specimen: Blood       Result                      Value             Ref Range           Anti-Fya                    ANTI-FYA                         -Green Top (Gel):        Result                      Value             Ref Range           Extra Tube                                                    Hold for add-ons.  -Lavender Top:        Result                      Value             Ref Range           Extra Tube                                                    hold for add-on  -Gold Top - SST:        Result                      Value             Ref Range           Extra Tube                                                    Hold for add-ons.  -Light Blue Top:        Result                      Value             Ref Range           Extra Tube                                                    Hold for add-ons..  XR Chest  AP    Result Date: 11/25/2023  FINDINGS/IMPRESSION: Central venous catheter tip in the superior vena cava. No pneumothorax. Cardiomegaly. Elevation of the right hemidiaphragm. Atelectasis in the lung bases. No acute fracture.   This report was finalized on 11/25/2023 9:46 PM by Alex Pallas, DO.      CT Abdomen Pelvis Without Contrast    Result Date: 11/25/2023  Impression: 1. Distended gallbladder with sludge and cholelithiasis, further evaluation if warranted. 2. Bladder wall thickening may be from incomplete distention, correlate to exclude cystitis. 3. Chronic findings detailed above.   This report was finalized on 11/25/2023 8:09 PM by Yash Lama DO.      CT Chest Without Contrast Diagnostic    Result Date: 11/25/2023  Impression: 1. Pneumonia left lower lung with nonspecific groundglass appearance of the lungs. 2. Large area of atelectasis right lower lung with bronchial impaction, consider aspiration.   This report was finalized on 11/25/2023 8:09 PM by Yash Lama DO.      CT Head Without Contrast    Result Date: 11/25/2023  Impression: 1. No acute intracranial process.   This report was finalized on 11/25/2023 8:08 PM by Yash Lama DO.      XR Chest 1 View    Result Date: 11/25/2023   1.  Mild enlarged heart size 2.  Hypoinflated lungs. 3.  Mild elevated right hemidiaphragm with atelectasis at the lung bases. 4.  Coarsened bronchovascular pattern to the lungs with features of mild central pulmonary vascular congestion and mild interstitial edema. 5.  Sternotomy. 6.  Surgical clips near the left hilum. 7.  No pneumothorax.  This report was finalized on 11/25/2023 5:33 PM by Marshal Garza MD.          Risk  Prescription drug management.    Critical Care  Total time providing critical care: 35 minutes        Final diagnoses:   Sepsis with acute renal failure and septic shock, due to unspecified organism, unspecified acute renal failure type   Acute UTI   Cholecystitis   Bronchial obstruction    Electronically signed by ROSS Scott, 11/25/23, 7:57 PM EST.     ED Disposition  ED Disposition       ED Disposition   Transfer to Another Facility     Condition   --    Comment   --               No follow-up provider specified.       Medication List      No changes were made to your prescriptions during this visit.            Landon Greene MD  11/25/23 0845

## 2023-11-26 LAB
BACTERIA SPEC AEROBE CULT: ABNORMAL
BACTERIA SPEC AEROBE CULT: ABNORMAL
BH BB BLOOD EXPIRATION DATE: NORMAL
BH BB BLOOD TYPE BARCODE: 7300
BH BB DISPENSE STATUS: NORMAL
BH BB PRODUCT CODE: NORMAL
BH BB UNIT NUMBER: NORMAL
CROSSMATCH INTERPRETATION: NORMAL
QT INTERVAL: 324 MS
QTC INTERVAL: 450 MS
UNIT  ABO: NORMAL
UNIT  RH: NORMAL

## 2023-11-26 NOTE — ED NOTES
[facility] here to transport patient at this time. Bedside report given to [facility] nurse.

## 2023-11-26 NOTE — ED NOTES
Nicole called back with [facility]. She states [facility] can not transport due to [place], [facility] is not available at this time, and no other air [facility] can transport.

## 2023-11-26 NOTE — ED NOTES
Patient is currently departing from Emergency Department at this time clean and dry in gown. Patient is stable at time of departure. There is no acute distress that has been noted at this time. No new complaints have been reported by patient at this time. Ivs and central line is patent and infusing at this time. Protonix is infusing @8mg/hr in the right hand and well as NS @125mL/hr. Paient also has Levophed infusing at 0.06mcg/minute in the central line. Patient has been updated and has been made aware of her need to transfer to external facility for a higher level of care.

## 2023-11-26 NOTE — ED NOTES
Patient changed and is clean and dry at this time. Tarry, black, watery stool is noted at this time. Provider had been made aware.

## 2023-11-30 LAB
BACTERIA SPEC AEROBE CULT: NORMAL
BACTERIA SPEC AEROBE CULT: NORMAL

## 2024-12-23 NOTE — PROGRESS NOTES
Subjective    Jolly Garcia presents for the following Pulmonary Hypertension      History of Present Illness   Were you born premature?  no    Any Childhood infections? no      Breathing problems when you were a child? yes    Any childhood allergies?    no             At what age did you begin smoking? 18    Smoking marijuana? no    Any IV drugs? no    How many packs per day? 0    Lung Function Test? no  Chest X-Ray? no    CT Chest? no Allergy Test? no    Family hx of Lung disease or Lung Cancer?no    If FHx is posivitive for lung cancer, what is the relationship of the family member? None    Any hospitalization in the last year? yes    How far can you walk without getting short of breath? Wheelchair    Any coughing? yes    Any wheezing? no    Acid Reflux? no    Do you snore? no    Daytime Fatigue? yes    Any pets? no   Any pet allergies? no    Occupation? Retired    Have you been exposed to any chemicals at your job? no    What inhalers are you currently using? None    Have you had the Influenza Vaccine? yes    Would you like to receive this Vaccine today? no    Have you had the Pneumonia Vaccine?  yes   Would you like to receive this Vaccine today? no  Above-mentioned questionnaire was reviewed by me in great detail.  Patient is here for evaluation of her sleep issues.  Has cough shortness of breath especially in the nighttime.  Has daytime fatigue and tiredness.  Review of Systems   Constitutional: Positive for fatigue. Negative for activity change, appetite change, chills and unexpected weight change.   HENT: Negative for congestion, postnasal drip and rhinorrhea.    Respiratory: Positive for cough and shortness of breath. Negative for apnea, chest tightness and wheezing.    Cardiovascular: Negative for chest pain, palpitations and leg swelling.   Gastrointestinal: Negative for nausea.   Musculoskeletal: Negative for gait problem.   Skin: Negative for pallor.   Allergic/Immunologic: Negative for  environmental allergies.   Neurological: Negative for syncope.   Psychiatric/Behavioral: Negative for confusion. The patient is not nervous/anxious.        Active Problems:  Problem List Items Addressed This Visit        Endocrine and Metabolic    Class 3 severe obesity due to excess calories with body mass index (BMI) of 50.0 to 59.9 in adult (HCC)       Pulmonary and Pneumonias    Chronic respiratory failure with hypoxia ([facility])    Overview     Wears 2 L/min at home            Sleep    JESIKA (obstructive sleep apnea) - Primary    Relevant Orders    Home Sleep Study       Symptoms and Signs    Physical debility       Past Medical History:  Past Medical History:   Diagnosis Date   • Anxiety    • CAD (coronary artery disease)     s/p CABG   • Chronic hypoxemic respiratory failure (HCC)     Wears 2 L/min at home   • Essential hypertension    • Hyperlipidemia    • Type 2 diabetes mellitus ([facility])        Family History:  Family History   Problem Relation Age of Onset   • Diabetes Mother        Social History:  Social History     Tobacco Use   • Smoking status: Former   • Smokeless tobacco: Not on file   Substance Use Topics   • Alcohol use: Never       Current Medications:  Current Outpatient Medications   Medication Sig Dispense Refill   • acetaminophen (TYLENOL) 500 MG tablet Take 500 mg by mouth Every 6 (Six) Hours As Needed for Mild Pain.     • aspirin 81 MG chewable tablet Chew 81 mg Daily.     • atorvastatin (LIPITOR) 20 MG tablet Take 20 mg by mouth Daily.     • bumetanide (BUMEX) 1 MG tablet Take 1 mg by mouth Daily.     • carvedilol (COREG) 6.25 MG tablet Take 1 tablet by mouth 2 (Two) Times a Day With Meals. 180 tablet 0   • Coenzyme Q10 (CoQ10) 50 MG capsule Take 50 mg by mouth.     • DULoxetine (CYMBALTA) 30 MG capsule Take 1 capsule by mouth Daily. 90 capsule 0   • empagliflozin (Jardiance) 10 MG tablet tablet Take  by mouth Daily.     • fluticasone (FLONASE) 50 MCG/ACT nasal spray 1 spray into the nostril(s) as  directed by provider Daily.     • folic acid (FOLVITE) 1 MG tablet Take 1 tablet by mouth Daily. 30 tablet 0   • hydrALAZINE (APRESOLINE) 25 MG tablet Take 25 mg by mouth 3 (Three) Times a Day.     • insulin aspart (novoLOG FLEXPEN) 100 UNIT/ML solution pen-injector sc pen Inject  under the skin into the appropriate area as directed 3 (Three) Times a Day With Meals.     • Insulin Glargine (Lantus SoloStar) 100 UNIT/ML injection pen Inject  under the skin into the appropriate area as directed.     • iron polysacch complex-B12-VitC-FA-Succ (Ferrex 150 Forte Plus)  MG capsule capsule Take  by mouth Daily With Breakfast.     • isosorbide mononitrate (IMDUR) 30 MG 24 hr tablet Take 1 tablet by mouth Daily. 90 tablet 0   • latanoprost (XALATAN) 0.005 % ophthalmic solution 1 drop Every Night.     • linaclotide (LINZESS) 72 MCG capsule capsule Take 1 capsule by mouth Every Morning Before Breakfast.     • losartan (COZAAR) 25 MG tablet Take 25 mg by mouth Daily.     • multivitamin with minerals (Ocuvite Extra) tablet tablet Take 1 tablet by mouth Daily.     • ondansetron ODT (ZOFRAN-ODT) 4 MG disintegrating tablet Place 4 mg on the tongue Every 8 (Eight) Hours As Needed for Nausea or Vomiting.     • pantoprazole (PROTONIX) 40 MG EC tablet Take 1 tablet by mouth Every Morning. 90 tablet 0   • Semaglutide,0.25 or 0.5MG/DOS, (Ozempic, 0.25 or 0.5 MG/DOSE,) 2 MG/1.5ML solution pen-injector Inject  under the skin into the appropriate area as directed 1 (One) Time Per Week.     • sodium chloride 0.65 % nasal spray 1 spray into the nostril(s) as directed by provider As Needed for Congestion.     • sodium zirconium cyclosilicate (Lokelma) 10 g pack Take 10 g by mouth.     • amLODIPine (NORVASC) 10 MG tablet Take 10 mg by mouth Daily.     • Insulin NPH Isophane & Regular (70-30) 100 UNIT/ML suspension pen-injector Inject 10 Units under the skin into the appropriate area as directed 2 (Two) Times a Day. 15 mL 0   • iron  polysaccharides (NIFEREX) 150 MG capsule Take 1 capsule by mouth Daily. 30 capsule 0   • Tirzepatide 2.5 MG/0.5ML solution pen-injector Inject  under the skin into the appropriate area as directed 1 (One) Time Per Week.     • vitamin B-12 (CYANOCOBALAMIN) 1000 MCG tablet Take 1 tablet by mouth Daily. 30 tablet 0     No current facility-administered medications for this visit.       Allergies:  No Known Allergies    Vitals:  /72   Pulse 65   Temp 97.5 °F (36.4 °C) (Temporal)   Ht 167.6 cm (66\")   Wt (!) 142 kg (313 lb)   SpO2 99%   BMI 50.52 kg/m²     Imaging:    Imaging Results (Most Recent)     None          Pulmonary Functions Testing Results:    No results found for: FEV1, FVC, GFR7LUD, TLC, DLCO    Results for orders placed or performed in visit on 12/03/22   Protein, Urine, 24 Hour    Specimen: Urine, Clean Catch; 24 Hour Urine   Result Value Ref Range    Protein, 24H Urine 633.2 (H) 0.0 - 150.0 mg/24hours    24H Urine Volume 1,350 mL    Time (Hours) 24 hrs       Objective   Physical Exam  General- obese in appearance, not in any acute distress    HEENT- pupils equally reactive to light, normal in size, no scleral icterus    Neck-supple    Respiratory-respirations normal-on auscultation no wheezing no crackles,     Cardiovascular-  Normal S1 and S2. No S3, S4 or murmurs. No JVD, no carotid bruit and no edema, pulses normal bilaterally     GI-nontender nondistended bowel sounds positive    CNS-nonfocal    Musculoskeletal -no edema  Extremities- no obvious deformity noticed     Psychiatric-mood good, good eye contact, alert awake oriented  Skin- no visible rash       Assessment & Plan    Daytime fatigue and [id number] will get sleep study.  Treat accordingly.    was educated about ill health effects of sleep apnea and what all effects it can have on health in long-term.  Which includes blood clots, arrhythmias, stroke, depression, hypothyroidism. was also educated about the pathophysiology of  sleep apnea and how weight contributes in it.  Patient understood the conversation well and will try and do exercise and eat right kind of food to lose weight.    Physical debility- encouraged her to start physical therapy.    Morbid obesity with BMI of 50.5 to    Did diet and exercise counseling.  Encouraged her to lose weight    Abnormal chest x-ray- CT chest and chest x-ray reviewed.  Showed restrictive lung disease likely due to patient's body habitus and bilateral pleural effusions.  Patient has CKD.  Recommend decreasing the salt intake and water intake.  Recommend diuretics.  Patient sees a nephrologist.  I advised her to seek consultation with a nephrologist to see if she can take diuretics for few days.      Hypoxic respiratory failure-chronic- stopped on oxygen for 10 minutes and checked her pulse ox which was around 92% on rest.  After deep breathing was 96 to 97%.  We will continue oxygen while ambulation and recommend discontinuing it during resting phase.  We will get PFTs to look for any diffusion abnormality.      ICD-10-CM ICD-9-CM   1. JESIKA (obstructive sleep apnea)  G47.33 327.23   2. Physical debility  R53.81 799.3   3. Class 3 severe obesity due to excess calories with body mass index (BMI) of 50.0 to 59.9 in adult, unspecified whether serious comorbidity present ([facility])  E66.01 278.01    Z68.43 V85.43   4. Chronic respiratory failure with hypoxia ([facility])  J96.11 518.83     799.02       No follow-ups on file.         FAMILY HISTORY:  FH: hypertension, mother, father- alive  FH: type 2 diabetes, mother, father- alive

## 2025-03-25 NOTE — ED NOTES
Consent signed per patient for blood transfusion at this time.   
DAYANA done @ 6722 and shown to Dr. Molina @ 3732.  
DISCHARGE